# Patient Record
Sex: MALE | Race: WHITE | NOT HISPANIC OR LATINO | Employment: OTHER | URBAN - METROPOLITAN AREA
[De-identification: names, ages, dates, MRNs, and addresses within clinical notes are randomized per-mention and may not be internally consistent; named-entity substitution may affect disease eponyms.]

---

## 2017-01-11 DIAGNOSIS — I48.0 PAROXYSMAL ATRIAL FIBRILLATION (HCC): ICD-10-CM

## 2017-01-11 RX ORDER — WARFARIN SODIUM 5 MG/1
TABLET ORAL
Qty: 60 TABLET | Refills: 1 | Status: SHIPPED | OUTPATIENT
Start: 2017-01-11 | End: 2017-03-11 | Stop reason: SDUPTHER

## 2017-02-16 ENCOUNTER — TELEPHONE (OUTPATIENT)
Dept: CARDIOLOGY | Facility: CLINIC | Age: 62
End: 2017-02-16

## 2017-02-24 ENCOUNTER — ANTICOAGULATION VISIT (OUTPATIENT)
Dept: CARDIOLOGY | Facility: CLINIC | Age: 62
End: 2017-02-24

## 2017-02-24 DIAGNOSIS — I35.9 AORTIC VALVE DISORDERS: ICD-10-CM

## 2017-02-24 LAB — INR PPP: 2.9

## 2017-03-11 DIAGNOSIS — I48.0 PAROXYSMAL ATRIAL FIBRILLATION (HCC): ICD-10-CM

## 2017-03-13 RX ORDER — WARFARIN SODIUM 5 MG/1
TABLET ORAL
Qty: 60 TABLET | Refills: 0 | Status: SHIPPED | OUTPATIENT
Start: 2017-03-13 | End: 2017-04-13 | Stop reason: SDUPTHER

## 2017-04-06 ENCOUNTER — TELEPHONE (OUTPATIENT)
Dept: CARDIOLOGY | Facility: CLINIC | Age: 62
End: 2017-04-06

## 2017-04-13 ENCOUNTER — ANTICOAGULATION VISIT (OUTPATIENT)
Dept: CARDIOLOGY | Facility: CLINIC | Age: 62
End: 2017-04-13

## 2017-04-13 DIAGNOSIS — I48.0 PAROXYSMAL ATRIAL FIBRILLATION (HCC): ICD-10-CM

## 2017-04-13 DIAGNOSIS — I35.9 AORTIC VALVE DISORDERS: ICD-10-CM

## 2017-04-13 LAB — INR PPP: 2.5

## 2017-04-13 RX ORDER — WARFARIN SODIUM 5 MG/1
TABLET ORAL
Qty: 60 TABLET | Refills: 0 | Status: SHIPPED | OUTPATIENT
Start: 2017-04-13 | End: 2017-05-14 | Stop reason: SDUPTHER

## 2017-05-14 DIAGNOSIS — I48.0 PAROXYSMAL ATRIAL FIBRILLATION (HCC): ICD-10-CM

## 2017-05-15 RX ORDER — WARFARIN SODIUM 5 MG/1
TABLET ORAL
Qty: 60 TABLET | Refills: 0 | Status: SHIPPED | OUTPATIENT
Start: 2017-05-15 | End: 2017-06-12 | Stop reason: SDUPTHER

## 2017-06-12 DIAGNOSIS — I48.0 PAROXYSMAL ATRIAL FIBRILLATION (HCC): ICD-10-CM

## 2017-06-12 RX ORDER — WARFARIN SODIUM 5 MG/1
TABLET ORAL
Qty: 15 TABLET | Refills: 0 | Status: SHIPPED | OUTPATIENT
Start: 2017-06-12 | End: 2017-06-20 | Stop reason: SDUPTHER

## 2017-06-17 DIAGNOSIS — I48.0 PAROXYSMAL ATRIAL FIBRILLATION (HCC): ICD-10-CM

## 2017-06-19 RX ORDER — WARFARIN SODIUM 5 MG/1
TABLET ORAL
Qty: 15 TABLET | Refills: 0 | OUTPATIENT
Start: 2017-06-19

## 2017-06-20 ENCOUNTER — ANTICOAGULATION VISIT (OUTPATIENT)
Dept: CARDIOLOGY | Facility: CLINIC | Age: 62
End: 2017-06-20

## 2017-06-20 DIAGNOSIS — I48.0 PAROXYSMAL ATRIAL FIBRILLATION (HCC): ICD-10-CM

## 2017-06-20 DIAGNOSIS — I35.9 AORTIC VALVE DISORDERS: ICD-10-CM

## 2017-06-20 LAB — INR PPP: 2.5

## 2017-06-20 RX ORDER — WARFARIN SODIUM 5 MG/1
TABLET ORAL
Qty: 60 TABLET | Refills: 1 | Status: SHIPPED | OUTPATIENT
Start: 2017-06-20 | End: 2017-10-26 | Stop reason: SDUPTHER

## 2017-06-20 RX ORDER — WARFARIN SODIUM 5 MG/1
TABLET ORAL
Qty: 60 TABLET | Refills: 1 | Status: SHIPPED | OUTPATIENT
Start: 2017-06-20 | End: 2017-06-20 | Stop reason: SDUPTHER

## 2017-07-06 ENCOUNTER — OFFICE VISIT (OUTPATIENT)
Dept: CARDIOLOGY | Facility: CLINIC | Age: 62
End: 2017-07-06

## 2017-07-06 VITALS
HEART RATE: 75 BPM | WEIGHT: 315 LBS | HEIGHT: 75 IN | DIASTOLIC BLOOD PRESSURE: 63 MMHG | BODY MASS INDEX: 39.17 KG/M2 | SYSTOLIC BLOOD PRESSURE: 124 MMHG

## 2017-07-06 DIAGNOSIS — I35.0 AORTIC VALVE STENOSIS, UNSPECIFIED ETIOLOGY: Primary | ICD-10-CM

## 2017-07-06 DIAGNOSIS — I10 ESSENTIAL HYPERTENSION: ICD-10-CM

## 2017-07-06 PROCEDURE — 99213 OFFICE O/P EST LOW 20 MIN: CPT | Performed by: INTERNAL MEDICINE

## 2017-07-06 RX ORDER — GLIPIZIDE 5 MG/1
5 TABLET ORAL
COMMUNITY

## 2017-07-06 RX ORDER — PRAVASTATIN SODIUM 40 MG
40 TABLET ORAL DAILY
COMMUNITY
End: 2019-06-24

## 2017-07-06 NOTE — PROGRESS NOTES
OFFICE FOLLOW UP     Date of Encounter:2017     Name: Cecilio Farias  : 1955  Address: [address] [place], [place] [address]  Phone: [phone]    PCP: America Everett, DO  [address] [address]    Cecilio Farias is a 61 y.o. male.    Chief Complaint: Follow up of Aortic stenosis(AVR), HTN, Dyslipidemia    Problem List:   1. Severe valvular aortic stenosis:  a. Acute systolic CHF, winter 2009.   b. LVEF 20% to 25%.   c. Normal coronary arteries.   d. 25 mm. 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  i. Paroxysmal atrial fibrillation, resolved.   e. MUGA EF = 64%, 2010.  f. EF >70%, mechanical aortic valve with normal function (16).  2. Hypertension.   3. Dyslipidemia.   4. Diabetes mellitus, type 2.   5. Morbid obesity.   6. Colon cancer with operation/chemotherapy/radiation.   7. Status post operations, remote.     Allergies:  Allergies   Allergen Reactions   • Sulfa Antibiotics        Current Medications:    Current Outpatient Prescriptions:   •  Amlodipine 10 mg po daily.  •  aspirin EC 81 mg po daily.    •  carvedilol 25 MG by mouth 2 times a day.  •  Gabapentin 100 MG capsule 2 times a day.   •  lisinopril 20 MG by mouth twice daily.   •  Metformin 500 mg by mouth 2 times a day with meals.  •  warfarin 5 MG tablet, Take 2 pills every evening or as directed.    History of Present Illness:   The patient returns for follow-up today.  He is active and asymptomatic.  He has specifically had no symptoms of chest pain or heart failure and no palpitation suggesting recurrent paroxysmal atrial fibrillation.         The following portions of the patient's history were reviewed and updated as appropriate: allergies, current medications and problem list.    HPI: Pertinent positives as listed in the HPI.  All other systems reviewed and negative.    Objective:    Vitals:    17 1150 17 1151   BP: 125/65 124/63   BP Location: Left arm Left arm  "  Patient Position: Sitting Standing   Pulse: 75 75   Weight: (!) 320 lb (145 kg)    Height: 75\" (190.5 cm)        Physical Exam:  GENERAL: Alert, cooperative, in no acute distress.   HEENT: Fundoscopic deferred, otherwise unremarkable.  NECK: No Jugular venous distention, adenopathy, or thyromegaly noted.   HEART: Regular rhythm, normal rate, NORMAL mechanical valve sounds with 1/6 systolic flow murmur. There are no diastolic murmurs.  LUNGS: Clear to auscultation bilaterally. No wheezing, rales or rhonchi.  NEUROLOGIC: No focal abnormalities involving strength or sensation are noted.   EXTREMITIES: No clubbing, cyanosis, or edema noted.     Diagnostic Data:  N/A    Procedures      Assessment and Plan: The patient and they'll symptomatic with a normally functioning mechanical aortic valve and LV function which is now normal following correction of his severe valvular aortic stenosis and atrial fibrillation.  I talked to him about the continued importance of lifelong warfarin treatment and about the need for SBE prophylaxis when necessary recommendations of the American [employer] Association.  He does have an AHA card in his wallet at the current time and understands this.  I would like for him to continue follow-up with America Everett D.O. and to see me back in one year, sooner as needed.  I have, again, urged him to follow up with a weight loss program.      Scribed for Kurtis Smith MD by Linda North RN. 7/6/2017  11:55 AM    I, Kurtis Smith MD, [facility], [facility], personally performed the services described in this documentation as scribed by the above named individual in my presence, and it is both accurate and complete. At 12:00 PM on 07/06/2017    EMR Dragon/Transcription Disclaimer:  Much of this encounter note is an electronic transcription/translation of spoken language to printed text.  The electronic translation of spoken language may permit erroneous, or at times, nonsensical words or phrases to be " inadvertently transcribed.  Although I have reviewed the note for such errors, some may still exist.

## 2017-08-02 ENCOUNTER — ANTICOAGULATION VISIT (OUTPATIENT)
Dept: CARDIOLOGY | Facility: CLINIC | Age: 62
End: 2017-08-02

## 2017-08-02 DIAGNOSIS — I35.9 AORTIC VALVE DISORDERS: ICD-10-CM

## 2017-08-02 LAB — INR PPP: 2.2

## 2017-10-25 DIAGNOSIS — I48.0 PAROXYSMAL ATRIAL FIBRILLATION (HCC): ICD-10-CM

## 2017-10-25 RX ORDER — WARFARIN SODIUM 5 MG/1
TABLET ORAL
Qty: 60 TABLET | Refills: 0 | OUTPATIENT
Start: 2017-10-25

## 2017-10-25 NOTE — TELEPHONE ENCOUNTER
Joanie, please call Mr. Farias -- he has not had an INR since 8/2 -- may call in a short supply, but he otherwise needs labwork before we will authorize more refills.

## 2017-10-26 ENCOUNTER — ANTICOAGULATION VISIT (OUTPATIENT)
Dept: PHARMACY | Facility: HOSPITAL | Age: 62
End: 2017-10-26

## 2017-10-26 DIAGNOSIS — Z95.2 H/O MECHANICAL AORTIC VALVE REPLACEMENT: Primary | ICD-10-CM

## 2017-10-26 DIAGNOSIS — I35.9 AORTIC VALVE DISORDER: ICD-10-CM

## 2017-10-26 LAB — INR PPP: 2.9

## 2017-10-26 RX ORDER — WARFARIN SODIUM 5 MG/1
TABLET ORAL
Qty: 60 TABLET | Refills: 1 | OUTPATIENT
Start: 2017-10-26 | End: 2017-12-29 | Stop reason: SDUPTHER

## 2017-10-26 NOTE — PROGRESS NOTES
Anticoagulation Clinic - Remote Progress Note  REMOTE LAB    Indication: mechanical AVR  Referring Provider: Luis  Initial Warfarin Start Date:   Goal INR: 2.5-3.5  Current Drug Interactions:   CHADS-VASc:   Bleed Risk: [HASBLED; HIGH/MODERATE/LOW + REASON; H/O BLEED]  Other: [PREVIOUS ANTICOAGULATION, ETC]    Diet: [GLV INTAKE]  Alcohol:   Tobacco:   OTC Pain Medication:    INR History:  Date 8/2 10/26          Total Weekly Dose 62.5mg           INR 2.2 2.9          Notes              Phone Interview:  Tablet Strength: pt has 5mg tabs  Current Maintenance Dose: 7.5 mg on Sun, Wed, Fri; 10 mg all other days        Patient Contact Info:  Lab Contact Info: [facility] - [phone]    Plan:  1. INR is therapeutic today. Instructed pt to continue maintenance dose of warfarin   2. Repeat INR in   3. Pt requests / declines warfarin refills.  4. Verbal information provided over the phone to [pt / caregiver]. [Pt / caregiver] RBV dosing instructions, expresses understanding, and has no further questions at this time.

## 2017-10-26 NOTE — PROGRESS NOTES
Anticoagulation Clinic - Remote Progress Note  REMOTE LAB     Indication: mechanical AVR  Referring Provider: Luis  Initial Warfarin Start Date: 2009  Goal INR: 2.5-3.5  Current Drug Interactions: aspirin     Diet: green beans occasionally  Alcohol: none  Tobacco: none  OTC Pain Medication:     INR History:  Date 8/2 10/26                 Total Weekly Dose 62.5mg 62.5 mg                 INR 2.2 2.9                 Notes                        Phone Interview:  Tablet Strength: pt has 5mg tabs  Current Maintenance Dose: 7.5 mg on Sun, Wed, Fri; 10 mg all other days  Patient Findings      Negatives Signs/symptoms of thrombosis, Signs/symptoms of bleeding, Laboratory test error suspected, Change in health, Change in alcohol use, Change in activity, Upcoming invasive procedure, Emergency department visit, Upcoming dental procedure, Missed doses, Extra doses, Change in medications, Change in diet/appetite, Hospital admission, Bruising, Other complaints     Patient Contact Info: [phone] (Home), [phone] (Mobile)  Lab Contact Info: [facility] - [phone]     Welcomed Cecilio Farias to [facility]. I introduced myself and discussed that we will assist with his warfarin monitoring and work with his cardiologist or other physicians to provide patient care. Encouraged patient to call the clinic with requests for warfarin refills, changes in medications / diet, change in health, or upcoming procedures / surgeries. At this time, Cecilio Farias did not have further questions or concerns.     Plan:  1. INR is therapeutic today. Instructed pt to continue maintenance dose of warfarin, 10mg daily except 7.5mg SunWedFri.   2. Repeat INR in 4 weeks.  3. Pt requests warfarin refills.  4. Verbal information provided over the phone to Mr. Farias. He RBV dosing instructions, expresses understanding, and has no further questions at this time.    Ann Cowden Mayer, PharmD  10/26/2017  4:45 PM

## 2017-12-21 ENCOUNTER — ANTICOAGULATION VISIT (OUTPATIENT)
Dept: PHARMACY | Facility: HOSPITAL | Age: 62
End: 2017-12-21

## 2017-12-21 ENCOUNTER — TELEPHONE (OUTPATIENT)
Dept: PHARMACY | Facility: HOSPITAL | Age: 62
End: 2017-12-21

## 2017-12-21 DIAGNOSIS — Z95.2 H/O MITRAL VALVE REPLACEMENT WITH MECHANICAL VALVE: Primary | ICD-10-CM

## 2017-12-21 DIAGNOSIS — I35.9 AORTIC VALVE DISORDER: ICD-10-CM

## 2017-12-21 LAB — INR PPP: 3.2

## 2017-12-21 NOTE — PROGRESS NOTES
Anticoagulation Clinic - Remote Progress Note  REMOTE LAB     Indication: mechanical AVR  Referring Provider: Luis  Initial Warfarin Start Date: 2009  Goal INR: 2.5-3.5  Current Drug Interactions: aspirin     Diet: green beans occasionally  Alcohol: none  Tobacco: none  OTC Pain Medication:     INR History:  Date 8/2 10/26  12/21               Total Weekly Dose 62.5mg 62.5 mg  62.5mg               INR 2.2 2.9  3.2               Notes                        Phone Interview:  Tablet Strength: pt has 5mg tabs  Current Maintenance Dose: 7.5 mg on Sun, Wed, Fri; 10 mg all other days  Patient Findings      Negatives Signs/symptoms of thrombosis, Signs/symptoms of bleeding, Laboratory test error suspected, Change in health, Change in alcohol use, Change in activity, Upcoming invasive procedure, Emergency department visit, Upcoming dental procedure, Missed doses, Extra doses, Change in medications, Change in diet/appetite, Hospital admission, Bruising, Other complaints   Patient Contact Info: [phone] (Home), [phone] (Mobile)  Lab Contact Info: [facility] - [phone]     Plan:  1. INR is therapeutic today at 3.2. Instructed pt to continue maintenance dose of warfarin, 10mg daily except 7.5mg SunWedFri.   2. Repeat INR in 4 weeks.  3. Pt declines warfarin refills  4. Verbal information provided over the phone to Mr. Farias. He RBV dosing instructions, expresses understanding, and has no further questions at this time.    Viviane Staples   12/21/2017  3:48 PM

## 2017-12-29 DIAGNOSIS — I48.0 PAROXYSMAL ATRIAL FIBRILLATION (HCC): ICD-10-CM

## 2017-12-29 RX ORDER — WARFARIN SODIUM 5 MG/1
TABLET ORAL
Qty: 60 TABLET | Refills: 3 | Status: SHIPPED | OUTPATIENT
Start: 2017-12-29 | End: 2018-07-11 | Stop reason: SDUPTHER

## 2017-12-29 NOTE — PROGRESS NOTES
I, Madie Lino [facility], have reviewed the note in full and agree with the assessment and plan.  12/29/17  8:27 AM

## 2018-01-19 ENCOUNTER — ANTICOAGULATION VISIT (OUTPATIENT)
Dept: PHARMACY | Facility: HOSPITAL | Age: 63
End: 2018-01-19

## 2018-01-19 DIAGNOSIS — I35.9 AORTIC VALVE DISORDER: ICD-10-CM

## 2018-01-19 LAB — INR PPP: 3

## 2018-01-19 NOTE — PROGRESS NOTES
Anticoagulation Clinic - Remote Progress Note  REMOTE LAB     Indication: mechanical AVR  Referring Provider: Luis  Initial Warfarin Start Date: 2009  Goal INR: 2.5-3.5  Current Drug Interactions: aspirin     Diet: green beans occasionally  Alcohol: none  Tobacco: none  OTC Pain Medication:     INR History:  Date 8/2 10/26  12/21  1/19/18             Total Weekly Dose 62.5mg 62.5mg  62.5mg               INR 2.2 2.9  3.2  3.0             Notes                        Phone Interview:  Tablet Strength: pt has 5mg tabs  Current Maintenance Dose: 10mg daily except 7.5mg SunWedFri    Patient Findings      Negatives Signs/symptoms of thrombosis, Signs/symptoms of bleeding, Laboratory test error suspected, Change in health, Change in alcohol use, Change in activity, Upcoming invasive procedure, Emergency department visit, Upcoming dental procedure, Missed doses, Extra doses, Change in medications, Change in diet/appetite, Hospital admission, Bruising, Other complaints     Patient Contact Info: [phone] (Home), [phone] (Mobile)  Lab Contact Info: [facility] - [phone]     Plan:  1. INR is therapeutic today at 3.0. Instructed pt to continue maintenance dose of warfarin 10mg daily except 7.5mg SunWedFri.   2. Repeat INR in 4 weeks.  3. Pt declines warfarin refills  4. Verbal information provided over the phone to Mr. Farias. He RBV dosing instructions, expresses understanding, and has no further questions at this time.    Kurtis Hackett CPhT  1/19/2018  3:33 PM    I, Kurtis Mortensen [facility], have reviewed the note in full and agree with the assessment and plan.  01/19/18  4:19 PM

## 2018-02-20 ENCOUNTER — ANTICOAGULATION VISIT (OUTPATIENT)
Dept: PHARMACY | Facility: HOSPITAL | Age: 63
End: 2018-02-20

## 2018-02-20 DIAGNOSIS — I35.9 AORTIC VALVE DISORDER: ICD-10-CM

## 2018-02-20 LAB — INR PPP: 2.9

## 2018-02-20 NOTE — PROGRESS NOTES
Anticoagulation Clinic - Remote Progress Note  REMOTE LAB     Indication: mechanical AVR  Referring Provider: Luis  Initial Warfarin Start Date: 2009  Goal INR: 2.5-3.5  Current Drug Interactions: aspirin     Diet: green beans occasionally  Alcohol: none  Tobacco: none  OTC Pain Medication:     INR History:  Date 8/2 10/26  12/21  1/19/18  2/20           Total Weekly Dose 62.5mg 62.5mg  62.5mg  62.5mg  62.5mg           INR 2.2 2.9  3.2  3.0  2.9           Notes                        Phone Interview:  Tablet Strength: pt has 5mg tabs  Current Maintenance Dose: 10mg daily except 7.5mg SunWedFri  Patient Contact Info: [phone] (Home), [phone] (Mobile)  Lab Contact Info: [facility] - [phone]     Patient Findings      Negatives Signs/symptoms of thrombosis, Signs/symptoms of bleeding, Laboratory test error suspected, Change in health, Change in alcohol use, Change in activity, Upcoming invasive procedure, Emergency department visit, Upcoming dental procedure, Missed doses, Extra doses, Change in medications, Change in diet/appetite, Hospital admission, Bruising, Other complaints     Comments Patient reports that he may have his remaining teeth pulled and getting dentures over the next few months. Patient stated that he would keep us updated.     Plan:  1. INR is therapeutic today at 2.9. Instructed pt to continue maintenance dose of warfarin 10mg daily except 7.5mg SunWedFri.   2. Repeat INR in 4 weeks. (3/20)  3. Patient declines the need for warfarin refills at this time.   4. Verbal information provided over the phone to Mr. Farias. He RBV dosing instructions, expresses understanding, and has no further questions at this time.    Viviane Staples  2/20/2018  1:51 PM    Kurtis SALMON [facility], have reviewed the note in full and agree with the assessment and plan.  02/20/18  2:10 PM

## 2018-03-27 LAB — INR PPP: 3.4

## 2018-03-28 ENCOUNTER — ANTICOAGULATION VISIT (OUTPATIENT)
Dept: PHARMACY | Facility: HOSPITAL | Age: 63
End: 2018-03-28

## 2018-03-28 DIAGNOSIS — I35.9 AORTIC VALVE DISORDER: ICD-10-CM

## 2018-03-28 NOTE — PROGRESS NOTES
Anticoagulation Clinic - Remote Progress Note  REMOTE LAB     Indication: mechanical AVR  Referring Provider: Luis  Initial Warfarin Start Date: 2009  Goal INR: 2.5-3.5  Current Drug Interactions: aspirin     Diet: green beans occasionally  Alcohol: none  Tobacco: none  OTC Pain Medication:     INR History:  Date 8/2 10/26  12/21  1/19/18  2/20  3/27         Total Weekly Dose 62.5mg 62.5mg  62.5mg  62.5mg  62.5mg  62.5mg         INR 2.2 2.9  3.2  3.0  2.9  3.4         Notes                        Phone Interview:  Tablet Strength: pt has 5mg tabs  Current Maintenance Dose: 10mg daily except 7.5mg SunWedFri  Patient Contact Info: [phone] (Home), [phone] (Mobile)  Lab Contact Info: [facility] [phone]     Patient Findings:   Negatives Signs/symptoms of thrombosis, Signs/symptoms of bleeding, Laboratory test error suspected, Change in health, Change in alcohol use, Change in activity, Upcoming invasive procedure, Emergency department visit, Upcoming dental procedure, Missed doses, Extra doses, Change in medications, Change in diet/appetite, Hospital admission, Bruising, Other complaints     Plan:  1. INR was therapeutic on 3/27 at 3.4, so instructed pt to continue warfarin 10mg daily except 7.5mg SunWedFri.   2. Repeat INR in 4 weeks. (4/24)  3. Patient declines the need for warfarin refills at this time.   4. Verbal information provided over the phone to Mr. Farias. He RBV dosing instructions, expresses understanding, and has no further questions at this time.    Viviane Staples CPhT  3/28/2018  9:36 AM    Madie SALMON [facility], have reviewed the note in full and agree with the assessment and plan.  03/28/18  9:46 AM

## 2018-04-30 ENCOUNTER — ANTICOAGULATION VISIT (OUTPATIENT)
Dept: PHARMACY | Facility: HOSPITAL | Age: 63
End: 2018-04-30

## 2018-04-30 DIAGNOSIS — I35.9 AORTIC VALVE DISORDER: ICD-10-CM

## 2018-04-30 LAB — INR PPP: 3.7

## 2018-04-30 NOTE — PROGRESS NOTES
Anticoagulation Clinic - Remote Progress Note  REMOTE LAB     Indication: mechanical AVR  Referring Provider: Luis  Initial Warfarin Start Date: 2009  Goal INR: 2.5-3.5  Current Drug Interactions: aspirin     Diet: green beans occasionally  Alcohol: none  Tobacco: none  OTC Pain Medication:     INR History:  Date 8/2 10/26  12/21  1/19/18  2/20  3/27  4/27       Total Weekly Dose 62.5mg 62.5mg  62.5mg  62.5mg  62.5mg  62.5mg  62.5mg       INR 2.2 2.9  3.2  3.0  2.9  3.4  3.7       Notes              sick          Phone Interview:  Tablet Strength: pt has 5mg tabs  Current Maintenance Dose: 10mg daily except 7.5mg SunWedFri  Patient Contact Info: [phone] (Home), [phone] (Mobile)  Lab Contact Info: [facility] [phone]     Patient Findings   Positives Change in health, Change in diet/appetite   Negatives Signs/symptoms of thrombosis, Signs/symptoms of bleeding, Laboratory test error suspected, Change in alcohol use, Change in activity, Upcoming invasive procedure, Emergency department visit, Upcoming dental procedure, Missed doses, Extra doses, Change in medications, Hospital admission, Bruising, Other complaints   Comments Mr. Farias reports that he was sick (n/v/d) x 3-4 days on 4/23- 4/26. He reports he didn't eat anything.     Plan:  1. INR was slighly SUPRAtherapeutic on 4/27 at 3.7 -likely due to illness that week. Given INR was drawn on Friday and patient's appetite has since increased and he has received two lower doses in typical regimen, instructed pt to eat 1 cup of green beans tonight and continue warfarin 10mg daily except 7.5mg SunWedFri.   2. Repeat INR in 2 weeks to ensure back WNL.  3. Patient declines the need for warfarin refills at this time.   4. Verbal information provided over the phone to Mr. Farias. He RBV dosing instructions, expresses understanding, and has no further questions at this time.    Olga Friend, PharmD  4/30/2018  6:14 PM

## 2018-05-15 ENCOUNTER — TELEPHONE (OUTPATIENT)
Dept: PHARMACY | Facility: HOSPITAL | Age: 63
End: 2018-05-15

## 2018-05-16 LAB — INR PPP: 2.9

## 2018-05-17 ENCOUNTER — ANTICOAGULATION VISIT (OUTPATIENT)
Dept: PHARMACY | Facility: HOSPITAL | Age: 63
End: 2018-05-17

## 2018-05-17 DIAGNOSIS — I35.9 AORTIC VALVE DISORDER: ICD-10-CM

## 2018-05-17 NOTE — PROGRESS NOTES
Anticoagulation Clinic - Remote Progress Note  REMOTE LAB     Indication: mechanical AVR  Referring Provider: Luis  Initial Warfarin Start Date: 2009  Goal INR: 2.5-3.5  Current Drug Interactions: aspirin     Diet: green beans occasionally  Alcohol: none  Tobacco: none  OTC Pain Medication:     INR History:  Date 8/2 10/26  12/21  1/19/18  2/20  3/27  4/27  5/16     Total Weekly Dose 62.5mg 62.5mg  62.5mg  62.5mg  62.5mg  62.5mg  62.5mg  62.5mg     INR 2.2 2.9  3.2  3.0  2.9  3.4  3.7  2.9     Notes              sick          Phone Interview:  Tablet Strength: pt has 5mg tabs  Current Maintenance Dose: 10mg daily except 7.5mg SunWedFri  Patient Contact Info: [phone] (Home), [phone] (Mobile)  Lab Contact Info: [facility] [phone]     Patient Findings:   Negatives:   Signs/symptoms of thrombosis, Signs/symptoms of bleeding, Laboratory test error suspected, Change in health, Change in alcohol use, Change in activity, Upcoming invasive procedure, Emergency department visit, Upcoming dental procedure, Missed doses, Extra doses, Change in medications, Change in diet/appetite, Hospital admission, Bruising, Other complaints   Comments:   Patient was ill at last encounter but states that he is feeling much better now.     Plan:  1. INR was back WNL on 5/16 at 2.9 after a hearty serving of GLV a few weeks ago and return to health. Instructed patient to continue warfarin 10mg daily except 7.5mg SunWedFri.   2. Repeat INR in 3 weeks. (6/6)  3. Patient declines the need for warfarin refills at this time.   4. Verbal information provided over the phone to Mr. Farias. He RBV dosing instructions, expresses understanding, and has no further questions at this time.    Viviane Staples, Pharmacy Technician  5/17/2018  8:17 AM    I, Olga Friend, PharmD, have reviewed the note in full and agree with the assessment and plan.  05/17/18  12:28 PM

## 2018-06-11 LAB — INR PPP: 2.6

## 2018-06-12 ENCOUNTER — ANTICOAGULATION VISIT (OUTPATIENT)
Dept: PHARMACY | Facility: HOSPITAL | Age: 63
End: 2018-06-12

## 2018-06-12 DIAGNOSIS — I35.9 AORTIC VALVE DISORDER: ICD-10-CM

## 2018-06-12 NOTE — PROGRESS NOTES
Anticoagulation Clinic - Remote Progress Note  REMOTE LAB     Indication: mechanical AVR  Referring Provider: Luis  Initial Warfarin Start Date: 2009  Goal INR: 2.5-3.5  Current Drug Interactions: aspirin     Diet: green beans occasionally  Alcohol: none  Tobacco: none  OTC Pain Medication:     INR History:  Date 8/2 10/26  12/21  1/19/18  2/20  3/27  4/27  5/16  6/11      Total Weekly Dose 62.5mg 62.5mg  62.5mg  62.5mg  62.5mg  62.5mg  62.5mg  62.5mg  62.5mg      INR 2.2 2.9  3.2  3.0  2.9  3.4  3.7  2.9  2.6      Notes              sick             Phone Interview:  Tablet Strength: 5mg tabs  Current Maintenance Dose: 10mg daily except 7.5mg SunWedFri  Patient Contact Info: [phone] (Home), [phone] (Mobile)  Lab Contact Info: [facility] [phone]    Patient Findings   Negatives:   Signs/symptoms of thrombosis, Signs/symptoms of bleeding, Laboratory test error suspected, Change in health, Change in alcohol use, Change in activity, Upcoming invasive procedure, Emergency department visit, Upcoming dental procedure, Missed doses, Extra doses, Change in medications, Change in diet/appetite, Hospital admission, Bruising, Other complaints     Plan:  1. INR was therapeutic on 6/11 at 2.6. Instructed patient to continue warfarin 10mg daily except 7.5mg SunWedFri.   2. Repeat INR in 4 weeks, 7/9  3. Verbal information provided over the phone to Mr. Farisa. He RBV dosing instructions, expresses understanding, and has no further questions at this time.  4. Patient declines the need for warfarin refills at this time.     Kurtis Hackett, Ozzie  6/14/2018  10:37 AM     I, Olga Friend, PharmD, have reviewed the note in full and agree with the assessment and plan.  06/15/18  4:53 PM

## 2018-07-11 DIAGNOSIS — I48.0 PAROXYSMAL ATRIAL FIBRILLATION (HCC): ICD-10-CM

## 2018-07-11 RX ORDER — WARFARIN SODIUM 5 MG/1
TABLET ORAL
Qty: 60 TABLET | Refills: 2 | OUTPATIENT
Start: 2018-07-11

## 2018-07-11 RX ORDER — WARFARIN SODIUM 5 MG/1
TABLET ORAL
Qty: 60 TABLET | Refills: 3 | OUTPATIENT
Start: 2018-07-11 | End: 2018-11-10 | Stop reason: SDUPTHER

## 2018-07-16 ENCOUNTER — ANTICOAGULATION VISIT (OUTPATIENT)
Dept: PHARMACY | Facility: HOSPITAL | Age: 63
End: 2018-07-16

## 2018-07-16 DIAGNOSIS — I35.9 AORTIC VALVE DISORDER: ICD-10-CM

## 2018-07-16 DIAGNOSIS — I48.0 PAROXYSMAL ATRIAL FIBRILLATION (HCC): ICD-10-CM

## 2018-07-16 LAB — INR PPP: 2.9

## 2018-07-16 RX ORDER — WARFARIN SODIUM 5 MG/1
TABLET ORAL
Qty: 60 TABLET | Refills: 2 | OUTPATIENT
Start: 2018-07-16

## 2018-07-16 RX ORDER — GABAPENTIN 800 MG/1
800 TABLET ORAL 3 TIMES DAILY
COMMUNITY
Start: 2019-03-03

## 2018-07-16 NOTE — PROGRESS NOTES
Anticoagulation Clinic - Remote Progress Note  REMOTE LAB     Indication: mechanical AVR  Referring Provider: Luis  Initial Warfarin Start Date: 2009  Goal INR: 2.5-3.5  Current Drug Interactions: aspirin     Diet: green beans occasionally  Alcohol: none  Tobacco: none  OTC Pain Medication:     INR History:  Date 8/2 10/26  12/21  1/19/18  2/20  3/27  4/27  5/16  6/11 7/16     Total Weekly Dose 62.5mg 62.5mg  62.5mg  62.5mg  62.5mg  62.5mg  62.5mg  62.5mg  62.5mg 62.5mg     INR 2.2 2.9  3.2  3.0  2.9  3.4  3.7  2.9  2.6 2.9     Notes              sick             Phone Interview:  Tablet Strength: 5mg tabs  Current Maintenance Dose: 10mg daily except 7.5mg SunWedFri  Patient Contact Info: [phone] (Home), [phone] (Mobile)  Lab Contact Info: [facility] [phone]    Patient Findings:  Negatives:   Signs/symptoms of thrombosis, Signs/symptoms of bleeding, Laboratory test error suspected, Change in health, Change in alcohol use, Change in activity, Upcoming invasive procedure, Emergency department visit, Upcoming dental procedure, Missed doses, Extra doses, Change in medications, Change in diet/appetite, Hospital admission, Bruising, Other complaints   Comments:   Spoke with patient who denies any changes since last encounter     Plan:  1. INR is therapeutic today at 2.9. Instructed patient to continue warfarin 10mg daily except 7.5mg SunWedFri.   2. Repeat INR in 4 weeks, 8/13.  3. Verbal information provided over the phone to Mr. Farias. He RBV dosing instructions, expresses understanding, and has no further questions at this time.  4. Patient declines the need for warfarin refills at this time.     Maritza Rapp CPhT  7/16/2018  3:19 PM     I, Magaly Oneil [facility], have reviewed the note in full and agree with the assessment and plan.  07/17/18  9:09 AM

## 2018-07-17 ENCOUNTER — OFFICE VISIT (OUTPATIENT)
Dept: CARDIOLOGY | Facility: CLINIC | Age: 63
End: 2018-07-17

## 2018-07-17 VITALS
DIASTOLIC BLOOD PRESSURE: 71 MMHG | SYSTOLIC BLOOD PRESSURE: 137 MMHG | HEART RATE: 70 BPM | HEIGHT: 75 IN | BODY MASS INDEX: 39.17 KG/M2 | WEIGHT: 315 LBS

## 2018-07-17 DIAGNOSIS — I10 ESSENTIAL HYPERTENSION: ICD-10-CM

## 2018-07-17 DIAGNOSIS — E78.5 DYSLIPIDEMIA: ICD-10-CM

## 2018-07-17 DIAGNOSIS — I35.9 AORTIC VALVE DISORDER: Primary | ICD-10-CM

## 2018-07-17 PROCEDURE — 99213 OFFICE O/P EST LOW 20 MIN: CPT | Performed by: INTERNAL MEDICINE

## 2018-07-17 NOTE — PROGRESS NOTES
OFFICE FOLLOW UP     Date of Encounter:2018     Name: Cecilio Farias  : 1955  Address: [address] [place], [place] [address]  Phone:     PCP: America Everett, DO  [address] [address]    Cecilio Farias is a 62 y.o. male.      Chief Complaint: Follow up of AS, HTN, HLD    Problem List:   1. Severe valvular aortic stenosis:  a. Acute systolic CHF, winter 2009.   b. LVEF 20% to 25%.   c. Normal coronary arteries.   d. 25 mm. 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  i. Paroxysmal atrial fibrillation, resolved.   e. MUGA EF = 64%, 2010.  f. EF >70%, mechanical aortic valve with normal function (16).  2. Hypertension.   3. Dyslipidemia.   4. Diabetes mellitus, type 2.   5. Morbid obesity.   6. Colon cancer with operation/chemotherapy/radiation.   7. Status post operations, remote.    Allergies   Allergen Reactions   • Sulfa Antibiotics        Current Medications:    Current Outpatient Prescriptions:   •  amLODIPine 10 mg by mouth daily.  •  aspirin 81 MG EC by mouth daily.  •  carvedilol 25 mg by mouth 2 (two) times a day with meals.  •  gabapentin 600 mg by mouth 3 (Three) Times a Day.  •  glipiZIDE 5 mg by mouth 2 (Two) Times a Day Before Meals  •  lisinopril 20 mg by mouth 2 (two) times a day  •  metFORMIN 500 mg by mouth 2 (two) times a day with meals.  •  pravastatin 80 mg by mouth Daily.  •  warfarin (COUMADIN) 5 MG tablet, TAKE 1.5-2 TABLETS BY MOUTH ONCE DAILY AS DIRECTED BY ANTICOAGULATION PHARMACIST.    History of Present Illness: Mr. Farias returns for follow-up today.  He is active and asymptomatic.  He has not had bleeding issues or issues with Coumadin regulation.  His last INR was 2.9 per patient.      He has not had angina or symptoms of congestive heart failure.  Home blood pressure readings have largely demonstrated systolic pressures of less than 1:30.         The following portions of the patient's history were reviewed and updated as  "appropriate: allergies, current medications and problem list.    ROS: Pertinent positives as listed in the HPI.  All other systems reviewed and negative.    Objective:    Vitals:    07/17/18 1149 07/17/18 1150   BP: 134/70 137/71   BP Location: Left arm Left arm   Patient Position: Standing Sitting   Pulse: 77 70   Weight: (!) 150 kg (331 lb) (!) 150 kg (331 lb)   Height: 190.5 cm (75\") 190.5 cm (75\")       Physical Exam:  GENERAL: Alert, cooperative, in no acute distress. Obese.  HEENT: Normocephalic, no adenopathy, no jugular venous distention  HEART: No discrete PMI is noted. Regular rhythm, normal rate. There are normal mechanical valve sounds with a faint basilar systolic murmur.   LUNGS: Clear to auscultation bilaterally. No wheezing, rales or ronchi.  ABDOMEN: Soft, bowel sounds present, non-tender   NEUROLOGIC: No focal abnormalities involving strength or sensation are noted.   EXTREMITIES: No clubbing, cyanosis, or edema noted. Peripheral pulses are present.    Diagnostic Data:    Lab Results   Component Value Date    INR 2.90 07/16/2018    INR 2.60 06/11/2018    INR 2.90 05/16/2018     Procedures      Assessment and Plan:   1.  AVR: He has had his St. Kevin valve for almost a decade and no complications ensued.  He understands the need for continued follow-up, Coumadin regulation, and the need for when necessary SBE prophylaxis as recommended by our credentialing agencies.  2.  HTN: He is essentially at target.  Again, he needs to lose weight.  3.  HLP and T2DM: He did not bring a lipid panel with him today but tells me that one is planned.  He is well tells me that his diabetes has been \"slightly\" out of bounds.  Again, medical management and weight control her very important.    The patient is strongly urged to bring labs from outside facilities to me at in all of his visits.    I will see Cecilio Farias back in one year or sooner on an as needed basis.    I, Kurtis Smith MD, personally performed the " services described as documented by the above named individual. I have made any necessary edits and it is both accurate and complete 7/17/2018  12:39 PM        Scribed for Kurtis Smith MD by Linda North RN. 07/17/2018 11:45 AM.        EMR Dragon/Transcription Disclaimer:  Much of this encounter note is an electronic transcription/translation of spoken language to printed text.  The electronic translation of spoken language may permit erroneous, or at times, nonsensical words or phrases to be inadvertently transcribed.  Although I have reviewed the note for such errors, some may still exist.

## 2018-09-18 ENCOUNTER — TELEPHONE (OUTPATIENT)
Dept: PHARMACY | Facility: HOSPITAL | Age: 63
End: 2018-09-18

## 2018-09-20 ENCOUNTER — ANTICOAGULATION VISIT (OUTPATIENT)
Dept: PHARMACY | Facility: HOSPITAL | Age: 63
End: 2018-09-20

## 2018-09-20 DIAGNOSIS — I35.9 AORTIC VALVE DISORDER: ICD-10-CM

## 2018-09-20 LAB — INR PPP: 2.9

## 2018-09-20 NOTE — PROGRESS NOTES
Anticoagulation Clinic - Remote Progress Note  REMOTE LAB     Indication: mechanical AVR  Referring Provider: Luis  Initial Warfarin Start Date: 2009  Goal INR: 2.5-3.5  Current Drug Interactions: aspirin     Diet: green beans occasionally  Alcohol: none  Tobacco: none  OTC Pain Medication:     INR History:  Date 8/2 10/26 12/21 1/19/18 2/20 3/27 4/27 5/16 6/11 7/16 9/20    Total Weekly Dose 62.5mg 62.5mg 62.5mg 62.5mg 62.5mg 62.5mg 62.5mg 62.5mg 62.5mg 62.5mg 62.5mg    INR 2.2 2.9 3.2 3.0 2.9 3.4 3.7 2.9 2.6 2.9 2.9    Notes              sick             Phone Interview:  Tablet Strength: 5mg tabs  Current Maintenance Dose: 10mg daily except 7.5mg SunWedFri  Patient Contact Info: [phone] (Home), [phone] (Mobile)  Lab Contact Info: [facility] [phone]    Patient Findings   Negatives:   Signs/symptoms of thrombosis, Signs/symptoms of bleeding, Laboratory test error suspected, Change in health, Change in alcohol use, Change in activity, Upcoming invasive procedure, Emergency department visit, Upcoming dental procedure, Missed doses, Extra doses, Change in medications, Change in diet/appetite, Hospital admission, Bruising, Other complaints     Plan:  1. INR is therapeutic today at 2.9. Instructed patient to continue warfarin 10mg daily except 7.5mg SunWedFri.   2. Repeat INR in 4 weeks, 10/18.  3. Verbal information provided over the phone. Cecilio Farias RBV dosing instructions, expresses understanding by teach back, and has no further questions at this time.  4. Patient declines the need for warfarin refills at this time.     Kurtis Hackett, Ozzie  9/20/2018  4:04 PM     IOlga, PharmD, have reviewed the note in full and agree with the assessment and plan.  09/21/18  9:33 AM

## 2018-10-31 ENCOUNTER — TELEPHONE (OUTPATIENT)
Dept: PHARMACY | Facility: HOSPITAL | Age: 63
End: 2018-10-31

## 2018-11-02 ENCOUNTER — ANTICOAGULATION VISIT (OUTPATIENT)
Dept: PHARMACY | Facility: HOSPITAL | Age: 63
End: 2018-11-02

## 2018-11-02 DIAGNOSIS — I35.9 AORTIC VALVE DISORDER: ICD-10-CM

## 2018-11-02 LAB — INR PPP: 3.2

## 2018-11-02 NOTE — PROGRESS NOTES
Anticoagulation Clinic - Remote Progress Note  REMOTE LAB     Indication: mechanical AVR  Referring Provider: Luis  Initial Warfarin Start Date: 2009  Goal INR: 2.5-3.5  Current Drug Interactions: aspirin     Diet: green beans occasionally  Alcohol: none  Tobacco: none  OTC Pain Medication:     INR History:  Date 8/2 10/26 12/21 1/19/18 2/20 3/27 4/27 5/16 6/11 7/16 9/20 11/2   Total Weekly Dose 62.5mg 62.5mg 62.5mg 62.5mg 62.5mg 62.5mg 62.5mg 62.5mg 62.5mg 62.5mg 62.5mg 62.5mg   INR 2.2 2.9 3.2 3.0 2.9 3.4 3.7 2.9 2.6 2.9 2.9 3.2   Notes              sick             Date               Total Weekly Dose               INR               Notes                 Phone Interview:  Tablet Strength: 5mg tabs  Patient Contact Info: [phone] (Home), [phone] (Mobile)  Lab Contact Info: [facility] [phone]    Patient Findings   Negatives:   Signs/symptoms of thrombosis, Signs/symptoms of bleeding, Laboratory test error suspected, Change in health, Change in alcohol use, Change in activity, Upcoming invasive procedure, Emergency department visit, Upcoming dental procedure, Missed doses, Extra doses, Change in medications, Change in diet/appetite, Hospital admission, Bruising, Other complaints     Plan:  1. INR is therapeutic today at 3.2. Instructed patient to continue warfarin 10mg daily except 7.5mg SunWedFri.   2. Repeat INR in 4 weeks, 11/30.  3. Verbal information provided over the phone. Cecilio Farias RBV dosing instructions, expresses understanding by teach back, and has no further questions at this time.  4. Patient declines the need for warfarin refills at this time.     Kurtis Hackett, Ozzie  11/2/2018  4:25 PM     Olga SALMON, PharmD, have reviewed the note in full and agree with the assessment and plan.  11/02/18  4:39 PM

## 2018-11-10 DIAGNOSIS — I48.0 PAROXYSMAL ATRIAL FIBRILLATION (HCC): ICD-10-CM

## 2018-11-12 RX ORDER — WARFARIN SODIUM 5 MG/1
TABLET ORAL
Qty: 60 TABLET | Refills: 2 | Status: SHIPPED | OUTPATIENT
Start: 2018-11-12 | End: 2019-01-08 | Stop reason: SDUPTHER

## 2018-12-18 ENCOUNTER — TELEPHONE (OUTPATIENT)
Dept: PHARMACY | Facility: HOSPITAL | Age: 63
End: 2018-12-18

## 2018-12-26 ENCOUNTER — ANTICOAGULATION VISIT (OUTPATIENT)
Dept: PHARMACY | Facility: HOSPITAL | Age: 63
End: 2018-12-26

## 2018-12-26 DIAGNOSIS — I35.9 AORTIC VALVE DISORDER: ICD-10-CM

## 2018-12-26 LAB — INR PPP: 3.4

## 2018-12-26 NOTE — PROGRESS NOTES
Anticoagulation Clinic - Remote Progress Note  REMOTE LAB     Indication: mechanical AVR  Referring Provider: Luis  Initial Warfarin Start Date: 2009  Goal INR: 2.5-3.5  Current Drug Interactions: aspirin     Diet: green beans occasionally  Alcohol: none  Tobacco: none  OTC Pain Medication:     INR History:  Date 8/2 10/26 12/21 1/19/18 2/20 3/27 4/27 5/16 6/11 7/16 9/20 11/2   Total Weekly Dose 62.5mg 62.5mg 62.5mg 62.5mg 62.5mg 62.5mg 62.5mg 62.5mg 62.5mg 62.5mg 62.5mg 62.5mg   INR 2.2 2.9 3.2 3.0 2.9 3.4 3.7 2.9 2.6 2.9 2.9 3.2   Notes              sick             Date 12/21              Total Weekly Dose 62.5mg              INR 3.4              Notes rec'vd 12/26                Phone Interview:  Tablet Strength: 5mg tabs  Patient Contact Info: [phone] (Home), [phone] (Mobile)  Lab Contact Info: [facility] [phone]      Plan:  1. INR was therapeutic on 12/21 at 3.4 (received 12/26). Left voice message instructing patient to continue warfarin 10mg daily except 7.5mg SunWedFri.   2. Repeat INR in 4 weeks, 1/18.  3. Cecilio Farias understands the importance of calling the [facility] if he notices any s/sx of bleeding, stroke, or abnormal bruising, if any changes are made to his medications or medication doses (Rx, OTC, herbal), or if any upcoming procedures are scheduled. Cecilio MARLA Farias will likewise let us know if any other changes, questions, or concerns arise regarding anticoagulation therapy. Cecilio Farias has the [facility]'s contact information.     Maritza Rapp CPhT  12/26/2018  2:53 PM     IMagaly, [facility], have reviewed the note in full and agree with the assessment and plan.  12/26/18  4:30 PM

## 2019-01-07 DIAGNOSIS — I48.0 PAROXYSMAL ATRIAL FIBRILLATION (HCC): ICD-10-CM

## 2019-01-07 RX ORDER — WARFARIN SODIUM 5 MG/1
TABLET ORAL
Qty: 60 TABLET | Refills: 1 | OUTPATIENT
Start: 2019-01-07

## 2019-01-08 RX ORDER — WARFARIN SODIUM 5 MG/1
TABLET ORAL
Qty: 60 TABLET | Refills: 2 | Status: SHIPPED | OUTPATIENT
Start: 2019-01-08 | End: 2019-05-14 | Stop reason: SDUPTHER

## 2019-01-31 ENCOUNTER — TELEPHONE (OUTPATIENT)
Dept: PHARMACY | Facility: HOSPITAL | Age: 64
End: 2019-01-31

## 2019-01-31 DIAGNOSIS — Z95.2 S/P AVR (AORTIC VALVE REPLACEMENT): Primary | ICD-10-CM

## 2019-02-01 ENCOUNTER — ANTICOAGULATION VISIT (OUTPATIENT)
Dept: PHARMACY | Facility: HOSPITAL | Age: 64
End: 2019-02-01

## 2019-02-01 DIAGNOSIS — I35.9 AORTIC VALVE DISORDER: ICD-10-CM

## 2019-02-01 LAB — INR PPP: 3.34

## 2019-02-01 NOTE — PROGRESS NOTES
Anticoagulation Clinic - Remote Progress Note  REMOTE LAB     Indication: mechanical AVR  Referring Provider: Luis  Initial Warfarin Start Date: 2009  Goal INR: 2.5-3.5  Current Drug Interactions: aspirin     Diet: green beans occasionally  Alcohol: none  Tobacco: none  OTC Pain Medication:     INR History:  Date 8/2 10/26 12/21 1/19/18 2/20 3/27 4/27 5/16 6/11 7/16 9/20 11/2   Total Weekly Dose 62.5mg 62.5mg 62.5mg 62.5mg 62.5mg 62.5mg 62.5mg 62.5mg 62.5mg 62.5mg 62.5mg 62.5mg   INR 2.2 2.9 3.2 3.0 2.9 3.4 3.7 2.9 2.6 2.9 2.9 3.2   Notes              sick             Date 12/21 2/1             Total Weekly Dose 62.5mg 62.5 mg             INR 3.4 3.34             Notes rec'vd 12/26                Phone Interview:  Tablet Strength: 5mg tabs  Patient Contact Info: [phone] (Home), [phone] (Mobile)  Lab Contact Info: [facility] [phone]    Patient Findings:  Negatives:  Signs/symptoms of thrombosis, Signs/symptoms of bleeding, Laboratory test error suspected, Change in health, Change in alcohol use, Change in activity, Upcoming invasive procedure, Emergency department visit, Upcoming dental procedure, Missed doses, Extra doses, Change in medications, Change in diet/appetite, Hospital admission, Bruising, Other complaints   Comments:   denies any change in diet but plans to resume eating a serving of green beans once weekly. No missed warfarin doses, no change in medications, and no signs of bleeding or abnormal bruising since our last encounter.     Plan:  1. INR is therapeutic again today, so instructed Mr. Farias to continue warfarin 10mg daily except 7.5mg SunWedFri.  2. Repeat INR in 4 weeks.  3. Cecilio Farias understands the importance of calling the [facility] if he notices any s/sx of bleeding, stroke, or abnormal bruising, if any changes are made to his medications or medication doses (Rx, OTC, herbal), or if any upcoming procedures are scheduled. Cecilio GUTIÉRREZ  Dinora will likewise let us know if any other changes, questions, or concerns arise regarding anticoagulation therapy. Cecilio Farias has the [facility]'s contact information.     Madie Lino, PharmD  2/1/2019  1:39 PM

## 2019-03-12 ENCOUNTER — ANTICOAGULATION VISIT (OUTPATIENT)
Dept: PHARMACY | Facility: HOSPITAL | Age: 64
End: 2019-03-12

## 2019-03-12 DIAGNOSIS — I35.9 AORTIC VALVE DISORDER: ICD-10-CM

## 2019-03-12 NOTE — PROGRESS NOTES
Anticoagulation Clinic - Remote Progress Note  REMOTE LAB     Indication: mechanical AVR  Referring Provider: Luis  Initial Warfarin Start Date: 2009  Goal INR: 2.5-3.5  Current Drug Interactions: aspirin     Diet: green beans occasionally  Alcohol: none  Tobacco: none  OTC Pain Medication:     INR History:  Date 8/2 10/26 12/21 1/19/18 2/20 3/27 4/27 5/16 6/11 7/16 9/20 11/2   Total Weekly Dose 62.5mg 62.5mg 62.5mg 62.5mg 62.5mg 62.5mg 62.5mg 62.5mg 62.5mg 62.5mg 62.5mg 62.5mg   INR 2.2 2.9 3.2 3.0 2.9 3.4 3.7 2.9 2.6 2.9 2.9 3.2   Notes              sick             Date 12/21 2/1 3/11            Total Weekly Dose 62.5mg 62.5 mg 62.5mg            INR 3.4 3.34 2.8            Notes rec'vd 12/26                Phone Interview:  Tablet Strength: 5mg tabs  Patient Contact Info: [phone] (Home), [phone] (Mobile)  Lab Contact Info: [facility] [phone]    [facility] requesting call back 3/12 9:10    Plan:  1. INR was therapeutic again yesterday, so instructed Mr. Farias to continue warfarin 10mg daily except 7.5mg SunWedFri.  2. Repeat INR in 4 weeks.  3.      Maritza Rapp CPhT  3/12/2019  9:06 AM    Yes

## 2019-03-13 LAB — INR PPP: 2.8

## 2019-03-13 NOTE — PROGRESS NOTES
Anticoagulation Clinic - Remote Progress Note  REMOTE LAB     Indication: mechanical AVR  Referring Provider: Luis  Initial Warfarin Start Date: 2009  Goal INR: 2.5-3.5  Current Drug Interactions: aspirin     Diet: green beans occasionally  Alcohol: none  Tobacco: none  OTC Pain Medication:     INR History:  Date 8/2 10/26 12/21 1/19/18 2/20 3/27 4/27 5/16 6/11 7/16 9/20 11/2   Total Weekly Dose 62.5mg 62.5mg 62.5mg 62.5mg 62.5mg 62.5mg 62.5mg 62.5mg 62.5mg 62.5mg 62.5mg 62.5mg   INR 2.2 2.9 3.2 3.0 2.9 3.4 3.7 2.9 2.6 2.9 2.9 3.2   Notes              sick             Date 12/21 2/1/19 3/11            Total Weekly Dose 62.5mg 62.5mg 62.5mg            INR 3.4 3.34 2.8            Notes rec'vd 12/26                Phone Interview:  Tablet Strength: 5mg tabs  Patient Contact Info: [phone] (Home), [phone] (Mobile)  Lab Contact Info: [facility] [phone]      Patient Findings   Positives:  Change in medications   Negatives:  Signs/symptoms of thrombosis, Signs/symptoms of bleeding, Laboratory test error suspected, Change in health, Change in alcohol use, Change in activity, Upcoming invasive procedure, Emergency department visit, Upcoming dental procedure, Missed doses, Extra doses, Change in diet/appetite, Hospital admission, Bruising, Other complaints   Comments:  Patient reports his gabapentin 600mg TID was increased to 800mg TID about ~10 days ago      Plan:  1. INR is therapeutic today at 2.8. Instructed Mr. Farias to continue warfarin 10mg oral daily except 7.5mg SunWedFri.  2. Repeat INR in 4 weeks on 4/8/2019.  3. Verbal information provided over the phone. Cecilio GUTIÉRREZ Dinora RBV dosing instructions, expresses understanding by teach back, and has no further questions at this time.  4. Patient declines the need for warfarin refills at this time  5. Med list reviewed and updated -- Luly Hackett CPhT  3/13/2019  9:17 AM    I, Arabella Hebert, PharmD, have reviewed the  note in full and agree with the assessment and plan.  03/13/19  10:15 AM

## 2019-04-18 ENCOUNTER — TELEPHONE (OUTPATIENT)
Dept: PHARMACY | Facility: HOSPITAL | Age: 64
End: 2019-04-18

## 2019-04-23 NOTE — TELEPHONE ENCOUNTER
Mr. Farias called back and assures that he will have his INR checked tomorrow at [facility].

## 2019-04-24 ENCOUNTER — ANTICOAGULATION VISIT (OUTPATIENT)
Dept: PHARMACY | Facility: HOSPITAL | Age: 64
End: 2019-04-24

## 2019-04-24 DIAGNOSIS — I35.9 AORTIC VALVE DISORDER: ICD-10-CM

## 2019-04-24 LAB — INR PPP: 3

## 2019-04-24 NOTE — PROGRESS NOTES
Anticoagulation Clinic - Remote Progress Note  REMOTE LAB     Indication: mechanical AVR  Referring Provider: Luis  Initial Warfarin Start Date: 2009  Goal INR: 2.5-3.5  Current Drug Interactions: aspirin     Diet: green beans occasionally  Alcohol: none  Tobacco: none  OTC Pain Medication:     INR History:  Date 8/2 10/26 12/21 1/19/18 2/20 3/27 4/27 5/16 6/11 7/16 9/20 11/2   Total Weekly Dose 62.5mg 62.5mg 62.5mg 62.5mg 62.5mg 62.5mg 62.5mg 62.5mg 62.5mg 62.5mg 62.5mg 62.5mg   INR 2.2 2.9 3.2 3.0 2.9 3.4 3.7 2.9 2.6 2.9 2.9 3.2   Notes              sick             Date 12/21 2/1/19 3/11 4/24           Total Weekly Dose 62.5mg 62.5mg 62.5mg            INR 3.4 3.34 2.8 3.0           Notes rec'vd 12/26                Phone Interview:  Tablet Strength: 5mg tabs  Patient Contact Info: [phone] (Home), [phone] (Mobile)  Lab Contact Info: [facility] [phone]    [facility] 4/24@529    Plan:  1. INR is therapeutic today. Instructed Mr. Farias to continue warfarin 10mg daily except 7.5mg SunWedFri.  2. Repeat INR in 4 weeks  3. Verbal information provided over the phone. Cecilio Farias RBV dosing instructions, expresses understanding by teach back, and has no further questions at this time.  4. Patient declines the need for warfarin refills at this time    Kurtis Hackett CPhT  4/24/2019  12:21 PM

## 2019-04-26 NOTE — PROGRESS NOTES
Anticoagulation Clinic - Remote Progress Note  REMOTE LAB     Indication: mechanical AVR  Referring Provider: Luis  Initial Warfarin Start Date: 2009  Goal INR: 2.5-3.5  Current Drug Interactions: aspirin     Diet: green beans occasionally  Alcohol: none  Tobacco: none  OTC Pain Medication:     INR History:  Date 8/2 10/26 12/21 1/19/18 2/20 3/27 4/27 5/16 6/11 7/16 9/20 11/2   Total Weekly Dose 62.5mg 62.5mg 62.5mg 62.5mg 62.5mg 62.5mg 62.5mg 62.5mg 62.5mg 62.5mg 62.5mg 62.5mg   INR 2.2 2.9 3.2 3.0 2.9 3.4 3.7 2.9 2.6 2.9 2.9 3.2   Notes              sick                Date 12/21 2/1/19 3/11 4/24                   Total Weekly Dose 62.5mg 62.5mg 62.5mg  62.5  mg                   INR 3.4 3.34 2.8 3.0                   Notes rec'vd 12/26                            Phone Interview:  Tablet Strength: 5mg tabs  Patient Contact Info: [phone] (Home), [phone] (Mobile)  Lab Contact Info: [facility] [phone]     Patient Findings:  Negatives:  Signs/symptoms of thrombosis, Signs/symptoms of bleeding, Laboratory test error suspected, Change in health, Change in alcohol use, Change in activity, Upcoming invasive procedure, Emergency department visit, Upcoming dental procedure, Missed doses, Extra doses, Change in medications, Change in diet/appetite, Hospital admission, Bruising, Other complaints     Plan:  1. INR is therapeutic today. Instructed Mr. Farias to continue warfarin 10mg daily except 7.5mg SunWedFri.  2. Repeat INR in 4 weeks  3. Verbal information provided over the phone. Cecilio Farias RBV dosing instructions, expresses understanding by teach back, and has no further questions at this time.  4. Patient declines the need for warfarin refills at this time     Maritza Rapp, Ozzie  4/26/2019  2:18 PM    I, Kurtis Mortensen [facility], have reviewed the note in full and agree with the assessment and plan.  04/26/19  2:48 PM

## 2019-05-14 DIAGNOSIS — I48.0 PAROXYSMAL ATRIAL FIBRILLATION (HCC): ICD-10-CM

## 2019-05-14 RX ORDER — WARFARIN SODIUM 5 MG/1
TABLET ORAL
Qty: 60 TABLET | Refills: 6 | Status: ON HOLD | OUTPATIENT
Start: 2019-05-14 | End: 2019-06-11

## 2019-05-21 LAB — INR PPP: 3.3

## 2019-05-22 LAB — INR PPP: 2.8

## 2019-05-23 ENCOUNTER — TELEPHONE (OUTPATIENT)
Dept: PHARMACY | Facility: HOSPITAL | Age: 64
End: 2019-05-23

## 2019-05-23 LAB — INR PPP: 3.3

## 2019-05-24 ENCOUNTER — ANTICOAGULATION VISIT (OUTPATIENT)
Dept: PHARMACY | Facility: HOSPITAL | Age: 64
End: 2019-05-24

## 2019-05-24 DIAGNOSIS — I35.9 AORTIC VALVE DISORDER: ICD-10-CM

## 2019-05-24 NOTE — PROGRESS NOTES
Anticoagulation Clinic - Remote Progress Note  REMOTE LAB     Indication: mechanical AVR  Referring Provider: Luis  Initial Warfarin Start Date: 2009  Goal INR: 2.5-3.5  Current Drug Interactions: aspirin     Diet: green beans occasionally  Alcohol: none  Tobacco: none  OTC Pain Medication:     INR History:  Date 8/2 10/26 12/21 1/19/18 2/20 3/27 4/27 5/16 6/11 7/16 9/20 11/2   Total Weekly Dose 62.5mg 62.5mg 62.5mg 62.5mg 62.5mg 62.5mg 62.5mg 62.5mg 62.5mg 62.5mg 62.5mg 62.5mg   INR 2.2 2.9 3.2 3.0 2.9 3.4 3.7 2.9 2.6 2.9 2.9 3.2   Notes              sick                Date 12/21 2/1/19 3/11 4/24 5/23        Total Weekly Dose 62.5mg 62.5mg 62.5mg 62.5 mg 62.5 mg        INR 3.4 3.34 2.8 3.0 3.3        Notes rec'vd 12/26       self report; recent hosp; Augmentin           Phone Interview:  Tablet Strength: 5mg tabs  Patient Contact Info: [phone] (Home), [phone] (Mobile)  Lab Contact Info: [facility] [phone]     Patient Findings:  Negatives:  Signs/symptoms of thrombosis, Signs/symptoms of bleeding, Laboratory test error suspected, Change in health, Change in alcohol use, Change in activity, Upcoming invasive procedure, Emergency department visit, Upcoming dental procedure, Missed doses, Extra doses, Change in medications, Change in diet/appetite, Hospital admission, Bruising, Other complaints   Comments:  Mr. Farias called to inform us that he was admitted to [facility] in [place] on Tuesday for an infection and was discharged today. He reports no changes from previous encounter except that he was discharged on Augmentin twice daily for 7 days. I called there to get records faxed over but they are not ready yet as he was just discharged about 2 hours ago (current time 1537) and would probably not be done until Monday.      Plan:  1. INR was therapeutic yesterday. Instructed Mr. Farias to continue warfarin 10mg daily except 7.5mg SunWedFri.  2. Repeat INR in 1 week on 5/31 to  "ensure WNL.  3. Verbal information provided over the phone. Cecilio Farias RBV dosing instructions, expresses understanding by teach back, and has no further questions at this time.    Anup Tello, Pharmacy Intern  05/24/2019  3:41 PM    Records requested from [facility].   I, Madie Lino, [facility], have reviewed the note in full and agree with the assessment and plan.  05/28/19  8:27 AM     Records received -- Mr. Farias was admitted with a fever, toe ulceration, and possible sepsis; however, blood cultures were negative. He received IV vanc and Zosyn while admitted and was discharged home on Augmentin. \"possible viral syndrome as cause for fever.\"    Ann Cowden Mayer, PharmD  5/28/2019  12:24 PM  "

## 2019-06-03 ENCOUNTER — TELEPHONE (OUTPATIENT)
Dept: PHARMACY | Facility: HOSPITAL | Age: 64
End: 2019-06-03

## 2019-06-09 PROCEDURE — 99285 EMERGENCY DEPT VISIT HI MDM: CPT

## 2019-06-10 ENCOUNTER — APPOINTMENT (OUTPATIENT)
Dept: GENERAL RADIOLOGY | Facility: HOSPITAL | Age: 64
End: 2019-06-10

## 2019-06-10 ENCOUNTER — APPOINTMENT (OUTPATIENT)
Dept: CT IMAGING | Facility: HOSPITAL | Age: 64
End: 2019-06-10

## 2019-06-10 ENCOUNTER — APPOINTMENT (OUTPATIENT)
Dept: MRI IMAGING | Facility: HOSPITAL | Age: 64
End: 2019-06-10

## 2019-06-10 ENCOUNTER — HOSPITAL ENCOUNTER (INPATIENT)
Facility: HOSPITAL | Age: 64
LOS: 3 days | Discharge: HOME | End: 2019-06-13
Attending: EMERGENCY MEDICINE | Admitting: INTERNAL MEDICINE

## 2019-06-10 DIAGNOSIS — A41.9 SEPSIS, DUE TO UNSPECIFIED ORGANISM: Primary | ICD-10-CM

## 2019-06-10 DIAGNOSIS — L03.115 CELLULITIS OF FOOT, RIGHT: ICD-10-CM

## 2019-06-10 PROBLEM — I50.9 CHF (CONGESTIVE HEART FAILURE) (HCC): Status: ACTIVE | Noted: 2019-06-10

## 2019-06-10 PROBLEM — R79.1 SUPRATHERAPEUTIC INR: Status: ACTIVE | Noted: 2019-06-10

## 2019-06-10 PROBLEM — N17.9 AKI (ACUTE KIDNEY INJURY) (HCC): Status: ACTIVE | Noted: 2019-06-10

## 2019-06-10 PROBLEM — Z95.2 S/P AVR: Status: ACTIVE | Noted: 2019-06-10

## 2019-06-10 LAB
ALBUMIN SERPL-MCNC: 3.5 G/DL (ref 3.5–5.2)
ALBUMIN/GLOB SERPL: 0.9 G/DL
ALP SERPL-CCNC: 68 U/L (ref 39–117)
ALT SERPL W P-5'-P-CCNC: 15 U/L (ref 1–41)
ANION GAP SERPL CALCULATED.3IONS-SCNC: 13 MMOL/L
ANION GAP SERPL CALCULATED.3IONS-SCNC: 13 MMOL/L
AST SERPL-CCNC: 22 U/L (ref 1–40)
BACTERIA BLD CULT: NORMAL
BACTERIA UR QL AUTO: ABNORMAL /HPF
BASOPHILS # BLD AUTO: 0.07 10*3/MM3 (ref 0–0.2)
BASOPHILS # BLD AUTO: 0.12 10*3/MM3 (ref 0–0.2)
BASOPHILS NFR BLD AUTO: 0.4 % (ref 0–1.5)
BASOPHILS NFR BLD AUTO: 0.7 % (ref 0–1.5)
BILIRUB SERPL-MCNC: 0.6 MG/DL (ref 0.2–1.2)
BILIRUB UR QL STRIP: NEGATIVE
BUN BLD-MCNC: 24 MG/DL (ref 8–23)
BUN BLD-MCNC: 24 MG/DL (ref 8–23)
BUN/CREAT SERPL: 18.2 (ref 7–25)
BUN/CREAT SERPL: 18.3 (ref 7–25)
CALCIUM SPEC-SCNC: 8.1 MG/DL (ref 8.6–10.5)
CALCIUM SPEC-SCNC: 8.8 MG/DL (ref 8.6–10.5)
CHLORIDE SERPL-SCNC: 104 MMOL/L (ref 98–107)
CHLORIDE SERPL-SCNC: 104 MMOL/L (ref 98–107)
CLARITY UR: CLEAR
CO2 SERPL-SCNC: 21 MMOL/L (ref 22–29)
CO2 SERPL-SCNC: 23 MMOL/L (ref 22–29)
COLOR UR: YELLOW
CREAT BLD-MCNC: 1.31 MG/DL (ref 0.76–1.27)
CREAT BLD-MCNC: 1.32 MG/DL (ref 0.76–1.27)
CRP SERPL-MCNC: 1.93 MG/DL (ref 0–0.5)
D-LACTATE SERPL-SCNC: 2 MMOL/L (ref 0.5–2)
DEPRECATED RDW RBC AUTO: 47.9 FL (ref 37–54)
DEPRECATED RDW RBC AUTO: 49.3 FL (ref 37–54)
EOSINOPHIL # BLD AUTO: 0.07 10*3/MM3 (ref 0–0.4)
EOSINOPHIL # BLD AUTO: 0.15 10*3/MM3 (ref 0–0.4)
EOSINOPHIL NFR BLD AUTO: 0.4 % (ref 0.3–6.2)
EOSINOPHIL NFR BLD AUTO: 0.9 % (ref 0.3–6.2)
ERYTHROCYTE [DISTWIDTH] IN BLOOD BY AUTOMATED COUNT: 14.7 % (ref 12.3–15.4)
ERYTHROCYTE [DISTWIDTH] IN BLOOD BY AUTOMATED COUNT: 14.8 % (ref 12.3–15.4)
ERYTHROCYTE [SEDIMENTATION RATE] IN BLOOD: 89 MM/HR (ref 0–20)
FLUAV AG NPH QL: NEGATIVE
FLUBV AG NPH QL IA: NEGATIVE
GFR SERPL CREATININE-BSD FRML MDRD: 55 ML/MIN/1.73
GFR SERPL CREATININE-BSD FRML MDRD: 55 ML/MIN/1.73
GLOBULIN UR ELPH-MCNC: 4.1 GM/DL
GLUCOSE BLD-MCNC: 249 MG/DL (ref 65–99)
GLUCOSE BLD-MCNC: 284 MG/DL (ref 65–99)
GLUCOSE BLDC GLUCOMTR-MCNC: 231 MG/DL (ref 70–130)
GLUCOSE BLDC GLUCOMTR-MCNC: 263 MG/DL (ref 70–130)
GLUCOSE BLDC GLUCOMTR-MCNC: 281 MG/DL (ref 70–130)
GLUCOSE BLDC GLUCOMTR-MCNC: 302 MG/DL (ref 70–130)
GLUCOSE UR STRIP-MCNC: ABNORMAL MG/DL
HBA1C MFR BLD: 10.2 % (ref 4.8–5.6)
HCT VFR BLD AUTO: 34.3 % (ref 37.5–51)
HCT VFR BLD AUTO: 35.9 % (ref 37.5–51)
HGB BLD-MCNC: 10.2 G/DL (ref 13–17.7)
HGB BLD-MCNC: 11.2 G/DL (ref 13–17.7)
HGB UR QL STRIP.AUTO: ABNORMAL
HYALINE CASTS UR QL AUTO: ABNORMAL /LPF
IMM GRANULOCYTES # BLD AUTO: 0.08 10*3/MM3 (ref 0–0.05)
IMM GRANULOCYTES # BLD AUTO: 0.25 10*3/MM3 (ref 0–0.05)
IMM GRANULOCYTES NFR BLD AUTO: 0.5 % (ref 0–0.5)
IMM GRANULOCYTES NFR BLD AUTO: 1.3 % (ref 0–0.5)
INR PPP: 5.26 (ref 0.85–1.16)
INR PPP: 5.83 (ref 0.85–1.16)
KETONES UR QL STRIP: NEGATIVE
LEUKOCYTE ESTERASE UR QL STRIP.AUTO: ABNORMAL
LYMPHOCYTES # BLD AUTO: 0.67 10*3/MM3 (ref 0.7–3.1)
LYMPHOCYTES # BLD AUTO: 0.77 10*3/MM3 (ref 0.7–3.1)
LYMPHOCYTES NFR BLD AUTO: 3.5 % (ref 19.6–45.3)
LYMPHOCYTES NFR BLD AUTO: 4.7 % (ref 19.6–45.3)
MCH RBC QN AUTO: 27.1 PG (ref 26.6–33)
MCH RBC QN AUTO: 27.9 PG (ref 26.6–33)
MCHC RBC AUTO-ENTMCNC: 29.7 G/DL (ref 31.5–35.7)
MCHC RBC AUTO-ENTMCNC: 31.2 G/DL (ref 31.5–35.7)
MCV RBC AUTO: 89.3 FL (ref 79–97)
MCV RBC AUTO: 91.2 FL (ref 79–97)
MONOCYTES # BLD AUTO: 0.87 10*3/MM3 (ref 0.1–0.9)
MONOCYTES # BLD AUTO: 1.09 10*3/MM3 (ref 0.1–0.9)
MONOCYTES NFR BLD AUTO: 5.3 % (ref 5–12)
MONOCYTES NFR BLD AUTO: 5.6 % (ref 5–12)
NEUTROPHILS # BLD AUTO: 14.56 10*3/MM3 (ref 1.7–7)
NEUTROPHILS # BLD AUTO: 17.15 10*3/MM3 (ref 1.7–7)
NEUTROPHILS NFR BLD AUTO: 87.9 % (ref 42.7–76)
NEUTROPHILS NFR BLD AUTO: 88.8 % (ref 42.7–76)
NITRITE UR QL STRIP: NEGATIVE
NRBC BLD AUTO-RTO: 0 /100 WBC (ref 0–0.2)
NRBC BLD AUTO-RTO: 0 /100 WBC (ref 0–0.2)
PH UR STRIP.AUTO: 5.5 [PH] (ref 5–8)
PLATELET # BLD AUTO: 263 10*3/MM3 (ref 140–450)
PLATELET # BLD AUTO: 297 10*3/MM3 (ref 140–450)
PMV BLD AUTO: 9.6 FL (ref 6–12)
PMV BLD AUTO: 9.9 FL (ref 6–12)
POTASSIUM BLD-SCNC: 4.4 MMOL/L (ref 3.5–5.2)
POTASSIUM BLD-SCNC: 4.8 MMOL/L (ref 3.5–5.2)
PROCALCITONIN SERPL-MCNC: 0.07 NG/ML (ref 0.1–0.25)
PROT SERPL-MCNC: 7.6 G/DL (ref 6–8.5)
PROT UR QL STRIP: NEGATIVE
PROTHROMBIN TIME: 46.5 SECONDS (ref 11.2–14.3)
PROTHROMBIN TIME: 50.4 SECONDS (ref 11.2–14.3)
RBC # BLD AUTO: 3.76 10*6/MM3 (ref 4.14–5.8)
RBC # BLD AUTO: 4.02 10*6/MM3 (ref 4.14–5.8)
RBC # UR: ABNORMAL /HPF
REF LAB TEST METHOD: ABNORMAL
SODIUM BLD-SCNC: 138 MMOL/L (ref 136–145)
SODIUM BLD-SCNC: 140 MMOL/L (ref 136–145)
SP GR UR STRIP: 1.01 (ref 1–1.03)
SQUAMOUS #/AREA URNS HPF: ABNORMAL /HPF
TROPONIN T SERPL-MCNC: 0.02 NG/ML (ref 0–0.03)
UROBILINOGEN UR QL STRIP: ABNORMAL
WBC NRBC COR # BLD: 16.55 10*3/MM3 (ref 3.4–10.8)
WBC NRBC COR # BLD: 19.3 10*3/MM3 (ref 3.4–10.8)
WBC UR QL AUTO: ABNORMAL /HPF

## 2019-06-10 PROCEDURE — 85610 PROTHROMBIN TIME: CPT | Performed by: EMERGENCY MEDICINE

## 2019-06-10 PROCEDURE — 84484 ASSAY OF TROPONIN QUANT: CPT | Performed by: EMERGENCY MEDICINE

## 2019-06-10 PROCEDURE — 85610 PROTHROMBIN TIME: CPT | Performed by: NURSE PRACTITIONER

## 2019-06-10 PROCEDURE — 63710000001 INSULIN LISPRO (HUMAN) PER 5 UNITS: Performed by: NURSE PRACTITIONER

## 2019-06-10 PROCEDURE — 81001 URINALYSIS AUTO W/SCOPE: CPT | Performed by: EMERGENCY MEDICINE

## 2019-06-10 PROCEDURE — 85652 RBC SED RATE AUTOMATED: CPT | Performed by: INTERNAL MEDICINE

## 2019-06-10 PROCEDURE — 74176 CT ABD & PELVIS W/O CONTRAST: CPT

## 2019-06-10 PROCEDURE — 84145 PROCALCITONIN (PCT): CPT | Performed by: EMERGENCY MEDICINE

## 2019-06-10 PROCEDURE — 70200 X-RAY EXAM OF EYE SOCKETS: CPT

## 2019-06-10 PROCEDURE — 83036 HEMOGLOBIN GLYCOSYLATED A1C: CPT | Performed by: NURSE PRACTITIONER

## 2019-06-10 PROCEDURE — 80053 COMPREHEN METABOLIC PANEL: CPT | Performed by: EMERGENCY MEDICINE

## 2019-06-10 PROCEDURE — 63710000001 INSULIN DETEMIR PER 5 UNITS: Performed by: HOSPITALIST

## 2019-06-10 PROCEDURE — 87150 DNA/RNA AMPLIFIED PROBE: CPT | Performed by: EMERGENCY MEDICINE

## 2019-06-10 PROCEDURE — 86140 C-REACTIVE PROTEIN: CPT | Performed by: INTERNAL MEDICINE

## 2019-06-10 PROCEDURE — 25010000002 VANCOMYCIN 10 G RECONSTITUTED SOLUTION

## 2019-06-10 PROCEDURE — 87040 BLOOD CULTURE FOR BACTERIA: CPT | Performed by: EMERGENCY MEDICINE

## 2019-06-10 PROCEDURE — 87077 CULTURE AEROBIC IDENTIFY: CPT | Performed by: EMERGENCY MEDICINE

## 2019-06-10 PROCEDURE — 87804 INFLUENZA ASSAY W/OPTIC: CPT | Performed by: EMERGENCY MEDICINE

## 2019-06-10 PROCEDURE — 25010000002 ONDANSETRON PER 1 MG: Performed by: EMERGENCY MEDICINE

## 2019-06-10 PROCEDURE — 87186 SC STD MICRODIL/AGAR DIL: CPT | Performed by: EMERGENCY MEDICINE

## 2019-06-10 PROCEDURE — 73718 MRI LOWER EXTREMITY W/O DYE: CPT

## 2019-06-10 PROCEDURE — 25010000002 CEFTRIAXONE PER 250 MG: Performed by: NURSE PRACTITIONER

## 2019-06-10 PROCEDURE — 83605 ASSAY OF LACTIC ACID: CPT | Performed by: EMERGENCY MEDICINE

## 2019-06-10 PROCEDURE — 93005 ELECTROCARDIOGRAM TRACING: CPT | Performed by: EMERGENCY MEDICINE

## 2019-06-10 PROCEDURE — 99223 1ST HOSP IP/OBS HIGH 75: CPT | Performed by: INTERNAL MEDICINE

## 2019-06-10 PROCEDURE — 25010000002 VANCOMYCIN 10 G RECONSTITUTED SOLUTION: Performed by: EMERGENCY MEDICINE

## 2019-06-10 PROCEDURE — 85025 COMPLETE CBC W/AUTO DIFF WBC: CPT | Performed by: EMERGENCY MEDICINE

## 2019-06-10 PROCEDURE — 82962 GLUCOSE BLOOD TEST: CPT

## 2019-06-10 PROCEDURE — 85025 COMPLETE CBC W/AUTO DIFF WBC: CPT | Performed by: NURSE PRACTITIONER

## 2019-06-10 PROCEDURE — 71045 X-RAY EXAM CHEST 1 VIEW: CPT

## 2019-06-10 RX ORDER — NICOTINE POLACRILEX 4 MG
15 LOZENGE BUCCAL
Status: DISCONTINUED | OUTPATIENT
Start: 2019-06-10 | End: 2019-06-13 | Stop reason: HOSPADM

## 2019-06-10 RX ORDER — SODIUM CHLORIDE 0.9 % (FLUSH) 0.9 %
3 SYRINGE (ML) INJECTION EVERY 12 HOURS SCHEDULED
Status: DISCONTINUED | OUTPATIENT
Start: 2019-06-10 | End: 2019-06-13 | Stop reason: HOSPADM

## 2019-06-10 RX ORDER — ONDANSETRON 4 MG/1
4 TABLET, FILM COATED ORAL EVERY 6 HOURS PRN
Status: DISCONTINUED | OUTPATIENT
Start: 2019-06-10 | End: 2019-06-13 | Stop reason: HOSPADM

## 2019-06-10 RX ORDER — ACETAMINOPHEN 500 MG
1000 TABLET ORAL ONCE
Status: COMPLETED | OUTPATIENT
Start: 2019-06-10 | End: 2019-06-10

## 2019-06-10 RX ORDER — ATORVASTATIN CALCIUM 10 MG/1
10 TABLET, FILM COATED ORAL DAILY
Status: DISCONTINUED | OUTPATIENT
Start: 2019-06-10 | End: 2019-06-13 | Stop reason: HOSPADM

## 2019-06-10 RX ORDER — SODIUM CHLORIDE 0.9 % (FLUSH) 0.9 %
3-10 SYRINGE (ML) INJECTION AS NEEDED
Status: DISCONTINUED | OUTPATIENT
Start: 2019-06-10 | End: 2019-06-13 | Stop reason: HOSPADM

## 2019-06-10 RX ORDER — ONDANSETRON 2 MG/ML
4 INJECTION INTRAMUSCULAR; INTRAVENOUS EVERY 6 HOURS PRN
Status: DISCONTINUED | OUTPATIENT
Start: 2019-06-10 | End: 2019-06-13 | Stop reason: HOSPADM

## 2019-06-10 RX ORDER — SODIUM CHLORIDE 0.9 % (FLUSH) 0.9 %
10 SYRINGE (ML) INJECTION AS NEEDED
Status: DISCONTINUED | OUTPATIENT
Start: 2019-06-10 | End: 2019-06-13 | Stop reason: HOSPADM

## 2019-06-10 RX ORDER — DEXTROSE MONOHYDRATE 25 G/50ML
25 INJECTION, SOLUTION INTRAVENOUS
Status: DISCONTINUED | OUTPATIENT
Start: 2019-06-10 | End: 2019-06-13 | Stop reason: HOSPADM

## 2019-06-10 RX ORDER — GABAPENTIN 400 MG/1
800 CAPSULE ORAL EVERY 8 HOURS SCHEDULED
Status: DISCONTINUED | OUTPATIENT
Start: 2019-06-10 | End: 2019-06-13 | Stop reason: HOSPADM

## 2019-06-10 RX ORDER — ASPIRIN 81 MG/1
81 TABLET ORAL DAILY
Status: DISCONTINUED | OUTPATIENT
Start: 2019-06-10 | End: 2019-06-13 | Stop reason: HOSPADM

## 2019-06-10 RX ORDER — ONDANSETRON 2 MG/ML
4 INJECTION INTRAMUSCULAR; INTRAVENOUS ONCE
Status: COMPLETED | OUTPATIENT
Start: 2019-06-10 | End: 2019-06-10

## 2019-06-10 RX ORDER — CARVEDILOL 12.5 MG/1
25 TABLET ORAL 2 TIMES DAILY WITH MEALS
Status: DISCONTINUED | OUTPATIENT
Start: 2019-06-10 | End: 2019-06-13 | Stop reason: HOSPADM

## 2019-06-10 RX ORDER — AMLODIPINE BESYLATE 5 MG/1
10 TABLET ORAL DAILY
Status: DISCONTINUED | OUTPATIENT
Start: 2019-06-10 | End: 2019-06-13 | Stop reason: HOSPADM

## 2019-06-10 RX ADMIN — CEFTRIAXONE 2 G: 2 INJECTION, POWDER, FOR SOLUTION INTRAMUSCULAR; INTRAVENOUS at 11:09

## 2019-06-10 RX ADMIN — ONDANSETRON 4 MG: 2 INJECTION INTRAMUSCULAR; INTRAVENOUS at 01:16

## 2019-06-10 RX ADMIN — CARVEDILOL 25 MG: 12.5 TABLET, FILM COATED ORAL at 17:01

## 2019-06-10 RX ADMIN — INSULIN LISPRO 4 UNITS: 100 INJECTION, SOLUTION INTRAVENOUS; SUBCUTANEOUS at 17:02

## 2019-06-10 RX ADMIN — ACETAMINOPHEN 1000 MG: 500 TABLET, FILM COATED ORAL at 01:02

## 2019-06-10 RX ADMIN — AMLODIPINE BESYLATE 10 MG: 5 TABLET ORAL at 08:38

## 2019-06-10 RX ADMIN — VANCOMYCIN HYDROCHLORIDE 3000 MG: 10 INJECTION, POWDER, LYOPHILIZED, FOR SOLUTION INTRAVENOUS at 03:16

## 2019-06-10 RX ADMIN — GABAPENTIN 800 MG: 400 CAPSULE ORAL at 05:34

## 2019-06-10 RX ADMIN — INSULIN LISPRO 5 UNITS: 100 INJECTION, SOLUTION INTRAVENOUS; SUBCUTANEOUS at 12:14

## 2019-06-10 RX ADMIN — GABAPENTIN 800 MG: 400 CAPSULE ORAL at 13:11

## 2019-06-10 RX ADMIN — SODIUM CHLORIDE 2 G: 9 INJECTION, SOLUTION INTRAVENOUS at 09:45

## 2019-06-10 RX ADMIN — INSULIN LISPRO 3 UNITS: 100 INJECTION, SOLUTION INTRAVENOUS; SUBCUTANEOUS at 08:38

## 2019-06-10 RX ADMIN — INSULIN DETEMIR 4 UNITS: 100 INJECTION, SOLUTION SUBCUTANEOUS at 20:33

## 2019-06-10 RX ADMIN — INSULIN LISPRO 4 UNITS: 100 INJECTION, SOLUTION INTRAVENOUS; SUBCUTANEOUS at 20:33

## 2019-06-10 RX ADMIN — ASPIRIN 81 MG: 81 TABLET, COATED ORAL at 08:38

## 2019-06-10 RX ADMIN — CARVEDILOL 25 MG: 12.5 TABLET, FILM COATED ORAL at 08:38

## 2019-06-10 RX ADMIN — SODIUM CHLORIDE 1000 ML: 9 INJECTION, SOLUTION INTRAVENOUS at 01:02

## 2019-06-10 RX ADMIN — VANCOMYCIN HYDROCHLORIDE 1500 MG: 10 INJECTION, POWDER, LYOPHILIZED, FOR SOLUTION INTRAVENOUS at 17:01

## 2019-06-10 RX ADMIN — ATORVASTATIN CALCIUM 10 MG: 10 TABLET, FILM COATED ORAL at 08:38

## 2019-06-10 RX ADMIN — GABAPENTIN 800 MG: 400 CAPSULE ORAL at 20:33

## 2019-06-10 RX ADMIN — SODIUM CHLORIDE 2 G: 9 INJECTION, SOLUTION INTRAVENOUS at 02:44

## 2019-06-10 RX ADMIN — SODIUM CHLORIDE, PRESERVATIVE FREE 3 ML: 5 INJECTION INTRAVENOUS at 08:39

## 2019-06-10 NOTE — PLAN OF CARE
Problem: Patient Care Overview  Goal: Plan of Care Review  Outcome: Ongoing (interventions implemented as appropriate)   06/10/19 1005   Coping/Psychosocial   Plan of Care Reviewed With patient   Plan of Care Review   Progress no change   OTHER   Outcome Summary Patient presents with a posterior right great toe neuropathic ulceration. Wound measures 1.5x2.5x0.1cm, with a dry, scabbed, stable wound bed. ID following at this time time. Patient has a good palpable pedal pulse. Patient is diabetic with A1C of 10.20%. Cleaned with Povidone-iodine and palace foam dressing. Area to be cleaned and dressing change daily. See orders for care needs. Thanks

## 2019-06-10 NOTE — PROGRESS NOTES
"Pharmacy Consult  -  Warfarin  Cecilio Farias is a  63 y.o. male   Height - 190.5 cm (75\")  Weight - (!) 145 kg (320 lb)    Consulting Provider PHAN Bueno (hospitalist service)  Indication - mechanical AVR  Goal INR - 2.5-3.5  Home Regimen:   - Warfarin 10 mg Mondays, Tuesdays, Thursdays, and Saturdays   - Warfarin 7.5 mg Sundays, Wednesdays, and Fridays  Bridge Therapy: No     Drug-Drug Interactions with current regimen:   Aspirin - increased bleed risk    Warfarin Dosing During Admission:  Date             INR             Dose                Education Provided:    Discharge Follow up:  Following Provider -  Follow up time range or appointment -    Labs:  Results from last 7 days   Lab Units 06/10/19  0103   INR  5.26*   HEMOGLOBIN g/dL 11.2*   HEMATOCRIT % 35.9*   PLATELETS 10*3/mm3 297     Results from last 7 days   Lab Units 06/10/19  0103   SODIUM mmol/L 140   POTASSIUM mmol/L 4.8   CHLORIDE mmol/L 104   CO2 mmol/L 23.0   BUN mg/dL 24*   CREATININE mg/dL 1.31*   CALCIUM mg/dL 8.8   BILIRUBIN mg/dL 0.6   ALK PHOS U/L 68   ALT (SGPT) U/L 15   AST (SGOT) U/L 22   GLUCOSE mg/dL 284*     Current dietary intake:   Diet Order   Procedures   • Diet Regular; Cardiac, Consistent Carbohydrate     Assessment/Plan:   1. Hold warfarin for now due to supratherapeutic INR.  2. Daily PT-INR labs have been ordered.  3. Pharmacy will adjust Mr. Farias's warfarin dose as needed based on daily INR result.    Thank you  Dell Casiano, PharmD, BCPS  6/10/2019  3:33 AM  "

## 2019-06-10 NOTE — CONSULTS
1515 Attempted to see for diabetes education, Mr. Farias is currently in MRI.  Wife at bedside states she helps Mr. Farias with finger sticks and glucose management.  We reviewed his current A1c and the possibility of insulin at discharge.  She states insurance wouldn't pay for it in the past.  We discussed [employer] insulin briefly. Provided Ms. Farias with a donated Contour Next One meter and demonstrated use.  She was able to teach back through role play.  We will return tomorrow for insulin teaching with Mr. Farias and complete any other diabetes education needs.  Thanks for the consult.

## 2019-06-10 NOTE — PLAN OF CARE
Problem: Patient Care Overview  Goal: Plan of Care Review  Outcome: Ongoing (interventions implemented as appropriate)   06/10/19 0433   Coping/Psychosocial   Plan of Care Reviewed With patient   Plan of Care Review   Progress no change   OTHER   Outcome Summary patient arrived to floor from ED. febrile, tylenol given in ED, temp trending down. no complaints of pain. alert and oriented. resting comfortably. will continue to monitor.        Problem: Fall Risk (Adult)  Goal: Identify Related Risk Factors and Signs and Symptoms  Outcome: Outcome(s) achieved Date Met: 06/10/19   06/10/19 0433   Fall Risk (Adult)   Related Risk Factors (Fall Risk) gait/mobility problems;neuro disease/injury;sensory deficits   Signs and Symptoms (Fall Risk) presence of risk factors     Goal: Absence of Fall  Outcome: Ongoing (interventions implemented as appropriate)   06/10/19 0433   Fall Risk (Adult)   Absence of Fall making progress toward outcome       Problem: Pain, Chronic (Adult)  Goal: Identify Related Risk Factors and Signs and Symptoms  Outcome: Outcome(s) achieved Date Met: 06/10/19   06/10/19 0433   Pain, Chronic (Adult)   Related Risk Factors (Chronic Pain) nerve injury;prolonged healing   Signs and Symptoms (Chronic Pain) verbalization of pain/discomfort for a prolonged time period     Goal: Acceptable Pain/Comfort Level and Functional Ability  Outcome: Ongoing (interventions implemented as appropriate)   06/10/19 0433   Pain, Chronic (Adult)   Acceptable Pain/Comfort Level and Functional Ability making progress toward outcome       Problem: Skin Injury Risk (Adult)  Goal: Identify Related Risk Factors and Signs and Symptoms  Outcome: Outcome(s) achieved Date Met: 06/10/19   06/10/19 0433   Skin Injury Risk (Adult)   Related Risk Factors (Skin Injury Risk) body weight extremes;edema;infection;mobility impaired;tissue perfusion altered     Goal: Skin Health and Integrity  Outcome: Ongoing (interventions implemented as  appropriate)   06/10/19 0433   Skin Injury Risk (Adult)   Skin Health and Integrity making progress toward outcome

## 2019-06-10 NOTE — ED PROVIDER NOTES
"Subjective   Mr. Cecilio Farias is a 63 y.o. male who presents to the ED with c/o fever. He began experiencing symptoms of fever, chills, nausea, and vomiting \"several weeks ago\". He was seen at [facility] in [place], [place] 2 weeks ago and admitted at that time for sepsis. During the admission, he received IV antibiotics and had several scans including gallbladder ultrasound and echocardiogram. He was concerned about care related to his heart, so he was discharged. He was prescribed Amoxicillin but developed rash and stopped taking it. He discussed his concerns with Dr. Smith, cardiology, however, his symptoms had improved and he already had an appointment scheduled and he was instructed to wait for the appointment. Today, after Confucian[employer] around 1300, his symptoms of fever, chills, nausea, and vomiting returned and this prompted him to present to the ED. He denies chest pain or abdominal pain. His wife feels his symptoms have been progressively worsening over the past 3 days. This is reportedly a recurrent set of symptoms. He has a history of partial colectomy for colon cancer, CABG, valve replacement for aortic aneurysm, and bilateral foot surgery due to his diabetes. He is on Coumadin. There are no other acute symptoms at this time.        History provided by:  Patient and relative  Fever   Temp source:  Unable to specify  Severity:  Moderate  Onset quality:  Gradual  Duration:  3 weeks  Timing:  Constant  Progression:  Waxing and waning  Chronicity:  Recurrent  Relieved by: Antibiotics.  Worsened by:  Nothing  Associated symptoms: chills, nausea, rhinorrhea and vomiting    Associated symptoms: no chest pain, no congestion, no cough, no dysuria and no sore throat    Risk factors: recent sickness        Review of Systems   Constitutional: Positive for chills and fever.   HENT: Positive for rhinorrhea. Negative for congestion and sore throat.    Respiratory: Negative for cough and shortness of breath.    Cardiovascular: " Negative for chest pain.   Gastrointestinal: Positive for nausea and vomiting. Negative for abdominal pain.   Genitourinary: Negative for dysuria.   All other systems reviewed and are negative.      Past Medical History:   Diagnosis Date   • Cancer (CMS/HCC)     colon cancer with operation/ chemotherapy/radiation    • CHF (congestive heart failure) (CMS/HCC) 2009    Acute Systolic    • Diabetes mellitus (CMS/HCC)        Allergies   Allergen Reactions   • Sulfa Antibiotics    • Amoxicillin Rash       Past Surgical History:   Procedure Laterality Date   • AORTIC VALVE REPAIR/REPLACEMENT  2009    25 mm. [place] AVR   • ASCENDING AORTIC ANEURYSM REPAIR  2009   • COLON SURGERY      Colon cancer with operation       Family History   Problem Relation Age of Onset   • Heart disease Mother    • Hyperlipidemia Mother    • Diabetes Mother    • Leukemia Father    • Cancer Sister    • Kidney failure Brother    • Hepatitis Brother    • Cancer Sister    • Cancer Brother    • Heart disease Brother    • Heart attack Brother        Social History     Socioeconomic History   • Marital status:      Spouse name: Not on file   • Number of children: Not on file   • Years of education: Not on file   • Highest education level: Not on file   Tobacco Use   • Smoking status: Never Smoker   • Smokeless tobacco: Never Used   Substance and Sexual Activity   • Alcohol use: No   • Drug use: No   • Sexual activity: Defer         Objective   Physical Exam   Constitutional: He is oriented to person, place, and time. He appears well-developed and well-nourished. No distress.   Warm to touch. Appears mildly somnolent.   HENT:   Head: Normocephalic and atraumatic.   Nose: Nose normal.   Mouth/Throat: Uvula is midline. No posterior oropharyngeal erythema.   Eyes: Conjunctivae are normal. No scleral icterus.   Neck: Normal range of motion. Neck supple.   Cardiovascular: Regular rhythm and normal heart sounds.   No murmur heard.  Mildly tachycardic.    Pulmonary/Chest: Effort normal and breath sounds normal. No respiratory distress.   Abdominal: Soft. There is no tenderness. A hernia is present.   Below the umbilicus there is a left abdominal hernia which is not tender.   Musculoskeletal: Normal range of motion. He exhibits edema. He exhibits no tenderness.   +3 edema to the mid-shin bilaterally. Equal calf sizes. There is a healing wound to the base of the right great toe. No tenderness reported but he has decreased sensation due to diabetes. There is redness extending from the right second toe to the right dorsal foot.   Neurological: He is alert and oriented to person, place, and time.   Mentation intact.   Skin: Skin is warm and dry. There is erythema.   Psychiatric: He has a normal mood and affect. His behavior is normal.   Nursing note and vitals reviewed.      Procedures         ED Course  ED Course as of Ben 10 0226   Mon Ben 10, 2019   0203 Hematocrit: (!) 35.9 [JW]      ED Course User Index  [JW] Clemente Sullivan     Recent Results (from the past 24 hour(s))   Urinalysis With Culture If Indicated - Urine, Clean Catch    Collection Time: 06/10/19 12:25 AM   Result Value Ref Range    Color, UA Yellow Yellow, Straw    Appearance, UA Clear Clear    pH, UA 5.5 5.0 - 8.0    Specific Gravity, UA 1.014 1.001 - 1.030    Glucose,  mg/dL (2+) (A) Negative    Ketones, UA Negative Negative    Bilirubin, UA Negative Negative    Blood, UA Moderate (2+) (A) Negative    Protein, UA Negative Negative    Leuk Esterase, UA Trace (A) Negative    Nitrite, UA Negative Negative    Urobilinogen, UA 0.2 E.U./dL 0.2 - 1.0 E.U./dL   Urinalysis, Microscopic Only - Urine, Clean Catch    Collection Time: 06/10/19 12:25 AM   Result Value Ref Range    RBC, UA 7-12 (A) None Seen, 0-2 /HPF    WBC, UA 3-5 (A) None Seen, 0-2 /HPF    Bacteria, UA None Seen None Seen, Trace /HPF    Squamous Epithelial Cells, UA 0-2 None Seen, 0-2 /HPF    Hyaline Casts, UA None Seen 0 - 6 /LPF     Methodology Automated Microscopy    Comprehensive Metabolic Panel    Collection Time: 06/10/19  1:03 AM   Result Value Ref Range    Glucose 284 (H) 65 - 99 mg/dL    BUN 24 (H) 8 - 23 mg/dL    Creatinine 1.31 (H) 0.76 - 1.27 mg/dL    Sodium 140 136 - 145 mmol/L    Potassium 4.8 3.5 - 5.2 mmol/L    Chloride 104 98 - 107 mmol/L    CO2 23.0 22.0 - 29.0 mmol/L    Calcium 8.8 8.6 - 10.5 mg/dL    Total Protein 7.6 6.0 - 8.5 g/dL    Albumin 3.50 3.50 - 5.20 g/dL    ALT (SGPT) 15 1 - 41 U/L    AST (SGOT) 22 1 - 40 U/L    Alkaline Phosphatase 68 39 - 117 U/L    Total Bilirubin 0.6 0.2 - 1.2 mg/dL    eGFR Non African Amer 55 (L) >60 mL/min/1.73    Globulin 4.1 gm/dL    A/G Ratio 0.9 g/dL    BUN/Creatinine Ratio 18.3 7.0 - 25.0    Anion Gap 13.0 mmol/L   Procalcitonin    Collection Time: 06/10/19  1:03 AM   Result Value Ref Range    Procalcitonin 0.07 (L) 0.10 - 0.25 ng/mL   Lactic Acid, Plasma    Collection Time: 06/10/19  1:03 AM   Result Value Ref Range    Lactate 2.0 0.5 - 2.0 mmol/L   Troponin    Collection Time: 06/10/19  1:03 AM   Result Value Ref Range    Troponin T 0.021 0.000 - 0.030 ng/mL   Protime-INR    Collection Time: 06/10/19  1:03 AM   Result Value Ref Range    Protime 46.5 (H) 11.2 - 14.3 Seconds    INR 5.26 (C) 0.85 - 1.16   CBC Auto Differential    Collection Time: 06/10/19  1:03 AM   Result Value Ref Range    WBC 16.55 (H) 3.40 - 10.80 10*3/mm3    RBC 4.02 (L) 4.14 - 5.80 10*6/mm3    Hemoglobin 11.2 (L) 13.0 - 17.7 g/dL    Hematocrit 35.9 (L) 37.5 - 51.0 %    MCV 89.3 79.0 - 97.0 fL    MCH 27.9 26.6 - 33.0 pg    MCHC 31.2 (L) 31.5 - 35.7 g/dL    RDW 14.7 12.3 - 15.4 %    RDW-SD 47.9 37.0 - 54.0 fl    MPV 9.6 6.0 - 12.0 fL    Platelets 297 140 - 450 10*3/mm3    Neutrophil % 87.9 (H) 42.7 - 76.0 %    Lymphocyte % 4.7 (L) 19.6 - 45.3 %    Monocyte % 5.3 5.0 - 12.0 %    Eosinophil % 0.9 0.3 - 6.2 %    Basophil % 0.7 0.0 - 1.5 %    Immature Grans % 0.5 0.0 - 0.5 %    Neutrophils, Absolute 14.56 (H) 1.70 - 7.00  "10*3/mm3    Lymphocytes, Absolute 0.77 0.70 - 3.10 10*3/mm3    Monocytes, Absolute 0.87 0.10 - 0.90 10*3/mm3    Eosinophils, Absolute 0.15 0.00 - 0.40 10*3/mm3    Basophils, Absolute 0.12 0.00 - 0.20 10*3/mm3    Immature Grans, Absolute 0.08 (H) 0.00 - 0.05 10*3/mm3    nRBC 0.0 0.0 - 0.2 /100 WBC   Influenza Antigen, Rapid - Swab, Nasopharynx    Collection Time: 06/10/19  1:39 AM   Result Value Ref Range    Influenza A Ag, EIA Negative Negative    Influenza B Ag, EIA Negative Negative     Note: In addition to lab results from this visit, the labs listed above may include labs taken at another facility or during a different encounter within the last 24 hours. Please correlate lab times with ED admission and discharge times for further clarification of the services performed during this visit.    XR Chest 1 View   Final Result      1.  No acute abnormality.      Signer Name: Sony Lawson MD    Signed: 6/10/2019 2:13 AM    Workstation Name: "[email or web address]"          CT Abdomen Pelvis Without Contrast   Final Result   1.  No acute intra-abdominal abnormality. Anastomotic sites within the right and left colon appear widely patent.   2.  Multiple bowel containing ventral abdominal hernias. There is no evidence of obstruction of the small bowel loops or colonic loops associated with these findings.   3.  Atherosclerosis.      Signer Name: Sony Lawson MD    Signed: 6/10/2019 12:46 AM    Workstation Name: [id number]            Vitals:    06/09/19 2358 06/10/19 0045 06/10/19 0130 06/10/19 0200   BP: 145/67 145/74 156/76 152/76   Pulse: 102  103 102   Resp: 20      Temp: (!) 103 °F (39.4 °C)      SpO2: 94%  90% 91%   Weight: (!) 145 kg (320 lb)      Height: 190.5 cm (75\")        Medications   sodium chloride 0.9 % flush 10 mL (not administered)   aztreonam (AZACTAM) 2 g in sodium chloride 0.9 % 100 mL IVPB-MBP (not administered)   vancomycin 3000 mg/500 mL 0.9% NS IVPB ([facility]) (not administered)   sodium chloride 0.9 % " bolus 1,000 mL (0 mL Intravenous Stopped 6/10/19 0224)   acetaminophen (TYLENOL) tablet 1,000 mg (1,000 mg Oral Given 6/10/19 0102)   ondansetron (ZOFRAN) injection 4 mg (4 mg Intravenous Given 6/10/19 0116)     ECG/EMG Results (last 24 hours)     Procedure Component Value Units Date/Time    ECG 12 Lead [[id number]] Collected:  06/10/19 0025     Updated:  06/10/19 0027        ECG 12 Lead                             MDM  Number of Diagnoses or Management Options  Cellulitis of foot, right: new and requires workup  Sepsis, due to unspecified organism (CMS/HCC): new and requires workup  Diagnosis management comments: Patient presents to meet sepsis criteria.  He is tachycardic, fever of 103, and significant leukocytosis.    The patient does have some redness over his right proximal foot that could represent cellulitis, he also has a healing wound to the sole of the right great toe.  There is no purulent drainage.  No significant redness to the right great toe itself.    I am unsure if the foot is the source of infection, but imaging has not previously been performed.    Blood cultures have been drawn, antibiotics in the form of aztreonam and vancomycin initiated.    I discussed the patient with the hospitalist, Dr. Cornejo, who will admit.       Amount and/or Complexity of Data Reviewed  Clinical lab tests: ordered and reviewed  Tests in the radiology section of CPT®: ordered and reviewed  Obtain history from someone other than the patient: yes  Review and summarize past medical records: yes  Discuss the patient with other providers: yes  Independent visualization of images, tracings, or specimens: yes    Patient Progress  Patient progress: stable      Final diagnoses:   Sepsis, due to unspecified organism (CMS/HCC)   Cellulitis of foot, right       Documentation assistance provided by sharad Sullivan.  Information recorded by the sharad was done at my direction and has been verified and validated by me.      Clemente Sullivan  06/10/19 0200       Clemente Sullivan  06/10/19 0215       French Han MD  06/10/19 0851

## 2019-06-10 NOTE — CONSULTS
INFECTIOUS DISEASE CONSULT/INITIAL HOSPITAL VISIT    Cecilio Farias  1955  [id number]    Date of Consult: 6/10/2019    Admission Date: 6/10/2019      Requesting Provider: No ref. provider found  Evaluating Physician: Oscar Kaur MD    Reason for Consultation: sepsis    History of present illness:    Patient is a 63 y.o. male, with DM, VHD s/p AVR, CHF, seen today for sepsis.   He developed fever, chills, nausea, vomiting about 3 weeks ago,and was hospitalized at [facility] for 4  Days.  He was on IV antibiotics, and during the hospitalization had a GB ultrasound and Echocardiogram reportedly normal. He symptomatically improved and was discharged on Amoxicillin.  He developed a diffuse rash after 2 days and discontinued the Amoxicillin.  He had been feeling well until yesterday, when he began chilling, and experiencing nausea and vomiting.  He presented to the ED here where his temperature was 103 degrees.  Admitting labs with a leukocytosis of 16, 000, Scr 1.31, elevated inflammatory markers normal PCT and lactate.  An abdominal CT without acute abnormality, CXR clear.   He has  An ulcer on his right hallux that has been there for over a year, which began as a callous and his  Podiatrist has been debriding it.     Past Medical History:   Diagnosis Date   • Cancer (CMS/HCC)     colon cancer with operation/ chemotherapy/radiation    • CHF (congestive heart failure) (CMS/HCC) 2009    Acute Systolic    • Chronic anticoagulation     coumadin   • Diabetes mellitus (CMS/HCC)    • H/O aortic valve replacement    • HLD (hyperlipidemia)    • Hypertension        Past Surgical History:   Procedure Laterality Date   • AORTIC VALVE REPAIR/REPLACEMENT  2009    25 mm. [place] AVR   • ASCENDING AORTIC ANEURYSM REPAIR  2009   • BACK SURGERY     • COLON SURGERY      Colon cancer with operation   • FOOT SURGERY      bilateral 2/2 wound and trauma        Family History   Problem Relation Age of Onset   • Heart disease  Mother    • Hyperlipidemia Mother    • Diabetes Mother    • Leukemia Father    • Cancer Sister    • Kidney failure Brother    • Hepatitis Brother    • Cancer Sister    • Cancer Brother    • Heart disease Brother    • Heart attack Brother        Social History     Socioeconomic History   • Marital status:      Spouse name: Not on file   • Number of children: Not on file   • Years of education: Not on file   • Highest education level: Not on file   Tobacco Use   • Smoking status: Never Smoker   • Smokeless tobacco: Never Used   Substance and Sexual Activity   • Alcohol use: No   • Drug use: No   • Sexual activity: Defer       Allergies   Allergen Reactions   • Sulfa Antibiotics    • Amoxicillin Rash         Medication:    Current Facility-Administered Medications:   •  [START ON 6/12/2019] ! Vancomycin trough 6/12 at 0530.  Please hold vancomycin dose 6/12 at 0600 until pharmacy can evaluate level, , Does not apply, Once, Dell Casiano, [facility]  •  amLODIPine (NORVASC) tablet 10 mg, 10 mg, Oral, Daily, Humera Deutsch, APRN, 10 mg at 06/10/19 0838  •  aspirin EC tablet 81 mg, 81 mg, Oral, Daily, Humera Deutsch W, APRN, 81 mg at 06/10/19 0838  •  atorvastatin (LIPITOR) tablet 10 mg, 10 mg, Oral, Daily, Humera Deutsch, APRN, 10 mg at 06/10/19 0838  •  carvedilol (COREG) tablet 25 mg, 25 mg, Oral, BID With Meals, Humera Deutsch, APRN, 25 mg at 06/10/19 1701  •  cefTRIAXone (ROCEPHIN) 2 g/100 mL 0.9% NS VTB (NICK), 2 g, Intravenous, Q24H, Marita Garcia APRN, Stopped at 06/10/19 1148  •  dextrose (D50W) 25 g/ 50mL Intravenous Solution 25 g, 25 g, Intravenous, Q15 Min PRN, Humera Deutsch, APRN  •  dextrose (GLUTOSE) oral gel 15 g, 15 g, Oral, Q15 Min PRN, Humera Deutsch, APRN  •  gabapentin (NEURONTIN) capsule 800 mg, 800 mg, Oral, Q8H, Humera Deutsch, APRN, 800 mg at 06/10/19 1311  •  glucagon (human recombinant) (GLUCAGEN DIAGNOSTIC) injection 1 mg, 1 mg, Subcutaneous,  PRN, Humera Deutsch APRN  •  insulin detemir (LEVEMIR) injection 4 Units, 4 Units, Subcutaneous, Nightly, Antelmo Nix MD  •  insulin lispro (humaLOG) injection 0-7 Units, 0-7 Units, Subcutaneous, 4x Daily With Meals & Nightly, Humera Deutsch APRN, 4 Units at 06/10/19 1702  •  ondansetron (ZOFRAN) tablet 4 mg, 4 mg, Oral, Q6H PRN **OR** ondansetron (ZOFRAN) injection 4 mg, 4 mg, Intravenous, Q6H PRN, Humera Deutsch APRN  •  Pharmacy to dose vancomycin, , Does not apply, Continuous PRN, Humera Deutsch APRN  •  Pharmacy to dose warfarin, , Does not apply, Continuous PRN, Humera Deutsch APRN  •  [COMPLETED] Insert peripheral IV, , , Once **AND** sodium chloride 0.9 % flush 10 mL, 10 mL, Intravenous, PRN, French Han MD  •  sodium chloride 0.9 % flush 3 mL, 3 mL, Intravenous, Q12H, Humera Deutsch APRN, 3 mL at 06/10/19 0839  •  sodium chloride 0.9 % flush 3-10 mL, 3-10 mL, Intravenous, PRN, Humera Deutsch APRN  •  vancomycin 1500 mg/500 mL 0.9% NS IVPB ([facility]), 11 mg/kg, Intravenous, Q12H, Dell Casiano RPH, 1,500 mg at 06/10/19 1701  •  warfarin (COUMADIN) (dosing per levels), , Does not apply, Daily, Dell Casiano RPH    Antibiotics:  Anti-Infectives (From admission, onward)    Ordered     Dose/Rate Route Frequency Start Stop    06/10/19 0246  vancomycin 1500 mg/500 mL 0.9% NS IVPB ([facility])     Ordering Provider:  Dell Casiano RPH    11 mg/kg × 145 kg  over 90 Minutes Intravenous Every 12 Hours 06/10/19 1800 06/17/19 0559    06/10/19 0956  cefTRIAXone (ROCEPHIN) 2 g/100 mL 0.9% NS VTB (NICK)     Ordering Provider:  Marita Garcia APRN    2 g  over 30 Minutes Intravenous Every 24 Hours 06/10/19 1200 06/17/19 1159    06/10/19 0237  Pharmacy to dose vancomycin     Ordering Provider:  Humera Deutsch APRN     Does not apply Continuous PRN 06/10/19 0237 06/17/19 0236    06/10/19 0216  aztreonam (AZACTAM) 2 g in sodium chloride 0.9 %  100 mL IVPB-MBP     Ordering Provider:  French Han MD    2 g  200 mL/hr over 30 Minutes Intravenous Once 06/10/19 0218 06/10/19 0316    06/10/19 0216  vancomycin 3000 mg/500 mL 0.9% NS IVPB ([facility])     Ordering Provider:  French Han MD    20 mg/kg × 145 kg Intravenous Once 06/10/19 0218 06/10/19 0316            Review of Systems:  Constitutional-- + Fever, chills or sweats.  Appetite good, and no malaise. No fatigue.  HEENT-- No new vision, hearing or throat complaints.  No epistaxis or oral sores.  Denies odynophagia or dysphagia. No headache, photophobia or neck stiffness.  CV-- No chest pain, palpitation or syncope  Resp-- No SOB/cough/Hemoptysis  GI- + nausea, vomiting,  No  diarrhea.  No hematochezia, melena, or hematemesis. Denies jaundice or chronic liver disease.  -- No dysuria, hematuria, or flank pain.  Denies hesitancy, urgency or flank pain.  Lymph- no swollen lymph nodes in neck/axilla or groin.   Heme- No active bruising or bleeding; no Hx of DVT or PE.  MS-- no swelling or pain in the bones or joints of arms/legs.  No new back pain.  Neuro-- No acute focal weakness or numbness in the arms or legs.  No seizures.  Skin--No rashes       Physical Exam:   Vital Signs  Temp (24hrs), Av.6 °F (38.7 °C), Min:100 °F (37.8 °C), Max:103 °F (39.4 °C)    Temp  Min: 100 °F (37.8 °C)  Max: 103 °F (39.4 °C)  BP  Min: 107/52  Max: 156/76  Pulse  Min: 87  Max: 103  Resp  Min: 18  Max: 20  SpO2  Min: 90 %  Max: 94 %    GENERAL: Awake and alert, in no acute distress.   HEENT: Normocephalic, atraumatic.  PERRL. EOMI. No conjunctival injection. No icterus. Oropharynx clear without evidence of thrush or exudate. No evidence of peridontal disease.    NECK: Supple without nuchal rigidity. No mass.  LYMPH: No cervical, axillary or inguinal lymphadenopathy.  HEART: RRR; No murmur, rubs, gallops. + prosthetic click   LUNGS: Clear to auscultation bilaterally without wheezing, rales, rhonchi. Normal  respiratory effort. Nonlabored. No dullness.  ABDOMEN: Soft, nontender, nondistended. Positive bowel sounds. No rebound or guarding. NO mass or HSM.  EXT:  No cyanosis, clubbing or edema. Venous stasis discoloration noted  + Peripheral pulse   : Without Adam catheter.  MSK: FROM without joint effusions noted arms/legs.    SKIN: Warm and dry.    NEURO: Oriented to PPT. No focal deficits on motor/sensory exam at arms/legs.  PSYCHIATRIC: Normal insight and judgement. Cooperative with PE  Right hallux with ulceration plantar surface, calloused no drainage, with faint erythema dorsum of foot   Laboratory Data    Results from last 7 days   Lab Units 06/10/19  0415 06/10/19  0103   WBC 10*3/mm3 19.30* 16.55*   HEMOGLOBIN g/dL 10.2* 11.2*   HEMATOCRIT % 34.3* 35.9*   PLATELETS 10*3/mm3 263 297     Results from last 7 days   Lab Units 06/10/19  0415   SODIUM mmol/L 138   POTASSIUM mmol/L 4.4   CHLORIDE mmol/L 104   CO2 mmol/L 21.0*   BUN mg/dL 24*   CREATININE mg/dL 1.32*   GLUCOSE mg/dL 249*   CALCIUM mg/dL 8.1*     Results from last 7 days   Lab Units 06/10/19  0103   ALK PHOS U/L 68   BILIRUBIN mg/dL 0.6   ALT (SGPT) U/L 15   AST (SGOT) U/L 22     Results from last 7 days   Lab Units 06/10/19  0103   SED RATE mm/hr 89*     Results from last 7 days   Lab Units 06/10/19  0415   CRP mg/dL 1.93*     Results from last 7 days   Lab Units 06/10/19  0103   LACTATE mmol/L 2.0             Estimated Creatinine Clearance: 89.5 mL/min (A) (by C-G formula based on SCr of 1.32 mg/dL (H)).      Microbiology:         Influenza  Negative  6/10: BC pending       [facility] :  5/21:  BC no growth                   Radiology:  Imaging Results (last 72 hours)     Procedure Component Value Units Date/Time    XR Chest 1 View [[id number]] Collected:  06/10/19 0213     Updated:  06/10/19 0215    Narrative:       CR Chest 1 Vw    SIGNS AND SYMPTOMS:  fever 103 fever for a couple days with fatigue and vomiting.  Hx of CABG 10 years ago, HTN,  diabetic     COMPARISONS:  CT angiogram of the chest 12/27/2009, chest radiograph 12/27/2009    FINDINGS:    A portable AP view of the chest was obtained  Semiupright.    The lungs are clear. There is marked cardiomegaly even accounting for AP technique. Aortic valve has been replaced utilizing a mechanical prosthetic. There are postsurgical changes of median sternotomy. No pneumothorax or pleural effusion is identified.  The bones are normal for age.      Impression:         1.  No acute abnormality.    Signer Name: Sony Lawson MD   Signed: 6/10/2019 2:13 AM   Workstation Name: [id number]       CT Abdomen Pelvis Without Contrast [[id number]] Collected:  06/10/19 0046     Updated:  06/10/19 0049    Narrative:       CT Abdomen Pelvis WO 6/10/2019 1:34 AM    SIGNS AND SYMPTOMS:  Fever, n/v starting today, no abd pain; Hx colon ca w/ colon resection; Recent hospital admission for similar symptoms    COMPARISON:  None available    TECHNIQUE:  Axial imaging of the abdomen and pelvis was performed without the administration of intravenous contrast. No enteric contrast was administered. Multiplanar reformation was obtained. Radiation dose reduction techniques included automated exposure control  or exposure modulation based on body size. Radiation audit for number of CT and nuclear cardiology exams performed in the last year: 0.    FINDINGS:  Lower chest: There are postsurgical changes of median sternotomy and coronary artery bypass. Coronary atherosclerosis is present.    Hepatobiliary: The liver is normal in size. There are gallstones seen within the gallbladder.There is no CT evidence of acute cholecystitis.     Spleen, pancreas, adrenal glands: No abnormalities.    Kidneys, ureters, bladder: No abnormalities.    Bowel: There is an anastomotic site seen within left colon in the left lower quadrant (series 4#31 for example). This appears widely patent. A second anastomotic site is seen within the right colon  (series 2#42 for example). This anastomotic site also  appears widely patent. The small bowel and stomach are otherwise unremarkable.    Peritoneal cavity / Subperitoneal space: No abnormalities.    Lymphovascular: Atherosclerotic vascular calcifications are present within the descending aorta and iliac arteries. No retroperitoneal adenopathy.    Abdominal wall: There are bowel containing ventral abdominal hernias (series 5#25 for example). These contain loops of both small bowel and colon. There is no evidence of obstruction of the loops involved within the ventral abdominal hernias.    Musculoskeletal: Degenerative disc disease lumbar spine. No destructive bony lesion.    Prostate, seminal vesicles: There is calcification of the vas deferens, nonspecific but can be seen in the setting of diabetes mellitus. Prostate normal in size. No abnormality of the seminal vesicles.      Impression:       1.  No acute intra-abdominal abnormality. Anastomotic sites within the right and left colon appear widely patent.  2.  Multiple bowel containing ventral abdominal hernias. There is no evidence of obstruction of the small bowel loops or colonic loops associated with these findings.  3.  Atherosclerosis.    Signer Name: Sony Lawson MD   Signed: 6/10/2019 12:46 AM   Workstation Name: [id number]               Impression:   1.  Sepsis- with leukocytosis, fever, acute kidney injury and a potential focus of infection.  The source of his sepsis is not entirely clear.  We will need to evaluate for prosthetic valve infection.  He does have some cellulitis around his right hallux ulcer and has some mild early erythema over the dorsum of his foot.  I will plan to switch his antibiotic therapy to intravenous vancomycin/ceftriaxone pending culture data.  2.  Right hallux ulcer/ cellulitis- will need to evaluate for osteomyelitis  3.  VHD, s/p AVR   4.  Diabetes Mellitus, type 2  5.  Peripheral neuropathy  6.  Acute kidney  injury/ATN  7.  History of colon cancer/resection  8.  Penicillin allergy-he had a recent extensive rash secondary to amoxicillin.    PLAN/RECOMMENDATIONS:   Thank you for asking us to see Cecilio MARLA Farias, I recommend the followin. Consider MRI of right hallux   2. Blood cultures, pending   3. Intravenous Vancomycin   4. Discontinue Intravenous Aztreonam  5. Obtain cultures, echo,, records from [facility] [address]  6. Ceftriaxone 2 g IV daily    Dr. Kaur has obtained the history, performed the physical exam and formulated the above treatment plan.        Oscar Kaur MD  6/10/2019  5:07 PM

## 2019-06-10 NOTE — PLAN OF CARE
Problem: Patient Care Overview  Goal: Plan of Care Review  Outcome: Ongoing (interventions implemented as appropriate)   06/10/19 1508   Coping/Psychosocial   Plan of Care Reviewed With patient   Plan of Care Review   Progress no change   OTHER   Outcome Summary Patient has had a low grade fever today. Antibiotics changed. Awaiting MRI of right lower extremity. No pain/nausea/vomiting. VSS. Wife at bedside. Will continue to monitor.       Problem: Fall Risk (Adult)  Goal: Absence of Fall  Outcome: Ongoing (interventions implemented as appropriate)   06/10/19 1508   Fall Risk (Adult)   Absence of Fall making progress toward outcome       Problem: Pain, Chronic (Adult)  Goal: Acceptable Pain/Comfort Level and Functional Ability  Outcome: Ongoing (interventions implemented as appropriate)   06/10/19 1508   Pain, Chronic (Adult)   Acceptable Pain/Comfort Level and Functional Ability making progress toward outcome       Problem: Skin Injury Risk (Adult)  Goal: Skin Health and Integrity  Outcome: Ongoing (interventions implemented as appropriate)   06/10/19 1508   Skin Injury Risk (Adult)   Skin Health and Integrity making progress toward outcome

## 2019-06-10 NOTE — PROGRESS NOTES
Seen and examined.  H and P reviewed by my partner, Dr. Cornejo.    This is a complex case as the patient has been hospitalized twice in several weeks with Sepsis as primary diagnosis and symptoms concerning for occult bacteremia.    This is a concern with a prosthetic heart valve.    He has a debrided ulcer on the plantar surface of his right great toe.  He reports diabetic neuropathy.  He reports treatment by a podiatrist.  He has been having fevers and chills.  It is very important in this case to rule out Osteomyelitis in an uncontrolled diabetic with fevers and chills and the inability to feel pain in his feet.

## 2019-06-10 NOTE — PROGRESS NOTES
Discharge Planning Assessment  [facility]     Patient Name: Cecilio Farias  MRN: [id number]  Today's Date: 6/10/2019    Admit Date: 6/10/2019    Discharge Needs Assessment     Row Name 06/10/19 0948       Living Environment    Lives With  spouse;child(marysol), adult    Current Living Arrangements  home/apartment/condo    Living Arrangement Comments  Has steps. Family can assist as needed after DC.       Discharge Needs Assessment    Equipment Currently Used at Home  none    Equipment Needed After Discharge  none    Discharge Coordination/Progress  No HH involved. No outpt services currently.        Discharge Plan     Row Name 06/10/19 0949       Plan    Plan  IDP    Patient/Family in Agreement with Plan  yes    Plan Comments  I spoke with the pt. He is unsure of any DC needs at this time. I will follow.    Row Name 06/10/19 0832       Plan    Final Discharge Disposition Code  01 - home or self-care        Destination      No service coordination in this encounter.      Durable Medical Equipment      No service coordination in this encounter.      Dialysis/Infusion      No service coordination in this encounter.      Home Medical Care      No service coordination in this encounter.      Therapy      No service coordination in this encounter.      Community Resources      No service coordination in this encounter.        Expected Discharge Date and Time     Expected Discharge Date Expected Discharge Time    Jun 12, 2019         Demographic Summary    No documentation.       Functional Status     Row Name 06/10/19 0947       Functional Status    Usual Activity Tolerance  good       Functional Status, IADL    Medications  independent    Meal Preparation  independent    Housekeeping  independent    Laundry  independent    Shopping  independent        Psychosocial    No documentation.       Abuse/Neglect    No documentation.       Legal    No documentation.       Substance Abuse    No documentation.       Patient Forms     No documentation.           Marita Zarco RN

## 2019-06-10 NOTE — PROGRESS NOTES
"Pharmacokinetic Consult - Vancomycin Dosing  Cecilio Farias is a 63 y.o. male who has been consulted for vancomycin dosing for sepsis and SSTI  (goal trough 15-20 mcg/mL).  Ht - 190.5 cm (75\")  Wt - (!) 145 kg (320 lb)    Current Antimicrobial Therapy  Aztreonam 2 gm IV q8h - day 1 (started 6/10)  Vancomycin 1500 mg IV q12h - day 1 (started 6/10)    Allergies  Sulfa antibiotics and Amoxicillin    Microbiology and Radiology  Microbiology Results (last 10 days)       Procedure Component Value - Date/Time    Influenza Antigen, Rapid - Swab, Nasopharynx [[id number]]  (Normal) Collected:  06/10/19 0139    Lab Status:  Final result Specimen:  Swab from Nasopharynx Updated:  06/10/19 0202     Influenza A Ag, EIA Negative     Influenza B Ag, EIA Negative          Relevant clinical data and objective history reviewed:  Results from last 7 days   Lab Units 06/10/19  0103   BUN mg/dL 24*   CREATININE mg/dL 1.31*    Estimated Creatinine Clearance: 90.1 mL/min (A) (by C-G formula based on SCr of 1.31 mg/dL (H)).   Intake & Output (last 3 days)         06/07 0701 - 06/08 0700 06/08 0701 - 06/09 0700 06/09 0701 - 06/10 0700    IV Piggyback   1000    Total Intake(mL/kg)   1000 (6.9)    Net   +1000                 Asessment/Plan  1. Will give vancomycin 3000 mg IV once                                                followed by      Vancomycin 1500 mg IV q 12 hours    2. Vancomycin trough is scheduled 6/12 at 0530 prior to the 5th dose    Dell Casiano, PharmD, BCPS  6/10/2019  2:39 AM  "

## 2019-06-10 NOTE — H&P
"    [facility]  HISTORY AND PHYSICAL    Patient Name: Cecilio Farias  : 1955  MRN: [id number]  Primary Care Physician: America Everett DO  Date of admission: 6/10/2019      Subjective   Subjective     Chief Complaint:  Fever     HPI:  Cecilio Farias is a 63 y.o. male with a history of AVR on chronic coumadin, CHF, hx of colon cancer s/p resection, HTN, HLD and T2DM who presented to the ED w/ c/o fever. Pt reports that he developed a fever  afternoon. Soon after fever onset patient developed nausea and vomiting. His fever persisted throughout the afternoon prompting his ED visit.   Pt w/ recent admission to [facility] 2 weeks ago for sepsis of unknown etiology. Pt reports that during his admission he was treated w/ IV antibiotics and diagnostic tests including ECHO and gallbladder ultrasound. Pt discharged on Amoxicillin but developed a rash and stopped taking it. Pt reports that prior to his admission to [facility] he had similar symptoms (fever, nausea) that prompted his ED visit.   Pt denies chest pain, dyspnea, cough, abdominal pain, diarrhea, melena, dysuria, edema, syncope, confusion. He does have a wound on his right great toe that was \"shaved\" during his recent admission. Pt reports that wound appears much better than it was several weeks ago. The wound started as a callus that \"busted\" about 3 months ago.   Pt evaluated in the ED then admitted to the hospital medicine service for further evaluation.       Review of Systems   Constitutional: Positive for chills, fatigue and fever.   HENT: Negative.    Eyes: Negative.    Respiratory: Negative.    Cardiovascular: Negative.    Gastrointestinal: Positive for nausea and vomiting. Negative for abdominal distention, abdominal pain, anal bleeding, blood in stool, constipation, diarrhea and rectal pain.   Endocrine: Negative.    Genitourinary: Negative.    Musculoskeletal: Negative.    Skin: Negative.  "   Allergic/Immunologic: Negative.    Neurological: Negative.    Hematological: Negative.    Psychiatric/Behavioral: Negative.    All other systems reviewed and are negative.       Otherwise complete ROS reviewed and is negative except as mentioned in the HPI.    Personal History     Past Medical History:   Diagnosis Date   • Cancer (CMS/HCC)     colon cancer with operation/ chemotherapy/radiation    • CHF (congestive heart failure) (CMS/HCC) 2009    Acute Systolic    • Chronic anticoagulation     coumadin   • Diabetes mellitus (CMS/HCC)    • H/O aortic valve replacement    • HLD (hyperlipidemia)    • Hypertension        Past Surgical History:   Procedure Laterality Date   • AORTIC VALVE REPAIR/REPLACEMENT  2009    25 mm. [place] AVR   • ASCENDING AORTIC ANEURYSM REPAIR  2009   • BACK SURGERY     • COLON SURGERY      Colon cancer with operation   • FOOT SURGERY      bilateral 2/2 wound and trauma        Family History: family history includes Cancer in his brother, sister, and sister; Diabetes in his mother; Heart attack in his brother; Heart disease in his brother and mother; Hepatitis in his brother; Hyperlipidemia in his mother; Kidney failure in his brother; Leukemia in his father. Otherwise pertinent FHx was reviewed and unremarkable.     Social History:  reports that he has never smoked. He has never used smokeless tobacco. He reports that he does not drink alcohol or use drugs.  Social History     Social History Narrative   • Not on file       Medications:    Available home medication information reviewed.    (Not in a hospital admission)    Allergies   Allergen Reactions   • Sulfa Antibiotics    • Amoxicillin Rash       Objective   Objective     Vital Signs:   Temp:  [103 °F (39.4 °C)] 103 °F (39.4 °C)  Heart Rate:  [102-103] 102  Resp:  [20] 20  BP: (145-156)/(67-76) 152/76        Physical Exam   Constitutional: He is oriented to person, place, and time. He appears well-developed and well-nourished. No  distress.   HENT:   Head: Normocephalic and atraumatic.   Eyes: EOM are normal. Pupils are equal, round, and reactive to light.   Neck: Normal range of motion. Neck supple.   Cardiovascular: Regular rhythm and intact distal pulses. Exam reveals no gallop and no friction rub.   No murmur heard.  Mechanical valve; click on exam. Tachycardia- sinus rhythm. +2 pitting edema BLE.    Pulmonary/Chest: Effort normal and breath sounds normal. No stridor. No respiratory distress. He has no wheezes. He has no rales. He exhibits no tenderness.   Abdominal: Soft. Bowel sounds are normal. He exhibits no distension. There is no tenderness. There is no guarding.   Musculoskeletal: Normal range of motion. He exhibits no tenderness or deformity.   Neurological: He is alert and oriented to person, place, and time. No cranial nerve deficit.   Skin: Skin is warm and dry. Capillary refill takes 2 to 3 seconds. No rash noted. He is not diaphoretic. No pallor.   Right foot/lower extremity w/ warmth on palpation. Mild erythema of entire foot/ankle noted. Plantar surface of the great toe w/ diabetic ulceration @ base- no drainage noted w/ surrounding erythema.    Psychiatric: He has a normal mood and affect. His behavior is normal. Judgment and thought content normal.   Nursing note and vitals reviewed.       Results Reviewed:  I have personally reviewed current lab, radiology, and data and agree.    Results from last 7 days   Lab Units 06/10/19  0103   WBC 10*3/mm3 16.55*   HEMOGLOBIN g/dL 11.2*   HEMATOCRIT % 35.9*   PLATELETS 10*3/mm3 297   INR  5.26*     Results from last 7 days   Lab Units 06/10/19  0103   SODIUM mmol/L 140   POTASSIUM mmol/L 4.8   CHLORIDE mmol/L 104   CO2 mmol/L 23.0   BUN mg/dL 24*   CREATININE mg/dL 1.31*   GLUCOSE mg/dL 284*   CALCIUM mg/dL 8.8   ALT (SGPT) U/L 15   AST (SGOT) U/L 22   TROPONIN T ng/mL 0.021   LACTATE mmol/L 2.0   PROCALCITONIN ng/mL 0.07*     Estimated Creatinine Clearance: 90.1 mL/min (A) (by C-G  formula based on SCr of 1.31 mg/dL (H)).  Brief Urine Lab Results  (Last result in the past 365 days)      Color   Clarity   Blood   Leuk Est   Nitrite   Protein   CREAT   Urine HCG        06/10/19 0025 Yellow Clear Moderate (2+) Trace Negative Negative             Imaging Results (last 24 hours)     Procedure Component Value Units Date/Time    XR Chest 1 View [[id number]] Collected:  06/10/19 0213     Updated:  06/10/19 0215    Narrative:       CR Chest 1 Vw    SIGNS AND SYMPTOMS:  fever 103 fever for a couple days with fatigue and vomiting.  Hx of CABG 10 years ago, HTN, diabetic     COMPARISONS:  CT angiogram of the chest 12/27/2009, chest radiograph 12/27/2009    FINDINGS:    A portable AP view of the chest was obtained  Semiupright.    The lungs are clear. There is marked cardiomegaly even accounting for AP technique. Aortic valve has been replaced utilizing a mechanical prosthetic. There are postsurgical changes of median sternotomy. No pneumothorax or pleural effusion is identified.  The bones are normal for age.      Impression:         1.  No acute abnormality.    Signer Name: Sony Lawson MD   Signed: 6/10/2019 2:13 AM   Workstation Name: [id number]       CT Abdomen Pelvis Without Contrast [[id number]] Collected:  06/10/19 0046     Updated:  06/10/19 0049    Narrative:       CT Abdomen Pelvis WO 6/10/2019 1:34 AM    SIGNS AND SYMPTOMS:  Fever, n/v starting today, no abd pain; Hx colon ca w/ colon resection; Recent hospital admission for similar symptoms    COMPARISON:  None available    TECHNIQUE:  Axial imaging of the abdomen and pelvis was performed without the administration of intravenous contrast. No enteric contrast was administered. Multiplanar reformation was obtained. Radiation dose reduction techniques included automated exposure control  or exposure modulation based on body size. Radiation audit for number of CT and nuclear cardiology exams performed in the last year: 0.    FINDINGS:  Lower  chest: There are postsurgical changes of median sternotomy and coronary artery bypass. Coronary atherosclerosis is present.    Hepatobiliary: The liver is normal in size. There are gallstones seen within the gallbladder.There is no CT evidence of acute cholecystitis.     Spleen, pancreas, adrenal glands: No abnormalities.    Kidneys, ureters, bladder: No abnormalities.    Bowel: There is an anastomotic site seen within left colon in the left lower quadrant (series 4#31 for example). This appears widely patent. A second anastomotic site is seen within the right colon (series 2#42 for example). This anastomotic site also  appears widely patent. The small bowel and stomach are otherwise unremarkable.    Peritoneal cavity / Subperitoneal space: No abnormalities.    Lymphovascular: Atherosclerotic vascular calcifications are present within the descending aorta and iliac arteries. No retroperitoneal adenopathy.    Abdominal wall: There are bowel containing ventral abdominal hernias (series 5#25 for example). These contain loops of both small bowel and colon. There is no evidence of obstruction of the loops involved within the ventral abdominal hernias.    Musculoskeletal: Degenerative disc disease lumbar spine. No destructive bony lesion.    Prostate, seminal vesicles: There is calcification of the vas deferens, nonspecific but can be seen in the setting of diabetes mellitus. Prostate normal in size. No abnormality of the seminal vesicles.      Impression:       1.  No acute intra-abdominal abnormality. Anastomotic sites within the right and left colon appear widely patent.  2.  Multiple bowel containing ventral abdominal hernias. There is no evidence of obstruction of the small bowel loops or colonic loops associated with these findings.  3.  Atherosclerosis.    Signer Name: Sony Lawson MD   Signed: 6/10/2019 12:46 AM   Workstation Name: [id number]           Results for orders placed during the hospital encounter  of 07/05/16   Adult transthoracic echo complete    Narrative · All left ventricular wall segments contract normally.  · Left ventricular diastolic dysfunction (grade I a) consistent with   impaired relaxation.  · Left ventricular function is hyperdynamic (EF > 70).  · A prosthetic aortic valve (unknown type) is present and functioning   normally.  · Mitral valve thickening and annular calcification is seen.          Assessment/Plan   Assessment / Plan     Active Hospital Problems    Diagnosis POA   • **Sepsis (CMS/[facility]) [A41.9] Yes   • HUMA (acute kidney injury) (CMS/[facility]) [N17.9] Yes   • Supratherapeutic INR [R79.1] Yes   • CHF (congestive heart failure) (CMS/[facility]) [I50.9] Yes   • S/P AVR [Z95.2] Not Applicable   • Aortic valve disorders [I35.9] [I35.9] Yes   • Dyslipidemia [E78.5] Yes     He follows up with Dr. Everett     • Hypertension [I10] Yes     7/2/15: currently controlled at home in the 130s/80s     • Type 2 diabetes mellitus (CMS/[facility]) [E11.9] Yes       Assessment & Plan    **Sepsis  -recent admission to [facility] 2 weeks ago for sepsis of unknown etiology   -meets sepsis criteria based on tachycardia, fever, leukocytosis, likely skin source   -likely 2/2 right foot cellulitis   -CXR and CT abd/pel obtained/reviewed  -blood cx pending  -s/p 1 liter NS given in ED  -started on Vanc and Azactam in ED; continue pending cultures  -WOC consulted for wound on foot  -ID consulted to see this am   -monitor labs    **Acute kidney injury  -baseline unclear, no previous CR on record  -pt denies known CKD  -UA obtained  -urine studies pending   -hold nephrotoxic meds for now  -monitor w/ labs     **Supratherapeutic INR   -chronic coumadin therapy 2/2 mechanical aortic valve  -no recent change in coumadin dose  -have asked pharmacy to dose coumadin w/ daily INRs ordered     **T2DM  -hem a1c w/ am labs  -hold routine metformin for now  -FSBG q ac/hs w/ LDSS coverage    **HTN  -stable  -hold routine lisinopril 2/2  HUMA  -continue routine coreg and norvasc    **CHF  -most recent ECHO on record from 2016; EF >70 w/ Grade I a diastolic dysfunction   -monitor for s/s decompensation   -routine ASA and BB continued     **HLD  -continue routine statin dose      DVT prophylaxis:  Coumadin     CODE STATUS:    Code Status and Medical Interventions:   Ordered at: 06/10/19 0248     Level Of Support Discussed With:    Patient     Code Status:    CPR     Medical Interventions (Level of Support Prior to Arrest):    Full       Admission Status:  I believe this patient meets INPATIENT status due to the need for care which can only be reasonably provided in an hospital setting ,includes iv fluids, iv antibiotics  In such, I feel patient’s risk for adverse outcomes and need for care warrant INPATIENT evaluation and predict the patient’s care encounter to likely last beyond 2 midnights.      Electronically signed by PHAN Rosado, 06/10/19, 2:32 AM.    Brief Attending Admission Attestation          Vitals:    06/10/19 0200   BP: 152/76   Pulse: 102   Resp:  20   Temp:  103   SpO2: 91% on room air       EXAM :  RS- CTA-BL, ,  No wheezing , no crackles, good effort.  CVS- s1s2 regular, no murmur, click heard in aortic area..  ABD- soft, non tender, distended, no organomegaly.  EXT-chronic appearing nonpitting edema, with discoloration, right great toe have open wound on the plantar surface, nontender, does have erythema and warmth as compared to the left.  NEURO- AAO-3, no focal defecit.      Old records reviewed and summarized in PM hx.    HPI:  Fever-for past 24 hours, denies any complaint of cough, dysuria, abdominal pain-nausea and vomiting along with the fever, no complaint of headache, sore throat, diarrhea.  Patient was discharged on amoxicillin-could not take it secondary to generalized rash.  Patient had fever on May 23-admitted to [facility]-started o vancomycin \Zosyn-had extensive work-up-CT abdomen/ultrasound  gallbladder-negative, chest x-ray negative, 2D echo negative,-at that point questionable source was right foot cellulitis-had a callus removed underneath right great toe.    In ER patient got Tylenol thousand milligrams, normal saline 1 L.  Vancomycin/aztreonam    PM HX :  Hypertension-Norvasc 10 mg, Coreg 25/12, lisinopril 20/12  CAD/nonobstructive-aspirin 81 mg  DM2-metformin, glipizide-blood glucose of 284-poor control, last hemoglobin A1c of 8.7.  Hyperlipidemia-Pravachol 40 mg  Mechanical aortic valve since 2009-Coumadin-INR of 5.26  Morbidly obese  Neuropathy-Neurontin 600/8    LABS:  UA-glucose 500, ketones negative, blood moderate, nitrite negative, leukocyte trace, RBC 7-12, WBC 3-5  CT abdomen-no acute intra-abdominal abnormality ventral hernia-with multiple bowel loops.Gall stones but no acute cholecystitiys.  Chest x-ray-no acute abnormality.  EKG-sinus rhythm at 102 bpm, first-degree AV block, QTC of 448.  Echo-diastolic dysfunction, ejection fraction 70%, prosthetic aortic valve  Troponin-0.021  Glucose 284  BUN/creatinine 24/1.3, bicarb of 23  LFTs within normal limit, lactic acid 2.0, procalcitonin 0.07.  WBC of 16, hemoglobin of 11, neutrophils of 87, platelet 297.  Influenza-negative    A/P:  Fever/leukocytosis- likely source being right foot cellulitis-ESR/CRP added, if elevated-may need radiological study to rule out osteomyelitis-continue vancomycin/aztreonam, ID consult.  DM2- fingerstick coverage, hemoglobin A1c.  HUMA-mild hydration.  Supratherapeutic INR-hold Coumadin tonight recheck INR in the a.m.    See above for further detailed assessment and plan developed with APC which I have reviewed and/or edited.     I have discussed with findings, diagnosis and plans with the patient and family.     Bhavna Cornejo MD 06/10/19 2:29 AM

## 2019-06-11 LAB
ANION GAP SERPL CALCULATED.3IONS-SCNC: 10 MMOL/L
BUN BLD-MCNC: 25 MG/DL (ref 8–23)
BUN/CREAT SERPL: 20.5 (ref 7–25)
CALCIUM SPEC-SCNC: 7.9 MG/DL (ref 8.6–10.5)
CHLORIDE SERPL-SCNC: 108 MMOL/L (ref 98–107)
CO2 SERPL-SCNC: 23 MMOL/L (ref 22–29)
CREAT BLD-MCNC: 1.22 MG/DL (ref 0.76–1.27)
DEPRECATED RDW RBC AUTO: 51.9 FL (ref 37–54)
ERYTHROCYTE [DISTWIDTH] IN BLOOD BY AUTOMATED COUNT: 15.1 % (ref 12.3–15.4)
GFR SERPL CREATININE-BSD FRML MDRD: 60 ML/MIN/1.73
GLUCOSE BLD-MCNC: 218 MG/DL (ref 65–99)
GLUCOSE BLDC GLUCOMTR-MCNC: 135 MG/DL (ref 70–130)
GLUCOSE BLDC GLUCOMTR-MCNC: 213 MG/DL (ref 70–130)
GLUCOSE BLDC GLUCOMTR-MCNC: 235 MG/DL (ref 70–130)
GLUCOSE BLDC GLUCOMTR-MCNC: 253 MG/DL (ref 70–130)
HCT VFR BLD AUTO: 36.7 % (ref 37.5–51)
HGB BLD-MCNC: 10.9 G/DL (ref 13–17.7)
INR PPP: 4.73 (ref 0.85–1.16)
MCH RBC QN AUTO: 27.5 PG (ref 26.6–33)
MCHC RBC AUTO-ENTMCNC: 29.7 G/DL (ref 31.5–35.7)
MCV RBC AUTO: 92.7 FL (ref 79–97)
PLATELET # BLD AUTO: 251 10*3/MM3 (ref 140–450)
PMV BLD AUTO: 9.6 FL (ref 6–12)
POTASSIUM BLD-SCNC: 4.4 MMOL/L (ref 3.5–5.2)
PROTHROMBIN TIME: 42.8 SECONDS (ref 11.2–14.3)
RBC # BLD AUTO: 3.96 10*6/MM3 (ref 4.14–5.8)
SODIUM BLD-SCNC: 141 MMOL/L (ref 136–145)
WBC NRBC COR # BLD: 11.55 10*3/MM3 (ref 3.4–10.8)

## 2019-06-11 PROCEDURE — 85027 COMPLETE CBC AUTOMATED: CPT

## 2019-06-11 PROCEDURE — 80048 BASIC METABOLIC PNL TOTAL CA: CPT

## 2019-06-11 PROCEDURE — 99233 SBSQ HOSP IP/OBS HIGH 50: CPT | Performed by: HOSPITALIST

## 2019-06-11 PROCEDURE — 63710000001 INSULIN LISPRO (HUMAN) PER 5 UNITS: Performed by: NURSE PRACTITIONER

## 2019-06-11 PROCEDURE — 25010000002 ONDANSETRON PER 1 MG: Performed by: NURSE PRACTITIONER

## 2019-06-11 PROCEDURE — 82962 GLUCOSE BLOOD TEST: CPT

## 2019-06-11 PROCEDURE — 63710000001 INSULIN DETEMIR PER 5 UNITS: Performed by: HOSPITALIST

## 2019-06-11 PROCEDURE — 25010000002 CEFEPIME 2 G/NS 100 ML SOLUTION: Performed by: INTERNAL MEDICINE

## 2019-06-11 PROCEDURE — 63710000001 INSULIN LISPRO (HUMAN) PER 5 UNITS: Performed by: HOSPITALIST

## 2019-06-11 PROCEDURE — 85610 PROTHROMBIN TIME: CPT | Performed by: NURSE PRACTITIONER

## 2019-06-11 PROCEDURE — 25010000002 VANCOMYCIN 10 G RECONSTITUTED SOLUTION

## 2019-06-11 PROCEDURE — G0108 DIAB MANAGE TRN  PER INDIV: HCPCS | Performed by: REGISTERED NURSE

## 2019-06-11 PROCEDURE — 25010000002 CEFTRIAXONE PER 250 MG: Performed by: NURSE PRACTITIONER

## 2019-06-11 RX ORDER — WARFARIN SODIUM 7.5 MG/1
7.5 TABLET ORAL SEE ADMIN INSTRUCTIONS
COMMUNITY
End: 2019-06-24

## 2019-06-11 RX ORDER — WARFARIN SODIUM 10 MG/1
10 TABLET ORAL SEE ADMIN INSTRUCTIONS
COMMUNITY
End: 2019-06-24

## 2019-06-11 RX ADMIN — ONDANSETRON 4 MG: 2 INJECTION INTRAMUSCULAR; INTRAVENOUS at 07:27

## 2019-06-11 RX ADMIN — AMLODIPINE BESYLATE 10 MG: 5 TABLET ORAL at 08:03

## 2019-06-11 RX ADMIN — CEFTRIAXONE 2 G: 2 INJECTION, POWDER, FOR SOLUTION INTRAMUSCULAR; INTRAVENOUS at 11:29

## 2019-06-11 RX ADMIN — INSULIN DETEMIR 4 UNITS: 100 INJECTION, SOLUTION SUBCUTANEOUS at 20:53

## 2019-06-11 RX ADMIN — SODIUM CHLORIDE, PRESERVATIVE FREE 3 ML: 5 INJECTION INTRAVENOUS at 20:53

## 2019-06-11 RX ADMIN — GABAPENTIN 800 MG: 400 CAPSULE ORAL at 21:02

## 2019-06-11 RX ADMIN — CARVEDILOL 25 MG: 12.5 TABLET, FILM COATED ORAL at 17:18

## 2019-06-11 RX ADMIN — INSULIN LISPRO 3 UNITS: 100 INJECTION, SOLUTION INTRAVENOUS; SUBCUTANEOUS at 12:31

## 2019-06-11 RX ADMIN — ATORVASTATIN CALCIUM 10 MG: 10 TABLET, FILM COATED ORAL at 08:02

## 2019-06-11 RX ADMIN — INSULIN LISPRO 3 UNITS: 100 INJECTION, SOLUTION INTRAVENOUS; SUBCUTANEOUS at 20:54

## 2019-06-11 RX ADMIN — INSULIN LISPRO 3 UNITS: 100 INJECTION, SOLUTION INTRAVENOUS; SUBCUTANEOUS at 17:18

## 2019-06-11 RX ADMIN — SODIUM CHLORIDE, PRESERVATIVE FREE 3 ML: 5 INJECTION INTRAVENOUS at 08:03

## 2019-06-11 RX ADMIN — ASPIRIN 81 MG: 81 TABLET, COATED ORAL at 08:03

## 2019-06-11 RX ADMIN — CEFEPIME 2 G: 2 INJECTION, POWDER, FOR SOLUTION INTRAVENOUS at 22:27

## 2019-06-11 RX ADMIN — VANCOMYCIN HYDROCHLORIDE 1500 MG: 10 INJECTION, POWDER, LYOPHILIZED, FOR SOLUTION INTRAVENOUS at 05:15

## 2019-06-11 RX ADMIN — GABAPENTIN 800 MG: 400 CAPSULE ORAL at 14:37

## 2019-06-11 RX ADMIN — CARVEDILOL 25 MG: 12.5 TABLET, FILM COATED ORAL at 08:02

## 2019-06-11 RX ADMIN — INSULIN LISPRO 4 UNITS: 100 INJECTION, SOLUTION INTRAVENOUS; SUBCUTANEOUS at 17:18

## 2019-06-11 RX ADMIN — GABAPENTIN 800 MG: 400 CAPSULE ORAL at 05:15

## 2019-06-11 NOTE — PROGRESS NOTES
INFECTIOUS DISEASE PROGRESS NOTE    Cecilio Farias  1955  [id number]    Date of Consult: 6/11/2019        Requesting Provider: No ref. provider found  Evaluating Physician: Oscar Kaur MD    Reason for Consultation: sepsis    History of present illness:  6/10/19:   Patient is a 63 y.o. male, with DM, VHD s/p AVR, CHF, seen today for sepsis.   He developed fever, chills, nausea, vomiting about 3 weeks ago,and was hospitalized at [facility] for 4  Days.  He was on IV antibiotics, and during the hospitalization had a GB ultrasound and Echocardiogram reportedly normal. He symptomatically improved and was discharged on Amoxicillin.  He developed a diffuse rash after 2 days and discontinued the Amoxicillin.  He had been feeling well until yesterday, when he began chilling, and experiencing nausea and vomiting.  He presented to the ED here where his temperature was 103 degrees.  Admitting labs with a leukocytosis of 16, 000, Scr 1.31, elevated inflammatory markers normal PCT and lactate.  An abdominal CT without acute abnormality, CXR clear.   He has  An ulcer on his right hallux that has been there for over a year, which began as a callous and his  Podiatrist has been debriding it.   6/11/19: Now with positive Gram negative bacteremia. Fever is better. He complains of nausea this am.  MRI hasn't been read yet this am.  No cough, dysuria, abdominal pain.      Past Medical History:   Diagnosis Date   • Cancer (CMS/HCC)     colon cancer with operation/ chemotherapy/radiation    • CHF (congestive heart failure) (CMS/HCC) 2009    Acute Systolic    • Chronic anticoagulation     coumadin   • Diabetes mellitus (CMS/HCC)    • H/O aortic valve replacement    • HLD (hyperlipidemia)    • Hypertension        Past Surgical History:   Procedure Laterality Date   • AORTIC VALVE REPAIR/REPLACEMENT  2009    25 mm. [place] AVR   • ASCENDING AORTIC ANEURYSM REPAIR  2009   • BACK SURGERY     • COLON SURGERY      Colon cancer  with operation   • FOOT SURGERY      bilateral 2/2 wound and trauma        Family History   Problem Relation Age of Onset   • Heart disease Mother    • Hyperlipidemia Mother    • Diabetes Mother    • Leukemia Father    • Cancer Sister    • Kidney failure Brother    • Hepatitis Brother    • Cancer Sister    • Cancer Brother    • Heart disease Brother    • Heart attack Brother        Social History     Socioeconomic History   • Marital status:      Spouse name: Not on file   • Number of children: Not on file   • Years of education: Not on file   • Highest education level: Not on file   Tobacco Use   • Smoking status: Never Smoker   • Smokeless tobacco: Never Used   Substance and Sexual Activity   • Alcohol use: No   • Drug use: No   • Sexual activity: Defer       Allergies   Allergen Reactions   • Sulfa Antibiotics    • Amoxicillin Rash     Has tolerated ceftriaxone         Medication:    Current Facility-Administered Medications:   •  amLODIPine (NORVASC) tablet 10 mg, 10 mg, Oral, Daily, Humera Deutsch, APRN, 10 mg at 06/11/19 0803  •  aspirin EC tablet 81 mg, 81 mg, Oral, Daily, Humera Deutsch, APRN, 81 mg at 06/11/19 0803  •  atorvastatin (LIPITOR) tablet 10 mg, 10 mg, Oral, Daily, Humera Deutsch, APRN, 10 mg at 06/11/19 0802  •  carvedilol (COREG) tablet 25 mg, 25 mg, Oral, BID With Meals, Humera Deutsch, APRN, 25 mg at 06/11/19 1718  •  cefepime (MAXIPIME) 2 g/100 mL 0.9% NS (mbp), 2 g, Intravenous, Once, Oscar Kaur MD  •  [START ON 6/12/2019] cefepime (MAXIPIME) 2 g/100 mL 0.9% NS (mbp), 2 g, Intravenous, Q8H, Oscar Kaur MD  •  dextrose (D50W) 25 g/ 50mL Intravenous Solution 25 g, 25 g, Intravenous, Q15 Min PRN, Humera Deutsch, APRN  •  dextrose (GLUTOSE) oral gel 15 g, 15 g, Oral, Q15 Min PRN, Humera Deutsch W, APRN  •  gabapentin (NEURONTIN) capsule 800 mg, 800 mg, Oral, Q8H, Humera Deutsch, APRN, 800 mg at 06/11/19 1437  •  glucagon (human  recombinant) (GLUCAGEN DIAGNOSTIC) injection 1 mg, 1 mg, Subcutaneous, PRN, Viraj Deutscha W, APRN  •  insulin detemir (LEVEMIR) injection 4 Units, 4 Units, Subcutaneous, Nightly, Antelmo Nix MD, 4 Units at 06/10/19 2033  •  insulin lispro (humaLOG) injection 0-7 Units, 0-7 Units, Subcutaneous, 4x Daily With Meals & Nightly, Humera Deutsch APRN, 4 Units at 06/11/19 1718  •  insulin lispro (humaLOG) injection 3 Units, 3 Units, Subcutaneous, TID With Meals, Antelmo Nix MD, 3 Units at 06/11/19 1718  •  ondansetron (ZOFRAN) tablet 4 mg, 4 mg, Oral, Q6H PRN **OR** ondansetron (ZOFRAN) injection 4 mg, 4 mg, Intravenous, Q6H PRN, Humera Deutsch, APRN, 4 mg at 06/11/19 0727  •  Pharmacy to dose vancomycin, , Does not apply, Continuous PRN, [place] Humera W, APRN  •  Pharmacy to dose warfarin, , Does not apply, Continuous PRN, [place] Humera W, APRN  •  [COMPLETED] Insert peripheral IV, , , Once **AND** sodium chloride 0.9 % flush 10 mL, 10 mL, Intravenous, PRN, French Han MD  •  sodium chloride 0.9 % flush 3 mL, 3 mL, Intravenous, Q12H, Viraj Deutscha W, APRN, 3 mL at 06/11/19 0803  •  sodium chloride 0.9 % flush 3-10 mL, 3-10 mL, Intravenous, PRN, [place] Humera W, APRN  •  warfarin (COUMADIN) (dosing per levels), , Does not apply, Daily, Dell Casiano, [facility], Stopped at 06/11/19 1720    Antibiotics:  Anti-Infectives (From admission, onward)    Ordered     Dose/Rate Route Frequency Start Stop    06/11/19 1925  cefepime (MAXIPIME) 2 g/100 mL 0.9% NS (mbp)     Ordering Provider:  Oscar Kaur MD    2 g  over 4 Hours Intravenous Every 8 Hours 06/12/19 0400 07/12/19 0359    06/11/19 1925  cefepime (MAXIPIME) 2 g/100 mL 0.9% NS (mbp)     Ordering Provider:  Oscar Kaur MD    2 g  200 mL/hr over 30 Minutes Intravenous Once 06/11/19 2015      06/10/19 0237  Pharmacy to dose vancomycin     Ordering Provider:  Humera Deutsch, APRN     Does not apply  Continuous PRN 06/10/19 0237 19 0236    06/10/19 0216  aztreonam (AZACTAM) 2 g in sodium chloride 0.9 % 100 mL IVPB-MBP     Ordering Provider:  French Han MD    2 g  200 mL/hr over 30 Minutes Intravenous Once 06/10/19 0218 06/10/19 0316    06/10/19 0216  vancomycin 3000 mg/500 mL 0.9% NS IVPB ([facility])     Ordering Provider:  French Han MD    20 mg/kg × 145 kg Intravenous Once 06/10/19 0218 06/10/19 0316              Physical Exam:   Vital Signs  Temp (24hrs), Av.8 °F (36.6 °C), Min:97.8 °F (36.6 °C), Max:97.8 °F (36.6 °C)    Temp  Min: 97.8 °F (36.6 °C)  Max: 97.8 °F (36.6 °C)  BP  Min: 122/62  Max: 122/62  Pulse  Min: 79  Max: 79  Resp  Min: 18  Max: 18  No Data Recorded    GENERAL: Awake and alert, in no acute distress.   HEENT: Normocephalic, atraumatic.  PERRL. EOMI. No conjunctival injection. No icterus. Oropharynx clear without evidence of thrush or exudate. No evidence of peridontal disease.    NECK: Supple without nuchal rigidity. No mass.  LYMPH: No cervical, axillary or inguinal lymphadenopathy.  HEART: RRR; No murmur, rubs, gallops. + prosthetic click   LUNGS: Clear to auscultation bilaterally without wheezing, rales, rhonchi. Normal respiratory effort. Nonlabored. No dullness.  ABDOMEN: Soft, nontender, nondistended. Positive bowel sounds. No rebound or guarding. NO mass or HSM.  EXT:  No cyanosis, clubbing or edema. Venous stasis discoloration noted  + Peripheral pulse   : Without Adam catheter.  MSK: FROM without joint effusions noted arms/legs.    SKIN: Warm and dry.    NEURO: Oriented to PPT. No focal deficits on motor/sensory exam at arms/legs.  PSYCHIATRIC: Normal insight and judgement. Cooperative with PE  Right hallux with ulceration plantar surface, calloused no drainage, with faint erythema dorsum of foot   Laboratory Data    Results from last 7 days   Lab Units 19  0857 06/10/19  0415 06/10/19  0103   WBC 10*3/mm3 11.55* 19.30* 16.55*   HEMOGLOBIN g/dL  10.9* 10.2* 11.2*   HEMATOCRIT % 36.7* 34.3* 35.9*   PLATELETS 10*3/mm3 251 263 297     Results from last 7 days   Lab Units 06/11/19  0857   SODIUM mmol/L 141   POTASSIUM mmol/L 4.4   CHLORIDE mmol/L 108*   CO2 mmol/L 23.0   BUN mg/dL 25*   CREATININE mg/dL 1.22   GLUCOSE mg/dL 218*   CALCIUM mg/dL 7.9*     Results from last 7 days   Lab Units 06/10/19  0103   ALK PHOS U/L 68   BILIRUBIN mg/dL 0.6   ALT (SGPT) U/L 15   AST (SGOT) U/L 22     Results from last 7 days   Lab Units 06/10/19  0103   SED RATE mm/hr 89*     Results from last 7 days   Lab Units 06/10/19  0415   CRP mg/dL 1.93*     Results from last 7 days   Lab Units 06/10/19  0103   LACTATE mmol/L 2.0             Estimated Creatinine Clearance: 96.8 mL/min (by C-G formula based on SCr of 1.22 mg/dL).      Microbiology:         Influenza  Negative  6/10: BC  (2/2) ABNORMAL  Gram negative bacilli, no organism by PCR       [facility] :  5/21:  BC no growth                   Radiology:  Imaging Results (last 72 hours)     Procedure Component Value Units Date/Time    XR Chest 1 View [[id number]] Collected:  06/10/19 0213     Updated:  06/10/19 0215    Narrative:       CR Chest 1 Vw    SIGNS AND SYMPTOMS:  fever 103 fever for a couple days with fatigue and vomiting.  Hx of CABG 10 years ago, HTN, diabetic     COMPARISONS:  CT angiogram of the chest 12/27/2009, chest radiograph 12/27/2009    FINDINGS:    A portable AP view of the chest was obtained  Semiupright.    The lungs are clear. There is marked cardiomegaly even accounting for AP technique. Aortic valve has been replaced utilizing a mechanical prosthetic. There are postsurgical changes of median sternotomy. No pneumothorax or pleural effusion is identified.  The bones are normal for age.      Impression:         1.  No acute abnormality.    Signer Name: Sony Lawson MD   Signed: 6/10/2019 2:13 AM   Workstation Name: [id number]       CT Abdomen Pelvis Without Contrast [[id number]] Collected:  06/10/19  0046     Updated:  06/10/19 0049    Narrative:       CT Abdomen Pelvis WO 6/10/2019 1:34 AM    SIGNS AND SYMPTOMS:  Fever, n/v starting today, no abd pain; Hx colon ca w/ colon resection; Recent hospital admission for similar symptoms    COMPARISON:  None available    TECHNIQUE:  Axial imaging of the abdomen and pelvis was performed without the administration of intravenous contrast. No enteric contrast was administered. Multiplanar reformation was obtained. Radiation dose reduction techniques included automated exposure control  or exposure modulation based on body size. Radiation audit for number of CT and nuclear cardiology exams performed in the last year: 0.    FINDINGS:  Lower chest: There are postsurgical changes of median sternotomy and coronary artery bypass. Coronary atherosclerosis is present.    Hepatobiliary: The liver is normal in size. There are gallstones seen within the gallbladder.There is no CT evidence of acute cholecystitis.     Spleen, pancreas, adrenal glands: No abnormalities.    Kidneys, ureters, bladder: No abnormalities.    Bowel: There is an anastomotic site seen within left colon in the left lower quadrant (series 4#31 for example). This appears widely patent. A second anastomotic site is seen within the right colon (series 2#42 for example). This anastomotic site also  appears widely patent. The small bowel and stomach are otherwise unremarkable.    Peritoneal cavity / Subperitoneal space: No abnormalities.    Lymphovascular: Atherosclerotic vascular calcifications are present within the descending aorta and iliac arteries. No retroperitoneal adenopathy.    Abdominal wall: There are bowel containing ventral abdominal hernias (series 5#25 for example). These contain loops of both small bowel and colon. There is no evidence of obstruction of the loops involved within the ventral abdominal hernias.    Musculoskeletal: Degenerative disc disease lumbar spine. No destructive bony  lesion.    Prostate, seminal vesicles: There is calcification of the vas deferens, nonspecific but can be seen in the setting of diabetes mellitus. Prostate normal in size. No abnormality of the seminal vesicles.      Impression:       1.  No acute intra-abdominal abnormality. Anastomotic sites within the right and left colon appear widely patent.  2.  Multiple bowel containing ventral abdominal hernias. There is no evidence of obstruction of the small bowel loops or colonic loops associated with these findings.  3.  Atherosclerosis.    Signer Name: Sony Lawson MD   Signed: 6/10/2019 12:46 AM   Workstation Name: [id number]           5/23:  Echo:  EF ~55-60% normal diastolic function./   Moderate mitral annular calcifications, thickening and calicification of the mitral valve  Leaflet . No evidence of vegetation.     Impression:   1.  Gram-negative bacteremia- with an organism that was not identified by [employer] PCR.  Pending further data, I will plan to switch him to intravenous cefepime and discontinue the vancomycin and ceftriaxone.  The source of his gram-negative bacteremia is not clear.  He does have some mild inflammation of the right hallux but this does not appear to be severe enough to cause bacteremia.  He did not have evidence of pneumonia or urinary tract infection.  I am concerned about the possibility of prosthetic valve endocarditis.  We will need to proceed with a ESTHER.  There was no evidence of an intra-abdominal source by CT scan.  Since he has cholelithiasis, he would be a potential risk for a common bile duct stone although his biliary enzymes are not elevated to suggest cholangitis.  2.  Sepsis- with leukocytosis, fever, acute kidney injury and a potential focus of infection.  The source of his sepsis is not entirely clear.  We will need to evaluate for prosthetic valve infection.  He does have some cellulitis around his right hallux ulcer and has some mild early erythema over the dorsum  of his foot.   3.  Right hallux ulcer/ cellulitis-with no evidence of osteomyelitis by MRI  4.  VHD, s/p AVR   5.  Peripheral neuropathy  6.  Acute kidney injury/ATN  7.  History of colon cancer/resection  8.  Penicillin allergy-he had a recent extensive rash secondary to amoxicillin.  9.  Gram negative bacteremia   10.  Cholithiasis by CT at [facility]  11.  Type 2 diabetes mellitus    PLAN/RECOMMENDATIONS:   1.  Discontinue vancomycin  2.  Discontinue ceftriaxone  3. ESTHER   4.  Repeat blood culture x1    Dr. Kaur has obtained the history, performed the physical exam and formulated the above treatment plan.     I discussed his very complex situation at length with him and his son this evening.  I spent over 40 minutes on his care today.       Oscar Kaur MD  6/11/2019  7:27 PM

## 2019-06-11 NOTE — CONSULTS
"Mr Farias was seen this afternoon for continued Diabetes Education. Permission was given by patient for my visit. He shares with me that he has been living with DM for the past 20 years. He shares that he felt he managed \"very well\" for years. He has been on insulin in the past using both the syringe and vial and insulin pen. He feels confident in using these skills should the need arise again for insulin therapy. He shared that he went to his pharmacy to  his insulin and was told it would be 1100.00 dollars. He shared that he was told insurance would not pay for insulin. He has Medicare A and B , but does not think he has D. He is further investigating his drug coverage for future use. At that point, he walked away from his insulin and quit using it. He began a strict low carb diet regime and would add activity to his daily routine. He shared that his glucose numbers greatly improved and had an A1C of 6.1% as self reported by patient. He still maintains a lower carb approach to nutrition, but has seen his glucose gradually increased with a current A1C of 10.2%. He shares that it has never been this elevated in the past. Discussed insulin therapy and cost is a significant barrier. A [facility] handout was given to Mr Farias that displays insulins available and their cost. Discussed that his provider would determine the dose required, should he use any of the relion products. Discussed 70/30 therapy and the importance of adequate nutrition with three meals daily to avoid hypoglycemia episodes. He has great knowledge of carb counting and problem solving, but was unaware of more affordable insulins. Encouraged him to talk with his provider to determine the best plan for improved glucose management. He denies any further questions at this time and voices his satisfaction with our visit. Thank you for this opportunity.   "

## 2019-06-11 NOTE — PROGRESS NOTES
"Pharmacy Consult  -  Warfarin  Cecilio Farias is a  63 y.o. male   Height - 190.5 cm (75\")  Weight - (!) 146 kg (322 lb 8 oz)    Consulting Provider PHAN Bueno (hospitalist service)  Indication - mechanical AVR  Goal INR - 2.5-3.5  Home Regimen:   - Warfarin 10mg daily, except 7.5 mg MWSat (per patient)   - Warfarin 10mg daily, except 7.5mg WFSun (per anticoag note)  Bridge Therapy: No     Drug-Drug Interactions with current regimen:   Aspirin - increased bleed risk    Warfarin Dosing During Admission:  Date  6/10 6/11          INR  5.83 4.73          Dose  HOLD HOLD             Education Provided:  Patient is on warfarin prior to admission.  Education provided 6/10 verbally and in writing.  Discussed effects of warfarin, importance of checking INR, drug-drug and drug-food interactions, and signs/symptoms of bleeding and clotting.  Patient verbalized understanding through teach back.  All pertinent questions were answered.      Discharge Follow up:  Following Provider - [facility] (Dr. Smith)  Follow up time range or appointment - 2-3 days after discharge    Labs:  Results from last 7 days   Lab Units 06/11/19  0308 06/10/19  0415 06/10/19  0103   INR  4.73* 5.83* 5.26*   HEMOGLOBIN g/dL  --  10.2* 11.2*   HEMATOCRIT %  --  34.3* 35.9*   PLATELETS 10*3/mm3  --  263 297     Results from last 7 days   Lab Units 06/10/19  0415 06/10/19  0103   SODIUM mmol/L 138 140   POTASSIUM mmol/L 4.4 4.8   CHLORIDE mmol/L 104 104   CO2 mmol/L 21.0* 23.0   BUN mg/dL 24* 24*   CREATININE mg/dL 1.32* 1.31*   CALCIUM mg/dL 8.1* 8.8   BILIRUBIN mg/dL  --  0.6   ALK PHOS U/L  --  68   ALT (SGPT) U/L  --  15   AST (SGOT) U/L  --  22   GLUCOSE mg/dL 249* 284*     Current dietary intake:   Diet Order   Procedures   • Diet Regular; Cardiac, Consistent Carbohydrate   25% dinner 6/10    Assessment/Plan:   1. Continue to hold warfarin for now due to supratherapeutic INR. INR declining rapidly.  2. Daily PT-INR labs " have been ordered.  3. Pharmacy will adjust Mr. Farias's warfarin dose as needed based on daily INR result.    Thank you for this consult,  Linette De Santiago, PharmD  06/11/19  8:59 AM

## 2019-06-11 NOTE — PROGRESS NOTES
[facility] Medicine Services  PROGRESS NOTE    Patient Name: Cecilio Farias  : 1955  MRN: [id number]    Date of Admission: 6/10/2019  Length of Stay: 1  Primary Care Physician: America Everett DO    Subjective   Subjective     CC:  Fevers / Chills    HPI:  Starting to defervesce on IV abx.  Temp of 103 on admission.  Possible gram neg bacteremia.      Review of Systems    Gen- No fevers, chills  CV- No chest pain, palpitations  Resp- No cough, dyspnea  GI- No N/V/D, abd pain    Otherwise ROS is negative except as mentioned in the HPI.    Objective   Objective     Vital Signs:   Temp:  [97.8 °F (36.6 °C)-99.2 °F (37.3 °C)] 97.8 °F (36.6 °C)  Heart Rate:  [79-83] 79  Resp:  [18] 18  BP: (109-122)/(54-62) 122/62     Physical Exam:    Constitutional: No acute distress, awake, alert  HENT: NCAT, mucous membranes moist  Respiratory: Clear to auscultation bilaterally, respiratory effort normal   Cardiovascular: RRR, + murmur/click  Gastrointestinal: Positive bowel sounds, soft, nontender, nondistended  Musculoskeletal: Right Hallux Plantar toe ulceration (eschar)  Psychiatric: Appropriate affect, cooperative  Neurologic: Oriented x 3, strength symmetric in all extremities, Cranial Nerves grossly intact to confrontation, speech clear  Skin: No rashes    Results Reviewed:  I have personally reviewed current lab, radiology, and data and agree.    Results from last 7 days   Lab Units 19  0857 19  0308 06/10/19  0415 06/10/19  0103   WBC 10*3/mm3 11.55*  --  19.30* 16.55*   HEMOGLOBIN g/dL 10.9*  --  10.2* 11.2*   HEMATOCRIT % 36.7*  --  34.3* 35.9*   PLATELETS 10*3/mm3 251  --  263 297   INR   --  4.73* 5.83* 5.26*   PROCALCITONIN ng/mL  --   --   --  0.07*     Results from last 7 days   Lab Units 19  0857 06/10/19  0415 06/10/19  0103   SODIUM mmol/L 141 138 140   POTASSIUM mmol/L 4.4 4.4 4.8   CHLORIDE mmol/L 108* 104 104   CO2 mmol/L 23.0 21.0* 23.0   BUN mg/dL 25*  24* 24*   CREATININE mg/dL 1.22 1.32* 1.31*   GLUCOSE mg/dL 218* 249* 284*   CALCIUM mg/dL 7.9* 8.1* 8.8   ALT (SGPT) U/L  --   --  15   AST (SGOT) U/L  --   --  22   TROPONIN T ng/mL  --   --  0.021     Estimated Creatinine Clearance: 96.8 mL/min (by C-G formula based on SCr of 1.22 mg/dL).    Microbiology Results Abnormal     Procedure Component Value - Date/Time    Blood Culture - Blood, Blood, Venous Line [[id number]]  (Abnormal) Collected:  06/10/19 0103    Lab Status:  Preliminary result Specimen:  Blood, Venous Line Updated:  06/11/19 1023     Blood Culture Gram Negative Bacilli     Isolated from Aerobic Bottle     Gram Stain Aerobic Bottle Gram negative bacilli    Blood Culture - Blood, Blood, Venous Line [[id number]]  (Abnormal) Collected:  06/10/19 0103    Lab Status:  Preliminary result Specimen:  Blood, Venous Line Updated:  06/11/19 1021     Blood Culture Gram Negative Bacilli     Isolated from Aerobic Bottle     Gram Stain Aerobic Bottle Gram negative bacilli    Blood Culture ID, PCR - Blood, Blood, Venous Line [[id number]]  (Normal) Collected:  06/10/19 0103    Lab Status:  Final result Specimen:  Blood, Venous Line Updated:  06/10/19 1852     BCID, PCR No organism detected by BCID PCR.    Influenza Antigen, Rapid - Swab, Nasopharynx [[id number]]  (Normal) Collected:  06/10/19 0139    Lab Status:  Final result Specimen:  Swab from Nasopharynx Updated:  06/10/19 0202     Influenza A Ag, EIA Negative     Influenza B Ag, EIA Negative          Imaging Results (last 24 hours)     Procedure Component Value Units Date/Time    MRI Foot Right Without Contrast [[id number]] Collected:  06/11/19 0750     Updated:  06/11/19 1213    Narrative:       EXAMINATION: MRI FOOT RIGHT WO CONTRAST-     INDICATION: Rule out osteomyelitis/right big toe plantar surface;  A41.9-Sepsis, unspecified organism; L03.115-Cellulitis of right lower  limb.      TECHNIQUE: Multiplanar MRI of the foot without intravenous contrast.      COMPARISON: None.     FINDINGS: Patient chart demonstrates evaluation or possibly  osteomyelitis and history of diabetes. Patient states he had right foot  callus on great toe that was debrided as needed by podiatrist and now  looks like an ulcer with surrounding cellulitis is present.     ON TODAY'S EXAMINATION: Increased T2 signal along the plantar surface  proximal and distal phalanx great toe lateral margins with mild soft  tissue irregularity, however, no evidence for abscess or focal fluid  collection and no distinct cortical irregularity to suggest cortical  disruption of osteomyelitis. Osseous structures otherwise unremarkable  with expected degenerative changes in the distal interphalangeal joints  and hindfoot/midfoot junction. Pes planus deformity along with  degenerative changes however grossly unremarkable appearance of the  flexor and extensor tendons noted.       Impression:       Soft tissue irregularity and minimal soft tissue edema and  inflammation surrounding the distal great toe lateral margin without  evidence for cortical irregularity to suggest osteomyelitis and no  evidence for abscess formation. Soft tissue irregularity corresponds to  clinical history of ulceration.      D:  06/11/2019  E:  06/11/2019             Results for orders placed during the hospital encounter of 07/05/16   Adult transthoracic echo complete    Narrative · All left ventricular wall segments contract normally.  · Left ventricular diastolic dysfunction (grade I a) consistent with   impaired relaxation.  · Left ventricular function is hyperdynamic (EF > 70).  · A prosthetic aortic valve (unknown type) is present and functioning   normally.  · Mitral valve thickening and annular calcification is seen.          I have reviewed the medications:  Scheduled Meds:  [START ON 6/12/2019] Pharmacy Consult  Does not apply Once   amLODIPine 10 mg Oral Daily   aspirin 81 mg Oral Daily   atorvastatin 10 mg Oral Daily   carvedilol  25 mg Oral BID With Meals   ceftriaxone 2 g Intravenous Q24H   gabapentin 800 mg Oral Q8H   insulin detemir 4 Units Subcutaneous Nightly   insulin lispro 0-7 Units Subcutaneous 4x Daily With Meals & Nightly   insulin lispro 3 Units Subcutaneous TID With Meals   sodium chloride 3 mL Intravenous Q12H   vancomycin 11 mg/kg Intravenous Q12H   warfarin (COUMADIN) (dosing per levels)  Does not apply Daily     Continuous Infusions:  Pharmacy to dose vancomycin    Pharmacy to dose warfarin      PRN Meds:.dextrose  •  dextrose  •  glucagon (human recombinant)  •  ondansetron **OR** ondansetron  •  Pharmacy to dose vancomycin  •  Pharmacy to dose warfarin  •  [COMPLETED] Insert peripheral IV **AND** sodium chloride  •  sodium chloride      Assessment/Plan   Assessment / Plan     Active Hospital Problems    Diagnosis POA   • **Sepsis (CMS/[facility]) [A41.9] Yes   • HUMA (acute kidney injury) (CMS/[facility]) [N17.9] Yes   • Supratherapeutic INR [R79.1] Yes   • CHF (congestive heart failure) (CMS/[facility]) [I50.9] Yes   • S/P AVR [Z95.2] Not Applicable   • Aortic valve disorders [I35.9] [I35.9] Yes   • Dyslipidemia [E78.5] Yes     He follows up with Dr. Everett     • Hypertension [I10] Yes     7/2/15: currently controlled at home in the 130s/80s     • Type 2 diabetes mellitus (CMS/[facility]) [E11.9] Yes       Brief Hospital Course to date:  Cecilio Farias is a 63 y.o. male admitted for Sepsis twice recently.    Febrile Illness / SIRS  - no clear source of infection  - gram negative organism in 2 blood cultures  - check echo  - starting to defervesce on abx  Mechanical AVR  - at risk for infection  - on warfarin  Uncontrolled DM  - hemoglobin A1C >10  - may need new Insulin   - diabetes educator  Morbid Obesity  - needs outpatient sleep study  Right Toe Plantar Ulceration  - MRI pending but looks promising  Chronic Kidney Disease  - unclear baseline    SIRS with no source  Bacteremia likely  Echo pending    High Complexity Case    DVT Prophylaxis:   anticoagulated    Disposition: I expect the patient to be discharged TBD    CODE STATUS:   Code Status and Medical Interventions:   Ordered at: 06/10/19 0248     Level Of Support Discussed With:    Patient     Code Status:    CPR     Medical Interventions (Level of Support Prior to Arrest):    Full         Electronically signed by Antelmo Nix MD, 06/11/19, 4:56 PM.

## 2019-06-11 NOTE — PLAN OF CARE
Problem: Patient Care Overview  Goal: Plan of Care Review  Outcome: Ongoing (interventions implemented as appropriate)   06/11/19 1608   Coping/Psychosocial   Plan of Care Reviewed With patient;son   Plan of Care Review   Progress improving   OTHER   Outcome Summary MRI shows no evidence of cellulitis, WBC 11.5, coumadin held today due to elevated INR at 4.73, VSS will continue to monitor.       Problem: Fall Risk (Adult)  Goal: Absence of Fall  Outcome: Ongoing (interventions implemented as appropriate)   06/11/19 1608   Fall Risk (Adult)   Absence of Fall making progress toward outcome       Problem: Pain, Chronic (Adult)  Goal: Acceptable Pain/Comfort Level and Functional Ability  Outcome: Ongoing (interventions implemented as appropriate)   06/11/19 1608   Pain, Chronic (Adult)   Acceptable Pain/Comfort Level and Functional Ability making progress toward outcome       Problem: Skin Injury Risk (Adult)  Goal: Skin Health and Integrity  Outcome: Ongoing (interventions implemented as appropriate)   06/11/19 1608   Skin Injury Risk (Adult)   Skin Health and Integrity making progress toward outcome

## 2019-06-11 NOTE — PLAN OF CARE
Problem: Fall Risk (Adult)  Goal: Absence of Fall  Outcome: Ongoing (interventions implemented as appropriate)   06/11/19 0419   Fall Risk (Adult)   Absence of Fall making progress toward outcome       Problem: Pain, Chronic (Adult)  Goal: Acceptable Pain/Comfort Level and Functional Ability  Outcome: Ongoing (interventions implemented as appropriate)   06/11/19 0419   Pain, Chronic (Adult)   Acceptable Pain/Comfort Level and Functional Ability making progress toward outcome       Problem: Skin Injury Risk (Adult)  Goal: Skin Health and Integrity  Outcome: Ongoing (interventions implemented as appropriate)   06/11/19 0419   Skin Injury Risk (Adult)   Skin Health and Integrity making progress toward outcome

## 2019-06-12 ENCOUNTER — APPOINTMENT (OUTPATIENT)
Dept: CARDIOLOGY | Facility: HOSPITAL | Age: 64
End: 2019-06-12

## 2019-06-12 LAB
ANION GAP SERPL CALCULATED.3IONS-SCNC: 9 MMOL/L
BACTERIA SPEC AEROBE CULT: ABNORMAL
BACTERIA SPEC AEROBE CULT: ABNORMAL
BH CV ECHO MEAS - AO MAX PG: 22 MMHG
BH CV ECHO MEAS - AO MEAN PG: 10.5 MMHG
BH CV ECHO MEAS - AO ROOT DIAM: 4 CM
BH CV ECHO MEAS - AO V2 MAX: 231 CM/SEC
BH CV ECHO MEAS - AO V2 MEAN: 150.5 CM/SEC
BH CV ECHO MEAS - AO V2 VTI: 50.9 CM
BH CV ECHO MEAS - BSA(HAYCOCK): 2.8 M^2
BH CV ECHO MEAS - BSA: 2.7 M^2
BH CV ECHO MEAS - BZI_BMI: 40.2 KILOGRAMS/M^2
BH CV ECHO MEAS - BZI_METRIC_HEIGHT: 190 CM
BH CV ECHO MEAS - BZI_METRIC_WEIGHT: 145.2 KG
BH CV VAS BP LEFT ARM: NORMAL MMHG
BUN BLD-MCNC: 27 MG/DL (ref 8–23)
BUN/CREAT SERPL: 25 (ref 7–25)
CALCIUM SPEC-SCNC: 8.2 MG/DL (ref 8.6–10.5)
CHLORIDE SERPL-SCNC: 105 MMOL/L (ref 98–107)
CO2 SERPL-SCNC: 26 MMOL/L (ref 22–29)
CREAT BLD-MCNC: 1.08 MG/DL (ref 0.76–1.27)
GFR SERPL CREATININE-BSD FRML MDRD: 69 ML/MIN/1.73
GLUCOSE BLD-MCNC: 187 MG/DL (ref 65–99)
GLUCOSE BLDC GLUCOMTR-MCNC: 150 MG/DL (ref 70–130)
GLUCOSE BLDC GLUCOMTR-MCNC: 168 MG/DL (ref 70–130)
GLUCOSE BLDC GLUCOMTR-MCNC: 179 MG/DL (ref 70–130)
GLUCOSE BLDC GLUCOMTR-MCNC: 190 MG/DL (ref 70–130)
GRAM STN SPEC: ABNORMAL
GRAM STN SPEC: ABNORMAL
INR PPP: 3.07 (ref 0.85–1.16)
ISOLATED FROM: ABNORMAL
ISOLATED FROM: ABNORMAL
POTASSIUM BLD-SCNC: 4.3 MMOL/L (ref 3.5–5.2)
PROTHROMBIN TIME: 30.5 SECONDS (ref 11.2–14.3)
SODIUM BLD-SCNC: 140 MMOL/L (ref 136–145)

## 2019-06-12 PROCEDURE — 63710000001 INSULIN LISPRO (HUMAN) PER 5 UNITS: Performed by: HOSPITALIST

## 2019-06-12 PROCEDURE — 25010000002 MIDAZOLAM PER 1 MG: Performed by: INTERNAL MEDICINE

## 2019-06-12 PROCEDURE — 87040 BLOOD CULTURE FOR BACTERIA: CPT | Performed by: INTERNAL MEDICINE

## 2019-06-12 PROCEDURE — 93325 DOPPLER ECHO COLOR FLOW MAPG: CPT | Performed by: INTERNAL MEDICINE

## 2019-06-12 PROCEDURE — 25010000002 ONDANSETRON PER 1 MG: Performed by: NURSE PRACTITIONER

## 2019-06-12 PROCEDURE — 93320 DOPPLER ECHO COMPLETE: CPT | Performed by: INTERNAL MEDICINE

## 2019-06-12 PROCEDURE — 99233 SBSQ HOSP IP/OBS HIGH 50: CPT | Performed by: INTERNAL MEDICINE

## 2019-06-12 PROCEDURE — 93325 DOPPLER ECHO COLOR FLOW MAPG: CPT

## 2019-06-12 PROCEDURE — 80048 BASIC METABOLIC PNL TOTAL CA: CPT

## 2019-06-12 PROCEDURE — 82962 GLUCOSE BLOOD TEST: CPT

## 2019-06-12 PROCEDURE — 93320 DOPPLER ECHO COMPLETE: CPT

## 2019-06-12 PROCEDURE — 93312 ECHO TRANSESOPHAGEAL: CPT

## 2019-06-12 PROCEDURE — 93312 ECHO TRANSESOPHAGEAL: CPT | Performed by: INTERNAL MEDICINE

## 2019-06-12 PROCEDURE — 63710000001 INSULIN LISPRO (HUMAN) PER 5 UNITS: Performed by: NURSE PRACTITIONER

## 2019-06-12 PROCEDURE — 63710000001 INSULIN DETEMIR PER 5 UNITS: Performed by: HOSPITALIST

## 2019-06-12 PROCEDURE — 25010000002 MEROPENEM: Performed by: INTERNAL MEDICINE

## 2019-06-12 PROCEDURE — 85610 PROTHROMBIN TIME: CPT | Performed by: NURSE PRACTITIONER

## 2019-06-12 PROCEDURE — 25010000002 CEFEPIME 2 G/NS 100 ML SOLUTION: Performed by: INTERNAL MEDICINE

## 2019-06-12 RX ORDER — MIDAZOLAM HYDROCHLORIDE 1 MG/ML
INJECTION INTRAMUSCULAR; INTRAVENOUS
Status: COMPLETED | OUTPATIENT
Start: 2019-06-12 | End: 2019-06-12

## 2019-06-12 RX ORDER — WARFARIN SODIUM 5 MG/1
10 TABLET ORAL
Status: DISCONTINUED | OUTPATIENT
Start: 2019-06-12 | End: 2019-06-13 | Stop reason: HOSPADM

## 2019-06-12 RX ADMIN — MEROPENEM 1 G: 1 INJECTION, POWDER, FOR SOLUTION INTRAVENOUS at 11:36

## 2019-06-12 RX ADMIN — MIDAZOLAM HYDROCHLORIDE 1 MG: 1 INJECTION, SOLUTION INTRAMUSCULAR; INTRAVENOUS at 13:31

## 2019-06-12 RX ADMIN — GABAPENTIN 800 MG: 400 CAPSULE ORAL at 15:34

## 2019-06-12 RX ADMIN — SODIUM CHLORIDE, PRESERVATIVE FREE 3 ML: 5 INJECTION INTRAVENOUS at 22:30

## 2019-06-12 RX ADMIN — WARFARIN SODIUM 10 MG: 5 TABLET ORAL at 16:53

## 2019-06-12 RX ADMIN — INSULIN LISPRO 2 UNITS: 100 INJECTION, SOLUTION INTRAVENOUS; SUBCUTANEOUS at 21:59

## 2019-06-12 RX ADMIN — METHOHEXITAL SODIUM 30 MG: 500 INJECTION, POWDER, LYOPHILIZED, FOR SOLUTION INTRAMUSCULAR; INTRAVENOUS; RECTAL at 13:19

## 2019-06-12 RX ADMIN — INSULIN LISPRO 2 UNITS: 100 INJECTION, SOLUTION INTRAVENOUS; SUBCUTANEOUS at 11:41

## 2019-06-12 RX ADMIN — ASPIRIN 81 MG: 81 TABLET, COATED ORAL at 08:17

## 2019-06-12 RX ADMIN — CARVEDILOL 25 MG: 12.5 TABLET, FILM COATED ORAL at 16:54

## 2019-06-12 RX ADMIN — INSULIN LISPRO 3 UNITS: 100 INJECTION, SOLUTION INTRAVENOUS; SUBCUTANEOUS at 16:55

## 2019-06-12 RX ADMIN — INSULIN LISPRO 3 UNITS: 100 INJECTION, SOLUTION INTRAVENOUS; SUBCUTANEOUS at 11:41

## 2019-06-12 RX ADMIN — INSULIN DETEMIR 4 UNITS: 100 INJECTION, SOLUTION SUBCUTANEOUS at 22:00

## 2019-06-12 RX ADMIN — MIDAZOLAM HYDROCHLORIDE 1 MG: 1 INJECTION, SOLUTION INTRAMUSCULAR; INTRAVENOUS at 13:19

## 2019-06-12 RX ADMIN — ATORVASTATIN CALCIUM 10 MG: 10 TABLET, FILM COATED ORAL at 08:17

## 2019-06-12 RX ADMIN — CARVEDILOL 25 MG: 12.5 TABLET, FILM COATED ORAL at 08:17

## 2019-06-12 RX ADMIN — GABAPENTIN 800 MG: 400 CAPSULE ORAL at 05:55

## 2019-06-12 RX ADMIN — AMLODIPINE BESYLATE 10 MG: 5 TABLET ORAL at 08:16

## 2019-06-12 RX ADMIN — GABAPENTIN 800 MG: 400 CAPSULE ORAL at 21:59

## 2019-06-12 RX ADMIN — MEROPENEM 1 G: 1 INJECTION, POWDER, FOR SOLUTION INTRAVENOUS at 16:53

## 2019-06-12 RX ADMIN — METHOHEXITAL SODIUM 10 MG: 500 INJECTION, POWDER, LYOPHILIZED, FOR SOLUTION INTRAMUSCULAR; INTRAVENOUS; RECTAL at 13:31

## 2019-06-12 RX ADMIN — INSULIN LISPRO 3 UNITS: 100 INJECTION, SOLUTION INTRAVENOUS; SUBCUTANEOUS at 08:16

## 2019-06-12 RX ADMIN — CEFEPIME 2 G: 2 INJECTION, POWDER, FOR SOLUTION INTRAVENOUS at 05:54

## 2019-06-12 RX ADMIN — ONDANSETRON 4 MG: 2 INJECTION INTRAMUSCULAR; INTRAVENOUS at 08:16

## 2019-06-12 NOTE — PLAN OF CARE
Problem: Patient Care Overview  Goal: Plan of Care Review   06/12/19 0457   OTHER   Outcome Summary no c/o pain. appears to be resting comfortably between care.        Problem: Fall Risk (Adult)  Goal: Absence of Fall  Outcome: Ongoing (interventions implemented as appropriate)   06/12/19 0457   Fall Risk (Adult)   Absence of Fall making progress toward outcome       Problem: Skin Injury Risk (Adult)  Goal: Skin Health and Integrity  Outcome: Ongoing (interventions implemented as appropriate)   06/12/19 0457   Skin Injury Risk (Adult)   Skin Health and Integrity making progress toward outcome

## 2019-06-12 NOTE — PROGRESS NOTES
INFECTIOUS DISEASE PROGRESS NOTE    Cecilio Farias  1955  [id number]    Date of Consult: 6/12/2019        Requesting Provider: No ref. provider found  Evaluating Physician: Oscar Kaur MD    Reason for Consultation: sepsis    History of present illness:  6/10/19:   Patient is a 63 y.o. male, with DM, VHD s/p AVR, CHF, seen today for sepsis.   He developed fever, chills, nausea, vomiting about 3 weeks ago,and was hospitalized at [facility] for 4  Days.  He was on IV antibiotics, and during the hospitalization had a GB ultrasound and Echocardiogram reportedly normal. He symptomatically improved and was discharged on Amoxicillin.  He developed a diffuse rash after 2 days and discontinued the Amoxicillin.  He had been feeling well until yesterday, when he began chilling, and experiencing nausea and vomiting.  He presented to the ED here where his temperature was 103 degrees.  Admitting labs with a leukocytosis of 16, 000, Scr 1.31, elevated inflammatory markers normal PCT and lactate.  An abdominal CT without acute abnormality, CXR clear.   He has  An ulcer on his right hallux that has been there for over a year, which began as a callous and his  Podiatrist has been debriding it.   6/11/19: Now with positive Gram negative bacteremia. Fever is better. He complains of nausea this am.  MRI hasn't been read yet this am.  No cough, dysuria, abdominal pain.  6/12/19:      Past Medical History:   Diagnosis Date   • Cancer (CMS/HCC)     colon cancer with operation/ chemotherapy/radiation    • CHF (congestive heart failure) (CMS/HCC) 2009    Acute Systolic    • Chronic anticoagulation     coumadin   • Diabetes mellitus (CMS/HCC)    • H/O aortic valve replacement    • HLD (hyperlipidemia)    • Hypertension        Past Surgical History:   Procedure Laterality Date   • AORTIC VALVE REPAIR/REPLACEMENT  2009    25 mm. [place] AVR   • ASCENDING AORTIC ANEURYSM REPAIR  2009   • BACK SURGERY     • COLON SURGERY      Colon  cancer with operation   • FOOT SURGERY      bilateral 2/2 wound and trauma        Family History   Problem Relation Age of Onset   • Heart disease Mother    • Hyperlipidemia Mother    • Diabetes Mother    • Leukemia Father    • Cancer Sister    • Kidney failure Brother    • Hepatitis Brother    • Cancer Sister    • Cancer Brother    • Heart disease Brother    • Heart attack Brother        Social History     Socioeconomic History   • Marital status:      Spouse name: Not on file   • Number of children: Not on file   • Years of education: Not on file   • Highest education level: Not on file   Tobacco Use   • Smoking status: Never Smoker   • Smokeless tobacco: Never Used   Substance and Sexual Activity   • Alcohol use: No   • Drug use: No   • Sexual activity: Defer       Allergies   Allergen Reactions   • Sulfa Antibiotics    • Amoxicillin Rash     Has tolerated ceftriaxone         Medication:    Current Facility-Administered Medications:   •  amLODIPine (NORVASC) tablet 10 mg, 10 mg, Oral, Daily, Viraj Deutscha W, APRN, 10 mg at 06/12/19 0816  •  aspirin EC tablet 81 mg, 81 mg, Oral, Daily, Viraj Deutscha W, APRN, 81 mg at 06/12/19 0817  •  atorvastatin (LIPITOR) tablet 10 mg, 10 mg, Oral, Daily, Viraj Deutscha W, APRN, 10 mg at 06/12/19 0817  •  carvedilol (COREG) tablet 25 mg, 25 mg, Oral, BID With Meals, Humera Deutsch, APRN, 25 mg at 06/12/19 1654  •  dextrose (D50W) 25 g/ 50mL Intravenous Solution 25 g, 25 g, Intravenous, Q15 Min PRN, Humera Deutsch W, APRN  •  dextrose (GLUTOSE) oral gel 15 g, 15 g, Oral, Q15 Min PRN, Viraj Deutscha W, APRN  •  gabapentin (NEURONTIN) capsule 800 mg, 800 mg, Oral, Q8H, [place] Humera W, APRN, 800 mg at 06/12/19 1534  •  glucagon (human recombinant) (GLUCAGEN DIAGNOSTIC) injection 1 mg, 1 mg, Subcutaneous, PRN, [place] Humera W, APRN  •  insulin detemir (LEVEMIR) injection 4 Units, 4 Units, Subcutaneous, Nightly, Antelmo Nix MD, 4 Units  at 06/11/19 2053  •  insulin lispro (humaLOG) injection 0-7 Units, 0-7 Units, Subcutaneous, 4x Daily With Meals & Nightly, Humera Deutsch APRN, 2 Units at 06/12/19 1141  •  insulin lispro (humaLOG) injection 3 Units, 3 Units, Subcutaneous, TID With Meals, Antelmo Nix MD, 3 Units at 06/12/19 1655  •  meropenem (MERREM) 1 g/100 mL 0.9% NS VTB (mbp), 1 g, Intravenous, Q8H, Oscar Kaur MD, 1 g at 06/12/19 1653  •  ondansetron (ZOFRAN) tablet 4 mg, 4 mg, Oral, Q6H PRN **OR** ondansetron (ZOFRAN) injection 4 mg, 4 mg, Intravenous, Q6H PRN, Humera Deutsch, APRN, 4 mg at 06/12/19 0816  •  Pharmacy to dose warfarin, , Does not apply, Continuous PRN, Humera Deutsch, APRN  •  [COMPLETED] Insert peripheral IV, , , Once **AND** sodium chloride 0.9 % flush 10 mL, 10 mL, Intravenous, PRN, French Han MD  •  sodium chloride 0.9 % flush 3 mL, 3 mL, Intravenous, Q12H, Humera Deutsch, APRN, 3 mL at 06/11/19 2053  •  sodium chloride 0.9 % flush 3-10 mL, 3-10 mL, Intravenous, PRN, Humera Deutsch, APRN  •  warfarin (COUMADIN) tablet 10 mg, 10 mg, Oral, Daily, Parish Perry G, RPH, 10 mg at 06/12/19 1653    Antibiotics:  Anti-Infectives (From admission, onward)    Ordered     Dose/Rate Route Frequency Start Stop    06/12/19 0940  meropenem (MERREM) 1 g/100 mL 0.9% NS VTB (mbp)     Ordering Provider:  Oscar Kaur MD    1 g  over 3 Hours Intravenous Every 8 Hours 06/12/19 1600 06/19/19 1559    06/12/19 0940  meropenem (MERREM) 1 g/100 mL 0.9% NS VTB (mbp)     Ordering Provider:  Oscar Kaur MD    1 g  over 30 Minutes Intravenous Once 06/12/19 1030 06/12/19 1206    06/11/19 1925  cefepime (MAXIPIME) 2 g/100 mL 0.9% NS (mbp)     Ordering Provider:  Oscar Kaur MD    2 g  200 mL/hr over 30 Minutes Intravenous Once 06/11/19 2015 06/11/19 2257    06/10/19 0216  aztreonam (AZACTAM) 2 g in sodium chloride 0.9 % 100 mL IVPB-MBP     Ordering Provider:  French Han,  MD    2 g  200 mL/hr over 30 Minutes Intravenous Once 06/10/19 0218 06/10/19 0316    06/10/19 0216  vancomycin 3000 mg/500 mL 0.9% NS IVPB ([facility])     Ordering Provider:  French Han MD    20 mg/kg × 145 kg Intravenous Once 06/10/19 0218 06/10/19 0316              Physical Exam:   Vital Signs  Temp (24hrs), Av.9 °F (36.6 °C), Min:97.8 °F (36.6 °C), Max:98.1 °F (36.7 °C)    Temp  Min: 97.8 °F (36.6 °C)  Max: 98.1 °F (36.7 °C)  BP  Min: 91/62  Max: 133/68  Pulse  Min: 72  Max: 79  Resp  Min: 16  Max: 18  SpO2  Min: 92 %  Max: 98 %    GENERAL: Awake and alert, in no acute distress.   HEENT: Normocephalic, atraumatic.  PERRL. EOMI. No conjunctival injection. No icterus. Oropharynx clear without evidence of thrush or exudate. No evidence of peridontal disease.    NECK: Supple without nuchal rigidity. No mass.  LYMPH: No cervical, axillary or inguinal lymphadenopathy.  HEART: RRR; No murmur, rubs, gallops. + prosthetic click   LUNGS: Clear to auscultation bilaterally without wheezing, rales, rhonchi. Normal respiratory effort. Nonlabored. No dullness.  ABDOMEN: Soft, nontender, nondistended. Positive bowel sounds. No rebound or guarding. NO mass or HSM.  EXT:  No cyanosis, clubbing or edema. Venous stasis discoloration noted  + Peripheral pulse   : Without Adam catheter.  MSK: FROM without joint effusions noted arms/legs.    SKIN: Warm and dry.    NEURO: Oriented to PPT. No focal deficits on motor/sensory exam at arms/legs.  PSYCHIATRIC: Normal insight and judgement. Cooperative with PE  Right hallux with ulceration plantar surface, calloused no drainage, with faint erythema dorsum of foot   Laboratory Data    Results from last 7 days   Lab Units 19  0857 06/10/19  0415 06/10/19  0103   WBC 10*3/mm3 11.55* 19.30* 16.55*   HEMOGLOBIN g/dL 10.9* 10.2* 11.2*   HEMATOCRIT % 36.7* 34.3* 35.9*   PLATELETS 10*3/mm3 251 263 297     Results from last 7 days   Lab Units 19  0319   SODIUM mmol/L 140    POTASSIUM mmol/L 4.3   CHLORIDE mmol/L 105   CO2 mmol/L 26.0   BUN mg/dL 27*   CREATININE mg/dL 1.08   GLUCOSE mg/dL 187*   CALCIUM mg/dL 8.2*     Results from last 7 days   Lab Units 06/10/19  0103   ALK PHOS U/L 68   BILIRUBIN mg/dL 0.6   ALT (SGPT) U/L 15   AST (SGOT) U/L 22     Results from last 7 days   Lab Units 06/10/19  0103   SED RATE mm/hr 89*     Results from last 7 days   Lab Units 06/10/19  0415   CRP mg/dL 1.93*     Results from last 7 days   Lab Units 06/10/19  0103   LACTATE mmol/L 2.0             Estimated Creatinine Clearance: 109.3 mL/min (by C-G formula based on SCr of 1.08 mg/dL).      Microbiology:         Influenza  Negative  6/10: BC  (2/2) ABNORMAL  Gram negative bacilli, no organism by PCR -- Acinetobacter baumannii   sensitive to Merrem, Tetracycline    [facility] :  5/21:  BC no growth                   Radiology:  Imaging Results (last 72 hours)     Procedure Component Value Units Date/Time    XR Chest 1 View [[id number]] Collected:  06/10/19 0213     Updated:  06/10/19 0215    Narrative:       CR Chest 1 Vw    SIGNS AND SYMPTOMS:  fever 103 fever for a couple days with fatigue and vomiting.  Hx of CABG 10 years ago, HTN, diabetic     COMPARISONS:  CT angiogram of the chest 12/27/2009, chest radiograph 12/27/2009    FINDINGS:    A portable AP view of the chest was obtained  Semiupright.    The lungs are clear. There is marked cardiomegaly even accounting for AP technique. Aortic valve has been replaced utilizing a mechanical prosthetic. There are postsurgical changes of median sternotomy. No pneumothorax or pleural effusion is identified.  The bones are normal for age.      Impression:         1.  No acute abnormality.    Signer Name: Sony Lawson MD   Signed: 6/10/2019 2:13 AM   Workstation Name: [id number]       CT Abdomen Pelvis Without Contrast [[id number]] Collected:  06/10/19 0046     Updated:  06/10/19 0049    Narrative:       CT Abdomen Pelvis WO 6/10/2019 1:34 AM    SIGNS  AND SYMPTOMS:  Fever, n/v starting today, no abd pain; Hx colon ca w/ colon resection; Recent hospital admission for similar symptoms    COMPARISON:  None available    TECHNIQUE:  Axial imaging of the abdomen and pelvis was performed without the administration of intravenous contrast. No enteric contrast was administered. Multiplanar reformation was obtained. Radiation dose reduction techniques included automated exposure control  or exposure modulation based on body size. Radiation audit for number of CT and nuclear cardiology exams performed in the last year: 0.    FINDINGS:  Lower chest: There are postsurgical changes of median sternotomy and coronary artery bypass. Coronary atherosclerosis is present.    Hepatobiliary: The liver is normal in size. There are gallstones seen within the gallbladder.There is no CT evidence of acute cholecystitis.     Spleen, pancreas, adrenal glands: No abnormalities.    Kidneys, ureters, bladder: No abnormalities.    Bowel: There is an anastomotic site seen within left colon in the left lower quadrant (series 4#31 for example). This appears widely patent. A second anastomotic site is seen within the right colon (series 2#42 for example). This anastomotic site also  appears widely patent. The small bowel and stomach are otherwise unremarkable.    Peritoneal cavity / Subperitoneal space: No abnormalities.    Lymphovascular: Atherosclerotic vascular calcifications are present within the descending aorta and iliac arteries. No retroperitoneal adenopathy.    Abdominal wall: There are bowel containing ventral abdominal hernias (series 5#25 for example). These contain loops of both small bowel and colon. There is no evidence of obstruction of the loops involved within the ventral abdominal hernias.    Musculoskeletal: Degenerative disc disease lumbar spine. No destructive bony lesion.    Prostate, seminal vesicles: There is calcification of the vas deferens, nonspecific but can be seen in  the setting of diabetes mellitus. Prostate normal in size. No abnormality of the seminal vesicles.      Impression:       1.  No acute intra-abdominal abnormality. Anastomotic sites within the right and left colon appear widely patent.  2.  Multiple bowel containing ventral abdominal hernias. There is no evidence of obstruction of the small bowel loops or colonic loops associated with these findings.  3.  Atherosclerosis.    Signer Name: Sony Lawson MD   Signed: 6/10/2019 12:46 AM   Workstation Name: [email or web address]           5/23:  Echo:  EF ~55-60% normal diastolic function./   Moderate mitral annular calcifications, thickening and calicification of the mitral valve  Leaflet . No evidence of vegetation.   6/11:  MRI    Impression:     Soft tissue irregularity and minimal soft tissue edema and  inflammation surrounding the distal great toe lateral margin without  evidence for cortical irregularity to suggest osteomyelitis and no  evidence for abscess formation. Soft tissue irregularity corresponds to  clinical history of ulceration.           Impression:   1.  Acinetobacter  bacteremia- he had 2+ blood cultures for Acinetobacter.  This organism is sensitive to penicillins but he is allergic to penicillins.  I have switched him from cefepime to Merrem.  His ESTHER does not show a vegetation but this does not exclude prosthetic valve endocarditis.  He does have gallbladder disease with cholelithiasis and I will plan to obtain an MRCP to evaluate for a common bile duct stone as a potential source.  He will likely require 6 weeks of intravenous antibiotic therapy followed by chronic oral minocycline suppression unless we can find in a removable focus of infection.  2.  Sepsis-improved  3.  Right hallux callus/ cellulitis-with no evidence of osteomyelitis by MRI  4.  Valvular heart disease s/p AVR   5.  Peripheral neuropathy  6.  Acute kidney injury/ATN  7.  History of colon cancer/resection  8.  Penicillin allergy-he  had a recent extensive rash secondary to amoxicillin.  9.  Gram negative bacteremia   10.  Cholithiasis by CT at [facility]  11.  Type 2 diabetes mellitus    PLAN/RECOMMENDATIONS:   1.  MRCP  2.  Repeat blood culture x1  3.  Discontinue cefepime  4.  Intravenous Merrem  5.  PICC line placement      Dr. Kaur has obtained the history, performed the physical exam and formulated the above treatment plan.     I discussed his complex situation with infectious disease clinical pharmacy in detail today.  I discussed his complex situation with his wife and the patient himself at length today.  I spent over 45 minutes on his complex situation today.       Oscar Kaur MD  6/12/2019  6:29 PM

## 2019-06-12 NOTE — PROGRESS NOTES
"Pharmacy Consult  -  Warfarin  Cecilio Farias is a  63 y.o. male   Height - 190.5 cm (75\")  Weight - (!) 146 kg (322 lb 8 oz)    Consulting Provider PHAN Bueno (hospitalist service)  Indication - mechanical AVR  Goal INR - 2.5-3.5  Home Regimen:   - Warfarin 10mg daily, except 7.5 mg MWSat (per patient)   - Warfarin 10mg daily, except 7.5mg WFSun (per anticoag note)  Bridge Therapy: No     Drug-Drug Interactions with current regimen:   Aspirin - increased bleed risk    Warfarin Dosing During Admission:  Date  6/10 6/11 6/12         INR  5.83 4.73 3.07         Dose  HOLD HOLD (10mg)            Education Provided:  Patient is on warfarin prior to admission.  Education provided 6/10 verbally and in writing.  Discussed effects of warfarin, importance of checking INR, drug-drug and drug-food interactions, and signs/symptoms of bleeding and clotting.  Patient verbalized understanding through teach back.  All pertinent questions were answered.      Discharge Follow up:  Following Provider - [facility] (Dr. Smith)  Follow up time range or appointment - 2-3 days after discharge    Labs:  Results from last 7 days   Lab Units 06/11/19  0308 06/10/19  0415 06/10/19  0103   INR  4.73* 5.83* 5.26*   HEMOGLOBIN g/dL  --  10.2* 11.2*   HEMATOCRIT %  --  34.3* 35.9*   PLATELETS 10*3/mm3  --  263 297     Results from last 7 days   Lab Units 06/10/19  0415 06/10/19  0103   SODIUM mmol/L 138 140   POTASSIUM mmol/L 4.4 4.8   CHLORIDE mmol/L 104 104   CO2 mmol/L 21.0* 23.0   BUN mg/dL 24* 24*   CREATININE mg/dL 1.32* 1.31*   CALCIUM mg/dL 8.1* 8.8   BILIRUBIN mg/dL  --  0.6   ALK PHOS U/L  --  68   ALT (SGPT) U/L  --  15   AST (SGOT) U/L  --  22   GLUCOSE mg/dL 249* 284*     Current dietary intake:   Diet Order   Procedures   • Diet Regular; Cardiac, Consistent Carbohydrate   25% dinner 6/10    Assessment/Plan:   1. Patient's INR is therapeutic at 3.07 today. Represents a significant decrease in INR when " compared to yesterday. Warfarin being dosed for mechanical AVR with INR goal 2.5-3.5.   2. Will give home dose of warfarin 10 mg today. Will likely need an adjustment of home dose given SUPRAtherapeutic INR. However, expect INR to be further decreased tomorrow after two held doses.   3. Daily PT/INR ordered.  4. Monitor signs/symptoms of bleeding, dietary intake, and drug-drug interactions. Make dose adjustments as necessary.  5. Pharmacy will continue to follow.    Thanks,  Parish Perry, PharmD  Pharmacy Resident  6/12/2019  7:10 AM

## 2019-06-12 NOTE — PROGRESS NOTES
[facility] Medicine Services  PROGRESS NOTE    Patient Name: Cecilio Farias  : 1955  MRN: [id number]    Date of Admission: 6/10/2019  Length of Stay: 2  Primary Care Physician: America Everett DO    Subjective   Subjective     CC:  Fevers / Chills    HPI:  Doing well this am, denies any issues overnight.      Review of Systems    Gen- No fevers, chills  CV- No chest pain, palpitations  Resp- No cough, dyspnea  GI- No N/V/D, abd pain    Otherwise ROS is negative except as mentioned in the HPI.    Objective   Objective     Vital Signs:   Temp:  [97.9 °F (36.6 °C)-98.1 °F (36.7 °C)] 97.9 °F (36.6 °C)  Heart Rate:  [72-79] 72  Resp:  [16-18] 18  BP: ()/(55-68) 133/68     Physical Exam:    Constitutional: No acute distress, awake, alert  HENT: NCAT, mucous membranes moist  Respiratory: Clear to auscultation bilaterally, respiratory effort normal   Cardiovascular: RRR, + murmur/click  Gastrointestinal: Positive bowel sounds, soft, nontender, nondistended  Musculoskeletal: Right Hallux Plantar toe ulceration (eschar)  Psychiatric: Appropriate affect, cooperative  Neurologic: Oriented x 3, strength symmetric in all extremities, Cranial Nerves grossly intact to confrontation, speech clear  Skin: No rashes    Results Reviewed:  I have personally reviewed current lab, radiology, and data and agree.    Results from last 7 days   Lab Units 19  0857 19  0308 06/10/19  0415 06/10/19  0103   WBC 10*3/mm3  --  11.55*  --  19.30* 16.55*   HEMOGLOBIN g/dL  --  10.9*  --  10.2* 11.2*   HEMATOCRIT %  --  36.7*  --  34.3* 35.9*   PLATELETS 10*3/mm3  --  251  --  263 297   INR  3.07*  --  4.73* 5.83* 5.26*   PROCALCITONIN ng/mL  --   --   --   --  0.07*     Results from last 7 days   Lab Units 19  0857 06/10/19  0415 06/10/19  0103   SODIUM mmol/L 140 141 138 140   POTASSIUM mmol/L 4.3 4.4 4.4 4.8   CHLORIDE mmol/L 105 108* 104 104   CO2 mmol/L  26.0 23.0 21.0* 23.0   BUN mg/dL 27* 25* 24* 24*   CREATININE mg/dL 1.08 1.22 1.32* 1.31*   GLUCOSE mg/dL 187* 218* 249* 284*   CALCIUM mg/dL 8.2* 7.9* 8.1* 8.8   ALT (SGPT) U/L  --   --   --  15   AST (SGOT) U/L  --   --   --  22   TROPONIN T ng/mL  --   --   --  0.021     Estimated Creatinine Clearance: 109.3 mL/min (by C-G formula based on SCr of 1.08 mg/dL).    Microbiology Results Abnormal     Procedure Component Value - Date/Time    Blood Culture - Blood, Blood, Venous Line [[id number]]  (Abnormal)  (Susceptibility) Collected:  06/10/19 0103    Lab Status:  Edited Result - FINAL Specimen:  Blood, Venous Line Updated:  06/12/19 0919     Blood Culture Acinetobacter baumannii     Isolated from Aerobic Bottle     Gram Stain Aerobic Bottle Gram negative bacilli    Susceptibility      Acinetobacter baumannii     NADEEM     Ampicillin + Sulbactam Susceptible     Cefepime Intermediate     Ceftazidime Intermediate     Gentamicin Susceptible     Meropenem Susceptible (C) [1]      Piperacillin + Tazobactam Susceptible     Tetracycline Susceptible            [1]   Appended report. These results have been appended to a previously final verified report.                 Blood Culture - Blood, Blood, Venous Line [[id number]]  (Abnormal) Collected:  06/10/19 0103    Lab Status:  Final result Specimen:  Blood, Venous Line Updated:  06/12/19 0737     Blood Culture Acinetobacter baumannii     Comment: Refer to [id number] collected 6/10/2019 @ 01:03 for MICs.        Isolated from Aerobic Bottle     Gram Stain Aerobic Bottle Gram negative bacilli    Blood Culture ID, PCR - Blood, Blood, Venous Line [[id number]]  (Normal) Collected:  06/10/19 0103    Lab Status:  Final result Specimen:  Blood, Venous Line Updated:  06/10/19 1852     BCID, PCR No organism detected by BCID PCR.    Influenza Antigen, Rapid - Swab, Nasopharynx [[id number]]  (Normal) Collected:  06/10/19 0139    Lab Status:  Final result Specimen:  Swab from Nasopharynx Updated:   06/10/19 0202     Influenza A Ag, EIA Negative     Influenza B Ag, EIA Negative          Imaging Results (last 24 hours)     Procedure Component Value Units Date/Time    MRI Foot Right Without Contrast [[id number]] Collected:  06/11/19 0750     Updated:  06/11/19 1852    Narrative:       EXAMINATION: MRI FOOT RIGHT WO CONTRAST-     INDICATION: Rule out osteomyelitis/right big toe plantar surface;  A41.9-Sepsis, unspecified organism; L03.115-Cellulitis of right lower  limb.      TECHNIQUE: Multiplanar MRI of the foot without intravenous contrast.     COMPARISON: None.     FINDINGS: Patient chart demonstrates evaluation or possibly  osteomyelitis and history of diabetes. Patient states he had right foot  callus on great toe that was debrided as needed by podiatrist and now  looks like an ulcer with surrounding cellulitis is present.     ON TODAY'S EXAMINATION: Increased T2 signal along the plantar surface  proximal and distal phalanx great toe lateral margins with mild soft  tissue irregularity, however, no evidence for abscess or focal fluid  collection and no distinct cortical irregularity to suggest cortical  disruption of osteomyelitis. Osseous structures otherwise unremarkable  with expected degenerative changes in the distal interphalangeal joints  and hindfoot/midfoot junction. Pes planus deformity along with  degenerative changes however grossly unremarkable appearance of the  flexor and extensor tendons noted.       Impression:       Soft tissue irregularity and minimal soft tissue edema and  inflammation surrounding the distal great toe lateral margin without  evidence for cortical irregularity to suggest osteomyelitis and no  evidence for abscess formation. Soft tissue irregularity corresponds to  clinical history of ulceration.      D:  06/11/2019  E:  06/11/2019     This report was finalized on 6/11/2019 6:49 PM by Dr. Kaerem Amin.             Results for orders placed during the hospital encounter of  07/05/16   Adult transthoracic echo complete    Narrative · All left ventricular wall segments contract normally.  · Left ventricular diastolic dysfunction (grade I a) consistent with   impaired relaxation.  · Left ventricular function is hyperdynamic (EF > 70).  · A prosthetic aortic valve (unknown type) is present and functioning   normally.  · Mitral valve thickening and annular calcification is seen.          I have reviewed the medications:  Scheduled Meds:    amLODIPine 10 mg Oral Daily   aspirin 81 mg Oral Daily   atorvastatin 10 mg Oral Daily   carvedilol 25 mg Oral BID With Meals   gabapentin 800 mg Oral Q8H   insulin detemir 4 Units Subcutaneous Nightly   insulin lispro 0-7 Units Subcutaneous 4x Daily With Meals & Nightly   insulin lispro 3 Units Subcutaneous TID With Meals   meropenem 1 g Intravenous Q8H   sodium chloride 3 mL Intravenous Q12H   warfarin 10 mg Oral Daily     Continuous Infusions:    Pharmacy to dose warfarin      PRN Meds:.dextrose  •  dextrose  •  glucagon (human recombinant)  •  ondansetron **OR** ondansetron  •  Pharmacy to dose warfarin  •  [COMPLETED] Insert peripheral IV **AND** sodium chloride  •  sodium chloride      Assessment/Plan   Assessment / Plan     Active Hospital Problems    Diagnosis POA   • **Sepsis (CMS/[facility]) [A41.9] Yes   • HUMA (acute kidney injury) (CMS/[facility]) [N17.9] Yes   • Supratherapeutic INR [R79.1] Yes   • CHF (congestive heart failure) (CMS/[facility]) [I50.9] Yes   • S/P AVR [Z95.2] Not Applicable   • Aortic valve disorders [I35.9] [I35.9] Yes   • Dyslipidemia [E78.5] Yes     He follows up with Dr. Everett     • Hypertension [I10] Yes     7/2/15: currently controlled at home in the 130s/80s     • Type 2 diabetes mellitus (CMS/[facility]) [E11.9] Yes       Brief Hospital Course to date:  Cecilio Farias is a 63 y.o. male admitted for Sepsis due to Acinetobacter Bacteremia.     Acinetobacter Bacteremia  - no clear source of infection  - in 2 blood cultures  --ESTHER today  --repeat  blood cultures PENDING  --ID following, continue Merrem     Mechanical AVR  - at risk for infection  - on warfarin    Uncontrolled DM  - hemoglobin A1C >10  - will need insulin upon d/c  - diabetes educator    Morbid Obesity  --complicates all aspects of care    Right Toe Plantar Ulceration  - MRI shows no osteomyelitis nor any abscess    Chronic Kidney Disease  - unclear baseline        High Complexity Case    DVT Prophylaxis:  anticoagulated    Disposition: I expect the patient to be discharged TBD    CODE STATUS:   Code Status and Medical Interventions:   Ordered at: 06/10/19 0248     Level Of Support Discussed With:    Patient     Code Status:    CPR     Medical Interventions (Level of Support Prior to Arrest):    Full         Electronically signed by Annalisa Duarte MD, 06/12/19, 12:44 PM.

## 2019-06-12 NOTE — PROGRESS NOTES
Continued Stay Note  [facility]     Patient Name: Cecilio Farias  MRN: [id number]  Today's Date: 6/12/2019    Admit Date: 6/10/2019    Discharge Plan     Row Name 06/12/19 1043       Plan    Plan  Home at DC    Patient/Family in Agreement with Plan  yes    Plan Comments  I spoke with the pt and spouse. No new DC needs. They were asking about home infusion and outpt options for AB at DC. They are aware [facility] will let us know the plan once it is known. I will follow.        Discharge Codes    No documentation.       Expected Discharge Date and Time     Expected Discharge Date Expected Discharge Time    Jun 12, 2019             Marita Zarco RN

## 2019-06-12 NOTE — PLAN OF CARE
Problem: Patient Care Overview  Goal: Plan of Care Review  Outcome: Ongoing (interventions implemented as appropriate)   06/12/19 0263   Coping/Psychosocial   Plan of Care Reviewed With patient   Plan of Care Review   Progress improving   OTHER   Outcome Summary VSS. No c/o pain or discomfort. Pt completed ESTHER this afternoon. Up in the chair all afternoon. Will continue to monitor.

## 2019-06-13 ENCOUNTER — APPOINTMENT (OUTPATIENT)
Dept: MRI IMAGING | Facility: HOSPITAL | Age: 64
End: 2019-06-13

## 2019-06-13 VITALS
OXYGEN SATURATION: 95 % | BODY MASS INDEX: 39.17 KG/M2 | SYSTOLIC BLOOD PRESSURE: 130 MMHG | HEART RATE: 85 BPM | TEMPERATURE: 97.9 F | WEIGHT: 315 LBS | RESPIRATION RATE: 18 BRPM | HEIGHT: 75 IN | DIASTOLIC BLOOD PRESSURE: 67 MMHG

## 2019-06-13 PROBLEM — N17.9 AKI (ACUTE KIDNEY INJURY): Status: RESOLVED | Noted: 2019-06-10 | Resolved: 2019-06-13

## 2019-06-13 PROBLEM — A41.59: Status: ACTIVE | Noted: 2019-06-13

## 2019-06-13 PROBLEM — A41.9 SEPSIS: Status: RESOLVED | Noted: 2019-06-10 | Resolved: 2019-06-13

## 2019-06-13 PROBLEM — R79.1 SUPRATHERAPEUTIC INR: Status: RESOLVED | Noted: 2019-06-10 | Resolved: 2019-06-13

## 2019-06-13 LAB
ANION GAP SERPL CALCULATED.3IONS-SCNC: 11 MMOL/L
BUN BLD-MCNC: 20 MG/DL (ref 8–23)
BUN/CREAT SERPL: 20.4 (ref 7–25)
CALCIUM SPEC-SCNC: 8.9 MG/DL (ref 8.6–10.5)
CHLORIDE SERPL-SCNC: 106 MMOL/L (ref 98–107)
CO2 SERPL-SCNC: 26 MMOL/L (ref 22–29)
CREAT BLD-MCNC: 0.98 MG/DL (ref 0.76–1.27)
DEPRECATED RDW RBC AUTO: 49.5 FL (ref 37–54)
ERYTHROCYTE [DISTWIDTH] IN BLOOD BY AUTOMATED COUNT: 14.7 % (ref 12.3–15.4)
GFR SERPL CREATININE-BSD FRML MDRD: 78 ML/MIN/1.73
GLUCOSE BLD-MCNC: 185 MG/DL (ref 65–99)
GLUCOSE BLDC GLUCOMTR-MCNC: 166 MG/DL (ref 70–130)
GLUCOSE BLDC GLUCOMTR-MCNC: 229 MG/DL (ref 70–130)
HCT VFR BLD AUTO: 38 % (ref 37.5–51)
HGB BLD-MCNC: 11.4 G/DL (ref 13–17.7)
INR PPP: 2.14 (ref 0.85–1.16)
MCH RBC QN AUTO: 27.3 PG (ref 26.6–33)
MCHC RBC AUTO-ENTMCNC: 30 G/DL (ref 31.5–35.7)
MCV RBC AUTO: 91.1 FL (ref 79–97)
PLATELET # BLD AUTO: 293 10*3/MM3 (ref 140–450)
PMV BLD AUTO: 9.8 FL (ref 6–12)
POTASSIUM BLD-SCNC: 4.2 MMOL/L (ref 3.5–5.2)
PROTHROMBIN TIME: 23 SECONDS (ref 11.2–14.3)
RBC # BLD AUTO: 4.17 10*6/MM3 (ref 4.14–5.8)
SODIUM BLD-SCNC: 143 MMOL/L (ref 136–145)
WBC NRBC COR # BLD: 7.47 10*3/MM3 (ref 3.4–10.8)

## 2019-06-13 PROCEDURE — 63710000001 INSULIN LISPRO (HUMAN) PER 5 UNITS: Performed by: HOSPITALIST

## 2019-06-13 PROCEDURE — 63710000001 INSULIN LISPRO (HUMAN) PER 5 UNITS: Performed by: NURSE PRACTITIONER

## 2019-06-13 PROCEDURE — 99239 HOSP IP/OBS DSCHRG MGMT >30: CPT | Performed by: INTERNAL MEDICINE

## 2019-06-13 PROCEDURE — 85027 COMPLETE CBC AUTOMATED: CPT | Performed by: INTERNAL MEDICINE

## 2019-06-13 PROCEDURE — 82962 GLUCOSE BLOOD TEST: CPT

## 2019-06-13 PROCEDURE — 74181 MRI ABDOMEN W/O CONTRAST: CPT

## 2019-06-13 PROCEDURE — 02HV33Z INSERTION OF INFUSION DEVICE INTO SUPERIOR VENA CAVA, PERCUTANEOUS APPROACH: ICD-10-PCS | Performed by: INTERNAL MEDICINE

## 2019-06-13 PROCEDURE — 85610 PROTHROMBIN TIME: CPT | Performed by: NURSE PRACTITIONER

## 2019-06-13 PROCEDURE — C1751 CATH, INF, PER/CENT/MIDLINE: HCPCS

## 2019-06-13 PROCEDURE — 80048 BASIC METABOLIC PNL TOTAL CA: CPT | Performed by: INTERNAL MEDICINE

## 2019-06-13 PROCEDURE — 25010000002 MEROPENEM: Performed by: INTERNAL MEDICINE

## 2019-06-13 PROCEDURE — C1894 INTRO/SHEATH, NON-LASER: HCPCS

## 2019-06-13 RX ORDER — SODIUM CHLORIDE 0.9 % (FLUSH) 0.9 %
10 SYRINGE (ML) INJECTION EVERY 12 HOURS SCHEDULED
Status: DISCONTINUED | OUTPATIENT
Start: 2019-06-13 | End: 2019-06-13 | Stop reason: HOSPADM

## 2019-06-13 RX ORDER — SODIUM CHLORIDE 0.9 % (FLUSH) 0.9 %
10 SYRINGE (ML) INJECTION AS NEEDED
Status: DISCONTINUED | OUTPATIENT
Start: 2019-06-13 | End: 2019-06-13 | Stop reason: HOSPADM

## 2019-06-13 RX ORDER — SODIUM CHLORIDE 0.9 % (FLUSH) 0.9 %
20 SYRINGE (ML) INJECTION AS NEEDED
Status: DISCONTINUED | OUTPATIENT
Start: 2019-06-13 | End: 2019-06-13 | Stop reason: HOSPADM

## 2019-06-13 RX ADMIN — CARVEDILOL 25 MG: 12.5 TABLET, FILM COATED ORAL at 08:50

## 2019-06-13 RX ADMIN — INSULIN LISPRO 3 UNITS: 100 INJECTION, SOLUTION INTRAVENOUS; SUBCUTANEOUS at 12:06

## 2019-06-13 RX ADMIN — ATORVASTATIN CALCIUM 10 MG: 10 TABLET, FILM COATED ORAL at 08:50

## 2019-06-13 RX ADMIN — INSULIN LISPRO 2 UNITS: 100 INJECTION, SOLUTION INTRAVENOUS; SUBCUTANEOUS at 08:51

## 2019-06-13 RX ADMIN — MEROPENEM 1 G: 1 INJECTION, POWDER, FOR SOLUTION INTRAVENOUS at 00:14

## 2019-06-13 RX ADMIN — GABAPENTIN 800 MG: 400 CAPSULE ORAL at 06:26

## 2019-06-13 RX ADMIN — INSULIN LISPRO 3 UNITS: 100 INJECTION, SOLUTION INTRAVENOUS; SUBCUTANEOUS at 08:50

## 2019-06-13 RX ADMIN — AMLODIPINE BESYLATE 10 MG: 5 TABLET ORAL at 08:50

## 2019-06-13 RX ADMIN — ASPIRIN 81 MG: 81 TABLET, COATED ORAL at 08:50

## 2019-06-13 RX ADMIN — MEROPENEM 1 G: 1 INJECTION, POWDER, FOR SOLUTION INTRAVENOUS at 08:51

## 2019-06-13 NOTE — PROGRESS NOTES
Case Management Discharge Note    Final Note: The pt is aware and agreeable to his weekly co pay for IV AB.    Destination      No service has been selected for the patient.      Durable Medical Equipment      No service has been selected for the patient.      Dialysis/Infusion - Selection Complete      Service Provider Request Status Selected Services Address Phone Number Fax Number    [facility] Selected Infusion and IV Therapy [address], [place] [id number] [phone]      Home Medical Care      No service has been selected for the patient.      Therapy      No service has been selected for the patient.      Community Resources      No service has been selected for the patient.             Final Discharge Disposition Code: 01 - home or self-care

## 2019-06-13 NOTE — PROGRESS NOTES
"Pharmacy Consult  -  Warfarin  Cecilio Farias is a  63 y.o. male   Height - 190.5 cm (75\")  Weight - (!) 145 kg (320 lb 6.4 oz)    Consulting Provider PHAN Bueno (hospitalist service)  Indication - mechanical AVR  Goal INR - 2.5-3.5  Home Regimen:   - Warfarin 10mg daily, except 7.5 mg MWSat (per patient)   - Warfarin 10mg daily, except 7.5mg WFSun (per anticoag note)  Bridge Therapy: No     Drug-Drug Interactions with current regimen:   Aspirin - increased bleed risk    Warfarin Dosing During Admission:  Date  6/10 6/11 6/12 6/13        INR  5.83 4.73 3.07 2.14        Dose  HOLD HOLD 10mg (10mg)           Education Provided:  Patient is on warfarin prior to admission.  Education provided 6/10 verbally and in writing.  Discussed effects of warfarin, importance of checking INR, drug-drug and drug-food interactions, and signs/symptoms of bleeding and clotting.  Patient verbalized understanding through teach back.  All pertinent questions were answered.      Discharge Follow up:  Following Provider - [facility] (Dr. Smith)  Follow up time range or appointment - 2-3 days after discharge    Labs:  Results from last 7 days   Lab Units 06/13/19  0332 06/12/19  0319 06/11/19  0857 06/11/19  0308 06/10/19  0415 06/10/19  0103   INR  2.14* 3.07*  --  4.73* 5.83* 5.26*   HEMOGLOBIN g/dL 11.4*  --  10.9*  --  10.2* 11.2*   HEMATOCRIT % 38.0  --  36.7*  --  34.3* 35.9*   PLATELETS 10*3/mm3 293  --  251  --  263 297     Results from last 7 days   Lab Units 06/13/19  0332 06/12/19  0319 06/11/19  0857  06/10/19  0103   SODIUM mmol/L 143 140 141   < > 140   POTASSIUM mmol/L 4.2 4.3 4.4   < > 4.8   CHLORIDE mmol/L 106 105 108*   < > 104   CO2 mmol/L 26.0 26.0 23.0   < > 23.0   BUN mg/dL 20 27* 25*   < > 24*   CREATININE mg/dL 0.98 1.08 1.22   < > 1.31*   CALCIUM mg/dL 8.9 8.2* 7.9*   < > 8.8   BILIRUBIN mg/dL  --   --   --   --  0.6   ALK PHOS U/L  --   --   --   --  68   ALT (SGPT) U/L  --   --   --   " --  15   AST (SGOT) U/L  --   --   --   --  22   GLUCOSE mg/dL 185* 187* 218*   < > 284*    < > = values in this interval not displayed.     Current dietary intake:   Diet Order   Procedures   • Diet Regular; Cardiac, Consistent Carbohydrate   100% dinner 6/11    Assessment/Plan:   1. Patient's INR is SUBtherapeutic at 3.07 today. Represents a decrease in INR when compared to yesterday. Warfarin being dosed for mechanical AVR with INR goal 2.5-3.5.   2. Will give home dose of warfarin 10 mg today. Will likely need an adjustment of home dose given SUPRAtherapeutic INR on admission. However, given current SUBtherapeutic INR after HELD doses x 2 will continue 10mg at this time.  3. Daily PT/INR ordered.  4. Monitor signs/symptoms of bleeding, dietary intake, and drug-drug interactions. Make dose adjustments as necessary.  5. Pharmacy will continue to follow.    Thanks,  Parish Perry, PharmD  Pharmacy Resident  6/13/2019  7:49 AM

## 2019-06-13 NOTE — DISCHARGE SUMMARY
[facility] Medicine Services  DISCHARGE SUMMARY    Patient Name: Cecilio Farias  : 1955  MRN: [id number]    Date of Admission: 6/10/2019  Date of Discharge:  2019  Primary Care Physician: America Everett DO    Consults     Date and Time Order Name Status Description    6/10/2019 0332 Inpatient Infectious Diseases Consult Completed           Hospital Course     Presenting Problem:   Sepsis, due to unspecified organism (CMS/[facility]) [A41.9]    Active Hospital Problems    Diagnosis  POA   • Sepsis due to Acinetobacter species (CMS/[facility]) [A41.59]  Unknown     Priority: High   • CHF (congestive heart failure) (CMS/[facility]) [I50.9]  Yes     Priority: Medium   • S/P AVR [Z95.2]  Not Applicable     Priority: Medium   • Aortic valve disorders [I35.9] [I35.9]  Yes     Priority: Low   • Dyslipidemia [E78.5]  Yes     Priority: Low   • Hypertension [I10]  Yes     Priority: Low   • Type 2 diabetes mellitus (CMS/[facility]) [E11.9]  Yes     Priority: Low      Resolved Hospital Problems    Diagnosis Date Resolved POA   • **Sepsis (CMS/[facility]) [A41.9] 2019 Yes     Priority: High   • HUMA (acute kidney injury) (CMS/[facility]) [N17.9] 2019 Yes     Priority: Medium   • Supratherapeutic INR [R79.1] 2019 Yes     Priority: Low          Hospital Course:  Cecilio Farias is a 63 y.o. male with PMH of mechanical AVR, T2DM, CKD presents with sepsis due to Acinetobacter Bacteremia. Pt was admitted to our service and ID was consulted.  His blood cultures eventually revealed Acinetobacter.  A ESTHER was performed and did not show any evidence of valvular vegetation, however, could not definitively rule out a vegetation. MRCP was performed due to concern for possible biliary etiology of gram negative bacteremia- this was also negative for source.  He had some mild erythema and pain of his right great toe, so an MRI was done to r/o osteomyelitis of this as source of infection- MRI was also negative.  Repeat blood  cultures are currently PENDING.  At this point, he will need to be treated for at least 6 weeks with IV Meropenem and then continue with suppressive Minocycline indefinitely as endocarditis could not be ruled out by a negative ESTHER.  He will get a PICC line today and his family will manage his IV Meropenem at home. He will follow up with Dr. Kaur in clinic on 6/20/19.      Pt has a mechanical aortic valve as noted above and will need to be followed closely in terms of Warfarin/INR levels by his PCP.    His HbA1C was 10.1% consistent with very poor control at home, I have prescribed him low dose 70/30 insulin upon discharge as he has been well controlled here on low doses of basal/bolus with minimal need for SSI.  He will also resume his oral hypoglycemics upon discharge. He has been seen by the Diabetes Educator during this admission.    He is doing well and will be discharged home this afternoon.       Discharge Follow Up Recommendations for labs/diagnostics:   with PCP in one week, with Dr. Oscar Kaur in one week on June 20th, 2019.   Day of Discharge     HPI:   Doing well today, pt denies any complaints. Wife at bedside this am.     Review of Systems  Gen- No fevers, chills  CV- No chest pain, palpitations  Resp- No cough, dyspnea  GI- No N/V/D, abd pain  Otherwise ROS is negative except as mentioned in the HPI.    Vital Signs:   Temp:  [97.8 °F (36.6 °C)-97.9 °F (36.6 °C)] 97.9 °F (36.6 °C)  Heart Rate:  [72-85] 85  Resp:  [18] 18  BP: (112-133)/(63-74) 130/67     Physical Exam:  Constitutional: No acute distress, awake, alert  HENT: NCAT, mucous membranes moist  Respiratory: Clear to auscultation bilaterally, respiratory effort normal   Cardiovascular: RRR, + murmur/click  Gastrointestinal: Positive bowel sounds, soft, nontender, nondistended  Musculoskeletal: Right Hallux Plantar toe ulceration (eschar)  Psychiatric: Appropriate affect, cooperative  Neurologic: Oriented x 3, strength symmetric in all  extremities, Cranial Nerves grossly intact to confrontation, speech clear  Skin: No rashes       Pertinent  and/or Most Recent Results     Results from last 7 days   Lab Units 06/13/19  0332 06/12/19 0319 06/11/19 0857 06/10/19  0415 06/10/19  0103   WBC 10*3/mm3 7.47  --  11.55* 19.30* 16.55*   HEMOGLOBIN g/dL 11.4*  --  10.9* 10.2* 11.2*   HEMATOCRIT % 38.0  --  36.7* 34.3* 35.9*   PLATELETS 10*3/mm3 293  --  251 263 297   SODIUM mmol/L 143 140 141 138 140   POTASSIUM mmol/L 4.2 4.3 4.4 4.4 4.8   CHLORIDE mmol/L 106 105 108* 104 104   CO2 mmol/L 26.0 26.0 23.0 21.0* 23.0   BUN mg/dL 20 27* 25* 24* 24*   CREATININE mg/dL 0.98 1.08 1.22 1.32* 1.31*   GLUCOSE mg/dL 185* 187* 218* 249* 284*   CALCIUM mg/dL 8.9 8.2* 7.9* 8.1* 8.8     Results from last 7 days   Lab Units 06/13/19  0332 06/12/19  0319 06/11/19  0308 06/10/19  0415 06/10/19  0103   BILIRUBIN mg/dL  --   --   --   --  0.6   ALK PHOS U/L  --   --   --   --  68   ALT (SGPT) U/L  --   --   --   --  15   AST (SGOT) U/L  --   --   --   --  22   PROTIME Seconds 23.0* 30.5* 42.8* 50.4* 46.5*   INR  2.14* 3.07* 4.73* 5.83* 5.26*           Invalid input(s): TG, LDLCALC, LDLREALC  Results from last 7 days   Lab Units 06/10/19  0415 06/10/19  0103   HEMOGLOBIN A1C % 10.20*  --    TROPONIN T ng/mL  --  0.021   PROCALCITONIN ng/mL  --  0.07*   LACTATE mmol/L  --  2.0       Brief Urine Lab Results  (Last result in the past 365 days)      Color   Clarity   Blood   Leuk Est   Nitrite   Protein   CREAT   Urine HCG        06/10/19 0025 Yellow Clear Moderate (2+) Trace Negative Negative               Microbiology Results Abnormal     Procedure Component Value - Date/Time    Blood Culture - Blood, Blood, Venous Line [[id number]]  (Abnormal)  (Susceptibility) Collected:  06/10/19 0103    Lab Status:  Edited Result - FINAL Specimen:  Blood, Venous Line Updated:  06/12/19 0919     Blood Culture Acinetobacter baumannii     Isolated from Aerobic Bottle     Gram Stain Aerobic  Bottle Gram negative bacilli    Susceptibility      Acinetobacter baumannii     NADEEM     Ampicillin + Sulbactam Susceptible     Cefepime Intermediate     Ceftazidime Intermediate     Gentamicin Susceptible     Meropenem Susceptible (C) [1]      Piperacillin + Tazobactam Susceptible     Tetracycline Susceptible            [1]   Appended report. These results have been appended to a previously final verified report.                 Blood Culture - Blood, Blood, Venous Line [[id number]]  (Abnormal) Collected:  06/10/19 0103    Lab Status:  Final result Specimen:  Blood, Venous Line Updated:  06/12/19 0737     Blood Culture Acinetobacter baumannii     Comment: Refer to [id number] collected 6/10/2019 @ 01:03 for MICs.        Isolated from Aerobic Bottle     Gram Stain Aerobic Bottle Gram negative bacilli    Blood Culture ID, PCR - Blood, Blood, Venous Line [[id number]]  (Normal) Collected:  06/10/19 0103    Lab Status:  Final result Specimen:  Blood, Venous Line Updated:  06/10/19 1852     BCID, PCR No organism detected by BCID PCR.    Influenza Antigen, Rapid - Swab, Nasopharynx [[id number]]  (Normal) Collected:  06/10/19 0139    Lab Status:  Final result Specimen:  Swab from Nasopharynx Updated:  06/10/19 0202     Influenza A Ag, EIA Negative     Influenza B Ag, EIA Negative          Imaging Results (all)     Procedure Component Value Units Date/Time    MRI abdomen wo contrast mrcp [[id number]] Collected:  06/13/19 0738     Updated:  06/13/19 0847    Narrative:       EXAMINATION: MRI OF THE ABDOMEN WITH MRCP-     HISTORY: Cholelithiasis, bacteremia, evaluate for common duct stone.     TECHNICAL: Exam consists of axial and coronal T2 HASTE, coronal T2 true  FISP, axial T1 in and out of phase images, and T2 thick slab and 3-D  gradient echo images performed with particular attention to the  pancreatic and biliary ductal systems.     COMPARISON: 06/10/2019 abdomen and pelvis CT scan.     FINDINGS: Previous exam report noted  cholelithiasis.     Images today are somewhat degraded by motion artifact, and wraparound  artifact as a result of patient's size. The included lung bases appear  grossly clear. Note is again made of relative enlargement of the left  liver lobe compared to the right. No discrete liver lesions are  identified. Spleen is not enlarged. No significant abnormalities are  seen of the pancreas and adrenal glands, or kidneys. No adenopathy or  ascites is appreciated. Bowel loops are normal in caliber. Note is made  of multiple gallstones. No inflammation is seen around the gallbladder.     MRCP images show normal-appearing common duct, to approximately 4 mm in  diameter. No filling defect is identified. Pancreatic duct is seen in a  limited fashion but is clearly not enlarged.       Impression:       1. Cholelithiasis.  2. Relatively enlarged left liver lobe compared to right. No liver  lesions.  3. Common duct diameter well within normal limits, at 4 mm. No visible  common duct stone.     D:  06/13/2019  E:  06/13/2019       MRI Foot Right Without Contrast [[id number]] Collected:  06/11/19 0750     Updated:  06/11/19 1852    Narrative:       EXAMINATION: MRI FOOT RIGHT WO CONTRAST-     INDICATION: Rule out osteomyelitis/right big toe plantar surface;  A41.9-Sepsis, unspecified organism; L03.115-Cellulitis of right lower  limb.      TECHNIQUE: Multiplanar MRI of the foot without intravenous contrast.     COMPARISON: None.     FINDINGS: Patient chart demonstrates evaluation or possibly  osteomyelitis and history of diabetes. Patient states he had right foot  callus on great toe that was debrided as needed by podiatrist and now  looks like an ulcer with surrounding cellulitis is present.     ON TODAY'S EXAMINATION: Increased T2 signal along the plantar surface  proximal and distal phalanx great toe lateral margins with mild soft  tissue irregularity, however, no evidence for abscess or focal fluid  collection and no distinct  cortical irregularity to suggest cortical  disruption of osteomyelitis. Osseous structures otherwise unremarkable  with expected degenerative changes in the distal interphalangeal joints  and hindfoot/midfoot junction. Pes planus deformity along with  degenerative changes however grossly unremarkable appearance of the  flexor and extensor tendons noted.       Impression:       Soft tissue irregularity and minimal soft tissue edema and  inflammation surrounding the distal great toe lateral margin without  evidence for cortical irregularity to suggest osteomyelitis and no  evidence for abscess formation. Soft tissue irregularity corresponds to  clinical history of ulceration.      D:  06/11/2019  E:  06/11/2019     This report was finalized on 6/11/2019 6:49 PM by Dr. Kareem Amin.       XR Orbits 4+ View [[id number]] Collected:  06/10/19 1524     Updated:  06/10/19 1628    Narrative:       EXAMINATION: XR ORBITS 4+ VW- 06/10/2019      INDICATION: A41.9-Sepsis, unspecified organism; L03.115-Cellulitis of  right lower limb      COMPARISON: NONE     FINDINGS: 4 views of the orbits reveal no radiopaque foreign body  identified overlying either orbit. The visualized paranasal sinuses are  grossly clear. No significant nasal septal deviation. Visualized  paranasal sinuses are clear. Orbits are intact.           Impression:       No radiopaque abnormality identified overlying either orbit.     D:  06/10/2019  E:  06/10/2019     This report was finalized on 6/10/2019 4:25 PM by Dr. Deborah Macias MD.       XR Chest 1 View [[id number]] Collected:  06/10/19 0213     Updated:  06/10/19 0215    Narrative:       CR Chest 1 Vw    SIGNS AND SYMPTOMS:  fever 103 fever for a couple days with fatigue and vomiting.  Hx of CABG 10 years ago, HTN, diabetic     COMPARISONS:  CT angiogram of the chest 12/27/2009, chest radiograph 12/27/2009    FINDINGS:    A portable AP view of the chest was obtained  Semiupright.    The lungs are  clear. There is marked cardiomegaly even accounting for AP technique. Aortic valve has been replaced utilizing a mechanical prosthetic. There are postsurgical changes of median sternotomy. No pneumothorax or pleural effusion is identified.  The bones are normal for age.      Impression:         1.  No acute abnormality.    Signer Name: Sony Lawson MD   Signed: 6/10/2019 2:13 AM   Workstation Name: [id number]       CT Abdomen Pelvis Without Contrast [[id number]] Collected:  06/10/19 0046     Updated:  06/10/19 0049    Narrative:       CT Abdomen Pelvis WO 6/10/2019 1:34 AM    SIGNS AND SYMPTOMS:  Fever, n/v starting today, no abd pain; Hx colon ca w/ colon resection; Recent hospital admission for similar symptoms    COMPARISON:  None available    TECHNIQUE:  Axial imaging of the abdomen and pelvis was performed without the administration of intravenous contrast. No enteric contrast was administered. Multiplanar reformation was obtained. Radiation dose reduction techniques included automated exposure control  or exposure modulation based on body size. Radiation audit for number of CT and nuclear cardiology exams performed in the last year: 0.    FINDINGS:  Lower chest: There are postsurgical changes of median sternotomy and coronary artery bypass. Coronary atherosclerosis is present.    Hepatobiliary: The liver is normal in size. There are gallstones seen within the gallbladder.There is no CT evidence of acute cholecystitis.     Spleen, pancreas, adrenal glands: No abnormalities.    Kidneys, ureters, bladder: No abnormalities.    Bowel: There is an anastomotic site seen within left colon in the left lower quadrant (series 4#31 for example). This appears widely patent. A second anastomotic site is seen within the right colon (series 2#42 for example). This anastomotic site also  appears widely patent. The small bowel and stomach are otherwise unremarkable.    Peritoneal cavity / Subperitoneal space: No  abnormalities.    Lymphovascular: Atherosclerotic vascular calcifications are present within the descending aorta and iliac arteries. No retroperitoneal adenopathy.    Abdominal wall: There are bowel containing ventral abdominal hernias (series 5#25 for example). These contain loops of both small bowel and colon. There is no evidence of obstruction of the loops involved within the ventral abdominal hernias.    Musculoskeletal: Degenerative disc disease lumbar spine. No destructive bony lesion.    Prostate, seminal vesicles: There is calcification of the vas deferens, nonspecific but can be seen in the setting of diabetes mellitus. Prostate normal in size. No abnormality of the seminal vesicles.      Impression:       1.  No acute intra-abdominal abnormality. Anastomotic sites within the right and left colon appear widely patent.  2.  Multiple bowel containing ventral abdominal hernias. There is no evidence of obstruction of the small bowel loops or colonic loops associated with these findings.  3.  Atherosclerosis.    Signer Name: Sony Lawson MD   Signed: 6/10/2019 12:46 AM   Workstation Name: [email or web address]                       Results for orders placed during the hospital encounter of 06/10/19   Adult Transesophageal Echo (ESTHER) W/ Cont if Necessary Per Protocol    Narrative · Left ventricular systolic function is normal. Estimated EF appears to be   in the range of 56 - 60%  · Left ventricular wall thickness is consistent with mild-to-moderate   concentric hypertrophy.  · There is a bileaflet tilting disc mechanical valve present. The aortic   valve peak and mean gradients are within defined limits  · Mild dilation of the aortic root is present, measuring 4 cm  · There is no evidence of endocarditis. No valvular vegetations seen.           Order Current Status    Blood Culture - Blood, Arm, Left In process    Blood Culture - Blood, Hand, Left In process        Discharge Details        Discharge Medications       New Medications      Instructions Start Date   insulin NPH-insulin regular (70-30) 100 UNIT/ML injection  Commonly known as:  NOVOLIN 70/30   5 Units, Subcutaneous, 2 Times Daily With Meals      meropenem  Commonly known as:  MERREM   1 g, Intravenous, Every 8 Hours         Continue These Medications      Instructions Start Date   amLODIPine 10 MG tablet  Commonly known as:  NORVASC   10 mg, Oral, Daily      aspirin 81 MG EC tablet   81 mg, Oral, Daily      carvedilol 25 MG tablet  Commonly known as:  COREG   25 mg, Oral, 2 Times Daily With Meals      gabapentin 800 MG tablet  Commonly known as:  NEURONTIN   800 mg, Oral, 3 Times Daily      glipiZIDE 5 MG tablet  Commonly known as:  GLUCOTROL   5 mg, Oral, 2 Times Daily Before Meals      lisinopril 20 MG tablet  Commonly known as:  PRINIVIL,ZESTRIL   20 mg, Oral, 2 Times Daily      metFORMIN 500 MG tablet  Commonly known as:  GLUCOPHAGE   500 mg, Oral, 2 Times Daily With Meals      pravastatin 40 MG tablet  Commonly known as:  PRAVACHOL   40 mg, Oral, Daily      warfarin 10 MG tablet  Commonly known as:  COUMADIN   10 mg, Oral, See Admin Instructions, 4 days a week on Sun, Tues, Thurs, Fri       warfarin 7.5 MG tablet  Commonly known as:  COUMADIN   7.5 mg, Oral, See Admin Instructions, 3 days a week on Mon , Wed, Sat              Allergies   Allergen Reactions   • Sulfa Antibiotics    • Amoxicillin Rash     Has tolerated ceftriaxone         Discharge Disposition:  Home or Self Care    Discharge Diet:  Diet Order   Procedures   • Diet Regular; Cardiac, Consistent Carbohydrate         Discharge Activity:         CODE STATUS:    Code Status and Medical Interventions:   Ordered at: 06/10/19 0248     Level Of Support Discussed With:    Patient     Code Status:    CPR     Medical Interventions (Level of Support Prior to Arrest):    Full         Future Appointments   Date Time Provider Department Center   7/23/2019 11:45 AM Kurtis Smith MD [facility] None        Additional Instructions for the Follow-ups that You Need to Schedule     Discharge Follow-up with PCP   As directed       Currently Documented PCP:    America Everett DO    PCP Phone Number:    [phone]     Follow Up Details:  in one week with PCP         Discharge Follow-up with Specified Provider: Dr. Oscar Kaur; 1 Week   As directed      To:  Dr. Oscar Kaur    Follow Up:  1 Week               Time Spent on Discharge: 35 minutes    Electronically signed by Annalisa Duarte MD, 06/13/19, 10:27 AM.

## 2019-06-13 NOTE — PROGRESS NOTES
Case Management Discharge Note    Final Note: [facility] home infusion will deliver the IV AB to the pts room today and teach the pt and spouse how to infuse. The pt will have his picc line changed at [facility] outpt oncology on 6-20-19 at 1500. I spoke with Yumiko in outpt infusion and faxed her info to [phone]. Home infusion will arrange delivery of weekly IV AB with the pt when he comes to [place]. I spoke with Yesica in the [facility] office and she will arrange the blood work and future picc line changes in outpt oncolgy on the f/u days with Dr Kaur. The pt and spouse are in agreement with the plan and verbalize understanding. No other DC needs.    Destination      No service has been selected for the patient.      Durable Medical Equipment      No service has been selected for the patient.      Dialysis/Infusion - Selection Complete      Service Provider Request Status Selected Services Address Phone Number Fax Number    [facility] Selected Infusion and IV Therapy [address], [facility] [address] [phone]      Home Medical Care      No service has been selected for the patient.      Therapy      No service has been selected for the patient.      Community Resources      No service has been selected for the patient.             Final Discharge Disposition Code: 01 - home or self-care

## 2019-06-13 NOTE — DISCHARGE PLACEMENT REQUEST
Cecilio Farias (63 y.o. Male)   To Outpt oncology [facility]  From [id number]    [place] [address] [facility] [address]  Phone:  [phone]  Fax:          Patient:     Cecilio Farias MRN:  [id number]   [address]  [facility] [address] :  1955  SSN:    Phone: [phone] Sex:  M      INSURANCE PAYOR PLAN GROUP # SUBSCRIBER ID   Primary:    MEDICARE [id number]   [id number]   Admitting Diagnosis: Sepsis, due to unspecified organism (CMS/[facility]) [A41.9]  Order Date:  2019         Case Management  Consult       (Order ID: [id number])     Diagnosis:         Priority:  Routine Expected Date:   Expiration Date:        Interval:   Count:    Comments: OUTPATIENT IV ANTIBIOTICS:  1.  Merrem 1g q 8 hours- [facility] to provide  2.  Weekly PICC line dressing change at outpatient oncology  3.  Follow up with Dr. Kaur  at 2:15p  appt mad  4. Stat CBC CMP ESR CRP at time of appt  Reason for Consult? OUTPATIENT IV ANTIBIOTICS     Specimen Type:   Specimen Source:   Specimen Taken Date:   Specimen Taken Time:                   Authorizing Provider:Marita Garcia APRN  Authorizing Provider's NPI: [id number]  Order Entered By: Marita Garcia APRN 2019  8:29 AM     Electronically signed by: Marita Garcia APRN 2019  8:29 AM            Date of Birth Social Security Number Address Home Phone MRN    1955  [id number]  [facility] [address] [phone] [id number]    Sabianism Marital Status          None        Admission Date Admission Type Admitting Provider Attending Provider Department, Room/Bed    6/10/19 Emergency Annalisa Duarte MD Howard, Gabriela Kirk, MD [address], [address]    Discharge Date Discharge Disposition Discharge Destination         Home or Self Care              Attending Provider:  Annalisa Duarte MD    Allergies:  Sulfa Antibiotics, Amoxicillin     "Isolation:  None   Infection:  None   Code Status:  CPR    Ht:  190.5 cm (75\")   Wt:  145 kg (320 lb 6.4 oz)    Admission Cmt:  None   Principal Problem:  Sepsis (CMS/[facility]) [A41.9]                 Active Insurance as of 6/10/2019     Primary Coverage     Payor Plan Insurance Group Employer/Plan Group    MEDICARE MEDICARE A & B      Payor Plan Address Payor Plan Phone Number Payor Plan Fax Number Effective Dates    [address] [phone]  7/1/2011 - None Entered    [place] [address]       Subscriber Name Subscriber Birth Date Member ID       TAMAR FARIAS 1955 [id number]                 Emergency Contacts      (Rel.) Home Phone Work Phone Mobile Phone    Lilibeth Farias (Spouse) [phone] -- [phone]            Insurance Information                MEDICARE/MEDICARE A & B Phone: [phone]    Subscriber: Tamar Farias Subscriber#: [id number]    Group#:  Precert#:           "

## 2019-06-13 NOTE — DISCHARGE INSTR - OTHER ORDERS
[facility] home infusion will arrange delivery of antibiotics needed after this week with you when you come to [place].    Dr Kaur's office will arrange picc line care after this next office visit with the infusion center.

## 2019-06-13 NOTE — PLAN OF CARE
Problem: Patient Care Overview  Goal: Plan of Care Review   06/13/19 0436   OTHER   Outcome Summary no c/o pain. Abd MRI completed. Appears to be resting well between care.        Problem: Fall Risk (Adult)  Goal: Absence of Fall   06/13/19 0436   Fall Risk (Adult)   Absence of Fall making progress toward outcome       Problem: Skin Injury Risk (Adult)  Goal: Skin Health and Integrity   06/13/19 0436   Skin Injury Risk (Adult)   Skin Health and Integrity making progress toward outcome

## 2019-06-14 ENCOUNTER — READMISSION MANAGEMENT (OUTPATIENT)
Dept: CALL CENTER | Facility: HOSPITAL | Age: 64
End: 2019-06-14

## 2019-06-14 NOTE — OUTREACH NOTE
Prep Survey      Responses   Facility patient discharged from?  [facility]   Is patient eligible?  Yes   Discharge diagnosis  Sepsis   Does the patient have one of the following disease processes/diagnoses(primary or secondary)?  Sepsis   Does the patient have Home health ordered?  No   What is the Home health agency?   Pt shown how to do home infusion, PICC line changes will be done [facility] output oncology   Is there a DME ordered?  Yes   What DME was ordered?  Infusion supplies   Comments regarding appointments  See AVS   Prep survey completed?  Yes          Penny Kerr RN

## 2019-06-17 ENCOUNTER — READMISSION MANAGEMENT (OUTPATIENT)
Dept: CALL CENTER | Facility: HOSPITAL | Age: 64
End: 2019-06-17

## 2019-06-17 LAB
BACTERIA SPEC AEROBE CULT: NORMAL
BACTERIA SPEC AEROBE CULT: NORMAL

## 2019-06-17 NOTE — OUTREACH NOTE
Sepsis Week 1 Survey      Responses   Facility patient discharged from?  [facility]   Does the patient have one of the following disease processes/diagnoses(primary or secondary)?  Sepsis   Is there a successful TCM telephone encounter documented?  No   Week 1 attempt successful?  No   Unsuccessful attempts  Attempt 1          Arturo Roldan RN

## 2019-06-19 ENCOUNTER — TELEPHONE (OUTPATIENT)
Dept: PHARMACY | Facility: HOSPITAL | Age: 64
End: 2019-06-19

## 2019-06-19 ENCOUNTER — READMISSION MANAGEMENT (OUTPATIENT)
Dept: CALL CENTER | Facility: HOSPITAL | Age: 64
End: 2019-06-19

## 2019-06-19 NOTE — OUTREACH NOTE
Sepsis Week 1 Survey      Responses   Facility patient discharged from?  [facility]   Does the patient have one of the following disease processes/diagnoses(primary or secondary)?  Sepsis   Is there a successful TCM telephone encounter documented?  No   Week 1 attempt successful?  Yes   Call start time  1454   Call end time  1505   Discharge diagnosis  Sepsis   Is patient permission given to speak with other caregiver?  No   Meds reviewed with patient/caregiver?  Yes   Is the patient having any side effects they believe may be caused by any medication additions or changes?  No   Does the patient have all medications related to this admission filled (includes all antibiotics, inhalers, nebulizers,steroids,etc.)  Yes   Is the patient taking all medications as directed (includes completed medication regime)?  No   What is preventing the patient from taking all medications as directed?  Medication regime [Patient did not understand that he was to resume his oral diabetic meds at discharge. ]   Nursing Interventions  Nurse provided patient education   Does the patient have a primary care provider?   Yes   Comments regarding PCP   America Everett DO, 6/20/19 1045am   Does the patient have an appointment with their PCP within 7 days of discharge?  Yes   Has the patient kept scheduled appointments due by today?  Yes   Comments  Follow up with Oscar Kaur MD, 6/20 at 2:15 appt   Has home health visited the patient within 72 hours of discharge?  N/A   What DME was ordered?  Infusion supplies   Has all DME been delivered?  Yes   Psychosocial issues?  No   Comments  Patient denies any difficulty with home IV antibiotic infusion. Receiving PICC line care at ID office.    Did the patient receive a copy of their discharge instructions?  Yes   Nursing interventions  Reviewed instructions with patient   What is the patient's perception of their health status since discharge?  Improving   Nursing interventions  Nurse  provided patient education   Is the patient/caregiver able to teach back Sepsis?  S - Shivering,fever or very cold, E - Extreme pain or generalized discomfort (worst ever,especially abdomen), P - Pale or discolored skin, S - Sleepy, difficult to arouse,confused, I -   I feel like I might die-a feeling of hopelessness, S - Short of breath   Nursing interventions  Nurse provided reassurance to patient, Nurse provided patient education   Is patient/caregiver able to teach back steps to recovery at home?  Set small, achievable goals for return to baseline health, Rest and regain strength, Eat a balanced diet   Is the patient/caregiver able to teach back signs and symptoms of worsening condition:  Fever, Hyperthermia, Rapid heart rate (>90), Shortness of breath/rapid respiratory rate, Altered mental status(confusion/coma), Edema, High blood glucose without diabetes   Is the patient/caregiver able to teach back the hierarchy of who to call/visit for symptoms/problems? PCP, Specialist, Home health nurse, Urgent Care, ED, 911  Yes   Week 1 call completed?  Yes          Kierra Arauz RN

## 2019-06-19 NOTE — TELEPHONE ENCOUNTER
Spoke with patient this morning. Patient would prefer we at [facility] clinic continue to manage his warfarin. He does not believe Dr Everett's office has ever or has the ability to manage him warfarin.    He will be coming to [facility] tomorrow, 6/20, for several appointments, but also will be having labs drawn at 3rd floor draw station at [address]. Will request standing order for PT/INR be put in EPIC. Patient does not believe he would be able to come into clinic due to aforementioned appts, but understands we will call and speak with him about results once they are available

## 2019-06-20 ENCOUNTER — LAB (OUTPATIENT)
Dept: LAB | Facility: HOSPITAL | Age: 64
End: 2019-06-20

## 2019-06-20 ENCOUNTER — TRANSCRIBE ORDERS (OUTPATIENT)
Dept: LAB | Facility: HOSPITAL | Age: 64
End: 2019-06-20

## 2019-06-20 ENCOUNTER — HOSPITAL ENCOUNTER (OUTPATIENT)
Dept: ONCOLOGY | Facility: HOSPITAL | Age: 64
Setting detail: INFUSION SERIES
Discharge: HOME | End: 2019-06-20

## 2019-06-20 VITALS
DIASTOLIC BLOOD PRESSURE: 68 MMHG | RESPIRATION RATE: 20 BRPM | WEIGHT: 315 LBS | HEART RATE: 86 BPM | HEIGHT: 75 IN | SYSTOLIC BLOOD PRESSURE: 148 MMHG | TEMPERATURE: 97 F | BODY MASS INDEX: 39.17 KG/M2

## 2019-06-20 DIAGNOSIS — A41.59: ICD-10-CM

## 2019-06-20 DIAGNOSIS — L97.518 NON-PRESSURE CHRONIC ULCER OF OTHER PART OF RIGHT FOOT WITH OTHER SPECIFIED SEVERITY (HCC): Primary | ICD-10-CM

## 2019-06-20 DIAGNOSIS — L97.518 NON-PRESSURE CHRONIC ULCER OF OTHER PART OF RIGHT FOOT WITH OTHER SPECIFIED SEVERITY (HCC): ICD-10-CM

## 2019-06-20 LAB
ALBUMIN SERPL-MCNC: 3.2 G/DL (ref 3.5–5.2)
ALBUMIN/GLOB SERPL: 0.7 G/DL
ALP SERPL-CCNC: 75 U/L (ref 39–117)
ALT SERPL W P-5'-P-CCNC: 17 U/L (ref 1–41)
ANION GAP SERPL CALCULATED.3IONS-SCNC: 9 MMOL/L
AST SERPL-CCNC: 26 U/L (ref 1–40)
BASOPHILS # BLD AUTO: 0.06 10*3/MM3 (ref 0–0.2)
BASOPHILS NFR BLD AUTO: 0.7 % (ref 0–1.5)
BILIRUB SERPL-MCNC: 0.5 MG/DL (ref 0.2–1.2)
BUN BLD-MCNC: 19 MG/DL (ref 8–23)
BUN/CREAT SERPL: 24.1 (ref 7–25)
CALCIUM SPEC-SCNC: 8.7 MG/DL (ref 8.6–10.5)
CHLORIDE SERPL-SCNC: 108 MMOL/L (ref 98–107)
CO2 SERPL-SCNC: 24 MMOL/L (ref 22–29)
CREAT BLD-MCNC: 0.79 MG/DL (ref 0.76–1.27)
CRP SERPL-MCNC: 1.41 MG/DL (ref 0–0.5)
DEPRECATED RDW RBC AUTO: 48.2 FL (ref 37–54)
EOSINOPHIL # BLD AUTO: 0.52 10*3/MM3 (ref 0–0.4)
EOSINOPHIL NFR BLD AUTO: 6.1 % (ref 0.3–6.2)
ERYTHROCYTE [DISTWIDTH] IN BLOOD BY AUTOMATED COUNT: 14.6 % (ref 12.3–15.4)
ERYTHROCYTE [SEDIMENTATION RATE] IN BLOOD: 61 MM/HR (ref 0–20)
GFR SERPL CREATININE-BSD FRML MDRD: 99 ML/MIN/1.73
GLOBULIN UR ELPH-MCNC: 4.7 GM/DL
GLUCOSE BLD-MCNC: 154 MG/DL (ref 65–99)
HCT VFR BLD AUTO: 39.2 % (ref 37.5–51)
HGB BLD-MCNC: 12 G/DL (ref 13–17.7)
IMM GRANULOCYTES # BLD AUTO: 0.05 10*3/MM3 (ref 0–0.05)
IMM GRANULOCYTES NFR BLD AUTO: 0.6 % (ref 0–0.5)
LYMPHOCYTES # BLD AUTO: 1.47 10*3/MM3 (ref 0.7–3.1)
LYMPHOCYTES NFR BLD AUTO: 17.3 % (ref 19.6–45.3)
MCH RBC QN AUTO: 27.7 PG (ref 26.6–33)
MCHC RBC AUTO-ENTMCNC: 30.6 G/DL (ref 31.5–35.7)
MCV RBC AUTO: 90.5 FL (ref 79–97)
MONOCYTES # BLD AUTO: 0.67 10*3/MM3 (ref 0.1–0.9)
MONOCYTES NFR BLD AUTO: 7.9 % (ref 5–12)
NEUTROPHILS # BLD AUTO: 5.72 10*3/MM3 (ref 1.7–7)
NEUTROPHILS NFR BLD AUTO: 67.4 % (ref 42.7–76)
NRBC BLD AUTO-RTO: 0 /100 WBC (ref 0–0.2)
PLATELET # BLD AUTO: 254 10*3/MM3 (ref 140–450)
PMV BLD AUTO: 10.2 FL (ref 6–12)
POTASSIUM BLD-SCNC: 4.8 MMOL/L (ref 3.5–5.2)
PROT SERPL-MCNC: 7.9 G/DL (ref 6–8.5)
RBC # BLD AUTO: 4.33 10*6/MM3 (ref 4.14–5.8)
SODIUM BLD-SCNC: 141 MMOL/L (ref 136–145)
WBC NRBC COR # BLD: 8.49 10*3/MM3 (ref 3.4–10.8)

## 2019-06-20 PROCEDURE — 85652 RBC SED RATE AUTOMATED: CPT

## 2019-06-20 PROCEDURE — 85025 COMPLETE CBC W/AUTO DIFF WBC: CPT

## 2019-06-20 PROCEDURE — G0463 HOSPITAL OUTPT CLINIC VISIT: HCPCS

## 2019-06-20 PROCEDURE — 80053 COMPREHEN METABOLIC PANEL: CPT | Performed by: INTERNAL MEDICINE

## 2019-06-20 PROCEDURE — 36415 COLL VENOUS BLD VENIPUNCTURE: CPT | Performed by: INTERNAL MEDICINE

## 2019-06-20 PROCEDURE — 86140 C-REACTIVE PROTEIN: CPT | Performed by: INTERNAL MEDICINE

## 2019-06-21 ENCOUNTER — ANTICOAGULATION VISIT (OUTPATIENT)
Dept: PHARMACY | Facility: HOSPITAL | Age: 64
End: 2019-06-21

## 2019-06-21 DIAGNOSIS — I35.9 AORTIC VALVE DISORDER: ICD-10-CM

## 2019-06-21 NOTE — PROGRESS NOTES
"Anticoagulation Clinic - Remote Progress Note  REMOTE LAB     Indication: mechanical AVR  Referring Provider: Luis  Initial Warfarin Start Date: 2009  Goal INR: 2.5-3.5  Current Drug Interactions: aspirin     Diet: green beans occasionally  Alcohol: none  Tobacco: none  OTC Pain Medication:     INR History:  Date 8/2 10/26 12/21 1/19/18 2/20 3/27 4/27 5/16 6/11 7/16 9/20 11/2   Total Weekly Dose 62.5mg 62.5mg 62.5mg 62.5mg 62.5mg 62.5mg 62.5mg 62.5mg 62.5mg 62.5mg 62.5mg 62.5mg   INR 2.2 2.9 3.2 3.0 2.9 3.4 3.7 2.9 2.6 2.9 2.9 3.2   Notes              sick                Date 12/21 2/1/19 3/11 4/24 5/23 6/24       Total Weekly Dose 62.5mg 62.5mg 62.5mg 62.5 mg 62.5 mg 62.5 mg       INR 3.4 3.34 2.8 3.0 3.3 3.6       Notes rec'vd 12/26       self report; recent hosp; Augmentin Merrem post-disch for sepsis          Phone Interview:  Tablet Strength: 5mg tabs  Patient Contact Info: [phone] (Home), [phone] (Mobile)  Lab Contact Info: [facility] [phone]     Patient Findings:  Positives:  Change in medications, Hospital admission   Negatives:  Signs/symptoms of thrombosis, Signs/symptoms of bleeding, Laboratory test error suspected, Change in health, Change in alcohol use, Change in activity, Upcoming invasive procedure, Emergency department visit, Upcoming dental procedure, Missed doses, Extra doses, Change in diet/appetite, Bruising, Other complaints   Comments:  Mr. Farias continues on IV meropenem TID via PICC due to recent inpt admission for sepsis 2/2 Acinteobacter bacteremia. Per Discharge Summary, \"He will need to be treated for at least 6 weeks with IV Meropenem and then continue with suppressive Minocycline indefinitely as endocarditis could not be ruled out by a negative ESTHER.\"     Lab Results   Component Value Date    WBC 8.49 06/20/2019    HGB 12.0 (L) 06/20/2019    HCT 39.2 06/20/2019    MCV 90.5 06/20/2019     06/20/2019     Plan:  1. INR is supratherapeutic with ongoing " abx, so instructed Mr. Farias to take warfarin 7.5mg daily this week.  2. Repeat INR with dressing change on Thursday. Separate order entered.  3. Verbal information provided over the phone. Cecilio Farias RBV dosing instructions, expresses understanding by teach back, and has no further questions at this time.    Ann Cowden Mayer, PharmD  6/24/2019  4:53 PM

## 2019-06-24 LAB — INR PPP: 3.6

## 2019-06-24 RX ORDER — WARFARIN SODIUM 5 MG/1
TABLET ORAL
COMMUNITY
Start: 2019-06-12 | End: 2019-12-15 | Stop reason: SDUPTHER

## 2019-06-24 RX ORDER — EPINEPHRINE 0.3 MG/.3ML
INJECTION SUBCUTANEOUS AS NEEDED
Status: ON HOLD | COMMUNITY
Start: 2019-06-13 | End: 2021-01-17

## 2019-06-24 RX ORDER — PRAVASTATIN SODIUM 80 MG/1
80 TABLET ORAL NIGHTLY
COMMUNITY
Start: 2019-04-08 | End: 2021-08-04 | Stop reason: ALTCHOICE

## 2019-06-27 ENCOUNTER — TRANSCRIBE ORDERS (OUTPATIENT)
Dept: LAB | Facility: HOSPITAL | Age: 64
End: 2019-06-27

## 2019-06-27 ENCOUNTER — LAB (OUTPATIENT)
Dept: LAB | Facility: HOSPITAL | Age: 64
End: 2019-06-27

## 2019-06-27 ENCOUNTER — HOSPITAL ENCOUNTER (OUTPATIENT)
Dept: ONCOLOGY | Facility: HOSPITAL | Age: 64
Setting detail: INFUSION SERIES
Discharge: HOME | End: 2019-06-27

## 2019-06-27 ENCOUNTER — READMISSION MANAGEMENT (OUTPATIENT)
Dept: CALL CENTER | Facility: HOSPITAL | Age: 64
End: 2019-06-27

## 2019-06-27 VITALS
SYSTOLIC BLOOD PRESSURE: 123 MMHG | TEMPERATURE: 97.1 F | HEIGHT: 75 IN | BODY MASS INDEX: 39.17 KG/M2 | HEART RATE: 86 BPM | DIASTOLIC BLOOD PRESSURE: 60 MMHG | WEIGHT: 315 LBS | RESPIRATION RATE: 18 BRPM

## 2019-06-27 DIAGNOSIS — L03.031 ABSCESS OF FIFTH TOENAIL OF RIGHT FOOT: ICD-10-CM

## 2019-06-27 DIAGNOSIS — L97.518 NON-PRESSURE CHRONIC ULCER OF OTHER PART OF RIGHT FOOT WITH OTHER SPECIFIED SEVERITY (HCC): ICD-10-CM

## 2019-06-27 DIAGNOSIS — L27.0 DERMATITIS MEDICAMENTOSA DUE TO INGESTED DRUGS: ICD-10-CM

## 2019-06-27 DIAGNOSIS — A28.0 HEMORRHAGIC SEPTICEMIA DUE TO PASTEURELLA MULTOCIDA (HCC): ICD-10-CM

## 2019-06-27 DIAGNOSIS — E11.69 DIABETIC FOOT ULCER WITH OSTEOMYELITIS (HCC): ICD-10-CM

## 2019-06-27 DIAGNOSIS — A41.59: Primary | ICD-10-CM

## 2019-06-27 DIAGNOSIS — L97.509 DIABETIC FOOT ULCER WITH OSTEOMYELITIS (HCC): ICD-10-CM

## 2019-06-27 DIAGNOSIS — E11.621 DIABETIC FOOT ULCER WITH OSTEOMYELITIS (HCC): ICD-10-CM

## 2019-06-27 DIAGNOSIS — A41.59 HEMORRHAGIC SEPTICEMIA DUE TO PASTEURELLA MULTOCIDA (HCC): ICD-10-CM

## 2019-06-27 DIAGNOSIS — M86.9 DIABETIC FOOT ULCER WITH OSTEOMYELITIS (HCC): ICD-10-CM

## 2019-06-27 DIAGNOSIS — L27.0 DERMATITIS MEDICAMENTOSA DUE TO INGESTED DRUGS: Primary | ICD-10-CM

## 2019-06-27 LAB
ALBUMIN SERPL-MCNC: 3.4 G/DL (ref 3.5–5.2)
ALBUMIN/GLOB SERPL: 0.9 G/DL
ALP SERPL-CCNC: 72 U/L (ref 39–117)
ALT SERPL W P-5'-P-CCNC: 28 U/L (ref 1–41)
ANION GAP SERPL CALCULATED.3IONS-SCNC: 8 MMOL/L (ref 5–15)
AST SERPL-CCNC: 33 U/L (ref 1–40)
BASOPHILS # BLD AUTO: 0.05 10*3/MM3 (ref 0–0.2)
BASOPHILS NFR BLD AUTO: 0.9 % (ref 0–1.5)
BILIRUB SERPL-MCNC: 0.4 MG/DL (ref 0.2–1.2)
BUN BLD-MCNC: 17 MG/DL (ref 8–23)
BUN/CREAT SERPL: 21 (ref 7–25)
CALCIUM SPEC-SCNC: 8.6 MG/DL (ref 8.6–10.5)
CHLORIDE SERPL-SCNC: 103 MMOL/L (ref 98–107)
CO2 SERPL-SCNC: 28 MMOL/L (ref 22–29)
CREAT BLD-MCNC: 0.81 MG/DL (ref 0.76–1.27)
CRP SERPL-MCNC: 0.49 MG/DL (ref 0–0.5)
DEPRECATED RDW RBC AUTO: 47.8 FL (ref 37–54)
EOSINOPHIL # BLD AUTO: 0.36 10*3/MM3 (ref 0–0.4)
EOSINOPHIL NFR BLD AUTO: 6.2 % (ref 0.3–6.2)
ERYTHROCYTE [DISTWIDTH] IN BLOOD BY AUTOMATED COUNT: 14.3 % (ref 12.3–15.4)
ERYTHROCYTE [SEDIMENTATION RATE] IN BLOOD: 54 MM/HR (ref 0–20)
GFR SERPL CREATININE-BSD FRML MDRD: 97 ML/MIN/1.73
GLOBULIN UR ELPH-MCNC: 4 GM/DL
GLUCOSE BLD-MCNC: 258 MG/DL (ref 65–99)
HCT VFR BLD AUTO: 38.4 % (ref 37.5–51)
HGB BLD-MCNC: 11.7 G/DL (ref 13–17.7)
IMM GRANULOCYTES # BLD AUTO: 0.02 10*3/MM3 (ref 0–0.05)
IMM GRANULOCYTES NFR BLD AUTO: 0.3 % (ref 0–0.5)
LYMPHOCYTES # BLD AUTO: 1.45 10*3/MM3 (ref 0.7–3.1)
LYMPHOCYTES NFR BLD AUTO: 25.1 % (ref 19.6–45.3)
MCH RBC QN AUTO: 27.6 PG (ref 26.6–33)
MCHC RBC AUTO-ENTMCNC: 30.5 G/DL (ref 31.5–35.7)
MCV RBC AUTO: 90.6 FL (ref 79–97)
MONOCYTES # BLD AUTO: 0.59 10*3/MM3 (ref 0.1–0.9)
MONOCYTES NFR BLD AUTO: 10.2 % (ref 5–12)
NEUTROPHILS # BLD AUTO: 3.3 10*3/MM3 (ref 1.7–7)
NEUTROPHILS NFR BLD AUTO: 57.3 % (ref 42.7–76)
NRBC BLD AUTO-RTO: 0 /100 WBC (ref 0–0.2)
PLATELET # BLD AUTO: 225 10*3/MM3 (ref 140–450)
PMV BLD AUTO: 10.3 FL (ref 6–12)
POTASSIUM BLD-SCNC: 4.5 MMOL/L (ref 3.5–5.2)
PROT SERPL-MCNC: 7.4 G/DL (ref 6–8.5)
RBC # BLD AUTO: 4.24 10*6/MM3 (ref 4.14–5.8)
SODIUM BLD-SCNC: 139 MMOL/L (ref 136–145)
WBC NRBC COR # BLD: 5.77 10*3/MM3 (ref 3.4–10.8)

## 2019-06-27 PROCEDURE — G0463 HOSPITAL OUTPT CLINIC VISIT: HCPCS

## 2019-06-27 PROCEDURE — 96523 IRRIG DRUG DELIVERY DEVICE: CPT

## 2019-06-27 PROCEDURE — 80053 COMPREHEN METABOLIC PANEL: CPT

## 2019-06-27 PROCEDURE — 86140 C-REACTIVE PROTEIN: CPT

## 2019-06-27 PROCEDURE — 36415 COLL VENOUS BLD VENIPUNCTURE: CPT

## 2019-06-27 PROCEDURE — 85025 COMPLETE CBC W/AUTO DIFF WBC: CPT

## 2019-06-27 PROCEDURE — 85652 RBC SED RATE AUTOMATED: CPT

## 2019-06-27 RX ORDER — SODIUM CHLORIDE 0.9 % (FLUSH) 0.9 %
10 SYRINGE (ML) INJECTION AS NEEDED
Status: CANCELLED | OUTPATIENT
Start: 2019-06-27

## 2019-06-27 RX ORDER — SODIUM CHLORIDE 0.9 % (FLUSH) 0.9 %
10 SYRINGE (ML) INJECTION AS NEEDED
Status: DISCONTINUED | OUTPATIENT
Start: 2019-06-27 | End: 2019-06-28 | Stop reason: HOSPADM

## 2019-06-27 RX ADMIN — SODIUM CHLORIDE, PRESERVATIVE FREE 10 ML: 5 INJECTION INTRAVENOUS at 14:29

## 2019-06-27 NOTE — OUTREACH NOTE
Sepsis Week 2 Survey      Responses   Facility patient discharged from?  [facility]   Does the patient have one of the following disease processes/diagnoses(primary or secondary)?  Sepsis   Week 2 attempt successful?  Yes   Call start time  1131   Rescheduled  Rescheduled-pt requested [Patient states on way to dr guzmán at time of call. ]   Call end time  1132   Discharge diagnosis  Sepsis          Kierra Arauz RN

## 2019-06-28 ENCOUNTER — READMISSION MANAGEMENT (OUTPATIENT)
Dept: CALL CENTER | Facility: HOSPITAL | Age: 64
End: 2019-06-28

## 2019-06-28 NOTE — OUTREACH NOTE
Sepsis Week 2 Survey      Responses   Facility patient discharged from?  [facility]   Does the patient have one of the following disease processes/diagnoses(primary or secondary)?  Sepsis   Week 2 attempt successful?  Yes   Call start time  1448   Call end time  1456   Discharge diagnosis  Sepsis   Is patient permission given to speak with other caregiver?  No   Meds reviewed with patient/caregiver?  Yes   Is the patient having any side effects they believe may be caused by any medication additions or changes?  No   Does the patient have all medications related to this admission filled (includes all antibiotics, inhalers, nebulizers,steroids,etc.)  Yes   Is the patient taking all medications as directed (includes completed medication regime)?  Yes   Does the patient have a primary care provider?   Yes   Comments regarding PCP   America Everett DO. Patient has seen since discharge.    Does the patient have an appointment with their PCP within 7 days of discharge?  Yes   Has the patient kept scheduled appointments due by today?  Yes   Has home health visited the patient within 72 hours of discharge?  N/A   Psychosocial issues?  No   Comments  Patient denies any difficulty with home IV antibiotic infusion. Receiving PICC line care at ID office.    What is the patient's perception of their health status since discharge?  Improving   Nursing interventions  Nurse provided patient education   Is the patient/caregiver able to teach back Sepsis?  S - Shivering,fever or very cold, E - Extreme pain or generalized discomfort (worst ever,especially abdomen), P - Pale or discolored skin, S - Sleepy, difficult to arouse,confused, I -   I feel like I might die-a feeling of hopelessness, S - Short of breath   Nursing interventions  Nurse provided reassurance to patient, Nurse provided patient education   Is patient/caregiver able to teach back steps to recovery at home?  Set small, achievable goals for return to baseline health,  Rest and regain strength, Eat a balanced diet   Is the patient/caregiver able to teach back signs and symptoms of worsening condition:  Fever, Hyperthermia, Rapid heart rate (>90), Shortness of breath/rapid respiratory rate, Altered mental status(confusion/coma), Edema, High blood glucose without diabetes   Is the patient/caregiver able to teach back the hierarchy of who to call/visit for symptoms/problems? PCP, Specialist, Home health nurse, Urgent Care, ED, 911  Yes   Week 2 call completed?  Yes          Kierra Arauz RN

## 2019-07-02 ENCOUNTER — TELEPHONE (OUTPATIENT)
Dept: PHARMACY | Facility: HOSPITAL | Age: 64
End: 2019-07-02

## 2019-07-02 DIAGNOSIS — Z95.2 S/P AVR: Primary | ICD-10-CM

## 2019-07-03 ENCOUNTER — TRANSCRIBE ORDERS (OUTPATIENT)
Dept: LAB | Facility: HOSPITAL | Age: 64
End: 2019-07-03

## 2019-07-03 ENCOUNTER — LAB (OUTPATIENT)
Dept: LAB | Facility: HOSPITAL | Age: 64
End: 2019-07-03
Attending: INTERNAL MEDICINE

## 2019-07-03 ENCOUNTER — HOSPITAL ENCOUNTER (OUTPATIENT)
Dept: ONCOLOGY | Facility: HOSPITAL | Age: 64
Setting detail: INFUSION SERIES
Discharge: HOME | End: 2019-07-03

## 2019-07-03 VITALS
HEART RATE: 87 BPM | BODY MASS INDEX: 39.17 KG/M2 | RESPIRATION RATE: 18 BRPM | HEIGHT: 75 IN | TEMPERATURE: 97.5 F | WEIGHT: 315 LBS | DIASTOLIC BLOOD PRESSURE: 58 MMHG | SYSTOLIC BLOOD PRESSURE: 116 MMHG

## 2019-07-03 DIAGNOSIS — M86.9 DIABETIC FOOT ULCER WITH OSTEOMYELITIS (HCC): ICD-10-CM

## 2019-07-03 DIAGNOSIS — N20.0 URIC ACID NEPHROLITHIASIS: ICD-10-CM

## 2019-07-03 DIAGNOSIS — E11.621 DIABETIC FOOT ULCER WITH OSTEOMYELITIS (HCC): ICD-10-CM

## 2019-07-03 DIAGNOSIS — E11.69 DIABETIC FOOT ULCER WITH OSTEOMYELITIS (HCC): ICD-10-CM

## 2019-07-03 DIAGNOSIS — T36.0X5A PENICILLIN CLASS DRUG CAUSING ADVERSE EFFECTS IN THERAPEUTIC USE, INITIAL ENCOUNTER: Primary | ICD-10-CM

## 2019-07-03 DIAGNOSIS — Z88.0 PERSONAL HISTORY OF ALLERGY TO PENICILLIN: ICD-10-CM

## 2019-07-03 DIAGNOSIS — A41.59: ICD-10-CM

## 2019-07-03 DIAGNOSIS — T36.0X5A PENICILLIN CLASS DRUG CAUSING ADVERSE EFFECTS IN THERAPEUTIC USE, INITIAL ENCOUNTER: ICD-10-CM

## 2019-07-03 DIAGNOSIS — L03.031 ABSCESS OF FIFTH TOENAIL OF RIGHT FOOT: ICD-10-CM

## 2019-07-03 DIAGNOSIS — L97.509 DIABETIC FOOT ULCER WITH OSTEOMYELITIS (HCC): ICD-10-CM

## 2019-07-03 LAB
ALBUMIN SERPL-MCNC: 3.5 G/DL (ref 3.5–5.2)
ALBUMIN/GLOB SERPL: 0.9 G/DL
ALP SERPL-CCNC: 66 U/L (ref 39–117)
ALT SERPL W P-5'-P-CCNC: 20 U/L (ref 1–41)
ANION GAP SERPL CALCULATED.3IONS-SCNC: 9 MMOL/L (ref 5–15)
AST SERPL-CCNC: 25 U/L (ref 1–40)
BASOPHILS # BLD AUTO: 0.06 10*3/MM3 (ref 0–0.2)
BASOPHILS NFR BLD AUTO: 1 % (ref 0–1.5)
BILIRUB SERPL-MCNC: 0.6 MG/DL (ref 0.2–1.2)
BUN BLD-MCNC: 23 MG/DL (ref 8–23)
BUN/CREAT SERPL: 30.7 (ref 7–25)
CALCIUM SPEC-SCNC: 8.8 MG/DL (ref 8.6–10.5)
CHLORIDE SERPL-SCNC: 103 MMOL/L (ref 98–107)
CO2 SERPL-SCNC: 27 MMOL/L (ref 22–29)
CREAT BLD-MCNC: 0.75 MG/DL (ref 0.76–1.27)
CRP SERPL-MCNC: 0.55 MG/DL (ref 0–0.5)
DEPRECATED RDW RBC AUTO: 47.9 FL (ref 37–54)
EOSINOPHIL # BLD AUTO: 0.49 10*3/MM3 (ref 0–0.4)
EOSINOPHIL NFR BLD AUTO: 8.3 % (ref 0.3–6.2)
ERYTHROCYTE [DISTWIDTH] IN BLOOD BY AUTOMATED COUNT: 14.6 % (ref 12.3–15.4)
ERYTHROCYTE [SEDIMENTATION RATE] IN BLOOD: 45 MM/HR (ref 0–20)
GFR SERPL CREATININE-BSD FRML MDRD: 106 ML/MIN/1.73
GLOBULIN UR ELPH-MCNC: 3.8 GM/DL
GLUCOSE BLD-MCNC: 225 MG/DL (ref 65–99)
HCT VFR BLD AUTO: 38.2 % (ref 37.5–51)
HGB BLD-MCNC: 11.8 G/DL (ref 13–17.7)
IMM GRANULOCYTES # BLD AUTO: 0.04 10*3/MM3 (ref 0–0.05)
IMM GRANULOCYTES NFR BLD AUTO: 0.7 % (ref 0–0.5)
LYMPHOCYTES # BLD AUTO: 1.32 10*3/MM3 (ref 0.7–3.1)
LYMPHOCYTES NFR BLD AUTO: 22.4 % (ref 19.6–45.3)
MCH RBC QN AUTO: 27.9 PG (ref 26.6–33)
MCHC RBC AUTO-ENTMCNC: 30.9 G/DL (ref 31.5–35.7)
MCV RBC AUTO: 90.3 FL (ref 79–97)
MONOCYTES # BLD AUTO: 0.63 10*3/MM3 (ref 0.1–0.9)
MONOCYTES NFR BLD AUTO: 10.7 % (ref 5–12)
NEUTROPHILS # BLD AUTO: 3.34 10*3/MM3 (ref 1.7–7)
NEUTROPHILS NFR BLD AUTO: 56.9 % (ref 42.7–76)
NRBC BLD AUTO-RTO: 0 /100 WBC (ref 0–0.2)
PLATELET # BLD AUTO: 208 10*3/MM3 (ref 140–450)
PMV BLD AUTO: 10.3 FL (ref 6–12)
POTASSIUM BLD-SCNC: 4.8 MMOL/L (ref 3.5–5.2)
PROT SERPL-MCNC: 7.3 G/DL (ref 6–8.5)
RBC # BLD AUTO: 4.23 10*6/MM3 (ref 4.14–5.8)
SODIUM BLD-SCNC: 139 MMOL/L (ref 136–145)
WBC NRBC COR # BLD: 5.88 10*3/MM3 (ref 3.4–10.8)

## 2019-07-03 PROCEDURE — 85025 COMPLETE CBC W/AUTO DIFF WBC: CPT

## 2019-07-03 PROCEDURE — 36415 COLL VENOUS BLD VENIPUNCTURE: CPT

## 2019-07-03 PROCEDURE — 80053 COMPREHEN METABOLIC PANEL: CPT

## 2019-07-03 PROCEDURE — G0463 HOSPITAL OUTPT CLINIC VISIT: HCPCS

## 2019-07-03 PROCEDURE — 85652 RBC SED RATE AUTOMATED: CPT

## 2019-07-03 PROCEDURE — 86140 C-REACTIVE PROTEIN: CPT

## 2019-07-09 NOTE — TELEPHONE ENCOUNTER
Spoke with patient re:overdue PT/INR lab draw. Patient would like to have labs drawn here at [facility] at 3rd floor draw station while he has other labs done on Thurs, 7/11.    Requesting standing order be put in EPIC for patient to have INR drawn

## 2019-07-11 ENCOUNTER — TRANSCRIBE ORDERS (OUTPATIENT)
Dept: LAB | Facility: HOSPITAL | Age: 64
End: 2019-07-11

## 2019-07-11 ENCOUNTER — ANTICOAGULATION VISIT (OUTPATIENT)
Dept: PHARMACY | Facility: HOSPITAL | Age: 64
End: 2019-07-11

## 2019-07-11 ENCOUNTER — LAB (OUTPATIENT)
Dept: LAB | Facility: HOSPITAL | Age: 64
End: 2019-07-11

## 2019-07-11 ENCOUNTER — HOSPITAL ENCOUNTER (OUTPATIENT)
Dept: ONCOLOGY | Facility: HOSPITAL | Age: 64
Setting detail: INFUSION SERIES
Discharge: HOME | End: 2019-07-11

## 2019-07-11 VITALS
HEART RATE: 85 BPM | WEIGHT: 315 LBS | SYSTOLIC BLOOD PRESSURE: 102 MMHG | BODY MASS INDEX: 39.17 KG/M2 | TEMPERATURE: 97.3 F | DIASTOLIC BLOOD PRESSURE: 60 MMHG | RESPIRATION RATE: 18 BRPM | HEIGHT: 75 IN

## 2019-07-11 DIAGNOSIS — T36.0X5A PENICILLIN CLASS DRUG CAUSING ADVERSE EFFECTS IN THERAPEUTIC USE, INITIAL ENCOUNTER: Primary | ICD-10-CM

## 2019-07-11 DIAGNOSIS — L27.0 DERMATITIS MEDICAMENTOSA DUE TO INGESTED DRUGS: ICD-10-CM

## 2019-07-11 DIAGNOSIS — A41.59: ICD-10-CM

## 2019-07-11 DIAGNOSIS — L03.031 ABSCESS OF FIFTH TOENAIL OF RIGHT FOOT: ICD-10-CM

## 2019-07-11 DIAGNOSIS — Z88.0 PERSONAL HISTORY OF ALLERGY TO PENICILLIN: ICD-10-CM

## 2019-07-11 DIAGNOSIS — I35.9 AORTIC VALVE DISORDER: ICD-10-CM

## 2019-07-11 DIAGNOSIS — Z95.2 S/P AVR: ICD-10-CM

## 2019-07-11 DIAGNOSIS — T36.0X5A PENICILLIN CLASS DRUG CAUSING ADVERSE EFFECTS IN THERAPEUTIC USE, INITIAL ENCOUNTER: ICD-10-CM

## 2019-07-11 LAB
ALBUMIN SERPL-MCNC: 3.5 G/DL (ref 3.5–5.2)
ALBUMIN/GLOB SERPL: 0.9 G/DL
ALP SERPL-CCNC: 70 U/L (ref 39–117)
ALT SERPL W P-5'-P-CCNC: 26 U/L (ref 1–41)
ANION GAP SERPL CALCULATED.3IONS-SCNC: 11 MMOL/L (ref 5–15)
AST SERPL-CCNC: 38 U/L (ref 1–40)
BASOPHILS # BLD AUTO: 0.07 10*3/MM3 (ref 0–0.2)
BASOPHILS NFR BLD AUTO: 1.2 % (ref 0–1.5)
BILIRUB SERPL-MCNC: 0.5 MG/DL (ref 0.2–1.2)
BUN BLD-MCNC: 23 MG/DL (ref 8–23)
BUN/CREAT SERPL: 28 (ref 7–25)
CALCIUM SPEC-SCNC: 9.3 MG/DL (ref 8.6–10.5)
CHLORIDE SERPL-SCNC: 101 MMOL/L (ref 98–107)
CO2 SERPL-SCNC: 26 MMOL/L (ref 22–29)
CREAT BLD-MCNC: 0.82 MG/DL (ref 0.76–1.27)
CRP SERPL-MCNC: 0.56 MG/DL (ref 0–0.5)
DEPRECATED RDW RBC AUTO: 47.8 FL (ref 37–54)
EOSINOPHIL # BLD AUTO: 0.73 10*3/MM3 (ref 0–0.4)
EOSINOPHIL NFR BLD AUTO: 12.2 % (ref 0.3–6.2)
ERYTHROCYTE [DISTWIDTH] IN BLOOD BY AUTOMATED COUNT: 14.6 % (ref 12.3–15.4)
ERYTHROCYTE [SEDIMENTATION RATE] IN BLOOD: 36 MM/HR (ref 0–20)
GFR SERPL CREATININE-BSD FRML MDRD: 95 ML/MIN/1.73
GLOBULIN UR ELPH-MCNC: 4 GM/DL
GLUCOSE BLD-MCNC: 268 MG/DL (ref 65–99)
HCT VFR BLD AUTO: 38.5 % (ref 37.5–51)
HGB BLD-MCNC: 11.8 G/DL (ref 13–17.7)
IMM GRANULOCYTES # BLD AUTO: 0.02 10*3/MM3 (ref 0–0.05)
IMM GRANULOCYTES NFR BLD AUTO: 0.3 % (ref 0–0.5)
INR PPP: 2.52 (ref 0.85–1.16)
LYMPHOCYTES # BLD AUTO: 1.23 10*3/MM3 (ref 0.7–3.1)
LYMPHOCYTES NFR BLD AUTO: 20.5 % (ref 19.6–45.3)
MCH RBC QN AUTO: 27.7 PG (ref 26.6–33)
MCHC RBC AUTO-ENTMCNC: 30.6 G/DL (ref 31.5–35.7)
MCV RBC AUTO: 90.4 FL (ref 79–97)
MONOCYTES # BLD AUTO: 0.58 10*3/MM3 (ref 0.1–0.9)
MONOCYTES NFR BLD AUTO: 9.7 % (ref 5–12)
NEUTROPHILS # BLD AUTO: 3.36 10*3/MM3 (ref 1.7–7)
NEUTROPHILS NFR BLD AUTO: 56.1 % (ref 42.7–76)
NRBC BLD AUTO-RTO: 0 /100 WBC (ref 0–0.2)
PLATELET # BLD AUTO: 187 10*3/MM3 (ref 140–450)
PMV BLD AUTO: 10.4 FL (ref 6–12)
POTASSIUM BLD-SCNC: 4.8 MMOL/L (ref 3.5–5.2)
PROT SERPL-MCNC: 7.5 G/DL (ref 6–8.5)
PROTHROMBIN TIME: 26.1 SECONDS (ref 11.2–14.3)
RBC # BLD AUTO: 4.26 10*6/MM3 (ref 4.14–5.8)
SODIUM BLD-SCNC: 138 MMOL/L (ref 136–145)
WBC NRBC COR # BLD: 5.99 10*3/MM3 (ref 3.4–10.8)

## 2019-07-11 PROCEDURE — 36415 COLL VENOUS BLD VENIPUNCTURE: CPT

## 2019-07-11 PROCEDURE — 86140 C-REACTIVE PROTEIN: CPT

## 2019-07-11 PROCEDURE — 85652 RBC SED RATE AUTOMATED: CPT

## 2019-07-11 PROCEDURE — 85610 PROTHROMBIN TIME: CPT

## 2019-07-11 PROCEDURE — G0463 HOSPITAL OUTPT CLINIC VISIT: HCPCS

## 2019-07-11 PROCEDURE — 85025 COMPLETE CBC W/AUTO DIFF WBC: CPT

## 2019-07-11 PROCEDURE — 80053 COMPREHEN METABOLIC PANEL: CPT

## 2019-07-11 NOTE — PROGRESS NOTES
"Anticoagulation Clinic - Remote Progress Note  REMOTE LAB     Indication: mechanical AVR  Referring Provider: Luis  Initial Warfarin Start Date: 2009  Goal INR: 2.5-3.5  Current Drug Interactions: aspirin     Diet: green beans occasionally  Alcohol: none  Tobacco: none  OTC Pain Medication:     INR History:  Date 8/2 10/26 12/21 1/19/18 2/20 3/27 4/27 5/16 6/11 7/16 9/20 11/2   Total Weekly Dose 62.5mg 62.5mg 62.5mg 62.5mg 62.5mg 62.5mg 62.5mg 62.5mg 62.5mg 62.5mg 62.5mg 62.5mg   INR 2.2 2.9 3.2 3.0 2.9 3.4 3.7 2.9 2.6 2.9 2.9 3.2   Notes              sick                Date 12/21 2/1/19 3/11 4/24 5/23 6/24 7/11      Total Weekly Dose 62.5mg 62.5mg 62.5mg 62.5 mg 62.5 mg 62.5 mg 52.5 mg      INR 3.4 3.34 2.8 3.0 3.3 3.6 2.52      Notes rec'vd 12/26       self report; recent hosp; Augmentin Merrem post-disch for sepsis Merrem         Phone Interview:  Tablet Strength: 5mg tabs  Patient Contact Info: [phone] (Home), [phone] (Mobile)  Lab Contact Info: [facility] [phone]     Patient Findings:   Negatives:  Signs/symptoms of thrombosis, Signs/symptoms of bleeding, Laboratory test error suspected, Change in health, Change in alcohol use, Change in activity, Upcoming invasive procedure, Emergency department visit, Upcoming dental procedure, Missed doses, Extra doses, Change in medications, Change in diet/appetite, Hospital admission, Bruising, Other complaints   Comments:  Mr. Farias continues on IV meropenem for about two more weeks. Per his recent Discharge Summary, \"He will need to be treated for at least 6 weeks with IV Meropenem and then continue with suppressive Minocycline indefinitely as endocarditis could not be ruled out by a negative ESTHER.\"      Plan:  1. INR is therapeutic today at the [facility]. Instructed Mr. Farias to take 10mg tonight and otherwise continue warfarin 7.5mg daily.  2. Repeat INR with dressing change next Thursday. Separate order entered.  3. Verbal information provided " over the phone. Cecilio Farias RBV dosing instructions, expresses understanding by teach back, and has no further questions at this time.    Ann Cowden Mayer, PharmD  7/11/2019  1:16 PM

## 2019-07-17 ENCOUNTER — APPOINTMENT (OUTPATIENT)
Dept: ONCOLOGY | Facility: HOSPITAL | Age: 64
End: 2019-07-17

## 2019-07-18 ENCOUNTER — HOSPITAL ENCOUNTER (OUTPATIENT)
Dept: ONCOLOGY | Facility: HOSPITAL | Age: 64
Setting detail: INFUSION SERIES
Discharge: HOME | End: 2019-07-18

## 2019-07-18 ENCOUNTER — ANTICOAGULATION VISIT (OUTPATIENT)
Dept: PHARMACY | Facility: HOSPITAL | Age: 64
End: 2019-07-18

## 2019-07-18 ENCOUNTER — TRANSCRIBE ORDERS (OUTPATIENT)
Dept: LAB | Facility: HOSPITAL | Age: 64
End: 2019-07-18

## 2019-07-18 ENCOUNTER — LAB (OUTPATIENT)
Dept: LAB | Facility: HOSPITAL | Age: 64
End: 2019-07-18

## 2019-07-18 DIAGNOSIS — E11.621 DIABETIC FOOT ULCER WITH OSTEOMYELITIS (HCC): ICD-10-CM

## 2019-07-18 DIAGNOSIS — E11.69 DIABETIC FOOT ULCER WITH OSTEOMYELITIS (HCC): ICD-10-CM

## 2019-07-18 DIAGNOSIS — L97.509 DIABETIC FOOT ULCER WITH OSTEOMYELITIS (HCC): ICD-10-CM

## 2019-07-18 DIAGNOSIS — I35.9 AORTIC VALVE DISORDER: ICD-10-CM

## 2019-07-18 DIAGNOSIS — L03.031 ABSCESS OF FIFTH TOENAIL OF RIGHT FOOT: ICD-10-CM

## 2019-07-18 DIAGNOSIS — L27.0 DERMATITIS MEDICAMENTOSA DUE TO INGESTED DRUGS: Primary | ICD-10-CM

## 2019-07-18 DIAGNOSIS — A28.0 HEMORRHAGIC SEPTICEMIA DUE TO PASTEURELLA MULTOCIDA (HCC): ICD-10-CM

## 2019-07-18 DIAGNOSIS — M86.9 DIABETIC FOOT ULCER WITH OSTEOMYELITIS (HCC): ICD-10-CM

## 2019-07-18 DIAGNOSIS — A41.59 HEMORRHAGIC SEPTICEMIA DUE TO PASTEURELLA MULTOCIDA (HCC): ICD-10-CM

## 2019-07-18 DIAGNOSIS — L27.0 DERMATITIS MEDICAMENTOSA DUE TO INGESTED DRUGS: ICD-10-CM

## 2019-07-18 LAB
ALBUMIN SERPL-MCNC: 3.5 G/DL (ref 3.5–5.2)
ALBUMIN/GLOB SERPL: 0.9 G/DL
ALP SERPL-CCNC: 69 U/L (ref 39–117)
ALT SERPL W P-5'-P-CCNC: 28 U/L (ref 1–41)
ANION GAP SERPL CALCULATED.3IONS-SCNC: 9 MMOL/L (ref 5–15)
AST SERPL-CCNC: 36 U/L (ref 1–40)
BILIRUB SERPL-MCNC: 0.6 MG/DL (ref 0.2–1.2)
BUN BLD-MCNC: 21 MG/DL (ref 8–23)
BUN/CREAT SERPL: 27.6 (ref 7–25)
CALCIUM SPEC-SCNC: 9.1 MG/DL (ref 8.6–10.5)
CHLORIDE SERPL-SCNC: 103 MMOL/L (ref 98–107)
CO2 SERPL-SCNC: 26 MMOL/L (ref 22–29)
CREAT BLD-MCNC: 0.76 MG/DL (ref 0.76–1.27)
CRP SERPL-MCNC: 0.79 MG/DL (ref 0–0.5)
DEPRECATED RDW RBC AUTO: 48.2 FL (ref 37–54)
ERYTHROCYTE [DISTWIDTH] IN BLOOD BY AUTOMATED COUNT: 14.5 % (ref 12.3–15.4)
ERYTHROCYTE [SEDIMENTATION RATE] IN BLOOD: 43 MM/HR (ref 0–20)
GFR SERPL CREATININE-BSD FRML MDRD: 104 ML/MIN/1.73
GLOBULIN UR ELPH-MCNC: 4 GM/DL
GLUCOSE BLD-MCNC: 230 MG/DL (ref 65–99)
HCT VFR BLD AUTO: 38.1 % (ref 37.5–51)
HGB BLD-MCNC: 11.7 G/DL (ref 13–17.7)
INR PPP: 2.27 (ref 0.85–1.16)
MCH RBC QN AUTO: 27.9 PG (ref 26.6–33)
MCHC RBC AUTO-ENTMCNC: 30.7 G/DL (ref 31.5–35.7)
MCV RBC AUTO: 90.9 FL (ref 79–97)
PLATELET # BLD AUTO: 202 10*3/MM3 (ref 140–450)
PMV BLD AUTO: 10.7 FL (ref 6–12)
POTASSIUM BLD-SCNC: 4.9 MMOL/L (ref 3.5–5.2)
PROT SERPL-MCNC: 7.5 G/DL (ref 6–8.5)
PROTHROMBIN TIME: 24.1 SECONDS (ref 11.2–14.3)
RBC # BLD AUTO: 4.19 10*6/MM3 (ref 4.14–5.8)
SODIUM BLD-SCNC: 138 MMOL/L (ref 136–145)
WBC NRBC COR # BLD: 6.17 10*3/MM3 (ref 3.4–10.8)

## 2019-07-18 PROCEDURE — 85027 COMPLETE CBC AUTOMATED: CPT

## 2019-07-18 PROCEDURE — 80053 COMPREHEN METABOLIC PANEL: CPT

## 2019-07-18 PROCEDURE — 86140 C-REACTIVE PROTEIN: CPT

## 2019-07-18 PROCEDURE — 36415 COLL VENOUS BLD VENIPUNCTURE: CPT

## 2019-07-18 PROCEDURE — G0463 HOSPITAL OUTPT CLINIC VISIT: HCPCS

## 2019-07-18 PROCEDURE — 85610 PROTHROMBIN TIME: CPT

## 2019-07-18 PROCEDURE — 85652 RBC SED RATE AUTOMATED: CPT

## 2019-07-18 NOTE — PROGRESS NOTES
Anticoagulation Clinic - Remote Progress Note  REMOTE LAB     Indication: mechanical AVR  Referring Provider: Luis  Initial Warfarin Start Date: 2009  Goal INR: 2.5-3.5  Current Drug Interactions: aspirin     Diet: green beans occasionally  Alcohol: none  Tobacco: none  OTC Pain Medication:     INR History:  Date 8/2 10/26 12/21 1/19/18 2/20 3/27 4/27 5/16 6/11 7/16 9/20 11/2   Total Weekly Dose 62.5mg 62.5mg 62.5mg 62.5mg 62.5mg 62.5mg 62.5mg 62.5mg 62.5mg 62.5mg 62.5mg 62.5mg   INR 2.2 2.9 3.2 3.0 2.9 3.4 3.7 2.9 2.6 2.9 2.9 3.2   Notes              sick                Date 12/21 2/1/19 3/11 4/24 5/23 6/24 7/11 7/18     Total Weekly Dose 62.5mg 62.5mg 62.5mg 62.5mg 62.5mg 62.5mg 52.5mg 55mg 57.5mg    INR 3.4 3.34 2.8 3.0 3.3 3.6 2.52 2.27     Notes rec'vd 12/26       self report; recent hosp; Augmentin Merrem post-disch for sepsis Merrem Merrem        Phone Interview:  Tablet Strength: 5mg tabs  Patient Contact Info: [phone] (Home), [phone] (Mobile)  Lab Contact Info: [facility] [phone]    Patient Findings   Negatives:  Signs/symptoms of thrombosis, Signs/symptoms of bleeding, Laboratory test error suspected, Change in health, Change in alcohol use, Change in activity, Upcoming invasive procedure, Emergency department visit, Upcoming dental procedure, Missed doses, Extra doses, Change in medications, Change in diet/appetite, Hospital admission, Bruising, Other complaints   Comments:  Patient reports his last Merrem infusion should be on 7/25. He will then be on suppressive Minocycline indefinitely. Otherwise denies all findings.     Plan:  1. INR is SUBtherapeutic today at 2.27. After consulting with Arabella Hebert, PharmD, instructed Mr. Farias to take warfarin 7.5mg daily except for 10mg on ThursSun (57.5mg/week, 4.6% increase)  2. Repeat INR on Tues, 7/23. Will request patient come into clinic before appt w/ Dr Smith at [facility]. OOP cost is hard to calculate but may be about $21.  3.  "Verbal information provided over the phone. Cecilio Farias RBV dosing instructions, expresses understanding by teach back, and has no further questions at this time.    Kurtis Hackett, Ozzie  7/18/2019  2:29 PM    Of note: the combination of minocycline-warfarin may result in an increased risk of bleeding. Micromedex suggests that \"the prothrombin time ratio or international normalized ratio (INR) should be closely monitored with the addition and withdrawal of treatment with minocycline, and should be reassessed periodically during concurrent therapy. Adjustments of the warfarin dose may be necessary in order to maintain the desired level of anticoagulation.\"  Please also note Mr. Farias's warfarin dosing history -- pre-hospitalization, he was very stable on a 62.5mg weekly dosing regimen.   IMadie, [facility], have reviewed the note in full and agree with the assessment and plan.  07/19/19  10:35 AM  "

## 2019-07-23 ENCOUNTER — OFFICE VISIT (OUTPATIENT)
Dept: CARDIOLOGY | Facility: CLINIC | Age: 64
End: 2019-07-23

## 2019-07-23 ENCOUNTER — ANTICOAGULATION VISIT (OUTPATIENT)
Dept: PHARMACY | Facility: HOSPITAL | Age: 64
End: 2019-07-23

## 2019-07-23 VITALS
HEIGHT: 75 IN | HEART RATE: 79 BPM | SYSTOLIC BLOOD PRESSURE: 120 MMHG | DIASTOLIC BLOOD PRESSURE: 64 MMHG | BODY MASS INDEX: 39.17 KG/M2 | WEIGHT: 315 LBS

## 2019-07-23 DIAGNOSIS — I35.9 AORTIC VALVE DISORDER: ICD-10-CM

## 2019-07-23 DIAGNOSIS — Z95.2 S/P AVR: ICD-10-CM

## 2019-07-23 DIAGNOSIS — E78.5 DYSLIPIDEMIA: ICD-10-CM

## 2019-07-23 DIAGNOSIS — I10 ESSENTIAL HYPERTENSION: Primary | ICD-10-CM

## 2019-07-23 LAB
INR PPP: 2.9 (ref 0.91–1.09)
PROTHROMBIN TIME: 35 SECONDS (ref 10–13.8)

## 2019-07-23 PROCEDURE — 99213 OFFICE O/P EST LOW 20 MIN: CPT | Performed by: INTERNAL MEDICINE

## 2019-07-23 PROCEDURE — G0463 HOSPITAL OUTPT CLINIC VISIT: HCPCS

## 2019-07-23 PROCEDURE — 85610 PROTHROMBIN TIME: CPT

## 2019-07-23 PROCEDURE — 36416 COLLJ CAPILLARY BLOOD SPEC: CPT

## 2019-07-23 NOTE — PROGRESS NOTES
OFFICE FOLLOW UP     Date of Encounter:2019     Name: Cecilio Farias  : 1955  Address: [address] [place], [place] [id number]    PCP: America Everett, DO  [address] [address]    Cecilio Farias is a 63 y.o. male.      Chief Complaint: Follow up of VHD, HTN, HLD    Problem List:   1. Severe valvular aortic stenosis:  a. Acute systolic CHF, winter 2009.   b. LVEF 20% to 25%.   c. Normal coronary arteries.   d. 25 mm. 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  i. Paroxysmal atrial fibrillation, resolved.   e. MUGA EF = 64%, 2010.  f. EF >70%, mechanical aortic valve with normal function (16).  g. ESTHER, 2019 - negative for vegetation or endocarditis, EF 56-60%  2. Hypertension.   3. Dyslipidemia.   4. Diabetes mellitus, type 2.   5. Morbid obesity.   6. Colon cancer with operation/chemotherapy/radiation.   7. Status post operations, remote.  8. Acinetobacter sepsis, 2019  a. IV antibiotics followed by ID.  b. Chronic suppressive Rx planned by ID    Allergies:  Allergies   Allergen Reactions   • Sulfa Antibiotics    • Amoxicillin Rash     Has tolerated ceftriaxone       Current Medications:  •  AMLODIPINE BESYLATE PO, Take 20 mg by mouth Daily  •  aspirin 81 MG EC tablet, Take 81 mg by mouth daily  •  carvedilol (COREG) 25 MG tablet, Take 25 mg by mouth 2 (two) times a day with meals.  •  EPINEPHrine (EPIPEN) 0.3 MG/0.3ML solution auto-injector injection, As Needed.  •  gabapentin (NEURONTIN) 800 MG tablet, Take 800 mg by mouth 3 (Three) Times a Day.  •  glipiZIDE (GLUCOTROL) 5 MG tablet, Take 5 mg by mouth 2 (Two) Times a Day Before Meals   •  lisinopril (PRINIVIL,ZESTRIL) 20 MG tablet, Take 20 mg by mouth 2 (two) times a day.  •  meropenem (MERREM) 1 g/100 mL 0.9% NS VTB (NICK), Infuse 1 g into a venous catheter Every 8 (Eight) Hours.  •  metFORMIN (GLUCOPHAGE) 1000 MG tablet, Take 1,000 mg by mouth Daily With Breakfast.:   •  pravastatin  "(PRAVACHOL) 80 MG tablet, Take 80 mg by mouth Every Night.  •  warfarin (COUMADIN) 5 MG tablet, 1.5 to 2 tablets by mouth daily, as directed by the anticoagulation clinic  •  insulin NPH-insulin regular (NOVOLIN 70/30) (70-30) 100 UNIT/ML injection, Inject 5 Units under the skin into the appropriate area as directed 2 (Two) Times a Day With Meals    History of Present Illness: The patient returns for elective follow-up this afternoon.  In early June, he was admitted to [facility] with Acinetobacter sepsis.  Treatment was initiated and continues with a PICC line in place.  A definite source was not identified.  He was felt to have no clear evidence of endocarditis with an evaluation that included a transesophageal echocardiogram.  It is the patient's understanding that he will soon transition to oral antibiotics \"for life\".  Diabetes was not in good control when he presented to the hospital with sepsis and he is following up with his primary care physician in that regard.  He has had no angina or symptoms of congestive heart failure.  He has had no apparent cardioembolic events.               The following portions of the patient's history were reviewed and updated as appropriate: allergies, current medications and problem list.    ROS: Pertinent positives as listed in the HPI.  All other systems reviewed and negative.    Objective:  GENERAL: Alert, cooperative, in no acute distress.   HEENT: Normocephalic, no adenopathy, no jugular venous distention  HEART: No discrete PMI is noted. Regular rhythm, normal rate, and no murmurs, gallops, or rubs. Normal PV sounds.  LUNGS: Clear to auscultation bilaterally. No wheezing, rales or ronchi.  ABDOMEN: Soft, bowel sounds present, non-tender: OBESE as previously noted.  NEUROLOGIC: No focal abnormalities involving strength or sensation are noted.   EXTREMITIES: No clubbing, cyanosis, or edema noted. PICC in right arm.    Vitals:    07/23/19 1140 07/23/19 1141   BP: " "133/71 120/64   BP Location: Left arm Left arm   Patient Position: Sitting Standing   Pulse: 74 79   Weight: (!) 147 kg (325 lb)    Height: 190.5 cm (75\")        Physical Exam: See \"Objective\" above.      Diagnostic Data:    Lab Results   Component Value Date    INR 2.9 (H) 07/23/2019    INR 2.27 (H) 07/18/2019    INR 2.52 (H) 07/11/2019     Lab Results   Component Value Date    HGBA1C 10.20 (H) 06/10/2019     Lab Results   Component Value Date    GLUCOSE 230 (H) 07/18/2019    BUN 21 07/18/2019    CREATININE 0.76 07/18/2019    EGFRIFNONA 104 07/18/2019    BCR 27.6 (H) 07/18/2019    K 4.9 07/18/2019    CO2 26.0 07/18/2019    CALCIUM 9.1 07/18/2019    ALBUMIN 3.50 07/18/2019    AST 36 07/18/2019    ALT 28 07/18/2019         Procedures      Assessment and Plan:   1.  VHD: NYHA class I with normal LV function following [place] mechanical valve placement.  I discussed the extreme importance of life long anticoagulation with warfarin.  I also reinterated recommendations for SBE prophylaxis on an as-needed basis per recommendations of the American Heart Association.  He understands all of this.  2.  HTN: He is at target today.  Changes will not be made.  He does need to lose weight in reference to both diabetes and hypertension and we discussed this for some time.  3.  HLD: He has a target LDL should be less than 100 and this can be followed as an outpatient.  I will not make changes in his statin at the current time.  4.  Recent sepsis: As noted under #1.  No change.     .[email or web address]    I will see Cecilio Farias back in one year or sooner on an as needed basis.              EMR Dragon/Transcription Disclaimer:  Much of this encounter note is an electronic transcription/translation of spoken language to printed text.  The electronic translation of spoken language may permit erroneous, or at times, nonsensical words or phrases to be inadvertently transcribed.  Although I have reviewed the note for such errors, some may still " exist.

## 2019-07-23 NOTE — PROGRESS NOTES
"Anticoagulation Clinic - Remote Progress Note  REMOTE LAB     Indication: mechanical AVR  Referring Provider: Luis  Initial Warfarin Start Date: 2009  Goal INR: 2.5-3.5  Current Drug Interactions: aspirin     Diet: green beans occasionally  Alcohol: none  Tobacco: none  OTC Pain Medication:     INR History:  Date 8/2 10/26 12/21 1/19/18 2/20 3/27 4/27 5/16 6/11 7/16 9/20 11/2   Total Weekly Dose 62.5mg 62.5mg 62.5mg 62.5mg 62.5mg 62.5mg 62.5mg 62.5mg 62.5mg 62.5mg 62.5mg 62.5mg   INR 2.2 2.9 3.2 3.0 2.9 3.4 3.7 2.9 2.6 2.9 2.9 3.2   Notes              sick                Date 12/21 2/1/19 3/11 4/24 5/23 6/24 7/11 7/18 7/22    Total Weekly Dose 62.5mg 62.5mg 62.5mg 62.5mg 62.5mg 62.5mg 52.5mg 55mg 57.5mg 55mg   INR 3.4 3.34 2.8 3.0 3.3 3.6 2.52 2.27 2.9    Notes rec'vd 12/26       self report; recent hosp; Augmentin Merrem post-disch for sepsis Merrem Merrem 1 boost, Merrem       Phone Interview:  Tablet Strength: 5mg tabs  Patient Contact Info: [phone] (Home), [phone] (Mobile)  Lab Contact Info: [facility] [phone]  Verbal release 7/23/19 may speak with Delaney (wife), Puneet or Ty (sons)- ok to [facility]    Patient Findings   Positives:  Change in medications   Negatives:  Signs/symptoms of thrombosis, Signs/symptoms of bleeding, Laboratory test error suspected, Change in health, Change in alcohol use, Change in activity, Upcoming invasive procedure, Emergency department visit, Upcoming dental procedure, Missed doses, Extra doses, Change in diet/appetite, Hospital admission, Bruising, Other complaints   Comments:  Taking PICC line out Thursday at last Merrem infusion.     From previous note: Patient reports his last Merrem infusion should be on 7/25. He will then be on suppressive Minocycline indefinitely. Of note, the combination of minocycline-warfarin may result in an increased risk of bleeding. Micromedex suggests that \"the prothrombin time ratio or international normalized ratio " "(INR) should be closely monitored with the addition and withdrawal of treatment with minocycline, and should be reassessed periodically during concurrent therapy. Adjustments of the warfarin dose may be necessary in order to maintain the desired level of anticoagulation.\"   Please also note Mr. Farias's warfarin dosing history -- pre-hospitalization, he was very stable on a 62.5mg weekly dosing regimen.      Plan:  1. INR is therapeutic today at 2.9, however with pending induction of minocycline and trend up, instructed Mr. Farias to resume previous maintenance dose of warfarin 7.5mg daily except for 10mg on Thurs (55mg/week)  2. Repeat INR Monday to monitor minocycline interaction  3. Verbal information provided over the phone. Cecilio Farias RBV dosing instructions, expresses understanding by teach back, and has no further questions at this time.    Adore Masters, PharmD.  07/23/19   11:19 AM      "

## 2019-07-25 ENCOUNTER — TRANSCRIBE ORDERS (OUTPATIENT)
Dept: LAB | Facility: HOSPITAL | Age: 64
End: 2019-07-25

## 2019-07-25 ENCOUNTER — LAB (OUTPATIENT)
Dept: LAB | Facility: HOSPITAL | Age: 64
End: 2019-07-25

## 2019-07-25 ENCOUNTER — APPOINTMENT (OUTPATIENT)
Dept: ONCOLOGY | Facility: HOSPITAL | Age: 64
End: 2019-07-25

## 2019-07-25 DIAGNOSIS — A28.0 HEMORRHAGIC SEPTICEMIA DUE TO PASTEURELLA MULTOCIDA (HCC): ICD-10-CM

## 2019-07-25 DIAGNOSIS — L97.509 DIABETIC FOOT ULCER WITH OSTEOMYELITIS (HCC): ICD-10-CM

## 2019-07-25 DIAGNOSIS — E11.69 DIABETIC FOOT ULCER WITH OSTEOMYELITIS (HCC): ICD-10-CM

## 2019-07-25 DIAGNOSIS — L27.0 DERMATITIS MEDICAMENTOSA DUE TO INGESTED DRUGS: ICD-10-CM

## 2019-07-25 DIAGNOSIS — A41.59 HEMORRHAGIC SEPTICEMIA DUE TO PASTEURELLA MULTOCIDA (HCC): ICD-10-CM

## 2019-07-25 DIAGNOSIS — L03.031 ABSCESS OF FIFTH TOENAIL OF RIGHT FOOT: ICD-10-CM

## 2019-07-25 DIAGNOSIS — M86.9 DIABETIC FOOT ULCER WITH OSTEOMYELITIS (HCC): ICD-10-CM

## 2019-07-25 DIAGNOSIS — L27.0 DERMATITIS MEDICAMENTOSA DUE TO INGESTED DRUGS: Primary | ICD-10-CM

## 2019-07-25 DIAGNOSIS — E11.621 DIABETIC FOOT ULCER WITH OSTEOMYELITIS (HCC): ICD-10-CM

## 2019-07-25 LAB
ALBUMIN SERPL-MCNC: 3.4 G/DL (ref 3.5–5.2)
ALBUMIN/GLOB SERPL: 0.8 G/DL
ALP SERPL-CCNC: 74 U/L (ref 39–117)
ALT SERPL W P-5'-P-CCNC: 12 U/L (ref 1–41)
ANION GAP SERPL CALCULATED.3IONS-SCNC: 10 MMOL/L (ref 5–15)
AST SERPL-CCNC: 35 U/L (ref 1–40)
BASOPHILS # BLD AUTO: 0.05 10*3/MM3 (ref 0–0.2)
BASOPHILS NFR BLD AUTO: 0.8 % (ref 0–1.5)
BILIRUB SERPL-MCNC: 0.4 MG/DL (ref 0.2–1.2)
BUN BLD-MCNC: 24 MG/DL (ref 8–23)
BUN/CREAT SERPL: 33.3 (ref 7–25)
CALCIUM SPEC-SCNC: 9 MG/DL (ref 8.6–10.5)
CHLORIDE SERPL-SCNC: 99 MMOL/L (ref 98–107)
CO2 SERPL-SCNC: 25 MMOL/L (ref 22–29)
CREAT BLD-MCNC: 0.72 MG/DL (ref 0.76–1.27)
CRP SERPL-MCNC: 1.08 MG/DL (ref 0–0.5)
DEPRECATED RDW RBC AUTO: 47.7 FL (ref 37–54)
EOSINOPHIL # BLD AUTO: 0.53 10*3/MM3 (ref 0–0.4)
EOSINOPHIL NFR BLD AUTO: 9 % (ref 0.3–6.2)
ERYTHROCYTE [DISTWIDTH] IN BLOOD BY AUTOMATED COUNT: 14.3 % (ref 12.3–15.4)
ERYTHROCYTE [SEDIMENTATION RATE] IN BLOOD: 33 MM/HR (ref 0–20)
GFR SERPL CREATININE-BSD FRML MDRD: 111 ML/MIN/1.73
GLOBULIN UR ELPH-MCNC: 4.1 GM/DL
GLUCOSE BLD-MCNC: 257 MG/DL (ref 65–99)
HCT VFR BLD AUTO: 39.2 % (ref 37.5–51)
HGB BLD-MCNC: 12.4 G/DL (ref 13–17.7)
IMM GRANULOCYTES # BLD AUTO: 0.04 10*3/MM3 (ref 0–0.05)
IMM GRANULOCYTES NFR BLD AUTO: 0.7 % (ref 0–0.5)
LYMPHOCYTES # BLD AUTO: 1.33 10*3/MM3 (ref 0.7–3.1)
LYMPHOCYTES NFR BLD AUTO: 22.5 % (ref 19.6–45.3)
MCH RBC QN AUTO: 29 PG (ref 26.6–33)
MCHC RBC AUTO-ENTMCNC: 31.6 G/DL (ref 31.5–35.7)
MCV RBC AUTO: 91.6 FL (ref 79–97)
MONOCYTES # BLD AUTO: 0.73 10*3/MM3 (ref 0.1–0.9)
MONOCYTES NFR BLD AUTO: 12.3 % (ref 5–12)
NEUTROPHILS # BLD AUTO: 3.24 10*3/MM3 (ref 1.7–7)
NEUTROPHILS NFR BLD AUTO: 54.7 % (ref 42.7–76)
NRBC BLD AUTO-RTO: 0 /100 WBC (ref 0–0.2)
PLATELET # BLD AUTO: 212 10*3/MM3 (ref 140–450)
PMV BLD AUTO: 10.9 FL (ref 6–12)
POTASSIUM BLD-SCNC: 5.1 MMOL/L (ref 3.5–5.2)
PROT SERPL-MCNC: 7.5 G/DL (ref 6–8.5)
RBC # BLD AUTO: 4.28 10*6/MM3 (ref 4.14–5.8)
SODIUM BLD-SCNC: 134 MMOL/L (ref 136–145)
WBC NRBC COR # BLD: 5.92 10*3/MM3 (ref 3.4–10.8)

## 2019-07-25 PROCEDURE — 85025 COMPLETE CBC W/AUTO DIFF WBC: CPT

## 2019-07-25 PROCEDURE — 36415 COLL VENOUS BLD VENIPUNCTURE: CPT

## 2019-07-25 PROCEDURE — 85652 RBC SED RATE AUTOMATED: CPT

## 2019-07-25 PROCEDURE — 86140 C-REACTIVE PROTEIN: CPT

## 2019-07-25 PROCEDURE — 80053 COMPREHEN METABOLIC PANEL: CPT

## 2019-08-08 ENCOUNTER — TRANSCRIBE ORDERS (OUTPATIENT)
Dept: LAB | Facility: HOSPITAL | Age: 64
End: 2019-08-08

## 2019-08-08 ENCOUNTER — LAB (OUTPATIENT)
Dept: LAB | Facility: HOSPITAL | Age: 64
End: 2019-08-08

## 2019-08-08 DIAGNOSIS — A28.0 HEMORRHAGIC SEPTICEMIA DUE TO PASTEURELLA MULTOCIDA (HCC): ICD-10-CM

## 2019-08-08 DIAGNOSIS — L27.0 DERMATITIS MEDICAMENTOSA DUE TO INGESTED DRUGS: ICD-10-CM

## 2019-08-08 DIAGNOSIS — A41.59 HEMORRHAGIC SEPTICEMIA DUE TO PASTEURELLA MULTOCIDA (HCC): ICD-10-CM

## 2019-08-08 DIAGNOSIS — T36.0X5A PENICILLIN CLASS DRUG CAUSING ADVERSE EFFECTS IN THERAPEUTIC USE, INITIAL ENCOUNTER: Primary | ICD-10-CM

## 2019-08-08 DIAGNOSIS — T36.0X5A PENICILLIN CLASS DRUG CAUSING ADVERSE EFFECTS IN THERAPEUTIC USE, INITIAL ENCOUNTER: ICD-10-CM

## 2019-08-08 LAB
ALBUMIN SERPL-MCNC: 3.4 G/DL (ref 3.5–5.2)
ALBUMIN/GLOB SERPL: 0.9 G/DL
ALP SERPL-CCNC: 74 U/L (ref 39–117)
ALT SERPL W P-5'-P-CCNC: 20 U/L (ref 1–41)
ANION GAP SERPL CALCULATED.3IONS-SCNC: 8 MMOL/L (ref 5–15)
AST SERPL-CCNC: 27 U/L (ref 1–40)
BASOPHILS # BLD AUTO: 0.07 10*3/MM3 (ref 0–0.2)
BASOPHILS NFR BLD AUTO: 1.3 % (ref 0–1.5)
BILIRUB SERPL-MCNC: 0.6 MG/DL (ref 0.2–1.2)
BUN BLD-MCNC: 21 MG/DL (ref 8–23)
BUN/CREAT SERPL: 24.7 (ref 7–25)
CALCIUM SPEC-SCNC: 8.9 MG/DL (ref 8.6–10.5)
CHLORIDE SERPL-SCNC: 105 MMOL/L (ref 98–107)
CO2 SERPL-SCNC: 25 MMOL/L (ref 22–29)
CREAT BLD-MCNC: 0.85 MG/DL (ref 0.76–1.27)
CRP SERPL-MCNC: 0.82 MG/DL (ref 0–0.5)
DEPRECATED RDW RBC AUTO: 46 FL (ref 37–54)
EOSINOPHIL # BLD AUTO: 0.41 10*3/MM3 (ref 0–0.4)
EOSINOPHIL NFR BLD AUTO: 7.7 % (ref 0.3–6.2)
ERYTHROCYTE [DISTWIDTH] IN BLOOD BY AUTOMATED COUNT: 14.1 % (ref 12.3–15.4)
ERYTHROCYTE [SEDIMENTATION RATE] IN BLOOD: 35 MM/HR (ref 0–20)
GFR SERPL CREATININE-BSD FRML MDRD: 91 ML/MIN/1.73
GLOBULIN UR ELPH-MCNC: 4 GM/DL
GLUCOSE BLD-MCNC: 213 MG/DL (ref 65–99)
HCT VFR BLD AUTO: 39.1 % (ref 37.5–51)
HGB BLD-MCNC: 12.2 G/DL (ref 13–17.7)
IMM GRANULOCYTES # BLD AUTO: 0.02 10*3/MM3 (ref 0–0.05)
IMM GRANULOCYTES NFR BLD AUTO: 0.4 % (ref 0–0.5)
LYMPHOCYTES # BLD AUTO: 1.21 10*3/MM3 (ref 0.7–3.1)
LYMPHOCYTES NFR BLD AUTO: 22.7 % (ref 19.6–45.3)
MCH RBC QN AUTO: 27.8 PG (ref 26.6–33)
MCHC RBC AUTO-ENTMCNC: 31.2 G/DL (ref 31.5–35.7)
MCV RBC AUTO: 89.1 FL (ref 79–97)
MONOCYTES # BLD AUTO: 0.63 10*3/MM3 (ref 0.1–0.9)
MONOCYTES NFR BLD AUTO: 11.8 % (ref 5–12)
NEUTROPHILS # BLD AUTO: 2.99 10*3/MM3 (ref 1.7–7)
NEUTROPHILS NFR BLD AUTO: 56.1 % (ref 42.7–76)
NRBC BLD AUTO-RTO: 0 /100 WBC (ref 0–0.2)
PLATELET # BLD AUTO: 198 10*3/MM3 (ref 140–450)
PMV BLD AUTO: 10.4 FL (ref 6–12)
POTASSIUM BLD-SCNC: 4.8 MMOL/L (ref 3.5–5.2)
PROT SERPL-MCNC: 7.4 G/DL (ref 6–8.5)
RBC # BLD AUTO: 4.39 10*6/MM3 (ref 4.14–5.8)
SODIUM BLD-SCNC: 138 MMOL/L (ref 136–145)
WBC NRBC COR # BLD: 5.33 10*3/MM3 (ref 3.4–10.8)

## 2019-08-08 PROCEDURE — 86140 C-REACTIVE PROTEIN: CPT

## 2019-08-08 PROCEDURE — 85025 COMPLETE CBC W/AUTO DIFF WBC: CPT

## 2019-08-08 PROCEDURE — 80053 COMPREHEN METABOLIC PANEL: CPT

## 2019-08-08 PROCEDURE — 85652 RBC SED RATE AUTOMATED: CPT

## 2019-08-08 PROCEDURE — 36415 COLL VENOUS BLD VENIPUNCTURE: CPT

## 2019-08-13 ENCOUNTER — TELEPHONE (OUTPATIENT)
Dept: PHARMACY | Facility: HOSPITAL | Age: 64
End: 2019-08-13

## 2019-08-15 ENCOUNTER — ANTICOAGULATION VISIT (OUTPATIENT)
Dept: PHARMACY | Facility: HOSPITAL | Age: 64
End: 2019-08-15

## 2019-08-15 DIAGNOSIS — I35.9 AORTIC VALVE DISORDER: ICD-10-CM

## 2019-08-15 LAB — INR PPP: 2.9

## 2019-08-15 NOTE — PROGRESS NOTES
Anticoagulation Clinic - Remote Progress Note  REMOTE LAB     Indication: mechanical AVR  Referring Provider: Luis  Initial Warfarin Start Date: 2009  Goal INR: 2.5-3.5  Current Drug Interactions: aspirin     Diet: green beans occasionally  Alcohol: none  Tobacco: none  OTC Pain Medication:     INR History:  Date 8/2 10/26 12/21 1/19/18 2/20 3/27 4/27 5/16 6/11 7/16 9/20 11/2   Total Weekly Dose 62.5mg 62.5mg 62.5mg 62.5mg 62.5mg 62.5mg 62.5mg 62.5mg 62.5mg 62.5mg 62.5mg 62.5mg   INR 2.2 2.9 3.2 3.0 2.9 3.4 3.7 2.9 2.6 2.9 2.9 3.2   Notes              sick                Date 12/21 2/1/19 3/11 4/24 5/23 6/24 7/11 7/18 7/22 8/15   Total Weekly Dose 62.5mg 62.5mg 62.5mg 62.5mg 62.5mg 62.5mg 52.5mg 55mg 57.5mg 57.5mg   INR 3.4 3.34 2.8 3.0 3.3 3.6 2.52 2.27 2.9 2.9   Notes rec'vd 12/26       self report; recent hosp; Augmentin Merrem post-disch for sepsis Merrem Merrem 1 boost, Merrem      **will take minocycline 100mg BID indefinitely**     Phone Interview:  Tablet Strength: 5mg tabs  Patient Contact Info: [phone] (Home); [phone] (Mobile)  Lab Contact Info: [facility] [phone]  Verbal release 7/23/19 may speak with Delaney (wife), Puneet or Ty (sons)- ok to [facility]    Patient Findings   Negatives:  Signs/symptoms of thrombosis, Signs/symptoms of bleeding, Laboratory test error suspected, Change in health, Change in alcohol use, Change in activity, Upcoming invasive procedure, Emergency department visit, Upcoming dental procedure, Missed doses, Extra doses, Change in medications, Change in diet/appetite, Hospital admission, Bruising, Other complaints   Comments:  Patient reports he has been taking 10mg on SunThurs (vs tracker which had 10mg only on Thurs) since our last encounter. Will have him continue to take 57.5mg/week. Patient reports he continues to take minocycline and will so indefinitely. Feels he has been very consistent with GLV intake and denies all other findings.       Plan:  1. INR was therapeutic on 8/15, again at 2.9. After multiple attempts, was able to speak to patient on 8/20. Instructed Mr. Farias to continue warfarin 7.5mg oral daily except for 10mg on SunThurs (57.5mg/week)  2. Repeat INR in 4 weeks, 9/12  3. Verbal information provided over the phone. Cecilio Farias RBV dosing instructions, expresses understanding by teach back, and has no further questions at this time.    Kurtis Hackett, Ozzie  8/15/2019  2:19 PM     I, Arabella Hebert, PharmD, have reviewed the note in full and agree with the assessment and plan.   He was scheduled to recheck his INR on 7/29 due to addition of minocycline.    08/20/19  1:58 PM

## 2019-08-15 NOTE — TELEPHONE ENCOUNTER
GURINDER re:overdue PT/IN lab draw    Patient returned phone call to report he had INR drawn today at [facility]. Will call and speak with patient once results are received

## 2019-08-20 RX ORDER — AMLODIPINE BESYLATE 10 MG/1
1 TABLET ORAL EVERY MORNING
COMMUNITY
Start: 2019-08-03

## 2019-08-20 RX ORDER — MINOCYCLINE HYDROCHLORIDE 100 MG/1
1 CAPSULE ORAL 2 TIMES DAILY
COMMUNITY
Start: 2019-07-25

## 2019-09-03 ENCOUNTER — TELEPHONE (OUTPATIENT)
Dept: CARDIOLOGY | Facility: CLINIC | Age: 64
End: 2019-09-03

## 2019-09-03 NOTE — TELEPHONE ENCOUNTER
Spoke with Kiersten with Dr. Ackerman @ [facility]. Mr. Farias needs one tooth extracted. They are asking for clarification of ABX use and warfarin. Per [facility] would recommend SBE prophylaxis per AHA guidelines noting allergy to PCN. We would NOT discontinue or hold warfarin but will try to keep at lower end of goal INR. LAst INR 8/15/19 = 2.9. Would recommend INR day or two prior to planned extraction. Will notify INR clinic.     Phone - [phone]

## 2019-09-06 NOTE — TELEPHONE ENCOUNTER
Mr. Farias reports that he is undergoing tooth extraction on Monday at 1300 or 1400. He will plan to go in the AM, and will plan to call the clinic when he leaves so we can look for his results and contact DMD.     He will take 7.5mg Sunday night instead of 10mg and take 10mg on Monday- switch days to avoid higher dose day prior to extraction.

## 2019-09-09 ENCOUNTER — ANTICOAGULATION VISIT (OUTPATIENT)
Dept: PHARMACY | Facility: HOSPITAL | Age: 64
End: 2019-09-09

## 2019-09-09 DIAGNOSIS — I35.9 AORTIC VALVE DISORDER: ICD-10-CM

## 2019-09-09 LAB — INR PPP: 2.5

## 2019-09-09 NOTE — PROGRESS NOTES
Anticoagulation Clinic - Remote Progress Note  REMOTE LAB     Indication: mechanical AVR  Referring Provider: Luis  Initial Warfarin Start Date: 2009  Goal INR: 2.5-3.5  Current Drug Interactions: aspirin     Diet: green beans occasionally  Alcohol: none  Tobacco: none  OTC Pain Medication:     INR History:  Date 8/2 10/26 12/21 1/19/18 2/20 3/27 4/27 5/16 6/11 7/16 9/20 11/2   Total Weekly Dose 62.5mg 62.5mg 62.5mg 62.5mg 62.5mg 62.5mg 62.5mg 62.5mg 62.5mg 62.5mg 62.5mg 62.5mg   INR 2.2 2.9 3.2 3.0 2.9 3.4 3.7 2.9 2.6 2.9 2.9 3.2   Notes              sick                Date 12/21 2/1/19 3/11 4/24 5/23 6/24 7/11 7/18 7/22 8/15   Total Weekly Dose 62.5mg 62.5mg 62.5mg 62.5mg 62.5mg 62.5mg 52.5mg 55mg 57.5mg 57.5mg   INR 3.4 3.34 2.8 3.0 3.3 3.6 2.52 2.27 2.9 2.9   Notes rec'vd 12/26       self report; recent hosp; Augmentin Merrem post-disch for sepsis Merrem Merrem 1 boost, Merrem      Date 9/9       Total Weekly Dose 57.5mg       INR 2.5       Notes          **will take minocycline 100mg BID indefinitely**     Phone Interview:  Tablet Strength: 5mg tabs  Patient Contact Info: [phone] (Home); [phone] (Mobile)  Lab Contact Info: [facility] [phone]  Verbal release 7/23/19 may speak with Delaney (wife), Puneet or Ty (sons)- ok to [facility]    Patient Findings:  Positives:  Upcoming dental procedure   Negatives:  Signs/symptoms of thrombosis, Signs/symptoms of bleeding, Laboratory test error suspected, Change in health, Change in alcohol use, Change in activity, Upcoming invasive procedure, Emergency department visit, Missed doses, Extra doses, Change in medications, Change in diet/appetite, Hospital admission, Bruising, Other complaints   Comments:  Mr. Farias's dental procedure has been pushed back to Thursday,  9/12. Patient did not call us back to inform us of this information and did not take 10mg last night as instructed in voice message left yesterday. He denies any other changes and  acknowledges to call the clinic with procedure changes in future as this may require dose adjustments.     Plan:  1. INR is therapeutic again today. Patient did not receive voice message left on 9/9. His dental procedure has been rescheduled but he did not call the clinic. At this time, instructed Mr. Farias to take warfarin 7.5mg daily except 10mg on TuesFriSun until recheck.  2. Repeat INR on Monday.  3. Verbal information provided over the phone. Cecilio Farias RBV dosing instructions, expresses understanding by teach back, and has no further questions at this time.    Maritza Rapp CPhT  9/9/2019  1:26 PM     IAdore, [facility], have reviewed the note in full and agree with the assessment and plan.  09/10/19  11:19 AM

## 2019-09-09 NOTE — TELEPHONE ENCOUNTER
Spoke with representative at [facility] to let them know patient INR today is 2.5 (lowest end of goal range). Will call patient to instruct him on warfarin dosing for the rest of the week.

## 2019-09-25 ENCOUNTER — ANTICOAGULATION VISIT (OUTPATIENT)
Dept: PHARMACY | Facility: HOSPITAL | Age: 64
End: 2019-09-25

## 2019-09-25 DIAGNOSIS — I35.9 AORTIC VALVE DISORDER: ICD-10-CM

## 2019-09-25 LAB — INR PPP: 2.6

## 2019-09-25 NOTE — PROGRESS NOTES
Anticoagulation Clinic - Remote Progress Note  REMOTE LAB     Indication: mechanical AVR  Referring Provider: Luis  Initial Warfarin Start Date: 2009  Goal INR: 2.5-3.5  Current Drug Interactions: aspirin     Diet: green beans occasionally 9/25/19  Alcohol: none  Tobacco: none  OTC Pain Medication:     INR History:  Date 8/2 10/26 12/21 1/19/18 2/20 3/27 4/27 5/16 6/11 7/16 9/20 11/2   Total Weekly Dose 62.5mg 62.5mg 62.5mg 62.5mg 62.5mg 62.5mg 62.5mg 62.5mg 62.5mg 62.5mg 62.5mg 62.5mg   INR 2.2 2.9 3.2 3.0 2.9 3.4 3.7 2.9 2.6 2.9 2.9 3.2   Notes              sick                Date 12/21 2/1/19 3/11 4/24 5/23 6/24 7/11 7/18 7/22 8/15   Total Weekly Dose 62.5mg 62.5mg 62.5mg 62.5mg 62.5mg 62.5mg 52.5mg 55mg 57.5mg 57.5mg   INR 3.4 3.34 2.8 3.0 3.3 3.6 2.52 2.27 2.9 2.9   Notes rec'vd 12/26       self report; recent hosp; Augmentin Merrem post-disch for sepsis Merrem Merrem 1 boost, Merrem      Date 9/9 9/25      Total Weekly Dose 57.5mg 57.5mg      INR 2.5 2.6      Notes        **will take minocycline 100mg BID indefinitely**     Phone Interview:  Tablet Strength: 5mg tabs  Patient Contact Info: [phone] (Home); [phone] (Mobile)  Lab Contact Info: [facility] [phone]  Verbal release 7/23/19 may speak with Delaney (wife), Puneet or Ty (sons)- ok to [facility]    Patient Findings:  Negatives:  Signs/symptoms of thrombosis, Signs/symptoms of bleeding, Laboratory test error suspected, Change in health, Change in alcohol use, Change in activity, Upcoming invasive procedure, Emergency department visit, Upcoming dental procedure, Missed doses, Extra doses, Change in medications, Change in diet/appetite, Hospital admission, Bruising, Other complaints   Comments:  Patient had dental extraction as planned. States it went well and he had only minor bleeding for about 24 hours. He was prescribed Ibuprofen for pain but only took a couple doses and then stopped. He otherwise denies any changes or  signs/symptoms of bruising or bleeding.     Plan:  1. INR is therapeutic again today. Instructed Mr. Farias to take warfarin 7.5mg daily except 10mg on SunThurs until recheck.  2. Repeat INR in 4 weeks.  3. Verbal information provided over the phone. Cecilio Farias RBV dosing instructions, expresses understanding by teach back, and has no further questions at this time.    Maritza Rapp, [facility]  9/25/2019  2:47 PM     Please re-emphasize importance of taking APAP over IBU while on anticoagulation therapy. Liver function tests are WNL  ALT (SGPT)  1 - 41 U/L 29    AST (SGOT)  1 - 40 U/L 40     I, Olga Friend, PharmD, have reviewed the note in full and agree with the assessment and plan.  09/26/19  11:39 AM

## 2019-09-26 ENCOUNTER — LAB (OUTPATIENT)
Dept: LAB | Facility: HOSPITAL | Age: 64
End: 2019-09-26

## 2019-09-26 ENCOUNTER — TRANSCRIBE ORDERS (OUTPATIENT)
Dept: LAB | Facility: HOSPITAL | Age: 64
End: 2019-09-26

## 2019-09-26 DIAGNOSIS — L03.031 ABSCESS OF FIFTH TOENAIL OF RIGHT FOOT: ICD-10-CM

## 2019-09-26 DIAGNOSIS — E11.69 DIABETIC FOOT ULCER WITH OSTEOMYELITIS (HCC): Primary | ICD-10-CM

## 2019-09-26 DIAGNOSIS — A28.0 HEMORRHAGIC SEPTICEMIA DUE TO PASTEURELLA MULTOCIDA (HCC): ICD-10-CM

## 2019-09-26 DIAGNOSIS — L97.509 DIABETIC FOOT ULCER WITH OSTEOMYELITIS (HCC): ICD-10-CM

## 2019-09-26 DIAGNOSIS — E11.621 DIABETIC FOOT ULCER WITH OSTEOMYELITIS (HCC): ICD-10-CM

## 2019-09-26 DIAGNOSIS — A41.59 HEMORRHAGIC SEPTICEMIA DUE TO PASTEURELLA MULTOCIDA (HCC): ICD-10-CM

## 2019-09-26 DIAGNOSIS — M86.9 DIABETIC FOOT ULCER WITH OSTEOMYELITIS (HCC): Primary | ICD-10-CM

## 2019-09-26 DIAGNOSIS — E11.621 DIABETIC FOOT ULCER WITH OSTEOMYELITIS (HCC): Primary | ICD-10-CM

## 2019-09-26 DIAGNOSIS — M86.9 DIABETIC FOOT ULCER WITH OSTEOMYELITIS (HCC): ICD-10-CM

## 2019-09-26 DIAGNOSIS — L97.509 DIABETIC FOOT ULCER WITH OSTEOMYELITIS (HCC): Primary | ICD-10-CM

## 2019-09-26 DIAGNOSIS — E11.69 DIABETIC FOOT ULCER WITH OSTEOMYELITIS (HCC): ICD-10-CM

## 2019-09-26 LAB
ALBUMIN SERPL-MCNC: 3.7 G/DL (ref 3.5–5.2)
ALBUMIN/GLOB SERPL: 1.1 G/DL
ALP SERPL-CCNC: 82 U/L (ref 39–117)
ALT SERPL W P-5'-P-CCNC: 29 U/L (ref 1–41)
ANION GAP SERPL CALCULATED.3IONS-SCNC: 12 MMOL/L (ref 5–15)
AST SERPL-CCNC: 40 U/L (ref 1–40)
BASOPHILS # BLD AUTO: 0.06 10*3/MM3 (ref 0–0.2)
BASOPHILS NFR BLD AUTO: 1.2 % (ref 0–1.5)
BILIRUB SERPL-MCNC: 0.5 MG/DL (ref 0.2–1.2)
BUN BLD-MCNC: 17 MG/DL (ref 8–23)
BUN/CREAT SERPL: 24.6 (ref 7–25)
CALCIUM SPEC-SCNC: 8.7 MG/DL (ref 8.6–10.5)
CHLORIDE SERPL-SCNC: 103 MMOL/L (ref 98–107)
CO2 SERPL-SCNC: 24 MMOL/L (ref 22–29)
CREAT BLD-MCNC: 0.69 MG/DL (ref 0.76–1.27)
CRP SERPL-MCNC: 0.64 MG/DL (ref 0–0.5)
DEPRECATED RDW RBC AUTO: 48.5 FL (ref 37–54)
EOSINOPHIL # BLD AUTO: 0.3 10*3/MM3 (ref 0–0.4)
EOSINOPHIL NFR BLD AUTO: 6 % (ref 0.3–6.2)
ERYTHROCYTE [DISTWIDTH] IN BLOOD BY AUTOMATED COUNT: 14.5 % (ref 12.3–15.4)
ERYTHROCYTE [SEDIMENTATION RATE] IN BLOOD: 26 MM/HR (ref 0–20)
GFR SERPL CREATININE-BSD FRML MDRD: 116 ML/MIN/1.73
GLOBULIN UR ELPH-MCNC: 3.4 GM/DL
GLUCOSE BLD-MCNC: 279 MG/DL (ref 65–99)
HCT VFR BLD AUTO: 40.2 % (ref 37.5–51)
HGB BLD-MCNC: 12.3 G/DL (ref 13–17.7)
IMM GRANULOCYTES # BLD AUTO: 0.02 10*3/MM3 (ref 0–0.05)
IMM GRANULOCYTES NFR BLD AUTO: 0.4 % (ref 0–0.5)
LYMPHOCYTES # BLD AUTO: 1.11 10*3/MM3 (ref 0.7–3.1)
LYMPHOCYTES NFR BLD AUTO: 22.3 % (ref 19.6–45.3)
MCH RBC QN AUTO: 28.1 PG (ref 26.6–33)
MCHC RBC AUTO-ENTMCNC: 30.6 G/DL (ref 31.5–35.7)
MCV RBC AUTO: 91.8 FL (ref 79–97)
MONOCYTES # BLD AUTO: 0.6 10*3/MM3 (ref 0.1–0.9)
MONOCYTES NFR BLD AUTO: 12.1 % (ref 5–12)
NEUTROPHILS # BLD AUTO: 2.88 10*3/MM3 (ref 1.7–7)
NEUTROPHILS NFR BLD AUTO: 58 % (ref 42.7–76)
NRBC BLD AUTO-RTO: 0 /100 WBC (ref 0–0.2)
PLATELET # BLD AUTO: 182 10*3/MM3 (ref 140–450)
PMV BLD AUTO: 10.6 FL (ref 6–12)
POTASSIUM BLD-SCNC: 4.4 MMOL/L (ref 3.5–5.2)
PROT SERPL-MCNC: 7.1 G/DL (ref 6–8.5)
RBC # BLD AUTO: 4.38 10*6/MM3 (ref 4.14–5.8)
SODIUM BLD-SCNC: 139 MMOL/L (ref 136–145)
WBC NRBC COR # BLD: 4.97 10*3/MM3 (ref 3.4–10.8)

## 2019-09-26 PROCEDURE — 80053 COMPREHEN METABOLIC PANEL: CPT

## 2019-09-26 PROCEDURE — 36415 COLL VENOUS BLD VENIPUNCTURE: CPT

## 2019-09-26 PROCEDURE — 85025 COMPLETE CBC W/AUTO DIFF WBC: CPT

## 2019-09-26 PROCEDURE — 86140 C-REACTIVE PROTEIN: CPT

## 2019-09-26 PROCEDURE — 85652 RBC SED RATE AUTOMATED: CPT

## 2019-11-05 ENCOUNTER — TELEPHONE (OUTPATIENT)
Dept: PHARMACY | Facility: HOSPITAL | Age: 64
End: 2019-11-05

## 2019-11-08 ENCOUNTER — ANTICOAGULATION VISIT (OUTPATIENT)
Dept: PHARMACY | Facility: HOSPITAL | Age: 64
End: 2019-11-08

## 2019-11-08 DIAGNOSIS — I35.9 AORTIC VALVE DISORDER: ICD-10-CM

## 2019-11-08 LAB — INR PPP: 2.4

## 2019-11-08 NOTE — PROGRESS NOTES
Anticoagulation Clinic - Remote Progress Note  REMOTE LAB     Indication: mechanical AVR  Referring Provider: Luis  Initial Warfarin Start Date: 2009  Goal INR: 2.5-3.5  Current Drug Interactions: aspirin     Diet: green beans occasionally  (~2x/week)   Alcohol: none  Tobacco: none  OTC Pain Medication:     INR History:  Date 8/2 10/26 12/21 1/19/18 2/20 3/27 4/27 5/16 6/11 7/16 9/20 11/2   Total Weekly Dose 62.5mg 62.5mg 62.5mg 62.5mg 62.5mg 62.5mg 62.5mg 62.5mg 62.5mg 62.5mg 62.5mg 62.5mg   INR 2.2 2.9 3.2 3.0 2.9 3.4 3.7 2.9 2.6 2.9 2.9 3.2   Notes              sick                Date 12/21 2/1/19 3/11 4/24 5/23 6/24 7/11 7/18 7/22 8/15   Total Weekly Dose 62.5mg 62.5mg 62.5mg 62.5mg 62.5mg 62.5mg 52.5mg 55mg 57.5mg 57.5mg   INR 3.4 3.34 2.8 3.0 3.3 3.6 2.52 2.27 2.9 2.9   Notes rec'vd 12/26       self report; recent hosp; Augmentin Merrem post-disch for sepsis Merrem Merrem 1 boost, Merrem      Date 9/9 9/25 11/8     Total Weekly Dose 57.5mg 57.5mg 57.5mg     INR 2.5 2.6 2.4     Notes        **will take minocycline 100mg BID indefinitely**     Phone Interview:  Tablet Strength: 5mg tabs  Patient Contact Info: [phone] (Home); [phone] (Mobile)  Lab Contact Info: [facility] [phone]  Verbal release 7/23/19 may speak with Delaney (wife), Puneet or Ty (sons)- ok to [facility]    Patient Findings   Negatives:  Signs/symptoms of thrombosis, Signs/symptoms of bleeding, Laboratory test error suspected, Change in health, Change in alcohol use, Change in activity, Upcoming invasive procedure, Emergency department visit, Upcoming dental procedure, Missed doses, Extra doses, Change in medications, Change in diet/appetite, Hospital admission, Bruising, Other complaints     Plan:  1. INR was therapeutic yesterday at 2.4. Instructed Mr. Farias to take warfarin 7.5mg daily except 10mg on SunThurs until recheck.  2. Repeat INR in 4 weeks, 12/5  3. Verbal information provided over the phone. Cecilio GUTIÉRREZ  Plank RBV dosing instructions, expresses understanding by teach back, and has no further questions at this time.      Kurtis Hackett, Ozzie  11/8/2019  11:05 AM    I, Gregory Parks [facility], have reviewed the note in full and agree with the assessment and plan.  11/08/19  2:38 PM

## 2019-12-16 RX ORDER — WARFARIN SODIUM 5 MG/1
TABLET ORAL
Qty: 60 TABLET | Refills: 1 | Status: SHIPPED | OUTPATIENT
Start: 2019-12-16 | End: 2020-02-10

## 2019-12-19 ENCOUNTER — TELEPHONE (OUTPATIENT)
Dept: PHARMACY | Facility: HOSPITAL | Age: 64
End: 2019-12-19

## 2019-12-30 ENCOUNTER — ANTICOAGULATION VISIT (OUTPATIENT)
Dept: PHARMACY | Facility: HOSPITAL | Age: 64
End: 2019-12-30

## 2019-12-30 DIAGNOSIS — I35.9 AORTIC VALVE DISORDER: ICD-10-CM

## 2019-12-30 LAB — INR PPP: 2.4

## 2019-12-30 NOTE — PROGRESS NOTES
Anticoagulation Clinic - Remote Progress Note  REMOTE LAB     Indication: mechanical AVR  Referring Provider: Luis  Initial Warfarin Start Date: 2009  Goal INR: 2.5-3.5  Current Drug Interactions: aspirin     Diet: green beans occasionally  (~2x/week)   Alcohol: none  Tobacco: none  OTC Pain Medication:     INR History:  Date 8/2 10/26 12/21 1/19/18 2/20 3/27 4/27 5/16 6/11 7/16 9/20 11/2   Total Weekly Dose 62.5mg 62.5mg 62.5mg 62.5mg 62.5mg 62.5mg 62.5mg 62.5mg 62.5mg 62.5mg 62.5mg 62.5mg   INR 2.2 2.9 3.2 3.0 2.9 3.4 3.7 2.9 2.6 2.9 2.9 3.2   Notes              sick                Date 12/21 2/1/19 3/11 4/24 5/23 6/24 7/11 7/18 7/22 8/15   Total Weekly Dose 62.5mg 62.5mg 62.5mg 62.5mg 62.5mg 62.5mg 52.5mg 55mg 57.5mg 57.5mg   INR 3.4 3.34 2.8 3.0 3.3 3.6 2.52 2.27 2.9 2.9   Notes rec'vd 12/26       self report; recent hosp; Augmentin Merrem post-disch for sepsis Merrem Merrem 1 boost, Merrem      Date 9/9 9/25 11/8 12/30    Total Weekly Dose 57.5mg 57.5mg 57.5mg 57.5mg    INR 2.5 2.6 2.4 2.4    Notes    incr GLV 1x boost   **will take minocycline 100mg BID indefinitely**     Phone Interview:  Tablet Strength: 5mg tabs  Patient Contact Info: [phone] (Home); [phone] (Mobile)  Lab Contact Info: [facility] [phone]  Verbal release 7/23/19 may speak with Delaney (wife), Puneet or Ty (sons)- ok to [facility]      Patient Findings     Positives:  Missed doses, Change in diet/appetite   Negatives:  Signs/symptoms of thrombosis, Signs/symptoms of bleeding, Laboratory test error suspected, Change in health, Change in alcohol use, Change in activity, Upcoming invasive procedure, Emergency department visit, Upcoming dental procedure, Extra doses, Change in medications, Hospital admission, Bruising, Other complaints   Comments:  Patient reports he missed a 7.5mg dose of warfarin maybe 2-3x weeks ago, does not remember the exact day he missed.     He does feel like the past few weeks he has eaten more  servings of green beans then he usually does. Feels he's been eating 3-4x servings a week when he usually eats only 2-3. Does not feel this will continue going forward and is agreeable to resume his usual GLV intake.     He is leaving for a 10 day trip on Thurs of next week, he is agreeable to have labs drawn prior to leaving     Plan:  1. INR is slightly SUBtherapeutic today at 2.4. Instructed Mr. Farias to BOOST tonight's dose to 10mg (4.4% increase) and then continue warfarin 7.5mg daily except 10mg on SunThurs until recheck.  2. Repeat INR in ~1.5 weeks, 1/8/20, prior to leaving on trip.  3. Verbal information provided over the phone. Cecilio Farias RBV dosing instructions, expresses understanding by teach back, and has no further questions at this time.    Kurtis Hackett CPhT  12/30/2019  3:24 PM      IBela [facility], have reviewed the note in full and agree with the assessment and plan.  12/30/19  3:44 PM

## 2020-01-20 ENCOUNTER — TELEPHONE (OUTPATIENT)
Dept: PHARMACY | Facility: HOSPITAL | Age: 65
End: 2020-01-20

## 2020-01-23 ENCOUNTER — LAB (OUTPATIENT)
Dept: LAB | Facility: HOSPITAL | Age: 65
End: 2020-01-23

## 2020-01-23 ENCOUNTER — TRANSCRIBE ORDERS (OUTPATIENT)
Dept: LAB | Facility: HOSPITAL | Age: 65
End: 2020-01-23

## 2020-01-23 DIAGNOSIS — T36.0X5A PENICILLIN CLASS DRUG CAUSING ADVERSE EFFECTS IN THERAPEUTIC USE, INITIAL ENCOUNTER: ICD-10-CM

## 2020-01-23 DIAGNOSIS — Z88.0 PERSONAL HISTORY OF ALLERGY TO PENICILLIN: ICD-10-CM

## 2020-01-23 DIAGNOSIS — L27.0 DERMATITIS MEDICAMENTOSA DUE TO INGESTED DRUGS: ICD-10-CM

## 2020-01-23 DIAGNOSIS — T36.0X5A PENICILLIN CLASS DRUG CAUSING ADVERSE EFFECTS IN THERAPEUTIC USE, INITIAL ENCOUNTER: Primary | ICD-10-CM

## 2020-01-23 LAB
ALBUMIN SERPL-MCNC: 3.8 G/DL (ref 3.5–5.2)
ALBUMIN/GLOB SERPL: 1.1 G/DL
ALP SERPL-CCNC: 82 U/L (ref 39–117)
ALT SERPL W P-5'-P-CCNC: 33 U/L (ref 1–41)
ANION GAP SERPL CALCULATED.3IONS-SCNC: 10 MMOL/L (ref 5–15)
AST SERPL-CCNC: 45 U/L (ref 1–40)
BASOPHILS # BLD AUTO: 0.05 10*3/MM3 (ref 0–0.2)
BASOPHILS NFR BLD AUTO: 1.1 % (ref 0–1.5)
BILIRUB SERPL-MCNC: 0.5 MG/DL (ref 0.2–1.2)
BUN BLD-MCNC: 20 MG/DL (ref 8–23)
BUN/CREAT SERPL: 25.6 (ref 7–25)
CALCIUM SPEC-SCNC: 8.8 MG/DL (ref 8.6–10.5)
CHLORIDE SERPL-SCNC: 106 MMOL/L (ref 98–107)
CO2 SERPL-SCNC: 23 MMOL/L (ref 22–29)
CREAT BLD-MCNC: 0.78 MG/DL (ref 0.76–1.27)
CRP SERPL-MCNC: 0.52 MG/DL (ref 0–0.5)
DEPRECATED RDW RBC AUTO: 51.7 FL (ref 37–54)
EOSINOPHIL # BLD AUTO: 0.26 10*3/MM3 (ref 0–0.4)
EOSINOPHIL NFR BLD AUTO: 5.9 % (ref 0.3–6.2)
ERYTHROCYTE [DISTWIDTH] IN BLOOD BY AUTOMATED COUNT: 15.2 % (ref 12.3–15.4)
ERYTHROCYTE [SEDIMENTATION RATE] IN BLOOD: 31 MM/HR (ref 0–20)
GFR SERPL CREATININE-BSD FRML MDRD: 101 ML/MIN/1.73
GLOBULIN UR ELPH-MCNC: 3.4 GM/DL
GLUCOSE BLD-MCNC: 206 MG/DL (ref 65–99)
HCT VFR BLD AUTO: 41 % (ref 37.5–51)
HGB BLD-MCNC: 12.7 G/DL (ref 13–17.7)
IMM GRANULOCYTES # BLD AUTO: 0.01 10*3/MM3 (ref 0–0.05)
IMM GRANULOCYTES NFR BLD AUTO: 0.2 % (ref 0–0.5)
LYMPHOCYTES # BLD AUTO: 1.09 10*3/MM3 (ref 0.7–3.1)
LYMPHOCYTES NFR BLD AUTO: 24.7 % (ref 19.6–45.3)
MCH RBC QN AUTO: 28.5 PG (ref 26.6–33)
MCHC RBC AUTO-ENTMCNC: 31 G/DL (ref 31.5–35.7)
MCV RBC AUTO: 91.9 FL (ref 79–97)
MONOCYTES # BLD AUTO: 0.47 10*3/MM3 (ref 0.1–0.9)
MONOCYTES NFR BLD AUTO: 10.7 % (ref 5–12)
NEUTROPHILS # BLD AUTO: 2.53 10*3/MM3 (ref 1.7–7)
NEUTROPHILS NFR BLD AUTO: 57.4 % (ref 42.7–76)
NRBC BLD AUTO-RTO: 0 /100 WBC (ref 0–0.2)
PLATELET # BLD AUTO: 148 10*3/MM3 (ref 140–450)
PMV BLD AUTO: 10.9 FL (ref 6–12)
POTASSIUM BLD-SCNC: 4.8 MMOL/L (ref 3.5–5.2)
PROT SERPL-MCNC: 7.2 G/DL (ref 6–8.5)
RBC # BLD AUTO: 4.46 10*6/MM3 (ref 4.14–5.8)
SODIUM BLD-SCNC: 139 MMOL/L (ref 136–145)
WBC NRBC COR # BLD: 4.41 10*3/MM3 (ref 3.4–10.8)

## 2020-01-23 PROCEDURE — 86140 C-REACTIVE PROTEIN: CPT

## 2020-01-23 PROCEDURE — 80053 COMPREHEN METABOLIC PANEL: CPT

## 2020-01-23 PROCEDURE — 85025 COMPLETE CBC W/AUTO DIFF WBC: CPT

## 2020-01-23 PROCEDURE — 85652 RBC SED RATE AUTOMATED: CPT

## 2020-01-23 PROCEDURE — 36415 COLL VENOUS BLD VENIPUNCTURE: CPT

## 2020-01-24 ENCOUNTER — ANTICOAGULATION VISIT (OUTPATIENT)
Dept: PHARMACY | Facility: HOSPITAL | Age: 65
End: 2020-01-24

## 2020-01-24 DIAGNOSIS — I35.9 AORTIC VALVE DISORDER: ICD-10-CM

## 2020-01-24 LAB — INR PPP: 3.7

## 2020-01-24 NOTE — PROGRESS NOTES
Anticoagulation Clinic - Remote Progress Note  REMOTE LAB     Indication: mechanical AVR  Referring Provider: Luis  Initial Warfarin Start Date: 2009  Goal INR: 2.5-3.5  Current Drug Interactions: aspirin     Diet: green beans occasionally  (~2x/week)   Alcohol: none  Tobacco: none  OTC Pain Medication:     INR History:  Date 8/2 10/26 12/21 1/19/18 2/20 3/27 4/27 5/16 6/11 7/16 9/20 11/2   Total Weekly Dose 62.5mg 62.5mg 62.5mg 62.5mg 62.5mg 62.5mg 62.5mg 62.5mg 62.5mg 62.5mg 62.5mg 62.5mg   INR 2.2 2.9 3.2 3.0 2.9 3.4 3.7 2.9 2.6 2.9 2.9 3.2   Notes              sick                Date 12/21 2/1/19 3/11 4/24 5/23 6/24 7/11 7/18 7/22 8/15   Total Weekly Dose 62.5mg 62.5mg 62.5mg 62.5mg 62.5mg 62.5mg 52.5mg 55mg 57.5mg 57.5mg   INR 3.4 3.34 2.8 3.0 3.3 3.6 2.52 2.27 2.9 2.9   Notes rec'vd 12/26       self report; recent hosp; Augmentin Merrem post-disch for sepsis Merrem Merrem 1 boost, Merrem      Date 9/9 9/25 11/8 12/30 1/24   Total Weekly Dose 57.5mg 57.5mg 57.5mg 57.5mg 57.5   INR 2.5 2.6 2.4 2.4 3.7   Notes    incr GLV    **will take minocycline 100mg BID indefinitely**     Phone Interview:  Tablet Strength: 5mg tabs  Patient Contact Info: [phone] (Home); [phone] (Mobile)  Lab Contact Info: [facility] [phone]  Verbal release 7/23/19 may speak with Delaney (wife), Puneet or Ty (sons)- ok to [facility]      Patient Findings     Negatives:  Signs/symptoms of thrombosis, Signs/symptoms of bleeding, Laboratory test error suspected, Change in health, Change in alcohol use, Change in activity, Upcoming invasive procedure, Emergency department visit, Upcoming dental procedure, Missed doses, Extra doses, Change in medications, Change in diet/appetite, Hospital admission, Bruising, Other complaints   Comments:  Mr. Farias states he is taking warfarin as directed. He does note that he has not had as many GLV lately. Aside from this he denies any other changes.       Plan:  1. INR is slightly  supratherapeutic today at 3.7. Instructed Mr. Farias  continue warfarin 7.5mg daily except 10mg on SunThurs until recheck. Mr. Farias will attempt to return to his normal GLV intake (2x week).  2. Repeat INR in 4 weeks.  3. Verbal information provided over the phone. Cecilio Farias RBV dosing instructions, expresses understanding by teach back, and has no further questions at this time.

## 2020-02-10 RX ORDER — WARFARIN SODIUM 5 MG/1
TABLET ORAL
Qty: 60 TABLET | Refills: 2 | Status: SHIPPED | OUTPATIENT
Start: 2020-02-10 | End: 2020-05-08

## 2020-02-26 ENCOUNTER — TELEPHONE (OUTPATIENT)
Dept: PHARMACY | Facility: HOSPITAL | Age: 65
End: 2020-02-26

## 2020-03-10 ENCOUNTER — ANTICOAGULATION VISIT (OUTPATIENT)
Dept: PHARMACY | Facility: HOSPITAL | Age: 65
End: 2020-03-10

## 2020-03-10 DIAGNOSIS — Z95.2 S/P AVR (AORTIC VALVE REPLACEMENT): Primary | ICD-10-CM

## 2020-03-10 DIAGNOSIS — I35.9 AORTIC VALVE DISORDER: ICD-10-CM

## 2020-03-10 DIAGNOSIS — Z95.2 S/P AVR: Primary | ICD-10-CM

## 2020-03-11 ENCOUNTER — TELEPHONE (OUTPATIENT)
Dept: PHARMACY | Facility: HOSPITAL | Age: 65
End: 2020-03-11

## 2020-04-07 NOTE — TELEPHONE ENCOUNTER
Contacted Mr. Farias re. Overdue PT/ INR.  He stated that he was aware that he needed to have it checked.    He has not had his INR checked since 1/24.  Discussed the risks of out-of-range INR.  He verbalized understanding.    He stated that he would go to the lab tomorrow.  Encouraged him to have checked.    Arabella Hebert, SandhyaD

## 2020-04-13 ENCOUNTER — ANTICOAGULATION VISIT (OUTPATIENT)
Dept: PHARMACY | Facility: HOSPITAL | Age: 65
End: 2020-04-13

## 2020-04-13 DIAGNOSIS — I35.9 AORTIC VALVE DISORDER: ICD-10-CM

## 2020-04-13 LAB — INR PPP: 2.2

## 2020-04-13 NOTE — PROGRESS NOTES
Anticoagulation Clinic - Remote Progress Note  REMOTE LAB     Indication: mechanical AVR  Referring Provider: Luis  Initial Warfarin Start Date: 2009  Goal INR: 2.5-3.5  Current Drug Interactions: aspirin     Diet: green beans occasionally  (~1x/week) 4/13/2020  Alcohol: none  Tobacco: none  OTC Pain Medication:     INR History:  Date 8/2 10/26 12/21 1/19/18 2/20 3/27 4/27 5/16 6/11 7/16 9/20 11/2   Total Weekly Dose 62.5mg 62.5mg 62.5mg 62.5mg 62.5mg 62.5mg 62.5mg 62.5mg 62.5mg 62.5mg 62.5mg 62.5mg   INR 2.2 2.9 3.2 3.0 2.9 3.4 3.7 2.9 2.6 2.9 2.9 3.2   Notes              sick                Date 12/21 2/1/19 3/11 4/24 5/23 6/24 7/11 7/18 7/22 8/15   Total Weekly Dose 62.5mg 62.5mg 62.5mg 62.5mg 62.5mg 62.5mg 52.5mg 55mg 57.5mg 57.5mg   INR 3.4 3.34 2.8 3.0 3.3 3.6 2.52 2.27 2.9 2.9   Notes rec'vd 12/26       self report; recent hosp; Augmentin Merrem post-disch for sepsis Merrem Merrem 1 boost, Merrem      Date 9/9 9/25 11/8 12/30 1/24 4/13     Total Weekly Dose 57.5mg 57.5mg 57.5mg 57.5mg 57.5mg 57.5mg     INR 2.5 2.6 2.4 2.4 3.7 2.2     Notes    incr GLV       **will take minocycline 100mg BID indefinitely**     Phone Interview:  Tablet Strength: 5mg tabs  Patient Contact Info: [phone] (Home); [phone] (Mobile)  Lab Contact Info: [facility] [phone]  Verbal release 7/23/19 may speak with Delaney (wife), Puneet or Ty (sons)- ok to [facility]    Patient Findings   Negatives:  Signs/symptoms of thrombosis, Signs/symptoms of bleeding, Laboratory test error suspected, Change in health, Change in alcohol use, Change in activity, Upcoming invasive procedure, Emergency department visit, Upcoming dental procedure, Missed doses, Extra doses, Change in medications, Change in diet/appetite, Hospital admission, Bruising, Other complaints   Comments:  Mr. Farias states he is taking warfarin as directed. He reports that he has not been eating his GLV as he used. He denies meal replacement drinks, liver,  or grapefruit. All other medications are consistent.      Plan:  1. INR is slightly SUBtherapeutic today. Instructed Mr. Farias to BOOST his dose to 12.5mg tonight and then continue warfarin 7.5mg daily except 10mg on SunThurs until recheck.  2. Repeat INR in 2 weeks and pt verbalized understanding of need for compliance.   3. Verbal information provided over the phone. Cecilio Farias RBV dosing instructions, expresses understanding by teach back, and has no further questions at this time.    Olga Friend, PharmD  04/13/2020  12:27 PM

## 2020-05-08 RX ORDER — WARFARIN SODIUM 5 MG/1
TABLET ORAL
Qty: 60 TABLET | Refills: 0 | Status: SHIPPED | OUTPATIENT
Start: 2020-05-08 | End: 2020-06-04

## 2020-05-19 ENCOUNTER — TELEPHONE (OUTPATIENT)
Dept: PHARMACY | Facility: HOSPITAL | Age: 65
End: 2020-05-19

## 2020-06-02 ENCOUNTER — ANTICOAGULATION VISIT (OUTPATIENT)
Dept: PHARMACY | Facility: HOSPITAL | Age: 65
End: 2020-06-02

## 2020-06-02 DIAGNOSIS — I35.9 AORTIC VALVE DISORDER: ICD-10-CM

## 2020-06-02 LAB — INR PPP: 2.9

## 2020-06-02 NOTE — PROGRESS NOTES
Anticoagulation Clinic - Remote Progress Note  REMOTE LAB     Indication: mechanical AVR  Referring Provider: Luis  Initial Warfarin Start Date: 2009  Goal INR: 2.5-3.5  Current Drug Interactions: aspirin     Diet: green beans occasionally  (~1x/week) 6/2/20  Alcohol: none  Tobacco: none  OTC Pain Medication:     INR History:  Date 8/2 10/26 12/21 1/19/18 2/20 3/27 4/27 5/16 6/11 7/16 9/20 11/2   Total Weekly Dose 62.5mg 62.5mg 62.5mg 62.5mg 62.5mg 62.5mg 62.5mg 62.5mg 62.5mg 62.5mg 62.5mg 62.5mg   INR 2.2 2.9 3.2 3.0 2.9 3.4 3.7 2.9 2.6 2.9 2.9 3.2   Notes              sick                Date 12/21 2/1/19 3/11 4/24 5/23 6/24 7/11 7/18 7/22 8/15   Total Weekly Dose 62.5mg 62.5mg 62.5mg 62.5mg 62.5mg 62.5mg 52.5mg 55mg 57.5mg 57.5mg   INR 3.4 3.34 2.8 3.0 3.3 3.6 2.52 2.27 2.9 2.9   Notes rec'vd 12/26       self report; recent hosp; Augmentin Merrem post-disch for sepsis Merrem Merrem 1 boost, Merrem      Date 9/9 9/25 11/8 12/30 1/24 4/13 6/2         Total Weekly Dose 57.5mg 57.5mg 57.5mg 57.5mg 57.5mg 57.5mg 57.5mg         INR 2.5 2.6 2.4 2.4 3.7 2.2 2.9         Notes    incr GLV   Nerve control 911         **will take minocycline 100mg BID indefinitely**     Phone Interview:  Tablet Strength: 5mg tabs  Patient Contact Info: [phone] (Home); [phone] (Mobile)  Lab Contact Info: [facility] [phone]  Verbal release 7/23/19 may speak with Delaney (wife), Puneet or Ty (sons)- ok to [facility]    Patient Findings:  Positives:  Change in medications, Bruising   Negatives:  Signs/symptoms of thrombosis, Signs/symptoms of bleeding, Laboratory test error suspected, Change in health, Change in alcohol use, Change in activity, Upcoming invasive procedure, Emergency department visit, Upcoming dental procedure, Missed doses, Extra doses, Change in diet/appetite, Hospital admission, Other complaints   Comments:  Mr. Farias verified dosing correctly despite being months overdue for repeat INR. He is taking  Nerve Control 911 for neuropathy pain. He has around 1-2 weeks left and doesn't think he will take it any further once this bottle is gone. It doesn't seem to be helping him. He reports bruising at his insulin shot sites but nothing concerning.      Plan:  1. INR is therapeutic today at 2.9. Mr. Farias has been non compliant with requested repeat INR testing dates. His last INR check was 4/10/20.  Instructed Mr. Farias to continue warfarin 7.5 mg oral daily except 10 mg on SunThurs  2. Repeat INR in 4 weeks on 6/30.  3. Verbal information provided over the phone. Cecilio Farias RBV dosing instructions, expresses understanding by teach back, and has no further questions at this time.    Maritza Rapp, Ozzie  06/02/2020  3:25 PM    IArabella, PharmD, have reviewed the note in full and agree with the assessment and plan.  06/02/20  15:45

## 2020-06-04 RX ORDER — WARFARIN SODIUM 5 MG/1
TABLET ORAL
Qty: 60 TABLET | Refills: 3 | Status: SHIPPED | OUTPATIENT
Start: 2020-06-04 | End: 2020-10-07

## 2020-07-06 ENCOUNTER — TELEPHONE (OUTPATIENT)
Dept: PHARMACY | Facility: HOSPITAL | Age: 65
End: 2020-07-06

## 2020-07-06 NOTE — TELEPHONE ENCOUNTER
Patient returned phone call re: overdue PT/INR. He is agreeable to have INR drawn ASAP. He reports it will tomorrow at the latest when he has it drawn.

## 2020-07-09 ENCOUNTER — ANTICOAGULATION VISIT (OUTPATIENT)
Dept: PHARMACY | Facility: HOSPITAL | Age: 65
End: 2020-07-09

## 2020-07-09 DIAGNOSIS — I35.9 AORTIC VALVE DISORDER: ICD-10-CM

## 2020-07-09 LAB — INR PPP: 2.3

## 2020-07-09 NOTE — PROGRESS NOTES
Anticoagulation Clinic - Remote Progress Note  REMOTE LAB     Indication: mechanical AVR  Referring Provider: TEODORA Smith (appt 7/28/20)  Initial Warfarin Start Date: 2009  Goal INR: 2.5-3.5  Current Drug Interactions: aspirin     Diet: green beans occasionally  (~1x/week) 6/2/20  Alcohol: none  Tobacco: none  OTC Pain Medication:     INR History:  Date 8/2 10/26 12/21 1/19/18 2/20 3/27 4/27 5/16 6/11 7/16 9/20 11/2   Total Weekly Dose 62.5mg 62.5mg 62.5mg 62.5mg 62.5mg 62.5mg 62.5mg 62.5mg 62.5mg 62.5mg 62.5mg 62.5mg   INR 2.2 2.9 3.2 3.0 2.9 3.4 3.7 2.9 2.6 2.9 2.9 3.2   Notes              sick                Date 12/21 2/1/19 3/11 4/24 5/23 6/24 7/11 7/18 7/22 8/15   Total Weekly Dose 62.5mg 62.5mg 62.5mg 62.5mg 62.5mg 62.5mg 52.5mg 55mg 57.5mg 57.5mg   INR 3.4 3.34 2.8 3.0 3.3 3.6 2.52 2.27 2.9 2.9   Notes rec'vd 12/26       self report; recent hosp; Augmentin Merrem post-disch for sepsis Merrem Merrem 1 boost, Merrem      Date 9/9 9/25 11/8 12/30 1/24 4/13 6/2 7/8        Total Weekly Dose 57.5mg 57.5mg 57.5mg 57.5mg 57.5mg 57.5mg 57.5mg  57.5 mg        INR 2.5 2.6 2.4 2.4 3.7 2.2 2.9  2.3        Notes    incr GLV   Nerve control 911 rec'd 7/9  Extra GLV        **will take minocycline 100mg BID indefinitely**     Phone Interview:  Tablet Strength: 5mg tabs  Patient Contact Info: [phone] (Home); [phone] (Mobile)  Lab Contact Info: [facility] [phone]  Verbal release 7/23/19 may speak with Delaney (wife), Puneet or Ty (sons)- ok to LV    Patient Findings   Positives:  Change in diet/appetite   Negatives:  Signs/symptoms of thrombosis, Signs/symptoms of bleeding, Laboratory test error suspected, Change in health, Change in alcohol use, Change in activity, Upcoming invasive procedure, Emergency department visit, Upcoming dental procedure, Missed doses, Extra doses, Change in medications, Hospital admission, Bruising, Other complaints   Comments:  He had been eating quite a few green  beans but hasn't had many over the past week.  He plans to resume his usual intake.   He will come to clinic prior to appt with Dr. Smith on 7/28.   Otherwise, above findings negative.     Plan:  1. INR is sub therapeutic today at 2.3. Mr. Farias has been non compliant with requested repeat INR testing dates. Instructed Mr. Farias to boost warfarin 10 mg tomorrow; otherwise, continue warfarin 7.5 mg oral daily except 10 mg on SunThurs  2. Repeat INR on 7/28 in clinic prior to appointment with Dr. Smith.  3. Verbal information provided over the phone. Cecilio Farias RBV dosing instructions, expresses understanding by teach back, and has no further questions at this time.      Arabella Hebert, PharmD  07/09/20  13:55

## 2020-07-13 ENCOUNTER — ANTICOAGULATION VISIT (OUTPATIENT)
Dept: PHARMACY | Facility: HOSPITAL | Age: 65
End: 2020-07-13

## 2020-07-13 DIAGNOSIS — I35.9 AORTIC VALVE DISORDER: ICD-10-CM

## 2020-07-21 ENCOUNTER — LAB (OUTPATIENT)
Dept: LAB | Facility: HOSPITAL | Age: 65
End: 2020-07-21

## 2020-07-21 ENCOUNTER — TRANSCRIBE ORDERS (OUTPATIENT)
Dept: LAB | Facility: HOSPITAL | Age: 65
End: 2020-07-21

## 2020-07-21 DIAGNOSIS — L27.0 DERMATITIS MEDICAMENTOSA DUE TO INGESTED DRUGS: ICD-10-CM

## 2020-07-21 DIAGNOSIS — Z88.0 PERSONAL HISTORY OF ALLERGY TO PENICILLIN: ICD-10-CM

## 2020-07-21 DIAGNOSIS — T36.0X5S: Primary | ICD-10-CM

## 2020-07-21 DIAGNOSIS — Z88.0 PERSONAL HISTORY OF ALLERGY TO PENICILLIN: Primary | ICD-10-CM

## 2020-07-21 LAB
ALBUMIN SERPL-MCNC: 3.7 G/DL (ref 3.5–5.2)
ALBUMIN/GLOB SERPL: 1.1 G/DL
ALP SERPL-CCNC: 75 U/L (ref 39–117)
ALT SERPL W P-5'-P-CCNC: 25 U/L (ref 1–41)
ANION GAP SERPL CALCULATED.3IONS-SCNC: 8 MMOL/L (ref 5–15)
AST SERPL-CCNC: 39 U/L (ref 1–40)
BASOPHILS # BLD AUTO: 0.06 10*3/MM3 (ref 0–0.2)
BASOPHILS NFR BLD AUTO: 1.1 % (ref 0–1.5)
BILIRUB SERPL-MCNC: 0.6 MG/DL (ref 0–1.2)
BUN SERPL-MCNC: 12 MG/DL (ref 8–23)
BUN/CREAT SERPL: 18.2 (ref 7–25)
CALCIUM SPEC-SCNC: 9.1 MG/DL (ref 8.6–10.5)
CHLORIDE SERPL-SCNC: 105 MMOL/L (ref 98–107)
CO2 SERPL-SCNC: 27 MMOL/L (ref 22–29)
CREAT SERPL-MCNC: 0.66 MG/DL (ref 0.76–1.27)
CRP SERPL-MCNC: 0.34 MG/DL (ref 0–0.5)
DEPRECATED RDW RBC AUTO: 49.3 FL (ref 37–54)
EOSINOPHIL # BLD AUTO: 0.33 10*3/MM3 (ref 0–0.4)
EOSINOPHIL NFR BLD AUTO: 5.9 % (ref 0.3–6.2)
ERYTHROCYTE [DISTWIDTH] IN BLOOD BY AUTOMATED COUNT: 14.5 % (ref 12.3–15.4)
ERYTHROCYTE [SEDIMENTATION RATE] IN BLOOD: 36 MM/HR (ref 0–20)
GFR SERPL CREATININE-BSD FRML MDRD: 122 ML/MIN/1.73
GLOBULIN UR ELPH-MCNC: 3.5 GM/DL
GLUCOSE SERPL-MCNC: 169 MG/DL (ref 65–99)
HCT VFR BLD AUTO: 41.8 % (ref 37.5–51)
HGB BLD-MCNC: 13.2 G/DL (ref 13–17.7)
IMM GRANULOCYTES # BLD AUTO: 0.02 10*3/MM3 (ref 0–0.05)
IMM GRANULOCYTES NFR BLD AUTO: 0.4 % (ref 0–0.5)
LYMPHOCYTES # BLD AUTO: 1.27 10*3/MM3 (ref 0.7–3.1)
LYMPHOCYTES NFR BLD AUTO: 22.9 % (ref 19.6–45.3)
MCH RBC QN AUTO: 29.2 PG (ref 26.6–33)
MCHC RBC AUTO-ENTMCNC: 31.6 G/DL (ref 31.5–35.7)
MCV RBC AUTO: 92.5 FL (ref 79–97)
MONOCYTES # BLD AUTO: 0.59 10*3/MM3 (ref 0.1–0.9)
MONOCYTES NFR BLD AUTO: 10.6 % (ref 5–12)
NEUTROPHILS NFR BLD AUTO: 3.28 10*3/MM3 (ref 1.7–7)
NEUTROPHILS NFR BLD AUTO: 59.1 % (ref 42.7–76)
NRBC BLD AUTO-RTO: 0 /100 WBC (ref 0–0.2)
PLATELET # BLD AUTO: 173 10*3/MM3 (ref 140–450)
PMV BLD AUTO: 10.4 FL (ref 6–12)
POTASSIUM SERPL-SCNC: 4.7 MMOL/L (ref 3.5–5.2)
PROT SERPL-MCNC: 7.2 G/DL (ref 6–8.5)
RBC # BLD AUTO: 4.52 10*6/MM3 (ref 4.14–5.8)
SODIUM SERPL-SCNC: 140 MMOL/L (ref 136–145)
WBC # BLD AUTO: 5.55 10*3/MM3 (ref 3.4–10.8)

## 2020-07-21 PROCEDURE — 85025 COMPLETE CBC W/AUTO DIFF WBC: CPT

## 2020-07-21 PROCEDURE — 36415 COLL VENOUS BLD VENIPUNCTURE: CPT

## 2020-07-21 PROCEDURE — 86140 C-REACTIVE PROTEIN: CPT

## 2020-07-21 PROCEDURE — 80053 COMPREHEN METABOLIC PANEL: CPT

## 2020-07-21 PROCEDURE — 85652 RBC SED RATE AUTOMATED: CPT

## 2020-07-28 ENCOUNTER — TELEPHONE (OUTPATIENT)
Dept: PHARMACY | Facility: HOSPITAL | Age: 65
End: 2020-07-28

## 2020-07-28 NOTE — TELEPHONE ENCOUNTER
LVM re: missed appt    Of note, patient cancelled appt with cardiology today due to being ill. Requested he recheck INR or call clinic back with questions.

## 2020-08-13 ENCOUNTER — OFFICE VISIT (OUTPATIENT)
Dept: CARDIOLOGY | Facility: CLINIC | Age: 65
End: 2020-08-13

## 2020-08-13 VITALS
WEIGHT: 315 LBS | DIASTOLIC BLOOD PRESSURE: 80 MMHG | HEIGHT: 75 IN | SYSTOLIC BLOOD PRESSURE: 156 MMHG | HEART RATE: 90 BPM | BODY MASS INDEX: 39.17 KG/M2

## 2020-08-13 DIAGNOSIS — E78.5 DYSLIPIDEMIA: ICD-10-CM

## 2020-08-13 DIAGNOSIS — I10 ESSENTIAL HYPERTENSION: ICD-10-CM

## 2020-08-13 DIAGNOSIS — I35.9 AORTIC VALVE DISORDER: Primary | ICD-10-CM

## 2020-08-13 DIAGNOSIS — Z95.2 S/P AVR: ICD-10-CM

## 2020-08-13 PROCEDURE — 99213 OFFICE O/P EST LOW 20 MIN: CPT | Performed by: INTERNAL MEDICINE

## 2020-08-13 NOTE — PROGRESS NOTES
OFFICE FOLLOW UP     Date of Encounter:2020     Name: Cecilio Farias  : 1955  Address: [address] [place], [place] [address]    PCP: America Everett, DO  [address] [id number]    Cecilio Farias is a 64 y.o. male.    Chief Complaint: Follow up of f/u VHD, HTN, HLD    Problem List:   1. Severe valvular aortic stenosis:  a. Acute systolic CHF, winter 2009.   b. LVEF 20% to 25%.   c. Normal coronary arteries.   d. 25 mm. 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  i. Paroxysmal atrial fibrillation, resolved.   e. MUGA EF = 64%, 2010.  f. EF >70%, mechanical aortic valve with normal function (16).  g. ESTHER, 2019 - negative for vegetation or endocarditis, EF 56-60%  2. Hypertension.   3. Dyslipidemia.   4. Diabetes mellitus, type 2.   5. Morbid obesity.   6. Colon cancer with operation/chemotherapy/radiation.   7. Status post operations, remote.  8. Acinetobacter sepsis, 2019  a. IV antibiotics followed by ID.  b. Chronic suppressive Rx planned by ID    Allergies:  Allergies   Allergen Reactions   • Sulfa Antibiotics    • Amoxicillin Rash     Has tolerated ceftriaxone     Current Medications:  •  amLODIPine (NORVASC) 10 MG tablet, Take 1 tablet by mouth Every Morning.  •  aspirin 81 MG EC tablet, Take 81 mg by mouth daily  •  carvedilol (COREG) 25 MG tablet, Take 25 mg by mouth 2 (two) times a day with meals.  •  EPINEPHrine (EPIPEN) 0.3 MG/0.3ML solution auto-injector injection, As Needed  •  gabapentin (NEURONTIN) 800 MG tablet, Take 800 mg by mouth 3 (Three) Times a Day  •  glipiZIDE (GLUCOTROL) 5 MG tablet, Take 5 mg by mouth 2 (Two) Times a Day Before Meals.  •  insulin NPH-insulin regular (NOVOLIN 70/30) (70-30) 100 UNIT/ML injection, Inject 5 Units under the skin into the appropriate area as directed 2 (Two) Times a Day With Meals. (Patient taking differently: Inject 15 Units under the skin into the appropriate area as directed 2 (Two) Times a  "Day With Meals.)  •  lisinopril (PRINIVIL,ZESTRIL) 20 MG tablet, Take 20 mg by mouth 2 (two) times a day  •  metFORMIN (GLUCOPHAGE) 500 MG tablet, Take 2 tablets by mouth Every Evening  •  minocycline (MINOCIN,DYNACIN) 100 MG capsule, Take 1 capsule by mouth 2 (Two) Times a Day  •  pravastatin (PRAVACHOL) 80 MG tablet, Take 80 mg by mouth Every Night.  •  warfarin (COUMADIN) 5 MG tablet, TAKE ONE AND ONE-HALF TO TWO TABLETS BY MOUTH AS DIRECTED BY CLINIC. DUE FOR REPEAT INR    History of Present Illness:           Mr. Sims returns today for yearly follow up. He denies any cardiac complaints since his last visit, specifically no chest pain, dyspnea or heart failure symptoms. He remains on suppressive therapy with Minocycline and notes a 3-4 month history of LE discoloration. He denies pain or significant edema, and reports he had a negative LE duplex in [place] recently. He reports his BP usually is 120-130s mmHg. He is still followed by ID, and is aware of SBE prophylaxis.     The following portions of the patient's history were reviewed and updated as appropriate: allergies, current medications and problem list.    ROS: Pertinent positives as listed in the HPI.  All other systems reviewed and negative.    Objective:  Vitals:    08/13/20 1502 08/13/20 1503   BP: 165/80 156/80   BP Location: Right arm Right arm   Patient Position: Sitting Standing   Pulse: 86 90   Weight: (!) 150 kg (330 lb)    Height: 190.5 cm (75\")      Physical Exam:  GENERAL: Obese, cooperative, in no acute distress.   HEENT: Normocephalic, no adenopathy, no jugular venous distention  HEART: No discrete PMI is noted. Regular rhythm, normal rate, mechanical valve clicks, and no murmurs, gallops, or rubs.   LUNGS: Clear to auscultation bilaterally. No wheezing, rales or ronchi.  ABDOMEN: Soft, bowel sounds present, non-tender   NEUROLOGIC: No focal abnormalities involving strength or sensation are noted.   EXTREMITIES: No clubbing, cyanosis, or " edema noted. LE discoloration/hyperpigementation with purple-black appearance of bilateral LE to knees, sparing soles of feet     Diagnostic Data:    Lab Results   Component Value Date    INR 2.30 07/08/2020    INR 2.90 06/02/2020    INR 2.20 04/10/2020     Procedures    Assessment and Plan:     1. VHD, s/p mechanical aortic valve: stable and asymptomatic without dyspnea or heart failure. His INR is followed by the anticoagulation clinic. He is reminded of SBE ppx, and is still followed by ID for suppressive therapy. Suspect LE discoloration is related to Minocycline induced hyperpigmentation.   2. HTN: elevated today, however historically controlled at home. We will not make any changes today and have asked him to continue to follow home BPs.  3. HLD: Continue Pravastatin, followed by PCP.  4. Follow up in 1 year or sooner as needed.     Scribed for Kurtis Smith MD by Olga Chau PA-C. 8/13/2020  15:41

## 2020-08-18 NOTE — TELEPHONE ENCOUNTER
LVM re:overdue PT/INR lab draw    Patient will be due for 2nd letter on 8/24 if INR is not received

## 2020-08-26 NOTE — TELEPHONE ENCOUNTER
LVM re:overdue PT/INR lab draw    Will send 2x copies of 2nd letter. 1x via [employer] regular mail and 1x via [employer] certified mail (tracking #: [id number])

## 2020-09-02 ENCOUNTER — ANTICOAGULATION VISIT (OUTPATIENT)
Dept: PHARMACY | Facility: HOSPITAL | Age: 65
End: 2020-09-02

## 2020-09-02 DIAGNOSIS — I35.9 AORTIC VALVE DISORDER: ICD-10-CM

## 2020-09-02 LAB — INR PPP: 2.2

## 2020-09-02 NOTE — TELEPHONE ENCOUNTER
Spoke with patient re:overdue PT/INR lab draw    Patient reports he had INR drawn at [facility] on Mon, 8/31. Will call for results

## 2020-09-02 NOTE — PROGRESS NOTES
Anticoagulation Clinic - Remote Progress Note  REMOTE LAB     Indication: mechanical AVR  Referring Provider: TEODORA Smith (last seen: 8/13/20  next appt: 8/26/21)  Initial Warfarin Start Date: 2009  Goal INR: 2.5-3.5  Current Drug Interactions: aspirin     Diet: green beans occasionally  (~1x/week) 6/2/20  Alcohol: none  Tobacco: none  OTC Pain Medication:     INR History:  Date 8/2 10/26 12/21 1/19/18 2/20 3/27 4/27 5/16 6/11 7/16 9/20 11/2   Total Weekly Dose 62.5mg 62.5mg 62.5mg 62.5mg 62.5mg 62.5mg 62.5mg 62.5mg 62.5mg 62.5mg 62.5mg 62.5mg   INR 2.2 2.9 3.2 3.0 2.9 3.4 3.7 2.9 2.6 2.9 2.9 3.2   Notes             sick                Date 12/21 2/1/19 3/11 4/24 5/23 6/24 7/11 7/18 7/22 8/15 9/9 9/25   Total Weekly Dose 62.5mg 62.5mg 62.5mg 62.5mg 62.5mg 62.5mg 52.5mg 55mg 57.5mg 57.5mg 57.5mg 57.5mg   INR 3.4 3.34 2.8 3.0 3.3 3.6 2.52 2.27 2.9 2.9 2.5 2.6   Notes recv'd 12/26       self report; recent hosp; Augmentin Merrem post-disch for sepsis merrem merrem 1x incr dose, merrem        Date 11/8 12/30 1/24/20 4/13 6/2 7/8 8/31        Total Weekly Dose 57.5mg 57.5mg 57.5mg 57.5mg 57.5mg 57.5mg 57.5mg 60mg       INR 2.4 2.4 3.7 2.2 2.9 2.3 2.2        Notes  incr GLV   nerve control 911 supplement recv'd 7/9  incr GLV recv'd 9/2; incr GLV        **will take minocycline 100mg BID indefinitely**     Phone Interview:  Tablet Strength: 5mg tabs  Patient Contact Info: [phone] (Home); [phone] (Mobile)  Lab Contact Info: [facility] [phone]  Verbal release 7/23/19 may speak with Delaney (wife), Puneet or Ty (sons)- ok to LVM    Patient Findings   Positives:  Change in diet/appetite, Other complaints   Negatives:  Signs/symptoms of thrombosis, Signs/symptoms of bleeding, Laboratory test error suspected, Change in health, Change in alcohol use, Change in activity, Upcoming invasive procedure, Emergency department visit, Upcoming dental procedure, Missed doses, Extra doses, Change in medications,  Hospital admission, Bruising   Comments:  Patient needed to be called multiple times and sent 2 letters. He does report that he had INR drawn on 8/31. Per staff at [facility], they are short-staffed and patient's INR was due to be faxed today. Did request update of auto-fax number in patient's account, should hopefully help with receiving results as soon as they become available.     Patient reports he has been eating more green beans and broccoli recently. Have discussed the need to stay consistent with GLV intake. Patient is agreeable to try to stay consistent week to week. Otherwise denies findings.     Plan:  1. INR was SUBtherapeutic on 8/31 at 2.2, results received 9/2. Patient was again non-compliant with requested follow up.  Discussed with Adore Masters, PharmD.    Instructed Mr. Farias to increase regimen to warfarin 7.5mg oral daily except 10mg on MonWedFri until recheck (60 mg/week, 4.3% increase)  2. Repeat INR in 2 weeks, 9/14. He is agreeable to call clinic if he has not been contacted by the following morning.  3. Verbal information provided over the phone. Cecilio Farias RBV dosing instructions, expresses understanding by teach back, and has no further questions at this time.    Kurtis Hackett CPhT  9/2/2020  10:24    I, Arabella Hebert, PharmD, have reviewed the note in full and agree with the assessment and plan.  09/02/20  11:40

## 2020-10-02 ENCOUNTER — TELEPHONE (OUTPATIENT)
Dept: PHARMACY | Facility: HOSPITAL | Age: 65
End: 2020-10-02

## 2020-10-07 NOTE — TELEPHONE ENCOUNTER
Overdue for INR, followed by our ACP. Last seen 8/13/2020    Lab Results   Component Value Date    INR 2.20 08/31/2020    INR 2.30 07/08/2020    INR 2.90 06/02/2020

## 2020-10-08 RX ORDER — WARFARIN SODIUM 5 MG/1
TABLET ORAL
Qty: 60 TABLET | Refills: 0 | Status: SHIPPED | OUTPATIENT
Start: 2020-10-08 | End: 2020-11-03

## 2020-10-14 ENCOUNTER — ANTICOAGULATION VISIT (OUTPATIENT)
Dept: PHARMACY | Facility: HOSPITAL | Age: 65
End: 2020-10-14

## 2020-10-14 DIAGNOSIS — I35.9 AORTIC VALVE DISORDER: ICD-10-CM

## 2020-10-14 LAB — INR PPP: 2.8

## 2020-10-14 NOTE — PROGRESS NOTES
Anticoagulation Clinic - Remote Progress Note  REMOTE LAB     Indication: mechanical AVR  Referring Provider: TEODORA Smith (last seen: 8/13/20  next appt: 8/26/21)  Initial Warfarin Start Date: 2009  Goal INR: 2.5-3.5  Current Drug Interactions: aspirin     Diet: green beans occasionally  (~1x/week) 10/15/20  Alcohol: none  Tobacco: none  OTC Pain Medication:     INR History:  Date 8/2 10/26 12/21 1/19/18 2/20 3/27 4/27 5/16 6/11 7/16 9/20 11/2   Total Weekly Dose 62.5mg 62.5mg 62.5mg 62.5mg 62.5mg 62.5mg 62.5mg 62.5mg 62.5mg 62.5mg 62.5mg 62.5mg   INR 2.2 2.9 3.2 3.0 2.9 3.4 3.7 2.9 2.6 2.9 2.9 3.2   Notes             sick                Date 12/21 2/1/19 3/11 4/24 5/23 6/24 7/11 7/18 7/22 8/15 9/9 9/25   Total Weekly Dose 62.5mg 62.5mg 62.5mg 62.5mg 62.5mg 62.5mg 52.5mg 55mg 57.5mg 57.5mg 57.5mg 57.5mg   INR 3.4 3.34 2.8 3.0 3.3 3.6 2.52 2.27 2.9 2.9 2.5 2.6   Notes recv'd 12/26       self report; recent hosp; Augmentin Merrem post-disch for sepsis merrem merrem 1x incr dose, merrem        Date 11/8 12/30 1/24/20 4/13 6/2 7/8 8/31 10/13       Total Weekly Dose 57.5mg 57.5mg 57.5mg 57.5mg 57.5mg 57.5mg 57.5mg 60 mg       INR 2.4 2.4 3.7 2.2 2.9 2.3 2.2 2.8       Notes  incr GLV   nerve control 911 supplement recv'd 7/9  incr GLV recv'd 9/2; incr GLV        **will take minocycline 100mg BID indefinitely**     Phone Interview:  Tablet Strength: 5mg tabs  Patient Contact Info: [phone] (Home); [phone] (Mobile)  Lab Contact Info: [facility] [phone]  Verbal release 7/23/19 may speak with Delaney (wife), Puneet or Ty (sons)- ok to [facility]    Patient Findings:  Positives:  Upcoming dental procedure   Negatives:  Signs/symptoms of thrombosis, Signs/symptoms of bleeding, Laboratory test error suspected, Change in health, Change in alcohol use, Change in activity, Upcoming invasive procedure, Emergency department visit, Missed doses, Extra doses, Change in medications, Change in diet/appetite,  Hospital admission, Bruising, Other complaints   Comments:  Mr. Farias has not scheduled yet but plans on calling his DMD to make an appointment for consultation/multiple extractions. Advised Mr. Farias the importance of calling the clinic with further information once this is scheduled as they may require a warfarin hold. He verbalized understanding.     Plan:  1. INR was back [facility] on 10/13 but unable to reach patient until 10/15. Patient was again non-compliant with requested follow up.  Instructed Mr. Farias to continue recently increased maintenance dose of warfarin 7.5 mg daily except 10 mg on MonWedFri until recheck. (60 mg/week)  2. Repeat INR in three weeks from last date.  3. Verbal information provided over the phone. Cecilio Farias RBV dosing instructions, expresses understanding by teach back, and has no further questions at this time.    Maritza Rapp, Ozzie  10/15/2020  11:36 EDT    I, Olga Friend, PharmD, have reviewed the note in full and agree with the assessment and plan.  10/16/20  11:19 EDT

## 2020-11-03 RX ORDER — WARFARIN SODIUM 5 MG/1
TABLET ORAL
Qty: 60 TABLET | Refills: 1 | Status: SHIPPED | OUTPATIENT
Start: 2020-11-03 | End: 2021-01-11

## 2020-11-16 ENCOUNTER — TELEPHONE (OUTPATIENT)
Dept: PHARMACY | Facility: HOSPITAL | Age: 65
End: 2020-11-16

## 2020-12-01 ENCOUNTER — ANTICOAGULATION VISIT (OUTPATIENT)
Dept: PHARMACY | Facility: HOSPITAL | Age: 65
End: 2020-12-01

## 2020-12-01 DIAGNOSIS — I35.9 AORTIC VALVE DISORDER: ICD-10-CM

## 2020-12-01 LAB — INR PPP: 3.5

## 2021-01-07 ENCOUNTER — TELEPHONE (OUTPATIENT)
Dept: PHARMACY | Facility: HOSPITAL | Age: 66
End: 2021-01-07

## 2021-01-11 RX ORDER — WARFARIN SODIUM 5 MG/1
TABLET ORAL
Qty: 60 TABLET | Refills: 0 | Status: SHIPPED | OUTPATIENT
Start: 2021-01-11 | End: 2021-02-08

## 2021-01-11 NOTE — TELEPHONE ENCOUNTER
Lab Results   Component Value Date    INR 3.50 12/01/2020    INR 2.80 10/13/2020    INR 2.20 08/31/2020      Overdue for INR  Next appt with [facility] 8/2021

## 2021-01-14 ENCOUNTER — ANTICOAGULATION VISIT (OUTPATIENT)
Dept: PHARMACY | Facility: HOSPITAL | Age: 66
End: 2021-01-14

## 2021-01-14 DIAGNOSIS — I35.9 AORTIC VALVE DISORDER: ICD-10-CM

## 2021-01-14 LAB — INR PPP: 3.5

## 2021-01-14 NOTE — PROGRESS NOTES
Anticoagulation Clinic - Remote Progress Note  REMOTE LAB     Indication: mechanical AVR  Referring Provider: TEODORA Smith (last seen: 8/13/20  next appt: 8/26/21)  Initial Warfarin Start Date: 2009  Goal INR: 2.5-3.5  Current Drug Interactions: aspirin     Diet: green beans occasionally  (~1x/week) 1/14/21  Alcohol: none  Tobacco: none  OTC Pain Medication:     INR History:  Date 8/2 10/26 12/21 1/19/18 2/20 3/27 4/27 5/16 6/11 7/16 9/20 11/2   Total Weekly Dose 62.5mg 62.5mg 62.5mg 62.5mg 62.5mg 62.5mg 62.5mg 62.5mg 62.5mg 62.5mg 62.5mg 62.5mg   INR 2.2 2.9 3.2 3.0 2.9 3.4 3.7 2.9 2.6 2.9 2.9 3.2   Notes             sick                Date 12/21 2/1/19 3/11 4/24 5/23 6/24 7/11 7/18 7/22 8/15 9/9 9/25   Total Weekly Dose 62.5mg 62.5mg 62.5mg 62.5mg 62.5mg 62.5mg 52.5mg 55mg 57.5mg 57.5mg 57.5mg 57.5mg   INR 3.4 3.34 2.8 3.0 3.3 3.6 2.52 2.27 2.9 2.9 2.5 2.6   Notes recv'd 12/26       self report; recent hosp; Augmentin Merrem post-disch for sepsis merrem merrem 1x incr dose, merrem        Date 11/8 12/30 1/24/20 4/13 6/2 7/8 8/31 10/13 12/1 1/14/21     Total Weekly Dose 57.5mg 57.5mg 57.5mg 57.5mg 57.5mg 57.5mg 57.5mg 60 mg 60 mg 60 mg     INR 2.4 2.4 3.7 2.2 2.9 2.3 2.2 2.8 3.5 3.5     Notes  incr GLV   nerve control 911 supplement recv'd 7/9  incr GLV recv'd 9/2; incr GLV        **will take minocycline 100mg BID indefinitely**     Phone Interview:  Tablet Strength: 5mg tabs  Patient Contact Info: [phone] (Home); [phone] (Mobile)  Lab Contact Info: [facility] [phone]  Verbal release 7/23/19 may speak with Delaney (wife), Puneet or Ty (sons)- ok to LVM    Patient Findings:  Negatives:  Signs/symptoms of thrombosis, Signs/symptoms of bleeding, Laboratory test error suspected, Change in health, Change in alcohol use, Change in activity, Upcoming invasive procedure, Emergency department visit, Upcoming dental procedure, Missed doses, Extra doses, Change in medications, Change in  diet/appetite, Hospital admission, Bruising, Other complaints     Plan:  1. INR is therapeutic today at [facility]. Patient was non-compliant again with requested follow up. Instructed Mr. Farias to continue maintenance dose of warfarin 7.5 mg daily except 10 mg on MonWedFri until recheck. (60 mg/week).   2. Repeat INR in 4 weeks.  3. Verbal information provided over the phone. Cecilio Farias RBV dosing instructions, expresses understanding by teach back, and has no further questions at this time.    Maritza Rapp, Ozzie  1/14/2021  11:17 EST    VIKY, Olga Friend, PharmD, have reviewed the note in full and agree with the assessment and plan.  01/14/21  13:41 EST

## 2021-01-15 ENCOUNTER — HOSPITAL ENCOUNTER (INPATIENT)
Facility: HOSPITAL | Age: 66
LOS: 6 days | Discharge: HOME | End: 2021-01-21
Attending: EMERGENCY MEDICINE | Admitting: INTERNAL MEDICINE

## 2021-01-15 ENCOUNTER — APPOINTMENT (OUTPATIENT)
Dept: GENERAL RADIOLOGY | Facility: HOSPITAL | Age: 66
End: 2021-01-15

## 2021-01-15 ENCOUNTER — TELEPHONE (OUTPATIENT)
Dept: CARDIOLOGY | Facility: CLINIC | Age: 66
End: 2021-01-15

## 2021-01-15 DIAGNOSIS — I50.9 ACUTE ON CHRONIC CONGESTIVE HEART FAILURE, UNSPECIFIED HEART FAILURE TYPE (HCC): Primary | ICD-10-CM

## 2021-01-15 DIAGNOSIS — Z95.2 HISTORY OF MECHANICAL AORTIC VALVE REPLACEMENT: ICD-10-CM

## 2021-01-15 DIAGNOSIS — R60.0 BILATERAL LOWER EXTREMITY EDEMA: ICD-10-CM

## 2021-01-15 DIAGNOSIS — I50.9 ACUTE CONGESTIVE HEART FAILURE, UNSPECIFIED HEART FAILURE TYPE (HCC): ICD-10-CM

## 2021-01-15 DIAGNOSIS — I48.91 ATRIAL FIBRILLATION, NEW ONSET (HCC): ICD-10-CM

## 2021-01-15 DIAGNOSIS — J96.01 ACUTE RESPIRATORY FAILURE WITH HYPOXIA (HCC): ICD-10-CM

## 2021-01-15 DIAGNOSIS — I27.21 MODERATE PULMONARY ARTERIAL SYSTOLIC HYPERTENSION (HCC): ICD-10-CM

## 2021-01-15 DIAGNOSIS — R94.39 ABNORMAL NUCLEAR STRESS TEST: ICD-10-CM

## 2021-01-15 DIAGNOSIS — R06.02 SHORTNESS OF BREATH: ICD-10-CM

## 2021-01-15 PROBLEM — Z86.19: Status: ACTIVE | Noted: 2021-01-15

## 2021-01-15 PROBLEM — Z79.01 CHRONIC ANTICOAGULATION: Status: ACTIVE | Noted: 2021-01-15

## 2021-01-15 PROBLEM — D64.9 ANEMIA: Status: ACTIVE | Noted: 2021-01-15

## 2021-01-15 PROBLEM — L81.9 DISCOLORATION OF SKIN OF LOWER LEG: Status: ACTIVE | Noted: 2021-01-15

## 2021-01-15 LAB
ALBUMIN SERPL-MCNC: 3.7 G/DL (ref 3.5–5.2)
ALBUMIN/GLOB SERPL: 0.9 G/DL
ALP SERPL-CCNC: 80 U/L (ref 39–117)
ALT SERPL W P-5'-P-CCNC: 23 U/L (ref 1–41)
ANION GAP SERPL CALCULATED.3IONS-SCNC: 7 MMOL/L (ref 5–15)
ARTERIAL PATENCY WRIST A: ABNORMAL
AST SERPL-CCNC: 40 U/L (ref 1–40)
ATMOSPHERIC PRESS: ABNORMAL MM[HG]
BASE EXCESS BLDA CALC-SCNC: 0 MMOL/L (ref 0–2)
BASOPHILS # BLD AUTO: 0.07 10*3/MM3 (ref 0–0.2)
BASOPHILS NFR BLD AUTO: 0.9 % (ref 0–1.5)
BDY SITE: ABNORMAL
BILIRUB SERPL-MCNC: 0.8 MG/DL (ref 0–1.2)
BODY TEMPERATURE: 37 C
BUN SERPL-MCNC: 21 MG/DL (ref 8–23)
BUN/CREAT SERPL: 28.4 (ref 7–25)
CALCIUM SPEC-SCNC: 8.8 MG/DL (ref 8.6–10.5)
CHLORIDE SERPL-SCNC: 106 MMOL/L (ref 98–107)
CO2 BLDA-SCNC: 26.8 MMOL/L (ref 22–33)
CO2 SERPL-SCNC: 24 MMOL/L (ref 22–29)
COHGB MFR BLD: 1.7 % (ref 0–2)
CREAT SERPL-MCNC: 0.74 MG/DL (ref 0.76–1.27)
D DIMER PPP FEU-MCNC: 0.41 MCGFEU/ML (ref 0–0.56)
D-LACTATE SERPL-SCNC: 1 MMOL/L (ref 0.5–2)
DEPRECATED RDW RBC AUTO: 49.7 FL (ref 37–54)
EOSINOPHIL # BLD AUTO: 0.51 10*3/MM3 (ref 0–0.4)
EOSINOPHIL NFR BLD AUTO: 6.4 % (ref 0.3–6.2)
ERYTHROCYTE [DISTWIDTH] IN BLOOD BY AUTOMATED COUNT: 15.1 % (ref 12.3–15.4)
FLUAV RNA RESP QL NAA+PROBE: NOT DETECTED
FLUBV RNA RESP QL NAA+PROBE: NOT DETECTED
GFR SERPL CREATININE-BSD FRML MDRD: 106 ML/MIN/1.73
GLOBULIN UR ELPH-MCNC: 3.9 GM/DL
GLUCOSE BLDC GLUCOMTR-MCNC: 146 MG/DL (ref 70–130)
GLUCOSE SERPL-MCNC: 162 MG/DL (ref 65–99)
HCO3 BLDA-SCNC: 25.5 MMOL/L (ref 20–26)
HCT VFR BLD AUTO: 41.3 % (ref 37.5–51)
HCT VFR BLD CALC: 37.1 %
HGB BLD-MCNC: 12.1 G/DL (ref 13–17.7)
HGB BLDA-MCNC: 12.1 G/DL (ref 13.5–17.5)
HOLD SPECIMEN: NORMAL
HOLD SPECIMEN: NORMAL
IMM GRANULOCYTES # BLD AUTO: 0.03 10*3/MM3 (ref 0–0.05)
IMM GRANULOCYTES NFR BLD AUTO: 0.4 % (ref 0–0.5)
INHALED O2 CONCENTRATION: 28 %
INR PPP: 3.81 (ref 0.85–1.16)
INR PPP: 3.88 (ref 0.85–1.16)
LYMPHOCYTES # BLD AUTO: 1.08 10*3/MM3 (ref 0.7–3.1)
LYMPHOCYTES NFR BLD AUTO: 13.6 % (ref 19.6–45.3)
MAGNESIUM SERPL-MCNC: 2 MG/DL (ref 1.6–2.4)
MCH RBC QN AUTO: 26.5 PG (ref 26.6–33)
MCHC RBC AUTO-ENTMCNC: 29.3 G/DL (ref 31.5–35.7)
MCV RBC AUTO: 90.6 FL (ref 79–97)
METHGB BLD QL: 0.5 % (ref 0–1.5)
MODALITY: ABNORMAL
MONOCYTES # BLD AUTO: 0.71 10*3/MM3 (ref 0.1–0.9)
MONOCYTES NFR BLD AUTO: 8.9 % (ref 5–12)
NEUTROPHILS NFR BLD AUTO: 5.55 10*3/MM3 (ref 1.7–7)
NEUTROPHILS NFR BLD AUTO: 69.8 % (ref 42.7–76)
NOTE: ABNORMAL
NRBC BLD AUTO-RTO: 0 /100 WBC (ref 0–0.2)
NT-PROBNP SERPL-MCNC: 532.3 PG/ML (ref 0–900)
OXYHGB MFR BLDV: 91.8 % (ref 94–99)
PCO2 BLDA: 43.9 MM HG (ref 35–45)
PCO2 TEMP ADJ BLD: 43.9 MM HG (ref 35–48)
PH BLDA: 7.37 PH UNITS (ref 7.35–7.45)
PH, TEMP CORRECTED: 7.37 PH UNITS
PLATELET # BLD AUTO: 201 10*3/MM3 (ref 140–450)
PMV BLD AUTO: 10.3 FL (ref 6–12)
PO2 BLDA: 70.1 MM HG (ref 83–108)
PO2 TEMP ADJ BLD: 70.1 MM HG (ref 83–108)
POTASSIUM SERPL-SCNC: 4.7 MMOL/L (ref 3.5–5.2)
PROT SERPL-MCNC: 7.6 G/DL (ref 6–8.5)
PROTHROMBIN TIME: 37.3 SECONDS (ref 11.5–14)
PROTHROMBIN TIME: 37.8 SECONDS (ref 11.5–14)
QT INTERVAL: 462 MS
QTC INTERVAL: 495 MS
RBC # BLD AUTO: 4.56 10*6/MM3 (ref 4.14–5.8)
SARS-COV-2 RNA RESP QL NAA+PROBE: NOT DETECTED
SODIUM SERPL-SCNC: 137 MMOL/L (ref 136–145)
TROPONIN T SERPL-MCNC: 0.01 NG/ML (ref 0–0.03)
TROPONIN T SERPL-MCNC: 0.02 NG/ML (ref 0–0.03)
TROPONIN T SERPL-MCNC: 0.02 NG/ML (ref 0–0.03)
TSH SERPL DL<=0.05 MIU/L-ACNC: 2.76 UIU/ML (ref 0.27–4.2)
VENTILATOR MODE: ABNORMAL
WBC # BLD AUTO: 7.95 10*3/MM3 (ref 3.4–10.8)
WHOLE BLOOD HOLD SPECIMEN: NORMAL
WHOLE BLOOD HOLD SPECIMEN: NORMAL

## 2021-01-15 PROCEDURE — 80053 COMPREHEN METABOLIC PANEL: CPT | Performed by: EMERGENCY MEDICINE

## 2021-01-15 PROCEDURE — 99284 EMERGENCY DEPT VISIT MOD MDM: CPT

## 2021-01-15 PROCEDURE — 85610 PROTHROMBIN TIME: CPT | Performed by: PHYSICIAN ASSISTANT

## 2021-01-15 PROCEDURE — 84484 ASSAY OF TROPONIN QUANT: CPT | Performed by: EMERGENCY MEDICINE

## 2021-01-15 PROCEDURE — 84484 ASSAY OF TROPONIN QUANT: CPT | Performed by: PHYSICIAN ASSISTANT

## 2021-01-15 PROCEDURE — 82375 ASSAY CARBOXYHB QUANT: CPT

## 2021-01-15 PROCEDURE — 85025 COMPLETE CBC W/AUTO DIFF WBC: CPT | Performed by: EMERGENCY MEDICINE

## 2021-01-15 PROCEDURE — 93005 ELECTROCARDIOGRAM TRACING: CPT | Performed by: EMERGENCY MEDICINE

## 2021-01-15 PROCEDURE — 71045 X-RAY EXAM CHEST 1 VIEW: CPT

## 2021-01-15 PROCEDURE — 85379 FIBRIN DEGRADATION QUANT: CPT | Performed by: PHYSICIAN ASSISTANT

## 2021-01-15 PROCEDURE — 99223 1ST HOSP IP/OBS HIGH 75: CPT | Performed by: INTERNAL MEDICINE

## 2021-01-15 PROCEDURE — 83605 ASSAY OF LACTIC ACID: CPT

## 2021-01-15 PROCEDURE — 83880 ASSAY OF NATRIURETIC PEPTIDE: CPT | Performed by: EMERGENCY MEDICINE

## 2021-01-15 PROCEDURE — 36600 WITHDRAWAL OF ARTERIAL BLOOD: CPT

## 2021-01-15 PROCEDURE — 87636 SARSCOV2 & INF A&B AMP PRB: CPT | Performed by: PHYSICIAN ASSISTANT

## 2021-01-15 PROCEDURE — 83050 HGB METHEMOGLOBIN QUAN: CPT

## 2021-01-15 PROCEDURE — 63710000001 INSULIN ISOPHANE & REGULAR PER 5 UNITS: Performed by: INTERNAL MEDICINE

## 2021-01-15 PROCEDURE — 82962 GLUCOSE BLOOD TEST: CPT

## 2021-01-15 PROCEDURE — 82805 BLOOD GASES W/O2 SATURATION: CPT

## 2021-01-15 PROCEDURE — 83735 ASSAY OF MAGNESIUM: CPT | Performed by: PHYSICIAN ASSISTANT

## 2021-01-15 PROCEDURE — 25010000002 FUROSEMIDE PER 20 MG: Performed by: PHYSICIAN ASSISTANT

## 2021-01-15 PROCEDURE — 84443 ASSAY THYROID STIM HORMONE: CPT | Performed by: PHYSICIAN ASSISTANT

## 2021-01-15 RX ORDER — MAGNESIUM SULFATE HEPTAHYDRATE 40 MG/ML
4 INJECTION, SOLUTION INTRAVENOUS AS NEEDED
Status: DISCONTINUED | OUTPATIENT
Start: 2021-01-15 | End: 2021-01-16

## 2021-01-15 RX ORDER — ACETAMINOPHEN 650 MG/1
650 SUPPOSITORY RECTAL EVERY 4 HOURS PRN
Status: DISCONTINUED | OUTPATIENT
Start: 2021-01-15 | End: 2021-01-21 | Stop reason: HOSPADM

## 2021-01-15 RX ORDER — DEXTROSE MONOHYDRATE 25 G/50ML
25 INJECTION, SOLUTION INTRAVENOUS
Status: DISCONTINUED | OUTPATIENT
Start: 2021-01-15 | End: 2021-01-21 | Stop reason: HOSPADM

## 2021-01-15 RX ORDER — MAGNESIUM SULFATE 1 G/100ML
1 INJECTION INTRAVENOUS AS NEEDED
Status: DISCONTINUED | OUTPATIENT
Start: 2021-01-15 | End: 2021-01-16

## 2021-01-15 RX ORDER — ASPIRIN 81 MG/1
81 TABLET ORAL DAILY
Status: DISCONTINUED | OUTPATIENT
Start: 2021-01-16 | End: 2021-01-21 | Stop reason: HOSPADM

## 2021-01-15 RX ORDER — LISINOPRIL 20 MG/1
20 TABLET ORAL EVERY 12 HOURS SCHEDULED
Status: DISCONTINUED | OUTPATIENT
Start: 2021-01-15 | End: 2021-01-21 | Stop reason: HOSPADM

## 2021-01-15 RX ORDER — SODIUM CHLORIDE 0.9 % (FLUSH) 0.9 %
10 SYRINGE (ML) INJECTION AS NEEDED
Status: DISCONTINUED | OUTPATIENT
Start: 2021-01-15 | End: 2021-01-21 | Stop reason: HOSPADM

## 2021-01-15 RX ORDER — MINOCYCLINE HYDROCHLORIDE 50 MG/1
100 CAPSULE ORAL 2 TIMES DAILY
Status: DISCONTINUED | OUTPATIENT
Start: 2021-01-15 | End: 2021-01-21 | Stop reason: HOSPADM

## 2021-01-15 RX ORDER — NICOTINE POLACRILEX 4 MG
15 LOZENGE BUCCAL
Status: DISCONTINUED | OUTPATIENT
Start: 2021-01-15 | End: 2021-01-21 | Stop reason: HOSPADM

## 2021-01-15 RX ORDER — AMLODIPINE BESYLATE 10 MG/1
10 TABLET ORAL EVERY MORNING
Status: DISCONTINUED | OUTPATIENT
Start: 2021-01-16 | End: 2021-01-21 | Stop reason: HOSPADM

## 2021-01-15 RX ORDER — CHOLECALCIFEROL (VITAMIN D3) 125 MCG
5 CAPSULE ORAL NIGHTLY PRN
Status: DISCONTINUED | OUTPATIENT
Start: 2021-01-15 | End: 2021-01-19

## 2021-01-15 RX ORDER — PRAVASTATIN SODIUM 40 MG
80 TABLET ORAL NIGHTLY
Status: DISCONTINUED | OUTPATIENT
Start: 2021-01-15 | End: 2021-01-21 | Stop reason: HOSPADM

## 2021-01-15 RX ORDER — ACETAMINOPHEN 160 MG/5ML
650 SOLUTION ORAL EVERY 4 HOURS PRN
Status: DISCONTINUED | OUTPATIENT
Start: 2021-01-15 | End: 2021-01-21 | Stop reason: HOSPADM

## 2021-01-15 RX ORDER — FUROSEMIDE 10 MG/ML
20 INJECTION INTRAMUSCULAR; INTRAVENOUS ONCE
Status: COMPLETED | OUTPATIENT
Start: 2021-01-16 | End: 2021-01-15

## 2021-01-15 RX ORDER — FUROSEMIDE 10 MG/ML
40 INJECTION INTRAMUSCULAR; INTRAVENOUS ONCE
Status: COMPLETED | OUTPATIENT
Start: 2021-01-15 | End: 2021-01-15

## 2021-01-15 RX ORDER — SODIUM CHLORIDE 0.9 % (FLUSH) 0.9 %
10 SYRINGE (ML) INJECTION EVERY 12 HOURS SCHEDULED
Status: DISCONTINUED | OUTPATIENT
Start: 2021-01-15 | End: 2021-01-21 | Stop reason: HOSPADM

## 2021-01-15 RX ORDER — GABAPENTIN 400 MG/1
800 CAPSULE ORAL EVERY 8 HOURS SCHEDULED
Status: DISCONTINUED | OUTPATIENT
Start: 2021-01-15 | End: 2021-01-21 | Stop reason: HOSPADM

## 2021-01-15 RX ORDER — WARFARIN SODIUM 7.5 MG/1
7.5 TABLET ORAL
Status: DISCONTINUED | OUTPATIENT
Start: 2021-01-16 | End: 2021-01-16

## 2021-01-15 RX ORDER — CARVEDILOL 12.5 MG/1
25 TABLET ORAL 2 TIMES DAILY WITH MEALS
Status: DISCONTINUED | OUTPATIENT
Start: 2021-01-15 | End: 2021-01-21 | Stop reason: HOSPADM

## 2021-01-15 RX ORDER — MAGNESIUM SULFATE HEPTAHYDRATE 40 MG/ML
2 INJECTION, SOLUTION INTRAVENOUS AS NEEDED
Status: DISCONTINUED | OUTPATIENT
Start: 2021-01-15 | End: 2021-01-16

## 2021-01-15 RX ORDER — ACETAMINOPHEN 325 MG/1
650 TABLET ORAL EVERY 4 HOURS PRN
Status: DISCONTINUED | OUTPATIENT
Start: 2021-01-15 | End: 2021-01-21 | Stop reason: HOSPADM

## 2021-01-15 RX ADMIN — LISINOPRIL 20 MG: 20 TABLET ORAL at 23:07

## 2021-01-15 RX ADMIN — PRAVASTATIN SODIUM 80 MG: 40 TABLET ORAL at 23:07

## 2021-01-15 RX ADMIN — MINOCYCLINE HYDROCHLORIDE 100 MG: 50 CAPSULE ORAL at 23:04

## 2021-01-15 RX ADMIN — INSULIN HUMAN 5 UNITS: 100 INJECTION, SUSPENSION SUBCUTANEOUS at 23:05

## 2021-01-15 RX ADMIN — SODIUM CHLORIDE, PRESERVATIVE FREE 10 ML: 5 INJECTION INTRAVENOUS at 23:07

## 2021-01-15 RX ADMIN — FUROSEMIDE 20 MG: 10 INJECTION, SOLUTION INTRAMUSCULAR; INTRAVENOUS at 23:55

## 2021-01-15 RX ADMIN — GABAPENTIN 800 MG: 400 CAPSULE ORAL at 23:07

## 2021-01-15 RX ADMIN — FUROSEMIDE 40 MG: 10 INJECTION, SOLUTION INTRAVENOUS at 19:21

## 2021-01-15 RX ADMIN — CARVEDILOL 25 MG: 12.5 TABLET, FILM COATED ORAL at 23:07

## 2021-01-15 NOTE — TELEPHONE ENCOUNTER
Pt called to report he is coming to ER at [facility] this evening. He has been SOB with minimal exertion and has LE edema. He denies fever and known exposure to COVID.  Last visit with JAQUELINE, 8/2020  Last Echo was ESTHER 6/2019 showed normal LV function and normal functioning mechanical AV.  Lab Results   Component Value Date    INR 3.50 01/14/2021    INR 3.50 12/01/2020    INR 2.80 10/13/2020

## 2021-01-15 NOTE — ED PROVIDER NOTES
Subjective   Patient is a 65-year-old male presents emergency room today with complaints of increased shortness of breath and bilateral lower extremity swelling.  Patient shares that his symptoms have been worsening over the last 2 to 3 weeks.  He shares that the swelling in his legs goes down when he lays down but that recently when he lays down he cannot breathe.  Patient has history of an artificial heart valve and is anticoagulated on warfarin.  He reports also that his symptoms are worse with exertion and alleviated with rest.  He states he can barely complete a sentence without feeling short of breath.  Patient is followed by Dr. Smith cardiology.  He denies any additional symptoms on interview and exam.      Shortness of Breath  Severity:  Moderate  Onset quality:  Gradual  Duration:  2 weeks  Timing:  Constant  Progression:  Worsening  Chronicity:  Recurrent  Context: activity    Relieved by:  Sitting up and position changes  Worsened by:  Exertion and movement (Lying down)  Ineffective treatments:  None tried  Associated symptoms: no cough and no fever    Risk factors: hx of cancer        Review of Systems   Constitutional: Negative for chills, fatigue and fever.   HENT: Negative.    Respiratory: Positive for shortness of breath. Negative for cough and chest tightness.    Cardiovascular: Negative.    Gastrointestinal: Negative.    Genitourinary: Negative.    All other systems reviewed and are negative.      Past Medical History:   Diagnosis Date   • Cancer (CMS/HCC)     colon cancer with operation/ chemotherapy/radiation    • CHF (congestive heart failure) (CMS/HCC) 2009    Acute Systolic    • Chronic anticoagulation     coumadin   • Diabetes mellitus (CMS/HCC)    • H/O aortic valve replacement    • HLD (hyperlipidemia)    • Hypertension        Allergies   Allergen Reactions   • Sulfa Antibiotics    • Amoxicillin Rash     Has tolerated ceftriaxone       Past Surgical History:   Procedure Laterality Date   •  AORTIC VALVE REPAIR/REPLACEMENT  2009    25 mm. [place] AVR   • ASCENDING AORTIC ANEURYSM REPAIR  2009   • BACK SURGERY     • COLON SURGERY      Colon cancer with operation   • FOOT SURGERY      bilateral 2/2 wound and trauma        Family History   Problem Relation Age of Onset   • Heart disease Mother    • Hyperlipidemia Mother    • Diabetes Mother    • Leukemia Father    • Cancer Sister    • Kidney failure Brother    • Hepatitis Brother    • Cancer Sister    • Cancer Brother    • Heart disease Brother    • Heart attack Brother        Social History     Socioeconomic History   • Marital status:      Spouse name: Not on file   • Number of children: Not on file   • Years of education: Not on file   • Highest education level: Not on file   Tobacco Use   • Smoking status: Never Smoker   • Smokeless tobacco: Never Used   Substance and Sexual Activity   • Alcohol use: No   • Drug use: No   • Sexual activity: Defer           Objective   Physical Exam  Vitals signs and nursing note reviewed.   Constitutional:       General: He is not in acute distress.     Appearance: Normal appearance. He is well-developed. He is obese. He is not ill-appearing or toxic-appearing.   HENT:      Head: Normocephalic and atraumatic.      Mouth/Throat:      Mouth: Mucous membranes are moist.   Eyes:      General: No scleral icterus.     Extraocular Movements: Extraocular movements intact.      Conjunctiva/sclera: Conjunctivae normal.   Neck:      Musculoskeletal: Normal range of motion and neck supple.   Cardiovascular:      Rate and Rhythm: Normal rate and regular rhythm.   Pulmonary:      Effort: Pulmonary effort is normal. Tachypnea present. No respiratory distress.      Breath sounds: Normal breath sounds.   Chest:      Chest wall: No tenderness.   Abdominal:      General: There is no distension.      Palpations: Abdomen is soft.      Tenderness: There is no abdominal tenderness.   Musculoskeletal: Normal range of motion.          General: No deformity.      Right lower leg: Edema present.      Left lower leg: Edema present.   Skin:     General: Skin is warm and dry.      Comments: Hyperpigmentation of bilateral lower extremities secondary to long-term minocycline therapy   Neurological:      General: No focal deficit present.      Mental Status: He is alert.   Psychiatric:         Mood and Affect: Mood normal.         Behavior: Behavior normal.         Thought Content: Thought content normal.         Judgment: Judgment normal.         Procedures           ED Course  ED Course as of Dane 15 2050   Fri Dane 15, 2021   1849 Notified by nursing staff that patient oxygen sats dropped from 93% resting at bedside to 83% when ambulating to doorway of ER exam room.    [JG]   2047 Patient presents to ED for evaluation of worsening shortness of breath and bilateral lower extremity edema over the last 2 to 3 weeks.  Patient with history of CHF and increased pulmonary vascular congestion on imaging of chest.  No additional acute abnormalities on labs.  With ambulation in ED patient dropped from 93% on room air to 83%.  Patient normally does not require oxygen.  Patient also found to be in new onset atrial fibrillation.  Patient already anticoagulated with warfarin for his mechanical valve.  Patient received initial dose of diuretic while in the emergency department.  Hospitalist consulted for admission and was agreeable to accept patient.  Patient agreeable to plan of care and hospital admission.  At time of admission disposition, patient resting comfortably, no acute distress, vital signs stable with oxygen saturation of 95%.    [JG]      ED Course User Index  [JG] Juanito Meeks PA      Recent Results (from the past 24 hour(s))   ECG 12 Lead    Collection Time: 01/15/21  5:38 PM   Result Value Ref Range    QT Interval 462 ms    QTC Interval 495 ms   Comprehensive Metabolic Panel    Collection Time: 01/15/21  5:46 PM    Specimen: Blood   Result Value Ref  Range    Glucose 162 (H) 65 - 99 mg/dL    BUN 21 8 - 23 mg/dL    Creatinine 0.74 (L) 0.76 - 1.27 mg/dL    Sodium 137 136 - 145 mmol/L    Potassium 4.7 3.5 - 5.2 mmol/L    Chloride 106 98 - 107 mmol/L    CO2 24.0 22.0 - 29.0 mmol/L    Calcium 8.8 8.6 - 10.5 mg/dL    Total Protein 7.6 6.0 - 8.5 g/dL    Albumin 3.70 3.50 - 5.20 g/dL    ALT (SGPT) 23 1 - 41 U/L    AST (SGOT) 40 1 - 40 U/L    Alkaline Phosphatase 80 39 - 117 U/L    Total Bilirubin 0.8 0.0 - 1.2 mg/dL    eGFR Non African Amer 106 >60 mL/min/1.73    Globulin 3.9 gm/dL    A/G Ratio 0.9 g/dL    BUN/Creatinine Ratio 28.4 (H) 7.0 - 25.0    Anion Gap 7.0 5.0 - 15.0 mmol/L   BNP    Collection Time: 01/15/21  5:46 PM    Specimen: Blood   Result Value Ref Range    proBNP 532.3 0.0 - 900.0 pg/mL   Troponin    Collection Time: 01/15/21  5:46 PM    Specimen: Blood   Result Value Ref Range    Troponin T 0.014 0.000 - 0.030 ng/mL   Light Blue Top    Collection Time: 01/15/21  5:46 PM   Result Value Ref Range    Extra Tube hold for add-on    Green Top (Gel)    Collection Time: 01/15/21  5:46 PM   Result Value Ref Range    Extra Tube Hold for add-ons.    Gold Top - SST    Collection Time: 01/15/21  5:46 PM   Result Value Ref Range    Extra Tube Hold for add-ons.    Protime-INR    Collection Time: 01/15/21  5:46 PM    Specimen: Blood   Result Value Ref Range    Protime 37.8 (H) 11.5 - 14.0 Seconds    INR 3.88 (H) 0.85 - 1.16   D-dimer, Quantitative    Collection Time: 01/15/21  5:46 PM    Specimen: Blood   Result Value Ref Range    D-Dimer, Quantitative 0.41 0.00 - 0.56 MCGFEU/mL   TSH    Collection Time: 01/15/21  5:46 PM    Specimen: Blood   Result Value Ref Range    TSH 2.760 0.270 - 4.200 uIU/mL   Magnesium    Collection Time: 01/15/21  5:46 PM    Specimen: Blood   Result Value Ref Range    Magnesium 2.0 1.6 - 2.4 mg/dL   Lavender Top    Collection Time: 01/15/21  5:48 PM   Result Value Ref Range    Extra Tube hold for add-on    CBC Auto Differential    Collection  Time: 01/15/21  5:48 PM    Specimen: Blood   Result Value Ref Range    WBC 7.95 3.40 - 10.80 10*3/mm3    RBC 4.56 4.14 - 5.80 10*6/mm3    Hemoglobin 12.1 (L) 13.0 - 17.7 g/dL    Hematocrit 41.3 37.5 - 51.0 %    MCV 90.6 79.0 - 97.0 fL    MCH 26.5 (L) 26.6 - 33.0 pg    MCHC 29.3 (L) 31.5 - 35.7 g/dL    RDW 15.1 12.3 - 15.4 %    RDW-SD 49.7 37.0 - 54.0 fl    MPV 10.3 6.0 - 12.0 fL    Platelets 201 140 - 450 10*3/mm3    Neutrophil % 69.8 42.7 - 76.0 %    Lymphocyte % 13.6 (L) 19.6 - 45.3 %    Monocyte % 8.9 5.0 - 12.0 %    Eosinophil % 6.4 (H) 0.3 - 6.2 %    Basophil % 0.9 0.0 - 1.5 %    Immature Grans % 0.4 0.0 - 0.5 %    Neutrophils, Absolute 5.55 1.70 - 7.00 10*3/mm3    Lymphocytes, Absolute 1.08 0.70 - 3.10 10*3/mm3    Monocytes, Absolute 0.71 0.10 - 0.90 10*3/mm3    Eosinophils, Absolute 0.51 (H) 0.00 - 0.40 10*3/mm3    Basophils, Absolute 0.07 0.00 - 0.20 10*3/mm3    Immature Grans, Absolute 0.03 0.00 - 0.05 10*3/mm3    nRBC 0.0 0.0 - 0.2 /100 WBC   Lactic Acid, Plasma    Collection Time: 01/15/21  5:48 PM    Specimen: Blood   Result Value Ref Range    Lactate 1.0 0.5 - 2.0 mmol/L   COVID-19 and FLU A/B PCR - Swab, Nasopharynx    Collection Time: 01/15/21  7:21 PM    Specimen: Nasopharynx; Swab   Result Value Ref Range    COVID19 Not Detected Not Detected - Ref. Range    Influenza A PCR Not Detected Not Detected    Influenza B PCR Not Detected Not Detected   Blood Gas, Arterial With Co-Ox    Collection Time: 01/15/21  8:36 PM    Specimen: Arterial Blood   Result Value Ref Range    Site Right Radial     Rodo's Test N/A     pH, Arterial 7.373 7.350 - 7.450 pH units    pCO2, Arterial 43.9 35.0 - 45.0 mm Hg    pO2, Arterial 70.1 (L) 83.0 - 108.0 mm Hg    HCO3, Arterial 25.5 20.0 - 26.0 mmol/L    Base Excess, Arterial 0.0 0.0 - 2.0 mmol/L    Hemoglobin, Blood Gas 12.1 (L) 13.5 - 17.5 g/dL    Hematocrit, Blood Gas 37.1 %    Oxyhemoglobin 91.8 (L) 94 - 99 %    Methemoglobin 0.50 0.00 - 1.50 %    Carboxyhemoglobin 1.7 0  - 2 %    CO2 Content 26.8 22 - 33 mmol/L    Temperature 37.0 C    Barometric Pressure for Blood Gas      Modality Nasal Cannula     FIO2 28 %    Ventilator Mode       Note      pH, Temp Corrected 7.373 pH Units    pCO2, Temperature Corrected 43.9 35 - 48 mm Hg    pO2, Temperature Corrected 70.1 (L) 83 - 108 mm Hg     Note: In addition to lab results from this visit, the labs listed above may include labs taken at another facility or during a different encounter within the last 24 hours. Please correlate lab times with ED admission and discharge times for further clarification of the services performed during this visit.    XR Chest 1 View   Preliminary Result   Heart is enlarged with prominence of the pulmonary   vascularity bilaterally. No evidence of acute parenchymal disease.                Vitals:    01/15/21 1830 01/15/21 1835 01/15/21 1845 01/15/21 1900   BP: 159/77   165/79   BP Location:       Patient Position:       Pulse:  67 78 70   Resp:       Temp:       SpO2: 92% 91% (!) 88% 95%   Weight:       Height:         Medications   sodium chloride 0.9 % flush 10 mL (has no administration in time range)   furosemide (LASIX) injection 40 mg (40 mg Intravenous Given 1/15/21 1921)     ECG/EMG Results (last 24 hours)     Procedure Component Value Units Date/Time    ECG 12 Lead [[id number]] Collected: 01/15/21 1738     Updated: 01/15/21 1738        ECG 12 Lead   Final Result   Test Reason : SOA Protocol   Blood Pressure : **/** mmHG   Vent. Rate : 069 BPM     Atrial Rate : 072 BPM      P-R Int : 000 ms          QRS Dur : 098 ms       QT Int : 462 ms       P-R-T Axes : 000 064 081 degrees      QTc Int : 495 ms      Atrial fibrillation   Prolonged QT   Abnormal ECG   When compared with ECG of 10-EVENS-2019 00:25,   Atrial fibrillation has replaced Sinus rhythm   Confirmed by NORMAN CHAPA MD ([id number]) on 1/15/2021 7:52:01 PM      Referred By:  JADA CAMPOS           Confirmed By:NORMAN CHAPA MD                                              MDM  Number of Diagnoses or Management Options  Acute on chronic congestive heart failure, unspecified heart failure type (CMS/HCC): new and requires workup  Acute respiratory failure with hypoxia (CMS/HCC): new and requires workup  Atrial fibrillation, new onset (CMS/HCC): new and requires workup  Bilateral lower extremity edema: established and worsening  Shortness of breath: new and requires workup     Amount and/or Complexity of Data Reviewed  Clinical lab tests: reviewed  Tests in the radiology section of CPT®: reviewed  Tests in the medicine section of CPT®: reviewed  Decide to obtain previous medical records or to obtain history from someone other than the patient: yes    Risk of Complications, Morbidity, and/or Mortality  Presenting problems: high  Diagnostic procedures: moderate  Management options: moderate    Patient Progress  Patient progress: stable      Final diagnoses:   Acute on chronic congestive heart failure, unspecified heart failure type (CMS/HCC)   Acute respiratory failure with hypoxia (CMS/HCC)   Shortness of breath   Bilateral lower extremity edema   Atrial fibrillation, new onset (CMS/HCC)            Juanito Meeks PA  01/15/21 2051

## 2021-01-16 ENCOUNTER — APPOINTMENT (OUTPATIENT)
Dept: CT IMAGING | Facility: HOSPITAL | Age: 66
End: 2021-01-16

## 2021-01-16 ENCOUNTER — APPOINTMENT (OUTPATIENT)
Dept: CARDIOLOGY | Facility: HOSPITAL | Age: 66
End: 2021-01-16

## 2021-01-16 PROBLEM — I50.9 CHF (CONGESTIVE HEART FAILURE): Status: RESOLVED | Noted: 2019-06-10 | Resolved: 2021-01-16

## 2021-01-16 PROBLEM — I50.31 ACUTE DIASTOLIC CONGESTIVE HEART FAILURE (HCC): Status: ACTIVE | Noted: 2021-01-15

## 2021-01-16 PROBLEM — I48.0 PAROXYSMAL ATRIAL FIBRILLATION (HCC): Status: ACTIVE | Noted: 2021-01-15

## 2021-01-16 LAB
ANION GAP SERPL CALCULATED.3IONS-SCNC: 7 MMOL/L (ref 5–15)
ASCENDING AORTA: 3.7 CM
BH CV ECHO MEAS - AO MAX PG (FULL): 25.1 MMHG
BH CV ECHO MEAS - AO MAX PG: 30.8 MMHG
BH CV ECHO MEAS - AO MEAN PG (FULL): 12.8 MMHG
BH CV ECHO MEAS - AO MEAN PG: 16.4 MMHG
BH CV ECHO MEAS - AO ROOT AREA (BSA CORRECTED): 1.4
BH CV ECHO MEAS - AO ROOT AREA: 11.4 CM^2
BH CV ECHO MEAS - AO ROOT DIAM: 3.9 CM
BH CV ECHO MEAS - AO V2 MAX: 277.5 CM/SEC
BH CV ECHO MEAS - AO V2 MEAN: 187.5 CM/SEC
BH CV ECHO MEAS - AO V2 VTI: 57.4 CM
BH CV ECHO MEAS - ASC AORTA: 3.7 CM
BH CV ECHO MEAS - AVA(I,A): 1.6 CM^2
BH CV ECHO MEAS - AVA(I,D): 1.6 CM^2
BH CV ECHO MEAS - AVA(V,A): 1.2 CM^2
BH CV ECHO MEAS - AVA(V,D): 1.2 CM^2
BH CV ECHO MEAS - BSA(HAYCOCK): 2.9 M^2
BH CV ECHO MEAS - BSA: 2.7 M^2
BH CV ECHO MEAS - BZI_BMI: 42.5 KILOGRAMS/M^2
BH CV ECHO MEAS - BZI_METRIC_HEIGHT: 190.5 CM
BH CV ECHO MEAS - BZI_METRIC_WEIGHT: 154.2 KG
BH CV ECHO MEAS - EDV(CUBED): 114.4 ML
BH CV ECHO MEAS - EDV(MOD-SP2): 234 ML
BH CV ECHO MEAS - EDV(MOD-SP4): 237 ML
BH CV ECHO MEAS - EDV(TEICH): 110.4 ML
BH CV ECHO MEAS - EF(CUBED): 83.4 %
BH CV ECHO MEAS - EF(MOD-BP): 53 %
BH CV ECHO MEAS - EF(MOD-SP2): 50 %
BH CV ECHO MEAS - EF(MOD-SP4): 56.1 %
BH CV ECHO MEAS - EF(TEICH): 76.2 %
BH CV ECHO MEAS - ESV(CUBED): 19 ML
BH CV ECHO MEAS - ESV(MOD-SP2): 117 ML
BH CV ECHO MEAS - ESV(MOD-SP4): 104 ML
BH CV ECHO MEAS - ESV(TEICH): 26.3 ML
BH CV ECHO MEAS - FS: 45 %
BH CV ECHO MEAS - IVS/LVPW: 1.1
BH CV ECHO MEAS - IVSD: 1.3 CM
BH CV ECHO MEAS - LA DIMENSION: 4.7 CM
BH CV ECHO MEAS - LA/AO: 1.2
BH CV ECHO MEAS - LAD MAJOR: 7.5 CM
BH CV ECHO MEAS - LAT PEAK E' VEL: 6.3 CM/SEC
BH CV ECHO MEAS - LATERAL E/E' RATIO: 27.4
BH CV ECHO MEAS - LV DIASTOLIC VOL/BSA (35-75): 86.2 ML/M^2
BH CV ECHO MEAS - LV IVRT: 0.1 SEC
BH CV ECHO MEAS - LV MASS(C)D: 242.8 GRAMS
BH CV ECHO MEAS - LV MASS(C)DI: 88.3 GRAMS/M^2
BH CV ECHO MEAS - LV MAX PG: 5.7 MMHG
BH CV ECHO MEAS - LV MEAN PG: 3.6 MMHG
BH CV ECHO MEAS - LV SYSTOLIC VOL/BSA (12-30): 37.8 ML/M^2
BH CV ECHO MEAS - LV V1 MAX: 119 CM/SEC
BH CV ECHO MEAS - LV V1 MEAN: 88.2 CM/SEC
BH CV ECHO MEAS - LV V1 VTI: 32 CM
BH CV ECHO MEAS - LVIDD: 4.9 CM
BH CV ECHO MEAS - LVIDS: 2.7 CM
BH CV ECHO MEAS - LVLD AP2: 10.1 CM
BH CV ECHO MEAS - LVLD AP4: 10.3 CM
BH CV ECHO MEAS - LVLS AP2: 8.8 CM
BH CV ECHO MEAS - LVLS AP4: 9.1 CM
BH CV ECHO MEAS - LVOT AREA (M): 2.8 CM^2
BH CV ECHO MEAS - LVOT AREA: 2.8 CM^2
BH CV ECHO MEAS - LVOT DIAM: 1.9 CM
BH CV ECHO MEAS - LVPWD: 1.2 CM
BH CV ECHO MEAS - MED PEAK E' VEL: 6.6 CM/SEC
BH CV ECHO MEAS - MEDIAL E/E' RATIO: 26
BH CV ECHO MEAS - MV A MAX VEL: 29.6 CM/SEC
BH CV ECHO MEAS - MV DEC SLOPE: 891.9 CM/SEC^2
BH CV ECHO MEAS - MV DEC TIME: 0.18 SEC
BH CV ECHO MEAS - MV E MAX VEL: 174.9 CM/SEC
BH CV ECHO MEAS - MV E/A: 5.9
BH CV ECHO MEAS - MV MAX PG: 19.3 MMHG
BH CV ECHO MEAS - MV MEAN PG: 4.5 MMHG
BH CV ECHO MEAS - MV P1/2T MAX VEL: 229.2 CM/SEC
BH CV ECHO MEAS - MV P1/2T: 75.3 MSEC
BH CV ECHO MEAS - MV V2 MAX: 219.6 CM/SEC
BH CV ECHO MEAS - MV V2 MEAN: 83.1 CM/SEC
BH CV ECHO MEAS - MV V2 VTI: 48.3 CM
BH CV ECHO MEAS - MVA P1/2T LCG: 0.96 CM^2
BH CV ECHO MEAS - MVA(P1/2T): 2.9 CM^2
BH CV ECHO MEAS - MVA(VTI): 1.9 CM^2
BH CV ECHO MEAS - PA ACC SLOPE: 856.5 CM/SEC^2
BH CV ECHO MEAS - PA ACC TIME: 0.12 SEC
BH CV ECHO MEAS - PA MAX PG: 6.2 MMHG
BH CV ECHO MEAS - PA PR(ACCEL): 22.8 MMHG
BH CV ECHO MEAS - PA V2 MAX: 124.6 CM/SEC
BH CV ECHO MEAS - RAP SYSTOLE: 3 MMHG
BH CV ECHO MEAS - RVSP: 56 MMHG
BH CV ECHO MEAS - SI(AO): 238.7 ML/M^2
BH CV ECHO MEAS - SI(CUBED): 34.7 ML/M^2
BH CV ECHO MEAS - SI(LVOT): 32.9 ML/M^2
BH CV ECHO MEAS - SI(MOD-SP2): 42.6 ML/M^2
BH CV ECHO MEAS - SI(MOD-SP4): 48.4 ML/M^2
BH CV ECHO MEAS - SI(TEICH): 30.6 ML/M^2
BH CV ECHO MEAS - SV(AO): 656.3 ML
BH CV ECHO MEAS - SV(CUBED): 95.4 ML
BH CV ECHO MEAS - SV(LVOT): 90.6 ML
BH CV ECHO MEAS - SV(MOD-SP2): 117 ML
BH CV ECHO MEAS - SV(MOD-SP4): 133 ML
BH CV ECHO MEAS - SV(TEICH): 84.1 ML
BH CV ECHO MEAS - TAPSE (>1.6): 2 CM
BH CV ECHO MEAS - TR MAX PG: 53 MMHG
BH CV ECHO MEAS - TR MAX VEL: 364 CM/SEC
BH CV ECHO MEASUREMENTS AVERAGE E/E' RATIO: 27.12
BH CV VAS BP LEFT ARM: NORMAL MMHG
BH CV XLRA - RV BASE: 4.3 CM
BH CV XLRA - RV LENGTH: 7.3 CM
BH CV XLRA - RV MID: 3.7 CM
BH CV XLRA - TDI S': 14.7 CM/SEC
BUN SERPL-MCNC: 21 MG/DL (ref 8–23)
BUN/CREAT SERPL: 24.4 (ref 7–25)
CALCIUM SPEC-SCNC: 8.4 MG/DL (ref 8.6–10.5)
CHLORIDE SERPL-SCNC: 105 MMOL/L (ref 98–107)
CO2 SERPL-SCNC: 25 MMOL/L (ref 22–29)
CREAT SERPL-MCNC: 0.86 MG/DL (ref 0.76–1.27)
DEPRECATED RDW RBC AUTO: 50.5 FL (ref 37–54)
ERYTHROCYTE [DISTWIDTH] IN BLOOD BY AUTOMATED COUNT: 15.2 % (ref 12.3–15.4)
GFR SERPL CREATININE-BSD FRML MDRD: 90 ML/MIN/1.73
GLUCOSE BLDC GLUCOMTR-MCNC: 205 MG/DL (ref 70–130)
GLUCOSE BLDC GLUCOMTR-MCNC: 223 MG/DL (ref 70–130)
GLUCOSE BLDC GLUCOMTR-MCNC: 233 MG/DL (ref 70–130)
GLUCOSE BLDC GLUCOMTR-MCNC: 243 MG/DL (ref 70–130)
GLUCOSE SERPL-MCNC: 159 MG/DL (ref 65–99)
HBA1C MFR BLD: 7.1 % (ref 4.8–5.6)
HCT VFR BLD AUTO: 38.2 % (ref 37.5–51)
HGB BLD-MCNC: 11.2 G/DL (ref 13–17.7)
INR PPP: 3.63 (ref 0.85–1.16)
IVRT: 95 MSEC
LEFT ATRIUM VOLUME INDEX: 46.9 ML/M^2
LEFT ATRIUM VOLUME: 129 ML
MAGNESIUM SERPL-MCNC: 1.8 MG/DL (ref 1.6–2.4)
MCH RBC QN AUTO: 26.9 PG (ref 26.6–33)
MCHC RBC AUTO-ENTMCNC: 29.3 G/DL (ref 31.5–35.7)
MCV RBC AUTO: 91.8 FL (ref 79–97)
PLATELET # BLD AUTO: 198 10*3/MM3 (ref 140–450)
PMV BLD AUTO: 10.2 FL (ref 6–12)
POTASSIUM SERPL-SCNC: 4.3 MMOL/L (ref 3.5–5.2)
PROTHROMBIN TIME: 35.9 SECONDS (ref 11.5–14)
RBC # BLD AUTO: 4.16 10*6/MM3 (ref 4.14–5.8)
SODIUM SERPL-SCNC: 137 MMOL/L (ref 136–145)
TSH SERPL DL<=0.05 MIU/L-ACNC: 2.34 UIU/ML (ref 0.27–4.2)
WBC # BLD AUTO: 6.39 10*3/MM3 (ref 3.4–10.8)

## 2021-01-16 PROCEDURE — 83036 HEMOGLOBIN GLYCOSYLATED A1C: CPT | Performed by: PHYSICIAN ASSISTANT

## 2021-01-16 PROCEDURE — 99232 SBSQ HOSP IP/OBS MODERATE 35: CPT | Performed by: INTERNAL MEDICINE

## 2021-01-16 PROCEDURE — 93005 ELECTROCARDIOGRAM TRACING: CPT | Performed by: PHYSICIAN ASSISTANT

## 2021-01-16 PROCEDURE — 93010 ELECTROCARDIOGRAM REPORT: CPT | Performed by: INTERNAL MEDICINE

## 2021-01-16 PROCEDURE — 99222 1ST HOSP IP/OBS MODERATE 55: CPT | Performed by: INTERNAL MEDICINE

## 2021-01-16 PROCEDURE — 0 IOPAMIDOL PER 1 ML: Performed by: INTERNAL MEDICINE

## 2021-01-16 PROCEDURE — 85610 PROTHROMBIN TIME: CPT | Performed by: PHYSICIAN ASSISTANT

## 2021-01-16 PROCEDURE — 82962 GLUCOSE BLOOD TEST: CPT

## 2021-01-16 PROCEDURE — 93306 TTE W/DOPPLER COMPLETE: CPT

## 2021-01-16 PROCEDURE — 25010000002 MAGNESIUM SULFATE 2 GM/50ML SOLUTION: Performed by: INTERNAL MEDICINE

## 2021-01-16 PROCEDURE — 85027 COMPLETE CBC AUTOMATED: CPT | Performed by: PHYSICIAN ASSISTANT

## 2021-01-16 PROCEDURE — 93306 TTE W/DOPPLER COMPLETE: CPT | Performed by: INTERNAL MEDICINE

## 2021-01-16 PROCEDURE — 83735 ASSAY OF MAGNESIUM: CPT | Performed by: INTERNAL MEDICINE

## 2021-01-16 PROCEDURE — 63710000001 INSULIN LISPRO (HUMAN) PER 5 UNITS: Performed by: INTERNAL MEDICINE

## 2021-01-16 PROCEDURE — 25010000002 FUROSEMIDE PER 20 MG: Performed by: INTERNAL MEDICINE

## 2021-01-16 PROCEDURE — 71275 CT ANGIOGRAPHY CHEST: CPT

## 2021-01-16 PROCEDURE — 63710000001 INSULIN DETEMIR PER 5 UNITS: Performed by: INTERNAL MEDICINE

## 2021-01-16 PROCEDURE — 80048 BASIC METABOLIC PNL TOTAL CA: CPT | Performed by: PHYSICIAN ASSISTANT

## 2021-01-16 PROCEDURE — 84443 ASSAY THYROID STIM HORMONE: CPT | Performed by: PHYSICIAN ASSISTANT

## 2021-01-16 RX ORDER — WARFARIN SODIUM 5 MG/1
5 TABLET ORAL
Status: DISCONTINUED | OUTPATIENT
Start: 2021-01-16 | End: 2021-01-17

## 2021-01-16 RX ORDER — FUROSEMIDE 10 MG/ML
40 INJECTION INTRAMUSCULAR; INTRAVENOUS EVERY 12 HOURS SCHEDULED
Status: DISCONTINUED | OUTPATIENT
Start: 2021-01-16 | End: 2021-01-19

## 2021-01-16 RX ORDER — MAGNESIUM SULFATE HEPTAHYDRATE 40 MG/ML
2 INJECTION, SOLUTION INTRAVENOUS AS NEEDED
Status: DISCONTINUED | OUTPATIENT
Start: 2021-01-16 | End: 2021-01-21 | Stop reason: HOSPADM

## 2021-01-16 RX ORDER — MAGNESIUM SULFATE HEPTAHYDRATE 40 MG/ML
4 INJECTION, SOLUTION INTRAVENOUS AS NEEDED
Status: DISCONTINUED | OUTPATIENT
Start: 2021-01-16 | End: 2021-01-21 | Stop reason: HOSPADM

## 2021-01-16 RX ADMIN — INSULIN LISPRO 3 UNITS: 100 INJECTION, SOLUTION INTRAVENOUS; SUBCUTANEOUS at 09:52

## 2021-01-16 RX ADMIN — PRAVASTATIN SODIUM 80 MG: 40 TABLET ORAL at 21:37

## 2021-01-16 RX ADMIN — INSULIN LISPRO 3 UNITS: 100 INJECTION, SOLUTION INTRAVENOUS; SUBCUTANEOUS at 13:15

## 2021-01-16 RX ADMIN — GABAPENTIN 800 MG: 400 CAPSULE ORAL at 21:38

## 2021-01-16 RX ADMIN — GABAPENTIN 800 MG: 400 CAPSULE ORAL at 14:16

## 2021-01-16 RX ADMIN — LISINOPRIL 20 MG: 20 TABLET ORAL at 21:37

## 2021-01-16 RX ADMIN — GABAPENTIN 800 MG: 400 CAPSULE ORAL at 06:38

## 2021-01-16 RX ADMIN — SODIUM CHLORIDE, PRESERVATIVE FREE 10 ML: 5 INJECTION INTRAVENOUS at 21:38

## 2021-01-16 RX ADMIN — MINOCYCLINE HYDROCHLORIDE 100 MG: 50 CAPSULE ORAL at 21:37

## 2021-01-16 RX ADMIN — INSULIN LISPRO 3 UNITS: 100 INJECTION, SOLUTION INTRAVENOUS; SUBCUTANEOUS at 18:54

## 2021-01-16 RX ADMIN — CARVEDILOL 25 MG: 12.5 TABLET, FILM COATED ORAL at 18:54

## 2021-01-16 RX ADMIN — WARFARIN SODIUM 5 MG: 5 TABLET ORAL at 18:54

## 2021-01-16 RX ADMIN — IOPAMIDOL 75 ML: 755 INJECTION, SOLUTION INTRAVENOUS at 16:06

## 2021-01-16 RX ADMIN — FUROSEMIDE 40 MG: 10 INJECTION INTRAMUSCULAR; INTRAVENOUS at 21:38

## 2021-01-16 RX ADMIN — INSULIN LISPRO 4 UNITS: 100 INJECTION, SOLUTION INTRAVENOUS; SUBCUTANEOUS at 18:55

## 2021-01-16 RX ADMIN — MAGNESIUM SULFATE HEPTAHYDRATE 2 G: 2 INJECTION, SOLUTION INTRAVENOUS at 09:52

## 2021-01-16 RX ADMIN — ASPIRIN 81 MG: 81 TABLET, FILM COATED ORAL at 09:53

## 2021-01-16 RX ADMIN — SODIUM CHLORIDE, PRESERVATIVE FREE 10 ML: 5 INJECTION INTRAVENOUS at 09:52

## 2021-01-16 RX ADMIN — INSULIN DETEMIR 8 UNITS: 100 INJECTION, SOLUTION SUBCUTANEOUS at 21:37

## 2021-01-16 RX ADMIN — FUROSEMIDE 40 MG: 10 INJECTION INTRAMUSCULAR; INTRAVENOUS at 09:53

## 2021-01-16 RX ADMIN — LISINOPRIL 20 MG: 20 TABLET ORAL at 09:53

## 2021-01-16 RX ADMIN — MINOCYCLINE HYDROCHLORIDE 100 MG: 50 CAPSULE ORAL at 09:53

## 2021-01-16 RX ADMIN — AMLODIPINE BESYLATE 10 MG: 10 TABLET ORAL at 09:53

## 2021-01-16 NOTE — PROGRESS NOTES
[facility] Medicine Services  PROGRESS NOTE    Patient Name: Cecilio Farias  : 1955  MRN: [id number]    Date of Admission: 1/15/2021  Primary Care Physician: America Everett DO    Subjective   Subjective     CC:  f/u dyspnea    HPI:  Feels a little better since admission.  Dieruesing well.  No CP, less dyspnea    ROS:  Gen- No fevers, chills  CV- No chest pain, palpitations  Resp- No cough, + dyspnea  GI- No N/V/D, abd pain    Objective   Objective     Vital Signs:   Temp:  [97.3 °F (36.3 °C)-98.6 °F (37 °C)] 98 °F (36.7 °C)  Heart Rate:  [45-78] 60  Resp:  [18] 18  BP: (128-193)/(57-96) 148/70        Physical Exam:  Constitutional: No acute distress, awake, alert, sitting up in bed  HENT: NCAT, mucous membranes moist  Respiratory: diminished but generally clear, respiratory effort normal   Cardiovascular: RRR, no murmurs, rubs, or gallops  Gastrointestinal: Positive bowel sounds, soft, nontender, nondistended  Musculoskeletal: trace bilateral ankle edema, chronic discoloration  Psychiatric: Appropriate affect, cooperative  Neurologic: Oriented x 3, no focal deficits  Skin: No rashes      Results Reviewed:  Results from last 7 days   Lab Units 21  0820 01/15/21  2250 01/15/21  1748 01/15/21  1746   WBC 10*3/mm3 6.39  --  7.95  --    HEMOGLOBIN g/dL 11.2*  --  12.1*  --    HEMATOCRIT % 38.2  --  41.3  --    PLATELETS 10*3/mm3 198  --  201  --    INR  3.63* 3.81*  --  3.88*     Results from last 7 days   Lab Units 21  0820 01/15/21  2222 01/15/21  2041 01/15/21  174   SODIUM mmol/L 137  --   --  137   POTASSIUM mmol/L 4.3  --   --  4.7   CHLORIDE mmol/L 105  --   --  106   CO2 mmol/L 25.0  --   --  24.0   BUN mg/dL 21  --   --  21   CREATININE mg/dL 0.86  --   --  0.74*   GLUCOSE mg/dL 159*  --   --  162*   CALCIUM mg/dL 8.4*  --   --  8.8   ALT (SGPT) U/L  --   --   --  23   AST (SGOT) U/L  --   --   --  40   TROPONIN T ng/mL  --  0.018 0.021 0.014   PROBNP pg/mL   --   --   --  532.3     Estimated Creatinine Clearance: 137.5 mL/min (by C-G formula based on SCr of 0.86 mg/dL).    Microbiology Results Abnormal     Procedure Component Value - Date/Time    COVID PRE-OP / PRE-PROCEDURE SCREENING ORDER (NO ISOLATION) - Swab, Nasopharynx [[id number]]  (Normal) Collected: 01/15/21 1921    Lab Status: Final result Specimen: Swab from Nasopharynx Updated: 01/15/21 2015    Narrative:      The following orders were created for panel order COVID PRE-OP / PRE-PROCEDURE SCREENING ORDER (NO ISOLATION) - Swab, Nasopharynx.  Procedure                               Abnormality         Status                     ---------                               -----------         ------                     COVID-19 and FLU A/B PCR...[[id number]]  Normal              Final result                 Please view results for these tests on the individual orders.    COVID-19 and FLU A/B PCR - Swab, Nasopharynx [[id number]]  (Normal) Collected: 01/15/21 1921    Lab Status: Final result Specimen: Swab from Nasopharynx Updated: 01/15/21 2015     COVID19 Not Detected     Influenza A PCR Not Detected     Influenza B PCR Not Detected    Narrative:      Fact sheet for providers: https://www.fda.gov/media/158444/download    Fact sheet for patients: https://www.fda.gov/media/678635/download    Test performed by PCR.          Imaging Results (Last 24 Hours)     Procedure Component Value Units Date/Time    XR Chest 1 View [[id number]] Collected: 01/15/21 1900     Updated: 01/16/21 1359    Narrative:         EXAMINATION: XR CHEST, SINGLE VIEW - 01/15/2021     INDICATION: Shortness of air.     COMPARISON: 06/10/2019     FINDINGS: Portable chest reveals patient is status post median  sternotomy. The heart is enlarged. Prominence of the pulmonary  vascularity. No focal parenchymal opacification present. No pleural  effusion or pneumothorax.          Impression:      Heart is enlarged with prominence of the pulmonary  vascularity  bilaterally. No evidence of acute parenchymal disease.     DICTATED:   01/15/2021  EDITED/ls :   01/15/2021     This report was finalized on 1/16/2021 1:56 PM by Dr. Deborah Macias MD.             Results for orders placed during the hospital encounter of 01/15/21   Transthoracic Echo Complete With Contrast if Necessary Per Protocol    Narrative · The left atrium is dilated.  · There is mild concentric left ventricular hypertrophy.  · Global and segmental LV systolic function is normal. The calculated left   ventricular ejection fraction is 53%.  · There is a mechanical aortic valve in the aortic position. Function   appears to be normal.  · There is mitral annular calcification. Mild mitral regurgitation is   seen. There appears to be some degree of chordal thickening with a 4.5   mmHg mean diastolic gradient across the mitral valve apparatus.  · Mild to moderate tricuspid regurgitation is noted. The estimated RV   systolic pressure is elevated at 56 mmHg.  · No other important findings are noted on the study.          I have reviewed the medications:  Scheduled Meds:amLODIPine, 10 mg, Oral, QAM  aspirin, 81 mg, Oral, Daily  carvedilol, 25 mg, Oral, BID With Meals  furosemide, 40 mg, Intravenous, Q12H  gabapentin, 800 mg, Oral, Q8H  insulin detemir, 8 Units, Subcutaneous, Nightly  insulin lispro, 0-9 Units, Subcutaneous, TID AC  insulin lispro, 3 Units, Subcutaneous, TID With Meals  lisinopril, 20 mg, Oral, Q12H  minocycline, 100 mg, Oral, BID  pharmacy consult - MTM, , Does not apply, BID  pravastatin, 80 mg, Oral, Nightly  sodium chloride, 10 mL, Intravenous, Q12H  warfarin, 5 mg, Oral, Daily      Continuous Infusions:Pharmacy to dose warfarin,       PRN Meds:.•  acetaminophen **OR** acetaminophen **OR** acetaminophen  •  dextrose  •  dextrose  •  glucagon (human recombinant)  •  magnesium sulfate **OR** magnesium sulfate **OR** magnesium sulfate  •  melatonin  •  Pharmacy to dose warfarin  •  sodium  chloride  •  sodium chloride    Assessment/Plan   Assessment & Plan     Active Hospital Problems    Diagnosis  POA   • **Acute diastolic congestive heart failure (CMS/HCC) [I50.31]  Yes     Priority: Medium   • Chronic anticoagulation [Z79.01]  Not Applicable   • Acute respiratory failure with hypoxia (CMS/HCC) [J96.01]  Yes   • Anemia [D64.9]  Yes   • History Acinetobacter infection [Z86.19]  Yes   • Paroxysmal atrial fibrillation (CMS/HCC) [I48.0]  Yes   • Discoloration of skin of bilateral legs likely due to minocycline use [L81.9]  Yes   • History of mechanical aortic valve replacement [Z95.2]  Not Applicable   • Essential hypertension [I10]  Yes   • Dyslipidemia [E78.5]  Yes   • Type 2 diabetes mellitus (CMS/HCC) [E11.9]  Yes      Resolved Hospital Problems    Diagnosis Date Resolved POA   • CHF (congestive heart failure) (CMS/HCC) [I50.9] 01/16/2021 Yes        Brief Hospital Course to date:  Cecilio Farias is a 65 y.o. male with a history of mechanical aortic valve on Coumadin, hypertension, A. fib, type 2 diabetes who presents with progressive dyspnea found to have some hypoxia.    Acute Respiratory Failure with Hypoxia   Acute diastolic CHF  - clinically improved after diuresis, down 2.3L so far  - echo reviewed, some diastolic dysfunction  - reviewed cardiology note  - getting CTA chest to eval possible widened mediastinum on CXR    Atrial Fibrillation:  - currently stable and rate controlled  - [id number] = 4. Already anticoagulated on coumadin     Hx of mechanical AVR:  - on coumadin. supratherapeutic inr at 3.8  - continue to dose anticoagulation per pharmacy     History of Acinetobacter bacteremia:  - on chronic minocycline.  Dr. Kaur  - legs discolored likely due to minocycline     DM2:  - last improved to 7.1%  - will increase basal slightly and add some prandial for better control while in the hospital     Hypertension:  - controlled and stable. Continue norvasc and coreg     7. HLD:  - continue  statin     8. Anemia:  - mild, stable, chronic    DVT Prophylaxis:  coumadin      Disposition: I expect the patient to be discharged home ~2 days.    CODE STATUS:   Code Status and Medical Interventions:   Ordered at: 01/15/21 1944     Level Of Support Discussed With:    Patient     Code Status:    CPR     Medical Interventions (Level of Support Prior to Arrest):    Full       Rocky Khalil MD  01/16/21

## 2021-01-16 NOTE — H&P
[facility] Medicine Services  HISTORY AND PHYSICAL    Patient Name: Cecilio Farias  : 1955  MRN: [id number]  Primary Care Physician: America Everett DO  Date of admission: 1/15/2021    Subjective   Subjective     Chief Complaint:  SOB    HPI:  Cecilio Farias is a 65 y.o. male with a past medical history significant for hypertension, HLD, chronic CHF, s/p AVR chronically anticoagulated on coumadin, PAF, DM2, anemia, and acinetobacter bacteremia on suppressive minocycline. He presents today with complaints of progressively worsening SOB going on for the past 2 weeks. Dyspnea worse with exertion and lying flat. There is associated lower extremity edema bilaterally, palpitations, and generalized fatigue. Patient was concerned that he was unable to walk a few steps without significant dyspnea and called his cardiologist, Dr. Smith. He was subsequently directed to ED for further evaluation and treatment. Currently there are no complaints of fever, cough, congestion, or chest pain. No syncope. No N/v/D. No known exposure to COVID-19. He is compliant with anticoagulation. Will admit to inpatient.      Current COVID Risks are:  [] Fever []  Cough [x] Shortness of breath [] Fatigue [] Change in taste or smell    [] Exposure to COVID positive patient  [] High risk facility   []  NONE    Review of Systems   Constitutional: Positive for fatigue. Negative for chills and fever.   HENT: Negative for congestion and trouble swallowing.    Eyes: Negative for photophobia and visual disturbance.   Respiratory: Positive for shortness of breath. Negative for cough.    Cardiovascular: Positive for leg swelling. Negative for chest pain.   Gastrointestinal: Negative for abdominal pain, diarrhea, nausea and vomiting.   Endocrine: Negative for cold intolerance and heat intolerance.   Genitourinary: Negative for dysuria and flank pain.   Musculoskeletal: Negative for back pain and gait problem.   Skin:  Positive for color change. Negative for pallor and rash.   Allergic/Immunologic: Positive for immunocompromised state.   Neurological: Negative for dizziness, weakness and headaches.   Hematological: Negative for adenopathy.   Psychiatric/Behavioral: Negative for agitation and confusion.        All other systems reviewed and are negative.     Personal History     Past Medical History:   Diagnosis Date   • Cancer (CMS/HCC)     colon cancer with operation/ chemotherapy/radiation    • CHF (congestive heart failure) (CMS/HCC) 2009    Acute Systolic    • Chronic anticoagulation     coumadin   • Diabetes mellitus (CMS/HCC)    • H/O aortic valve replacement    • HLD (hyperlipidemia)    • Hypertension        Past Surgical History:   Procedure Laterality Date   • AORTIC VALVE REPAIR/REPLACEMENT  2009    25 mm. [place] AVR   • ASCENDING AORTIC ANEURYSM REPAIR  2009   • BACK SURGERY     • COLON SURGERY      Colon cancer with operation   • FOOT SURGERY      bilateral 2/2 wound and trauma        Family History: family history includes Cancer in his brother, sister, and sister; Diabetes in his mother; Heart attack in his brother; Heart disease in his brother and mother; Hepatitis in his brother; Hyperlipidemia in his mother; Kidney failure in his brother; Leukemia in his father. Otherwise pertinent FHx was reviewed and unremarkable.     Social History:  reports that he has never smoked. He has never used smokeless tobacco. He reports that he does not drink alcohol or use drugs.  Social History     Social History Narrative   • Not on file       Medications:  EPINEPHrine, amLODIPine, aspirin, carvedilol, gabapentin, glipizide, insulin NPH-insulin regular, lisinopril, metFORMIN, minocycline, pravastatin, and warfarin    Allergies   Allergen Reactions   • Sulfa Antibiotics    • Amoxicillin Rash     Has tolerated ceftriaxone       Objective   Objective     Vital Signs:   Temp:  [97.3 °F (36.3 °C)-98.6 °F (37 °C)] 98.5 °F (36.9  °C)  Heart Rate:  [63-78] 70  Resp:  [18] 18  BP: (144-193)/(57-96) 149/57  Flow (L/min):  [3] 3    Physical Exam   Constitutional: Awake, alert. morbidly obese  Eyes: PERRLA, sclerae anicteric, no conjunctival injection  HENT: NCAT, mucous membranes moist  Neck: Supple, no thyromegaly, no lymphadenopathy, trachea midline  Respiratory: Clear to auscultation bilaterally, nonlabored respirations   Cardiovascular: RRR, no murmurs, rubs, or gallops, palpable pedal pulses bilaterally  Audible click from mechanical valve  Gastrointestinal: Positive bowel sounds, soft, nontender, nondistended  Musculoskeletal: no clubbing or cyanosis to extremities  Pitting BLE edema, significant color change from chronic minocycline  Psychiatric: Appropriate affect, cooperative  Neurologic: Oriented x 3, strength symmetric in all extremities, Cranial Nerves grossly intact to confrontation, speech clear  Skin: No rashes      Results Reviewed:  I have personally reviewed most recent indicated data and agree with findings including:  [x]  Laboratory  [x]  Radiology  [x]  EKG/Telemetry  [x]  Pathology  [x]  Cardiac/Vascular Studies  [x]  Old records  []  Other:  Most pertinent findings include: /85. Hypoxic to 88%. Afebrile. Troponin negative. . Glucose 162. INR 3.88. hemoglobin 12.1. chemistry and hematology otherwise favorable. CXR demonstrates pulmonary edema. EKG with rate controlled atrial fibrillation and prolonged QTC.       LAB RESULTS:      Lab 01/15/21  1748 01/15/21  1746   WBC 7.95  --    HEMOGLOBIN 12.1*  --    HEMATOCRIT 41.3  --    PLATELETS 201  --    NEUTROS ABS 5.55  --    IMMATURE GRANS (ABS) 0.03  --    LYMPHS ABS 1.08  --    MONOS ABS 0.71  --    EOS ABS 0.51*  --    MCV 90.6  --    LACTATE 1.0  --    PROTIME  --  37.8*   D DIMER QUANT  --  0.41         Lab 01/15/21  1746   SODIUM 137   POTASSIUM 4.7   CHLORIDE 106   CO2 24.0   ANION GAP 7.0   BUN 21   CREATININE 0.74*   GLUCOSE 162*   CALCIUM 8.8   MAGNESIUM  2.0   TSH 2.760         Lab 01/15/21  1746   TOTAL PROTEIN 7.6   ALBUMIN 3.70   GLOBULIN 3.9   ALT (SGPT) 23   AST (SGOT) 40   BILIRUBIN 0.8   ALK PHOS 80         Lab 01/15/21  2041 01/15/21  1746 01/14/21   PROBNP  --  532.3  --    TROPONIN T 0.021 0.014  --    PROTIME  --  37.8*  --    INR  --  3.88* 3.50                 Lab 01/15/21  2036   PH, ARTERIAL 7.373   PCO2, ARTERIAL 43.9   PO2 ART 70.1*   FIO2 28   HCO3 ART 25.5   BASE EXCESS ART 0.0   CARBOXYHEMOGLOBIN 1.7     Brief Urine Lab Results     None        Microbiology Results (last 10 days)     Procedure Component Value - Date/Time    COVID PRE-OP / PRE-PROCEDURE SCREENING ORDER (NO ISOLATION) - Swab, Nasopharynx [[id number]]  (Normal) Collected: 01/15/21 1921    Lab Status: Final result Specimen: Swab from Nasopharynx Updated: 01/15/21 2015    Narrative:      The following orders were created for panel order COVID PRE-OP / PRE-PROCEDURE SCREENING ORDER (NO ISOLATION) - Swab, Nasopharynx.  Procedure                               Abnormality         Status                     ---------                               -----------         ------                     COVID-19 and FLU A/B PCR...[[id number]]  Normal              Final result                 Please view results for these tests on the individual orders.    COVID-19 and FLU A/B PCR - Swab, Nasopharynx [[id number]]  (Normal) Collected: 01/15/21 1921    Lab Status: Final result Specimen: Swab from Nasopharynx Updated: 01/15/21 2015     COVID19 Not Detected     Influenza A PCR Not Detected     Influenza B PCR Not Detected    Narrative:      Fact sheet for providers: https://www.fda.gov/media/222160/download    Fact sheet for patients: https://www.fda.gov/media/512391/download    Test performed by PCR.          Xr Chest 1 View    Result Date: 1/15/2021   EXAMINATION: XR CHEST, SINGLE VIEW - 01/15/2021  INDICATION: Shortness of air.  COMPARISON: 06/10/2019  FINDINGS: Portable chest reveals patient is status  post median sternotomy. The heart is enlarged. Prominence of the pulmonary vascularity. No focal parenchymal opacification present. No pleural effusion or pneumothorax.        Impression: Heart is enlarged with prominence of the pulmonary vascularity bilaterally. No evidence of acute parenchymal disease.  DICTATED:   01/15/2021 EDITED/ls :   01/15/2021         Results for orders placed during the hospital encounter of 06/10/19   Adult Transesophageal Echo (ESTHER) W/ Cont if Necessary Per Protocol    Narrative · Left ventricular systolic function is normal. Estimated EF appears to be   in the range of 56 - 60%  · Left ventricular wall thickness is consistent with mild-to-moderate   concentric hypertrophy.  · There is a bileaflet tilting disc mechanical valve present. The aortic   valve peak and mean gradients are within defined limits  · Mild dilation of the aortic root is present, measuring 4 cm  · There is no evidence of endocarditis. No valvular vegetations seen.          Assessment/Plan   Assessment & Plan       Acute CHF    Acute respiratory failure with hypoxia (CMS/HCC)    Hx of mechanical AoVR (on chronic coumadin)    Atrial fibrillation (CMS/HCC)    Hypertension    Dyslipidemia    Type 2 diabetes mellitus (CMS/HCC)    CHF (congestive heart failure) (CMS/HCC)    Chronic anticoagulation    Anemia    History Acinetobacter infection    Discoloration of skin of bilateral legs likely due to minocycline use      Acute Respiratory Failure with Hypoxia (due to acute chf):  . Not chronically diuresed at home  - administered 40 mg lasix in ED, repeating 20mg iv x 1 @ ~ midnight as well. Further diuretics tomorrow depending on renal function & cards recs  - troponin, BNP favorable. Continue to trend cardia enzymes  - check TSH   - last echocardiogram June 2019 demonstrated EF of 56 to 60%. No vegetation on mechanical valve or evidence of endocarditis. Obtain echocardiogram; cards consult  - daily weights  - strict I&O  -  1500ml fluid restriction, cardiac diet  - am labs    Atrial Fibrillation:  - currently stable and rate controlled  - history of resolved PAF in 2016  - [id number] = 4. Already anticoagulated on coumadin  - check magnesium  -echo, on b-blocker, cards to see    Hx of mechanical AVR:  - on coumadin. supratherapeutic inr at 3.8  - continue to dose anticoagulation per pharmacy    History of Acinetobacter bacteremia:  - source yet to be determined. Could not rule out endocarditis from negative ESTHER.   - on chronic minocycline. Continue  - followed per ID, Dr. Kaur  -legs discolored likely due to minocycline    DM2:  - last a1c 10.2 on 6/2019. recheck  - insulin dependent. Received 5 units 70/30 night of admission, will change to levemir 6 units nightly w/ sliding scale, titrate prn  - hold oral hypoglycemics  - add SSI with scheduled accu checks    Hypertension:  - controlled and stable. Continue norvasc and coreg    7. HLD:  - continue statin    8. Anemia:  - stable. At baseline. Monitor.      DVT prophylaxis:  coumadin      CODE STATUS:  Full code  Code Status and Medical Interventions:   Ordered at: 01/15/21 1944     Level Of Support Discussed With:    Patient     Code Status:    CPR     Medical Interventions (Level of Support Prior to Arrest):    Full         This note has been completed as part of a split-shared workflow.     Electronically signed by Sada Armendariz PA-C, 01/15/21, 7:50 PM EST.      Attending   Admission Attestation       I have seen and examined the patient, performing an independent face-to-face diagnostic evaluation with plan of care reviewed and developed with the advanced practice clinician (APC).      Brief Summary Statement:   Cecilio Farias is a 65 y.o. male w/ hx obesity, mechanical aortic valve replacement (on coumadin), previous afib (apparently had resolved per previous cardiology note), dm2, previous acinetobacter bacteremia on chronic suppression w/ minocycline (per Dr. Moore  Kaur) w/ associated leg skin discoloration. Patient presents w/ 2 weeks of dyspnea worse w/ exertion, orthopnea, LE edema. No fever, no chills, no taste or smell changes. Patient apparently called cardiologist who referred patient to ED. In ED patient hypoxic. In afib but rate controlled. Troponin negative. cxr w/ pulmonary vascular congestion. Given lasix 40mg iv in ed and admitted to hospitalist service.    Remainder of detailed HPI is as noted by APC and has been reviewed and/or edited by me for completeness.    Attending Physical Exam:  Constitutional:Alert, oriented x 3, nontoxic appearing, slightly dyspneic w/ long sentences but no overt distress  Psych:Normal/appropriate affect  HEENT:Ncat, oroph clear  Neck: neck supple, full range of motion  Neuro: Face symmetric, speech clear, equal , moves all extremities  Cardiac: irr irr, regular rate; BLE 2+ edema  Resp: Ctab, normal effort  GI: abd soft, nontender  Skin: BLE dark skin discoloration  Musculoskeletal/extremities: no cyanosis extremities; no significant ankle edema; 2+ BLE dp pulses          Brief Assessment/Plan :  See detailed assessment and plan developed with APC which I have reviewed and/or edited for completeness.        Admission Status: I believe that this patient meets inpatient status due to need for iv diuresis, supplemental oxygen requirement w/ expected stay > 2 midnights      Roque Montilla MD  01/15/21

## 2021-01-16 NOTE — PROGRESS NOTES
"Pharmacy Consult  -  Warfarin  Cecilio Farias is a  65 y.o. male   Height - 190 cm (74.8\")  Weight - (!) 154 kg (340 lb)    Consulting Service - hospitalist  Referring Provider: TEODORA Smith (last seen: 8/13/20  next appt: 8/26/21)  Indication - mechanical AVR  Goal INR - 2.5-3.5  Home Regimen:   - warfarin 10 mg on Mon, Wed, Fri   - warfarin 7.5 mg on Sun, Tues, Thurs, Sat  Bridge Therapy: No     Drug-Drug Interactions with current regimen:   Aspirin: increased bleeding risk   Minocycline: Increased bleeding risk     Warfarin Dosing During Admission:  Date  1/15 1/16          INR  3.88 3.63          Dose  Hold (5 mg)             Education Provided:    Discharge Follow up:  Following Provider -  Follow up time range or appointment -    Labs:  Results from last 7 days   Lab Units 01/16/21  0820 01/15/21  2250 01/15/21  1748 01/15/21  1746 01/14/21   INR  3.63* 3.81*  --  3.88* 3.50   HEMOGLOBIN g/dL 11.2*  --  12.1*  --   --    HEMATOCRIT % 38.2  --  41.3  --   --    PLATELETS 10*3/mm3 198  --  201  --   --      Results from last 7 days   Lab Units 01/16/21  0820 01/15/21  1746   SODIUM mmol/L 137 137   POTASSIUM mmol/L 4.3 4.7   CHLORIDE mmol/L 105 106   CO2 mmol/L 25.0 24.0   BUN mg/dL 21 21   CREATININE mg/dL 0.86 0.74*   CALCIUM mg/dL 8.4* 8.8   BILIRUBIN mg/dL  --  0.8   ALK PHOS U/L  --  80   ALT (SGPT) U/L  --  23   AST (SGOT) U/L  --  40   GLUCOSE mg/dL 159* 162*     Current dietary intake: 100 % of meals on 1/16.   Diet Order   Procedures    Diet Regular; Cardiac, Consistent Carbohydrate       Assessment/Plan:   Today's INR is supra therapeutic 3.63 from 3.81 on 1/15.   Patients warfarin dose is 5 mg today.   Obtain PT/INR daily.  Monitor for signs and symptoms of bleeding, food intake, and drug - drug interactions. Make dose adjustments as neccessary   Pharmacy will follow.     Thank you,   Paul Mustafa     "

## 2021-01-16 NOTE — PROGRESS NOTES
"Pharmacy Consult  -  Warfarin  Cecilio Farias is a  65 y.o. male   Height - 190.5 cm (75\")  Weight - (!) 154 kg (340 lb)    Consulting Service - hospitalist  Referring Provider: TEODORA Smith (last seen: 8/13/20  next appt: 8/26/21)  Indication - mechanical AVR  Goal INR - 2.5-3.5  Home Regimen:   - warfarin 10 mg on Mon, Wed, Fri   - warfarin 7.5 mg on Sun, Tues, Thurs, Sat  Bridge Therapy: No     Drug-Drug Interactions with current regimen:   aspirin    Warfarin Dosing During Admission:  Date  1/15           INR  3.88           Dose  Hold              Education Provided:    Discharge Follow up:  Following Provider -  Follow up time range or appointment -    Labs:  Results from last 7 days   Lab Units 01/15/21  1748 01/15/21  1746 01/14/21   INR   --  3.88* 3.50   HEMOGLOBIN g/dL 12.1*  --   --    HEMATOCRIT % 41.3  --   --    PLATELETS 10*3/mm3 201  --   --      Results from last 7 days   Lab Units 01/15/21  1746   SODIUM mmol/L 137   POTASSIUM mmol/L 4.7   CHLORIDE mmol/L 106   CO2 mmol/L 24.0   BUN mg/dL 21   CREATININE mg/dL 0.74*   CALCIUM mg/dL 8.8   BILIRUBIN mg/dL 0.8   ALK PHOS U/L 80   ALT (SGPT) U/L 23   AST (SGOT) U/L 40   GLUCOSE mg/dL 162*     Current dietary intake:   Diet Order   Procedures    Diet Regular; Cardiac, Consistent Carbohydrate       Assessment/Plan:   1. Hold warfarin tonight due to supratherapeutic INR.  2. Daily PT-INR labs have been ordered.  3. Pharmacy will adjust Mr. Farias's warfarin dose as needed based on daily INR result.    Thank you  Dell Casiano, PharmD, BCPS  1/15/2021  22:30 EST   "

## 2021-01-16 NOTE — CONSULTS
[facility] at [facility]  CARDIOLOGY CONSULTATION NOTE    Cecilio Farias  : 1955  MRN:[id number]  Home Phone:[phone]    Date of Admission:1/15/2021  Date of Consultation: 21    PCP: America Everett DO    IDENTIFICATION: A 65 y.o. male resident of [place], [place]     Chief Complaint   Patient presents with   • Shortness of Breath     PROBLEM LIST:   1. Severe valvular aortic stenosis:  a. Acute systolic CHF, winter 2009.   b. LVEF 20% to 25%.   c. Normal coronary arteries.   d. 25 mm. 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  i. Paroxysmal atrial fibrillation, resolved.   e. MUGA EF = 64%, 2010.  f. EF >70%, mechanical aortic valve with normal function (16).  g. ESTHER, 2019 - negative for vegetation or endocarditis, EF 56-60%  2. [facility] admission for LE edema, shortness of breath, and new Afib 2021   3. Hypertension.   4. Dyslipidemia.   5. Diabetes mellitus, type 2.   6. Morbid obesity.   7. Colon cancer with operation/chemotherapy/radiation.   8. Status post operations, remote.  9. Acinetobacter sepsis, 2019  a. IV antibiotics followed by ID.  b. Chronic suppressive Rx planned by ID       ALLERGIES:   Allergies   Allergen Reactions   • Sulfa Antibiotics    • Amoxicillin Rash     Has tolerated ceftriaxone       HOME MEDICINES:   Prior to Admission Medications     Prescriptions Last Dose Informant Patient Reported? Taking?    amLODIPine (NORVASC) 10 MG tablet 1/15/2021  Yes Yes    Take 1 tablet by mouth Every Morning.    aspirin 81 MG EC tablet 1/15/2021  Yes Yes    Take 81 mg by mouth daily.    carvedilol (COREG) 25 MG tablet 1/15/2021  Yes Yes    Take 25 mg by mouth 2 (two) times a day with meals.    gabapentin (NEURONTIN) 800 MG tablet 1/15/2021 Self Yes Yes    Take 800 mg by mouth 3 (Three) Times a Day.    glipiZIDE (GLUCOTROL) 5 MG tablet 1/15/2021  Yes Yes    Take 5 mg by mouth 2 (Two) Times a Day Before Meals.    insulin  NPH-insulin regular (NOVOLIN 70/30) (70-30) 100 UNIT/ML injection 1/15/2021  No Yes    Inject 5 Units under the skin into the appropriate area as directed 2 (Two) Times a Day With Meals.    Patient taking differently:  Inject 15 Units under the skin into the appropriate area as directed 2 (Two) Times a Day With Meals.    lisinopril (PRINIVIL,ZESTRIL) 20 MG tablet 1/15/2021  Yes Yes    Take 20 mg by mouth 2 (two) times a day.    metFORMIN (GLUCOPHAGE) 500 MG tablet 1/15/2021  Yes Yes    Take 2 tablets by mouth Every Evening.    minocycline (MINOCIN,DYNACIN) 100 MG capsule 1/15/2021  Yes Yes    Take 1 capsule by mouth 2 (Two) Times a Day.    pravastatin (PRAVACHOL) 80 MG tablet 1/14/2021  Yes Yes    Take 80 mg by mouth Every Night.    warfarin (COUMADIN) 5 MG tablet 1/14/2021  No Yes    TAKE 1 AND 1/2 TO TWO TABLETS BY MOUTH AS DIRECTED BY CLINIC.    EPINEPHrine (EPIPEN) 0.3 MG/0.3ML solution auto-injector injection Patient Taking Differently  Yes No    As Needed.          HPI: Mr. Farias is a 64 y/o WM with VHD, s/p remote mechanical aortic valve, hypertension, hyperlipidemia and diabetes who presents to the hospital with a 2 week history of worsening exertional dyspnea, LE edema, and orthopnea. He denies any symptoms to suggest angina, no syncope or pre-syncope. He was found to be in rate controlled Afib at time of admission, with proBNP of 532, and CXR with pulmonary congestion. He was treated with IV lasix and has diuresed well with improvement in symptoms. He denies any known Covid exposure or symptoms. He reports LE edema is improved today, however not to baseline. He is on chronic Minocycline which has caused skin discoloration in lower legs.       ROS: All systems have been reviewed and are negative with the exception of those mentioned in the HPI and problem list above.    Surgical History:   Past Surgical History:   Procedure Laterality Date   • AORTIC VALVE REPAIR/REPLACEMENT  2009    25 mm. St. Kevin AVR   •  "ASCENDING AORTIC ANEURYSM REPAIR  2009   • BACK SURGERY     • COLON SURGERY      Colon cancer with operation   • FOOT SURGERY      bilateral 2/2 wound and trauma      Social History:   Social History     Socioeconomic History   • Marital status:    Tobacco Use   • Smoking status: Never Smoker   • Smokeless tobacco: Never Used   Substance and Sexual Activity   • Alcohol use: No   • Drug use: No   • Sexual activity: Defer     Family History:   Family History   Problem Relation Age of Onset   • Heart disease Mother    • Hyperlipidemia Mother    • Diabetes Mother    • Leukemia Father    • Cancer Sister    • Kidney failure Brother    • Hepatitis Brother    • Cancer Sister    • Cancer Brother    • Heart disease Brother    • Heart attack Brother        Objective     /68 (BP Location: Right arm, Patient Position: Lying)   Pulse 59   Temp 98.1 °F (36.7 °C) (Oral)   Resp 18   Ht 190.5 cm (75\")   Wt (!) 154 kg (340 lb)   SpO2 95%   BMI 42.50 kg/m²     Intake/Output Summary (Last 24 hours) at 1/16/2021 0957  Last data filed at 1/16/2021 0341  Gross per 24 hour   Intake 525 ml   Output 1050 ml   Net -525 ml       PHYSICAL EXAM:  CONSTITUTIONAL: Well nourished, cooperative, in no acute distress  HEENT: Normocephalic, atraumatic, PERRLA, no JVD  CARDIOVASCULAR:  Regular rhythm and normal rate, mechanical valve clicks, no murmur, gallop, rub.  RESPIRATORY:  Normal respiratory effort, no wheezing, rales or rhonchi. On 3L NC  GI: Soft, nontender, normal bowel sounds  MUSCULOSKELETAL: No gross deformities, 2+ LE edema; skin discoloration attributed to Minocycline use   SKIN: Warm, dry. No bleeding, bruising or rash; see above   NEUROLOGICAL: No focal deficits  PSYCHIATRIC: Normal mood and affect. Behavior is normal     Labs/Diagnostic Data  Results from last 7 days   Lab Units 01/16/21  0820 01/15/21  1746   SODIUM mmol/L 137 137   POTASSIUM mmol/L 4.3 4.7   CHLORIDE mmol/L 105 106   CO2 mmol/L 25.0 24.0   BUN " "mg/dL 21 21   CREATININE mg/dL 0.86 0.74*   GLUCOSE mg/dL 159* 162*   CALCIUM mg/dL 8.4* 8.8     Results from last 7 days   Lab Units 01/15/21  2222 01/15/21  2041 01/15/21  1746   TROPONIN T ng/mL 0.018 0.021 0.014     Results from last 7 days   Lab Units 01/16/21  0820 01/15/21  1748   WBC 10*3/mm3 6.39 7.95   HEMOGLOBIN g/dL 11.2* 12.1*   HEMATOCRIT % 38.2 41.3   PLATELETS 10*3/mm3 198 201     Results from last 7 days   Lab Units 01/16/21  0820   MAGNESIUM mg/dL 1.8         Results from last 7 days   Lab Units 01/16/21  0820   TSH uIU/mL 2.340         Results from last 7 days   Lab Units 01/15/21  1746   PROBNP pg/mL 532.3     Results from last 7 days   Lab Units 01/16/21  0820 01/15/21  2250 01/15/21  1746   PROTIME Seconds 35.9* 37.3* 37.8*   INR  3.63* 3.81* 3.88*     I personally reviewed the patient's EKG/Telemetry data    Radiology Data:   CXR 1/15/21:  IMPRESSION:  Heart is enlarged with prominence of the pulmonary  vascularity bilaterally. No evidence of acute parenchymal disease.    Current Medications:    amLODIPine, 10 mg, Oral, QAM  aspirin, 81 mg, Oral, Daily  carvedilol, 25 mg, Oral, BID With Meals  furosemide, 40 mg, Intravenous, Q12H  gabapentin, 800 mg, Oral, Q8H  insulin detemir, 6 Units, Subcutaneous, Nightly  insulin lispro, 0-7 Units, Subcutaneous, 4x Daily With Meals & Nightly  lisinopril, 20 mg, Oral, Q12H  minocycline, 100 mg, Oral, BID  pravastatin, 80 mg, Oral, Nightly  sodium chloride, 10 mL, Intravenous, Q12H  warfarin, 7.5 mg, Oral, Daily      Pharmacy to dose warfarin,         Assessment and Plan:     1. Acute \"CHF\"  - symptoms of progressive exertional dyspnea, orthopnea and LE edema x2 weeks  - proBNP within normal limits, however CXR shows enlarged mediastinum with pulmonary congestion, no significantly changed from 2019  - echo demonstrates some LVH with NORMAL EF and normal PV function. There is sl.diastolic gradient across the MV apparatus AND a PA systolic pressure of 56 " mmHg.  - clinically improved with IV lasix; will continue overnight but NEEDS evaluation of mediastinum (not new) and PA-HTN (JI a real possibility).     2. AS, s/p mechanical AVR  - echo pending  - continue Warfarin for anticoagulation    3. Afib  - rate is controlled, and he is chronically anticoagulated with Warfarin    4. Morbid obesity/possible JI  - patient denies prior history of JI, however would benefit from sleep study         Thank you for allowing me to participate in the care of Cecilio Farias. Feel free to contact me directly with any further questions or concerns.      Scribed for Kurtis Smith MD by Olga Chau PA-C. 1/16/2021  12:13 EST

## 2021-01-16 NOTE — PLAN OF CARE
Goal Outcome Evaluation:  Plan of Care Reviewed With: patient  Progress: improving  Outcome Summary: A & O x 4, Afib/Zay, 3 NC, no c/o pain, Mag replaced with redraw scheduled, TTE done today/EF 53%, [place] 19, Falls score 22/bed alarm active, will continue to monitor

## 2021-01-16 NOTE — NURSING NOTE
Spoke with Bela, pharmacist. Told okay to give Warfarin 5mg this evening even though INR is outside of range (currently 3.63; range is 2.5-3.5). She stated it was held last night and if we hold again, it would drop outside of therapeutic range. ELIJAH Epps

## 2021-01-17 PROBLEM — R91.1 LUNG NODULE: Status: ACTIVE | Noted: 2021-01-17

## 2021-01-17 LAB
ANION GAP SERPL CALCULATED.3IONS-SCNC: 7 MMOL/L (ref 5–15)
BUN SERPL-MCNC: 21 MG/DL (ref 8–23)
BUN/CREAT SERPL: 27.6 (ref 7–25)
CALCIUM SPEC-SCNC: 8.6 MG/DL (ref 8.6–10.5)
CHLORIDE SERPL-SCNC: 103 MMOL/L (ref 98–107)
CO2 SERPL-SCNC: 28 MMOL/L (ref 22–29)
CREAT SERPL-MCNC: 0.76 MG/DL (ref 0.76–1.27)
GFR SERPL CREATININE-BSD FRML MDRD: 103 ML/MIN/1.73
GLUCOSE BLDC GLUCOMTR-MCNC: 151 MG/DL (ref 70–130)
GLUCOSE BLDC GLUCOMTR-MCNC: 217 MG/DL (ref 70–130)
GLUCOSE BLDC GLUCOMTR-MCNC: 221 MG/DL (ref 70–130)
GLUCOSE BLDC GLUCOMTR-MCNC: 223 MG/DL (ref 70–130)
GLUCOSE SERPL-MCNC: 150 MG/DL (ref 65–99)
INR PPP: 2.66 (ref 0.85–1.16)
MAGNESIUM SERPL-MCNC: 1.9 MG/DL (ref 1.6–2.4)
NT-PROBNP SERPL-MCNC: 303.4 PG/ML (ref 0–900)
POTASSIUM SERPL-SCNC: 4.4 MMOL/L (ref 3.5–5.2)
PROTHROMBIN TIME: 28.1 SECONDS (ref 11.5–14)
QT INTERVAL: 498 MS
QTC INTERVAL: 476 MS
SODIUM SERPL-SCNC: 138 MMOL/L (ref 136–145)
TROPONIN T SERPL-MCNC: 0.03 NG/ML (ref 0–0.03)

## 2021-01-17 PROCEDURE — 85610 PROTHROMBIN TIME: CPT | Performed by: PHYSICIAN ASSISTANT

## 2021-01-17 PROCEDURE — 63710000001 INSULIN DETEMIR PER 5 UNITS: Performed by: INTERNAL MEDICINE

## 2021-01-17 PROCEDURE — 80048 BASIC METABOLIC PNL TOTAL CA: CPT | Performed by: INTERNAL MEDICINE

## 2021-01-17 PROCEDURE — 25010000003 MAGNESIUM SULFATE 4 GM/100ML SOLUTION: Performed by: INTERNAL MEDICINE

## 2021-01-17 PROCEDURE — 99232 SBSQ HOSP IP/OBS MODERATE 35: CPT | Performed by: INTERNAL MEDICINE

## 2021-01-17 PROCEDURE — 82962 GLUCOSE BLOOD TEST: CPT

## 2021-01-17 PROCEDURE — 63710000001 INSULIN LISPRO (HUMAN) PER 5 UNITS: Performed by: INTERNAL MEDICINE

## 2021-01-17 PROCEDURE — 84484 ASSAY OF TROPONIN QUANT: CPT | Performed by: INTERNAL MEDICINE

## 2021-01-17 PROCEDURE — 83735 ASSAY OF MAGNESIUM: CPT | Performed by: INTERNAL MEDICINE

## 2021-01-17 PROCEDURE — 93010 ELECTROCARDIOGRAM REPORT: CPT | Performed by: INTERNAL MEDICINE

## 2021-01-17 PROCEDURE — 83880 ASSAY OF NATRIURETIC PEPTIDE: CPT | Performed by: INTERNAL MEDICINE

## 2021-01-17 PROCEDURE — 99232 SBSQ HOSP IP/OBS MODERATE 35: CPT | Performed by: PHYSICIAN ASSISTANT

## 2021-01-17 PROCEDURE — 93005 ELECTROCARDIOGRAM TRACING: CPT | Performed by: INTERNAL MEDICINE

## 2021-01-17 PROCEDURE — 25010000002 FUROSEMIDE PER 20 MG: Performed by: INTERNAL MEDICINE

## 2021-01-17 RX ADMIN — GABAPENTIN 800 MG: 400 CAPSULE ORAL at 21:47

## 2021-01-17 RX ADMIN — CARVEDILOL 25 MG: 12.5 TABLET, FILM COATED ORAL at 17:35

## 2021-01-17 RX ADMIN — SODIUM CHLORIDE, PRESERVATIVE FREE 10 ML: 5 INJECTION INTRAVENOUS at 08:04

## 2021-01-17 RX ADMIN — LISINOPRIL 20 MG: 20 TABLET ORAL at 21:48

## 2021-01-17 RX ADMIN — INSULIN LISPRO 3 UNITS: 100 INJECTION, SOLUTION INTRAVENOUS; SUBCUTANEOUS at 08:50

## 2021-01-17 RX ADMIN — INSULIN LISPRO 4 UNITS: 100 INJECTION, SOLUTION INTRAVENOUS; SUBCUTANEOUS at 12:34

## 2021-01-17 RX ADMIN — INSULIN DETEMIR 8 UNITS: 100 INJECTION, SOLUTION SUBCUTANEOUS at 21:56

## 2021-01-17 RX ADMIN — ASPIRIN 81 MG: 81 TABLET, FILM COATED ORAL at 08:04

## 2021-01-17 RX ADMIN — SODIUM CHLORIDE, PRESERVATIVE FREE 10 ML: 5 INJECTION INTRAVENOUS at 21:49

## 2021-01-17 RX ADMIN — FUROSEMIDE 40 MG: 10 INJECTION INTRAMUSCULAR; INTRAVENOUS at 21:53

## 2021-01-17 RX ADMIN — WARFARIN SODIUM 9 MG: 5 TABLET ORAL at 17:35

## 2021-01-17 RX ADMIN — FUROSEMIDE 40 MG: 10 INJECTION INTRAMUSCULAR; INTRAVENOUS at 08:04

## 2021-01-17 RX ADMIN — GABAPENTIN 800 MG: 400 CAPSULE ORAL at 14:10

## 2021-01-17 RX ADMIN — MINOCYCLINE HYDROCHLORIDE 100 MG: 50 CAPSULE ORAL at 08:04

## 2021-01-17 RX ADMIN — MAGNESIUM SULFATE HEPTAHYDRATE 4 G: 40 INJECTION, SOLUTION INTRAVENOUS at 08:50

## 2021-01-17 RX ADMIN — INSULIN LISPRO 4 UNITS: 100 INJECTION, SOLUTION INTRAVENOUS; SUBCUTANEOUS at 17:36

## 2021-01-17 RX ADMIN — MINOCYCLINE HYDROCHLORIDE 100 MG: 50 CAPSULE ORAL at 21:47

## 2021-01-17 RX ADMIN — LISINOPRIL 20 MG: 20 TABLET ORAL at 08:04

## 2021-01-17 RX ADMIN — AMLODIPINE BESYLATE 10 MG: 10 TABLET ORAL at 08:04

## 2021-01-17 RX ADMIN — INSULIN LISPRO 2 UNITS: 100 INJECTION, SOLUTION INTRAVENOUS; SUBCUTANEOUS at 08:50

## 2021-01-17 RX ADMIN — PRAVASTATIN SODIUM 80 MG: 40 TABLET ORAL at 21:47

## 2021-01-17 RX ADMIN — INSULIN LISPRO 3 UNITS: 100 INJECTION, SOLUTION INTRAVENOUS; SUBCUTANEOUS at 17:36

## 2021-01-17 RX ADMIN — CARVEDILOL 25 MG: 12.5 TABLET, FILM COATED ORAL at 08:04

## 2021-01-17 RX ADMIN — INSULIN LISPRO 3 UNITS: 100 INJECTION, SOLUTION INTRAVENOUS; SUBCUTANEOUS at 12:34

## 2021-01-17 RX ADMIN — GABAPENTIN 800 MG: 400 CAPSULE ORAL at 06:30

## 2021-01-17 NOTE — PLAN OF CARE
Goal Outcome Evaluation:  Plan of Care Reviewed With: patient  Progress: improving  Outcome Summary: A & O x 4, AFib/Zay at times, reduced to 2L NC, no c/o pain, Mag replaced with recheck scheduled in the AM, to be NPO after midnight for stress test tomorrow, plan is to d/c home in 1-2 days, Thomas 19, Falls score 19/bed alarm active, will continue to monitor

## 2021-01-17 NOTE — PROGRESS NOTES
"  [facility] at [facility]  PROGRESS NOTE    Date of Admission: 1/15/2021  Date of Service: 01/17/21    Primary Care Physician: America Everett DO    Chief Complaint: f/u CHF  Problem List:   1. Severe valvular aortic stenosis:  a. Acute systolic CHF, winter 2009.   b. LVEF 20% to 25%.   c. Normal coronary arteries.   d. 25 mm. St. Kevin AVR and repair of ascending aneurysm, March 2009.  i. Paroxysmal atrial fibrillation, resolved.   e. MUGA EF = 64%, September 2010.  f. EF >70%, mechanical aortic valve with normal function (7/5/16).  g. ESTHER, 6/12/2019 - negative for vegetation or endocarditis, EF 56-60%  2. [facility] admission for LE edema, shortness of breath, and new Afib January 2021   a. Echo 1/16/21: EF 53%, mechanical aortic valve with normal function, MAC with mild MR, RVSP 56mmHg   3. Hypertension.   4. Dyslipidemia.   5. Diabetes mellitus, type 2.   6. Morbid obesity.   7. Colon cancer with operation/chemotherapy/radiation.   8. Status post operations, remote.  9. Acinetobacter sepsis, 6/2019  a. IV antibiotics followed by ID.  b. Chronic suppressive Rx planned by ID       Subjective      Patient feels improved in terms of dyspnea and LE edema.       Objective   Vitals: /73   Pulse 57   Temp 98 °F (36.7 °C) (Oral)   Resp 18   Ht 190 cm (74.8\")   Wt (!) 146 kg (322 lb 12.8 oz)   SpO2 98%   BMI 40.56 kg/m²     Physical Exam:  GENERAL: Alert, cooperative, in no acute distress.   HEENT: Normocephalic, no jugular venous distention  HEART: Regular rhythm, normal rate, mechanical valve clicks, no murmur, gallops, or rubs.   LUNGS: Diminished bilaterally. No wheezing, rales or rhonchi.  ABDOMEN: Soft, bowel sounds present, non-tender   NEUROLOGIC: No focal abnormalities involving strength or sensation are noted.   EXTREMITIES: No clubbing, cyanosis, 1+ edema noted.     Results:  Results from last 7 days   Lab Units 01/16/21  0820 01/15/21  1748   WBC 10*3/mm3 6.39 7.95 "   HEMOGLOBIN g/dL 11.2* 12.1*   HEMATOCRIT % 38.2 41.3   PLATELETS 10*3/mm3 198 201     Results from last 7 days   Lab Units 01/17/21  0740 01/16/21  0820 01/15/21  1746   SODIUM mmol/L 138 137 137   POTASSIUM mmol/L 4.4 4.3 4.7   CHLORIDE mmol/L 103 105 106   CO2 mmol/L 28.0 25.0 24.0   BUN mg/dL 21 21 21   CREATININE mg/dL 0.76 0.86 0.74*   GLUCOSE mg/dL 150* 159* 162*      Lab Results   Component Value Date    AST 40 01/15/2021    ALT 23 01/15/2021     Results from last 7 days   Lab Units 01/16/21  0820   HEMOGLOBIN A1C % 7.10*         Results from last 7 days   Lab Units 01/16/21  0820   TSH uIU/mL 2.340         Results from last 7 days   Lab Units 01/17/21  0740 01/16/21  0820 01/15/21  2250   PROTIME Seconds 28.1* 35.9* 37.3*   INR  2.66* 3.63* 3.81*     Results from last 7 days   Lab Units 01/17/21  0740 01/15/21  2222 01/15/21  2041   TROPONIN T ng/mL 0.028 0.018 0.021     Results from last 7 days   Lab Units 01/17/21  0740   PROBNP pg/mL 303.4       Intake/Output Summary (Last 24 hours) at 1/17/2021 1116  Last data filed at 1/17/2021 1100  Gross per 24 hour   Intake 1150 ml   Output 6475 ml   Net -5325 ml     I personally reviewed the patient's EKG/Telemetry data    Radiology Data:   Echo 1/16/21:  Interpretation Summary    · The left atrium is dilated.  · There is mild concentric left ventricular hypertrophy.  · Global and segmental LV systolic function is normal. The calculated left ventricular ejection fraction is 53%.  · There is a mechanical aortic valve in the aortic position. Function appears to be normal.  · There is mitral annular calcification. Mild mitral regurgitation is seen. There appears to be some degree of chordal thickening with a 4.5 mmHg mean diastolic gradient across the mitral valve apparatus.  · Mild to moderate tricuspid regurgitation is noted. The estimated RV systolic pressure is elevated at 56 mmHg.  · No other important findings are noted on the study.     CTA Chest  "1/16/21:  IMPRESSION:  Atherosclerotic disease seen within the thoracic aorta.  Largest AP dimension of the a sending thoracic aorta is 3.1 cm. There is  extensive coronary artery calcification identified. Small right pleural  effusion and tiny left pleural effusion with atelectatic changes seen at  the lung bases. No superimposed infiltrate. There is a small nodular  density possibly at the right lung base in which 3-6 month follow-up is  recommended. Stones filling a contracted gallbladder.      Current Medications:  amLODIPine, 10 mg, Oral, QAM  aspirin, 81 mg, Oral, Daily  carvedilol, 25 mg, Oral, BID With Meals  furosemide, 40 mg, Intravenous, Q12H  gabapentin, 800 mg, Oral, Q8H  insulin detemir, 8 Units, Subcutaneous, Nightly  insulin lispro, 0-9 Units, Subcutaneous, TID AC  insulin lispro, 3 Units, Subcutaneous, TID With Meals  lisinopril, 20 mg, Oral, Q12H  minocycline, 100 mg, Oral, BID  pharmacy consult - [facility], , Does not apply, BID  pravastatin, 80 mg, Oral, Nightly  sodium chloride, 10 mL, Intravenous, Q12H  warfarin, 5 mg, Oral, Daily      Pharmacy to dose warfarin,         Assessment and Plan:      1. Acute \"CHF\"  - symptoms of progressive exertional dyspnea, orthopnea and LE edema x2 weeks  - proBNP within normal limits, however CXR shows enlarged mediastinum with pulmonary congestion, no significantly changed from 2019  - echo demonstrates some LVH with NORMAL EF.There is sl.diastolic gradient across the MV apparatus AND a PA systolic pressure of 56 mmHg.  - CTA results noted above with no mediastinal mass, 3.1 cm ascending aorta  - extensive coronary calcifications noted- will obtain stress PET tomorrow to rule out ischemia      2. AS, s/p mechanical AVR  - echo as above with normal valve function   - continue Warfarin for anticoagulation     3. Afib  - rate is controlled, and he is chronically anticoagulated with Warfarin     4. Morbid obesity/possible JI  - patient denies prior history of JI, " however would benefit from sleep study     Electronically signed by Olga Chau PA-C, 01/17/21, 12:02 PM EST.

## 2021-01-17 NOTE — PROGRESS NOTES
[facility] Medicine Services  PROGRESS NOTE    Patient Name: Cecilio Farias  : 1955  MRN: [id number]    Date of Admission: 1/15/2021  Primary Care Physician: America Everett DO    Subjective   Subjective     CC:  f/u dyspnea    HPI:  Wife at bedside.  Says he is continues to feel improved and breathing is close to baseline.  Has diuresed about 6 L since admission.  Lower extremity edema is improved. denies any chest pain.    ROS:  Gen- No fevers, chills  CV- No chest pain, palpitations  Resp- No cough, + dyspnea  GI- No N/V/D, abd pain    Objective   Objective     Vital Signs:   Temp:  [98 °F (36.7 °C)-99.7 °F (37.6 °C)] 98.3 °F (36.8 °C)  Heart Rate:  [56-82] 64  Resp:  [16-19] 18  BP: ()/(54-79) 125/71        Physical Exam:  Constitutional: No acute distress, awake, alert, sitting up on side of bed  HENT: NCAT, mucous membranes moist  Respiratory: diminished but generally clear, respiratory effort normal on 2 L  Cardiovascular: RRR, no murmurs, rubs, or gallops  Gastrointestinal: Positive bowel sounds, soft, nontender, nondistended  Musculoskeletal: 1+ bilateral ankle edema, chronic discoloration  Psychiatric: Appropriate affect, cooperative  Neurologic: Oriented x 3, no focal deficits  Skin: No rashes      Results Reviewed:  Results from last 7 days   Lab Units 21  0740 21  0820 01/15/21  2250 01/15/21  1748   WBC 10*3/mm3  --  6.39  --  7.95   HEMOGLOBIN g/dL  --  11.2*  --  12.1*   HEMATOCRIT %  --  38.2  --  41.3   PLATELETS 10*3/mm3  --  198  --  201   INR  2.66* 3.63* 3.81*  --      Results from last 7 days   Lab Units 21  0740 21  0820 01/15/21  2222 01/15/21  2041 01/15/21  1746   SODIUM mmol/L 138 137  --   --  137   POTASSIUM mmol/L 4.4 4.3  --   --  4.7   CHLORIDE mmol/L 103 105  --   --  106   CO2 mmol/L 28.0 25.0  --   --  24.0   BUN mg/dL 21 21  --   --  21   CREATININE mg/dL 0.76 0.86  --   --  0.74*   GLUCOSE mg/dL 150* 159*   --   --  162*   CALCIUM mg/dL 8.6 8.4*  --   --  8.8   ALT (SGPT) U/L  --   --   --   --  23   AST (SGOT) U/L  --   --   --   --  40   TROPONIN T ng/mL 0.028  --  0.018 0.021 0.014   PROBNP pg/mL 303.4  --   --   --  532.3     Estimated Creatinine Clearance: 151.4 mL/min (by C-G formula based on SCr of 0.76 mg/dL).    Microbiology Results Abnormal     Procedure Component Value - Date/Time    COVID PRE-OP / PRE-PROCEDURE SCREENING ORDER (NO ISOLATION) - Swab, Nasopharynx [[id number]]  (Normal) Collected: 01/15/21 1921    Lab Status: Final result Specimen: Swab from Nasopharynx Updated: 01/15/21 2015    Narrative:      The following orders were created for panel order COVID PRE-OP / PRE-PROCEDURE SCREENING ORDER (NO ISOLATION) - Swab, Nasopharynx.  Procedure                               Abnormality         Status                     ---------                               -----------         ------                     COVID-19 and FLU A/B PCR...[[id number]]  Normal              Final result                 Please view results for these tests on the individual orders.    COVID-19 and FLU A/B PCR - Swab, Nasopharynx [[id number]]  (Normal) Collected: 01/15/21 1921    Lab Status: Final result Specimen: Swab from Nasopharynx Updated: 01/15/21 2015     COVID19 Not Detected     Influenza A PCR Not Detected     Influenza B PCR Not Detected    Narrative:      Fact sheet for providers: https://www.fda.gov/media/225892/download    Fact sheet for patients: https://www.fda.gov/media/332806/download    Test performed by PCR.          Imaging Results (Last 24 Hours)     Procedure Component Value Units Date/Time    CT Angiogram Chest With & Without Contrast [[id number]] Collected: 01/17/21 0824     Updated: 01/17/21 1132    Narrative:      EXAMINATION: CT ANGIOGRAM CHEST WWO CONTRAST  - 01/16/2021     INDICATION: I50.9-Heart failure, unspecified; J96.01-Acute respiratory  failure with hypoxia; R06.02-Shortness of breath; R60.0-Localized  edema;  I48.91-Unspecified atrial fibrillation. Shortness of breath,     TECHNIQUE: Multiple axial CT images obtained of the chest with and  without the administration of intravenous contrast. 3D reformatted  images were submitted to further facilitate diagnostic accuracy and  treatment planning.     The radiation dose reduction device was turned on for each scan per the  ALARA (As Low as Reasonably Achievable) protocol.     COMPARISON: None.     FINDINGS: Thyroid is homogeneous. There is no mediastinal mass. There is  been repair of the ascending thoracic aorta. The largest AP dimension of  the a sending thoracic aorta is 3.1 cm. The cardiac chambers are within  normal limits. Extensive coronary artery calcification identified. There  is no mediastinal mass. Small lymph nodes seen throughout the hilar  regions bilaterally. No bulky adenopathy. There is a tiny left pleural  effusion and small right pleural effusion. The lung parenchyma reveals  some mild atelectatic changes seen within the lung fields bilaterally.  There are some atelectatic changes identified within the lower lung  fields bilaterally. A nodular density within the right lung base cannot  be completely excluded measuring approximately 1.5 cm. 3-6 month  follow-up is recommended for stability. Findings may be related to the  small right pleural effusion. There are stones filling the contracted  gallbladder. Remainder of the upper abdomen is unremarkable.  Degenerative changes seen within the spine.       Impression:      Atherosclerotic disease seen within the thoracic aorta.  Largest AP dimension of the ascending thoracic aorta is 3.1 cm. There is  extensive coronary artery calcification identified. Small right pleural  effusion and tiny left pleural effusion with atelectatic changes seen at  the lung bases. No superimposed infiltrate. There is a small nodular  density possibly at the right lung base and 3-6 month follow-up is  recommended. Stones  are filling a contracted gallbladder.     DICTATED:   01/17/2021  EDITED/ls :   01/17/2021       XR Chest 1 View [[id number]] Collected: 01/15/21 1900     Updated: 01/16/21 1359    Narrative:         EXAMINATION: XR CHEST, SINGLE VIEW - 01/15/2021     INDICATION: Shortness of air.     COMPARISON: 06/10/2019     FINDINGS: Portable chest reveals patient is status post median  sternotomy. The heart is enlarged. Prominence of the pulmonary  vascularity. No focal parenchymal opacification present. No pleural  effusion or pneumothorax.          Impression:      Heart is enlarged with prominence of the pulmonary  vascularity bilaterally. No evidence of acute parenchymal disease.     DICTATED:   01/15/2021  EDITED/ls :   01/15/2021     This report was finalized on 1/16/2021 1:56 PM by Dr. Deborah Macias MD.             Results for orders placed during the hospital encounter of 01/15/21   Transthoracic Echo Complete With Contrast if Necessary Per Protocol    Narrative · The left atrium is dilated.  · There is mild concentric left ventricular hypertrophy.  · Global and segmental LV systolic function is normal. The calculated left   ventricular ejection fraction is 53%.  · There is a mechanical aortic valve in the aortic position. Function   appears to be normal.  · There is mitral annular calcification. Mild mitral regurgitation is   seen. There appears to be some degree of chordal thickening with a 4.5   mmHg mean diastolic gradient across the mitral valve apparatus.  · Mild to moderate tricuspid regurgitation is noted. The estimated RV   systolic pressure is elevated at 56 mmHg.  · No other important findings are noted on the study.          I have reviewed the medications:  Scheduled Meds:amLODIPine, 10 mg, Oral, QAM  aspirin, 81 mg, Oral, Daily  carvedilol, 25 mg, Oral, BID With Meals  furosemide, 40 mg, Intravenous, Q12H  gabapentin, 800 mg, Oral, Q8H  insulin detemir, 8 Units, Subcutaneous, Nightly  insulin lispro,  0-9 Units, Subcutaneous, TID AC  insulin lispro, 3 Units, Subcutaneous, TID With Meals  lisinopril, 20 mg, Oral, Q12H  minocycline, 100 mg, Oral, BID  pharmacy consult - MTM, , Does not apply, BID  pravastatin, 80 mg, Oral, Nightly  sodium chloride, 10 mL, Intravenous, Q12H  warfarin, 9 mg, Oral, Daily      Continuous Infusions:Pharmacy to dose warfarin,       PRN Meds:.•  acetaminophen **OR** acetaminophen **OR** acetaminophen  •  dextrose  •  dextrose  •  glucagon (human recombinant)  •  magnesium sulfate **OR** magnesium sulfate **OR** magnesium sulfate  •  melatonin  •  Pharmacy to dose warfarin  •  sodium chloride  •  sodium chloride    Assessment/Plan   Assessment & Plan     Active Hospital Problems    Diagnosis  POA   • **Acute diastolic congestive heart failure (CMS/HCC) [I50.31]  Yes     Priority: Medium   • Lung nodule [R91.1]  Yes   • Chronic anticoagulation [Z79.01]  Not Applicable   • Acute respiratory failure with hypoxia (CMS/HCC) [J96.01]  Yes   • Anemia [D64.9]  Yes   • History Acinetobacter infection [Z86.19]  Yes   • Paroxysmal atrial fibrillation (CMS/HCC) [I48.0]  Yes   • Discoloration of skin of bilateral legs likely due to minocycline use [L81.9]  Yes   • History of mechanical aortic valve replacement [Z95.2]  Not Applicable   • Essential hypertension [I10]  Yes   • Dyslipidemia [E78.5]  Yes   • Type 2 diabetes mellitus (CMS/HCC) [E11.9]  Yes      Resolved Hospital Problems    Diagnosis Date Resolved POA   • CHF (congestive heart failure) (CMS/HCC) [I50.9] 01/16/2021 Yes        Brief Hospital Course to date:  Cecilio Farias is a 65 y.o. male with a history of mechanical aortic valve on Coumadin, hypertension, A. fib, type 2 diabetes who presents with progressive dyspnea found to have some hypoxia.    Acute Respiratory Failure with Hypoxia   Acute diastolic CHF  - clinically improved after diuresis, down 6L so far.  Continue IV Lasix at this time.  - echo reviewed, some diastolic  dysfunction  -Personally reviewed chest CT which shows some small effusions   -CT showed significant coronary calcifications, cardiology plans stress test tomorrow    Atrial Fibrillation:  - currently stable and rate controlled  - [id number] = 4. Already anticoagulated on coumadin     Hx of mechanical AVR:  - on coumadin. supratherapeutic inr at 3.8  - continue to dose anticoagulation per pharmacy     History of Acinetobacter bacteremia:  - on chronic minocycline.  Dr. Kaur  - legs discolored likely due to minocycline     DM2:  - last improved to 7.1%  - better control, continue current insulin regimen     Hypertension:  - controlled and stable. Continue norvasc and coreg     7. HLD:  - continue statin     8. Anemia:  - mild, stable, chronic    DVT Prophylaxis:  coumadin      Disposition: I expect the patient to be discharged home ~1-2 days when ok with cardiology    CODE STATUS:   Code Status and Medical Interventions:   Ordered at: 01/15/21 1944     Level Of Support Discussed With:    Patient     Code Status:    CPR     Medical Interventions (Level of Support Prior to Arrest):    Full       Rocky Khalil MD  01/17/21

## 2021-01-17 NOTE — PROGRESS NOTES
"Pharmacy Consult  -  Warfarin  Cecilio Farias is a  65 y.o. male   Height - 190 cm (74.8\")  Weight - (!) 146 kg (322 lb 12.8 oz)    Consulting Service - hospitalist  Referring Provider: TEODORA Smith (last seen: 8/13/20  next appt: 8/26/21)  Indication - mechanical AVR  Goal INR - 2.5-3.5  Home Regimen:   - warfarin 10 mg on Mon, Wed, Fri   - warfarin 7.5 mg on Sun, Tues, Thurs, Sat  Bridge Therapy: No     Drug-Drug Interactions with current regimen:   Aspirin: increased bleeding risk   Minocycline: Increased bleeding risk     Warfarin Dosing During Admission:  Date  1/15 1/16 1/17         INR  3.88 3.63 2.66         Dose  Hold 5 mg (9 mg)            Education Provided: Warfarin education provided on 1/17 verbally and in writing.  Discussed effects of warfarin, importance of checking INR, drug-drug and drug-food interactions, and signs/symptoms of bleeding and clotting.  Patient verbalized understanding through teach back.  All pertinent questions were answered.      Discharge Follow up:  Following Provider -  [facility]  Follow up time range or appointment - 2-3 days post discharge    Labs:  Results from last 7 days   Lab Units 01/17/21  0740 01/16/21  0820 01/15/21  2250 01/15/21  1748 01/15/21  1746 01/14/21   INR  2.66* 3.63* 3.81*  --  3.88* 3.50   HEMOGLOBIN g/dL  --  11.2*  --  12.1*  --   --    HEMATOCRIT %  --  38.2  --  41.3  --   --    PLATELETS 10*3/mm3  --  198  --  201  --   --      Results from last 7 days   Lab Units 01/17/21  0740 01/16/21  0820 01/15/21  1746   SODIUM mmol/L 138 137 137   POTASSIUM mmol/L 4.4 4.3 4.7   CHLORIDE mmol/L 103 105 106   CO2 mmol/L 28.0 25.0 24.0   BUN mg/dL 21 21 21   CREATININE mg/dL 0.76 0.86 0.74*   CALCIUM mg/dL 8.6 8.4* 8.8   BILIRUBIN mg/dL  --   --  0.8   ALK PHOS U/L  --   --  80   ALT (SGPT) U/L  --   --  23   AST (SGOT) U/L  --   --  40   GLUCOSE mg/dL 150* 159* 162*     Current dietary intake: 75 - 100 % of meals on 1/16.   Diet Order "   Procedures    Diet Regular; Cardiac, Consistent Carbohydrate    NPO Diet       Assessment/Plan:   Today's INR is therapeutic at 2.66 from 3.63 on 1/16.    Due to large drop in INR, patients warfarin dose is 9 mg today.   Obtain PT/INR daily.  Monitor for signs and symptoms of bleeding, food intake, and drug - drug interactions. Make dose adjustments as neccessary   Pharmacy will follow.     Thank you,   Paul Mustafa, Pharm D  Pharmacy Resident   1/17/2021  14:44 EST

## 2021-01-17 NOTE — PLAN OF CARE
Goal Outcome Evaluation:  Plan of Care Reviewed With: patient  Progress: improving  Outcome Summary: Pt VSS, NSR/ SB w/ frequent PACs, 3LNC. A+Ox4, denies pain/ discomfort. IV lasix given as ordered. Pt slept well overnight, no new complaints at this time.

## 2021-01-18 ENCOUNTER — APPOINTMENT (OUTPATIENT)
Dept: CARDIOLOGY | Facility: HOSPITAL | Age: 66
End: 2021-01-18

## 2021-01-18 ENCOUNTER — DOCUMENTATION (OUTPATIENT)
Dept: CARDIOLOGY | Facility: CLINIC | Age: 66
End: 2021-01-18

## 2021-01-18 LAB
ANION GAP SERPL CALCULATED.3IONS-SCNC: 8 MMOL/L (ref 5–15)
BUN SERPL-MCNC: 25 MG/DL (ref 8–23)
BUN/CREAT SERPL: 28.1 (ref 7–25)
CALCIUM SPEC-SCNC: 8.3 MG/DL (ref 8.6–10.5)
CHLORIDE SERPL-SCNC: 99 MMOL/L (ref 98–107)
CO2 SERPL-SCNC: 29 MMOL/L (ref 22–29)
CREAT SERPL-MCNC: 0.89 MG/DL (ref 0.76–1.27)
GFR SERPL CREATININE-BSD FRML MDRD: 86 ML/MIN/1.73
GLUCOSE BLDC GLUCOMTR-MCNC: 177 MG/DL (ref 70–130)
GLUCOSE BLDC GLUCOMTR-MCNC: 213 MG/DL (ref 70–130)
GLUCOSE BLDC GLUCOMTR-MCNC: 214 MG/DL (ref 70–130)
GLUCOSE BLDC GLUCOMTR-MCNC: 218 MG/DL (ref 70–130)
GLUCOSE SERPL-MCNC: 169 MG/DL (ref 65–99)
INR PPP: 2.39 (ref 0.85–1.16)
MAGNESIUM SERPL-MCNC: 2.1 MG/DL (ref 1.6–2.4)
POTASSIUM SERPL-SCNC: 4.3 MMOL/L (ref 3.5–5.2)
PROTHROMBIN TIME: 25.7 SECONDS (ref 11.5–14)
QT INTERVAL: 462 MS
QT INTERVAL: 524 MS
QTC INTERVAL: 491 MS
QTC INTERVAL: 515 MS
SODIUM SERPL-SCNC: 136 MMOL/L (ref 136–145)

## 2021-01-18 PROCEDURE — 78492 MYOCRD IMG PET MLT RST&STRS: CPT | Performed by: INTERNAL MEDICINE

## 2021-01-18 PROCEDURE — 99232 SBSQ HOSP IP/OBS MODERATE 35: CPT | Performed by: NURSE PRACTITIONER

## 2021-01-18 PROCEDURE — 25010000002 REGADENOSON 0.4 MG/5ML SOLUTION: Performed by: PHYSICIAN ASSISTANT

## 2021-01-18 PROCEDURE — 25010000002 FUROSEMIDE PER 20 MG: Performed by: INTERNAL MEDICINE

## 2021-01-18 PROCEDURE — 63710000001 INSULIN DETEMIR PER 5 UNITS: Performed by: NURSE PRACTITIONER

## 2021-01-18 PROCEDURE — 93010 ELECTROCARDIOGRAM REPORT: CPT | Performed by: INTERNAL MEDICINE

## 2021-01-18 PROCEDURE — 82962 GLUCOSE BLOOD TEST: CPT

## 2021-01-18 PROCEDURE — 63710000001 INSULIN LISPRO (HUMAN) PER 5 UNITS: Performed by: INTERNAL MEDICINE

## 2021-01-18 PROCEDURE — 80048 BASIC METABOLIC PNL TOTAL CA: CPT | Performed by: INTERNAL MEDICINE

## 2021-01-18 PROCEDURE — 93017 CV STRESS TEST TRACING ONLY: CPT

## 2021-01-18 PROCEDURE — 93018 CV STRESS TEST I&R ONLY: CPT | Performed by: INTERNAL MEDICINE

## 2021-01-18 PROCEDURE — 99232 SBSQ HOSP IP/OBS MODERATE 35: CPT | Performed by: INTERNAL MEDICINE

## 2021-01-18 PROCEDURE — 83735 ASSAY OF MAGNESIUM: CPT | Performed by: INTERNAL MEDICINE

## 2021-01-18 PROCEDURE — 78492 MYOCRD IMG PET MLT RST&STRS: CPT

## 2021-01-18 PROCEDURE — 85610 PROTHROMBIN TIME: CPT | Performed by: PHYSICIAN ASSISTANT

## 2021-01-18 PROCEDURE — 93005 ELECTROCARDIOGRAM TRACING: CPT | Performed by: INTERNAL MEDICINE

## 2021-01-18 PROCEDURE — 0 RUBIDIUM CHLORIDE: Performed by: PHYSICIAN ASSISTANT

## 2021-01-18 PROCEDURE — A9555 RB82 RUBIDIUM: HCPCS | Performed by: PHYSICIAN ASSISTANT

## 2021-01-18 RX ORDER — CAFFEINE CITRATE 20 MG/ML
60 SOLUTION INTRAVENOUS ONCE
Status: COMPLETED | OUTPATIENT
Start: 2021-01-18 | End: 2021-01-18

## 2021-01-18 RX ORDER — WARFARIN SODIUM 5 MG/1
10 TABLET ORAL
Status: DISCONTINUED | OUTPATIENT
Start: 2021-01-18 | End: 2021-01-19

## 2021-01-18 RX ADMIN — INSULIN LISPRO 4 UNITS: 100 INJECTION, SOLUTION INTRAVENOUS; SUBCUTANEOUS at 17:18

## 2021-01-18 RX ADMIN — GABAPENTIN 800 MG: 400 CAPSULE ORAL at 12:47

## 2021-01-18 RX ADMIN — INSULIN LISPRO 3 UNITS: 100 INJECTION, SOLUTION INTRAVENOUS; SUBCUTANEOUS at 17:19

## 2021-01-18 RX ADMIN — CAFFEINE CITRATE 60 MG: 20 INJECTION, SOLUTION INTRAVENOUS at 10:19

## 2021-01-18 RX ADMIN — WARFARIN SODIUM 10 MG: 5 TABLET ORAL at 17:18

## 2021-01-18 RX ADMIN — GABAPENTIN 800 MG: 400 CAPSULE ORAL at 22:20

## 2021-01-18 RX ADMIN — SODIUM CHLORIDE, PRESERVATIVE FREE 10 ML: 5 INJECTION INTRAVENOUS at 08:13

## 2021-01-18 RX ADMIN — SODIUM CHLORIDE, PRESERVATIVE FREE 10 ML: 5 INJECTION INTRAVENOUS at 22:18

## 2021-01-18 RX ADMIN — INSULIN DETEMIR 10 UNITS: 100 INJECTION, SOLUTION SUBCUTANEOUS at 22:21

## 2021-01-18 RX ADMIN — INSULIN LISPRO 2 UNITS: 100 INJECTION, SOLUTION INTRAVENOUS; SUBCUTANEOUS at 12:48

## 2021-01-18 RX ADMIN — RUBIDIUM CHLORIDE RB-82 1 DOSE: 150 INJECTION, SOLUTION INTRAVENOUS at 10:04

## 2021-01-18 RX ADMIN — MINOCYCLINE HYDROCHLORIDE 100 MG: 50 CAPSULE ORAL at 22:21

## 2021-01-18 RX ADMIN — LISINOPRIL 20 MG: 20 TABLET ORAL at 22:21

## 2021-01-18 RX ADMIN — FUROSEMIDE 40 MG: 10 INJECTION INTRAMUSCULAR; INTRAVENOUS at 08:13

## 2021-01-18 RX ADMIN — MINOCYCLINE HYDROCHLORIDE 100 MG: 50 CAPSULE ORAL at 08:13

## 2021-01-18 RX ADMIN — INSULIN LISPRO 3 UNITS: 100 INJECTION, SOLUTION INTRAVENOUS; SUBCUTANEOUS at 12:48

## 2021-01-18 RX ADMIN — PRAVASTATIN SODIUM 80 MG: 40 TABLET ORAL at 22:20

## 2021-01-18 RX ADMIN — AMLODIPINE BESYLATE 10 MG: 10 TABLET ORAL at 08:13

## 2021-01-18 RX ADMIN — RUBIDIUM CHLORIDE RB-82 1 DOSE: 150 INJECTION, SOLUTION INTRAVENOUS at 09:16

## 2021-01-18 RX ADMIN — CARVEDILOL 25 MG: 12.5 TABLET, FILM COATED ORAL at 12:47

## 2021-01-18 RX ADMIN — LISINOPRIL 20 MG: 20 TABLET ORAL at 08:13

## 2021-01-18 RX ADMIN — FUROSEMIDE 40 MG: 10 INJECTION INTRAMUSCULAR; INTRAVENOUS at 22:20

## 2021-01-18 RX ADMIN — GABAPENTIN 800 MG: 400 CAPSULE ORAL at 06:10

## 2021-01-18 RX ADMIN — ASPIRIN 81 MG: 81 TABLET, FILM COATED ORAL at 08:13

## 2021-01-18 RX ADMIN — REGADENOSON 0.4 MG: 0.08 INJECTION, SOLUTION INTRAVENOUS at 10:01

## 2021-01-18 NOTE — PROGRESS NOTES
"  [facility]  PROGRESS NOTE    Date of Admission: 1/15/2021  Date of Service: 01/18/21    Primary Care Physician: America Everett DO    Chief Complaint: f/u CHF, PAF  Problem List:   1. Severe valvular aortic stenosis:  a. Acute systolic CHF, winter 2009.   b. LVEF 20% to 25%.   c. Normal coronary arteries.   d. 25 mm. [place] AVR and repair of ascending aneurysm, March 2009.  i. Paroxysmal atrial fibrillation, resolved.   e. MUGA EF = 64%, September 2010.  f. EF >70%, mechanical aortic valve with normal function (7/5/16).  g. ESTHER, 6/12/2019 - negative for vegetation or endocarditis, EF 56-60%  2. [facility] admission for LE edema, shortness of breath, and new Afib January 2021   a. Echo 1/16/21: EF 53%, mechanical aortic valve with normal function, MAC with mild MR, RVSP 56mmHg   3. Paroxysmal atrial fibrillation  a. Noted on admission 01/2021, rate controlled and asymptomatic   4. Hypertension.   5. Dyslipidemia.   6. Diabetes mellitus, type 2.   7. Morbid obesity.   8. Colon cancer with operation/chemotherapy/radiation.   9. Status post operations, remote.  10. Acinetobacter sepsis, 6/2019  a. IV antibiotics followed by ID.  b. Chronic suppressive Rx planned by ID       Subjective      HPI: He is comfortable at rest and with ambulation on the floor.      Objective   Vitals: /74 (BP Location: Left arm, Patient Position: Sitting)   Pulse 67   Temp 98.5 °F (36.9 °C) (Oral)   Resp 18   Ht 190 cm (74.8\")   Wt (!) 146 kg (322 lb)   SpO2 91%   BMI 40.46 kg/m²     Physical Exam:  GENERAL: Alert, cooperative, in no acute distress.   HEENT: Normocephalic, no jugular venous distention  HEART: Regular rhythm, normal rate, and no murmurs, gallops, or rubs.   LUNGS: Clear to auscultation bilaterally. No wheezing, rales or rhonchi.  ABDOMEN: Soft, bowel sounds present, non-tender; obese (doubt fluid)   NEUROLOGIC: No focal abnormalities involving strength or sensation are " noted.   EXTREMITIES: Discoloration and edema noted.    Results:  Results from last 7 days   Lab Units 01/16/21  0820 01/15/21  1748   WBC 10*3/mm3 6.39 7.95   HEMOGLOBIN g/dL 11.2* 12.1*   HEMATOCRIT % 38.2 41.3   PLATELETS 10*3/mm3 198 201     Results from last 7 days   Lab Units 01/18/21  0533 01/17/21  0740 01/16/21  0820   SODIUM mmol/L 136 138 137   POTASSIUM mmol/L 4.3 4.4 4.3   CHLORIDE mmol/L 99 103 105   CO2 mmol/L 29.0 28.0 25.0   BUN mg/dL 25* 21 21   CREATININE mg/dL 0.89 0.76 0.86   GLUCOSE mg/dL 169* 150* 159*      Lab Results   Component Value Date    AST 40 01/15/2021    ALT 23 01/15/2021     Results from last 7 days   Lab Units 01/16/21  0820   HEMOGLOBIN A1C % 7.10*         Results from last 7 days   Lab Units 01/16/21  0820   TSH uIU/mL 2.340         Results from last 7 days   Lab Units 01/18/21  0533 01/17/21  0740 01/16/21  0820   PROTIME Seconds 25.7* 28.1* 35.9*   INR  2.39* 2.66* 3.63*     Results from last 7 days   Lab Units 01/17/21  0740 01/15/21  2222 01/15/21  2041   TROPONIN T ng/mL 0.028 0.018 0.021     Results from last 7 days   Lab Units 01/17/21  0740   PROBNP pg/mL 303.4       Intake/Output Summary (Last 24 hours) at 1/18/2021 1357  Last data filed at 1/18/2021 0342  Gross per 24 hour   Intake 306 ml   Output 1650 ml   Net -1344 ml     I personally reviewed the patient's EKG/Telemetry data    Current Medications:  amLODIPine, 10 mg, Oral, QAM  aspirin, 81 mg, Oral, Daily  carvedilol, 25 mg, Oral, BID With Meals  furosemide, 40 mg, Intravenous, Q12H  gabapentin, 800 mg, Oral, Q8H  insulin detemir, 10 Units, Subcutaneous, Nightly  insulin lispro, 0-9 Units, Subcutaneous, TID AC  insulin lispro, 3 Units, Subcutaneous, TID With Meals  lisinopril, 20 mg, Oral, Q12H  minocycline, 100 mg, Oral, BID  pharmacy consult - MTM, , Does not apply, BID  pravastatin, 80 mg, Oral, Nightly  sodium chloride, 10 mL, Intravenous, Q12H  warfarin, 10 mg, Oral, Daily          Assessment and Plan:  "    1. Acute \"CHF\"  - symptoms of progressive exertional dyspnea, orthopnea and LE edema x2 weeks  - proBNP within normal limits, however CXR shows enlarged mediastinum with pulmonary congestion, not significantly changed from 2019  - echo demonstrates some LVH with NORMAL EF.There is sl.diastolic gradient across the MV apparatus AND a PA systolic pressure of 56 mmHg.  - CTA results noted above with no mediastinal mass, 3.1 cm ascending aorta  - stress PET today is abnormal but could reflect artifact. Needs catheterization prior to discharge. Discussed and answered questions.     2. AS, s/p mechanical AVR  - echo with normal valve function   - continue Warfarin for anticoagulation     3. Afib  -  chronically anticoagulated with Warfarin  - currently in 2nd degree AVB, type I     4. Morbid obesity/possible JI  - patient denies prior history of JI, however would benefit from sleep study as OP.    5. PA hypertension: Will look at during catheterization.    Electronically signed by Olga Chau PA-C, 01/18/21, 2:01 PM EST.    "

## 2021-01-18 NOTE — PLAN OF CARE
Goal Outcome Evaluation:  Plan of Care Reviewed With: patient  Progress: improving  Outcome Summary: VSS. AOx4. No reports of pain. Stress test completed today. Remains in sinus rhythm to sinus merlyn on the monitor with first degree block and frequent PACs. Producing adequate urine output.

## 2021-01-18 NOTE — PLAN OF CARE
Problem: Adult Inpatient Plan of Care  Goal: Plan of Care Review  Outcome: Ongoing, Progressing  Flowsheets (Taken 1/18/2021 6079)  Plan of Care Reviewed With: patient  Outcome Summary: Pt A+O x 4. RHYTHM CHANGE per EKG - Sinus merlyn with first degree AV block and PACs. EKG did not upload to EMR, copy in pt's chart. No complaints per pt. NPO since midnight for stress test this AM. No further concerns at this time.

## 2021-01-18 NOTE — PROGRESS NOTES
[facility] Medicine Services  PROGRESS NOTE    Patient Name: Cecilio Farias  : 1955  MRN: [id number]    Date of Admission: 1/15/2021  Primary Care Physician: America Everett DO    Subjective   Subjective     CC:  f/u dyspnea    HPI:  Patient returned from stress test. Feels much better. Breathing at baseline wo CP, palpitations, dizziness. Moving well.  Net - 7,429 since admit, -2,904 in last 24 hr  -8lb    ROS:  Gen- No fevers, chills  CV- No chest pain, palpitations  Resp- No cough,  Dyspnea resolved  GI- No N/V/D, abd pain    Objective   Objective     Vital Signs:   Temp:  [98.1 °F (36.7 °C)-98.5 °F (36.9 °C)] 98.5 °F (36.9 °C)  Heart Rate:  [55-73] 73  Resp:  [16-18] 18  BP: (113-150)/(66-74) 123/74        Physical Exam:  Constitutional: No acute distress, awake, alert, sitting up on side of bed finishing lunch  HENT: NCAT, mucous membranes moist  Respiratory: CTAB, respiratory effort normal on 2 L  Cardiovascular: RRR, no murmurs, rubs, or gallops  Gastrointestinal: Positive bowel sounds, soft, nontender, nondistended  Musculoskeletal: No bilateral ankle edema, chronic discoloration  Psychiatric: Appropriate affect, cooperative  Neurologic: Oriented x 3, no focal deficits  Skin: No rashes      Results Reviewed:  Results from last 7 days   Lab Units 21  0521  0740 21  0820  01/15/21  1748   WBC 10*3/mm3  --   --  6.39  --  7.95   HEMOGLOBIN g/dL  --   --  11.2*  --  12.1*   HEMATOCRIT %  --   --  38.2  --  41.3   PLATELETS 10*3/mm3  --   --  198  --  201   INR  2.39* 2.66* 3.63*   < >  --     < > = values in this interval not displayed.     Results from last 7 days   Lab Units 21  0533 21  0740 21  0820 01/15/21  2222 01/15/21  2041 01/15/21  1746   SODIUM mmol/L 136 138 137  --   --  137   POTASSIUM mmol/L 4.3 4.4 4.3  --   --  4.7   CHLORIDE mmol/L 99 103 105  --   --  106   CO2 mmol/L 29.0 28.0 25.0  --   --  24.0   BUN mg/dL 25*  21 21  --   --  21   CREATININE mg/dL 0.89 0.76 0.86  --   --  0.74*   GLUCOSE mg/dL 169* 150* 159*  --   --  162*   CALCIUM mg/dL 8.3* 8.6 8.4*  --   --  8.8   ALT (SGPT) U/L  --   --   --   --   --  23   AST (SGOT) U/L  --   --   --   --   --  40   TROPONIN T ng/mL  --  0.028  --  0.018 0.021 0.014   PROBNP pg/mL  --  303.4  --   --   --  532.3     Estimated Creatinine Clearance: 129.3 mL/min (by C-G formula based on SCr of 0.89 mg/dL).    Microbiology Results Abnormal     Procedure Component Value - Date/Time    COVID PRE-OP / PRE-PROCEDURE SCREENING ORDER (NO ISOLATION) - Swab, Nasopharynx [[id number]]  (Normal) Collected: 01/15/21 1921    Lab Status: Final result Specimen: Swab from Nasopharynx Updated: 01/15/21 2015    Narrative:      The following orders were created for panel order COVID PRE-OP / PRE-PROCEDURE SCREENING ORDER (NO ISOLATION) - Swab, Nasopharynx.  Procedure                               Abnormality         Status                     ---------                               -----------         ------                     COVID-19 and FLU A/B PCR...[[id number]]  Normal              Final result                 Please view results for these tests on the individual orders.    COVID-19 and FLU A/B PCR - Swab, Nasopharynx [[id number]]  (Normal) Collected: 01/15/21 1921    Lab Status: Final result Specimen: Swab from Nasopharynx Updated: 01/15/21 2015     COVID19 Not Detected     Influenza A PCR Not Detected     Influenza B PCR Not Detected    Narrative:      Fact sheet for providers: https://www.fda.gov/media/592997/download    Fact sheet for patients: https://www.fda.gov/media/716921/download    Test performed by PCR.          Imaging Results (Last 24 Hours)     ** No results found for the last 24 hours. **          Results for orders placed during the hospital encounter of 01/15/21   Transthoracic Echo Complete With Contrast if Necessary Per Protocol    Narrative · The left atrium is dilated.  · There  is mild concentric left ventricular hypertrophy.  · Global and segmental LV systolic function is normal. The calculated left   ventricular ejection fraction is 53%.  · There is a mechanical aortic valve in the aortic position. Function   appears to be normal.  · There is mitral annular calcification. Mild mitral regurgitation is   seen. There appears to be some degree of chordal thickening with a 4.5   mmHg mean diastolic gradient across the mitral valve apparatus.  · Mild to moderate tricuspid regurgitation is noted. The estimated RV   systolic pressure is elevated at 56 mmHg.  · No other important findings are noted on the study.          I have reviewed the medications:  Scheduled Meds:amLODIPine, 10 mg, Oral, QAM  aspirin, 81 mg, Oral, Daily  carvedilol, 25 mg, Oral, BID With Meals  furosemide, 40 mg, Intravenous, Q12H  gabapentin, 800 mg, Oral, Q8H  insulin detemir, 10 Units, Subcutaneous, Nightly  insulin lispro, 0-9 Units, Subcutaneous, TID AC  insulin lispro, 3 Units, Subcutaneous, TID With Meals  lisinopril, 20 mg, Oral, Q12H  minocycline, 100 mg, Oral, BID  pharmacy consult - [place], , Does not apply, BID  pravastatin, 80 mg, Oral, Nightly  sodium chloride, 10 mL, Intravenous, Q12H  warfarin, 10 mg, Oral, Daily      Continuous Infusions:Pharmacy to dose warfarin,       PRN Meds:.•  acetaminophen **OR** acetaminophen **OR** acetaminophen  •  dextrose  •  dextrose  •  glucagon (human recombinant)  •  magnesium sulfate **OR** magnesium sulfate **OR** magnesium sulfate  •  melatonin  •  Pharmacy to dose warfarin  •  sodium chloride  •  sodium chloride    Assessment/Plan   Assessment & Plan     Active Hospital Problems    Diagnosis  POA   • **Acute diastolic congestive heart failure (CMS/HCC) [I50.31]  Yes   • Lung nodule [R91.1]  Yes   • Chronic anticoagulation [Z79.01]  Not Applicable   • Acute respiratory failure with hypoxia (CMS/HCC) [J96.01]  Yes   • Anemia [D64.9]  Yes   • History Acinetobacter infection  [Z86.19]  Yes   • Paroxysmal atrial fibrillation (CMS/HCC) [I48.0]  Yes   • Discoloration of skin of bilateral legs likely due to minocycline use [L81.9]  Yes   • History of mechanical aortic valve replacement [Z95.2]  Not Applicable   • Essential hypertension [I10]  Yes   • Dyslipidemia [E78.5]  Yes   • Type 2 diabetes mellitus (CMS/HCC) [E11.9]  Yes      Resolved Hospital Problems    Diagnosis Date Resolved POA   • CHF (congestive heart failure) (CMS/HCC) [I50.9] 01/16/2021 Yes        Brief Hospital Course to date:  Cecilio Farias is a 65 y.o. male with a history of mechanical aortic valve on Coumadin, hypertension, A. fib, type 2 diabetes who presents with progressive dyspnea found to have hypoxia.    Acute Respiratory Failure with Hypoxia   Acute diastolic CHF  - clinically improved after diuresis, down 7L.  Continue IV Lasix bid per cardiology  - echo reviewed, noted diastolic dysfunction  - CT chest with some small effusions   -CT showed significant coronary calcifications, s/p stress test today. Prelim with EF 24%. Await further recs from cardiology    Atrial Fibrillation:  - sinus/ sinus merlyn currently  - [id number] = 4. Already anticoagulated on coumadin    Right lung base nodule  -- incidental finding on CTA chest in right lung base  -- recommend 3-6 month follow up     Hx of mechanical AVR:  - on coumadin. therapeutic today at 2.39  - continue to dose anticoagulation per pharmacy     History of Acinetobacter bacteremia:  - on chronic minocycline.  Dr. Kaur  - legs discolored likely due to minocycline     DM2:  - last improved to 7.1%  - decent control will increase levemir slightly     Hypertension:  - controlled and stable. Continue norvasc and coreg     7. HLD:  - continue statin     8. Anemia:  - mild, stable, chronic    DVT Prophylaxis:  coumadin      Disposition: I expect the patient to be discharged home ~1-2 days when ok with cardiology    CODE STATUS:   Code Status and Medical Interventions:    Ordered at: 01/15/21 1944     Level Of Support Discussed With:    Patient     Code Status:    CPR     Medical Interventions (Level of Support Prior to Arrest):    Full       Evelyne Barrientos, APRN  01/18/21

## 2021-01-18 NOTE — PROGRESS NOTES
"Pharmacy Consult  -  Warfarin  Cecilio Farias is a  65 y.o. male   Height - 190 cm (74.8\")  Weight - (!) 146 kg (322 lb)    Consulting Service: Hospitalist  Indication: Mechanical AVR  Goal INR: 2.5-3.5  Home Regimen:   - Warfarin 10 mg on Mon, Wed, Fri   - Warfarin 7.5 mg on Sun, Tues, Thurs, Sat    Bridge Therapy: No     Drug-Drug Interactions with current regimen:     Aspirin - may increase risk of bleeding (home med)      Minocycline - may increase risk of bleeding (home med)     Warfarin Dosing During Admission:    Date  1/15 1/16 1/17 1/18        INR  3.88 3.63 2.66 2.39        Dose  Hold 5 mg 9 mg (10 mg)           Education Provided: Warfarin education provided on 1/17/21 verbally and in writing. Discussed effects of warfarin, importance of checking INR, drug-drug and drug-food interactions, and signs/symptoms of bleeding and clotting. Patient verbalized understanding through teach back. All pertinent questions were answered.     Discharge Follow up:     Following Provider - [facility]     Follow up time range or appointment - Recommend repeat INR 2-3 days after discharge    Labs:  Results from last 7 days   Lab Units 01/18/21 0533 01/17/21  0740 01/16/21  0820 01/15/21  2250 01/15/21  1748 01/15/21  1746 01/14/21   INR  2.39* 2.66* 3.63* 3.81*  --  3.88* 3.50   HEMOGLOBIN g/dL  --   --  11.2*  --  12.1*  --   --    HEMATOCRIT %  --   --  38.2  --  41.3  --   --    PLATELETS 10*3/mm3  --   --  198  --  201  --   --      Results from last 7 days   Lab Units 01/18/21  0533 01/17/21  0740 01/16/21  0820 01/15/21  1746   SODIUM mmol/L 136 138 137 137   POTASSIUM mmol/L 4.3 4.4 4.3 4.7   CHLORIDE mmol/L 99 103 105 106   CO2 mmol/L 29.0 28.0 25.0 24.0   BUN mg/dL 25* 21 21 21   CREATININE mg/dL 0.89 0.76 0.86 0.74*   CALCIUM mg/dL 8.3* 8.6 8.4* 8.8   BILIRUBIN mg/dL  --   --   --  0.8   ALK PHOS U/L  --   --   --  80   ALT (SGPT) U/L  --   --   --  23   AST (SGOT) U/L  --   --   --  40   GLUCOSE " mg/dL 169* 150* 159* 162*     Current dietary intake: 100% of meals documented on 1/17. NPO on 1/18 AM for procedure, but diet resumed.    Diet Order   Procedures    Diet Regular; Cardiac, Consistent Carbohydrate     Assessment/Plan:    Patient's INR is therapeutic at 2.39 today.  Will resume home dose of warfarin and give 10 mg today.  Daily PT/INR ordered.  Monitor signs/symptoms of bleeding, dietary intake, and drug-drug interactions. Make dose adjustments as necessary.  Pharmacy will continue to follow.    Rachel Mathias, PharmD, BCPS  1/18/2021  11:51 EST

## 2021-01-18 NOTE — PROGRESS NOTES
Discharge Planning Assessment  [facility]     Patient Name: Cecilio Farias  MRN: [id number]  Today's Date: 1/18/2021    Admit Date: 1/15/2021    Discharge Needs Assessment     Row Name 01/18/21 5886       Living Environment    Lives With  spouse;child(marysol), adult    Name(s) of Who Lives With Patient  Lilibeth Farias    Current Living Arrangements  home/apartment/condo    Primary Care Provided by  self    Provides Primary Care For  no one    Able to Return to Prior Arrangements  yes       Resource/Environmental Concerns    Resource/Environmental Concerns  none       Transition Planning    Patient/Family Anticipates Transition to  home    Patient/Family Anticipated Services at Transition  none    Transportation Anticipated  family or friend will provide       Discharge Needs Assessment    Readmission Within the Last 30 Days  no previous admission in last 30 days    Equipment Currently Used at Home  none    Concerns to be Addressed  discharge planning;adjustment to diagnosis/illness    Equipment Needed After Discharge  none    Current Discharge Risk  chronically ill        Discharge Plan     Row Name 01/18/21 8247       Plan    Plan  Home    Patient/Family in Agreement with Plan  yes    Plan Comments  I met with Mr. Farias at bedside who states he lives in [facility] [place] [facility] with wife and adult son.  He reports that he is independent with activities of daily living and mobility.  He drives himself.  He is being followed by cardiology.  He anticipates having no discharge needs at this time.  Case Management will follow plan of care recommendations and discuss with Mr. Farias as needed.    Final Discharge Disposition Code  01 - home or self-care        Continued Care and Services - Admitted Since 1/15/2021    Coordination has not been started for this encounter.         Demographic Summary     Row Name 01/18/21 6369       General Information    General Information Comments  I verified with Mr. Farias his PCP is America Everett MD  and his insurance carrier as Medicare with [employer] as a prescription plan.        Functional Status     Row Name 01/18/21 1356       Functional Status, IADL    Medications  independent    Meal Preparation  independent    Housekeeping  independent    Laundry  independent    Shopping  independent        Psychosocial    No documentation.       Abuse/Neglect    No documentation.       Legal    No documentation.       Substance Abuse    No documentation.       Patient Forms    No documentation.           Kathy Kuhn RN

## 2021-01-19 LAB
ANION GAP SERPL CALCULATED.3IONS-SCNC: 7 MMOL/L (ref 5–15)
BH CV STRESS BP STAGE 1: NORMAL
BH CV STRESS BP STAGE 3: NORMAL
BH CV STRESS COMMENTS STAGE 1: NORMAL
BH CV STRESS DOSE REGADENOSON STAGE 1: 0.4
BH CV STRESS DURATION MIN STAGE 1: 1
BH CV STRESS DURATION MIN STAGE 2: 1
BH CV STRESS DURATION MIN STAGE 3: 1
BH CV STRESS DURATION MIN STAGE 4: 1
BH CV STRESS DURATION SEC STAGE 1: 0
BH CV STRESS DURATION SEC STAGE 2: 0
BH CV STRESS DURATION SEC STAGE 3: 0
BH CV STRESS DURATION SEC STAGE 4: 0
BH CV STRESS HR STAGE 1: 55
BH CV STRESS HR STAGE 2: 70
BH CV STRESS HR STAGE 3: 62
BH CV STRESS HR STAGE 4: 62
BH CV STRESS O2 STAGE 1: 98
BH CV STRESS O2 STAGE 2: 100
BH CV STRESS O2 STAGE 3: 99
BH CV STRESS O2 STAGE 4: 100
BH CV STRESS PROTOCOL 1: NORMAL
BH CV STRESS RECOVERY BP: NORMAL MMHG
BH CV STRESS RECOVERY HR: 63 BPM
BH CV STRESS RECOVERY O2: 99 %
BH CV STRESS STAGE 1: 1
BH CV STRESS STAGE 2: 2
BH CV STRESS STAGE 3: 3
BH CV STRESS STAGE 4: 4
BUN SERPL-MCNC: 33 MG/DL (ref 8–23)
BUN/CREAT SERPL: 33.3 (ref 7–25)
CALCIUM SPEC-SCNC: 8.5 MG/DL (ref 8.6–10.5)
CHLORIDE SERPL-SCNC: 99 MMOL/L (ref 98–107)
CO2 SERPL-SCNC: 31 MMOL/L (ref 22–29)
CREAT SERPL-MCNC: 0.99 MG/DL (ref 0.76–1.27)
GFR SERPL CREATININE-BSD FRML MDRD: 76 ML/MIN/1.73
GLUCOSE BLDC GLUCOMTR-MCNC: 187 MG/DL (ref 70–130)
GLUCOSE BLDC GLUCOMTR-MCNC: 213 MG/DL (ref 70–130)
GLUCOSE BLDC GLUCOMTR-MCNC: 239 MG/DL (ref 70–130)
GLUCOSE BLDC GLUCOMTR-MCNC: 242 MG/DL (ref 70–130)
GLUCOSE SERPL-MCNC: 236 MG/DL (ref 65–99)
INR PPP: 2.44 (ref 0.85–1.16)
LV EF NUC BP: 24 %
MAXIMAL PREDICTED HEART RATE: 156 BPM
PERCENT MAX PREDICTED HR: 48.08 %
POTASSIUM SERPL-SCNC: 4.8 MMOL/L (ref 3.5–5.2)
PROTHROMBIN TIME: 26.2 SECONDS (ref 11.5–14)
SODIUM SERPL-SCNC: 137 MMOL/L (ref 136–145)
STRESS BASELINE BP: NORMAL MMHG
STRESS BASELINE HR: 64 BPM
STRESS O2 SAT REST: 96 %
STRESS PERCENT HR: 57 %
STRESS POST ESTIMATED WORKLOAD: 1 METS
STRESS POST EXERCISE DUR MIN: 4 MIN
STRESS POST EXERCISE DUR SEC: 0 SEC
STRESS POST O2 SAT PEAK: 100 %
STRESS POST PEAK BP: NORMAL MMHG
STRESS POST PEAK HR: 75 BPM
STRESS TARGET HR: 133 BPM

## 2021-01-19 PROCEDURE — 99232 SBSQ HOSP IP/OBS MODERATE 35: CPT | Performed by: NURSE PRACTITIONER

## 2021-01-19 PROCEDURE — 25010000002 FUROSEMIDE PER 20 MG: Performed by: INTERNAL MEDICINE

## 2021-01-19 PROCEDURE — 82962 GLUCOSE BLOOD TEST: CPT

## 2021-01-19 PROCEDURE — 99233 SBSQ HOSP IP/OBS HIGH 50: CPT | Performed by: INTERNAL MEDICINE

## 2021-01-19 PROCEDURE — 85610 PROTHROMBIN TIME: CPT | Performed by: PHYSICIAN ASSISTANT

## 2021-01-19 PROCEDURE — 63710000001 INSULIN DETEMIR PER 5 UNITS: Performed by: NURSE PRACTITIONER

## 2021-01-19 PROCEDURE — 80048 BASIC METABOLIC PNL TOTAL CA: CPT | Performed by: PHYSICIAN ASSISTANT

## 2021-01-19 PROCEDURE — 63710000001 INSULIN LISPRO (HUMAN) PER 5 UNITS: Performed by: NURSE PRACTITIONER

## 2021-01-19 PROCEDURE — 63710000001 INSULIN LISPRO (HUMAN) PER 5 UNITS: Performed by: INTERNAL MEDICINE

## 2021-01-19 RX ORDER — TRAZODONE HYDROCHLORIDE 50 MG/1
25 TABLET ORAL NIGHTLY PRN
Status: DISCONTINUED | OUTPATIENT
Start: 2021-01-19 | End: 2021-01-21 | Stop reason: HOSPADM

## 2021-01-19 RX ORDER — FUROSEMIDE 40 MG/1
40 TABLET ORAL DAILY
Status: DISCONTINUED | OUTPATIENT
Start: 2021-01-20 | End: 2021-01-21 | Stop reason: HOSPADM

## 2021-01-19 RX ORDER — WARFARIN SODIUM 7.5 MG/1
7.5 TABLET ORAL
Status: DISCONTINUED | OUTPATIENT
Start: 2021-01-19 | End: 2021-01-20

## 2021-01-19 RX ORDER — CHOLECALCIFEROL (VITAMIN D3) 125 MCG
5 CAPSULE ORAL NIGHTLY
Status: DISCONTINUED | OUTPATIENT
Start: 2021-01-19 | End: 2021-01-21 | Stop reason: HOSPADM

## 2021-01-19 RX ADMIN — Medication 5 MG: at 22:00

## 2021-01-19 RX ADMIN — FUROSEMIDE 40 MG: 10 INJECTION INTRAMUSCULAR; INTRAVENOUS at 08:54

## 2021-01-19 RX ADMIN — SODIUM CHLORIDE, PRESERVATIVE FREE 10 ML: 5 INJECTION INTRAVENOUS at 21:59

## 2021-01-19 RX ADMIN — INSULIN LISPRO 4 UNITS: 100 INJECTION, SOLUTION INTRAVENOUS; SUBCUTANEOUS at 11:54

## 2021-01-19 RX ADMIN — INSULIN LISPRO 3 UNITS: 100 INJECTION, SOLUTION INTRAVENOUS; SUBCUTANEOUS at 09:13

## 2021-01-19 RX ADMIN — SODIUM CHLORIDE, PRESERVATIVE FREE 10 ML: 5 INJECTION INTRAVENOUS at 08:55

## 2021-01-19 RX ADMIN — MINOCYCLINE HYDROCHLORIDE 100 MG: 50 CAPSULE ORAL at 08:54

## 2021-01-19 RX ADMIN — INSULIN LISPRO 7 UNITS: 100 INJECTION, SOLUTION INTRAVENOUS; SUBCUTANEOUS at 17:48

## 2021-01-19 RX ADMIN — WARFARIN SODIUM 7.5 MG: 7.5 TABLET ORAL at 17:45

## 2021-01-19 RX ADMIN — AMLODIPINE BESYLATE 10 MG: 10 TABLET ORAL at 08:54

## 2021-01-19 RX ADMIN — GABAPENTIN 800 MG: 400 CAPSULE ORAL at 22:00

## 2021-01-19 RX ADMIN — GABAPENTIN 800 MG: 400 CAPSULE ORAL at 13:28

## 2021-01-19 RX ADMIN — INSULIN LISPRO 7 UNITS: 100 INJECTION, SOLUTION INTRAVENOUS; SUBCUTANEOUS at 11:53

## 2021-01-19 RX ADMIN — LISINOPRIL 20 MG: 20 TABLET ORAL at 08:53

## 2021-01-19 RX ADMIN — CARVEDILOL 25 MG: 12.5 TABLET, FILM COATED ORAL at 08:54

## 2021-01-19 RX ADMIN — INSULIN DETEMIR 10 UNITS: 100 INJECTION, SOLUTION SUBCUTANEOUS at 22:00

## 2021-01-19 RX ADMIN — ASPIRIN 81 MG: 81 TABLET, FILM COATED ORAL at 08:53

## 2021-01-19 RX ADMIN — MINOCYCLINE HYDROCHLORIDE 100 MG: 50 CAPSULE ORAL at 22:00

## 2021-01-19 RX ADMIN — INSULIN LISPRO 2 UNITS: 100 INJECTION, SOLUTION INTRAVENOUS; SUBCUTANEOUS at 08:53

## 2021-01-19 RX ADMIN — PRAVASTATIN SODIUM 80 MG: 40 TABLET ORAL at 22:00

## 2021-01-19 RX ADMIN — INSULIN LISPRO 4 UNITS: 100 INJECTION, SOLUTION INTRAVENOUS; SUBCUTANEOUS at 17:47

## 2021-01-19 RX ADMIN — GABAPENTIN 800 MG: 400 CAPSULE ORAL at 06:21

## 2021-01-19 NOTE — PLAN OF CARE
Goal Outcome Evaluation:  Plan of Care Reviewed With: patient  Progress: no change  Outcome Summary: Patient A&O x 4, Afib on telemetry.  BP low as noted in flowsheets this afternoon and notified Evelyne CHISHOLM, will monitor BP prior to administration of BP medications this evening and hold as ordered.  Consent obtained for left heart catheterization in AM.  No new complaints or concerns voiced.

## 2021-01-19 NOTE — PROGRESS NOTES
"  [facility]  PROGRESS NOTE    Date of Admission: 1/15/2021  Date of Service: 01/19/21    Primary Care Physician: America Everett DO    Chief Complaint: f/u CHF, PAF  Problem List:   1. Severe valvular aortic stenosis:  a. Acute systolic CHF, winter 2009.   b. LVEF 20% to 25%.   c. Normal coronary arteries.   d. 25 mm. [place] AVR and repair of ascending aneurysm, March 2009.  i. Paroxysmal atrial fibrillation, resolved.   e. MUGA EF = 64%, September 2010.  f. EF >70%, mechanical aortic valve with normal function (7/5/16).  g. ESTHER, 6/12/2019 - negative for vegetation or endocarditis, EF 56-60%  2. [facility] admission for LE edema, shortness of breath, and new Afib January 2021   a. Echo 1/16/21: EF 53%, mechanical aortic valve with normal function, MAC with mild MR, RVSP 56mmHg   3. Paroxysmal atrial fibrillation  a. Noted on admission 01/2021, rate controlled and asymptomatic   4. Hypertension.   5. Dyslipidemia.   6. Diabetes mellitus, type 2.   7. Morbid obesity.   8. Colon cancer with operation/chemotherapy/radiation.   9. Status post operations, remote.  10. Acinetobacter sepsis, 6/2019  a. IV antibiotics followed by ID.  b. Chronic suppressive Rx planned by ID    Subjective      HPI:  He has been painfree and no complaints of shortness of air overnight.      Objective   Vitals: /61 (BP Location: Left arm, Patient Position: Lying)   Pulse 71   Temp 97.6 °F (36.4 °C) (Oral)   Resp 22   Ht 190 cm (74.8\")   Wt (!) 144 kg (316 lb 9.6 oz)   SpO2 98%   BMI 39.78 kg/m²     Physical Exam:  GENERAL: Alert, cooperative, in no acute distress.   HEENT: Normocephalic, no jugular venous distention  HEART: Regular rhythm, normal rate, and no murmurs, gallops, or rubs.   LUNGS: Clear to auscultation bilaterally. No wheezing, rales or rhonchi.  ABDOMEN: Soft, bowel sounds present, non-tender   NEUROLOGIC: No focal abnormalities involving strength or sensation are noted. "   EXTREMITIES: As before, discoloration and edema noted.     Results:  Results from last 7 days   Lab Units 01/16/21  0820 01/15/21  1748   WBC 10*3/mm3 6.39 7.95   HEMOGLOBIN g/dL 11.2* 12.1*   HEMATOCRIT % 38.2 41.3   PLATELETS 10*3/mm3 198 201     Results from last 7 days   Lab Units 01/18/21  0533 01/17/21  0740 01/16/21  0820   SODIUM mmol/L 136 138 137   POTASSIUM mmol/L 4.3 4.4 4.3   CHLORIDE mmol/L 99 103 105   CO2 mmol/L 29.0 28.0 25.0   BUN mg/dL 25* 21 21   CREATININE mg/dL 0.89 0.76 0.86   GLUCOSE mg/dL 169* 150* 159*      Lab Results   Component Value Date    AST 40 01/15/2021    ALT 23 01/15/2021     Results from last 7 days   Lab Units 01/16/21  0820   HEMOGLOBIN A1C % 7.10*         Results from last 7 days   Lab Units 01/16/21  0820   TSH uIU/mL 2.340         Results from last 7 days   Lab Units 01/18/21  0533 01/17/21  0740 01/16/21  0820   PROTIME Seconds 25.7* 28.1* 35.9*   INR  2.39* 2.66* 3.63*     Results from last 7 days   Lab Units 01/17/21  0740 01/15/21  2222 01/15/21  2041   TROPONIN T ng/mL 0.028 0.018 0.021     Results from last 7 days   Lab Units 01/17/21  0740   PROBNP pg/mL 303.4       Intake/Output Summary (Last 24 hours) at 1/19/2021 0858  Last data filed at 1/19/2021 0600  Gross per 24 hour   Intake 360 ml   Output 1600 ml   Net -1240 ml     I personally reviewed the patient's EKG/Telemetry data    Current Medications:  amLODIPine, 10 mg, Oral, QAM  aspirin, 81 mg, Oral, Daily  carvedilol, 25 mg, Oral, BID With Meals  furosemide, 40 mg, Intravenous, Q12H  gabapentin, 800 mg, Oral, Q8H  insulin detemir, 10 Units, Subcutaneous, Nightly  insulin lispro, 0-9 Units, Subcutaneous, TID AC  insulin lispro, 5 Units, Subcutaneous, TID With Meals  lisinopril, 20 mg, Oral, Q12H  minocycline, 100 mg, Oral, BID  pharmacy consult - MTM, , Does not apply, BID  pravastatin, 80 mg, Oral, Nightly  sodium chloride, 10 mL, Intravenous, Q12H  warfarin, 10 mg, Oral, Daily      Pharmacy to dose warfarin,  "        Assessment and Plan:   1. Acute \"CHF\"  - symptoms of progressive exertional dyspnea, orthopnea and LE edema x2 weeks  - proBNP within normal limits, however CXR shows enlarged mediastinum with pulmonary congestion, not significantly changed from 2019  - echo demonstrates some LVH with NORMAL EF.There is sl.diastolic gradient across the MV apparatus AND a PA systolic pressure of 56 mmHg.  - stress PET yesterday was abnormal but could reflect artifact. Needs catheterization prior to discharge. Discussed and answered questions.     2. AS, s/p mechanical AVR  - echo with normal valve function   - continue Warfarin for anticoagulation     3. Paroxysmal afib  -  chronically anticoagulated with Warfarin  -  currently in 2nd degree AVB, type I      4. Morbid obesity/possible JI  - patient denies prior history of JI, however would benefit from sleep study as OP.     5. PA hypertension: Will look at during catheterization.    Needs heart catheterization to assess \"heart failure\", PA hypertension, CAD as contributory to symptoms.    Electronically signed by Olga Chau PA-C, 01/19/21, 9:00 AM EST.      "

## 2021-01-19 NOTE — PROGRESS NOTES
[facility] Medicine Services  PROGRESS NOTE    Patient Name: Cecilio Farias  : 1955  MRN: [id number]    Date of Admission: 1/15/2021  Primary Care Physician: America Everett,     Subjective   Subjective     CC:  f/u dyspnea    HPI:  Patient sitting up in bed eating breakfast. Denies pain, soa/ MEJIAS, lightheadedness.  -13lb and -8.6L since admission  Urinating constantly per report    ROS:  Gen- No fevers, chills  CV- No chest pain, palpitations  Resp- No cough,  Dyspnea resolved  GI- No N/V/D, abd pain    Objective   Objective     Vital Signs:   Temp:  [97.3 °F (36.3 °C)-98.5 °F (36.9 °C)] 97.3 °F (36.3 °C)  Heart Rate:  [49-73] 71  Resp:  [18-24] 24  BP: (120-129)/(61-83) 123/83        Physical Exam:  Constitutional: No acute distress, awake, alert, sitting up on side of bed finishing breakfast  HENT: NCAT, mucous membranes moist  Respiratory: CTAB, respiratory effort normal on 2 L  Cardiovascular: RRR, no murmurs, rubs, or gallops  Gastrointestinal: Positive bowel sounds, soft, nontender, nondistended  Musculoskeletal: trace bilateral ankle edema, chronic dark/ black discoloration BLE extending up to knees  Psychiatric: Appropriate affect, cooperative  Neurologic: Oriented x 3, no focal deficits  Skin: No rashes      Results Reviewed:  Results from last 7 days   Lab Units 21  0521  0740 21  0820  01/15/21  1748   WBC 10*3/mm3  --   --  6.39  --  7.95   HEMOGLOBIN g/dL  --   --  11.2*  --  12.1*   HEMATOCRIT %  --   --  38.2  --  41.3   PLATELETS 10*3/mm3  --   --  198  --  201   INR  2.39* 2.66* 3.63*   < >  --     < > = values in this interval not displayed.     Results from last 7 days   Lab Units 21  0533 21  0740 21  0820 01/15/21  2222 01/15/21  2041 01/15/21  1746   SODIUM mmol/L 136 138 137  --   --  137   POTASSIUM mmol/L 4.3 4.4 4.3  --   --  4.7   CHLORIDE mmol/L 99 103 105  --   --  106   CO2 mmol/L 29.0 28.0 25.0  --   --   24.0   BUN mg/dL 25* 21 21  --   --  21   CREATININE mg/dL 0.89 0.76 0.86  --   --  0.74*   GLUCOSE mg/dL 169* 150* 159*  --   --  162*   CALCIUM mg/dL 8.3* 8.6 8.4*  --   --  8.8   ALT (SGPT) U/L  --   --   --   --   --  23   AST (SGOT) U/L  --   --   --   --   --  40   TROPONIN T ng/mL  --  0.028  --  0.018 0.021 0.014   PROBNP pg/mL  --  303.4  --   --   --  532.3     Estimated Creatinine Clearance: 128.1 mL/min (by C-G formula based on SCr of 0.89 mg/dL).    Microbiology Results Abnormal     Procedure Component Value - Date/Time    COVID PRE-OP / PRE-PROCEDURE SCREENING ORDER (NO ISOLATION) - Swab, Nasopharynx [[id number]]  (Normal) Collected: 01/15/21 1921    Lab Status: Final result Specimen: Swab from Nasopharynx Updated: 01/15/21 2015    Narrative:      The following orders were created for panel order COVID PRE-OP / PRE-PROCEDURE SCREENING ORDER (NO ISOLATION) - Swab, Nasopharynx.  Procedure                               Abnormality         Status                     ---------                               -----------         ------                     COVID-19 and FLU A/B PCR...[[id number]]  Normal              Final result                 Please view results for these tests on the individual orders.    COVID-19 and FLU A/B PCR - Swab, Nasopharynx [[id number]]  (Normal) Collected: 01/15/21 1921    Lab Status: Final result Specimen: Swab from Nasopharynx Updated: 01/15/21 2015     COVID19 Not Detected     Influenza A PCR Not Detected     Influenza B PCR Not Detected    Narrative:      Fact sheet for providers: https://www.fda.gov/media/874771/download    Fact sheet for patients: https://www.fda.gov/media/289527/download    Test performed by PCR.          Imaging Results (Last 24 Hours)     Procedure Component Value Units Date/Time    CT Angiogram Chest With & Without Contrast [[id number]] Collected: 01/17/21 0824     Updated: 01/18/21 1428    Narrative:      EXAMINATION: CT ANGIOGRAM CHEST WWO CONTRAST  -  01/16/2021     INDICATION: I50.9-Heart failure, unspecified; J96.01-Acute respiratory  failure with hypoxia; R06.02-Shortness of breath; R60.0-Localized edema;  I48.91-Unspecified atrial fibrillation. Shortness of breath,     TECHNIQUE: Multiple axial CT images obtained of the chest with and  without the administration of intravenous contrast. 3D reformatted  images were submitted to further facilitate diagnostic accuracy and  treatment planning.     The radiation dose reduction device was turned on for each scan per the  ALARA (As Low as Reasonably Achievable) protocol.     COMPARISON: None.     FINDINGS: Thyroid is homogeneous. There is no mediastinal mass. There is  been repair of the ascending thoracic aorta. The largest AP dimension of  the a sending thoracic aorta is 3.1 cm. The cardiac chambers are within  normal limits. Extensive coronary artery calcification identified. There  is no mediastinal mass. Small lymph nodes seen throughout the hilar  regions bilaterally. No bulky adenopathy. There is a tiny left pleural  effusion and small right pleural effusion. The lung parenchyma reveals  some mild atelectatic changes seen within the lung fields bilaterally.  There are some atelectatic changes identified within the lower lung  fields bilaterally. A nodular density within the right lung base cannot  be completely excluded measuring approximately 1.5 cm. 3-6 month  follow-up is recommended for stability. Findings may be related to the  small right pleural effusion. There are stones filling the contracted  gallbladder. Remainder of the upper abdomen is unremarkable.  Degenerative changes seen within the spine.       Impression:      Atherosclerotic disease seen within the thoracic aorta.  Largest AP dimension of the ascending thoracic aorta is 3.1 cm. There is  extensive coronary artery calcification identified. Small right pleural  effusion and tiny left pleural effusion with atelectatic changes seen at  the  lung bases. No superimposed infiltrate. There is a small nodular  density possibly at the right lung base and 3-6 month follow-up is  recommended. Stones are filling a contracted gallbladder.     DICTATED:   01/17/2021  EDITED/ls :   01/17/2021     This report was finalized on 1/18/2021 2:25 PM by Dr. Deborah Macias MD.             Results for orders placed during the hospital encounter of 01/15/21   Transthoracic Echo Complete With Contrast if Necessary Per Protocol    Narrative · The left atrium is dilated.  · There is mild concentric left ventricular hypertrophy.  · Global and segmental LV systolic function is normal. The calculated left   ventricular ejection fraction is 53%.  · There is a mechanical aortic valve in the aortic position. Function   appears to be normal.  · There is mitral annular calcification. Mild mitral regurgitation is   seen. There appears to be some degree of chordal thickening with a 4.5   mmHg mean diastolic gradient across the mitral valve apparatus.  · Mild to moderate tricuspid regurgitation is noted. The estimated RV   systolic pressure is elevated at 56 mmHg.  · No other important findings are noted on the study.          I have reviewed the medications:  Scheduled Meds:amLODIPine, 10 mg, Oral, QAM  aspirin, 81 mg, Oral, Daily  carvedilol, 25 mg, Oral, BID With Meals  furosemide, 40 mg, Intravenous, Q12H  gabapentin, 800 mg, Oral, Q8H  insulin detemir, 10 Units, Subcutaneous, Nightly  insulin lispro, 0-9 Units, Subcutaneous, TID AC  insulin lispro, 5 Units, Subcutaneous, TID With Meals  lisinopril, 20 mg, Oral, Q12H  melatonin, 5 mg, Oral, Nightly  minocycline, 100 mg, Oral, BID  pharmacy consult - MTM, , Does not apply, BID  pravastatin, 80 mg, Oral, Nightly  sodium chloride, 10 mL, Intravenous, Q12H  warfarin, 10 mg, Oral, Daily      Continuous Infusions:Pharmacy to dose warfarin,       PRN Meds:.•  acetaminophen **OR** acetaminophen **OR** acetaminophen  •  dextrose  •   dextrose  •  glucagon (human recombinant)  •  magnesium sulfate **OR** magnesium sulfate **OR** magnesium sulfate  •  Pharmacy to dose warfarin  •  sodium chloride  •  sodium chloride  •  traZODone    Assessment/Plan   Assessment & Plan     Active Hospital Problems    Diagnosis  POA   • **Acute diastolic congestive heart failure (CMS/HCC) [I50.31]  Yes   • Lung nodule [R91.1]  Yes   • Chronic anticoagulation [Z79.01]  Not Applicable   • Acute respiratory failure with hypoxia (CMS/HCC) [J96.01]  Yes   • Anemia [D64.9]  Yes   • History Acinetobacter infection [Z86.19]  Yes   • Paroxysmal atrial fibrillation (CMS/HCC) [I48.0]  Yes   • Discoloration of skin of bilateral legs likely due to minocycline use [L81.9]  Yes   • History of mechanical aortic valve replacement [Z95.2]  Not Applicable   • Essential hypertension [I10]  Yes   • Dyslipidemia [E78.5]  Yes   • Type 2 diabetes mellitus (CMS/HCC) [E11.9]  Yes      Resolved Hospital Problems    Diagnosis Date Resolved POA   • CHF (congestive heart failure) (CMS/HCC) [I50.9] 01/16/2021 Yes        Brief Hospital Course to date:  Cecilio Farias is a 65 y.o. male with a history of mechanical aortic valve on Coumadin, hypertension, A. fib, type 2 diabetes who presents with progressive dyspnea found to have hypoxia.    Acute Respiratory Failure with Hypoxia   Acute diastolic CHF  - clinically improved after diuresis, down 8.6L.  Continue IV Lasix bid per cardiology  -continue coreg/ lisinopril  - echo reviewed, noted diastolic dysfunction  - CT chest with some small effusions   -CT showed significant coronary calcifications, s/p stress test1/18. Prelim with EF 24%. Dr. Smith has reviewed and plans for [facility] 1/20. NPO after MN  -CHF navigator has evaluated and plan follow up 3-5 days after DC    Right lung base nodule  -- incidental finding on CTA chest in right lung base  -- recommend 3-6 month follow up     Atrial Fibrillation:  - sinus/ sinus merlyn currently  - [id number] = 4.  Already anticoagulated on coumadin. Pharmacy dosing    Hx of mechanical AVR:  - on coumadin. therapeutic today at 2.39  - continue to dose anticoagulation per pharmacy     History of Acinetobacter bacteremia:  - on chronic minocycline.  Dr. Kaur  - legs discolored likely due to minocycline     DM2:  - last improved to 7.1%  - decent control will increase prandial slightly today     Hypertension:  - controlled and stable. Continue norvas, lisinopril and coreg     7. HLD:  - continue statin     8. Anemia:  - mild, stable, chronic    DVT Prophylaxis:  coumadin      Disposition: I expect the patient to be discharged home pending cardiology clearance    CODE STATUS:   Code Status and Medical Interventions:   Ordered at: 01/15/21 1944     Level Of Support Discussed With:    Patient     Code Status:    CPR     Medical Interventions (Level of Support Prior to Arrest):    Full       Evelyne Barrientos, APRN  01/19/21

## 2021-01-19 NOTE — PROGRESS NOTES
"Pharmacy Consult  -  Warfarin  Cecilio Farias is a  65 y.o. male   Height - 190 cm (74.8\")  Weight - (!) 144 kg (316 lb 9.6 oz)    Consulting Service: Hospitalist  Indication: Mechanical AVR  Goal INR: 2.5-3.5  Home Regimen:   - Warfarin 10 mg on Mon, Wed, Fri   - Warfarin 7.5 mg on Sun, Tues, Thurs, Sat    Bridge Therapy: No     Drug-Drug Interactions with current regimen:     Aspirin - may increase risk of bleeding (home med)      Minocycline - may increase risk of bleeding (home med)     Warfarin Dosing During Admission:    Date  1/15 1/16 1/17 1/18 1/19       INR  3.88 3.63 2.66 2.39 2.44       Dose  Hold 5 mg 9 mg 10 mg (7.5 mg)         Education Provided: Warfarin education provided on 1/17/21 verbally and in writing. Discussed effects of warfarin, importance of checking INR, drug-drug and drug-food interactions, and signs/symptoms of bleeding and clotting. Patient verbalized understanding through teach back. All pertinent questions were answered.     Discharge Follow up:     Following Provider - [facility]     Follow up time range or appointment - Recommend repeat INR 2-3 days after discharge    Labs:  Results from last 7 days   Lab Units 01/19/21  1058 01/18/21  0533 01/17/21  0740 01/16/21  0820 01/15/21  2250 01/15/21  1748 01/15/21  1746 01/14/21   INR  2.44* 2.39* 2.66* 3.63* 3.81*  --  3.88* 3.50   HEMOGLOBIN g/dL  --   --   --  11.2*  --  12.1*  --   --    HEMATOCRIT %  --   --   --  38.2  --  41.3  --   --    PLATELETS 10*3/mm3  --   --   --  198  --  201  --   --      Results from last 7 days   Lab Units 01/19/21  1058 01/18/21  0533 01/17/21  0740  01/15/21  1746   SODIUM mmol/L 137 136 138   < > 137   POTASSIUM mmol/L 4.8 4.3 4.4   < > 4.7   CHLORIDE mmol/L 99 99 103   < > 106   CO2 mmol/L 31.0* 29.0 28.0   < > 24.0   BUN mg/dL 33* 25* 21   < > 21   CREATININE mg/dL 0.99 0.89 0.76   < > 0.74*   CALCIUM mg/dL 8.5* 8.3* 8.6   < > 8.8   BILIRUBIN mg/dL  --   --   --   --  0.8   ALK " PHOS U/L  --   --   --   --  80   ALT (SGPT) U/L  --   --   --   --  23   AST (SGOT) U/L  --   --   --   --  40   GLUCOSE mg/dL 236* 169* 150*   < > 162*    < > = values in this interval not displayed.     Current dietary intake: 100% of meals documented on 1/19.   Patient will be NPO 1/20 AM for procedure  Diet Order   Procedures    Diet Regular; Cardiac, Consistent Carbohydrate    NPO Diet     Assessment/Plan:    Patient's INR is slightly subtherapeutic at 2.44 today.  Will continue home dose of warfarin and give 7.5 mg today.  Daily PT/INR ordered.  Monitor signs/symptoms of bleeding, dietary intake, and drug-drug interactions. Make dose adjustments as necessary.  Pharmacy will continue to follow.    Mattie Smith, PharmD  Pharmacy Resident  1/19/2021  13:08 EST

## 2021-01-19 NOTE — NURSING NOTE
Cardiology Navigator Note      Patient Name:  Cecilio Farias  :  1955  DOS:  21    Active Hospital Problems    Diagnosis   • **Acute diastolic congestive heart failure (CMS/HCC)   • Lung nodule   • Chronic anticoagulation   • Acute respiratory failure with hypoxia (CMS/HCC)   • Anemia   • History Acinetobacter infection   • Paroxysmal atrial fibrillation (CMS/HCC)     · Atrial fibrillation noted following AVR/ascending aortic aneurysm repair,   · [facility] admission for CHF with atrial fibrillation, 1/15/2021  · UICQH1Dflo 3 (HTN, DM, CHF)     • Discoloration of skin of bilateral legs likely due to minocycline use   • History of mechanical aortic valve replacement     · Acute systolic CHF with aortic stenosis,   · Status post mechanical AVR (25 mm [place] tilting-disc) with repair of ascending aorta, 3/2009  · [facility] (2019): LVEF 60%.  Mild to moderate LVH.  Normal functioning mechanical aortic valve.  No vegetations.     • Essential hypertension   • Dyslipidemia   • Type 2 diabetes mellitus (CMS/HCC)       Consult has been received. Medical records have been reviewed. Hx of mechanical aortic valve replacement, systolic heart failure, hypertension, type 2 diabetes, PAF admitted with acute diastolic heart failure.   Patient is a good candidate for the [facility].  Patient to be scheduled follow up 3-5 days post discharge.    Echo Results:  · The left atrium is dilated.  · There is mild concentric left ventricular hypertrophy.  · Global and segmental LV systolic function is normal. The calculated left ventricular ejection fraction is 53%.  · There is a mechanical aortic valve in the aortic position. Function appears to be normal.  · There is mitral annular calcification. Mild mitral regurgitation is seen. There appears to be some degree of chordal thickening with a 4.5 mmHg mean diastolic gradient across the mitral valve apparatus.  · Mild to moderate tricuspid  regurgitation is noted. The estimated RV systolic pressure is elevated at 56 mmHg.  · No other important findings are noted on the study.         Heart Failure Quality Measures    ACE I, ARB, ARNI (if EF <40%)     Lisinopril     Evidence-based Beta Blocker (EF<40%)    Carvedilol    Aldosterone Antagonist (EF <40%)  None-recent EF normalized

## 2021-01-19 NOTE — PLAN OF CARE
Goal Outcome Evaluation:   Pt VSS, NSR/ SB  w/ first degree block and frequent PACs, RA. A+Ox4, denies pain/ discomfort. IV lasix given as ordered. no new complaints at this time. Pt SS, NSR/ SB  w/ first degree block and frequent PACs, RA. A+Ox4, denies pain/ discomfort. IV lasix given as ordered. no new complaints at this time.

## 2021-01-20 LAB
ANION GAP SERPL CALCULATED.3IONS-SCNC: 5 MMOL/L (ref 5–15)
ARTERIAL PATENCY WRIST A: ABNORMAL
ATMOSPHERIC PRESS: ABNORMAL MM[HG]
BASE EXCESS BLDA CALC-SCNC: 4.1 MMOL/L (ref 0–2)
BDY SITE: ABNORMAL
BODY TEMPERATURE: 37 C
BUN SERPL-MCNC: 42 MG/DL (ref 8–23)
BUN/CREAT SERPL: 40.4 (ref 7–25)
CALCIUM SPEC-SCNC: 7.9 MG/DL (ref 8.6–10.5)
CHLORIDE SERPL-SCNC: 102 MMOL/L (ref 98–107)
CO2 BLDA-SCNC: 30.8 MMOL/L (ref 22–33)
CO2 SERPL-SCNC: 30 MMOL/L (ref 22–29)
COHGB MFR BLD: 1.2 % (ref 0–2)
CREAT SERPL-MCNC: 1.04 MG/DL (ref 0.76–1.27)
EPAP: 0
GFR SERPL CREATININE-BSD FRML MDRD: 72 ML/MIN/1.73
GLUCOSE BLDC GLUCOMTR-MCNC: 145 MG/DL (ref 70–130)
GLUCOSE BLDC GLUCOMTR-MCNC: 150 MG/DL (ref 70–130)
GLUCOSE BLDC GLUCOMTR-MCNC: 176 MG/DL (ref 70–130)
GLUCOSE SERPL-MCNC: 155 MG/DL (ref 65–99)
HCO3 BLDA-SCNC: 29.4 MMOL/L (ref 20–26)
HCT VFR BLD CALC: 36.7 %
HGB BLDA-MCNC: 12 G/DL (ref 13.5–17.5)
INHALED O2 CONCENTRATION: 21 %
INR PPP: 2.7 (ref 0.85–1.16)
IPAP: 0
METHGB BLD QL: 0.7 % (ref 0–1.5)
MODALITY: ABNORMAL
NOTE: ABNORMAL
OXYHGB MFR BLDV: 92.4 % (ref 94–99)
PAW @ PEAK INSP FLOW SETTING VENT: 0 CMH2O
PCO2 BLDA: 45.9 MM HG (ref 35–45)
PCO2 TEMP ADJ BLD: 45.9 MM HG (ref 35–48)
PH BLDA: 7.42 PH UNITS (ref 7.35–7.45)
PH, TEMP CORRECTED: 7.42 PH UNITS
PO2 BLDA: 70.7 MM HG (ref 83–108)
PO2 TEMP ADJ BLD: 70.7 MM HG (ref 83–108)
POTASSIUM SERPL-SCNC: 4.7 MMOL/L (ref 3.5–5.2)
PROTHROMBIN TIME: 28.3 SECONDS (ref 11.5–14)
SODIUM SERPL-SCNC: 137 MMOL/L (ref 136–145)
TOTAL RATE: 0 BREATHS/MINUTE

## 2021-01-20 PROCEDURE — 25010000002 FENTANYL CITRATE (PF) 100 MCG/2ML SOLUTION: Performed by: INTERNAL MEDICINE

## 2021-01-20 PROCEDURE — C1751 CATH, INF, PER/CENT/MIDLINE: HCPCS | Performed by: INTERNAL MEDICINE

## 2021-01-20 PROCEDURE — 25010000002 HEPARIN (PORCINE) PER 1000 UNITS: Performed by: INTERNAL MEDICINE

## 2021-01-20 PROCEDURE — 93456 R HRT CORONARY ARTERY ANGIO: CPT | Performed by: INTERNAL MEDICINE

## 2021-01-20 PROCEDURE — B2111ZZ FLUOROSCOPY OF MULTIPLE CORONARY ARTERIES USING LOW OSMOLAR CONTRAST: ICD-10-PCS | Performed by: INTERNAL MEDICINE

## 2021-01-20 PROCEDURE — 85610 PROTHROMBIN TIME: CPT | Performed by: PHYSICIAN ASSISTANT

## 2021-01-20 PROCEDURE — C1894 INTRO/SHEATH, NON-LASER: HCPCS | Performed by: INTERNAL MEDICINE

## 2021-01-20 PROCEDURE — 82375 ASSAY CARBOXYHB QUANT: CPT

## 2021-01-20 PROCEDURE — C1760 CLOSURE DEV, VASC: HCPCS | Performed by: INTERNAL MEDICINE

## 2021-01-20 PROCEDURE — 82805 BLOOD GASES W/O2 SATURATION: CPT

## 2021-01-20 PROCEDURE — 63710000001 INSULIN DETEMIR PER 5 UNITS: Performed by: INTERNAL MEDICINE

## 2021-01-20 PROCEDURE — B2161ZZ FLUOROSCOPY OF RIGHT AND LEFT HEART USING LOW OSMOLAR CONTRAST: ICD-10-PCS | Performed by: INTERNAL MEDICINE

## 2021-01-20 PROCEDURE — 99233 SBSQ HOSP IP/OBS HIGH 50: CPT | Performed by: INTERNAL MEDICINE

## 2021-01-20 PROCEDURE — C1769 GUIDE WIRE: HCPCS | Performed by: INTERNAL MEDICINE

## 2021-01-20 PROCEDURE — 25010000002 MIDAZOLAM PER 1 MG: Performed by: INTERNAL MEDICINE

## 2021-01-20 PROCEDURE — 80048 BASIC METABOLIC PNL TOTAL CA: CPT | Performed by: INTERNAL MEDICINE

## 2021-01-20 PROCEDURE — 36600 WITHDRAWAL OF ARTERIAL BLOOD: CPT

## 2021-01-20 PROCEDURE — 82962 GLUCOSE BLOOD TEST: CPT

## 2021-01-20 PROCEDURE — 83050 HGB METHEMOGLOBIN QUAN: CPT

## 2021-01-20 PROCEDURE — 4A023N6 MEASUREMENT OF CARDIAC SAMPLING AND PRESSURE, RIGHT HEART, PERCUTANEOUS APPROACH: ICD-10-PCS | Performed by: INTERNAL MEDICINE

## 2021-01-20 PROCEDURE — 0 IOPAMIDOL PER 1 ML: Performed by: INTERNAL MEDICINE

## 2021-01-20 RX ORDER — FENTANYL CITRATE 50 UG/ML
INJECTION, SOLUTION INTRAMUSCULAR; INTRAVENOUS AS NEEDED
Status: DISCONTINUED | OUTPATIENT
Start: 2021-01-20 | End: 2021-01-20 | Stop reason: HOSPADM

## 2021-01-20 RX ORDER — LIDOCAINE HYDROCHLORIDE 10 MG/ML
INJECTION, SOLUTION EPIDURAL; INFILTRATION; INTRACAUDAL; PERINEURAL AS NEEDED
Status: DISCONTINUED | OUTPATIENT
Start: 2021-01-20 | End: 2021-01-20 | Stop reason: HOSPADM

## 2021-01-20 RX ORDER — MIDAZOLAM HYDROCHLORIDE 1 MG/ML
INJECTION INTRAMUSCULAR; INTRAVENOUS AS NEEDED
Status: DISCONTINUED | OUTPATIENT
Start: 2021-01-20 | End: 2021-01-20 | Stop reason: HOSPADM

## 2021-01-20 RX ORDER — SODIUM CHLORIDE 9 MG/ML
INJECTION, SOLUTION INTRAVENOUS CONTINUOUS PRN
Status: COMPLETED | OUTPATIENT
Start: 2021-01-20 | End: 2021-01-20

## 2021-01-20 RX ORDER — ACETAMINOPHEN 325 MG/1
650 TABLET ORAL EVERY 4 HOURS PRN
Status: DISCONTINUED | OUTPATIENT
Start: 2021-01-20 | End: 2021-01-21 | Stop reason: HOSPADM

## 2021-01-20 RX ORDER — WARFARIN SODIUM 5 MG/1
10 TABLET ORAL
Status: DISCONTINUED | OUTPATIENT
Start: 2021-01-20 | End: 2021-01-21 | Stop reason: HOSPADM

## 2021-01-20 RX ADMIN — INSULIN DETEMIR 10 UNITS: 100 INJECTION, SOLUTION SUBCUTANEOUS at 21:50

## 2021-01-20 RX ADMIN — GABAPENTIN 800 MG: 400 CAPSULE ORAL at 21:48

## 2021-01-20 RX ADMIN — GABAPENTIN 800 MG: 400 CAPSULE ORAL at 06:38

## 2021-01-20 RX ADMIN — SODIUM CHLORIDE, PRESERVATIVE FREE 10 ML: 5 INJECTION INTRAVENOUS at 21:49

## 2021-01-20 RX ADMIN — GABAPENTIN 800 MG: 400 CAPSULE ORAL at 15:20

## 2021-01-20 RX ADMIN — Medication 5 MG: at 21:49

## 2021-01-20 RX ADMIN — MINOCYCLINE HYDROCHLORIDE 100 MG: 50 CAPSULE ORAL at 21:45

## 2021-01-20 RX ADMIN — PRAVASTATIN SODIUM 80 MG: 40 TABLET ORAL at 21:49

## 2021-01-20 RX ADMIN — LISINOPRIL 20 MG: 20 TABLET ORAL at 21:45

## 2021-01-20 NOTE — PROGRESS NOTES
"Pharmacy Consult  -  Warfarin  Cecilio Farias is a  65 y.o. male   Height - 190 cm (74.8\")  Weight - (!) 144 kg (316 lb 9.6 oz)    Consulting Service: Hospitalist  Indication: Mechanical AVR  Goal INR: 2.5-3.5  Home Regimen:   - Warfarin 10 mg on Mon, Wed, Fri   - Warfarin 7.5 mg on Sun, Tues, Thurs, Sat    Bridge Therapy: No     Drug-Drug Interactions with current regimen:     Aspirin - may increase risk of bleeding (home med)      Minocycline - may increase risk of bleeding (home med)     Warfarin Dosing During Admission:    Date  1/15 1/16 1/17 1/18 1/19 1/20      INR  3.88 3.63 2.66 2.39 2.44 2.7      Dose  Hold 5 mg 9 mg 10 mg 7.5 mg (10 mg)        Education Provided: Warfarin education provided on 1/17/21 verbally and in writing. Discussed effects of warfarin, importance of checking INR, drug-drug and drug-food interactions, and signs/symptoms of bleeding and clotting. Patient verbalized understanding through teach back. All pertinent questions were answered.     Discharge Follow up:     Following Provider - [facility]     Follow up time range or appointment - Recommend repeat INR 2-3 days after discharge    Labs:  Results from last 7 days   Lab Units 01/20/21 0625 01/19/21 1058 01/18/21  0533 01/17/21  0740 01/16/21  0820 01/15/21  2250 01/15/21  1748 01/15/21  1746   INR  2.70* 2.44* 2.39* 2.66* 3.63* 3.81*  --  3.88*   HEMOGLOBIN g/dL  --   --   --   --  11.2*  --  12.1*  --    HEMATOCRIT %  --   --   --   --  38.2  --  41.3  --    PLATELETS 10*3/mm3  --   --   --   --  198  --  201  --      Results from last 7 days   Lab Units 01/20/21 0625 01/19/21 1058 01/18/21  0533  01/15/21  1746   SODIUM mmol/L 137 137 136   < > 137   POTASSIUM mmol/L 4.7 4.8 4.3   < > 4.7   CHLORIDE mmol/L 102 99 99   < > 106   CO2 mmol/L 30.0* 31.0* 29.0   < > 24.0   BUN mg/dL 42* 33* 25*   < > 21   CREATININE mg/dL 1.04 0.99 0.89   < > 0.74*   CALCIUM mg/dL 7.9* 8.5* 8.3*   < > 8.8   BILIRUBIN mg/dL  --   --   " --   --  0.8   ALK PHOS U/L  --   --   --   --  80   ALT (SGPT) U/L  --   --   --   --  23   AST (SGOT) U/L  --   --   --   --  40   GLUCOSE mg/dL 155* 236* 169*   < > 162*    < > = values in this interval not displayed.     Current dietary intake: 100% of meals documented on 1/19.   Patient NPO 1/20 AM for procedure  Diet Order   Procedures    Diet Regular; Cardiac     Assessment/Plan:    Patient's INR is therapeutic at 2.7 today.  Will continue home dose of warfarin and give 10 mg today.  Daily PT/INR ordered.  Monitor signs/symptoms of bleeding, dietary intake, and drug-drug interactions. Make dose adjustments as necessary.  Pharmacy will continue to follow.    Mattie Smith, PharmD  Pharmacy Resident  1/20/2021  13:02 EST

## 2021-01-20 NOTE — PLAN OF CARE
Goal Outcome Evaluation:  Plan of Care Reviewed With: patient  Progress: no change  Outcome Summary: Pt Second degree AV block Type one per cardiolody, appears to primary RN to be NSR/SB w/ first degree AV block and frequent PACs. RA. A+Ox4, denies pain/ discomfort. pt Hypotensive this pm, held lisinopril, BP improving this am. NPO since 0000 pending heart cath today. sleeping better tonight after starting melatonin. no new complaints at this time.

## 2021-01-20 NOTE — PROGRESS NOTES
Events noted to this point. No new symptoms today.    CATH REPORT ON CHART.    I'll ask pulmonary medicine to look at re pulmonary HTN that does NOT appear to be due to left heart failure.

## 2021-01-20 NOTE — PROGRESS NOTES
[facility] Medicine Services  PROGRESS NOTE    Patient Name: Cecilio Farias  : 1955  MRN: [id number]    Date of Admission: 1/15/2021  Primary Care Physician: America Everett DO    Subjective   Subjective     CC:  f/u dyspnea    HPI:  Patient doing well after heart cath.  Denies any shortness of breath or chest pain.      General: denies fevers or chills  CV: denies chest pain  Resp: denies shortness of breath  Abd: denies abd pain, nausea      Objective   Objective     Vital Signs:   Temp:  [97.7 °F (36.5 °C)-98.3 °F (36.8 °C)] 97.7 °F (36.5 °C)  Heart Rate:  [51-81] 73  Resp:  [16-24] 16  BP: ()/(39-72) 129/64        Physical Exam:  Constitutional: Sitting upright in bed  HENT: NCAT, mucous membranes moist  Respiratory: Clear to auscultation bilaterally, respiratory effort normal   Cardiovascular: RRR, no murmurs, rubs, or gallops  Gastrointestinal: Positive bowel sounds, soft, nontender, nondistended  Musculoskeletal: No bilateral ankle edema  Psychiatric: Appropriate affect, cooperative  Neurologic: Oriented x 3, normal gait  Skin: Left radial cath site with dressings        Results Reviewed:  Results from last 7 days   Lab Units 2120  01/15/21  1748   WBC 10*3/mm3  --   --   --   --  6.39  --  7.95   HEMOGLOBIN g/dL  --   --   --   --  11.2*  --  12.1*   HEMATOCRIT %  --   --   --   --  38.2  --  41.3   PLATELETS 10*3/mm3  --   --   --   --  198  --  201   INR  2.70* 2.44* 2.39*   < > 3.63*   < >  --     < > = values in this interval not displayed.     Results from last 7 days   Lab Units 21  0621  1058 21  0533 21  0740  01/15/21  2222 01/15/21  2041 01/15/21  1746   SODIUM mmol/L 137 137 136 138   < >  --   --  137   POTASSIUM mmol/L 4.7 4.8 4.3 4.4   < >  --   --  4.7   CHLORIDE mmol/L 102 99 99 103   < >  --   --  106   CO2 mmol/L 30.0* 31.0* 29.0 28.0   < >  --   --  24.0   BUN  mg/dL 42* 33* 25* 21   < >  --   --  21   CREATININE mg/dL 1.04 0.99 0.89 0.76   < >  --   --  0.74*   GLUCOSE mg/dL 155* 236* 169* 150*   < >  --   --  162*   CALCIUM mg/dL 7.9* 8.5* 8.3* 8.6   < >  --   --  8.8   ALT (SGPT) U/L  --   --   --   --   --   --   --  23   AST (SGOT) U/L  --   --   --   --   --   --   --  40   TROPONIN T ng/mL  --   --   --  0.028  --  0.018 0.021 0.014   PROBNP pg/mL  --   --   --  303.4  --   --   --  532.3    < > = values in this interval not displayed.     Estimated Creatinine Clearance: 109.6 mL/min (by C-G formula based on SCr of 1.04 mg/dL).    Microbiology Results Abnormal     Procedure Component Value - Date/Time    COVID PRE-OP / PRE-PROCEDURE SCREENING ORDER (NO ISOLATION) - Swab, Nasopharynx [[id number]]  (Normal) Collected: 01/15/21 1921    Lab Status: Final result Specimen: Swab from Nasopharynx Updated: 01/15/21 2015    Narrative:      The following orders were created for panel order COVID PRE-OP / PRE-PROCEDURE SCREENING ORDER (NO ISOLATION) - Swab, Nasopharynx.  Procedure                               Abnormality         Status                     ---------                               -----------         ------                     COVID-19 and FLU A/B PCR...[[id number]]  Normal              Final result                 Please view results for these tests on the individual orders.    COVID-19 and FLU A/B PCR - Swab, Nasopharynx [[id number]]  (Normal) Collected: 01/15/21 1921    Lab Status: Final result Specimen: Swab from Nasopharynx Updated: 01/15/21 2015     COVID19 Not Detected     Influenza A PCR Not Detected     Influenza B PCR Not Detected    Narrative:      Fact sheet for providers: https://www.fda.gov/media/398154/download    Fact sheet for patients: https://www.fda.gov/media/938792/download    Test performed by PCR.          Imaging Results (Last 24 Hours)     ** No results found for the last 24 hours. **          Results for orders placed during the hospital  encounter of 01/15/21   Transthoracic Echo Complete With Contrast if Necessary Per Protocol    Narrative · The left atrium is dilated.  · There is mild concentric left ventricular hypertrophy.  · Global and segmental LV systolic function is normal. The calculated left   ventricular ejection fraction is 53%.  · There is a mechanical aortic valve in the aortic position. Function   appears to be normal.  · There is mitral annular calcification. Mild mitral regurgitation is   seen. There appears to be some degree of chordal thickening with a 4.5   mmHg mean diastolic gradient across the mitral valve apparatus.  · Mild to moderate tricuspid regurgitation is noted. The estimated RV   systolic pressure is elevated at 56 mmHg.  · No other important findings are noted on the study.          I have reviewed the medications:  Scheduled Meds:amLODIPine, 10 mg, Oral, QAM  aspirin, 81 mg, Oral, Daily  carvedilol, 25 mg, Oral, BID With Meals  furosemide, 40 mg, Oral, Daily  gabapentin, 800 mg, Oral, Q8H  insulin detemir, 10 Units, Subcutaneous, Nightly  insulin lispro, 0-9 Units, Subcutaneous, TID AC  insulin lispro, 7 Units, Subcutaneous, TID With Meals  lisinopril, 20 mg, Oral, Q12H  melatonin, 5 mg, Oral, Nightly  minocycline, 100 mg, Oral, BID  pharmacy consult - MTM, , Does not apply, BID  pravastatin, 80 mg, Oral, Nightly  sodium chloride, 10 mL, Intravenous, Q12H  warfarin, 10 mg, Oral, Daily      Continuous Infusions:Pharmacy to dose warfarin,       PRN Meds:.•  acetaminophen **OR** acetaminophen **OR** acetaminophen  •  acetaminophen  •  dextrose  •  dextrose  •  glucagon (human recombinant)  •  magnesium sulfate **OR** magnesium sulfate **OR** magnesium sulfate  •  Pharmacy to dose warfarin  •  sodium chloride  •  sodium chloride  •  traZODone    Assessment/Plan   Assessment & Plan     Active Hospital Problems    Diagnosis  POA   • **Acute diastolic congestive heart failure (CMS/HCC) [I50.31]  Yes   • Lung nodule [R91.1]   Yes   • Chronic anticoagulation [Z79.01]  Not Applicable   • Acute respiratory failure with hypoxia (CMS/HCC) [J96.01]  Yes   • Anemia [D64.9]  Yes   • History Acinetobacter infection [Z86.19]  Yes   • Paroxysmal atrial fibrillation (CMS/HCC) [I48.0]  Yes   • Discoloration of skin of bilateral legs likely due to minocycline use [L81.9]  Yes   • History of mechanical aortic valve replacement [Z95.2]  Not Applicable   • Essential hypertension [I10]  Yes   • Dyslipidemia [E78.5]  Yes   • Type 2 diabetes mellitus (CMS/HCC) [E11.9]  Yes      Resolved Hospital Problems    Diagnosis Date Resolved POA   • CHF (congestive heart failure) (CMS/HCC) [I50.9] 01/16/2021 Yes        Brief Hospital Course to date:  Cecilio Farias is a 65 y.o. male with a history of mechanical aortic valve on Coumadin, hypertension, A. fib, type 2 diabetes who presents with progressive dyspnea found to have hypoxia.    Acute Respiratory Failure with Hypoxia   Acute diastolic CHF  Moderate to severe pulmonary artery hypertension  - clinically improved after diuresis, down 8.6L.  Continue p.o. Lasix per cardiology  -continue coreg/ lisinopril  - echo reviewed, noted diastolic dysfunction  - CT chest with some small effusions   - CT showed significant coronary calcifications,  -  s/p stress test 1/18 was abnormal.  -Status post left heart cath per Dr. Smith on 1/20/2021 which showed systolic function and functioning mechanical aortic valve.  There is nonobstructive coronary artery disease.  There was moderate to severe pulmonary artery hypertension due to elevated pulmonary vascular resistance.  Recommends pulmonary evaluation.  -CHF navigator has evaluated and plan follow up 3-5 days after DC  -Awaiting pulmonary consult    Right lung base nodule  -- incidental finding on CTA chest in right lung base  -- recommend 3-6 month follow up     Atrial Fibrillation:  -Continue Coreg, warfarin    Hx of mechanical AVR:  - on coumadin. therapeutic today at  2.39  - continue to dose anticoagulation per pharmacy     History of Acinetobacter bacteremia:  - on chronic minocycline.  Dr. Kaur  - legs discolored likely due to minocycline     DM2:  - last improved to 7.1%  - decent control will increase prandial slightly today     Hypertension:  - Continue norvas, lisinopril and coreg     HLD:  - continue statin     Anemia:  - mild, stable, chronic    DVT Prophylaxis:  coumadin      Disposition: I expect the patient to be discharged home pending pulmonary evaluation    CODE STATUS:   Code Status and Medical Interventions:   Ordered at: 01/20/21 0935     Level Of Support Discussed With:    Patient     Code Status:    CPR     Medical Interventions (Level of Support Prior to Arrest):    Full     This patient's problems and plans were partially entered by my partner and updated as appropriate by me 01/20/21. Today is my first day evaluating this patients active medical problems. I Personally reviewed chart and adjusted note to reflect daily changes in management/clinical condition      Prerna Bates MD  01/20/21

## 2021-01-21 ENCOUNTER — APPOINTMENT (OUTPATIENT)
Dept: PULMONOLOGY | Facility: HOSPITAL | Age: 66
End: 2021-01-21

## 2021-01-21 VITALS
SYSTOLIC BLOOD PRESSURE: 132 MMHG | TEMPERATURE: 97.7 F | WEIGHT: 312.1 LBS | HEIGHT: 75 IN | OXYGEN SATURATION: 92 % | DIASTOLIC BLOOD PRESSURE: 85 MMHG | BODY MASS INDEX: 38.81 KG/M2 | HEART RATE: 69 BPM | RESPIRATION RATE: 16 BRPM

## 2021-01-21 PROBLEM — I27.21 PULMONARY ARTERIAL HYPERTENSION (HCC): Status: ACTIVE | Noted: 2021-01-21

## 2021-01-21 PROBLEM — R06.00 DYSPNEA: Status: ACTIVE | Noted: 2021-01-21

## 2021-01-21 PROBLEM — J90 CHRONIC BILATERAL PLEURAL EFFUSIONS: Status: ACTIVE | Noted: 2021-01-21

## 2021-01-21 LAB
ANION GAP SERPL CALCULATED.3IONS-SCNC: 7 MMOL/L (ref 5–15)
BUN SERPL-MCNC: 28 MG/DL (ref 8–23)
BUN/CREAT SERPL: 34.6 (ref 7–25)
CALCIUM SPEC-SCNC: 7.9 MG/DL (ref 8.6–10.5)
CHLORIDE SERPL-SCNC: 103 MMOL/L (ref 98–107)
CO2 SERPL-SCNC: 27 MMOL/L (ref 22–29)
CREAT SERPL-MCNC: 0.81 MG/DL (ref 0.76–1.27)
GFR SERPL CREATININE-BSD FRML MDRD: 96 ML/MIN/1.73
GLUCOSE BLDC GLUCOMTR-MCNC: 213 MG/DL (ref 70–130)
GLUCOSE BLDC GLUCOMTR-MCNC: 300 MG/DL (ref 70–130)
GLUCOSE SERPL-MCNC: 163 MG/DL (ref 65–99)
INR PPP: 2.58 (ref 0.85–1.16)
POTASSIUM SERPL-SCNC: 4.4 MMOL/L (ref 3.5–5.2)
PROTHROMBIN TIME: 27.4 SECONDS (ref 11.5–14)
SODIUM SERPL-SCNC: 137 MMOL/L (ref 136–145)

## 2021-01-21 PROCEDURE — 94729 DIFFUSING CAPACITY: CPT

## 2021-01-21 PROCEDURE — 94010 BREATHING CAPACITY TEST: CPT

## 2021-01-21 PROCEDURE — 80048 BASIC METABOLIC PNL TOTAL CA: CPT | Performed by: INTERNAL MEDICINE

## 2021-01-21 PROCEDURE — 99239 HOSP IP/OBS DSCHRG MGMT >30: CPT | Performed by: HOSPITALIST

## 2021-01-21 PROCEDURE — 99223 1ST HOSP IP/OBS HIGH 75: CPT | Performed by: INTERNAL MEDICINE

## 2021-01-21 PROCEDURE — 99232 SBSQ HOSP IP/OBS MODERATE 35: CPT | Performed by: PHYSICIAN ASSISTANT

## 2021-01-21 PROCEDURE — 82962 GLUCOSE BLOOD TEST: CPT

## 2021-01-21 PROCEDURE — 63710000001 INSULIN LISPRO (HUMAN) PER 5 UNITS: Performed by: INTERNAL MEDICINE

## 2021-01-21 PROCEDURE — 94727 GAS DIL/WSHOT DETER LNG VOL: CPT | Performed by: INTERNAL MEDICINE

## 2021-01-21 PROCEDURE — 94010 BREATHING CAPACITY TEST: CPT | Performed by: INTERNAL MEDICINE

## 2021-01-21 PROCEDURE — 97161 PT EVAL LOW COMPLEX 20 MIN: CPT | Performed by: PHYSICAL THERAPIST

## 2021-01-21 PROCEDURE — 94727 GAS DIL/WSHOT DETER LNG VOL: CPT

## 2021-01-21 PROCEDURE — 85610 PROTHROMBIN TIME: CPT | Performed by: INTERNAL MEDICINE

## 2021-01-21 PROCEDURE — 94729 DIFFUSING CAPACITY: CPT | Performed by: INTERNAL MEDICINE

## 2021-01-21 RX ORDER — FUROSEMIDE 40 MG/1
40 TABLET ORAL DAILY
Qty: 30 TABLET | Refills: 5 | Status: SHIPPED | OUTPATIENT
Start: 2021-01-22 | End: 2021-01-22

## 2021-01-21 RX ORDER — POTASSIUM CHLORIDE 1500 MG/1
20 TABLET, FILM COATED, EXTENDED RELEASE ORAL DAILY
Qty: 30 TABLET | Refills: 5 | Status: SHIPPED | OUTPATIENT
Start: 2021-01-21 | End: 2021-01-22 | Stop reason: SDUPTHER

## 2021-01-21 RX ADMIN — INSULIN LISPRO 4 UNITS: 100 INJECTION, SOLUTION INTRAVENOUS; SUBCUTANEOUS at 13:11

## 2021-01-21 RX ADMIN — INSULIN LISPRO 7 UNITS: 100 INJECTION, SOLUTION INTRAVENOUS; SUBCUTANEOUS at 13:11

## 2021-01-21 RX ADMIN — MINOCYCLINE HYDROCHLORIDE 100 MG: 50 CAPSULE ORAL at 10:11

## 2021-01-21 RX ADMIN — GABAPENTIN 800 MG: 400 CAPSULE ORAL at 04:54

## 2021-01-21 RX ADMIN — CARVEDILOL 25 MG: 12.5 TABLET, FILM COATED ORAL at 09:09

## 2021-01-21 RX ADMIN — LISINOPRIL 20 MG: 20 TABLET ORAL at 09:09

## 2021-01-21 RX ADMIN — AMLODIPINE BESYLATE 10 MG: 10 TABLET ORAL at 09:09

## 2021-01-21 RX ADMIN — ASPIRIN 81 MG: 81 TABLET, FILM COATED ORAL at 09:08

## 2021-01-21 RX ADMIN — INSULIN LISPRO 7 UNITS: 100 INJECTION, SOLUTION INTRAVENOUS; SUBCUTANEOUS at 10:11

## 2021-01-21 RX ADMIN — SODIUM CHLORIDE, PRESERVATIVE FREE 10 ML: 5 INJECTION INTRAVENOUS at 09:09

## 2021-01-21 RX ADMIN — FUROSEMIDE 40 MG: 40 TABLET ORAL at 09:08

## 2021-01-21 NOTE — PROGRESS NOTES
Adult Nutrition  Assessment/PES    Patient Name:  Cecilio Farias  YOB: 1955  MRN: [id number]  Admit Date:  1/15/2021    Assessment Date:      Reason for Assessment     Row Name 01/21/21 1214          Reason for Assessment    Reason For Assessment  -- education. 30 mins     Diagnosis  -- respiratory failure with hypoxia, HF, AFib, R lung base nodule, obese. H/o DM,HTN, HLP.           Anthropometrics     Row Name 01/21/21 1221 01/21/21 0500       Anthropometrics             Admit Weight    Admit Weight  -- ht=75in, gp=190jp; BMI=39.2  --                        Problem/Interventions:  Problem 1     Row Name 01/21/21 1221          Nutrition Diagnoses Problem 1    Problem 1  Knowledge Deficit     Etiology (related to)  -- OBESE     Signs/Symptoms (evidenced by)  -- BMI=39.2                     Education/Evaluation     Row Name 01/21/21 1222          Education    Education  Provided education regarding;Education topics RD verbally encouraged pt toward wt loss, encouraged food portion control, discussed principles of basic heart healthy diet,and encouraged  physical activity as allowed by MD, and gave written materials for such.     Provided education regarding  -- Pt lives in [place], [place] and RD encouraged pt to seek outpt nutrition education/follow up to assist pt with wt loss goals long term; pt willing to consider.        Monitor/Evaluation    Monitor  Per protocol           Electronically signed by:  Clara Dyer MS,RD,LD  01/21/21 12:25 EST

## 2021-01-21 NOTE — PROGRESS NOTES
Case Management Discharge Note      Final Note: I spoke with Mr. Farias at bedside.  He is agreeable for oxygen to be arranged with [facility]. [facility]'s room.  Patients son to transport.         Selected Continued Care - Admitted Since 1/15/2021     Destination    No services have been selected for the patient.              Durable Medical Equipment Coordination complete    Service Provider Selected Services Address Phone Fax Patient Preferred    [facility]  Durable Medical Equipment [address] [facility] [address] [phone] --    [facility] [place]  Durable Medical Equipment [address] [address] [phone] --          Dialysis/Infusion    No services have been selected for the patient.              Home Medical Care    No services have been selected for the patient.              Therapy    No services have been selected for the patient.              Community Resources    No services have been selected for the patient.                       Final Discharge Disposition Code: 01 - home or self-care

## 2021-01-21 NOTE — CONSULTS
Intensivist Note                                                            CONSULTATION  1/21/2021  Hospital Day: 6  1 Day Post-Op  ICU Stays Timeline            Hospital Admission: 01/15/21 1802 - Current  No ICU stays    No ICU stays to display             Mr. Cecilio Farias, 65 y.o. male is followed for:    Acute diastolic CHF. LVEF 53% (CMS/HCC)    Essential hypertension    Moderate PAH. PA 55/20, wedge 15 mmHg.  Multifactorial (CMS/HCC)    Bilateral small pleural effusions    History of mechanical AVR for aortic stenosis 2009    Dyspnea and mild hypoxemia    PAF on chronic warfarin (CMS/HCC)    RLL lung nodule. Incidental finding and requires follow-up    Dyslipidemia    Type 2 diabetes mellitus (CMS/HCC)    Anemia    History Acinetobacter bacteremia on chronic minocycline    Discoloration of skin of bilateral legs likely due to minocycline use       SUBJECTIVE     65-year-old white male with a history of severe aortic stenosis and associated LV dysfunction requiring mechanical aortic valve replacement 2009 with recovery of LV systolic function.  Additional problems include PAF, diabetes mellitus, morbid obesity, remote Acinetobacter bacteremia on chronic minocycline, hypertension and diastolic dysfunction, and hyperlipidemia.  Patient was last seen by Dr. Smith in the office 8/13/2020 and denied any complaints specifically chest pain, dyspnea, or CHF.  Approximately 2 months PTA however he began to note dyspnea on exertion upon just walking to his garage.  This gradually progressed over time and began to be associated with increasing lower extremity edema, orthopnea, and PND.  Dyspnea worsened over the last 2 weeks PTA to the point that just getting to the bathroom became difficult.  1/15/2020 he called Dr. Smith office and was short of breath just trying to talk and was told to go to the ER where it was noted that O2 sats were 93% on room air but with ambulation dropped to 83%.  He was admitted by the  hospitalists 1/15/2020 and seen in consultation by Dr. Smith.  Has been aggressively diuresed and is in negative fluid balance of 8.8 L.  He indicates that his heart rate dropped to 38 1 evening and his Coreg dose was held but he remains on Coreg at this time.  Patient was taken for cardiac catheterization to rule out occult CAD 1/20/2021.  This revealed no significant occlusive disease, but confirmed the patient has moderate pulmonary hypertension with a PA pressure 55/20 mmHg, RV 55/12 mmHg, RA 12 mmHg, wedge pressure 15 mmHg, cardiac index 3.1.  Echocardiogram confirmed RVSP of 56 mmHg.  Because patient's LV function has normalized after his mechanical AVR in 2009, I was asked to see if there is any other potential etiology for his moderate pulmonary hypertension.  He specifically denies ever taking weight loss medications such as Fen/Phen in the past.  He is on full dose anticoagulation making occult PE and chronic thromboembolic disease unlikely, he gives a history of snoring and daytime hypersomnolence although he downplays his symptoms, his weight is stable and has not changed but he remains morbidly obese for many years.  He denies any prior history of pulmonary disease and his chest x-ray and CT scans here have just revealed some mild congestive and groundglass changes.  He denies anorexia, weight loss, chronic fevers chills sweats, chronic cough, purulent production, hemoptysis, or pleuritic pain, nausea, vomiting, diarrhea, melena, hematochezia, dysuria, hematuria, urgency, or frequency.      ROS: Per subjective, all other systems reviewed and were negative.    Past Medical History:   Diagnosis Date   • Cancer (CMS/HCC)     colon cancer with operation/ chemotherapy/radiation    • CHF (congestive heart failure) (CMS/HCC) 2009    Acute Systolic CHF prior to aortic valve replacement   • Chronic anticoagulation for prosthetic aortic valve and PAF     coumadin   • Diabetes mellitus (CMS/[facility])    • H/O aortic  valve replacement  2009   • HLD (hyperlipidemia)    • Hypertension and diastolic CHF    • History of Acinetobacter bacteremia on chronic minocycline (CMS/HCC) 6/13/2019     .   PAF on chronic warfarin    .   Morbid obesity    .   Suspected JI    Past Surgical History:   Procedure Laterality Date   • AORTIC VALVE REPAIR/REPLACEMENT  2009    25 mm. [place] AVR   • ASCENDING AORTIC ANEURYSM REPAIR  2009   • BACK SURGERY     • CARDIAC CATHETERIZATION N/A 1/20/2021    Procedure: RIGHT AND LEFT HEART CATH;  Surgeon: Kurtis Smith MD;  Location: [facility] CATH INVASIVE LOCATION;  Service: Cardiology;  Laterality: N/A;   • COLON SURGERY      Colon cancer with operation   • FOOT SURGERY      bilateral 2/2 wound and trauma          Current Outpatient Medications on File Prior to Encounter   Medication Sig Dispense Refill   • amLODIPine (NORVASC) 10 MG tablet Take 1 tablet by mouth Every Morning.     • aspirin 81 MG EC tablet Take 81 mg by mouth daily.     • carvedilol (COREG) 25 MG tablet Take 25 mg by mouth 2 (two) times a day with meals.     • gabapentin (NEURONTIN) 800 MG tablet Take 800 mg by mouth 3 (Three) Times a Day.     • glipiZIDE (GLUCOTROL) 5 MG tablet Take 5 mg by mouth 2 (Two) Times a Day Before Meals.     • insulin NPH-insulin regular (NOVOLIN 70/30) (70-30) 100 UNIT/ML injection Inject 5 Units under the skin into the appropriate area as directed 2 (Two) Times a Day With Meals. (Patient taking differently: Inject 15 Units under the skin into the appropriate area as directed 2 (Two) Times a Day With Meals.) 10 mL 0   • lisinopril (PRINIVIL,ZESTRIL) 20 MG tablet Take 20 mg by mouth 2 (two) times a day.     • metFORMIN (GLUCOPHAGE) 500 MG tablet Take 2 tablets by mouth Every Evening.     • minocycline (MINOCIN,DYNACIN) 100 MG capsule Take 1 capsule by mouth 2 (Two) Times a Day.     • pravastatin (PRAVACHOL) 80 MG tablet Take 80 mg by mouth Every Night.     • warfarin (COUMADIN) 5 MG tablet TAKE 1 AND 1/2 TO TWO  "TABLETS BY MOUTH AS DIRECTED BY CLINIC. 60 tablet 0       Allergies   Allergen Reactions   • Sulfa Antibiotics    • Amoxicillin Rash     Has tolerated ceftriaxone       Family History   Problem Relation Age of Onset   • Heart disease Mother    • Hyperlipidemia Mother    • Diabetes Mother    • Leukemia Father    • Cancer Sister    • Kidney failure Brother    • Hepatitis Brother    • Cancer Sister    • Cancer Brother    • Heart disease Brother    • Heart attack Brother        Social History     Socioeconomic History   • Marital status:      Spouse name: Not on file   • Number of children: Not on file   • Years of education: Not on file   • Highest education level: Not on file   Tobacco Use   • Smoking status: Never Smoker   • Smokeless tobacco: Never Used   Substance and Sexual Activity   • Alcohol use: No   • Drug use: No   • Sexual activity: Defer       The patient's relevant PMH, PSH, FH, and SH were reviewed and updated in Epic as appropriate. Allergies and Medications reviewed.    OBJECTIVE     /85   Pulse 96   Temp 97.7 °F (36.5 °C) (Oral)   Resp 16   Ht 190 cm (74.8\")   Wt (!) 142 kg (312 lb 1.6 oz)   SpO2 98%   BMI 39.22 kg/m²      Flow (L/min): 2    Flowsheet Rows      First Filed Value   Admission Height  190.5 cm (75\") Documented at 01/15/2021 1731   Admission Weight  (!) 150 kg (330 lb) Documented at 01/15/2021 1731        Intake & Output (last day)       01/20 0701 - 01/21 0700 01/21 0701 - 01/22 0700    P.O.      Total Intake(mL/kg)      Urine (mL/kg/hr) 450 (0.1)     Stool      Total Output 450     Net -450                 Exam:  General Exam:  Pleasant well-developed well-nourished morbidly obese white male sitting on the side of the bed in NAD  HEENT: Pupils equal and reactive. Nose and throat clear.  Neck:                          Supple, no JVD, thyromegaly, or adenopathy  Lungs: Clear anteriorly, laterally, posteriorly.  No wheezes, rales, rhonchi.  Cardiovascular: RRR without " murmurs or gallops.  Valve click present HR 78 bpm  Abdomen: Soft nontender without organomegaly or masses.   and rectal: Deferred.  Extremities: No cyanosis clubbing edema.  Apparently on admission had 2+ edema.  Does have skin discoloration of lower extremities (questionable venous stasis changes versus mono cycling)  Neurologic:                 Symmetric strength. No focal deficits.    Chest X-Ray 1/15/2020: Portable study reveals cardiomegaly with a widened mediastinum and congestive changes but no florid pulmonary edema.  Sternal wires in place    CTA of chest 1/16/2021: Lungs clear except for some mild bilateral groundglass changes and small pleural effusions (small on the right, tiny on the left).  Significant amount of mediastinal fat.  Coronary calcifications are present.    Results from last 7 days   Lab Units 01/16/21  0820 01/15/21  1748   WBC 10*3/mm3 6.39 7.95   HEMOGLOBIN g/dL 11.2* 12.1*   HEMATOCRIT % 38.2 41.3   PLATELETS 10*3/mm3 198 201     Results from last 7 days   Lab Units 01/21/21  0407 01/20/21  0625   SODIUM mmol/L 137 137   POTASSIUM mmol/L 4.4 4.7   CHLORIDE mmol/L 103 102   CO2 mmol/L 27.0 30.0*   BUN mg/dL 28* 42*   CREATININE mg/dL 0.81 1.04   GLUCOSE mg/dL 163* 155*   CALCIUM mg/dL 7.9* 7.9*     Results from last 7 days   Lab Units 01/18/21  0533 01/17/21  0740 01/16/21  0820   MAGNESIUM mg/dL 2.1 1.9 1.8     Results from last 7 days   Lab Units 01/15/21  1746   ALK PHOS U/L 80   BILIRUBIN mg/dL 0.8   ALT (SGPT) U/L 23   AST (SGOT) U/L 40       Lab Results   Component Value Date    SEDRATE 36 (H) 07/21/2020     No results found for: BNP  Lab Results   Component Value Date    TROPONINT 0.028 01/17/2021     Lab Results   Component Value Date    TSH 2.340 01/16/2021     Lab Results   Component Value Date    LACTATE 1.0 01/15/2021     No results found for: CORTISOL    Results from last 7 days   Lab Units 01/20/21  1735 01/15/21  2036   PH, ARTERIAL pH units 7.415 7.373   PCO2, ARTERIAL  mm Hg 45.9* 43.9   PO2 ART mm Hg 70.7* 70.1*   HCO3 ART mmol/L 29.4* 25.5   FIO2 % 21 28         I reviewed the patient's results, images and medication.    Assessment/Plan   ASSESSMENT        Acute diastolic CHF. LVEF 53% (CMS/HCC)    Essential hypertension    Moderate PAH. PA 55/20, wedge 15 mmHg.  Multifactorial (CMS/HCC)    Bilateral small pleural effusions    History of mechanical AVR for aortic stenosis 2009    Dyspnea and mild hypoxemia    PAF on chronic warfarin (CMS/HCC)    RLL lung nodule. Incidental finding and requires follow-up    Dyslipidemia    Type 2 diabetes mellitus (CMS/HCC)    Anemia    History Acinetobacter bacteremia on chronic minocycline    Discoloration of skin of bilateral legs likely due to minocycline use      DISCUSSION: In reviewing his chart, exam, and radiographic studies I do not think he has an unusual cause for his moderate pulmonary hypertension.  Although his LV systolic function is good I am sure he has significant diastolic dysfunction as evidenced by his wedge of 15 which was obtained 5 days after his admission and significant diuresis.  ABGs also revealed a PCO2 mildly elevated at 44 and an individual with normal lungs suggesting his morbid obesity does induce mild obesity hypoventilation syndrome.  On top of this he does have snoring and daytime hypersomnolence suggesting at least a component of obstructive sleep apnea (but he has never had a sleep study).  He is on chronic anticoagulation and I feel it is unlikely he has chronic thromboembolic disease (VQ scan is the better study for this rather than CTA but I see no reason to evaluate as my suspicion is very low).  I specifically asked if he ever taken any weight loss agents which are known to cause pulmonary hypertension and he denied ever doing so.  He also does not fit the picture of a primary pulmonary hypertension, and I see no evidence of lung disease.  With this in mind I would just focus on diuresis as per   Luis's plan, weight loss, and having a sleep study performed at home.  If the sleep study is abnormal he should be on CPAP at night.    PLAN     1.  Okay for discharge  2.  Would contact his primary physician and have him set up a sleep study in [place] (patient prefers to have it done there).  If abnormal they can set him up for CPAP and if used consistently this should help his pulmonary pressures  3.  Weight loss  4.  Diuretic therapy per cardiology  5.  Continued blood pressure control would also help with diastolic dysfunction    Plan of care and goals reviewed with multidisciplinary team at daily rounds.    I discussed the patient's findings and my recommendations with patient, nursing staff and consulting provider    Time spent: 60 minutes    Electronically signed by Dani Jaramillo MD, 01/21/21, 6:39 AM EST.   Pulmonary / Critical care medicine

## 2021-01-21 NOTE — PLAN OF CARE
Goal Outcome Evaluation:  Plan of Care Reviewed With: patient  Progress: no change       Pt resting at this time. Obtained walking oxygen sat. Pt went from 88%-91% while walking. PT is unsteady at times because of his neuropathy in his feet. Pt's groin and radial site are without issues. Pt does desat in his sleep. 2LNC have been provided and his walk sat and sleeping sat are within the charting. Continuing to monitor.

## 2021-01-21 NOTE — DISCHARGE PLACEMENT REQUEST
"Cecilio Farias (65 y.o. Male)     Date of Birth Social Security Number Address Home Phone MRN    1955  [id number]  [facility] [id number] [phone] [id number]    [employer] Marital Status          L[employer]-day        Admission Date Admission Type Admitting Provider Attending Provider Department, Room/Bed    1/15/21 Emergency Miguel Florez MD Russell, Marc P, MD [address], [address]    Discharge Date Discharge Disposition Discharge Destination                       Attending Provider: Miguel Florez MD    Allergies: Sulfa Antibiotics, Amoxicillin    Isolation: None   Infection: None   Code Status: CPR    Ht: 190 cm (74.8\")   Wt: 142 kg (312 lb 1.6 oz)    Admission Cmt: None   Principal Problem: Acute diastolic CHF. LVEF 53% (CMS/HCC) [I50.31]                 Active Insurance as of 1/15/2021     Primary Coverage     Payor Plan Insurance Group Employer/Plan Group    MEDICARE MEDICARE A & B      Payor Plan Address Payor Plan Phone Number Payor Plan Fax Number Effective Dates    [address] [phone]  2011 - None Entered    [place] [id number]       Subscriber Name Subscriber Birth Date Member ID       CECILIO FARIAS 1955 [id number]                 Emergency Contacts      (Rel.) Home Phone Work Phone Mobile Phone    Lilibeth Farias (Spouse) [phone] -- [phone]    GERSON FARIAS (Son) [phone] -- --        [address] [address]  Dept. Phone:  [phone]  Dept. Fax:  [phone] Date Ordered: 2021         Patient:  Cecilio Farias MRN:  [id number]   [address]  [facility] [address] :  1955  SSN:    Phone: [phone] Sex:  M     Weight: 142 kg (312 lb 1.6 oz)         Ht Readings from Last 1 Encounters:   21 190 cm (74.8\")         Oxygen Therapy         (Order ID: [id number])    Diagnosis:  Acute on chronic congestive heart failure, unspecified " heart failure type (CMS/HCC) (I50.9 [ICD-10-CM] 428.0 [ICD-9-CM])  Acute respiratory failure with hypoxia (CMS/HCC) (J96.01 [ICD-10-CM] 518.81 [ICD-9-CM])  Shortness of breath (R06.02 [ICD-10-CM] 786.05 [ICD-9-CM])  Atrial fibrillation, new onset (CMS/HCC) (I48.91 [ICD-10-CM] 427.31 [ICD-9-CM])  Moderate pulmonary arterial systolic hypertension (CMS/HCC) (I27.21 [ICD-10-CM] 416.8 [ICD-9-CM])   Quantity:  1     Delivery Modality: Nasal Cannula  Liters Per Minute: 2  Duration: With Exertion  Duration: During Sleep  Equipment:  Oxygen Concentrator &  &  Portable Gaseous Oxygen System & Portable Oxygen Contents Gaseous &  Conserving Regulator  Length of Need (99 Months = Lifetime): 6 Months        Authorizing Provider's Phone: [phone]   Verbal Order Mode: Verbal with readback   Authorizing Provider: Miguel Florez MD  Authorizing Provider's NPI: [id number]     Order Entered By: Daya Gallegos RN 2021 11:25 AM     Electronically signed by: Miguel Florez MD 2021 11:29 AM               History & Physical      West, Roque GUTIÉRREZ MD at 01/15/21 [address] [place] [address] Medicine Services  HISTORY AND PHYSICAL    Patient Name: Cecilio Farias  : 1955  MRN: [id number]  Primary Care Physician: America Everett DO  Date of admission: 1/15/2021    Subjective   Subjective     Chief Complaint:  SOB    HPI:  Cecilio Farias is a 65 y.o. male with a past medical history significant for hypertension, HLD, chronic CHF, s/p AVR chronically anticoagulated on coumadin, PAF, DM2, anemia, and acinetobacter bacteremia on suppressive minocycline. He presents today with complaints of progressively worsening SOB going on for the past 2 weeks. Dyspnea worse with exertion and lying flat. There is associated lower extremity edema bilaterally, palpitations, and generalized fatigue. Patient was concerned that he was unable to walk a few steps without significant dyspnea  and called his cardiologist, Dr. Smith. He was subsequently directed to ED for further evaluation and treatment. Currently there are no complaints of fever, cough, congestion, or chest pain. No syncope. No N/v/D. No known exposure to COVID-19. He is compliant with anticoagulation. Will admit to inpatient.      Current COVID Risks are:  [] Fever []  Cough [x] Shortness of breath [] Fatigue [] Change in taste or smell    [] Exposure to COVID positive patient  [] High risk facility   []  NONE    Review of Systems   Constitutional: Positive for fatigue. Negative for chills and fever.   HENT: Negative for congestion and trouble swallowing.    Eyes: Negative for photophobia and visual disturbance.   Respiratory: Positive for shortness of breath. Negative for cough.    Cardiovascular: Positive for leg swelling. Negative for chest pain.   Gastrointestinal: Negative for abdominal pain, diarrhea, nausea and vomiting.   Endocrine: Negative for cold intolerance and heat intolerance.   Genitourinary: Negative for dysuria and flank pain.   Musculoskeletal: Negative for back pain and gait problem.   Skin: Positive for color change. Negative for pallor and rash.   Allergic/Immunologic: Positive for immunocompromised state.   Neurological: Negative for dizziness, weakness and headaches.   Hematological: Negative for adenopathy.   Psychiatric/Behavioral: Negative for agitation and confusion.        All other systems reviewed and are negative.     Personal History     Past Medical History:   Diagnosis Date   • Cancer (CMS/HCC)     colon cancer with operation/ chemotherapy/radiation    • CHF (congestive heart failure) (CMS/HCC) 2009    Acute Systolic    • Chronic anticoagulation     coumadin   • Diabetes mellitus (CMS/HCC)    • H/O aortic valve replacement    • HLD (hyperlipidemia)    • Hypertension        Past Surgical History:   Procedure Laterality Date   • AORTIC VALVE REPAIR/REPLACEMENT  2009    25 mm. [place] AVR   • ASCENDING  AORTIC ANEURYSM REPAIR  2009   • BACK SURGERY     • COLON SURGERY      Colon cancer with operation   • FOOT SURGERY      bilateral 2/2 wound and trauma        Family History: family history includes Cancer in his brother, sister, and sister; Diabetes in his mother; Heart attack in his brother; Heart disease in his brother and mother; Hepatitis in his brother; Hyperlipidemia in his mother; Kidney failure in his brother; Leukemia in his father. Otherwise pertinent FHx was reviewed and unremarkable.     Social History:  reports that he has never smoked. He has never used smokeless tobacco. He reports that he does not drink alcohol or use drugs.  Social History     Social History Narrative   • Not on file       Medications:  EPINEPHrine, amLODIPine, aspirin, carvedilol, gabapentin, glipizide, insulin NPH-insulin regular, lisinopril, metFORMIN, minocycline, pravastatin, and warfarin    Allergies   Allergen Reactions   • Sulfa Antibiotics    • Amoxicillin Rash     Has tolerated ceftriaxone       Objective   Objective     Vital Signs:   Temp:  [97.3 °F (36.3 °C)-98.6 °F (37 °C)] 98.5 °F (36.9 °C)  Heart Rate:  [63-78] 70  Resp:  [18] 18  BP: (144-193)/(57-96) 149/57  Flow (L/min):  [3] 3    Physical Exam   Constitutional: Awake, alert. morbidly obese  Eyes: PERRLA, sclerae anicteric, no conjunctival injection  HENT: NCAT, mucous membranes moist  Neck: Supple, no thyromegaly, no lymphadenopathy, trachea midline  Respiratory: Clear to auscultation bilaterally, nonlabored respirations   Cardiovascular: RRR, no murmurs, rubs, or gallops, palpable pedal pulses bilaterally  Audible click from mechanical valve  Gastrointestinal: Positive bowel sounds, soft, nontender, nondistended  Musculoskeletal: no clubbing or cyanosis to extremities  Pitting BLE edema, significant color change from chronic minocycline  Psychiatric: Appropriate affect, cooperative  Neurologic: Oriented x 3, strength symmetric in all extremities, Cranial Nerves  grossly intact to confrontation, speech clear  Skin: No rashes      Results Reviewed:  I have personally reviewed most recent indicated data and agree with findings including:  [x]  Laboratory  [x]  Radiology  [x]  EKG/Telemetry  [x]  Pathology  [x]  Cardiac/Vascular Studies  [x]  Old records  []  Other:  Most pertinent findings include: /85. Hypoxic to 88%. Afebrile. Troponin negative. . Glucose 162. INR 3.88. hemoglobin 12.1. chemistry and hematology otherwise favorable. CXR demonstrates pulmonary edema. EKG with rate controlled atrial fibrillation and prolonged QTC.       LAB RESULTS:      Lab 01/15/21  1748 01/15/21  1746   WBC 7.95  --    HEMOGLOBIN 12.1*  --    HEMATOCRIT 41.3  --    PLATELETS 201  --    NEUTROS ABS 5.55  --    IMMATURE GRANS (ABS) 0.03  --    LYMPHS ABS 1.08  --    MONOS ABS 0.71  --    EOS ABS 0.51*  --    MCV 90.6  --    LACTATE 1.0  --    PROTIME  --  37.8*   D DIMER QUANT  --  0.41         Lab 01/15/21  1746   SODIUM 137   POTASSIUM 4.7   CHLORIDE 106   CO2 24.0   ANION GAP 7.0   BUN 21   CREATININE 0.74*   GLUCOSE 162*   CALCIUM 8.8   MAGNESIUM 2.0   TSH 2.760         Lab 01/15/21  1746   TOTAL PROTEIN 7.6   ALBUMIN 3.70   GLOBULIN 3.9   ALT (SGPT) 23   AST (SGOT) 40   BILIRUBIN 0.8   ALK PHOS 80         Lab 01/15/21  2041 01/15/21  1746 01/14/21   PROBNP  --  532.3  --    TROPONIN T 0.021 0.014  --    PROTIME  --  37.8*  --    INR  --  3.88* 3.50                 Lab 01/15/21  2036   PH, ARTERIAL 7.373   PCO2, ARTERIAL 43.9   PO2 ART 70.1*   FIO2 28   HCO3 ART 25.5   BASE EXCESS ART 0.0   CARBOXYHEMOGLOBIN 1.7     Brief Urine Lab Results     None        Microbiology Results (last 10 days)     Procedure Component Value - Date/Time    COVID PRE-OP / PRE-PROCEDURE SCREENING ORDER (NO ISOLATION) - Swab, Nasopharynx [[id number]]  (Normal) Collected: 01/15/21 1921    Lab Status: Final result Specimen: Swab from Nasopharynx Updated: 01/15/21 2015    Narrative:      The following  orders were created for panel order COVID PRE-OP / PRE-PROCEDURE SCREENING ORDER (NO ISOLATION) - Swab, Nasopharynx.  Procedure                               Abnormality         Status                     ---------                               -----------         ------                     COVID-19 and FLU A/B PCR...[[id number]]  Normal              Final result                 Please view results for these tests on the individual orders.    COVID-19 and FLU A/B PCR - Swab, Nasopharynx [[id number]]  (Normal) Collected: 01/15/21 1921    Lab Status: Final result Specimen: Swab from Nasopharynx Updated: 01/15/21 2015     COVID19 Not Detected     Influenza A PCR Not Detected     Influenza B PCR Not Detected    Narrative:      Fact sheet for providers: https://www.fda.gov/media/769383/download    Fact sheet for patients: https://www.fda.gov/media/549218/download    Test performed by PCR.          Xr Chest 1 View    Result Date: 1/15/2021   EXAMINATION: XR CHEST, SINGLE VIEW - 01/15/2021  INDICATION: Shortness of air.  COMPARISON: 06/10/2019  FINDINGS: Portable chest reveals patient is status post median sternotomy. The heart is enlarged. Prominence of the pulmonary vascularity. No focal parenchymal opacification present. No pleural effusion or pneumothorax.        Impression: Heart is enlarged with prominence of the pulmonary vascularity bilaterally. No evidence of acute parenchymal disease.  DICTATED:   01/15/2021 EDITED/ls :   01/15/2021         Results for orders placed during the hospital encounter of 06/10/19   Adult Transesophageal Echo (ESTHER) W/ Cont if Necessary Per Protocol    Narrative · Left ventricular systolic function is normal. Estimated EF appears to be   in the range of 56 - 60%  · Left ventricular wall thickness is consistent with mild-to-moderate   concentric hypertrophy.  · There is a bileaflet tilting disc mechanical valve present. The aortic   valve peak and mean gradients are within defined limits  ·  Mild dilation of the aortic root is present, measuring 4 cm  · There is no evidence of endocarditis. No valvular vegetations seen.          Assessment/Plan   Assessment & Plan       Acute CHF    Acute respiratory failure with hypoxia (CMS/HCC)    Hx of mechanical AoVR (on chronic coumadin)    Atrial fibrillation (CMS/HCC)    Hypertension    Dyslipidemia    Type 2 diabetes mellitus (CMS/HCC)    CHF (congestive heart failure) (CMS/[facility])    Chronic anticoagulation    Anemia    History Acinetobacter infection    Discoloration of skin of bilateral legs likely due to minocycline use      Acute Respiratory Failure with Hypoxia (due to acute chf):  . Not chronically diuresed at home  - administered 40 mg lasix in ED, repeating 20mg iv x 1 @ ~ midnight as well. Further diuretics tomorrow depending on renal function & cards recs  - troponin, BNP favorable. Continue to trend cardia enzymes  - check TSH   - last echocardiogram June 2019 demonstrated EF of 56 to 60%. No vegetation on mechanical valve or evidence of endocarditis. Obtain echocardiogram; cards consult  - daily weights  - strict I&O  - 1500ml fluid restriction, cardiac diet  - am labs    Atrial Fibrillation:  - currently stable and rate controlled  - history of resolved PAF in 2016  - [id number] = 4. Already anticoagulated on coumadin  - check magnesium  -echo, on b-blocker, cards to see    Hx of mechanical AVR:  - on coumadin. supratherapeutic inr at 3.8  - continue to dose anticoagulation per pharmacy    History of Acinetobacter bacteremia:  - source yet to be determined. Could not rule out endocarditis from negative ESTHER.   - on chronic minocycline. Continue  - followed per ID, Dr. Kaur  -legs discolored likely due to minocycline    DM2:  - last a1c 10.2 on 6/2019. recheck  - insulin dependent. Received 5 units 70/30 night of admission, will change to levemir 6 units nightly w/ sliding scale, titrate prn  - hold oral hypoglycemics  - add SSI with scheduled  accu checks    Hypertension:  - controlled and stable. Continue norvasc and coreg    7. HLD:  - continue statin    8. Anemia:  - stable. At baseline. Monitor.      DVT prophylaxis:  coumadin      CODE STATUS:  Full code  Code Status and Medical Interventions:   Ordered at: 01/15/21 1944     Level Of Support Discussed With:    Patient     Code Status:    CPR     Medical Interventions (Level of Support Prior to Arrest):    Full         This note has been completed as part of a split-shared workflow.     Electronically signed by Sada Armendariz PA-C, 01/15/21, 7:50 PM EST.      Attending   Admission Attestation       I have seen and examined the patient, performing an independent face-to-face diagnostic evaluation with plan of care reviewed and developed with the advanced practice clinician (APC).      Brief Summary Statement:   Cecilio Farias is a 65 y.o. male w/ hx obesity, mechanical aortic valve replacement (on coumadin), previous afib (apparently had resolved per previous cardiology note), dm2, previous acinetobacter bacteremia on chronic suppression w/ minocycline (per Dr. Oscar Kaur) w/ associated leg skin discoloration. Patient presents w/ 2 weeks of dyspnea worse w/ exertion, orthopnea, LE edema. No fever, no chills, no taste or smell changes. Patient apparently called cardiologist who referred patient to ED. In ED patient hypoxic. In afib but rate controlled. Troponin negative. cxr w/ pulmonary vascular congestion. Given lasix 40mg iv in ed and admitted to hospitalist service.    Remainder of detailed HPI is as noted by APC and has been reviewed and/or edited by me for completeness.    Attending Physical Exam:  Constitutional:Alert, oriented x 3, nontoxic appearing, slightly dyspneic w/ long sentences but no overt distress  Psych:Normal/appropriate affect  HEENT:Ncat, oroph clear  Neck: neck supple, full range of motion  Neuro: Face symmetric, speech clear, equal , moves all extremities  Cardiac:  irr irr, regular rate; BLE 2+ edema  Resp: Ctab, normal effort  GI: abd soft, nontender  Skin: BLE dark skin discoloration  Musculoskeletal/extremities: no cyanosis extremities; no significant ankle edema; 2+ BLE dp pulses          Brief Assessment/Plan :  See detailed assessment and plan developed with APC which I have reviewed and/or edited for completeness.        Admission Status: I believe that this patient meets inpatient status due to need for iv diuresis, supplemental oxygen requirement w/ expected stay > 2 midnights      Roque Montilla MD  01/15/21                        Electronically signed by Roque Montilla MD at 01/15/21 3151       Oxygen Therapy (last 2 days)     Date/Time   SpO2   Device (Oxygen Therapy)   Flow (L/min)   Oxygen Concentration (%)   ETCO2 (mmHg)    01/21/21 0910   --   room air   --   --   --    01/21/21 0900   92   --   --   --   --    01/21/21 0700   100   --   --   --   --    01/21/21 0600   --   nasal cannula   2   --   --    01/21/21 0400   --   nasal cannula   2   --   --    01/21/21 0200   98   nasal cannula   2   --   --    01/21/21 0100   --   nasal cannula   2   --   --    01/21/21 0017   (!) 87   room air   --   --   --    01/21/21 0016   (!) 87   room air   --   --   --    01/21/21 0014   (!) 88   room air   --   --   --    01/21/21 0013   (!) 88   room air   --   --   --    01/21/21 0012   (!) 88   room air   --   --   --    01/21/21 0000   (!) 89   nasal cannula   2   --   --    01/20/21 2300   93   --   --   --   --    01/20/21 2202   91   room air   --   --   --    01/20/21 2200   91   room air   --   --   --    01/20/21 2159   90   room air   --   --   --    01/20/21 2158   (!) 88   room air   --   --   --    01/20/21 1930   93   --   --   --   --    01/20/21 1915   92   --   --   --   --    01/20/21 1900   92   --   --   --   --    01/20/21 1845   94   --   --   --   --    01/20/21 1830 91   --   --   --   --    01/20/21 1815   95   --   --   --   --     01/20/21 1800   94   --   --   --   --    01/20/21 1745   93   --   --   --   --    01/20/21 1730   93   --   --   --   --    01/20/21 1715   93   --   --   --   --    01/20/21 1700   95   --   --   --   --    01/20/21 1645   95   --   --   --   --    01/20/21 1630   93   --   --   --   --    01/20/21 1615   94   --   --   --   --    01/20/21 1600   94   --   --   --   --    01/20/21 1545   95   --   --   --   --    01/20/21 1530   94   --   --   --   --    01/20/21 1515   95   --   --   --   --    01/20/21 1500   95   --   --   --   --    01/20/21 1415   95   --   --   --   --    01/20/21 1400   93   --   --   --   --    01/20/21 1345   94   --   --   --   --    01/20/21 1330   96   --   --   --   --    01/20/21 1315   95   --   --   --   --    01/20/21 1300   94   --   --   --   --    01/20/21 1245   94   --   --   --   --    01/20/21 1230   95   --   --   --   --    01/20/21 1215   94   --   --   --   --    01/20/21 1200   94   --   --   --   --    01/20/21 1145   93   --   --   --   --    01/20/21 1130   94   --   --   --   --    01/20/21 1115   96   --   --   --   --    01/20/21 1100   93   --   --   --   --    01/20/21 1045   94   --   --   --   --    01/20/21 1030   94   --   --   --   --    01/20/21 1015   93   --   --   --   --    01/20/21 1000   90   --   --   --   --    01/20/21 0945   92   --   --   --   --    01/20/21 0930   91   --   --   --   --    01/20/21 0915   91   room air   --   --   --    01/20/21 09:03:20   98   --   --   --   --    01/20/21 08:07:13   98   --   --   --   --    01/20/21 0736   --   room air   --   --   --    01/20/21 0600   98   room air   --   --   --    01/20/21 0400   97   room air   --   --   --    01/20/21 0314   98   --   --   --   --    01/20/21 0200   98   room air   --   --   --    01/20/21 0000   99   room air   --   --   --    01/19/21 2329   97   --   --   --   --    01/19/21 2200   98   room air   --   --   --    01/19/21 2045   --   room air   --   --   --     01/19/21 2000   95   room air   --   --   --    01/19/21 1938   93   --   --   --   --    01/19/21 1810   90   --   --   --   --    01/19/21 1600   90   --   --   --   --    01/19/21 1542   92   --   --   --   --    01/19/21 1200   94   room air   --   --   --    01/19/21 1000   98   nasal cannula   2   --   --    01/19/21 0840   --   room air   --   --   --    01/19/21 0800   99   --   --   --   --    01/19/21 0740   (!) 81   --   --   --   --    01/19/21 0600   98   room air   --   --   --    01/19/21 0303   98   --   --   --   --    01/19/21 0000   98   room air   --   --   --               Consult Notes (most recent note)      Dani Jaramillo MD at 01/21/21 0639          Intensivist Note                                                            CONSULTATION  1/21/2021  Hospital Day: 6  1 Day Post-Op  ICU Stays Timeline            Hospital Admission: 01/15/21 1802 - Current  No ICU stays    No ICU stays to display             Mr. Cecilio Farias, 65 y.o. male is followed for:    Acute diastolic CHF. LVEF 53% (CMS/HCC)    Essential hypertension    Moderate PAH. PA 55/20, wedge 15 mmHg.  Multifactorial (CMS/HCC)    Bilateral small pleural effusions    History of mechanical AVR for aortic stenosis 2009    Dyspnea and mild hypoxemia    PAF on chronic warfarin (CMS/HCC)    RLL lung nodule. Incidental finding and requires follow-up    Dyslipidemia    Type 2 diabetes mellitus (CMS/HCC)    Anemia    History Acinetobacter bacteremia on chronic minocycline    Discoloration of skin of bilateral legs likely due to minocycline use       SUBJECTIVE     65-year-old white male with a history of severe aortic stenosis and associated LV dysfunction requiring mechanical aortic valve replacement 2009 with recovery of LV systolic function.  Additional problems include PAF, diabetes mellitus, morbid obesity, remote Acinetobacter bacteremia on chronic minocycline, hypertension and diastolic dysfunction, and hyperlipidemia.  Patient  was last seen by Dr. Smith in the office 8/13/2020 and denied any complaints specifically chest pain, dyspnea, or CHF.  Approximately 2 months PTA however he began to note dyspnea on exertion upon just walking to his garage.  This gradually progressed over time and began to be associated with increasing lower extremity edema, orthopnea, and PND.  Dyspnea worsened over the last 2 weeks PTA to the point that just getting to the bathroom became difficult.  1/15/2020 he called Dr. Smith office and was short of breath just trying to talk and was told to go to the ER where it was noted that O2 sats were 93% on room air but with ambulation dropped to 83%.  He was admitted by the hospitalists 1/15/2020 and seen in consultation by Dr. Smith.  Has been aggressively diuresed and is in negative fluid balance of 8.8 L.  He indicates that his heart rate dropped to 38 1 evening and his Coreg dose was held but he remains on Coreg at this time.  Patient was taken for cardiac catheterization to rule out occult CAD 1/20/2021.  This revealed no significant occlusive disease, but confirmed the patient has moderate pulmonary hypertension with a PA pressure 55/20 mmHg, RV 55/12 mmHg, RA 12 mmHg, wedge pressure 15 mmHg, cardiac index 3.1.  Echocardiogram confirmed RVSP of 56 mmHg.  Because patient's LV function has normalized after his mechanical AVR in 2009, I was asked to see if there is any other potential etiology for his moderate pulmonary hypertension.  He specifically denies ever taking weight loss medications such as Fen/Phen in the past.  He is on full dose anticoagulation making occult PE and chronic thromboembolic disease unlikely, he gives a history of snoring and daytime hypersomnolence although he downplays his symptoms, his weight is stable and has not changed but he remains morbidly obese for many years.  He denies any prior history of pulmonary disease and his chest x-ray and CT scans here have just revealed some mild  congestive and groundglass changes.  He denies anorexia, weight loss, chronic fevers chills sweats, chronic cough, purulent production, hemoptysis, or pleuritic pain, nausea, vomiting, diarrhea, melena, hematochezia, dysuria, hematuria, urgency, or frequency.      ROS: Per subjective, all other systems reviewed and were negative.    Past Medical History:   Diagnosis Date   • Cancer (CMS/[facility])     colon cancer with operation/ chemotherapy/radiation    • CHF (congestive heart failure) (CMS/[facility]) 2009    Acute Systolic CHF prior to aortic valve replacement   • Chronic anticoagulation for prosthetic aortic valve and PAF     coumadin   • Diabetes mellitus ([facility] [facility])    • H/O aortic valve replacement  2009   • HLD (hyperlipidemia)    • Hypertension and diastolic CHF    • History of Acinetobacter bacteremia on chronic minocycline (CMS/[facility]) 6/13/2019     .   PAF on chronic warfarin    .   Morbid obesity    .   Suspected JI    Past Surgical History:   Procedure Laterality Date   • AORTIC VALVE REPAIR/REPLACEMENT  2009    25 mm. [place] AVR   • ASCENDING AORTIC ANEURYSM REPAIR  2009   • BACK SURGERY     • CARDIAC CATHETERIZATION N/A 1/20/2021    Procedure: RIGHT AND LEFT HEART CATH;  Surgeon: Kurtis Smith MD;  Location: [facility] CATH INVASIVE LOCATION;  Service: Cardiology;  Laterality: N/A;   • COLON SURGERY      Colon cancer with operation   • FOOT SURGERY      bilateral 2/2 wound and trauma          Current Outpatient Medications on File Prior to Encounter   Medication Sig Dispense Refill   • amLODIPine (NORVASC) 10 MG tablet Take 1 tablet by mouth Every Morning.     • aspirin 81 MG EC tablet Take 81 mg by mouth daily.     • carvedilol (COREG) 25 MG tablet Take 25 mg by mouth 2 (two) times a day with meals.     • gabapentin (NEURONTIN) 800 MG tablet Take 800 mg by mouth 3 (Three) Times a Day.     • glipiZIDE (GLUCOTROL) 5 MG tablet Take 5 mg by mouth 2 (Two) Times a Day Before Meals.     • insulin NPH-insulin regular  "(NOVOLIN 70/30) (70-30) 100 UNIT/ML injection Inject 5 Units under the skin into the appropriate area as directed 2 (Two) Times a Day With Meals. (Patient taking differently: Inject 15 Units under the skin into the appropriate area as directed 2 (Two) Times a Day With Meals.) 10 mL 0   • lisinopril (PRINIVIL,ZESTRIL) 20 MG tablet Take 20 mg by mouth 2 (two) times a day.     • metFORMIN (GLUCOPHAGE) 500 MG tablet Take 2 tablets by mouth Every Evening.     • minocycline (MINOCIN,DYNACIN) 100 MG capsule Take 1 capsule by mouth 2 (Two) Times a Day.     • pravastatin (PRAVACHOL) 80 MG tablet Take 80 mg by mouth Every Night.     • warfarin (COUMADIN) 5 MG tablet TAKE 1 AND 1/2 TO TWO TABLETS BY MOUTH AS DIRECTED BY CLINIC. 60 tablet 0       Allergies   Allergen Reactions   • Sulfa Antibiotics    • Amoxicillin Rash     Has tolerated ceftriaxone       Family History   Problem Relation Age of Onset   • Heart disease Mother    • Hyperlipidemia Mother    • Diabetes Mother    • Leukemia Father    • Cancer Sister    • Kidney failure Brother    • Hepatitis Brother    • Cancer Sister    • Cancer Brother    • Heart disease Brother    • Heart attack Brother        Social History     Socioeconomic History   • Marital status:      Spouse name: Not on file   • Number of children: Not on file   • Years of education: Not on file   • Highest education level: Not on file   Tobacco Use   • Smoking status: Never Smoker   • Smokeless tobacco: Never Used   Substance and Sexual Activity   • Alcohol use: No   • Drug use: No   • Sexual activity: Defer       The patient's relevant PMH, PSH, FH, and SH were reviewed and updated in Epic as appropriate. Allergies and Medications reviewed.    OBJECTIVE     /85   Pulse 96   Temp 97.7 °F (36.5 °C) (Oral)   Resp 16   Ht 190 cm (74.8\")   Wt (!) 142 kg (312 lb 1.6 oz)   SpO2 98%   BMI 39.22 kg/m²      Flow (L/min): 2    Flowsheet Rows      First Filed Value   Admission Height  190.5 cm " "(75\") Documented at 01/15/2021 1731   Admission Weight  (!) 150 kg (330 lb) Documented at 01/15/2021 1731        Intake & Output (last day)       01/20 0701 - 01/21 0700 01/21 0701 - 01/22 0700    P.O.      Total Intake(mL/kg)      Urine (mL/kg/hr) 450 (0.1)     Stool      Total Output 450     Net -450                 Exam:  General Exam:  Pleasant well-developed well-nourished morbidly obese white male sitting on the side of the bed in NAD  HEENT: Pupils equal and reactive. Nose and throat clear.  Neck:                          Supple, no JVD, thyromegaly, or adenopathy  Lungs: Clear anteriorly, laterally, posteriorly.  No wheezes, rales, rhonchi.  Cardiovascular: RRR without murmurs or gallops.  Valve click present HR 78 bpm  Abdomen: Soft nontender without organomegaly or masses.   and rectal: Deferred.  Extremities: No cyanosis clubbing edema.  Apparently on admission had 2+ edema.  Does have skin discoloration of lower extremities (questionable venous stasis changes versus mono cycling)  Neurologic:                 Symmetric strength. No focal deficits.    Chest X-Ray 1/15/2020: Portable study reveals cardiomegaly with a widened mediastinum and congestive changes but no florid pulmonary edema.  Sternal wires in place    CTA of chest 1/16/2021: Lungs clear except for some mild bilateral groundglass changes and small pleural effusions (small on the right, tiny on the left).  Significant amount of mediastinal fat.  Coronary calcifications are present.    Results from last 7 days   Lab Units 01/16/21  0820 01/15/21  1748   WBC 10*3/mm3 6.39 7.95   HEMOGLOBIN g/dL 11.2* 12.1*   HEMATOCRIT % 38.2 41.3   PLATELETS 10*3/mm3 198 201     Results from last 7 days   Lab Units 01/21/21  0407 01/20/21  0625   SODIUM mmol/L 137 137   POTASSIUM mmol/L 4.4 4.7   CHLORIDE mmol/L 103 102   CO2 mmol/L 27.0 30.0*   BUN mg/dL 28* 42*   CREATININE mg/dL 0.81 1.04   GLUCOSE mg/dL 163* 155*   CALCIUM mg/dL 7.9* 7.9*     Results from " last 7 days   Lab Units 01/18/21  0533 01/17/21  0740 01/16/21  0820   MAGNESIUM mg/dL 2.1 1.9 1.8     Results from last 7 days   Lab Units 01/15/21  1746   ALK PHOS U/L 80   BILIRUBIN mg/dL 0.8   ALT (SGPT) U/L 23   AST (SGOT) U/L 40       Lab Results   Component Value Date    SEDRATE 36 (H) 07/21/2020     No results found for: BNP  Lab Results   Component Value Date    TROPONINT 0.028 01/17/2021     Lab Results   Component Value Date    TSH 2.340 01/16/2021     Lab Results   Component Value Date    LACTATE 1.0 01/15/2021     No results found for: CORTISOL    Results from last 7 days   Lab Units 01/20/21  1735 01/15/21  2036   PH, ARTERIAL pH units 7.415 7.373   PCO2, ARTERIAL mm Hg 45.9* 43.9   PO2 ART mm Hg 70.7* 70.1*   HCO3 ART mmol/L 29.4* 25.5   FIO2 % 21 28         I reviewed the patient's results, images and medication.    Assessment/Plan   ASSESSMENT        Acute diastolic CHF. LVEF 53% (CMS/HCC)    Essential hypertension    Moderate PAH. PA 55/20, wedge 15 mmHg.  Multifactorial (CMS/HCC)    Bilateral small pleural effusions    History of mechanical AVR for aortic stenosis 2009    Dyspnea and mild hypoxemia    PAF on chronic warfarin (CMS/HCC)    RLL lung nodule. Incidental finding and requires follow-up    Dyslipidemia    Type 2 diabetes mellitus (CMS/HCC)    Anemia    History Acinetobacter bacteremia on chronic minocycline    Discoloration of skin of bilateral legs likely due to minocycline use      DISCUSSION: In reviewing his chart, exam, and radiographic studies I do not think he has an unusual cause for his moderate pulmonary hypertension.  Although his LV systolic function is good I am sure he has significant diastolic dysfunction as evidenced by his wedge of 15 which was obtained 5 days after his admission and significant diuresis.  ABGs also revealed a PCO2 mildly elevated at 44 and an individual with normal lungs suggesting his morbid obesity does induce mild obesity hypoventilation syndrome.  On  top of this he does have snoring and daytime hypersomnolence suggesting at least a component of obstructive sleep apnea (but he has never had a sleep study).  He is on chronic anticoagulation and I feel it is unlikely he has chronic thromboembolic disease (VQ scan is the better study for this rather than CTA but I see no reason to evaluate as my suspicion is very low).  I specifically asked if he ever taken any weight loss agents which are known to cause pulmonary hypertension and he denied ever doing so.  He also does not fit the picture of a primary pulmonary hypertension, and I see no evidence of lung disease.  With this in mind I would just focus on diuresis as per Dr. Smith's plan, weight loss, and having a sleep study performed at home.  If the sleep study is abnormal he should be on CPAP at night.    PLAN     1.  Okay for discharge  2.  Would contact his primary physician and have him set up a sleep study in [place] (patient prefers to have it done there).  If abnormal they can set him up for CPAP and if used consistently this should help his pulmonary pressures  3.  Weight loss  4.  Diuretic therapy per cardiology  5.  Continued blood pressure control would also help with diastolic dysfunction    Plan of care and goals reviewed with multidisciplinary team at daily rounds.    I discussed the patient's findings and my recommendations with patient, nursing staff and consulting provider    Time spent: 60 minutes    Electronically signed by Dani Jaramillo MD, 01/21/21, 6:39 AM EST.   Pulmonary / Critical care medicine       Electronically signed by Dani Jaramillo MD at 01/21/21 1041       Physical Therapy Notes (most recent note)    No notes exist for this encounter.         Occupational Therapy Notes (most recent note)    No notes exist for this encounter.            Discharge Summary      Miguel Florez MD at 01/21/21 0961              [facility] Medicine Services  DISCHARGE  SUMMARY    Patient Name: Cecilio Farias  : 1955  MRN: [id number]    Date of Admission: 1/15/2021  6:02 PM  Date of Discharge:  2021  Primary Care Physician: America Everett,     Consults     Date and Time Order Name Status Description    2021 1211 Inpatient Pulmonology Consult      1/15/2021 2223 Inpatient Cardiology Consult Completed           Hospital Course     Presenting Problem:   Acute on chronic congestive heart failure, unspecified heart failure type (CMS/HCC) [I50.9]  Acute on chronic congestive heart failure, unspecified heart failure type (CMS/HCC) [I50.9]    Active Hospital Problems    Diagnosis  POA   • **Acute diastolic CHF. LVEF 53% (CMS/HCC) [I50.31]  Yes   • Dyspnea and mild hypoxemia [R06.00]  Yes   • Moderate PAH. PA 55/20, wedge 15 mmHg.  Multifactorial (CMS/HCC) [I27.21]  Yes   • Bilateral small pleural effusions [J90]  Yes   • RLL lung nodule. Incidental finding and requires follow-up [R91.1]  Yes   • Anemia [D64.9]  Yes   • History Acinetobacter bacteremia on chronic minocycline [Z86.19]  Yes   • PAF on chronic warfarin (CMS/HCC) [I48.0]  Yes   • Discoloration of skin of bilateral legs likely due to minocycline use [L81.9]  Yes   • History of mechanical aortic valve replacement for aortic stenosis  [Z95.2]  Not Applicable   • Essential hypertension [I10]  Yes   • Dyslipidemia [E78.5]  Yes   • Type 2 diabetes mellitus (CMS/HCC) [E11.9]  Yes      Resolved Hospital Problems    Diagnosis Date Resolved POA   • CHF (congestive heart failure) (CMS/HCC) [I50.9] 2021 Yes          Hospital Course:  Cecilio Farias is a 65 y.o. male with a history of mechanical aortic valve on Coumadin, hypertension, A. fib, type 2 diabetes who presents with progressive dyspnea found to have hypoxia.     Acute Respiratory Failure with Hypoxia   Acute Diastolic CHF  Pulmonary hypertension secondary to obesity and probable sleep apnea  --He was aggressively diuresed with near resolution  of his edema. He underwent LHC/RHC and was found to have pulmonary HTN and evaluated by pulmonary. Outpatient sleep study was recommended and the patient needs to remain on diuretics. He will continue Coreg and Lisinopril.     Right lung base nodule  --This was incidentally found on CTA and needs imaging follow up in 3-6 months for surveillance.      Atrial Fibrillation:  ---He will remain on Coreg and warfarin     Hx of mechanical AVR:  --He will remain on Coumadin     History of Acinetobacter bacteremia:  --He is on chronic minocycline per Dr. Kaur.        Discharge Follow Up Recommendations for outpatient labs/diagnostics:  As written below, and needs sleep study for snoring and daytime hypersomnolence      Day of Discharge     HPI:   Up in bed. Denies dyspnea. Edema nearly completely resolved. No f/c. No n/v. Tolerating PO. No constipation or dysuria.    Review of Systems  Negative except for above.    Vital Signs:   Temp:  [97.7 °F (36.5 °C)] 97.7 °F (36.5 °C)  Heart Rate:  [] 96  Resp:  [16] 16  BP: (105-149)/(53-95) 132/85     Physical Exam:  NAD, alert and oriented x 3  OP clear, MMM  PERRL  Neck supple  No LAD  Tachy  Decreased at bases, otherwise clear  +BS, ND, NT, soft  No c/c, tr edema  No rashes  Normal affect    Pertinent  and/or Most Recent Results     LAB RESULTS:      Lab 01/21/21  0407 01/20/21  0625 01/19/21  1058 01/18/21  0533 01/17/21  0740 01/16/21  0820  01/15/21  1748 01/15/21  1746   WBC  --   --   --   --   --  6.39  --  7.95  --    HEMOGLOBIN  --   --   --   --   --  11.2*  --  12.1*  --    HEMATOCRIT  --   --   --   --   --  38.2  --  41.3  --    PLATELETS  --   --   --   --   --  198  --  201  --    NEUTROS ABS  --   --   --   --   --   --   --  5.55  --    IMMATURE GRANS (ABS)  --   --   --   --   --   --   --  0.03  --    LYMPHS ABS  --   --   --   --   --   --   --  1.08  --    MONOS ABS  --   --   --   --   --   --   --  0.71  --    EOS ABS  --   --   --   --   --   --    --  0.51*  --    MCV  --   --   --   --   --  91.8  --  90.6  --    LACTATE  --   --   --   --   --   --   --  1.0  --    PROTIME 27.4* 28.3* 26.2* 25.7* 28.1* 35.9*   < >  --  37.8*   D DIMER QUANT  --   --   --   --   --   --   --   --  0.41    < > = values in this interval not displayed.         Lab 01/21/21 0407 01/20/21 0625 01/19/21  1058 01/18/21  0533 01/17/21 0740 01/16/21  0820 01/15/21  1746   SODIUM 137 137 137 136 138 137 137   POTASSIUM 4.4 4.7 4.8 4.3 4.4 4.3 4.7   CHLORIDE 103 102 99 99 103 105 106   CO2 27.0 30.0* 31.0* 29.0 28.0 25.0 24.0   ANION GAP 7.0 5.0 7.0 8.0 7.0 7.0 7.0   BUN 28* 42* 33* 25* 21 21 21   CREATININE 0.81 1.04 0.99 0.89 0.76 0.86 0.74*   GLUCOSE 163* 155* 236* 169* 150* 159* 162*   CALCIUM 7.9* 7.9* 8.5* 8.3* 8.6 8.4* 8.8   MAGNESIUM  --   --   --  2.1 1.9 1.8 2.0   HEMOGLOBIN A1C  --   --   --   --   --  7.10*  --    TSH  --   --   --   --   --  2.340 2.760         Lab 01/15/21  1746   TOTAL PROTEIN 7.6   ALBUMIN 3.70   GLOBULIN 3.9   ALT (SGPT) 23   AST (SGOT) 40   BILIRUBIN 0.8   ALK PHOS 80         Lab 01/21/21 0407 01/20/21 0625 01/19/21  1058 01/18/21  0533 01/17/21  0740  01/15/21  2222 01/15/21  2041 01/15/21  1746   PROBNP  --   --   --   --  303.4  --   --   --  532.3   TROPONIN T  --   --   --   --  0.028  --  0.018 0.021 0.014   PROTIME 27.4* 28.3* 26.2* 25.7* 28.1*   < >  --   --  37.8*   INR 2.58* 2.70* 2.44* 2.39* 2.66*   < >  --   --  3.88*    < > = values in this interval not displayed.                 Lab 01/20/21  1735 01/15/21  2036   PH, ARTERIAL 7.415 7.373   PCO2, ARTERIAL 45.9* 43.9   PO2 ART 70.7* 70.1*   FIO2 21 28   HCO3 ART 29.4* 25.5   BASE EXCESS ART 4.1* 0.0   CARBOXYHEMOGLOBIN 1.2 1.7     Brief Urine Lab Results     None        Microbiology Results (last 10 days)     Procedure Component Value - Date/Time    COVID PRE-OP / PRE-PROCEDURE SCREENING ORDER (NO ISOLATION) - Swab, Nasopharynx [[id number]]  (Normal) Collected: 01/15/21 1921    Lab  Status: Final result Specimen: Swab from Nasopharynx Updated: 01/15/21 2015    Narrative:      The following orders were created for panel order COVID PRE-OP / PRE-PROCEDURE SCREENING ORDER (NO ISOLATION) - Swab, Nasopharynx.  Procedure                               Abnormality         Status                     ---------                               -----------         ------                     COVID-19 and FLU A/B PCR...[[id number]]  Normal              Final result                 Please view results for these tests on the individual orders.    COVID-19 and FLU A/B PCR - Swab, Nasopharynx [[id number]]  (Normal) Collected: 01/15/21 1921    Lab Status: Final result Specimen: Swab from Nasopharynx Updated: 01/15/21 2015     COVID19 Not Detected     Influenza A PCR Not Detected     Influenza B PCR Not Detected    Narrative:      Fact sheet for providers: https://www.fda.gov/media/956584/download    Fact sheet for patients: https://www.fda.gov/media/953149/download    Test performed by PCR.          Ct Angiogram Chest With & Without Contrast    Result Date: 1/18/2021  EXAMINATION: CT ANGIOGRAM CHEST WWO CONTRAST  - 01/16/2021  INDICATION: I50.9-Heart failure, unspecified; J96.01-Acute respiratory failure with hypoxia; R06.02-Shortness of breath; R60.0-Localized edema; I48.91-Unspecified atrial fibrillation. Shortness of breath,  TECHNIQUE: Multiple axial CT images obtained of the chest with and without the administration of intravenous contrast. 3D reformatted images were submitted to further facilitate diagnostic accuracy and treatment planning.  The radiation dose reduction device was turned on for each scan per the ALARA (As Low as Reasonably Achievable) protocol.  COMPARISON: None.  FINDINGS: Thyroid is homogeneous. There is no mediastinal mass. There is been repair of the ascending thoracic aorta. The largest AP dimension of the a sending thoracic aorta is 3.1 cm. The cardiac chambers are within normal limits.  Extensive coronary artery calcification identified. There is no mediastinal mass. Small lymph nodes seen throughout the hilar regions bilaterally. No bulky adenopathy. There is a tiny left pleural effusion and small right pleural effusion. The lung parenchyma reveals some mild atelectatic changes seen within the lung fields bilaterally. There are some atelectatic changes identified within the lower lung fields bilaterally. A nodular density within the right lung base cannot be completely excluded measuring approximately 1.5 cm. 3-6 month follow-up is recommended for stability. Findings may be related to the small right pleural effusion. There are stones filling the contracted gallbladder. Remainder of the upper abdomen is unremarkable. Degenerative changes seen within the spine.      Atherosclerotic disease seen within the thoracic aorta. Largest AP dimension of the ascending thoracic aorta is 3.1 cm. There is extensive coronary artery calcification identified. Small right pleural effusion and tiny left pleural effusion with atelectatic changes seen at the lung bases. No superimposed infiltrate. There is a small nodular density possibly at the right lung base and 3-6 month follow-up is recommended. Stones are filling a contracted gallbladder.  DICTATED:   01/17/2021 EDITED/ls :   01/17/2021  This report was finalized on 1/18/2021 2:25 PM by Dr. Deborah Macias MD.      Xr Chest 1 View    Result Date: 1/16/2021   EXAMINATION: XR CHEST, SINGLE VIEW - 01/15/2021  INDICATION: Shortness of air.  COMPARISON: 06/10/2019  FINDINGS: Portable chest reveals patient is status post median sternotomy. The heart is enlarged. Prominence of the pulmonary vascularity. No focal parenchymal opacification present. No pleural effusion or pneumothorax.        Heart is enlarged with prominence of the pulmonary vascularity bilaterally. No evidence of acute parenchymal disease.  DICTATED:   01/15/2021 EDITED/ls :   01/15/2021  This report  was finalized on 1/16/2021 1:56 PM by Dr. Deborah Macias MD.                  Results for orders placed during the hospital encounter of 01/15/21   Transthoracic Echo Complete With Contrast if Necessary Per Protocol    Narrative · The left atrium is dilated.  · There is mild concentric left ventricular hypertrophy.  · Global and segmental LV systolic function is normal. The calculated left   ventricular ejection fraction is 53%.  · There is a mechanical aortic valve in the aortic position. Function   appears to be normal.  · There is mitral annular calcification. Mild mitral regurgitation is   seen. There appears to be some degree of chordal thickening with a 4.5   mmHg mean diastolic gradient across the mitral valve apparatus.  · Mild to moderate tricuspid regurgitation is noted. The estimated RV   systolic pressure is elevated at 56 mmHg.  · No other important findings are noted on the study.          Plan for Follow-up of Pending Labs/Results: Reviewed    Discharge Details        Discharge Medications      New Medications      Instructions Start Date   furosemide 40 MG tablet  Commonly known as: LASIX   40 mg, Oral, Daily   Start Date: January 22, 2021     PHARMACY MEDS TO BED CONSULT   Does not apply, Daily         Changes to Medications      Instructions Start Date   insulin NPH-insulin regular (70-30) 100 UNIT/ML injection  Commonly known as: NovoLIN 70/30  What changed: how much to take   5 Units, Subcutaneous, 2 Times Daily With Meals         Continue These Medications      Instructions Start Date   amLODIPine 10 MG tablet  Commonly known as: NORVASC   1 tablet, Oral, Every Morning      aspirin 81 MG EC tablet   81 mg, Oral, Daily      carvedilol 25 MG tablet  Commonly known as: COREG   25 mg, Oral, 2 Times Daily With Meals      gabapentin 800 MG tablet  Commonly known as: NEURONTIN   800 mg, Oral, 3 Times Daily      glipizide 5 MG tablet  Commonly known as: GLUCOTROL   5 mg, Oral, 2 Times Daily Before  Meals      lisinopril 20 MG tablet  Commonly known as: PRINIVIL,ZESTRIL   20 mg, Oral, 2 Times Daily      metFORMIN 500 MG tablet  Commonly known as: GLUCOPHAGE   2 tablets, Oral, Every Evening      minocycline 100 MG capsule  Commonly known as: MINOCIN,DYNACIN   1 capsule, Oral, 2 Times Daily      pravastatin 80 MG tablet  Commonly known as: PRAVACHOL   80 mg, Oral, Nightly      warfarin 5 MG tablet  Commonly known as: COUMADIN   TAKE 1 AND 1/2 TO TWO TABLETS BY MOUTH AS DIRECTED BY CLINIC.             Allergies   Allergen Reactions   • Sulfa Antibiotics    • Amoxicillin Rash     Has tolerated ceftriaxone         Discharge Disposition:      Diet:  Hospital:  Diet Order   Procedures   • Diet Regular; Cardiac       Activity:  Activity Instructions     Activity as Tolerated            Restrictions or Other Recommendations:           CODE STATUS:    Code Status and Medical Interventions:   Ordered at: 01/20/21 0935     Level Of Support Discussed With:    Patient     Code Status:    CPR     Medical Interventions (Level of Support Prior to Arrest):    Full       Future Appointments   Date Time Provider Department Center   8/26/2021 11:00 AM Kurtis Smith MD [facility] None       Additional Instructions for the Follow-ups that You Need to Schedule     Discharge Follow-up with PCP   As directed       Currently Documented PCP:    America Everett DO    PCP Phone Number:    [phone]     Follow Up Details: 1-2 weeks, needs outpatient sleep study for snoring and daytime hypersomnolence         Discharge Follow-up with Specified Provider: STUART Smith if wants to follow up   As directed      To: STUART Smith if wants to follow up                     Miguel Florez MD  01/21/21      Time Spent on Discharge:  I spent 40 minutes on this discharge activity which included: face-to-face encounter with the patient, reviewing the data in the system, coordination of the care with the nursing staff as well as consultants,  documentation, and entering orders.            Electronically signed by Miguel Florez MD at 01/21/21 0926

## 2021-01-21 NOTE — THERAPY EVALUATION
Patient Name: Cecilio Farias  : 1955    MRN: [id number]                              Today's Date: 2021       Admit Date: 1/15/2021    Visit Dx:     ICD-10-CM ICD-9-CM   1. Acute on chronic congestive heart failure, unspecified heart failure type (CMS/Formerly [facility])  I50.9 428.0   2. Acute respiratory failure with hypoxia (CMS/Formerly [facility])  J96.01 518.81   3. Shortness of breath  R06.02 786.05   4. Bilateral lower extremity edema  R60.0 782.3   5. Atrial fibrillation, new onset (CMS/Formerly [facility])  I48.91 427.31   6. Abnormal nuclear stress test  R94.39 794.39   7. Acute congestive heart failure, unspecified heart failure type (CMS/Formerly [facility])  I50.9 428.0   8. History of mechanical aortic valve replacement  Z95.2 V43.3   9. Moderate pulmonary arterial systolic hypertension (CMS/[facility]ly [facility])  I27.21 416.8     Patient Active Problem List   Diagnosis   • Essential hypertension   • Dyslipidemia   • Morbid obesity (CMS/[facility]ly [facility])   • Type 2 diabetes mellitus (CMS/[facility])   • History of mechanical AVR for aortic stenosis    • Sepsis due to Acinetobacter species (CMS/[facility]ly [facility])   • Chronic anticoagulation   • Anemia   • Acute diastolic CHF. LVEF 53% (CMS/[facility]ly [facility])   • History Acinetobacter bacteremia on chronic minocycline   • PAF on chronic warfarin (CMS/[facility]ly [facility])   • Discoloration of skin of bilateral legs likely due to minocycline use   • RLL lung nodule. Incidental finding and requires follow-up   • Dyspnea and mild hypoxemia   • Moderate PAH. PA 55/20, wedge 15 mmHg.  Multifactorial (CMS/[facility])   • Bilateral small pleural effusions     Past Medical History:   Diagnosis Date   • Cancer (CMS/[facility])     colon cancer with operation/ chemotherapy/radiation    • CHF (congestive heart failure) (CMS/[facility]ly [facility])     Acute Systolic    • Chronic anticoagulation     coumadin   • Diabetes mellitus (CMS/[facility])    • H/O aortic valve replacement    • HLD (hyperlipidemia)    • Hypertension    • Sepsis due to Acinetobacter species (CMS/[facility]ly [facility]) 2019     Past Surgical History:   Procedure  Laterality Date   • AORTIC VALVE REPAIR/REPLACEMENT  2009    25 mm. [place] AVR   • ASCENDING AORTIC ANEURYSM REPAIR  2009   • BACK SURGERY     • CARDIAC CATHETERIZATION N/A 1/20/2021    Procedure: RIGHT AND LEFT HEART CATH;  Surgeon: Kurtis Smith MD;  Location: [facility] CATH INVASIVE LOCATION;  Service: Cardiology;  Laterality: N/A;   • COLON SURGERY      Colon cancer with operation   • FOOT SURGERY      bilateral 2/2 wound and trauma      General Information     Row Name 01/21/21 1048          Physical Therapy Time and Intention    Document Type  evaluation  -LM     Mode of Treatment  individual therapy;physical therapy  -LM     Row Name 01/21/21 1048          General Information    Patient Profile Reviewed  yes  -LM     Prior Level of Function  independent:;all household mobility;gait;ADL's  -LM     Existing Precautions/Restrictions  fall  -LM     Barriers to Rehab  none identified  -LM     Row Name 01/21/21 1048          Living Environment    Lives With  spouse;child(marysol), adult States his son is there most of the time  -LM     Row Name 01/21/21 1048          Home Main Entrance    Number of Stairs, Main Entrance  one  -LM     Row Name 01/21/21 1048          Stairs Within Home, Primary    Stairs, Within Home, Primary  Has 2 stairs from the kitchen to the living room; Also has a basement but states he does not use it  -LM     Number of Stairs, Within Home, Primary  two  -LM     Stair Railings, Within Home, Primary  railings on both sides of stairs  -LM     Row Name 01/21/21 1048          Cognition    Orientation Status (Cognition)  oriented x 4  -LM     Row Name 01/21/21 1048          Safety Issues, Functional Mobility    Impairments Affecting Function (Mobility)  balance;sensation/sensory awareness;endurance/activity tolerance  -LM       User Key  (r) = Recorded By, (t) = Taken By, (c) = Cosigned By    Initials Name Provider Type    LM Chacha Terrell PT Physical Therapist        Mobility     Row Name 01/21/21  1048          Bed Mobility    Comment (Bed Mobility)  Pt sitting EOB at initial and end of eval.  -LM     Row Name 01/21/21 1048          Sit-Stand Transfer    Sit-Stand [place] (Transfers)  standby assist  -LM     Assistive Device (Sit-Stand Transfers)  -- No AD  -LM     Row Name 01/21/21 1048          Gait/Stairs (Locomotion)    [place] Level (Gait)  standby assist  -LM     Assistive Device (Gait)  -- No AD  -LM     Distance in Feet (Gait)  400 feet  -LM     Deviations/Abnormal Patterns (Gait)  jack decreased;stride length decreased  -LM     Bilateral Gait Deviations  weight shift ability decreased  -LM     Comment (Gait/Stairs)  O2 on RA noted to be 92% post gait.  Quality of gait improved with distance.  Pt slightly unsteady throughout but reports this is baseline.  States he would do better if he had his shoes as he is unable to feel his feet and it is difficult to walk in socks.  -LM       User Key  (r) = Recorded By, (t) = Taken By, (c) = Cosigned By    Initials Name Provider Type    Chacha Juarez, PT Physical Therapist        Obj/Interventions     Row Name 01/21/21 1048          Range of Motion Comprehensive    General Range of Motion  bilateral lower extremity ROM WFL  -LM     [facility] Name 01/21/21 1048          Strength Comprehensive (MMT)    General Manual Muscle Testing (MMT) Assessment  no strength deficits identified BLEs  -LM     [facility] Name 01/21/21 1048          Balance    Balance Assessment  sitting static balance;standing static balance;standing dynamic balance  -LM     Static Sitting Balance  WFL;sitting, edge of bed  -LM     Static Standing Balance  WFL;unsupported  -LM     Dynamic Standing Balance  WFL;unsupported  -LM     Row Name 01/21/21 1048          Sensory Assessment (Somatosensory)    Bilateral LE Sensory Assessment  impaired Unable to feel light touch sensation to B feet  -LM       User Key  (r) = Recorded By, (t) = Taken By, (c) = Cosigned By    Initials Name Provider Type     LM Chacha Terrell, PT Physical Therapist        Goals/Plan     Row Name 01/21/21 1048          Bed Mobility Goal 1 (PT)    Activity/Assistive Device (Bed Mobility Goal 1, PT)  sit to supine/supine to sit  -LM     [place] Level/Cues Needed (Bed Mobility Goal 1, PT)  independent  -LM     Time Frame (Bed Mobility Goal 1, PT)  long term goal (LTG);5 days  -LM     Row Name 01/21/21 1048          Gait Training Goal 1 (PT)    Activity/Assistive Device (Gait Training Goal 1, PT)  gait (walking locomotion)  -LM     [place] Level (Gait Training Goal 1, PT)  independent  -LM     Distance (Gait Training Goal 1, PT)  400 feet  -LM     Time Frame (Gait Training Goal 1, PT)  long term goal (LTG);5 days  -LM     Row Name 01/21/21 1048          Stairs Goal 1 (PT)    Activity/Assistive Device (Stairs Goal 1, PT)  ascending stairs;descending stairs;using handrail, left;using handrail, right  -LM     [place] Level/Cues Needed (Stairs Goal 1, PT)  modified independence  -LM     Number of Stairs (Stairs Goal 1, PT)  2  -LM     Time Frame (Stairs Goal 1, PT)  long term goal (LTG);5 days  -LM       User Key  (r) = Recorded By, (t) = Taken By, (c) = Cosigned By    Initials Name Provider Type    Chacha Juarez, PT Physical Therapist        Clinical Impression     Row Name 01/21/21 1048          Pain    Additional Documentation  Pain Scale: Numbers Pre/Post-Treatment (Group)  -LM     Row Name 01/21/21 1048          Pain Scale: Numbers Pre/Post-Treatment    Pretreatment Pain Rating  0/10 - no pain  -LM     Posttreatment Pain Rating  0/10 - no pain  -LM     Row Name 01/21/21 1048          Plan of Care Review    Plan of Care Reviewed With  patient  -LM     Outcome Summary  PT evaluation completed.  Pt stood with SBA and ambulated 400 feet without AD with SBAx1.  Slight unsteadiness noted but quality of gait improved with distance.  O2 noted to be 92% post gait.  HEP issued.  Skilled PT services warranted to improve mobility and  safety.  Recommend home at d/c.  -LM     Row Name 01/21/21 1048          Therapy Assessment/Plan (PT)    Rehab Potential (PT)  good, to achieve stated therapy goals  -LM     Criteria for Skilled Interventions Met (PT)  yes;meets criteria;skilled treatment is necessary  -LM     Row Name 01/21/21 1048          Vital Signs    Pre Systolic BP Rehab  132  -LM     Pre Treatment Diastolic BP  85  -LM     Post Systolic BP Rehab  143  -LM     Post Treatment Diastolic BP  72  -LM     Pretreatment Heart Rate (beats/min)  61  -LM     Intratreatment Heart Rate (beats/min)  94  -LM     Posttreatment Heart Rate (beats/min)  68  -LM     Pre SpO2 (%)  96  -LM     O2 Delivery Pre Treatment  room air  -LM     Intra SpO2 (%)  92  -LM     O2 Delivery Intra Treatment  room air  -LM     Post SpO2 (%)  97  -LM     O2 Delivery Post Treatment  room air  -LM     Pre Patient Position  Sitting  -LM     Intra Patient Position  Sitting  -LM     Post Patient Position  Sitting  -LM     Row Name 01/21/21 1048          Positioning and Restraints    Pre-Treatment Position  in bed  -LM     Post Treatment Position  bed  -LM     In Bed  sitting EOB;call light within reach;encouraged to call for assist;notified nsg  -LM       User Key  (r) = Recorded By, (t) = Taken By, (c) = Cosigned By    Initials Name Provider Type    LM Chacha Terrell, PT Physical Therapist        Outcome Measures     Row Name 01/21/21 1048          How much help from another person do you currently need...    Turning from your back to your side while in flat bed without using bedrails?  3  -LM     Moving from lying on back to sitting on the side of a flat bed without bedrails?  3  -LM     Moving to and from a bed to a chair (including a wheelchair)?  3  -LM     Standing up from a chair using your arms (e.g., wheelchair, bedside chair)?  3  -LM     Climbing 3-5 steps with a railing?  3  -LM     To walk in hospital room?  3  -LM     AM-PAC 6 Clicks Score (PT)  18  -LM     Row Name  01/21/21 1048          Functional Assessment    Outcome Measure Options  AM-PAC 6 Clicks Basic Mobility (PT)  -       User Key  (r) = Recorded By, (t) = Taken By, (c) = Cosigned By    Initials Name Provider Type    Chacha Juarez PT Physical Therapist        Physical Therapy Education                 Title: PT OT SLP Therapies (Done)     Topic: Physical Therapy (Done)     Point: Mobility training (Done)     Learning Progress Summary           Patient Acceptance, E, VU by  at 1/21/2021 1316                   Point: Home exercise program (Done)     Learning Progress Summary           Patient Acceptance, E, VU by  at 1/21/2021 1316                   Point: Precautions (Done)     Learning Progress Summary           Patient Acceptance, E, VU by  at 1/21/2021 1316                               User Key     Initials Effective Dates Name Provider Type Discipline     07/24/19 -  Chacha Terrell PT Physical Therapist PT              PT Recommendation and Plan  Planned Therapy Interventions (PT): balance training, bed mobility training, gait training, home exercise program, motor coordination training, neuromuscular re-education, patient/family education, postural re-education, ROM (range of motion), stair training, strengthening, stretching, transfer training  Plan of Care Reviewed With: patient  Outcome Summary: PT evaluation completed.  Pt stood with SBA and ambulated 400 feet without AD with SBAx1.  Slight unsteadiness noted but quality of gait improved with distance.  O2 noted to be 92% post gait.  HEP issued.  Skilled PT services warranted to improve mobility and safety.  Recommend home at d/c.     Time Calculation:   PT Charges     Row Name 01/21/21 1048             Time Calculation    Start Time  1048  -LM      PT Received On  01/21/21  -      PT Goal Re-Cert Due Date  01/31/21  -        User Key  (r) = Recorded By, (t) = Taken By, (c) = Cosigned By    Initials Name Provider Type    Chacha Juarez PT  Physical Therapist        Therapy Charges for Today     Code Description Service Date Service Provider Modifiers Qty    [id number] HC PT EVAL LOW COMPLEXITY 4 1/21/2021 Chacha Terrell, PT GP 1          PT G-Codes  Outcome Measure Options: AM-PAC 6 Clicks Basic Mobility (PT)  AM-PAC 6 Clicks Score (PT): 18    Chacha Terrell, MATEO  1/21/2021

## 2021-01-21 NOTE — DISCHARGE SUMMARY
[facility] Medicine Services  DISCHARGE SUMMARY    Patient Name: Cecilio Farias  : 1955  MRN: [id number]    Date of Admission: 1/15/2021  6:02 PM  Date of Discharge:  2021  Primary Care Physician: America Everett DO    Consults     Date and Time Order Name Status Description    2021 1211 Inpatient Pulmonology Consult      1/15/2021 2223 Inpatient Cardiology Consult Completed           Hospital Course     Presenting Problem:   Acute on chronic congestive heart failure, unspecified heart failure type (CMS/HCC) [I50.9]  Acute on chronic congestive heart failure, unspecified heart failure type (CMS/HCC) [I50.9]    Active Hospital Problems    Diagnosis  POA   • **Acute diastolic CHF. LVEF 53% (CMS/HCC) [I50.31]  Yes   • Dyspnea and mild hypoxemia [R06.00]  Yes   • Moderate PAH. PA 55/20, wedge 15 mmHg.  Multifactorial (CMS/HCC) [I27.21]  Yes   • Bilateral small pleural effusions [J90]  Yes   • RLL lung nodule. Incidental finding and requires follow-up [R91.1]  Yes   • Anemia [D64.9]  Yes   • History Acinetobacter bacteremia on chronic minocycline [Z86.19]  Yes   • PAF on chronic warfarin (CMS/HCC) [I48.0]  Yes   • Discoloration of skin of bilateral legs likely due to minocycline use [L81.9]  Yes   • History of mechanical aortic valve replacement for aortic stenosis  [Z95.2]  Not Applicable   • Essential hypertension [I10]  Yes   • Dyslipidemia [E78.5]  Yes   • Type 2 diabetes mellitus (CMS/HCC) [E11.9]  Yes      Resolved Hospital Problems    Diagnosis Date Resolved POA   • CHF (congestive heart failure) (CMS/HCC) [I50.9] 2021 Yes          Hospital Course:  Cecilio Farias is a 65 y.o. male with a history of mechanical aortic valve on Coumadin, hypertension, A. fib, type 2 diabetes who presents with progressive dyspnea found to have hypoxia.     Acute Respiratory Failure with Hypoxia   Acute Diastolic CHF  Pulmonary hypertension secondary to obesity and probable  sleep apnea  --He was aggressively diuresed with near resolution of his edema. He underwent LHC/RHC and was found to have pulmonary HTN and evaluated by pulmonary. Outpatient sleep study was recommended and the patient needs to remain on diuretics. He will continue Coreg and Lisinopril.     Right lung base nodule  --This was incidentally found on CTA and needs imaging follow up in 3-6 months for surveillance.      Atrial Fibrillation:  ---He will remain on Coreg and warfarin     Hx of mechanical AVR:  --He will remain on Coumadin     History of Acinetobacter bacteremia:  --He is on chronic minocycline per Dr. Kaur.        Discharge Follow Up Recommendations for outpatient labs/diagnostics:  As written below, and needs sleep study for snoring and daytime hypersomnolence      Day of Discharge     HPI:   Up in bed. Denies dyspnea. Edema nearly completely resolved. No f/c. No n/v. Tolerating PO. No constipation or dysuria.    Review of Systems  Negative except for above.    Vital Signs:   Temp:  [97.7 °F (36.5 °C)] 97.7 °F (36.5 °C)  Heart Rate:  [] 96  Resp:  [16] 16  BP: (105-149)/(53-95) 132/85     Physical Exam:  NAD, alert and oriented x 3  OP clear, MMM  PERRL  Neck supple  No LAD  Tachy  Decreased at bases, otherwise clear  +BS, ND, NT, soft  No c/c, tr edema  No rashes  Normal affect    Pertinent  and/or Most Recent Results     LAB RESULTS:      Lab 01/21/21  0407 01/20/21  0625 01/19/21  1058 01/18/21  0533 01/17/21  0740 01/16/21  0820  01/15/21  1748 01/15/21  1746   WBC  --   --   --   --   --  6.39  --  7.95  --    HEMOGLOBIN  --   --   --   --   --  11.2*  --  12.1*  --    HEMATOCRIT  --   --   --   --   --  38.2  --  41.3  --    PLATELETS  --   --   --   --   --  198  --  201  --    NEUTROS ABS  --   --   --   --   --   --   --  5.55  --    IMMATURE GRANS (ABS)  --   --   --   --   --   --   --  0.03  --    LYMPHS ABS  --   --   --   --   --   --   --  1.08  --    MONOS ABS  --   --   --   --    --   --   --  0.71  --    EOS ABS  --   --   --   --   --   --   --  0.51*  --    MCV  --   --   --   --   --  91.8  --  90.6  --    LACTATE  --   --   --   --   --   --   --  1.0  --    PROTIME 27.4* 28.3* 26.2* 25.7* 28.1* 35.9*   < >  --  37.8*   D DIMER QUANT  --   --   --   --   --   --   --   --  0.41    < > = values in this interval not displayed.         Lab 01/21/21 0407 01/20/21  0625 01/19/21  1058 01/18/21  0533 01/17/21 0740 01/16/21  0820 01/15/21  1746   SODIUM 137 137 137 136 138 137 137   POTASSIUM 4.4 4.7 4.8 4.3 4.4 4.3 4.7   CHLORIDE 103 102 99 99 103 105 106   CO2 27.0 30.0* 31.0* 29.0 28.0 25.0 24.0   ANION GAP 7.0 5.0 7.0 8.0 7.0 7.0 7.0   BUN 28* 42* 33* 25* 21 21 21   CREATININE 0.81 1.04 0.99 0.89 0.76 0.86 0.74*   GLUCOSE 163* 155* 236* 169* 150* 159* 162*   CALCIUM 7.9* 7.9* 8.5* 8.3* 8.6 8.4* 8.8   MAGNESIUM  --   --   --  2.1 1.9 1.8 2.0   HEMOGLOBIN A1C  --   --   --   --   --  7.10*  --    TSH  --   --   --   --   --  2.340 2.760         Lab 01/15/21  1746   TOTAL PROTEIN 7.6   ALBUMIN 3.70   GLOBULIN 3.9   ALT (SGPT) 23   AST (SGOT) 40   BILIRUBIN 0.8   ALK PHOS 80         Lab 01/21/21  0407 01/20/21  0625 01/19/21  1058 01/18/21  0533 01/17/21  0740  01/15/21  2222 01/15/21  2041 01/15/21  1746   PROBNP  --   --   --   --  303.4  --   --   --  532.3   TROPONIN T  --   --   --   --  0.028  --  0.018 0.021 0.014   PROTIME 27.4* 28.3* 26.2* 25.7* 28.1*   < >  --   --  37.8*   INR 2.58* 2.70* 2.44* 2.39* 2.66*   < >  --   --  3.88*    < > = values in this interval not displayed.                 Lab 01/20/21  1735 01/15/21  2036   PH, ARTERIAL 7.415 7.373   PCO2, ARTERIAL 45.9* 43.9   PO2 ART 70.7* 70.1*   FIO2 21 28   HCO3 ART 29.4* 25.5   BASE EXCESS ART 4.1* 0.0   CARBOXYHEMOGLOBIN 1.2 1.7     Brief Urine Lab Results     None        Microbiology Results (last 10 days)     Procedure Component Value - Date/Time    COVID PRE-OP / PRE-PROCEDURE SCREENING ORDER (NO ISOLATION) - Swab,  Nasopharynx [[id number]]  (Normal) Collected: 01/15/21 1921    Lab Status: Final result Specimen: Swab from Nasopharynx Updated: 01/15/21 2015    Narrative:      The following orders were created for panel order COVID PRE-OP / PRE-PROCEDURE SCREENING ORDER (NO ISOLATION) - Swab, Nasopharynx.  Procedure                               Abnormality         Status                     ---------                               -----------         ------                     COVID-19 and FLU A/B PCR...[[id number]]  Normal              Final result                 Please view results for these tests on the individual orders.    COVID-19 and FLU A/B PCR - Swab, Nasopharynx [[id number]]  (Normal) Collected: 01/15/21 1921    Lab Status: Final result Specimen: Swab from Nasopharynx Updated: 01/15/21 2015     COVID19 Not Detected     Influenza A PCR Not Detected     Influenza B PCR Not Detected    Narrative:      Fact sheet for providers: https://www.fda.gov/media/592339/download    Fact sheet for patients: https://www.fda.gov/media/196582/download    Test performed by PCR.          Ct Angiogram Chest With & Without Contrast    Result Date: 1/18/2021  EXAMINATION: CT ANGIOGRAM CHEST WWO CONTRAST  - 01/16/2021  INDICATION: I50.9-Heart failure, unspecified; J96.01-Acute respiratory failure with hypoxia; R06.02-Shortness of breath; R60.0-Localized edema; I48.91-Unspecified atrial fibrillation. Shortness of breath,  TECHNIQUE: Multiple axial CT images obtained of the chest with and without the administration of intravenous contrast. 3D reformatted images were submitted to further facilitate diagnostic accuracy and treatment planning.  The radiation dose reduction device was turned on for each scan per the ALARA (As Low as Reasonably Achievable) protocol.  COMPARISON: None.  FINDINGS: Thyroid is homogeneous. There is no mediastinal mass. There is been repair of the ascending thoracic aorta. The largest AP dimension of the a sending thoracic  aorta is 3.1 cm. The cardiac chambers are within normal limits. Extensive coronary artery calcification identified. There is no mediastinal mass. Small lymph nodes seen throughout the hilar regions bilaterally. No bulky adenopathy. There is a tiny left pleural effusion and small right pleural effusion. The lung parenchyma reveals some mild atelectatic changes seen within the lung fields bilaterally. There are some atelectatic changes identified within the lower lung fields bilaterally. A nodular density within the right lung base cannot be completely excluded measuring approximately 1.5 cm. 3-6 month follow-up is recommended for stability. Findings may be related to the small right pleural effusion. There are stones filling the contracted gallbladder. Remainder of the upper abdomen is unremarkable. Degenerative changes seen within the spine.      Atherosclerotic disease seen within the thoracic aorta. Largest AP dimension of the ascending thoracic aorta is 3.1 cm. There is extensive coronary artery calcification identified. Small right pleural effusion and tiny left pleural effusion with atelectatic changes seen at the lung bases. No superimposed infiltrate. There is a small nodular density possibly at the right lung base and 3-6 month follow-up is recommended. Stones are filling a contracted gallbladder.  DICTATED:   01/17/2021 EDITED/ls :   01/17/2021  This report was finalized on 1/18/2021 2:25 PM by Dr. Deborah Macias MD.      Xr Chest 1 View    Result Date: 1/16/2021   EXAMINATION: XR CHEST, SINGLE VIEW - 01/15/2021  INDICATION: Shortness of air.  COMPARISON: 06/10/2019  FINDINGS: Portable chest reveals patient is status post median sternotomy. The heart is enlarged. Prominence of the pulmonary vascularity. No focal parenchymal opacification present. No pleural effusion or pneumothorax.        Heart is enlarged with prominence of the pulmonary vascularity bilaterally. No evidence of acute parenchymal  disease.  DICTATED:   01/15/2021 EDITED/ls :   01/15/2021  This report was finalized on 1/16/2021 1:56 PM by Dr. Deborah Macias MD.                  Results for orders placed during the hospital encounter of 01/15/21   Transthoracic Echo Complete With Contrast if Necessary Per Protocol    Narrative · The left atrium is dilated.  · There is mild concentric left ventricular hypertrophy.  · Global and segmental LV systolic function is normal. The calculated left   ventricular ejection fraction is 53%.  · There is a mechanical aortic valve in the aortic position. Function   appears to be normal.  · There is mitral annular calcification. Mild mitral regurgitation is   seen. There appears to be some degree of chordal thickening with a 4.5   mmHg mean diastolic gradient across the mitral valve apparatus.  · Mild to moderate tricuspid regurgitation is noted. The estimated RV   systolic pressure is elevated at 56 mmHg.  · No other important findings are noted on the study.          Plan for Follow-up of Pending Labs/Results: Reviewed    Discharge Details        Discharge Medications      New Medications      Instructions Start Date   furosemide 40 MG tablet  Commonly known as: LASIX   40 mg, Oral, Daily   Start Date: January 22, 2021     PHARMACY MEDS TO BED CONSULT   Does not apply, Daily         Changes to Medications      Instructions Start Date   insulin NPH-insulin regular (70-30) 100 UNIT/ML injection  Commonly known as: NovoLIN 70/30  What changed: how much to take   5 Units, Subcutaneous, 2 Times Daily With Meals         Continue These Medications      Instructions Start Date   amLODIPine 10 MG tablet  Commonly known as: NORVASC   1 tablet, Oral, Every Morning      aspirin 81 MG EC tablet   81 mg, Oral, Daily      carvedilol 25 MG tablet  Commonly known as: COREG   25 mg, Oral, 2 Times Daily With Meals      gabapentin 800 MG tablet  Commonly known as: NEURONTIN   800 mg, Oral, 3 Times Daily      glipizide 5 MG  tablet  Commonly known as: GLUCOTROL   5 mg, Oral, 2 Times Daily Before Meals      lisinopril 20 MG tablet  Commonly known as: PRINIVIL,ZESTRIL   20 mg, Oral, 2 Times Daily      metFORMIN 500 MG tablet  Commonly known as: GLUCOPHAGE   2 tablets, Oral, Every Evening      minocycline 100 MG capsule  Commonly known as: MINOCIN,DYNACIN   1 capsule, Oral, 2 Times Daily      pravastatin 80 MG tablet  Commonly known as: PRAVACHOL   80 mg, Oral, Nightly      warfarin 5 MG tablet  Commonly known as: COUMADIN   TAKE 1 AND 1/2 TO TWO TABLETS BY MOUTH AS DIRECTED BY CLINIC.             Allergies   Allergen Reactions   • Sulfa Antibiotics    • Amoxicillin Rash     Has tolerated ceftriaxone         Discharge Disposition:      Diet:  Hospital:  Diet Order   Procedures   • Diet Regular; Cardiac       Activity:  Activity Instructions     Activity as Tolerated            Restrictions or Other Recommendations:           CODE STATUS:    Code Status and Medical Interventions:   Ordered at: 01/20/21 0935     Level Of Support Discussed With:    Patient     Code Status:    CPR     Medical Interventions (Level of Support Prior to Arrest):    Full       Future Appointments   Date Time Provider Department Center   8/26/2021 11:00 AM Kurtis Smith MD [facility] None       Additional Instructions for the Follow-ups that You Need to Schedule     Discharge Follow-up with PCP   As directed       Currently Documented PCP:    America Everett DO    PCP Phone Number:    [phone]     Follow Up Details: 1-2 weeks, needs outpatient sleep study for snoring and daytime hypersomnolence         Discharge Follow-up with Specified Provider: STUART Smith if wants to follow up   As directed      To: STUART Smith if wants to follow up                     Miguel Florez MD  01/21/21      Time Spent on Discharge:  I spent 40 minutes on this discharge activity which included: face-to-face encounter with the patient, reviewing the data in the system,  coordination of the care with the nursing staff as well as consultants, documentation, and entering orders.

## 2021-01-21 NOTE — PLAN OF CARE
Goal Outcome Evaluation:  Plan of Care Reviewed With: patient  Progress: no change  Outcome Summary: PT evaluation completed.  Pt stood with SBA and ambulated 400 feet without AD with SBAx1.  Slight unsteadiness noted but quality of gait improved with distance.  O2 noted to be 92% post gait.  HEP issued.  Skilled PT services warranted to improve mobility and safety.  Recommend home at d/c.

## 2021-01-21 NOTE — PROGRESS NOTES
"Pharmacy Consult  -  Warfarin  Cecilio Farias is a  65 y.o. male   Height - 190 cm (74.8\")  Weight - (!) 142 kg (312 lb 1.6 oz)    Consulting Service: Hospitalist  Indication: Mechanical AVR  Goal INR: 2.5-3.5  Home Regimen:   - Warfarin 7.5 mg oral daily EXCEPT         - Warfarin 10 mg on Mon, Wed, Fri    Bridge Therapy: No     Drug-Drug Interactions with current regimen:      Aspirin - may increase risk of bleeding (home med)       Minocycline - may increase risk of bleeding (home med)    Melatonin, may result in an increased risk of bleeding   PRN acetaminophen and trazodone, may result in an increased risk of bleeding     Warfarin Dosing During Admission:    Date  1/15 1/16 1/17 1/18 1/19 1/20  1/21     INR  3.88 3.63 2.66 2.39 2.44 2.7  2.58     Dose  Hold 5 mg 9 mg 10 mg 7.5 mg 10 mg  7.5 mg       Education Provided: Warfarin education provided on 1/17/21 verbally and in writing. Discussed effects of warfarin, importance of checking INR, drug-drug and drug-food interactions, and signs/symptoms of bleeding and clotting. Patient verbalized understanding through teach back. All pertinent questions were answered.     Discharge Follow up:     Following Provider - [facility]     Follow up time range or appointment - Recommend repeat INR 2-3 days after discharge    Labs:  Results from last 7 days   Lab Units 01/21/21  0407 01/20/21  0625 01/19/21  1058 01/18/21  0533 01/17/21  0740 01/16/21  0820 01/15/21  2250 01/15/21  1748   INR  2.58* 2.70* 2.44* 2.39* 2.66* 3.63* 3.81*  --    HEMOGLOBIN g/dL  --   --   --   --   --  11.2*  --  12.1*   HEMATOCRIT %  --   --   --   --   --  38.2  --  41.3   PLATELETS 10*3/mm3  --   --   --   --   --  198  --  201     Results from last 7 days   Lab Units 01/21/21  0407 01/20/21  0625 01/19/21  1058  01/15/21  1746   SODIUM mmol/L 137 137 137   < > 137   POTASSIUM mmol/L 4.4 4.7 4.8   < > 4.7   CHLORIDE mmol/L 103 102 99   < > 106   CO2 mmol/L 27.0 30.0* 31.0*   < " > 24.0   BUN mg/dL 28* 42* 33*   < > 21   CREATININE mg/dL 0.81 1.04 0.99   < > 0.74*   CALCIUM mg/dL 7.9* 7.9* 8.5*   < > 8.8   BILIRUBIN mg/dL  --   --   --   --  0.8   ALK PHOS U/L  --   --   --   --  80   ALT (SGPT) U/L  --   --   --   --  23   AST (SGOT) U/L  --   --   --   --  40   GLUCOSE mg/dL 163* 155* 236*   < > 162*    < > = values in this interval not displayed.     Current dietary intake: 100% of meals documented on 1/19.   Diet Order   Procedures    Diet Regular; Cardiac     Assessment/Plan:    Patient's INR is therapeutic today at 2.58. (goal 2.5 to 3.5)  Recommend to continue home regimen:  warfarin 7.5 mg oral daily except 10 mg MonWedFri  Daily PT/INR ordered.  Pharmacy will monitor signs/symptoms of bleeding, dietary intake, and drug-drug interactions and make dose adjustments as necessary.       Arabella Hebert, PharmD  1/21/2021  13:38 EST

## 2021-01-21 NOTE — PROGRESS NOTES
"  [facility]  PROGRESS NOTE    Date of Admission: 1/15/2021  Date of Service: 01/21/21    Primary Care Physician: America Everett DO    Chief Complaint: f/u PAH with RHF, PAF, mechanical AV  Problem List:   1. Severe valvular aortic stenosis:  a. Acute systolic CHF, winter 2009.   b. LVEF 20% to 25%.   c. Normal coronary arteries.   d. 25 mm. [place] AVR and repair of ascending aneurysm, March 2009.  i. Paroxysmal atrial fibrillation, resolved.   e. MUGA EF = 64%, September 2010.  f. EF >70%, mechanical aortic valve with normal function (7/5/16).  g. ESTHER, 6/12/2019 - negative for vegetation or endocarditis, EF 56-60%  2. PA hypertension with right heart failure, new Afib January 2021   a. Echo 1/16/21: EF 53%, mechanical aortic valve with normal function, MAC with mild MR, RVSP 56mmHg   b. Right and LHC 1/20/21: mod-severe PAH due to elevated pulmonary vascular resistance; normal forward CI, nonobstructive CAD  3. Paroxysmal atrial fibrillation  a. Noted on admission 01/2021, rate controlled and asymptomatic   4. Hypertension.   5. Dyslipidemia.   6. Diabetes mellitus, type 2.   7. Right lower lobe nodule  8. Morbid obesity.   9. Colon cancer with operation/chemotherapy/radiation.   10. Status post operations, remote.  11. Acinetobacter sepsis, 6/2019  a. IV antibiotics followed by ID.  b. Chronic suppressive Rx planned by ID    Subjective      Asymptomatic and ready for discharge home. Had pulmonary function tests this AM.     Objective   Vitals: /60 (BP Location: Right arm, Patient Position: Lying)   Pulse 70   Temp 97.7 °F (36.5 °C) (Oral)   Resp 16   Ht 190 cm (74.8\")   Wt (!) 142 kg (312 lb 1.6 oz)   SpO2 98%   BMI 39.22 kg/m²     Physical Exam:  GENERAL: Alert, cooperative, in no acute distress.   HEENT: Normocephalic, no jugular venous distention  HEART: Regular rhythm, normal rate, and no murmurs, gallops, or rubs.   LUNGS: Clear to auscultation " bilaterally. No wheezing, rales or rhonchi.  ABDOMEN: Soft, bowel sounds present, non-tender   NEUROLOGIC: No focal abnormalities involving strength or sensation are noted.   EXTREMITIES: No clubbing, cyanosis, or edema noted.     Results:  Results from last 7 days   Lab Units 01/16/21  0820 01/15/21  1748   WBC 10*3/mm3 6.39 7.95   HEMOGLOBIN g/dL 11.2* 12.1*   HEMATOCRIT % 38.2 41.3   PLATELETS 10*3/mm3 198 201     Results from last 7 days   Lab Units 01/21/21  0407 01/20/21  0625 01/19/21  1058   SODIUM mmol/L 137 137 137   POTASSIUM mmol/L 4.4 4.7 4.8   CHLORIDE mmol/L 103 102 99   CO2 mmol/L 27.0 30.0* 31.0*   BUN mg/dL 28* 42* 33*   CREATININE mg/dL 0.81 1.04 0.99   GLUCOSE mg/dL 163* 155* 236*      Lab Results   Component Value Date    AST 40 01/15/2021    ALT 23 01/15/2021     Results from last 7 days   Lab Units 01/16/21  0820   HEMOGLOBIN A1C % 7.10*         Results from last 7 days   Lab Units 01/16/21  0820   TSH uIU/mL 2.340         Results from last 7 days   Lab Units 01/21/21  0407 01/20/21  0625 01/19/21  1058   PROTIME Seconds 27.4* 28.3* 26.2*   INR  2.58* 2.70* 2.44*     Results from last 7 days   Lab Units 01/17/21  0740 01/15/21  2222 01/15/21  2041   TROPONIN T ng/mL 0.028 0.018 0.021     Results from last 7 days   Lab Units 01/17/21  0740   PROBNP pg/mL 303.4       No intake or output data in the 24 hours ending 01/21/21 0903    I personally reviewed the patient's EKG/Telemetry data    Radiology Data:   Right and [facility] 1/20/21:  Final Impression:  #1: Normal left ventricular systolic function and a normally functioning mechanical aortic valve (Non-invasive).                              #2:  There is moderate-severe pulmonary artery hypertension due to elevated pulmonary vascular resistance, i.e., this is a pulmonary, not cardiac issue.  The forward cardiac index is normal.                              #3:  The patient does not have obstructive coronary artery disease.     I have asked pulmonary  associates to review CXR/CT chest as well as this data for guidance. I do not believe that JI has been excluded. He is a non-smoker.    Current Medications:  amLODIPine, 10 mg, Oral, QAM  aspirin, 81 mg, Oral, Daily  carvedilol, 25 mg, Oral, BID With Meals  furosemide, 40 mg, Oral, Daily  gabapentin, 800 mg, Oral, Q8H  insulin detemir, 10 Units, Subcutaneous, Nightly  insulin lispro, 0-9 Units, Subcutaneous, TID AC  insulin lispro, 7 Units, Subcutaneous, TID With Meals  lisinopril, 20 mg, Oral, Q12H  melatonin, 5 mg, Oral, Nightly  minocycline, 100 mg, Oral, BID  pharmacy consult - MTM, , Does not apply, BID  pravastatin, 80 mg, Oral, Nightly  sodium chloride, 10 mL, Intravenous, Q12H  warfarin, 10 mg, Oral, Daily      Pharmacy to dose warfarin,         Assessment and Plan:     1. Pulmonary artery hypertension with right heart failure   - right and C yesterday with mod-severe PAH with elevated pulmonary vascular resistance  - nonobstructive CAD  - discussed case with Dr. Jaramillo - appreciate input  - agree with discharge on lasix 40mg daily with KCl 20mEq     2. AS, s/p mechanical AVR  - echo with normal valve function   - continue Warfarin for anticoagulation     3. Paroxysmal afib  -  chronically anticoagulated with Warfarin     4. Morbid obesity/possible JI  - patient denies prior history of JI, however would benefit from sleep study as OP.  - Pulmonary is setting this up        Electronically signed by Olga Chau PA-C, 01/21/21, 9:07 AM EST.

## 2021-01-22 ENCOUNTER — TELEPHONE (OUTPATIENT)
Dept: PHARMACY | Facility: HOSPITAL | Age: 66
End: 2021-01-22

## 2021-01-22 ENCOUNTER — READMISSION MANAGEMENT (OUTPATIENT)
Dept: CALL CENTER | Facility: HOSPITAL | Age: 66
End: 2021-01-22

## 2021-01-22 RX ORDER — FUROSEMIDE 40 MG/1
40 TABLET ORAL DAILY
Qty: 30 TABLET | Refills: 5 | Status: SHIPPED | OUTPATIENT
Start: 2021-01-22 | End: 2021-04-27

## 2021-01-22 RX ORDER — POTASSIUM CHLORIDE 1500 MG/1
20 TABLET, FILM COATED, EXTENDED RELEASE ORAL DAILY
Qty: 30 TABLET | Refills: 5 | Status: SHIPPED | OUTPATIENT
Start: 2021-01-22 | End: 2021-08-12 | Stop reason: SDUPTHER

## 2021-01-22 NOTE — OUTREACH NOTE
Prep Survey      Responses   [facility] facility patient discharged from?  [place]   Is LACE score < 7 ?  No   Emergency Room discharge w/ pulse ox?  No   Eligibility  Readm Mgmt   Discharge diagnosis  Acute diastolic CHF [s/p right and left heart cath]   Does the patient have one of the following disease processes/diagnoses(primary or secondary)?  CHF   Does the patient have Home health ordered?  No   Is there a DME ordered?  Yes   What DME was ordered?  Oxygen via [employer]   Comments regarding appointments  Per AVS   Medication alerts for this patient  continue aspirin and coumadin   Prep survey completed?  Yes          Jessica Abrams RN

## 2021-01-26 ENCOUNTER — READMISSION MANAGEMENT (OUTPATIENT)
Dept: CALL CENTER | Facility: HOSPITAL | Age: 66
End: 2021-01-26

## 2021-01-26 NOTE — OUTREACH NOTE
CHF Week 1 Survey      Responses   [facility] patient discharged from?  [place]   Does the patient have one of the following disease processes/diagnoses(primary or secondary)?  CHF   CHF Week 1 attempt successful?  No   Unsuccessful attempts  Attempt 1          Rossy Fortune RN

## 2021-01-29 ENCOUNTER — READMISSION MANAGEMENT (OUTPATIENT)
Dept: CALL CENTER | Facility: HOSPITAL | Age: 66
End: 2021-01-29

## 2021-01-29 NOTE — OUTREACH NOTE
CHF Week 1 Survey      Responses   [facility] patient discharged from?  [place]   Does the patient have one of the following disease processes/diagnoses(primary or secondary)?  CHF   CHF Week 1 attempt successful?  Yes   Call start time  1606   Discharge diagnosis  Acute diastolic CHF   Meds reviewed with patient/caregiver?  Yes   Is the patient having any side effects they believe may be caused by any medication additions or changes?  No   Does the patient have all medications ordered at discharge?  Yes   Is the patient taking all medications as directed (includes completed medication regime)?  Yes   Does the patient have a primary care provider?   Yes   Does the patient have an appointment with their PCP within 7 days of discharge?  Yes   Comments regarding PCP   America Everett, DO. Patient has seen since discharge.    Has the patient kept scheduled appointments due by today?  Yes   Psychosocial issues?  No   Did the patient receive a copy of their discharge instructions?  Yes   Nursing interventions  Reviewed instructions with patient   What is the patient's perception of their health status since discharge?  Improving   Nursing interventions  Nurse provided patient education   Is the patient weighing daily?  No   Does the patient have scales?  Yes   Daily weight interventions  Education provided on importance of daily weight          Rashida Pollack RN

## 2021-01-29 NOTE — OUTREACH NOTE
CHF Week 1 Survey      Responses   [facility] patient discharged from?  [place]   Does the patient have one of the following disease processes/diagnoses(primary or secondary)?  CHF   CHF Week 1 attempt successful?  Yes   Call start time  1606   Discharge diagnosis  Acute diastolic CHF   Meds reviewed with patient/caregiver?  Yes   Is the patient having any side effects they believe may be caused by any medication additions or changes?  No   Does the patient have all medications ordered at discharge?  Yes   Is the patient taking all medications as directed (includes completed medication regime)?  Yes   Does the patient have a primary care provider?   Yes   Does the patient have an appointment with their PCP within 7 days of discharge?  Yes   Comments regarding PCP   America Everett, DO. Patient has seen since discharge.    Has the patient kept scheduled appointments due by today?  Yes   Psychosocial issues?  No   Did the patient receive a copy of their discharge instructions?  Yes   Nursing interventions  Reviewed instructions with patient   What is the patient's perception of their health status since discharge?  Improving   Nursing interventions  Nurse provided patient education   Is the patient weighing daily?  No   Does the patient have scales?  Yes   Daily weight interventions  Education provided on importance of daily weight          Rashida Pollack RN

## 2021-02-02 ENCOUNTER — TRANSCRIBE ORDERS (OUTPATIENT)
Dept: LAB | Facility: HOSPITAL | Age: 66
End: 2021-02-02

## 2021-02-02 ENCOUNTER — TELEPHONE (OUTPATIENT)
Dept: PHARMACY | Facility: HOSPITAL | Age: 66
End: 2021-02-02

## 2021-02-02 ENCOUNTER — ANTICOAGULATION VISIT (OUTPATIENT)
Dept: PHARMACY | Facility: HOSPITAL | Age: 66
End: 2021-02-02

## 2021-02-02 ENCOUNTER — LAB (OUTPATIENT)
Dept: LAB | Facility: HOSPITAL | Age: 66
End: 2021-02-02

## 2021-02-02 DIAGNOSIS — T36.0X5A PENICILLIN CLASS DRUG CAUSING ADVERSE EFFECTS IN THERAPEUTIC USE, INITIAL ENCOUNTER: Primary | ICD-10-CM

## 2021-02-02 DIAGNOSIS — Z95.2 S/P AVR (AORTIC VALVE REPLACEMENT): ICD-10-CM

## 2021-02-02 DIAGNOSIS — T36.0X5A PENICILLIN CLASS DRUG CAUSING ADVERSE EFFECTS IN THERAPEUTIC USE, INITIAL ENCOUNTER: ICD-10-CM

## 2021-02-02 LAB
ALBUMIN SERPL-MCNC: 3.6 G/DL (ref 3.5–5.2)
ALBUMIN/GLOB SERPL: 0.9 G/DL
ALP SERPL-CCNC: 78 U/L (ref 39–117)
ALT SERPL W P-5'-P-CCNC: 26 U/L (ref 1–41)
ANION GAP SERPL CALCULATED.3IONS-SCNC: 7 MMOL/L (ref 5–15)
AST SERPL-CCNC: 47 U/L (ref 1–40)
BASOPHILS # BLD AUTO: 0.08 10*3/MM3 (ref 0–0.2)
BASOPHILS NFR BLD AUTO: 1.4 % (ref 0–1.5)
BILIRUB SERPL-MCNC: 0.5 MG/DL (ref 0–1.2)
BUN SERPL-MCNC: 26 MG/DL (ref 8–23)
BUN/CREAT SERPL: 28.9 (ref 7–25)
CALCIUM SPEC-SCNC: 9.1 MG/DL (ref 8.6–10.5)
CHLORIDE SERPL-SCNC: 106 MMOL/L (ref 98–107)
CO2 SERPL-SCNC: 28 MMOL/L (ref 22–29)
CREAT SERPL-MCNC: 0.9 MG/DL (ref 0.76–1.27)
CRP SERPL-MCNC: 0.45 MG/DL (ref 0–0.5)
DEPRECATED RDW RBC AUTO: 49.9 FL (ref 37–54)
EOSINOPHIL # BLD AUTO: 0.43 10*3/MM3 (ref 0–0.4)
EOSINOPHIL NFR BLD AUTO: 7.5 % (ref 0.3–6.2)
ERYTHROCYTE [DISTWIDTH] IN BLOOD BY AUTOMATED COUNT: 15.3 % (ref 12.3–15.4)
ERYTHROCYTE [SEDIMENTATION RATE] IN BLOOD: 45 MM/HR (ref 0–20)
GFR SERPL CREATININE-BSD FRML MDRD: 85 ML/MIN/1.73
GLOBULIN UR ELPH-MCNC: 3.8 GM/DL
GLUCOSE SERPL-MCNC: 111 MG/DL (ref 65–99)
HCT VFR BLD AUTO: 42.1 % (ref 37.5–51)
HGB BLD-MCNC: 12.6 G/DL (ref 13–17.7)
IMM GRANULOCYTES # BLD AUTO: 0.02 10*3/MM3 (ref 0–0.05)
IMM GRANULOCYTES NFR BLD AUTO: 0.3 % (ref 0–0.5)
INR PPP: 3.23 (ref 0.85–1.16)
LYMPHOCYTES # BLD AUTO: 1.29 10*3/MM3 (ref 0.7–3.1)
LYMPHOCYTES NFR BLD AUTO: 22.6 % (ref 19.6–45.3)
MCH RBC QN AUTO: 26.5 PG (ref 26.6–33)
MCHC RBC AUTO-ENTMCNC: 29.9 G/DL (ref 31.5–35.7)
MCV RBC AUTO: 88.6 FL (ref 79–97)
MONOCYTES # BLD AUTO: 0.69 10*3/MM3 (ref 0.1–0.9)
MONOCYTES NFR BLD AUTO: 12.1 % (ref 5–12)
NEUTROPHILS NFR BLD AUTO: 3.21 10*3/MM3 (ref 1.7–7)
NEUTROPHILS NFR BLD AUTO: 56.1 % (ref 42.7–76)
NRBC BLD AUTO-RTO: 0 /100 WBC (ref 0–0.2)
PLATELET # BLD AUTO: 236 10*3/MM3 (ref 140–450)
PMV BLD AUTO: 10.6 FL (ref 6–12)
POTASSIUM SERPL-SCNC: 5.1 MMOL/L (ref 3.5–5.2)
PROT SERPL-MCNC: 7.4 G/DL (ref 6–8.5)
PROTHROMBIN TIME: 32.7 SECONDS (ref 11.5–14)
RBC # BLD AUTO: 4.75 10*6/MM3 (ref 4.14–5.8)
SODIUM SERPL-SCNC: 141 MMOL/L (ref 136–145)
WBC # BLD AUTO: 5.72 10*3/MM3 (ref 3.4–10.8)

## 2021-02-02 PROCEDURE — 85025 COMPLETE CBC W/AUTO DIFF WBC: CPT

## 2021-02-02 PROCEDURE — 86140 C-REACTIVE PROTEIN: CPT | Performed by: INTERNAL MEDICINE

## 2021-02-02 PROCEDURE — 85610 PROTHROMBIN TIME: CPT

## 2021-02-02 PROCEDURE — 36415 COLL VENOUS BLD VENIPUNCTURE: CPT | Performed by: INTERNAL MEDICINE

## 2021-02-02 PROCEDURE — 85652 RBC SED RATE AUTOMATED: CPT | Performed by: INTERNAL MEDICINE

## 2021-02-02 PROCEDURE — 80053 COMPREHEN METABOLIC PANEL: CPT | Performed by: INTERNAL MEDICINE

## 2021-02-02 NOTE — PROGRESS NOTES
Anticoagulation Clinic - Remote Progress Note  REMOTE LAB     Indication: mechanical AVR  Referring Provider: TEODORA Smith (last seen: 8/13/20  next appt: 8/26/21)  Initial Warfarin Start Date: 2009  Goal INR: 2.5-3.5  Current Drug Interactions: aspirin     Diet: green beans occasionally  (~1x/week) 1/14/21; salads 5x wk (2/2/21)  Alcohol: none  Tobacco: none  OTC Pain Medication:     INR History:  Date 8/2 10/26 12/21 1/19/18 2/20 3/27 4/27 5/16 6/11 7/16 9/20 11/2   Total Weekly Dose 62.5mg 62.5mg 62.5mg 62.5mg 62.5mg 62.5mg 62.5mg 62.5mg 62.5mg 62.5mg 62.5mg 62.5mg   INR 2.2 2.9 3.2 3.0 2.9 3.4 3.7 2.9 2.6 2.9 2.9 3.2   Notes             sick                Date 12/21 2/1/19 3/11 4/24 5/23 6/24 7/11 7/18 7/22 8/15 9/9 9/25   Total Weekly Dose 62.5mg 62.5mg 62.5mg 62.5mg 62.5mg 62.5mg 52.5mg 55mg 57.5mg 57.5mg 57.5mg 57.5mg   INR 3.4 3.34 2.8 3.0 3.3 3.6 2.52 2.27 2.9 2.9 2.5 2.6   Notes recv'd 12/26       self report; recent hosp; Augmentin Merrem post-disch for sepsis merrem merrem 1x incr dose, merrem        Date 11/8 12/30 1/24/20 4/13 6/2 7/8 8/31 10/13 12/1 1/14/21 2/2    Total Weekly Dose 57.5mg 57.5mg 57.5mg 57.5mg 57.5mg 57.5mg 57.5mg 60 mg 60 mg 60 mg 60mg    INR 2.4 2.4 3.7 2.2 2.9 2.3 2.2 2.8 3.5 3.5 3.23    Notes  incr GLV   nerve control 911 supplement recv'd 7/9  incr GLV recv'd 9/2; incr GLV    incr glv    **will take minocycline 100mg BID indefinitely**     Phone Interview:  Tablet Strength: 5mg tabs  Patient Contact Info: [phone] (Home); [phone] (Mobile)  Lab Contact Info: [facility] [phone]  Verbal release 7/23/19 may speak with Delaney (wife), Puneet or Ty (sons)- ok to LVM    Patient Findings    Positives:  Change in medications, Change in diet/appetite, Hospital admission   Negatives:  Signs/symptoms of thrombosis, Signs/symptoms of bleeding, Laboratory test error suspected, Change in health, Change in alcohol use, Change in activity, Upcoming invasive procedure,  Emergency department visit, Upcoming dental procedure, Missed doses, Extra doses, Bruising, Other complaints   Comments:  Patient was recently discharged from [facility] for a heart failure exacerbation. He was discharged on furosemide which has greatly improved his symptoms, per his report. His is trying to do better with his BGL avoiding junk foods and keeping carbohydrate intake low. He has started eating salads about 5 times a week. These are normal house salads with lettuce, tomato, cheese, etc.          Plan:  1. INR is therapeutic today at 3.23. Instructed Mr. Farias to continue maintenance dose of warfarin 7.5 mg daily except 10 mg on MonWedFri until recheck. (60 mg/week).   2. Repeat INR in 2 weeks.  3. Verbal information provided over the phone. Cecilio Farias RBV dosing instructions, expresses understanding by teach back, and has no further questions at this time.    Thank you,    Kayode ArthurD, SRI  2/2/2021  14:31 EST

## 2021-02-08 RX ORDER — WARFARIN SODIUM 5 MG/1
TABLET ORAL
Qty: 60 TABLET | Refills: 2 | Status: SHIPPED | OUTPATIENT
Start: 2021-02-08 | End: 2021-05-17

## 2021-02-08 NOTE — TELEPHONE ENCOUNTER
Lab Results   Component Value Date    INR 3.23 (H) 02/02/2021    INR 2.58 (H) 01/21/2021    INR 2.70 (H) 01/20/2021    Next appt 4/27/21

## 2021-02-10 ENCOUNTER — READMISSION MANAGEMENT (OUTPATIENT)
Dept: CALL CENTER | Facility: HOSPITAL | Age: 66
End: 2021-02-10

## 2021-02-10 NOTE — OUTREACH NOTE
CHF Week 2 Survey      Responses   [facility] patient discharged from?  [place]   Does the patient have one of the following disease processes/diagnoses(primary or secondary)?  CHF   Week 2 attempt successful?  No   Unsuccessful attempts  Attempt 1          Arturo Roldan RN

## 2021-02-25 ENCOUNTER — TELEPHONE (OUTPATIENT)
Dept: PHARMACY | Facility: HOSPITAL | Age: 66
End: 2021-02-25

## 2021-03-04 ENCOUNTER — ANTICOAGULATION VISIT (OUTPATIENT)
Dept: PHARMACY | Facility: HOSPITAL | Age: 66
End: 2021-03-04

## 2021-03-04 LAB — INR PPP: 2.4

## 2021-03-04 NOTE — PROGRESS NOTES
Anticoagulation Clinic - Remote Progress Note  REMOTE LAB     Indication: mechanical AVR  Referring Provider: TEODORA Smith (last seen: 8/13/20  next appt: 8/26/21)  Initial Warfarin Start Date: 2009  Goal INR: 2.5-3.5  Current Drug Interactions: aspirin     Diet: green beans occasionally  (~1x/week) 1/14/21; salads 5x wk (2/2/21)  Alcohol: none  Tobacco: none  OTC Pain Medication:     INR History:  Date 8/2 10/26 12/21 1/19/18 2/20 3/27 4/27 5/16 6/11 7/16 9/20 11/2   Total Weekly Dose 62.5mg 62.5mg 62.5mg 62.5mg 62.5mg 62.5mg 62.5mg 62.5mg 62.5mg 62.5mg 62.5mg 62.5mg   INR 2.2 2.9 3.2 3.0 2.9 3.4 3.7 2.9 2.6 2.9 2.9 3.2   Notes             sick                Date 12/21 2/1/19 3/11 4/24 5/23 6/24 7/11 7/18 7/22 8/15 9/9 9/25   Total Weekly Dose 62.5mg 62.5mg 62.5mg 62.5mg 62.5mg 62.5mg 52.5mg 55mg 57.5mg 57.5mg 57.5mg 57.5mg   INR 3.4 3.34 2.8 3.0 3.3 3.6 2.52 2.27 2.9 2.9 2.5 2.6   Notes recv'd 12/26       self report; recent hosp; Augmentin Merrem post-disch for sepsis merrem merrem 1x incr dose, merrem        Date 11/8 12/30 1/24/20 4/13 6/2 7/8 8/31 10/13 12/1 1/14/21 2/2 3/4   Total Weekly Dose 57.5mg 57.5mg 57.5mg 57.5mg 57.5mg 57.5mg 57.5mg 60 mg 60 mg 60 mg 60mg 60mg   INR 2.4 2.4 3.7 2.2 2.9 2.3 2.2 2.8 3.5 3.5 3.23 2.4   Notes  incr GLV   nerve control 911 supplement recv'd 7/9  incr GLV recv'd 9/2; incr GLV    incr glv Inc GLV   **will take minocycline 100mg BID indefinitely**     Phone Interview:  Tablet Strength: 5mg tabs  Patient Contact Info: [phone] (Home); [phone] (Mobile)  Lab Contact Info: [facility] [phone]  Verbal release 7/23/19 may speak with Delaney (wife), Puneet or Ty (sons)- ok to [facility]    Patient Findings  Negatives:  Signs/symptoms of thrombosis, Signs/symptoms of bleeding, Laboratory test error suspected, Change in health, Change in alcohol use, Change in activity, Upcoming invasive procedure, Emergency department visit, Upcoming dental procedure, Missed  doses, Extra doses, Change in medications, Change in diet/appetite, Hospital admission, Bruising, Other complaints   Comments:  Possibly had more GLV than usual, green beans and broccoli along with his normal 5x weekly salads. Plans to cut back to normal diet         Plan:  1. INR is slightly sub therapeutic today at 2.4. Instructed Mr. Farias to boost tonight's dose to 10mg then  continue maintenance dose of warfarin 7.5 mg daily except 10 mg on MonWedFri until recheck. (60 mg/week).   2. Repeat INR in 2 weeks.  3. Verbal information provided over the phone. Cecilio Farias RBV dosing instructions, expresses understanding by teach back, and has no further questions at this time.    Adore Masters, PharmD.  03/04/21   14:38 EST

## 2021-03-25 ENCOUNTER — TELEPHONE (OUTPATIENT)
Dept: PHARMACY | Facility: HOSPITAL | Age: 66
End: 2021-03-25

## 2021-03-25 NOTE — TELEPHONE ENCOUNTER
Spoke with patient re: overdue PT/INR. Patient though he was to recheck in 4 weeks (vs advised 2 weeks). He is agreeable to have INR drawn ASAP.

## 2021-03-30 ENCOUNTER — ANTICOAGULATION VISIT (OUTPATIENT)
Dept: PHARMACY | Facility: HOSPITAL | Age: 66
End: 2021-03-30

## 2021-03-30 LAB — INR PPP: 2.5

## 2021-03-30 NOTE — PROGRESS NOTES
Anticoagulation Clinic - Remote Progress Note  REMOTE LAB     Indication: mechanical AVR  Referring Provider: TEODORA Smith (last seen: 8/13/20  next appt: 8/26/21)  Initial Warfarin Start Date: 2009  Goal INR: 2.5-3.5  Current Drug Interactions: aspirin     Diet: green beans occasionally  (~1x/week) 1/14/21; salads 5x week (2/2/21)  Alcohol: none  Tobacco: none  OTC Pain Medication:     INR History:  Date 8/2 10/26 12/21 1/19/18 2/20 3/27 4/27 5/16 6/11 7/16 9/20 11/2   Total Weekly Dose 62.5mg 62.5mg 62.5mg 62.5mg 62.5mg 62.5mg 62.5mg 62.5mg 62.5mg 62.5mg 62.5mg 62.5mg   INR 2.2 2.9 3.2 3.0 2.9 3.4 3.7 2.9 2.6 2.9 2.9 3.2   Notes             sick                Date 12/21 2/1/19 3/11 4/24 5/23 6/24 7/11 7/18 7/22 8/15 9/9 9/25   Total Weekly Dose 62.5mg 62.5mg 62.5mg 62.5mg 62.5mg 62.5mg 52.5mg 55mg 57.5mg 57.5mg 57.5mg 57.5mg   INR 3.4 3.34 2.8 3.0 3.3 3.6 2.52 2.27 2.9 2.9 2.5 2.6   Notes recv'd 12/26       self report; recent hosp; Augmentin Merrem post-disch for sepsis merrem merrem 1x incr dose, merrem        Date 11/8 12/30 1/24/20 4/13 6/2 7/8 8/31 10/13 12/1 1/14/21 2/2 3/4   Total Weekly Dose 57.5mg 57.5mg 57.5mg 57.5mg 57.5mg 57.5mg 57.5mg 60mg 60mg 60mg 60mg 60mg   INR 2.4 2.4 3.7 2.2 2.9 2.3 2.2 2.8 3.5 3.5 3.23 2.4   Notes  incr GLV   nerve control 911 supplement recv'd 7/9  incr GLV recv'd 9/2; incr GLV    incr glv Inc GLV     Date 3/30              Total WeeklyDose 60mg              INR 2.5              Notes 1x incr dose                  **will take minocycline 100mg BID indefinitely**     Phone Interview:  Tablet Strength: 5mg tabs  Patient Contact Info: [phone] (Home); [phone] (Mobile)  Lab Contact Info: [facility] [phone]  Verbal release 7/23/19 may speak with Delaney (wife), Puneet or Ty (sons)- ok to [facility]    Patient Findings  Negatives:  Signs/symptoms of thrombosis, Signs/symptoms of bleeding, Laboratory test error suspected, Change in health, Change in alcohol  use, Change in activity, Upcoming invasive procedure, Emergency department visit, Upcoming dental procedure, Missed doses, Extra doses, Change in medications, Change in diet/appetite, Hospital admission, Bruising, Other complaints   Comments:  Patient denies all findings, feels he has been consistent with GLV intake.     Plan:  1. INR is therapeutic and back WNL today at 2.5, though this is LLN. Patient was again non-compliant with requested follow-up. Instructed Mr. Farias to continue maintenance dose of warfarin 7.5mg oral daily except 10mg on MonWedFri until recheck. (60mg/week).   2. Repeat INR in 2 weeks, 4/13. Patient has history of non-compliant with requested follow up.  3. Verbal information provided over the phone. Cecilio Farias RBV dosing instructions, expresses understanding by teach back, and has no further questions at this time.    Kurtis Hackett, Ozzie  3/30/2021  13:37 EDT     I, Adore Masters, PharmD, have reviewed the note in full and agree with the assessment and plan.  03/30/21  15:53 EDT

## 2021-03-30 NOTE — TELEPHONE ENCOUNTER
Spoke with patient re: overdue PT/INR. Patient reports he had labs drawn at [facility] approx 1 hour ago. He is aware we will call and speak once results are received.

## 2021-04-20 ENCOUNTER — TELEPHONE (OUTPATIENT)
Dept: PHARMACY | Facility: HOSPITAL | Age: 66
End: 2021-04-20

## 2021-04-27 ENCOUNTER — OFFICE VISIT (OUTPATIENT)
Dept: CARDIOLOGY | Facility: CLINIC | Age: 66
End: 2021-04-27

## 2021-04-27 VITALS
SYSTOLIC BLOOD PRESSURE: 119 MMHG | WEIGHT: 315 LBS | BODY MASS INDEX: 39.17 KG/M2 | HEART RATE: 91 BPM | OXYGEN SATURATION: 95 % | HEIGHT: 75 IN | DIASTOLIC BLOOD PRESSURE: 67 MMHG

## 2021-04-27 DIAGNOSIS — I50.31 ACUTE DIASTOLIC CONGESTIVE HEART FAILURE (HCC): ICD-10-CM

## 2021-04-27 DIAGNOSIS — E66.01 MORBID OBESITY (HCC): Primary | ICD-10-CM

## 2021-04-27 DIAGNOSIS — I10 ESSENTIAL HYPERTENSION: ICD-10-CM

## 2021-04-27 DIAGNOSIS — I48.0 PAROXYSMAL ATRIAL FIBRILLATION (HCC): ICD-10-CM

## 2021-04-27 PROCEDURE — 99214 OFFICE O/P EST MOD 30 MIN: CPT | Performed by: INTERNAL MEDICINE

## 2021-04-27 RX ORDER — FUROSEMIDE 20 MG/1
20 TABLET ORAL DAILY
COMMUNITY
End: 2021-08-12 | Stop reason: SDUPTHER

## 2021-04-27 NOTE — PROGRESS NOTES
OFFICE FOLLOW UP     Date of Encounter:2021     Name: Cecilio Farias  : 1955  Address: [address] [place], [place] [id number]    PCP: America Everett, DO  [address] [id number]    Cecilio Farias is a 65 y.o. male.      Chief Complaint: Follow up of VHD    Problem List:   1. Severe valvular aortic stenosis:  a. Acute systolic CHF, winter 2009.   b. LVEF 20% to 25%.   c. Normal coronary arteries.   d. 25 mm. 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  i. Paroxysmal atrial fibrillation, resolved.   e. MUGA EF = 64%, 2010.  f. EF >70%, mechanical aortic valve with normal function (16).  g. ESTHER, 2019 - negative for vegetation or endocarditis, EF 56-60%  h. Echo, 2021: mechanical AV normal function, EF 53%  2. PA hypertension with right heart failure, new Afib 2021   a. Echo 21: EF 53%, mechanical aortic valve with normal function, MAC with mild MR, RVSP 56mmHg   b. Right and LHC 21: mod-severe PAH due to elevated pulmonary vascular resistance; normal forward CI, nonobstructive CAD  c. PFTs 2021: Severe combined obstruction and restriction. Air trapping.   3. Paroxysmal atrial fibrillation  a. Noted on admission 2021, rate controlled and asymptomatic   4. Hypertension.   5. Dyslipidemia.   6. Diabetes mellitus, type 2.   7. Right lower lobe nodule  8. Morbid obesity.   9. Colon cancer with operation/chemotherapy/radiation.   10. Status post operations, remote.  11. Acinetobacter sepsis, 2019  a. IV antibiotics followed by ID.  b. Chronic suppressive Rx planned by ID    Allergies:  Allergies   Allergen Reactions   • Sulfa Antibiotics    • Amoxicillin Rash     Has tolerated ceftriaxone       Current Medications:  Current Outpatient Medications   Medication Instructions   • amLODIPine (NORVASC) 10 MG tablet 1 tablet, Oral, Every Morning   • aspirin 81 mg, Oral, Daily   • carvedilol (COREG) 25 mg, Oral, 2 Times Daily With  "Meals   • furosemide (LASIX) 20 mg, Oral, Daily   • gabapentin (NEURONTIN) 800 mg, Oral, 3 Times Daily   • glipizide (GLUCOTROL) 10 mg, Oral, 2 Times Daily Before Meals   • insulin NPH-insulin regular (NOVOLIN 70/30) (70-30) 100 UNIT/ML injection 5 Units, Subcutaneous, 2 Times Daily With Meals   • lisinopril (PRINIVIL,ZESTRIL) 20 mg, Oral, 2 Times Daily   • metFORMIN (GLUCOPHAGE) 500 MG tablet 2 tablets, Oral, Every Evening   • minocycline (MINOCIN,DYNACIN) 100 MG capsule 1 capsule, Oral, 2 Times Daily   • PHARMACY MEDS TO BED CONSULT Does not apply, Daily   • potassium chloride ER (K-TAB) 20 MEQ tablet controlled-release ER tablet 20 mEq, Oral, Daily, With Lasix   • pravastatin (PRAVACHOL) 80 mg, Oral, Nightly   • warfarin (COUMADIN) 5 MG tablet TAKE ONE AND ONE-HALF TO TWO TABLETS BY MOUTH DAILY AS DIRECTED BY CLINIC        History of Present Illness:  Cecilio Farias returns for hospital follow up today.  He \"feels great\".  He has not undergone a sleep study as ordered at the time of his recent hospital discharge.  He has had no recurrent angina or symptoms of congestive heart failure.  He has been unable to lose weight.  The results of hospital PFTs are as noted above.             The following portions of the patient's history were reviewed and updated as appropriate: allergies, current medications and problem list.    ROS: Pertinent positives as listed in the HPI.  All other systems reviewed and negative.    Objective:    Vitals:    04/27/21 1159 04/27/21 1200   BP: 124/64 119/67   BP Location: Left arm Left arm   Patient Position: Sitting Standing   Pulse: 82 91   SpO2: 95%    Weight: (!) 144 kg (318 lb)    Height: 190.5 cm (75\")      Body mass index is 39.75 kg/m².    Physical Exam:   GENERAL: Alert, cooperative, in no acute distress.   HEENT: Normocephalic, no adenopathy, no jugular venous distention  HEART: No discrete PMI is noted. Regular rhythm, normal rate, and no murmurs, gallops, or rubs.   LUNGS: " Clear to auscultation bilaterally. No wheezing, rales or ronchi.  ABDOMEN: Soft, bowel sounds present, non-tender, obese  NEUROLOGIC: No focal abnormalities involving strength or sensation are noted.   EXTREMITIES: No clubbing, cyanosis, or edema noted.        Diagnostic Data:  No new labs available to review.     Procedures    Assessment and Plan:   1.  VHD:  Normal function. Continue warfarin for St. Kevin AVR.  2.  HTN:  Well controlled, continue current medications.  3.  PAF: Continue warfarin.   4.  JI, suspected: We will call [facility] and ask them to reschedule previously requested sleep evaluation.     I will see Cecilio GUTIÉRREZ Dinora back in one year or sooner on an as needed basis.      Scribed for Kurtis Smith MD by Linda North RN. 04/27/2021 12:31 EDT.             EMR Dragon/Transcription Disclaimer:  Much of this encounter note is an electronic transcription/translation of spoken language to printed text.  The electronic translation of spoken language may permit erroneous, or at times, nonsensical words or phrases to be inadvertently transcribed.  Although I have reviewed the note for such errors, some may still exist.

## 2021-05-07 ENCOUNTER — TELEPHONE (OUTPATIENT)
Dept: CARDIOLOGY | Facility: CLINIC | Age: 66
End: 2021-05-07

## 2021-05-07 NOTE — TELEPHONE ENCOUNTER
Spoke with Arabella at [facility], pt was no-show on 2/9/21. They will call to get pt rescheduled and order is current from PCP.

## 2021-05-10 ENCOUNTER — ANTICOAGULATION VISIT (OUTPATIENT)
Dept: PHARMACY | Facility: HOSPITAL | Age: 66
End: 2021-05-10

## 2021-05-10 LAB — INR PPP: 2.8

## 2021-05-10 NOTE — PROGRESS NOTES
Anticoagulation Clinic - Remote Progress Note  REMOTE LAB     Indication: mechanical AVR  Referring Provider: TEODORA Smith (last seen: 8/13/20  next appt: 8/26/21)  Initial Warfarin Start Date: 2009  Goal INR: 2.5-3.5  Current Drug Interactions: aspirin     Diet: green beans occasionally  (~1x/week) 1/14/21; salads 5x week (2/2/21)  Alcohol: none  Tobacco: none  OTC Pain Medication:     INR History:  Date 8/2 10/26 12/21 1/19/18 2/20 3/27 4/27 5/16 6/11 7/16 9/20 11/2   Total Weekly Dose 62.5mg 62.5mg 62.5mg 62.5mg 62.5mg 62.5mg 62.5mg 62.5mg 62.5mg 62.5mg 62.5mg 62.5mg   INR 2.2 2.9 3.2 3.0 2.9 3.4 3.7 2.9 2.6 2.9 2.9 3.2   Notes             sick                Date 12/21 2/1/19 3/11 4/24 5/23 6/24 7/11 7/18 7/22 8/15 9/9 9/25   Total Weekly Dose 62.5mg 62.5mg 62.5mg 62.5mg 62.5mg 62.5mg 52.5mg 55mg 57.5mg 57.5mg 57.5mg 57.5mg   INR 3.4 3.34 2.8 3.0 3.3 3.6 2.52 2.27 2.9 2.9 2.5 2.6   Notes recv'd 12/26       self report; recent hosp; Augmentin Merrem post-disch for sepsis merrem merrem 1x incr dose, merrem        Date 11/8 12/30 1/24/20 4/13 6/2 7/8 8/31 10/13 12/1 1/14/21 2/2 3/4   Total Weekly Dose 57.5mg 57.5mg 57.5mg 57.5mg 57.5mg 57.5mg 57.5mg 60mg 60mg 60mg 60mg 60mg   INR 2.4 2.4 3.7 2.2 2.9 2.3 2.2 2.8 3.5 3.5 3.23 2.4   Notes  incr GLV   nerve control 911 supplement recv'd 7/9  incr GLV recv'd 9/2; incr GLV    incr GLV incr GLV     Date 3/30  5/10            Total WeeklyDose 60mg non- compliant 60mg            INR 2.5  2.8            Notes 1x incr dose                **will take minocycline 100mg BID indefinitely**       Phone Interview:  Tablet Strength: 5mg tabs  Patient Contact Info: [phone] (Home); [phone] (Mobile)  Lab Contact Info: [facility] [phone]  Verbal release 7/23/19 may speak with Delaney (wife), Puneet or Ty (sons)- ok to LVM    Patient Findings  Negatives:  Signs/symptoms of thrombosis, Signs/symptoms of bleeding, Laboratory test error suspected, Change in  health, Change in alcohol use, Change in activity, Upcoming invasive procedure, Emergency department visit, Upcoming dental procedure, Missed doses, Extra doses, Change in medications, Change in diet/appetite, Hospital admission, Bruising, Other complaints     Plan:  1. INR is therapeutic today at 2.8. Patient was again non-compliant with requested follow-up and needed be to called multiple times and sent 1x letter. Instructed Mr. Farias to continue maintenance dose of warfarin 7.5mg oral daily except 10mg on MonWedFri until recheck. (60mg/week).   2. Repeat INR in 4 weeks, 6/7. Patient has extensive history of non-compliance with requested follow-up.  3. Verbal information provided over the phone. Cecilio Farias RBV dosing instructions, expresses understanding by teach back, and has no further questions at this time.    Kurtis Hackett, LANI  5/10/2021  12:21 EDT     I, Mattie Smith, have reviewed the note in full and agree with the assessment and plan.  05/11/21  14:48 EDT

## 2021-05-17 RX ORDER — WARFARIN SODIUM 5 MG/1
TABLET ORAL
Qty: 60 TABLET | Refills: 1 | Status: SHIPPED | OUTPATIENT
Start: 2021-05-17 | End: 2021-07-15

## 2021-05-17 NOTE — TELEPHONE ENCOUNTER
Lab Results   Component Value Date    INR 2.80 05/10/2021    INR 2.50 03/30/2021    INR 2.40 03/04/2021    next appt 4/28/22   Graft Cartilage Fenestration Text: The cartilage was fenestrated with a 2mm punch biopsy to help facilitate graft survival and healing.

## 2021-06-17 ENCOUNTER — TELEPHONE (OUTPATIENT)
Dept: PHARMACY | Facility: HOSPITAL | Age: 66
End: 2021-06-17

## 2021-06-21 ENCOUNTER — ANTICOAGULATION VISIT (OUTPATIENT)
Dept: PHARMACY | Facility: HOSPITAL | Age: 66
End: 2021-06-21

## 2021-06-21 DIAGNOSIS — I48.0 PAROXYSMAL ATRIAL FIBRILLATION (HCC): ICD-10-CM

## 2021-06-21 DIAGNOSIS — Z95.2 HISTORY OF MECHANICAL AORTIC VALVE REPLACEMENT: Primary | ICD-10-CM

## 2021-06-21 LAB — INR PPP: 3.6

## 2021-06-21 NOTE — PROGRESS NOTES
Anticoagulation Clinic - Remote Progress Note  REMOTE LAB     Indication: mechanical AVR  Referring Provider: TEODORA Smith (last seen: 8/13/20  next appt: 8/26/21)  Initial Warfarin Start Date: 2009  Goal INR: 2.5-3.5  Current Drug Interactions: aspirin     Diet: green beans occasionally  (~1x/week) 1/14/21; salads 5x week (2/2/21)  Alcohol: none  Tobacco: none  OTC Pain Medication:     INR History:  Date 8/2 10/26 12/21 1/19/18 2/20 3/27 4/27 5/16 6/11 7/16 9/20 11/2   Total Weekly Dose 62.5mg 62.5mg 62.5mg 62.5mg 62.5mg 62.5mg 62.5mg 62.5mg 62.5mg 62.5mg 62.5mg 62.5mg   INR 2.2 2.9 3.2 3.0 2.9 3.4 3.7 2.9 2.6 2.9 2.9 3.2   Notes             sick                Date 12/21 2/1/19 3/11 4/24 5/23 6/24 7/11 7/18 7/22 8/15 9/9 9/25   Total Weekly Dose 62.5mg 62.5mg 62.5mg 62.5mg 62.5mg 62.5mg 52.5mg 55mg 57.5mg 57.5mg 57.5mg 57.5mg   INR 3.4 3.34 2.8 3.0 3.3 3.6 2.52 2.27 2.9 2.9 2.5 2.6   Notes recv'd 12/26       self report; recent hosp; Augmentin Merrem post-disch for sepsis merrem merrem 1x incr dose, merrem        Date 11/8 12/30 1/24/20 4/13 6/2 7/8 8/31 10/13 12/1 1/14/21 2/2 3/4   Total Weekly Dose 57.5mg 57.5mg 57.5mg 57.5mg 57.5mg 57.5mg 57.5mg 60mg 60mg 60mg 60mg 60mg   INR 2.4 2.4 3.7 2.2 2.9 2.3 2.2 2.8 3.5 3.5 3.23 2.4   Notes  incr GLV   nerve control 911 supplement recv'd 7/9  incr GLV recv'd 9/2; incr GLV    incr GLV incr GLV     Date 3/30  5/10 6/21           Total WeeklyDose 60mg non- compliant 60mg 60mg           INR 2.5  2.8 3.6           Notes 1x incr dose                **will take minocycline 100mg BID indefinitely**       Phone Interview:  Tablet Strength: 5mg tabs  Patient Contact Info: [phone] (Home); [phone] (Mobile)  Lab Contact Info: [facility] [phone]  Verbal release 7/23/19 may speak with Delaney (wife), Puneet or Ty (sons)- ok to LVM    Patient Findings    Positives:  Change in diet/appetite   Negatives:  Signs/symptoms of thrombosis, Signs/symptoms of  bleeding, Laboratory test error suspected, Change in health, Change in alcohol use, Change in activity, Upcoming invasive procedure, Emergency department visit, Upcoming dental procedure, Missed doses, Extra doses, Change in medications, Hospital admission, Bruising, Other complaints   Comments:  Patient did not have any greens last week and he usually will have some every week.          Plan:  1. INR is supratherapeutic today at 3.6.  Instructed Mr. Farias to continue maintenance dose of warfarin 7.5mg oral daily except 10mg on MonWedFri until recheck. (60mg/week). He will resume normal GLV intake.  2. Repeat INR in 3 weeks, 7/12. Patient has extensive history of non-compliance with requested follow-up.  3. Verbal information provided over the phone. Cecilio Farias RBV dosing instructions, expresses understanding by teach back, and has no further questions at this time.    Thank you,    Kayode ArthurD, SRI  6/21/2021  11:25 EDT

## 2021-07-15 NOTE — TELEPHONE ENCOUNTER
Lab Results   Component Value Date    INR 3.60 06/21/2021    INR 2.80 05/10/2021    INR 2.50 03/30/2021    next appt 4/2022

## 2021-07-16 RX ORDER — WARFARIN SODIUM 5 MG/1
TABLET ORAL
Qty: 60 TABLET | Refills: 3 | Status: SHIPPED | OUTPATIENT
Start: 2021-07-16 | End: 2022-03-09 | Stop reason: SDUPTHER

## 2021-07-28 ENCOUNTER — TELEPHONE (OUTPATIENT)
Dept: PHARMACY | Facility: HOSPITAL | Age: 66
End: 2021-07-28

## 2021-08-03 ENCOUNTER — TRANSCRIBE ORDERS (OUTPATIENT)
Dept: LAB | Facility: HOSPITAL | Age: 66
End: 2021-08-03

## 2021-08-03 ENCOUNTER — LAB (OUTPATIENT)
Dept: LAB | Facility: HOSPITAL | Age: 66
End: 2021-08-03

## 2021-08-03 ENCOUNTER — ANTICOAGULATION VISIT (OUTPATIENT)
Dept: PHARMACY | Facility: HOSPITAL | Age: 66
End: 2021-08-03

## 2021-08-03 DIAGNOSIS — M86.9 DIABETIC FOOT ULCER WITH OSTEOMYELITIS (HCC): ICD-10-CM

## 2021-08-03 DIAGNOSIS — L97.518 NON-PRESSURE CHRONIC ULCER OF OTHER PART OF RIGHT FOOT WITH OTHER SPECIFIED SEVERITY (HCC): ICD-10-CM

## 2021-08-03 DIAGNOSIS — E11.621 DIABETIC FOOT ULCER WITH OSTEOMYELITIS (HCC): ICD-10-CM

## 2021-08-03 DIAGNOSIS — Z95.2 HEART VALVE REPLACED BY TRANSPLANT: ICD-10-CM

## 2021-08-03 DIAGNOSIS — L03.031 ABSCESS OF FIFTH TOENAIL OF RIGHT FOOT: ICD-10-CM

## 2021-08-03 DIAGNOSIS — E11.69 DIABETIC FOOT ULCER WITH OSTEOMYELITIS (HCC): ICD-10-CM

## 2021-08-03 DIAGNOSIS — I33.0 ACUTE AND SUBACUTE BACTERIAL ENDOCARDITIS: Primary | ICD-10-CM

## 2021-08-03 DIAGNOSIS — L97.509 DIABETIC FOOT ULCER WITH OSTEOMYELITIS (HCC): ICD-10-CM

## 2021-08-03 DIAGNOSIS — I48.0 PAROXYSMAL ATRIAL FIBRILLATION (HCC): ICD-10-CM

## 2021-08-03 DIAGNOSIS — I33.0 ACUTE AND SUBACUTE BACTERIAL ENDOCARDITIS: ICD-10-CM

## 2021-08-03 DIAGNOSIS — Z95.2 HISTORY OF MECHANICAL AORTIC VALVE REPLACEMENT: ICD-10-CM

## 2021-08-03 LAB
ALBUMIN SERPL-MCNC: 3.9 G/DL (ref 3.5–5.2)
ALBUMIN/GLOB SERPL: 1.2 G/DL
ALP SERPL-CCNC: 81 U/L (ref 39–117)
ALT SERPL W P-5'-P-CCNC: 27 U/L (ref 1–41)
ANION GAP SERPL CALCULATED.3IONS-SCNC: 8 MMOL/L (ref 5–15)
AST SERPL-CCNC: 46 U/L (ref 1–40)
BILIRUB SERPL-MCNC: 0.7 MG/DL (ref 0–1.2)
BUN SERPL-MCNC: 28 MG/DL (ref 8–23)
BUN/CREAT SERPL: 27.2 (ref 7–25)
CALCIUM SPEC-SCNC: 8.8 MG/DL (ref 8.6–10.5)
CHLORIDE SERPL-SCNC: 104 MMOL/L (ref 98–107)
CO2 SERPL-SCNC: 26 MMOL/L (ref 22–29)
CREAT SERPL-MCNC: 1.03 MG/DL (ref 0.76–1.27)
CRP SERPL-MCNC: 0.76 MG/DL (ref 0–0.5)
ERYTHROCYTE [SEDIMENTATION RATE] IN BLOOD: 18 MM/HR (ref 0–20)
GFR SERPL CREATININE-BSD FRML MDRD: 73 ML/MIN/1.73
GLOBULIN UR ELPH-MCNC: 3.2 GM/DL
GLUCOSE SERPL-MCNC: 115 MG/DL (ref 65–99)
INR PPP: 3.12 (ref 0.85–1.16)
POTASSIUM SERPL-SCNC: 5.1 MMOL/L (ref 3.5–5.2)
PROT SERPL-MCNC: 7.1 G/DL (ref 6–8.5)
PROTHROMBIN TIME: 30.7 SECONDS (ref 11.4–14.4)
SODIUM SERPL-SCNC: 138 MMOL/L (ref 136–145)

## 2021-08-03 PROCEDURE — 85652 RBC SED RATE AUTOMATED: CPT | Performed by: INTERNAL MEDICINE

## 2021-08-03 PROCEDURE — 86140 C-REACTIVE PROTEIN: CPT | Performed by: INTERNAL MEDICINE

## 2021-08-03 PROCEDURE — 80053 COMPREHEN METABOLIC PANEL: CPT | Performed by: INTERNAL MEDICINE

## 2021-08-03 PROCEDURE — 36415 COLL VENOUS BLD VENIPUNCTURE: CPT | Performed by: INTERNAL MEDICINE

## 2021-08-03 PROCEDURE — 85610 PROTHROMBIN TIME: CPT

## 2021-08-03 NOTE — PROGRESS NOTES
Anticoagulation Clinic - Remote Progress Note  REMOTE LAB     Indication: mechanical AVR  Referring Provider: TEODORA Smith (last seen: 8/13/20  next appt: 8/26/21)  Initial Warfarin Start Date: 2009  Goal INR: 2.5-3.5  Current Drug Interactions: aspirin     Diet: green beans occasionally  (~1x/week) 1/14/21; salads 5x week (2/2/21)  Alcohol: none  Tobacco: none  OTC Pain Medication:     INR History:  Date 8/2 10/26 12/21 1/19/18 2/20 3/27 4/27 5/16 6/11 7/16 9/20 11/2   Total Weekly Dose 62.5mg 62.5mg 62.5mg 62.5mg 62.5mg 62.5mg 62.5mg 62.5mg 62.5mg 62.5mg 62.5mg 62.5mg   INR 2.2 2.9 3.2 3.0 2.9 3.4 3.7 2.9 2.6 2.9 2.9 3.2   Notes             sick                Date 12/21 2/1/19 3/11 4/24 5/23 6/24 7/11 7/18 7/22 8/15 9/9 9/25   Total Weekly Dose 62.5mg 62.5mg 62.5mg 62.5mg 62.5mg 62.5mg 52.5mg 55mg 57.5mg 57.5mg 57.5mg 57.5mg   INR 3.4 3.34 2.8 3.0 3.3 3.6 2.52 2.27 2.9 2.9 2.5 2.6   Notes recv'd 12/26       self report; recent hosp; Augmentin Merrem post-disch for sepsis merrem merrem 1x incr dose, merrem        Date 11/8 12/30 1/24/20 4/13 6/2 7/8 8/31 10/13 12/1 1/14/21 2/2 3/4   Total Weekly Dose 57.5mg 57.5mg 57.5mg 57.5mg 57.5mg 57.5mg 57.5mg 60mg 60mg 60mg 60mg 60mg   INR 2.4 2.4 3.7 2.2 2.9 2.3 2.2 2.8 3.5 3.5 3.23 2.4   Notes  incr GLV   nerve control 911 supplement recv'd 7/9  incr GLV recv'd 9/2; incr GLV    incr GLV incr GLV     Date 3/30  5/10 6/21 8/3          Total WeeklyDose 60mg non- compliant 60mg 60mg 60 mg          INR 2.5  2.8 3.6 3.12          Notes 1x incr dose   Less GLV             **will take minocycline 100mg BID indefinitely**       Phone Interview:  Tablet Strength: 5mg tabs  Patient Contact Info: [phone] (Home); [phone] (Mobile)  Lab Contact Info: [facility] [phone]  Verbal release 7/23/19 may speak with Delaney (wife), Puneet or Ty (sons)- ok to [facility]    Patient Findings  Positives:  Upcoming dental procedure, Change in medications, Change in  diet/appetite, Other complaints   Negatives:  Signs/symptoms of thrombosis, Signs/symptoms of bleeding, Laboratory test error suspected, Change in health, Change in alcohol use, Change in activity, Upcoming invasive procedure, Emergency department visit, Missed doses, Extra doses, Hospital admission, Bruising   Comments:  Mr. Farias was due to recheck his INR on 7/12.  Was not rechecked until today.   He has been changed from pravastatin to Atorvastatin.  Updated his med profile.   He has been trying to eat more healthy choices.     He is going to the dentist on 8/28.  He believes he will require a few teeth to be pulled soon. He will contact the clinic once procedure is scheduled.  Otherwise, above findings negative     Plan:  1. INR is therapeutic today at 3.12.  Instructed Mr. Farias to continue maintenance dose of warfarin 7.5mg oral daily except 10mg on MonWedFri until recheck. (60mg/week). He will resume normal GLV intake.  2. Repeat INR in 4 weeks, 8/31.   Patient has extensive history of non-compliance with requested follow-up.  3. Verbal information provided over the phone. Cecilio Farias RBV dosing instructions, expresses understanding by teach back, and has no further questions at this time.    Arabella Hebert, PharmD  8/3/2021  10:42 EDT     Lab Results   Component Value Date    GLUCOSE 115 (H) 08/03/2021    BUN 28 (H) 08/03/2021    CREATININE 1.03 08/03/2021    EGFRIFNONA 73 08/03/2021    BCR 27.2 (H) 08/03/2021    K 5.1 08/03/2021    CO2 26.0 08/03/2021    CALCIUM 8.8 08/03/2021    ALBUMIN 3.90 08/03/2021    AST 46 (H) 08/03/2021    ALT 27 08/03/2021

## 2021-08-04 RX ORDER — ATORVASTATIN CALCIUM 40 MG/1
40 TABLET, FILM COATED ORAL NIGHTLY
COMMUNITY

## 2021-08-12 RX ORDER — FUROSEMIDE 20 MG/1
20 TABLET ORAL DAILY
Qty: 90 TABLET | Refills: 0 | Status: SHIPPED | OUTPATIENT
Start: 2021-08-12 | End: 2021-09-20 | Stop reason: SDUPTHER

## 2021-08-12 RX ORDER — POTASSIUM CHLORIDE 1500 MG/1
20 TABLET, FILM COATED, EXTENDED RELEASE ORAL DAILY
Qty: 30 TABLET | Refills: 5 | Status: SHIPPED | OUTPATIENT
Start: 2021-08-12 | End: 2021-08-16

## 2021-08-16 RX ORDER — POTASSIUM CHLORIDE 1500 MG/1
TABLET, FILM COATED, EXTENDED RELEASE ORAL
Qty: 90 TABLET | Refills: 2 | Status: SHIPPED | OUTPATIENT
Start: 2021-08-16 | End: 2022-05-20 | Stop reason: SDUPTHER

## 2021-08-16 NOTE — TELEPHONE ENCOUNTER
Lab Results   Component Value Date    GLUCOSE 115 (H) 08/03/2021    BUN 28 (H) 08/03/2021    CREATININE 1.03 08/03/2021    EGFRIFNONA 73 08/03/2021    BCR 27.2 (H) 08/03/2021    K 5.1 08/03/2021    CO2 26.0 08/03/2021    CALCIUM 8.8 08/03/2021    ALBUMIN 3.90 08/03/2021    AST 46 (H) 08/03/2021    ALT 27 08/03/2021    Next appt 4/2022

## 2021-09-15 ENCOUNTER — TELEPHONE (OUTPATIENT)
Dept: PHARMACY | Facility: HOSPITAL | Age: 66
End: 2021-09-15

## 2021-09-15 NOTE — TELEPHONE ENCOUNTER
Patient called back and is agreeable to have INR drawn tomorrow. Of note, patient reports he will have 7 teeth pulled with oral surgeon Dr. Vlad Martinez ([phone]).     Have spoken with  [facility] and they report that Dr. Chu is not in office today, but they believe he was not aware patient was taking warfarin. They are going to speak with Dr. Chu to determine if patient should hold warfarin and for how long. They are agreeable to contact clinic when they have more information and confirm they have our contact information.

## 2021-09-20 RX ORDER — FUROSEMIDE 20 MG/1
20 TABLET ORAL DAILY
Qty: 90 TABLET | Refills: 2 | Status: SHIPPED | OUTPATIENT
Start: 2021-09-20

## 2021-09-27 ENCOUNTER — ANTICOAGULATION VISIT (OUTPATIENT)
Dept: PHARMACY | Facility: HOSPITAL | Age: 66
End: 2021-09-27

## 2021-09-27 LAB — INR PPP: 3.1

## 2021-09-27 NOTE — PROGRESS NOTES
Anticoagulation Clinic - Remote Progress Note  REMOTE LAB     Indication: mechanical AVR  Referring Provider: Olga Chau   Initial Warfarin Start Date: 2009  Goal INR: 2.5-3.5  Current Drug Interactions: aspirin     Diet: green beans occasionally  (~1x/week) 1/14/21; salads 5x week (2/2/21)  Alcohol: none  Tobacco: none  OTC Pain Medication:     INR History:  Date 8/2 10/26 12/21 1/19/18 2/20 3/27 4/27 5/16 6/11 7/16 9/20 11/2   Total Weekly Dose 62.5mg 62.5mg 62.5mg 62.5mg 62.5mg 62.5mg 62.5mg 62.5mg 62.5mg 62.5mg 62.5mg 62.5mg   INR 2.2 2.9 3.2 3.0 2.9 3.4 3.7 2.9 2.6 2.9 2.9 3.2   Notes             sick                Date 12/21 2/1/19 3/11 4/24 5/23 6/24 7/11 7/18 7/22 8/15 9/9 9/25   Total Weekly Dose 62.5mg 62.5mg 62.5mg 62.5mg 62.5mg 62.5mg 52.5mg 55mg 57.5mg 57.5mg 57.5mg 57.5mg   INR 3.4 3.34 2.8 3.0 3.3 3.6 2.52 2.27 2.9 2.9 2.5 2.6   Notes recv'd 12/26       self report; recent hosp; Augmentin Merrem post-disch for sepsis merrem merrem 1x incr dose, merrem        Date 11/8 12/30 1/24/20 4/13 6/2 7/8 8/31 10/13 12/1 1/14/21 2/2 3/4   Total Weekly Dose 57.5mg 57.5mg 57.5mg 57.5mg 57.5mg 57.5mg 57.5mg 60mg 60mg 60mg 60mg 60mg   INR 2.4 2.4 3.7 2.2 2.9 2.3 2.2 2.8 3.5 3.5 3.23 2.4   Notes  incr GLV   nerve control 911 supplement recv'd 7/9  incr GLV recv'd 9/2; incr GLV    incr GLV incr GLV     Date 3/30  5/10 6/21 8/3  9/27        Total WeeklyDose 60mg non- compliant 60mg 60mg 60mg non- compliant 60mg        INR 2.5  2.8 3.6 3.12  3.1        Notes 1x incr dose   Less GLV             **will take minocycline 100mg BID indefinitely**       Phone Interview:  Tablet Strength: 5mg tabs  Patient Contact Info: [phone] (Home); [phone] (Mobile)  Lab Contact Info: [facility] [phone]  Verbal release 7/23/19 may speak with Delaney (wife), Puneet or Ty (sons)- ok to LVM    Patient Findings  Negatives:  Signs/symptoms of thrombosis, Signs/symptoms of bleeding, Laboratory test  error suspected, Change in health, Change in alcohol use, Change in activity, Upcoming invasive procedure, Emergency department visit, Upcoming dental procedure, Missed doses, Extra doses, Change in medications, Change in diet/appetite, Hospital admission, Bruising, Other complaints     Plan:  1. INR is therapeutic today at 3.1. Patient was non-compliant with requested follow-up.  Instructed Mr. Farias to continue maintenance dose of warfarin 7.5mg oral daily except 10mg on MonWedFri until recheck. (60mg/week)  2. Repeat INR in 4 weeks, 10/25.   Patient has extensive history of non-compliance with requested follow-up.  3. Verbal information provided over the phone. Cecilio Farias RBV dosing instructions, expresses understanding by teach back, and has no further questions at this time.    Kurtis Hackett, [facility]  9/27/2021  11:33 EDT    I, Collins Gilbert, PharmD, have reviewed the note in full and agree with the assessment and plan. Non-compliance noted.  09/27/21  14:38 EDT     Approved plan for upcoming unscheduled dental extraction:  Will send the below plan to Dr Martinez's office for upcoming dental extraction (7 remaining teeth)     Cecilio Farias is a 65 y.o. male on warfarin for mechanical aortic valve and PAF, therefore bridge therapy is recommended. [144 kg / Scr 1.03 (8/2021)]     Date -5: Begin hold warfarin 5 days  Date -4: hold warfarin, lovenox PM only  Date -3: hold warfarin: lovenox q12h   Date -2: hold warfarin, lovenox q12h  Date -1: hold warfarin: lovenox AM ONLY  Date TBD: procedure- resume anticoagulation when directed by Dr Martinez  Post-op day 1: resume warfarin [2-3 boosted doses if appropriate] + lovenox q12h until INR >2    Adore Masters, SandhyaD.  10/26/21   11:44 EDT

## 2021-09-28 ENCOUNTER — TELEPHONE (OUTPATIENT)
Dept: CARDIOLOGY | Facility: CLINIC | Age: 66
End: 2021-09-28

## 2021-09-28 DIAGNOSIS — Z95.2 HISTORY OF MECHANICAL AORTIC VALVE REPLACEMENT: Primary | ICD-10-CM

## 2021-09-28 NOTE — TELEPHONE ENCOUNTER
I am normally trying to coordinate this with the anticoagulation clinic. Typically would recommend holding warfarin 5 days ahead of the procedure.    Anticipate he will need bridging Lovenox starting about 2 days prior to the procedure. If an INR could be checked on this day that would be beneficial.    Would have  him take the therapeutic Lovenox with the last dose the morning prior to the procedure.    Postprocedure have him start Lovenox when acceptable per Dr. Martinez. Typically would be started the next day. And restarting Coumadin that evening. Then would need follow-up with anticoagulation clinic to reestablish therapeutic INR

## 2021-09-28 NOTE — TELEPHONE ENCOUNTER
Mei from Dr. Vlad Martinez's office ([place]) called to request to hold warfarin for 3-5 days prior to teeth extraction (all remaining teeth - 7) under IV sedation.    Pt has mechanical aortic valve and PAF. Warfarin followed by  [facility]  Lab Results   Component Value Date    INR 3.10 09/27/2021    INR 3.12 (H) 08/03/2021    INR 3.60 06/21/2021     SBE prophylaxis - Pre-op clindamycin given to pt per Dr. Martinez.    Office phone - [phone]  Office fax - [phone]    Please advise.

## 2021-09-29 NOTE — TELEPHONE ENCOUNTER
Dr. Martinez's office aware to contact us once scheduled and briefly discussed with Mr Farias- will review further once scheduled

## 2021-09-29 NOTE — TELEPHONE ENCOUNTER
Dr. Jackson,     Will send the below plan to Dr Martinez's office for upcoming dental extraction (7 remaining teeth)    Cecilio Farias is a 65 y.o. male on warfarin for mechanical aortic valve and PAF, therefore bridge therapy is recommended. [144 kg / Scr 1.03 (8/2021)]    Date -5: Begin hold warfarin 5 days  Date -4: hold warfarin, lovenox PM only  Date -3: hold warfarin: lovenox q12h   Date -2: hold warfarin, lovenox q12h  Date -1: hold warfarin: lovenox AM ONLY  Date TBD: procedure- resume anticoagulation when directed by Dr Martinez  Post-op day 1: resume warfarin [2-3 boosted doses if appropriate] + lovenox q12h until INR >2        Thank you,    Adore Masters, PharmD    [facility] Anticoagulation Team  (t) [phone]  (f) [phone]

## 2021-11-16 ENCOUNTER — HOSPITAL ENCOUNTER (INPATIENT)
Facility: HOSPITAL | Age: 66
LOS: 2 days | Discharge: HOME | End: 2021-11-19
Attending: EMERGENCY MEDICINE | Admitting: INTERNAL MEDICINE

## 2021-11-16 DIAGNOSIS — R11.2 NON-INTRACTABLE VOMITING WITH NAUSEA, UNSPECIFIED VOMITING TYPE: ICD-10-CM

## 2021-11-16 DIAGNOSIS — Z79.01 ANTICOAGULATED ON COUMADIN: ICD-10-CM

## 2021-11-16 DIAGNOSIS — K56.609 SMALL BOWEL OBSTRUCTION (HCC): Primary | ICD-10-CM

## 2021-11-16 DIAGNOSIS — K43.6 VENTRAL HERNIA WITH OBSTRUCTION AND WITHOUT GANGRENE: ICD-10-CM

## 2021-11-16 LAB
BASOPHILS # BLD AUTO: 0.06 10*3/MM3 (ref 0–0.2)
BASOPHILS NFR BLD AUTO: 0.5 % (ref 0–1.5)
DEPRECATED RDW RBC AUTO: 49.2 FL (ref 37–54)
EOSINOPHIL # BLD AUTO: 0.16 10*3/MM3 (ref 0–0.4)
EOSINOPHIL NFR BLD AUTO: 1.3 % (ref 0.3–6.2)
ERYTHROCYTE [DISTWIDTH] IN BLOOD BY AUTOMATED COUNT: 15.8 % (ref 12.3–15.4)
HCT VFR BLD AUTO: 47.7 % (ref 37.5–51)
HGB BLD-MCNC: 15.4 G/DL (ref 13–17.7)
IMM GRANULOCYTES # BLD AUTO: 0.05 10*3/MM3 (ref 0–0.05)
IMM GRANULOCYTES NFR BLD AUTO: 0.4 % (ref 0–0.5)
LYMPHOCYTES # BLD AUTO: 1.22 10*3/MM3 (ref 0.7–3.1)
LYMPHOCYTES NFR BLD AUTO: 10.1 % (ref 19.6–45.3)
MCH RBC QN AUTO: 27.8 PG (ref 26.6–33)
MCHC RBC AUTO-ENTMCNC: 32.3 G/DL (ref 31.5–35.7)
MCV RBC AUTO: 86.3 FL (ref 79–97)
MONOCYTES # BLD AUTO: 1.2 10*3/MM3 (ref 0.1–0.9)
MONOCYTES NFR BLD AUTO: 10 % (ref 5–12)
NEUTROPHILS NFR BLD AUTO: 77.7 % (ref 42.7–76)
NEUTROPHILS NFR BLD AUTO: 9.37 10*3/MM3 (ref 1.7–7)
NRBC BLD AUTO-RTO: 0 /100 WBC (ref 0–0.2)
PLATELET # BLD AUTO: 311 10*3/MM3 (ref 140–450)
PMV BLD AUTO: 10.4 FL (ref 6–12)
RBC # BLD AUTO: 5.53 10*6/MM3 (ref 4.14–5.8)
WBC # BLD AUTO: 12.06 10*3/MM3 (ref 3.4–10.8)

## 2021-11-16 PROCEDURE — 85025 COMPLETE CBC W/AUTO DIFF WBC: CPT | Performed by: EMERGENCY MEDICINE

## 2021-11-16 PROCEDURE — 80053 COMPREHEN METABOLIC PANEL: CPT | Performed by: EMERGENCY MEDICINE

## 2021-11-16 PROCEDURE — 83605 ASSAY OF LACTIC ACID: CPT | Performed by: EMERGENCY MEDICINE

## 2021-11-16 PROCEDURE — 99284 EMERGENCY DEPT VISIT MOD MDM: CPT

## 2021-11-16 PROCEDURE — 99285 EMERGENCY DEPT VISIT HI MDM: CPT

## 2021-11-16 PROCEDURE — 82248 BILIRUBIN DIRECT: CPT | Performed by: INTERNAL MEDICINE

## 2021-11-16 PROCEDURE — 85610 PROTHROMBIN TIME: CPT | Performed by: EMERGENCY MEDICINE

## 2021-11-16 PROCEDURE — 83690 ASSAY OF LIPASE: CPT | Performed by: EMERGENCY MEDICINE

## 2021-11-16 PROCEDURE — 84145 PROCALCITONIN (PCT): CPT | Performed by: INTERNAL MEDICINE

## 2021-11-16 RX ORDER — SODIUM CHLORIDE 9 MG/ML
10 INJECTION INTRAVENOUS AS NEEDED
Status: DISCONTINUED | OUTPATIENT
Start: 2021-11-16 | End: 2021-11-19 | Stop reason: HOSPADM

## 2021-11-16 RX ADMIN — SODIUM CHLORIDE 1000 ML: 9 INJECTION, SOLUTION INTRAVENOUS at 23:40

## 2021-11-17 ENCOUNTER — APPOINTMENT (OUTPATIENT)
Dept: CT IMAGING | Facility: HOSPITAL | Age: 66
End: 2021-11-17

## 2021-11-17 ENCOUNTER — APPOINTMENT (OUTPATIENT)
Dept: GENERAL RADIOLOGY | Facility: HOSPITAL | Age: 66
End: 2021-11-17

## 2021-11-17 PROBLEM — E80.6 HYPERBILIRUBINEMIA: Status: ACTIVE | Noted: 2021-11-17

## 2021-11-17 PROBLEM — K56.609 SBO (SMALL BOWEL OBSTRUCTION): Status: ACTIVE | Noted: 2021-11-17

## 2021-11-17 PROBLEM — I50.32 DIASTOLIC CHF, CHRONIC: Status: ACTIVE | Noted: 2021-11-17

## 2021-11-17 LAB
ABO GROUP BLD: NORMAL
ALBUMIN SERPL-MCNC: 3.2 G/DL (ref 3.5–5.2)
ALBUMIN SERPL-MCNC: 4.3 G/DL (ref 3.5–5.2)
ALBUMIN/GLOB SERPL: 1.3 G/DL
ALBUMIN/GLOB SERPL: 1.3 G/DL
ALP SERPL-CCNC: 113 U/L (ref 39–117)
ALP SERPL-CCNC: 86 U/L (ref 39–117)
ALT SERPL W P-5'-P-CCNC: 21 U/L (ref 1–41)
ALT SERPL W P-5'-P-CCNC: 29 U/L (ref 1–41)
ANION GAP SERPL CALCULATED.3IONS-SCNC: 12 MMOL/L (ref 5–15)
ANION GAP SERPL CALCULATED.3IONS-SCNC: 17 MMOL/L (ref 5–15)
APTT PPP: 37.6 SECONDS (ref 22–39)
ARTERIAL PATENCY WRIST A: ABNORMAL
AST SERPL-CCNC: 31 U/L (ref 1–40)
AST SERPL-CCNC: 44 U/L (ref 1–40)
ATMOSPHERIC PRESS: ABNORMAL MM[HG]
BACTERIA UR QL AUTO: NORMAL /HPF
BASE EXCESS BLDA CALC-SCNC: -0.7 MMOL/L (ref 0–2)
BASOPHILS # BLD AUTO: 0.05 10*3/MM3 (ref 0–0.2)
BASOPHILS NFR BLD AUTO: 0.5 % (ref 0–1.5)
BDY SITE: ABNORMAL
BILIRUB CONJ SERPL-MCNC: 0.3 MG/DL (ref 0–0.3)
BILIRUB SERPL-MCNC: 1.1 MG/DL (ref 0–1.2)
BILIRUB SERPL-MCNC: 1.6 MG/DL (ref 0–1.2)
BILIRUB UR QL STRIP: NEGATIVE
BLD GP AB SCN SERPL QL: NEGATIVE
BODY TEMPERATURE: 37 C
BUN SERPL-MCNC: 22 MG/DL (ref 8–23)
BUN SERPL-MCNC: 24 MG/DL (ref 8–23)
BUN/CREAT SERPL: 13.8 (ref 7–25)
BUN/CREAT SERPL: 14.3 (ref 7–25)
CALCIUM SPEC-SCNC: 7.7 MG/DL (ref 8.6–10.5)
CALCIUM SPEC-SCNC: 9.2 MG/DL (ref 8.6–10.5)
CHLORIDE SERPL-SCNC: 102 MMOL/L (ref 98–107)
CHLORIDE SERPL-SCNC: 95 MMOL/L (ref 98–107)
CLARITY UR: CLEAR
CO2 BLDA-SCNC: 25.9 MMOL/L (ref 22–33)
CO2 SERPL-SCNC: 24 MMOL/L (ref 22–29)
CO2 SERPL-SCNC: 25 MMOL/L (ref 22–29)
COHGB MFR BLD: 1.5 % (ref 0–2)
COLOR UR: ABNORMAL
CREAT SERPL-MCNC: 1.6 MG/DL (ref 0.76–1.27)
CREAT SERPL-MCNC: 1.68 MG/DL (ref 0.76–1.27)
D-LACTATE SERPL-SCNC: 1.9 MMOL/L (ref 0.5–2)
D-LACTATE SERPL-SCNC: 3.3 MMOL/L (ref 0.5–2)
DEPRECATED RDW RBC AUTO: 51.4 FL (ref 37–54)
EOSINOPHIL # BLD AUTO: 0.08 10*3/MM3 (ref 0–0.4)
EOSINOPHIL NFR BLD AUTO: 0.7 % (ref 0.3–6.2)
EPAP: 0
ERYTHROCYTE [DISTWIDTH] IN BLOOD BY AUTOMATED COUNT: 15.9 % (ref 12.3–15.4)
GFR SERPL CREATININE-BSD FRML MDRD: 41 ML/MIN/1.73
GFR SERPL CREATININE-BSD FRML MDRD: 44 ML/MIN/1.73
GLOBULIN UR ELPH-MCNC: 2.5 GM/DL
GLOBULIN UR ELPH-MCNC: 3.4 GM/DL
GLUCOSE BLDC GLUCOMTR-MCNC: 165 MG/DL (ref 70–130)
GLUCOSE BLDC GLUCOMTR-MCNC: 186 MG/DL (ref 70–130)
GLUCOSE BLDC GLUCOMTR-MCNC: 202 MG/DL (ref 70–130)
GLUCOSE SERPL-MCNC: 200 MG/DL (ref 65–99)
GLUCOSE SERPL-MCNC: 228 MG/DL (ref 65–99)
GLUCOSE UR STRIP-MCNC: NEGATIVE MG/DL
HBA1C MFR BLD: 8.5 % (ref 4.8–5.6)
HCO3 BLDA-SCNC: 24.6 MMOL/L (ref 20–26)
HCT VFR BLD AUTO: 45.5 % (ref 37.5–51)
HCT VFR BLD CALC: 39.1 % (ref 38–51)
HGB BLD-MCNC: 14.4 G/DL (ref 13–17.7)
HGB BLDA-MCNC: 12.8 G/DL (ref 13.5–17.5)
HGB UR QL STRIP.AUTO: NEGATIVE
HOLD SPECIMEN: NORMAL
HYALINE CASTS UR QL AUTO: NORMAL /LPF
IMM GRANULOCYTES # BLD AUTO: 0.05 10*3/MM3 (ref 0–0.05)
IMM GRANULOCYTES NFR BLD AUTO: 0.5 % (ref 0–0.5)
INHALED O2 CONCENTRATION: 21 %
INR PPP: 2.02 (ref 0.85–1.16)
INR PPP: 2.68 (ref 0.85–1.16)
INR PPP: 2.79 (ref 0.85–1.16)
IPAP: 0
KETONES UR QL STRIP: ABNORMAL
LEUKOCYTE ESTERASE UR QL STRIP.AUTO: NEGATIVE
LIPASE SERPL-CCNC: 48 U/L (ref 13–60)
LYMPHOCYTES # BLD AUTO: 1.63 10*3/MM3 (ref 0.7–3.1)
LYMPHOCYTES NFR BLD AUTO: 14.9 % (ref 19.6–45.3)
MCH RBC QN AUTO: 28.2 PG (ref 26.6–33)
MCHC RBC AUTO-ENTMCNC: 31.6 G/DL (ref 31.5–35.7)
MCV RBC AUTO: 89.2 FL (ref 79–97)
METHGB BLD QL: 1.2 % (ref 0–1.5)
MODALITY: ABNORMAL
MONOCYTES # BLD AUTO: 1.09 10*3/MM3 (ref 0.1–0.9)
MONOCYTES NFR BLD AUTO: 10 % (ref 5–12)
NEUTROPHILS NFR BLD AUTO: 73.4 % (ref 42.7–76)
NEUTROPHILS NFR BLD AUTO: 8.03 10*3/MM3 (ref 1.7–7)
NITRITE UR QL STRIP: NEGATIVE
NOTE: ABNORMAL
NRBC BLD AUTO-RTO: 0 /100 WBC (ref 0–0.2)
NT-PROBNP SERPL-MCNC: 157.1 PG/ML (ref 0–900)
OXYHGB MFR BLDV: 90.8 % (ref 94–99)
PAW @ PEAK INSP FLOW SETTING VENT: 0 CMH2O
PCO2 BLDA: 42.2 MM HG (ref 35–45)
PCO2 TEMP ADJ BLD: 42.2 MM HG (ref 35–48)
PH BLDA: 7.37 PH UNITS (ref 7.35–7.45)
PH UR STRIP.AUTO: 5.5 [PH] (ref 5–8)
PH, TEMP CORRECTED: 7.37 PH UNITS
PLATELET # BLD AUTO: 266 10*3/MM3 (ref 140–450)
PMV BLD AUTO: 10.4 FL (ref 6–12)
PO2 BLDA: 71.7 MM HG (ref 83–108)
PO2 TEMP ADJ BLD: 71.7 MM HG (ref 83–108)
POTASSIUM SERPL-SCNC: 4.7 MMOL/L (ref 3.5–5.2)
POTASSIUM SERPL-SCNC: 4.9 MMOL/L (ref 3.5–5.2)
PROCALCITONIN SERPL-MCNC: 0.11 NG/ML (ref 0–0.25)
PROT SERPL-MCNC: 5.7 G/DL (ref 6–8.5)
PROT SERPL-MCNC: 7.7 G/DL (ref 6–8.5)
PROT UR QL STRIP: ABNORMAL
PROTHROMBIN TIME: 22 SECONDS (ref 11.4–14.4)
PROTHROMBIN TIME: 27.4 SECONDS (ref 11.4–14.4)
PROTHROMBIN TIME: 28.3 SECONDS (ref 11.4–14.4)
RBC # BLD AUTO: 5.1 10*6/MM3 (ref 4.14–5.8)
RBC # UR STRIP: NORMAL /HPF
REF LAB TEST METHOD: NORMAL
RH BLD: POSITIVE
SARS-COV-2 RDRP RESP QL NAA+PROBE: NORMAL
SODIUM SERPL-SCNC: 137 MMOL/L (ref 136–145)
SODIUM SERPL-SCNC: 138 MMOL/L (ref 136–145)
SP GR UR STRIP: 1.04 (ref 1–1.03)
SQUAMOUS #/AREA URNS HPF: NORMAL /HPF
T&S EXPIRATION DATE: NORMAL
TOTAL RATE: 0 BREATHS/MINUTE
UFH PPP CHRO-ACNC: 0.1 IU/ML (ref 0.3–0.7)
UFH PPP CHRO-ACNC: 0.1 IU/ML (ref 0.3–0.7)
UFH PPP CHRO-ACNC: >1.1 IU/ML (ref 0.3–0.7)
UROBILINOGEN UR QL STRIP: ABNORMAL
WBC # BLD AUTO: 10.93 10*3/MM3 (ref 3.4–10.8)
WBC # UR STRIP: NORMAL /HPF
WHOLE BLOOD HOLD SPECIMEN: NORMAL
WHOLE BLOOD HOLD SPECIMEN: NORMAL

## 2021-11-17 PROCEDURE — 85520 HEPARIN ASSAY: CPT

## 2021-11-17 PROCEDURE — 85610 PROTHROMBIN TIME: CPT | Performed by: INTERNAL MEDICINE

## 2021-11-17 PROCEDURE — 80053 COMPREHEN METABOLIC PANEL: CPT | Performed by: NURSE PRACTITIONER

## 2021-11-17 PROCEDURE — 83605 ASSAY OF LACTIC ACID: CPT | Performed by: EMERGENCY MEDICINE

## 2021-11-17 PROCEDURE — 25010000002 IOPAMIDOL 61 % SOLUTION: Performed by: EMERGENCY MEDICINE

## 2021-11-17 PROCEDURE — 99223 1ST HOSP IP/OBS HIGH 75: CPT | Performed by: INTERNAL MEDICINE

## 2021-11-17 PROCEDURE — 83036 HEMOGLOBIN GLYCOSYLATED A1C: CPT | Performed by: NURSE PRACTITIONER

## 2021-11-17 PROCEDURE — 93010 ELECTROCARDIOGRAM REPORT: CPT | Performed by: INTERNAL MEDICINE

## 2021-11-17 PROCEDURE — P9017 PLASMA 1 DONOR FRZ W/IN 8 HR: HCPCS

## 2021-11-17 PROCEDURE — 85730 THROMBOPLASTIN TIME PARTIAL: CPT | Performed by: INTERNAL MEDICINE

## 2021-11-17 PROCEDURE — 82962 GLUCOSE BLOOD TEST: CPT

## 2021-11-17 PROCEDURE — 86900 BLOOD TYPING SEROLOGIC ABO: CPT | Performed by: INTERNAL MEDICINE

## 2021-11-17 PROCEDURE — 74250 X-RAY XM SM INT 1CNTRST STD: CPT

## 2021-11-17 PROCEDURE — 86927 PLASMA FRESH FROZEN: CPT

## 2021-11-17 PROCEDURE — 87635 SARS-COV-2 COVID-19 AMP PRB: CPT | Performed by: INTERNAL MEDICINE

## 2021-11-17 PROCEDURE — 82805 BLOOD GASES W/O2 SATURATION: CPT

## 2021-11-17 PROCEDURE — 86901 BLOOD TYPING SEROLOGIC RH(D): CPT | Performed by: INTERNAL MEDICINE

## 2021-11-17 PROCEDURE — 85025 COMPLETE CBC W/AUTO DIFF WBC: CPT | Performed by: NURSE PRACTITIONER

## 2021-11-17 PROCEDURE — 74177 CT ABD & PELVIS W/CONTRAST: CPT

## 2021-11-17 PROCEDURE — 74018 RADEX ABDOMEN 1 VIEW: CPT

## 2021-11-17 PROCEDURE — 25010000002 MORPHINE PER 10 MG

## 2021-11-17 PROCEDURE — 0 DIATRIZOATE MEGLUMINE & SODIUM PER 1 ML: Performed by: INTERNAL MEDICINE

## 2021-11-17 PROCEDURE — 83050 HGB METHEMOGLOBIN QUAN: CPT

## 2021-11-17 PROCEDURE — 85520 HEPARIN ASSAY: CPT | Performed by: INTERNAL MEDICINE

## 2021-11-17 PROCEDURE — 83880 ASSAY OF NATRIURETIC PEPTIDE: CPT | Performed by: INTERNAL MEDICINE

## 2021-11-17 PROCEDURE — 82375 ASSAY CARBOXYHB QUANT: CPT

## 2021-11-17 PROCEDURE — 25010000002 HEPARIN (PORCINE) 25000-0.45 UT/250ML-% SOLUTION

## 2021-11-17 PROCEDURE — 81001 URINALYSIS AUTO W/SCOPE: CPT | Performed by: EMERGENCY MEDICINE

## 2021-11-17 PROCEDURE — 93005 ELECTROCARDIOGRAM TRACING: CPT | Performed by: NURSE PRACTITIONER

## 2021-11-17 PROCEDURE — 36600 WITHDRAWAL OF ARTERIAL BLOOD: CPT

## 2021-11-17 PROCEDURE — 25010000002 HEPARIN (PORCINE) 25000-0.45 UT/250ML-% SOLUTION: Performed by: INTERNAL MEDICINE

## 2021-11-17 PROCEDURE — 36430 TRANSFUSION BLD/BLD COMPNT: CPT

## 2021-11-17 PROCEDURE — 0 PHYTONADIONE 10 MG/ML SOLUTION 1 ML AMPULE: Performed by: INTERNAL MEDICINE

## 2021-11-17 PROCEDURE — 86850 RBC ANTIBODY SCREEN: CPT | Performed by: INTERNAL MEDICINE

## 2021-11-17 RX ORDER — FAMOTIDINE 20 MG/1
20 TABLET, FILM COATED ORAL ONCE
Status: CANCELLED | OUTPATIENT
Start: 2021-11-17 | End: 2021-11-17

## 2021-11-17 RX ORDER — ONDANSETRON 4 MG/1
4 TABLET, FILM COATED ORAL EVERY 6 HOURS PRN
Status: DISCONTINUED | OUTPATIENT
Start: 2021-11-17 | End: 2021-11-19 | Stop reason: HOSPADM

## 2021-11-17 RX ORDER — HEPARIN SODIUM 10000 [USP'U]/100ML
8 INJECTION, SOLUTION INTRAVENOUS
Status: DISPENSED | OUTPATIENT
Start: 2021-11-17 | End: 2021-11-18

## 2021-11-17 RX ORDER — SODIUM CHLORIDE 0.9 % (FLUSH) 0.9 %
10 SYRINGE (ML) INJECTION AS NEEDED
Status: CANCELLED | OUTPATIENT
Start: 2021-11-17

## 2021-11-17 RX ORDER — DEXTROSE MONOHYDRATE 25 G/50ML
25 INJECTION, SOLUTION INTRAVENOUS
Status: DISCONTINUED | OUTPATIENT
Start: 2021-11-17 | End: 2021-11-19 | Stop reason: HOSPADM

## 2021-11-17 RX ORDER — NICOTINE POLACRILEX 4 MG
15 LOZENGE BUCCAL
Status: DISCONTINUED | OUTPATIENT
Start: 2021-11-17 | End: 2021-11-19 | Stop reason: HOSPADM

## 2021-11-17 RX ORDER — MORPHINE SULFATE 4 MG/ML
INJECTION, SOLUTION INTRAMUSCULAR; INTRAVENOUS
Status: COMPLETED
Start: 2021-11-17 | End: 2021-11-17

## 2021-11-17 RX ORDER — MIDAZOLAM HYDROCHLORIDE 1 MG/ML
1 INJECTION INTRAMUSCULAR; INTRAVENOUS
Status: CANCELLED | OUTPATIENT
Start: 2021-11-17

## 2021-11-17 RX ORDER — SODIUM CHLORIDE 0.9 % (FLUSH) 0.9 %
10 SYRINGE (ML) INJECTION EVERY 12 HOURS SCHEDULED
Status: CANCELLED | OUTPATIENT
Start: 2021-11-17

## 2021-11-17 RX ORDER — FAMOTIDINE 10 MG/ML
20 INJECTION, SOLUTION INTRAVENOUS ONCE
Status: CANCELLED | OUTPATIENT
Start: 2021-11-17 | End: 2021-11-17

## 2021-11-17 RX ORDER — MINOCYCLINE HYDROCHLORIDE 50 MG/1
100 CAPSULE ORAL 2 TIMES DAILY
Status: DISCONTINUED | OUTPATIENT
Start: 2021-11-17 | End: 2021-11-19 | Stop reason: HOSPADM

## 2021-11-17 RX ORDER — SODIUM CHLORIDE, SODIUM LACTATE, POTASSIUM CHLORIDE, CALCIUM CHLORIDE 600; 310; 30; 20 MG/100ML; MG/100ML; MG/100ML; MG/100ML
9 INJECTION, SOLUTION INTRAVENOUS CONTINUOUS
Status: CANCELLED | OUTPATIENT
Start: 2021-11-17

## 2021-11-17 RX ORDER — ONDANSETRON 2 MG/ML
4 INJECTION INTRAMUSCULAR; INTRAVENOUS EVERY 6 HOURS PRN
Status: DISCONTINUED | OUTPATIENT
Start: 2021-11-17 | End: 2021-11-19 | Stop reason: HOSPADM

## 2021-11-17 RX ORDER — GABAPENTIN 400 MG/1
800 CAPSULE ORAL EVERY 12 HOURS SCHEDULED
Status: DISCONTINUED | OUTPATIENT
Start: 2021-11-17 | End: 2021-11-19 | Stop reason: HOSPADM

## 2021-11-17 RX ORDER — LIDOCAINE HYDROCHLORIDE 10 MG/ML
0.5 INJECTION, SOLUTION EPIDURAL; INFILTRATION; INTRACAUDAL; PERINEURAL ONCE AS NEEDED
Status: CANCELLED | OUTPATIENT
Start: 2021-11-17

## 2021-11-17 RX ORDER — HEPARIN SODIUM 1000 [USP'U]/ML
4000 INJECTION, SOLUTION INTRAVENOUS; SUBCUTANEOUS AS NEEDED
Status: DISCONTINUED | OUTPATIENT
Start: 2021-11-17 | End: 2021-11-17

## 2021-11-17 RX ORDER — SODIUM CHLORIDE, SODIUM LACTATE, POTASSIUM CHLORIDE, CALCIUM CHLORIDE 600; 310; 30; 20 MG/100ML; MG/100ML; MG/100ML; MG/100ML
75 INJECTION, SOLUTION INTRAVENOUS CONTINUOUS
Status: DISCONTINUED | OUTPATIENT
Start: 2021-11-17 | End: 2021-11-18

## 2021-11-17 RX ORDER — ATORVASTATIN CALCIUM 40 MG/1
40 TABLET, FILM COATED ORAL NIGHTLY
Status: DISCONTINUED | OUTPATIENT
Start: 2021-11-17 | End: 2021-11-19 | Stop reason: HOSPADM

## 2021-11-17 RX ORDER — HEPARIN SODIUM 1000 [USP'U]/ML
60 INJECTION, SOLUTION INTRAVENOUS; SUBCUTANEOUS AS NEEDED
Status: DISCONTINUED | OUTPATIENT
Start: 2021-11-17 | End: 2021-11-17

## 2021-11-17 RX ORDER — MIDAZOLAM HYDROCHLORIDE 1 MG/ML
0.5 INJECTION INTRAMUSCULAR; INTRAVENOUS
Status: CANCELLED | OUTPATIENT
Start: 2021-11-17

## 2021-11-17 RX ADMIN — SODIUM CHLORIDE 1000 ML: 9 INJECTION, SOLUTION INTRAVENOUS at 04:29

## 2021-11-17 RX ADMIN — IOPAMIDOL 100 ML: 612 INJECTION, SOLUTION INTRAVENOUS at 01:18

## 2021-11-17 RX ADMIN — MINOCYCLINE HYDROCHLORIDE 100 MG: 50 CAPSULE ORAL at 20:07

## 2021-11-17 RX ADMIN — GABAPENTIN 800 MG: 400 CAPSULE ORAL at 20:08

## 2021-11-17 RX ADMIN — SODIUM CHLORIDE, POTASSIUM CHLORIDE, SODIUM LACTATE AND CALCIUM CHLORIDE 500 ML: 600; 310; 30; 20 INJECTION, SOLUTION INTRAVENOUS at 06:32

## 2021-11-17 RX ADMIN — DIATRIZOATE MEGLUMINE AND DIATRIZOATE SODIUM 240 ML: 660; 100 LIQUID ORAL; RECTAL at 11:31

## 2021-11-17 RX ADMIN — MORPHINE SULFATE: 4 INJECTION, SOLUTION INTRAMUSCULAR; INTRAVENOUS at 01:14

## 2021-11-17 RX ADMIN — PHYTONADIONE 5 MG: 10 INJECTION, EMULSION INTRAMUSCULAR; INTRAVENOUS; SUBCUTANEOUS at 06:32

## 2021-11-17 RX ADMIN — ATORVASTATIN CALCIUM 40 MG: 40 TABLET, FILM COATED ORAL at 20:08

## 2021-11-17 RX ADMIN — SODIUM CHLORIDE, POTASSIUM CHLORIDE, SODIUM LACTATE AND CALCIUM CHLORIDE 75 ML/HR: 600; 310; 30; 20 INJECTION, SOLUTION INTRAVENOUS at 05:02

## 2021-11-17 RX ADMIN — SODIUM CHLORIDE, POTASSIUM CHLORIDE, SODIUM LACTATE AND CALCIUM CHLORIDE 1000 ML: 600; 310; 30; 20 INJECTION, SOLUTION INTRAVENOUS at 05:24

## 2021-11-17 RX ADMIN — HEPARIN SODIUM 4 UNITS/KG/HR: 10000 INJECTION, SOLUTION INTRAVENOUS at 16:13

## 2021-11-17 RX ADMIN — HEPARIN SODIUM 7 UNITS/KG/HR: 10000 INJECTION, SOLUTION INTRAVENOUS at 09:06

## 2021-11-17 NOTE — CONSULTS
General Surgery Consultation Note    Date of Service: 11/17/2021  Cecilio Farias  [id number]  1955      Referring Provider: Romero Bradshaw MD    Location of Consult: Emergency department     Reason for Consultation: Concern for obstruction related to ventral hernia       History of Present Illness:  I am seeing, Cecliio Farias, in consultation at request of Romero Bradshaw MD regarding concern for small bowel obstruction related to ventral hernia.  He is a 66-year-old gentleman with history of diabetes, hypertension, diastolic heart failure, mechanical aortic valve, and partial colectomy for colon cancer many years ago who presents to [facility] with complaints of abdominal pain.  He reports that he has had a known abdominal hernia for greater than 10 years and he has discussed this with prior physicians who have not recommended repair.  He reports that over the last several months he has had intermittent pain in his lower abdomen.  This comes and goes and only lasts for a few minutes.  Over the last 3 days or so the pain has been worse.  The pain is intermittent and last for only a few minutes.  The pain is located in his mid abdomen.  He reports that he is not having pain at this time.  He has had some small episodes of intermittent emesis over the last 3 days.  He has not had fevers or chills.  He has had a bowel movement since arrival in the ER and is also passed gas.  An NG tube was placed with less than 100 mL of output.  He denies any pain at this time.  He denies pain with palpation of his abdomen.  He reports that he is on chronic minocycline therapy for history of Acinetobacter infection.  His only prior abdominal surgery was a partial colectomy and more had greater than 10 years ago.  He takes Coumadin due to history of mechanical aortic valve and believes his last dose was on Monday.    Problems Addressed this Visit     None      Visit Diagnoses     Small bowel obstruction (HCC)    -   Primary    Ventral hernia with obstruction and without gangrene        Non-intractable vomiting with nausea, unspecified vomiting type        Anticoagulated on Coumadin          Diagnoses       Codes Comments    Small bowel obstruction (HCC)    -  Primary ICD-10-CM: K56.609  ICD-9-CM: 560.9     Ventral hernia with obstruction and without gangrene     ICD-10-CM: K43.6  ICD-9-CM: 552.20     Non-intractable vomiting with nausea, unspecified vomiting type     ICD-10-CM: R11.2  ICD-9-CM: 787.01     Anticoagulated on Coumadin     ICD-10-CM: Z79.01  ICD-9-CM: V58.61           PMHx:  Past Medical History:   Diagnosis Date   • Cancer (HCC)     colon cancer with operation/ chemotherapy/radiation    • CHF (congestive heart failure) (HCC) 2009    Acute Systolic    • Chronic anticoagulation     coumadin   • Diabetes mellitus (HCC)    • H/O aortic valve replacement    • HLD (hyperlipidemia)    • Hypertension    • Sepsis due to Acinetobacter species (HCC) 6/13/2019       Past Surgical History:  Past Surgical History:   • AORTIC VALVE REPAIR/REPLACEMENT    25 mm. [place] AVR   • ASCENDING AORTIC ANEURYSM REPAIR   • BACK SURGERY   • CARDIAC CATHETERIZATION    Procedure: RIGHT AND LEFT HEART CATH;  Surgeon: Kurtis Smith MD;  Location: [facility] CATH INVASIVE LOCATION;  Service: Cardiology;  Laterality: N/A;   • COLON SURGERY    Colon cancer with operation   • FOOT SURGERY    bilateral 2/2 wound and trauma        Allergies:  Allergies   Allergen Reactions   • Sulfa Antibiotics    • Amoxicillin Rash     Has tolerated ceftriaxone       Medications:  No current facility-administered medications on file prior to encounter.     Current Outpatient Medications on File Prior to Encounter   Medication Sig Dispense Refill   • amLODIPine (NORVASC) 10 MG tablet Take 1 tablet by mouth Every Morning.     • aspirin 81 MG EC tablet Take 81 mg by mouth daily.     • atorvastatin (LIPITOR) 40 MG tablet Take 40 mg by mouth Every Night.     •  carvedilol (COREG) 25 MG tablet Take 25 mg by mouth 2 (two) times a day with meals.     • furosemide (LASIX) 20 MG tablet Take 1 tablet by mouth Daily. 90 tablet 2   • gabapentin (NEURONTIN) 800 MG tablet Take 800 mg by mouth 3 (Three) Times a Day.     • glipizide (GLUCOTROL) 10 MG tablet Take 10 mg by mouth 2 (Two) Times a Day Before Meals.     • insulin NPH-insulin regular (NOVOLIN 70/30) (70-30) 100 UNIT/ML injection Inject 5 Units under the skin into the appropriate area as directed 2 (Two) Times a Day With Meals. (Patient taking differently: Inject 15 Units under the skin into the appropriate area as directed 2 (Two) Times a Day With Meals.) 10 mL 0   • lisinopril (PRINIVIL,ZESTRIL) 20 MG tablet Take 20 mg by mouth 2 (two) times a day.     • minocycline (MINOCIN,DYNACIN) 100 MG capsule Take 1 capsule by mouth 2 (Two) Times a Day.     • potassium chloride ER (K-TAB) 20 MEQ tablet controlled-release ER tablet TAKE ONE TABLET BY MOUTH DAILY WITH LASIX 90 tablet 2   • warfarin (COUMADIN) 5 MG tablet TAKE ONE AND ONE-HALF TO TWO TABLETS BY MOUTH DAILY AS DIRECTED BY CLINIC 60 tablet 3   • metFORMIN (GLUCOPHAGE) 500 MG tablet Take 2 tablets by mouth Every Evening.           Current Facility-Administered Medications:   •  atorvastatin (LIPITOR) tablet 40 mg, 40 mg, Oral, Nightly, Ramo, Kimmie, APRN  •  dextrose (D50W) (25 g/50 mL) IV injection 25 g, 25 g, Intravenous, Q15 Min PRN, Ramo, Kimmie, APRN  •  dextrose (GLUTOSE) oral gel 15 g, 15 g, Oral, Q15 Min PRN, Ramo, Kimmie, APRN  •  gabapentin (NEURONTIN) capsule 800 mg, 800 mg, Oral, Q12H, Ramo, Kimmie, APRN  •  glucagon (human recombinant) (GLUCAGEN DIAGNOSTIC) injection 1 mg, 1 mg, Subcutaneous, Q15 Min PRN, Ramo, Kimmie, APRN  •  heparin 86038 units/250 mL (100 units/mL) in 0.45 % NaCl infusion, 7 Units/kg/hr, Intravenous, Titrated, JoanRolando doe, DO  •  insulin regular (humuLIN R,novoLIN R) injection 0-7 Units, 0-7 Units, Subcutaneous, Q6H,  Rolando Franco, DO  •  lactated ringers infusion, 75 mL/hr, Intravenous, Continuous, Rolando Franco, DO, Last Rate: 75 mL/hr at 11/17/21 0502, 75 mL/hr at 11/17/21 0502  •  minocycline (MINOCIN,DYNACIN) capsule 100 mg, 100 mg, Oral, BID, Ramo, Kimmie, APRN  •  ondansetron (ZOFRAN) tablet 4 mg, 4 mg, Oral, Q6H PRN **OR** ondansetron (ZOFRAN) injection 4 mg, 4 mg, Intravenous, Q6H PRN, Ramo, Kimmie, APRN  •  Pharmacy to Dose Heparin, , Does not apply, Continuous PRN, Rolando Franco,   •  Sodium Chloride (PF) 0.9 % 10 mL, 10 mL, Intravenous, PRN, Paramjit Martinez MD    Current Outpatient Medications:   •  amLODIPine (NORVASC) 10 MG tablet, Take 1 tablet by mouth Every Morning., Disp: , Rfl:   •  aspirin 81 MG EC tablet, Take 81 mg by mouth daily., Disp: , Rfl:   •  atorvastatin (LIPITOR) 40 MG tablet, Take 40 mg by mouth Every Night., Disp: , Rfl:   •  carvedilol (COREG) 25 MG tablet, Take 25 mg by mouth 2 (two) times a day with meals., Disp: , Rfl:   •  furosemide (LASIX) 20 MG tablet, Take 1 tablet by mouth Daily., Disp: 90 tablet, Rfl: 2  •  gabapentin (NEURONTIN) 800 MG tablet, Take 800 mg by mouth 3 (Three) Times a Day., Disp: , Rfl:   •  glipizide (GLUCOTROL) 10 MG tablet, Take 10 mg by mouth 2 (Two) Times a Day Before Meals., Disp: , Rfl:   •  insulin NPH-insulin regular (NOVOLIN 70/30) (70-30) 100 UNIT/ML injection, Inject 5 Units under the skin into the appropriate area as directed 2 (Two) Times a Day With Meals. (Patient taking differently: Inject 15 Units under the skin into the appropriate area as directed 2 (Two) Times a Day With Meals.), Disp: 10 mL, Rfl: 0  •  lisinopril (PRINIVIL,ZESTRIL) 20 MG tablet, Take 20 mg by mouth 2 (two) times a day., Disp: , Rfl:   •  minocycline (MINOCIN,DYNACIN) 100 MG capsule, Take 1 capsule by mouth 2 (Two) Times a Day., Disp: , Rfl:   •  potassium chloride ER (K-TAB) 20 MEQ tablet controlled-release ER tablet, TAKE ONE TABLET BY MOUTH DAILY WITH LASIX, Disp:  "90 tablet, Rfl: 2  •  warfarin (COUMADIN) 5 MG tablet, TAKE ONE AND ONE-HALF TO TWO TABLETS BY MOUTH DAILY AS DIRECTED BY CLINIC, Disp: 60 tablet, Rfl: 3  •  metFORMIN (GLUCOPHAGE) 500 MG tablet, Take 2 tablets by mouth Every Evening., Disp: , Rfl:       Family History:  Family History   Problem Relation Age of Onset   • Heart disease Mother    • Hyperlipidemia Mother    • Diabetes Mother    • Leukemia Father    • Cancer Sister    • Kidney failure Brother    • Hepatitis Brother    • Cancer Sister    • Cancer Brother    • Heart disease Brother    • Heart attack Brother        Social History: [place] lives in [place]    Tobacco use: Denies     EtOH use : Denies    Illicit drug use: Denies       Review of Systems:   Constitutional: No fevers, chills or malaise   Eyes: Denies visual changes    Cardiovascular: Denies chest pain, palpitations   Pulmonary: Denies cough or shortness of breath   Abdominal/ GI: See HPI    Genitourinary: Denies dysuria or hematuria   Musculoskeletal: Denies any but chronic joint aches, pains or deformities   Psychiatric: No recent mood changes   Neurologic: No paresthesias or loss of function    /62   Pulse 76   Temp 97.6 °F (36.4 °C) (Oral)   Resp 18   Ht 190.5 cm (75\")   Wt 136 kg (300 lb)   SpO2 93%   BMI 37.50 kg/m²   Body mass index is 37.5 kg/m².    Gen: Awake, alert, resting in bed, no acute distress  Head: Normocephalic, atraumatic.   Eyes: Pupils equal, round, react to light and accommodation.   Mouth: Oral mucosa without lesions,   Neck: No masses, lymphadenopathy or carotid bruits bilaterally   CV: Rhythm and rate regular, no murmurs, rubs or gallops  Lungs: Clear to auscultation bilaterally, not labored on room air   Abdomen: Obese, soft, not tender to palpation throughout the abdomen, midline scar, palpable periumbilical bulge that extends inferiorly on the right side, not definitely reducible, no significant tenderness to palpation over the site, no skin changes on " the abdomen  Groin : No obvious hernias bilaterally   Extremities:  No cyanosis, clubbing or edema bilaterally.  Black discoloration of the bilateral lower extremities below the knee but no obvious open wounds  Lymphatics: No abnormal lymphadenopathy appreciated   Neurologic: No gross deficits         CBC  Results from last 7 days   Lab Units 11/17/21  0601   WBC 10*3/mm3 10.93*   HEMOGLOBIN g/dL 14.4   HEMATOCRIT % 45.5   PLATELETS 10*3/mm3 266       CMP  Results from last 7 days   Lab Units 11/17/21  0601   SODIUM mmol/L 138   POTASSIUM mmol/L 4.9   CHLORIDE mmol/L 102   CO2 mmol/L 24.0   BUN mg/dL 24*   CREATININE mg/dL 1.68*   CALCIUM mg/dL 7.7*   BILIRUBIN mg/dL 1.1   ALK PHOS U/L 86   ALT (SGPT) U/L 21   AST (SGOT) U/L 31   GLUCOSE mg/dL 200*       Radiology  Imaging Results (Last 72 Hours)     Procedure Component Value Units Date/Time    XR Abdomen KUB [[id number]] Collected: 11/17/21 0529     Updated: 11/17/21 0532    Narrative:      CR Abdomen 1 Vw    INDICATION:   NG tube placement.    COMPARISON:   None available    FINDINGS:  AP radiograph(s) of the abdomen. Tip of an NG tube is in the proximal gastric body. Visualized bowel gas pattern is within normal limits. There is excreted IV contrast in the kidneys from the patient's prior CT scan.      Impression:      NG tube tip in the stomach.    Signer Name: Angelo Pimentel MD   Signed: 11/17/2021 5:29 AM   Workstation Name: [id number]    [facility]    CT Abdomen Pelvis With Contrast [[id number]] Resulted: 11/17/21 0404     Updated: 11/17/21 0405              Results Review: I have personally reviewed all of the recent lab and imaging results available at this time.     Assessment:  Mr. Farias is a 66-year-old gentleman with history of diabetes, diastolic heart failure, mechanical aortic valve on Coumadin, hypertension, and history of colon cancer status post resection with concern for small bowel obstruction related to incarcerated  ventral hernia.  Vital signs are stable.  Labs demonstrate a white blood cell count of 10.9 with no left shift, albumin of 3.2, elevated creatinine of 1.6.  I have personally reviewed his CT scan of the abdomen and pelvis, this demonstrates a fairly large ventral hernia that contains portions of small bowel, there is appears to be a transition point as the small bowel enters the hernia, there is no definite pneumatosis, free fluid, or evidence of perforation.  He has a very obese abdomen with some rectus diastases as well.  He does not appear clinically obstructed as he has passed flatus and had a bowel movement this morning shortly before I evaluated him and has only had 100 cc from his NG tube which was placed.  He is high risk for hernia repair and would be at a very high risk of hernia recurrence given his abdominal obesity, chronic Acinetobacter infection, uncontrolled diabetes, history of heart disease, and urgent nature of the repair.  I am concerned that he will require operative management given his progressive symptoms over the last several months.  We will plan to obtain a small bowel follow-through today to see if his obstruction has resolved and will also work to reverse his anticoagulation.  If small bowel follow-through shows no evidence of obstruction may be able to manage this nonoperatively, however I think it is likely he will require surgery    Plan:  -Small bowel follow-through today to assess for continued obstruction  -As needed pain control and antiemetics  -Recommend reversal of his warfarin.  Discussed with Dr. Jasso who will order FFP      I discussed the patient's findings and my recommendations with the patient and/or family, as well as the primary team     Juan Lowe MD  11/17/21  08:47 EST

## 2021-11-17 NOTE — ED PROVIDER NOTES
[facility]    EMERGENCY DEPARTMENT ENCOUNTER      Pt Name: Cecilio Farias  MRN: [id number]  YOB: 1955  Date of evaluation: 11/16/2021  Provider: Paramjit Martinez MD    CHIEF COMPLAINT       Chief Complaint   Patient presents with   • Abdominal Pain         HISTORY OF PRESENT ILLNESS  (Location/Symptom, Timing/Onset, Context/Setting, Quality, Duration, Modifying Factors, Severity.)   Cecilio Farias is a 66 y.o. male who presents to the emergency department with acute onset moderate severity generalized abdominal pain has been progressively worsening over the course the past day with associated vomiting and no modifying factors, however there is no associated fever, chills, chest pain, shortness of breath, diarrhea.  Patient states he is also been constipated for the past 3 days.  He Opry previous history of resection of colon cancer at age 42 but no other prior abdominal surgeries.  He does use Coumadin due to mechanical valve replacement.      Nursing notes were reviewed.    REVIEW OF SYSTEMS    (2-9 systems for level 4, 10 or more for level 5)   ROS:  General:  No fevers, no chills, no weakness  Cardiovascular:  No chest pain, no palpitations  Respiratory:  No shortness of breath, no cough, no wheezing  Gastrointestinal: Abdominal pain, vomiting  Musculoskeletal:  No muscle pain, no joint pain  Skin:  No rash  Neurologic:  No speech problems, no headache, no extremity numbness, no extremity tingling, no extremity weakness  Psychiatric:  No anxiety  Genitourinary:  No dysuria, no hematuria    Except as noted above the remainder of the review of systems was reviewed and negative.       PAST MEDICAL HISTORY     Past Medical History:   Diagnosis Date   • Cancer (HCC)     colon cancer with operation/ chemotherapy/radiation    • CHF (congestive heart failure) (HCC) 2009    Acute Systolic    • Chronic anticoagulation     coumadin   • Diabetes mellitus (HCC)    • H/O aortic valve replacement    • HLD  (hyperlipidemia)    • Hypertension    • Sepsis due to Acinetobacter species (HCC) 6/13/2019         SURGICAL HISTORY       Past Surgical History:   Procedure Laterality Date   • AORTIC VALVE REPAIR/REPLACEMENT  2009    25 mm. [place] AVR   • ASCENDING AORTIC ANEURYSM REPAIR  2009   • BACK SURGERY     • CARDIAC CATHETERIZATION N/A 1/20/2021    Procedure: RIGHT AND LEFT HEART CATH;  Surgeon: Kurtis Smith MD;  Location: [facility] CATH INVASIVE LOC[facility];  Service: Cardiology;  Laterality: N/A;   • COLON SURGERY      Colon cancer with operation   • FOOT SURGERY      bilateral 2/2 wound and trauma          CURRENT MEDICATIONS       Current Facility-Administered Medications:   •  atorvastatin (LIPITOR) tablet 40 mg, 40 mg, Oral, Nightly, Ramo, Kimmie, APRN  •  dextrose (D50W) (25 g/50 mL) IV injection 25 g, 25 g, Intravenous, Q15 Min PRN, Ramo, Kimmie, APRN  •  dextrose (GLUTOSE) oral gel 15 g, 15 g, Oral, Q15 Min PRN, Ramo, Kimmie, APRN  •  gabapentin (NEURONTIN) capsule 800 mg, 800 mg, Oral, Q12H, Ramo, Kimmie, APRN  •  glucagon (human recombinant) (GLUCAGEN DIAGNOSTIC) injection 1 mg, 1 mg, Subcutaneous, Q15 Min PRN, Ramo, Kimmie, APRN  •  heparin 07354 units/250 mL (100 units/mL) in 0.45 % NaCl infusion, 4 Units/kg/hr, Intravenous, Titrated, Rupert Nixon, PharmD, Last Rate: 5.44 mL/hr at 11/17/21 1613, 4 Units/kg/hr at 11/17/21 1613  •  insulin regular (humuLIN R,novoLIN R) injection 0-7 Units, 0-7 Units, Subcutaneous, Q6H, Rolando Franco, DO  •  lactated ringers infusion, 75 mL/hr, Intravenous, Continuous, Rolando Franco, DO, Last Rate: 75 mL/hr at 11/17/21 1140, 75 mL/hr at 11/17/21 1140  •  minocycline (MINOCIN,DYNACIN) capsule 100 mg, 100 mg, Oral, BID, Ramo, Kimmie, APRN  •  ondansetron (ZOFRAN) tablet 4 mg, 4 mg, Oral, Q6H PRN **OR** ondansetron (ZOFRAN) injection 4 mg, 4 mg, Intravenous, Q6H PRN, Kimmie Ralph, APRN  •  Pharmacy to Dose Heparin, , Does not apply, Continuous  PRN, Rolando Franco, DO  •  Sodium Chloride (PF) 0.9 % 10 mL, 10 mL, Intravenous, PRN, Paramjit Martinez MD    ALLERGIES     Sulfa antibiotics and Amoxicillin    FAMILY HISTORY       Family History   Problem Relation Age of Onset   • Heart disease Mother    • Hyperlipidemia Mother    • Diabetes Mother    • Leukemia Father    • Cancer Sister    • Kidney failure Brother    • Hepatitis Brother    • Cancer Sister    • Cancer Brother    • Heart disease Brother    • Heart attack Brother           SOCIAL HISTORY       Social History     Socioeconomic History   • Marital status:    Tobacco Use   • Smoking status: Never Smoker   • Smokeless tobacco: Never Used   Vaping Use   • Vaping Use: Never used   Substance and Sexual Activity   • Alcohol use: No   • Drug use: No   • Sexual activity: Defer         PHYSICAL EXAM    (up to 7 for level 4, 8 or more for level 5)     Vitals:    11/17/21 1315 11/17/21 1330 11/17/21 1341 11/17/21 1426   BP: 145/65 130/80  147/81   BP Location:    Right arm   Patient Position:    Lying   Pulse:   76 84   Resp:   16 18   Temp:    98.1 °F (36.7 °C)   TempSrc:    Oral   SpO2: 93% 92% 96% 100%   Weight:       Height:           Physical Exam  General: Awake, alert, no acute distress.  HEENT: Conjunctivae normal.  Neck: Trachea midline.  Cardiac: Heart regular rate, rhythm, no murmurs, rubs, or gallops  Lungs: Lungs are clear to auscultation, there is no wheezing, rhonchi, or rales. There is no use of accessory muscles.  Chest wall: There is no tenderness to palpation over the chest wall or over ribs  Abdomen: Reducible ventral abdominal wall hernia that is not indurated and without any overlying erythema.  The abdomen is otherwise moderately distended and generally tender.  Musculoskeletal: No deformity.  Neuro: Alert and oriented x 4.  Dermatology: Skin is warm and dry  Psych: Mentation is grossly normal, cognition is grossly normal. Affect is appropriate.        DIAGNOSTIC RESULTS     EKG:  All EKGs are interpreted by the Emergency Department Physician who either signs or Co-signs this chart in the absence of a cardiologist.    NSR, no acute ST/T changes, normal intervals, no ectopy    RADIOLOGY:   Non-plain film images such as CT, Ultrasound and MRI are read by the radiologist. Plain radiographic images are visualized and preliminarily interpreted by the emergency physician with the below findings:      [x] Radiologist's Report Reviewed:  XR Abdomen KUB   Final Result   NG tube tip in the stomach.      Signer Name: Angelo Pimentel MD    Signed: 11/17/2021 5:29 AM    Workstation Name: [facility]     [facility]      CT Abdomen Pelvis With Contrast   Final Result       Complete small bowel obstruction with transition point identified in the   lower anterior abdomen at site of a ventral hernia defect as above. No   bowel ischemia or perforation.           This report was finalized on 11/17/2021 10:57 AM by Gregory Arana MD.          FL Small Bowel Follow Through Single-Contrast    (Results Pending)         ED BEDSIDE ULTRASOUND:   Performed by ED Physician - none    LABS:    I have reviewed and interpreted all of the currently available lab results from this visit (if applicable):  Results for orders placed or performed during the hospital encounter of 11/16/21   COVID-19, [facility]OTT IN-HOUSE,NASAL Swab (NO TRANSPORT MEDIA) 2 HR TAT - Swab, Nasopharynx    Specimen: Nasopharynx; Swab   Result Value Ref Range    COVID19 Presumptive Negative Presumptive Negative - Ref. Range   Protime-INR    Specimen: Blood   Result Value Ref Range    Protime 27.4 (H) 11.4 - 14.4 Seconds    INR 2.68 (H) 0.85 - 1.16   Comprehensive Metabolic Panel    Specimen: Blood   Result Value Ref Range    Glucose 228 (H) 65 - 99 mg/dL    BUN 22 8 - 23 mg/dL    Creatinine 1.60 (H) 0.76 - 1.27 mg/dL    Sodium 137 136 - 145 mmol/L    Potassium 4.7 3.5 - 5.2 mmol/L    Chloride 95 (L) 98 - 107 mmol/L    CO2 25.0 22.0 -  29.0 mmol/L    Calcium 9.2 8.6 - 10.5 mg/dL    Total Protein 7.7 6.0 - 8.5 g/dL    Albumin 4.30 3.50 - 5.20 g/dL    ALT (SGPT) 29 1 - 41 U/L    AST (SGOT) 44 (H) 1 - 40 U/L    Alkaline Phosphatase 113 39 - 117 U/L    Total Bilirubin 1.6 (H) 0.0 - 1.2 mg/dL    eGFR Non African Amer 44 (L) >60 mL/min/1.73    Globulin 3.4 gm/dL    A/G Ratio 1.3 g/dL    BUN/Creatinine Ratio 13.8 7.0 - 25.0    Anion Gap 17.0 (H) 5.0 - 15.0 mmol/L   Lipase    Specimen: Blood   Result Value Ref Range    Lipase 48 13 - 60 U/L   Lactic Acid, Plasma    Specimen: Blood   Result Value Ref Range    Lactate 3.3 (C) 0.5 - 2.0 mmol/L   CBC Auto Differential    Specimen: Blood   Result Value Ref Range    WBC 12.06 (H) 3.40 - 10.80 10*3/mm3    RBC 5.53 4.14 - 5.80 10*6/mm3    Hemoglobin 15.4 13.0 - 17.7 g/dL    Hematocrit 47.7 37.5 - 51.0 %    MCV 86.3 79.0 - 97.0 fL    MCH 27.8 26.6 - 33.0 pg    MCHC 32.3 31.5 - 35.7 g/dL    RDW 15.8 (H) 12.3 - 15.4 %    RDW-SD 49.2 37.0 - 54.0 fl    MPV 10.4 6.0 - 12.0 fL    Platelets 311 140 - 450 10*3/mm3    Neutrophil % 77.7 (H) 42.7 - 76.0 %    Lymphocyte % 10.1 (L) 19.6 - 45.3 %    Monocyte % 10.0 5.0 - 12.0 %    Eosinophil % 1.3 0.3 - 6.2 %    Basophil % 0.5 0.0 - 1.5 %    Immature Grans % 0.4 0.0 - 0.5 %    Neutrophils, Absolute 9.37 (H) 1.70 - 7.00 10*3/mm3    Lymphocytes, Absolute 1.22 0.70 - 3.10 10*3/mm3    Monocytes, Absolute 1.20 (H) 0.10 - 0.90 10*3/mm3    Eosinophils, Absolute 0.16 0.00 - 0.40 10*3/mm3    Basophils, Absolute 0.06 0.00 - 0.20 10*3/mm3    Immature Grans, Absolute 0.05 0.00 - 0.05 10*3/mm3    nRBC 0.0 0.0 - 0.2 /100 WBC   Protime-INR    Specimen: Blood   Result Value Ref Range    Protime 28.3 (H) 11.4 - 14.4 Seconds    INR 2.79 (H) 0.85 - 1.16   aPTT    Specimen: Blood   Result Value Ref Range    PTT 37.6 22.0 - 39.0 seconds   BNP    Specimen: Blood   Result Value Ref Range    proBNP 157.1 0.0 - 900.0 pg/mL   Bilirubin, Direct    Specimen: Blood   Result Value Ref Range    Bilirubin,  Direct 0.3 0.0 - 0.3 mg/dL   Procalcitonin    Specimen: Blood   Result Value Ref Range    Procalcitonin 0.11 0.00 - 0.25 ng/mL   CBC Auto Differential    Specimen: Blood   Result Value Ref Range    WBC 10.93 (H) 3.40 - 10.80 10*3/mm3    RBC 5.10 4.14 - 5.80 10*6/mm3    Hemoglobin 14.4 13.0 - 17.7 g/dL    Hematocrit 45.5 37.5 - 51.0 %    MCV 89.2 79.0 - 97.0 fL    MCH 28.2 26.6 - 33.0 pg    MCHC 31.6 31.5 - 35.7 g/dL    RDW 15.9 (H) 12.3 - 15.4 %    RDW-SD 51.4 37.0 - 54.0 fl    MPV 10.4 6.0 - 12.0 fL    Platelets 266 140 - 450 10*3/mm3    Neutrophil % 73.4 42.7 - 76.0 %    Lymphocyte % 14.9 (L) 19.6 - 45.3 %    Monocyte % 10.0 5.0 - 12.0 %    Eosinophil % 0.7 0.3 - 6.2 %    Basophil % 0.5 0.0 - 1.5 %    Immature Grans % 0.5 0.0 - 0.5 %    Neutrophils, Absolute 8.03 (H) 1.70 - 7.00 10*3/mm3    Lymphocytes, Absolute 1.63 0.70 - 3.10 10*3/mm3    Monocytes, Absolute 1.09 (H) 0.10 - 0.90 10*3/mm3    Eosinophils, Absolute 0.08 0.00 - 0.40 10*3/mm3    Basophils, Absolute 0.05 0.00 - 0.20 10*3/mm3    Immature Grans, Absolute 0.05 0.00 - 0.05 10*3/mm3    nRBC 0.0 0.0 - 0.2 /100 WBC   Comprehensive Metabolic Panel    Specimen: Blood   Result Value Ref Range    Glucose 200 (H) 65 - 99 mg/dL    BUN 24 (H) 8 - 23 mg/dL    Creatinine 1.68 (H) 0.76 - 1.27 mg/dL    Sodium 138 136 - 145 mmol/L    Potassium 4.9 3.5 - 5.2 mmol/L    Chloride 102 98 - 107 mmol/L    CO2 24.0 22.0 - 29.0 mmol/L    Calcium 7.7 (L) 8.6 - 10.5 mg/dL    Total Protein 5.7 (L) 6.0 - 8.5 g/dL    Albumin 3.20 (L) 3.50 - 5.20 g/dL    ALT (SGPT) 21 1 - 41 U/L    AST (SGOT) 31 1 - 40 U/L    Alkaline Phosphatase 86 39 - 117 U/L    Total Bilirubin 1.1 0.0 - 1.2 mg/dL    eGFR Non African Amer 41 (L) >60 mL/min/1.73    Globulin 2.5 gm/dL    A/G Ratio 1.3 g/dL    BUN/Creatinine Ratio 14.3 7.0 - 25.0    Anion Gap 12.0 5.0 - 15.0 mmol/L   Hemoglobin A1c    Specimen: Blood   Result Value Ref Range    Hemoglobin A1C 8.50 (H) 4.80 - 5.60 %   Blood Gas, Arterial With Co-Ox     Specimen: Arterial Blood   Result Value Ref Range    Site Right Radial     Rodo's Test N/A     pH, Arterial 7.374 7.350 - 7.450 pH units    pCO2, Arterial 42.2 35.0 - 45.0 mm Hg    pO2, Arterial 71.7 (L) 83.0 - 108.0 mm Hg    HCO3, Arterial 24.6 20.0 - 26.0 mmol/L    Base Excess, Arterial -0.7 (L) 0.0 - 2.0 mmol/L    Hemoglobin, Blood Gas 12.8 (L) 13.5 - 17.5 g/dL    Hematocrit, Blood Gas 39.1 38.0 - 51.0 %    Oxyhemoglobin 90.8 (L) 94 - 99 %    Methemoglobin 1.20 0.00 - 1.50 %    Carboxyhemoglobin 1.5 0 - 2 %    CO2 Content 25.9 22 - 33 mmol/L    Temperature 37.0 C    Barometric Pressure for Blood Gas      Modality Room Air     FIO2 21 %    Rate 0 Breaths/minute    PIP 0 cmH2O    IPAP 0     EPAP 0     Note      pH, Temp Corrected 7.374 pH Units    pCO2, Temperature Corrected 42.2 35 - 48 mm Hg    pO2, Temperature Corrected 71.7 (L) 83 - 108 mm Hg   Heparin Anti-Xa    Specimen: Blood   Result Value Ref Range    Heparin Anti-Xa (UFH) 0.10 (L) 0.30 - 0.70 IU/ml   Heparin Anti-Xa    Specimen: Blood   Result Value Ref Range    Heparin Anti-Xa (UFH) >1.10 (C) 0.30 - 0.70 IU/ml   Protime-INR    Specimen: Blood   Result Value Ref Range    Protime 22.0 (H) 11.4 - 14.4 Seconds    INR 2.02 (H) 0.85 - 1.16   POC Glucose Once    Specimen: Blood   Result Value Ref Range    Glucose 186 (H) 70 - 130 mg/dL   Type & Screen    Specimen: Blood   Result Value Ref Range    ABO Type A     RH type Positive     Antibody Screen Negative     T&S Expiration Date 11/20/2021 11:59:59 PM    Prepare Fresh Frozen Plasma, 2 Units   Result Value Ref Range    Product Code G9700C16     Unit Number [id number]     UNIT  ABO A     UNIT  RH POS     Dispense Status IS     Blood Expiration Date 542237017027     Blood Type Barcode 6200     Product Code T9253D64     Unit Number [id number]     UNIT  ABO A     UNIT  RH POS     Dispense Status IS     Blood Expiration Date 202111222359     Blood Type Barcode 6200    Prepare Fresh Frozen Plasma, 2 Units    Result Value Ref Range    Product Code I4691V14     Unit Number [id number]     UNIT  ABO A     UNIT  RH POS     Dispense Status XM     Blood Expiration Date 202111222359     Blood Type Barcode 6200     Product Code C6416K78     Unit Number [id number]     UNIT  ABO A     UNIT  RH POS     Dispense Status XM     Blood Expiration Date 202111222359     Blood Type Barcode 6200    Green Top (Gel)   Result Value Ref Range    Extra Tube Hold for add-ons.    Lavender Top   Result Value Ref Range    Extra Tube hold for add-on    Gold Top - SST   Result Value Ref Range    Extra Tube Hold for add-ons.    Gray Top   Result Value Ref Range    Extra Tube Hold for add-ons.    Light Blue Top   Result Value Ref Range    Extra Tube hold for add-on    Gold Top - SST   Result Value Ref Range    Extra Tube Hold for add-ons.         All other labs were within normal range or not returned as of this dictation.      EMERGENCY DEPARTMENT COURSE and DIFFERENTIAL DIAGNOSIS/MDM:   Vitals:    Vitals:    11/17/21 1315 11/17/21 1330 11/17/21 1341 11/17/21 1426   BP: 145/65 130/80  147/81   BP Location:    Right arm   Patient Position:    Lying   Pulse:   76 84   Resp:   16 18   Temp:    98.1 °F (36.7 °C)   TempSrc:    Oral   SpO2: 93% 92% 96% 100%   Weight:       Height:           ED Course as of 11/17/21 1627   Wed Nov 17, 2021   0426 Patient presents with findings concerning for acute small bowel obstruction based on CT scan.  This is secondary to an incarcerated ventral abdominal wall hernia, however there is no CT evidence of strangulation and labs demonstrate no evidence of acidemia.  There is no evidence of alternate process, perforation on CT.  He is not systemically ill or septic at this time.  I was initially able to reduce the hernia and he was feeling better, however he unfortunately had recurrence and ongoing pain and nausea and so I elected to admit him to the hospitalist service, place an NG tube, and will have him  evaluated by surgery. [NS]      ED Course User Index  [NS] Paramjit Martinez MD         MEDICATIONS ADMINISTERED IN ED:  Medications   Sodium Chloride (PF) 0.9 % 10 mL (has no administration in time range)   lactated ringers infusion (75 mL/hr Intravenous Currently Infusing 11/17/21 1140)   atorvastatin (LIPITOR) tablet 40 mg (has no administration in time range)   gabapentin (NEURONTIN) capsule 800 mg (0 mg Oral Hold 11/17/21 1124)   minocycline (MINOCIN,DYNACIN) capsule 100 mg (0 mg Oral Hold 11/17/21 1125)   ondansetron (ZOFRAN) tablet 4 mg (has no administration in time range)     Or   ondansetron (ZOFRAN) injection 4 mg (has no administration in time range)   dextrose (GLUTOSE) oral gel 15 g (has no administration in time range)   dextrose (D50W) (25 g/50 mL) IV injection 25 g (has no administration in time range)   glucagon (human recombinant) (GLUCAGEN DIAGNOSTIC) injection 1 mg (has no administration in time range)   heparin 80584 units/250 mL (100 units/mL) in 0.45 % NaCl infusion (4 Units/kg/hr × 136 kg Intravenous New Bag 11/17/21 1613)   Pharmacy to Dose Heparin (has no administration in time range)   insulin regular (humuLIN R,novoLIN R) injection 0-7 Units (0 Units Subcutaneous Not Given 11/17/21 1437)   sodium chloride 0.9 % bolus 1,000 mL (0 mL Intravenous Stopped 11/17/21 0023)   Morphine sulfate (PF) 4 MG/ML injection  - ADS Override Pull (  Given During Downtime 11/17/21 0114)   iopamidol (ISOVUE-300) 61 % injection 100 mL (100 mL Intravenous Given 11/17/21 0118)   sodium chloride 0.9 % bolus 1,000 mL (0 mL Intravenous Stopped 11/17/21 0511)   lactated ringers bolus 1,000 mL (0 mL Intravenous Stopped 11/17/21 0939)   phytonadione (AQUA-MEPHYTON, VITAMIN K) 5 mg in sodium chloride 0.9 % 50 mL IVPB (0 mg Intravenous Stopped 11/17/21 0740)   lactated ringers bolus 500 mL (0 mL Intravenous Stopped 11/17/21 0708)   diatrizoate meglumine-sodium (GASTROGRAFIN) 66-10 % oral solution 240 mL (240 mL  Nasogastric Given 11/17/21 1131)           FINAL IMPRESSION      1. Small bowel obstruction (HCC)    2. Ventral hernia with obstruction and without gangrene    3. Non-intractable vomiting with nausea, unspecified vomiting type    4. Anticoagulated on Coumadin          DISPOSITION/PLAN     ED Disposition     ED Disposition Condition Comment    Decision to Admit  Level of Care: Telemetry [5]   Diagnosis: SBO (small bowel obstruction) (HCC) [128298]   Admitting Physician: CARL URRUTIA [[id number]]   Attending Physician: CARL URRUTIA [[id number]]   Bed Request Comments: fly Martinez MD  Attending Emergency Physician               Paramjit Martinez MD  11/17/21 1665

## 2021-11-17 NOTE — H&P
[facility]  HISTORY AND PHYSICAL    Patient Name: Cecilio Farias  : 1955  MRN: [id number]  Primary Care Physician: America Everett DO  Date of admission: 2021    Subjective   Subjective     Chief Complaint:  Abdominal pain, nausea/vomiting     HPI:  Cecilio Farias is a 66 y.o. 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  Patient reports intermittent throbbing pain over the past two weeks but has significantly worsened over the past 2-3 days.  In addition he reports nausea and vomiting with inability to eat or drink in 2-3 days.  He does have a ventral hernia in which he has had for many years.  He has a history of colon cancer and is s/p colon resection (24 years ago.) He denies any cough, chest pain, shortness of air, dizziness or palpitations.  CT of abdomen and pelvis obtained tonight notes SBO  With transition point in ventral hernia.  Patient will be admitted to [facility] under the care of the Hospitalist for further evaluation and treatment.      Case was discussed with Dr. REYNA who recommends reversal of INR.  2 units of FFP as well as 5 IV of vitamin K have been ordered.  Heparin drip has been ordered for bridging purposes.      COVID Details:    Symptoms:    [x] NONE [] Fever []  Cough [] Shortness of breath [] Change in taste/smell      The patient has started, but not completed, their COVID-19 vaccination series.      Review of Systems   Constitutional: Positive for appetite change. Negative for activity change, chills, diaphoresis, fatigue, fever and unexpected weight change.   HENT: Negative.    Eyes: Negative for photophobia and visual disturbance.   Respiratory: Negative for cough  and shortness of breath.    Cardiovascular: Negative for chest pain, palpitations and leg swelling.   Gastrointestinal: Positive for abdominal pain, nausea and vomiting. Negative for abdominal distention, blood in stool, constipation and diarrhea.   Genitourinary: Negative.    Musculoskeletal: Negative.    Skin: Negative.    Neurological: Negative.    Psychiatric/Behavioral: Negative.         All other systems reviewed and are negative.     Personal History     Past Medical History:   Diagnosis Date   • Cancer (HCC)     colon cancer with operation/ chemotherapy/radiation    • CHF (congestive heart failure) (HCC) 2009    Acute Systolic    • Chronic anticoagulation     coumadin   • Diabetes mellitus (HCC)    • H/O aortic valve replacement    • HLD (hyperlipidemia)    • Hypertension    • Sepsis due to Acinetobacter species (HCC) 6/13/2019       Past Surgical History:   Procedure Laterality Date   • AORTIC VALVE REPAIR/REPLACEMENT  2009    25 mm. [place] AVR   • ASCENDING AORTIC ANEURYSM REPAIR  2009   • BACK SURGERY     • CARDIAC CATHETERIZATION N/A 1/20/2021    Procedure: RIGHT AND LEFT HEART CATH;  Surgeon: Kurtis Smith MD;  Location: [facility] CATH INVASIVE LOC[facility];  Service: Cardiology;  Laterality: N/A;   • COLON SURGERY      Colon cancer with operation   • FOOT SURGERY      bilateral 2/2 wound and trauma        Family History: family history includes Cancer in his brother, sister, and sister; Diabetes in his mother; Heart attack in his brother; Heart disease in his brother and mother; Hepatitis in his brother; Hyperlipidemia in his mother; Kidney failure in his brother; Leukemia in his father. Otherwise pertinent FHx was reviewed and unremarkable.     Social History:  reports that he has never smoked. He has never used smokeless tobacco. He reports that he does not drink alcohol and does not use drugs.  Social History     Social History Narrative   • Not on file       Medications:  amLODIPine, aspirin,  atorvastatin, carvedilol, furosemide, gabapentin, glipizide, insulin NPH-insulin regular, lisinopril, metFORMIN, minocycline, potassium chloride ER, and warfarin    Allergies   Allergen Reactions   • Sulfa Antibiotics    • Amoxicillin Rash     Has tolerated ceftriaxone       Objective   Objective     Vital Signs:   Temp:  [97.6 °F (36.4 °C)] 97.6 °F (36.4 °C)  Heart Rate:  [] 93  Resp:  [18] 18  BP: ()/() 95/65    Physical Exam  Vitals and nursing note reviewed.   Constitutional:       General: He is not in acute distress.     Appearance: Normal appearance. He is obese. He is not ill-appearing, toxic-appearing or diaphoretic.   HENT:      Head: Normocephalic and atraumatic.      Nose: Nose normal.      Mouth/Throat:      Mouth: Mucous membranes are dry.      Pharynx: Oropharynx is clear.   Eyes:      General: No scleral icterus.        Right eye: No discharge.         Left eye: No discharge.      Extraocular Movements: Extraocular movements intact.      Conjunctiva/sclera: Conjunctivae normal.      Pupils: Pupils are equal, round, and reactive to light.   Cardiovascular:      Rate and Rhythm: Tachycardia present. Rhythm irregular.      Pulses: Normal pulses.      Heart sounds: Normal heart sounds.      Comments: Bigeminal PAC's   Pulmonary:      Effort: Pulmonary effort is normal.      Breath sounds: Normal breath sounds.   Abdominal:      General: Bowel sounds are normal. There is no distension.      Palpations: Abdomen is soft. There is no mass.      Tenderness: There is abdominal tenderness. There is no right CVA tenderness, left CVA tenderness, guarding or rebound.      Hernia: A hernia is present.      Comments: Ventral hernia noted     Musculoskeletal:         General: No swelling, tenderness, deformity or signs of injury. Normal range of motion.      Cervical back: Normal range of motion and neck supple.      Right lower leg: No edema.      Left lower leg: No edema.   Skin:     General: Skin  is warm and dry.   Neurological:      General: No focal deficit present.      Mental Status: He is alert and oriented to person, place, and time. Mental status is at baseline.   Psychiatric:         Mood and Affect: Mood normal.         Behavior: Behavior normal.         Thought Content: Thought content normal.         Judgment: Judgment normal.            Results Reviewed:  I have personally reviewed most recent indicated data and agree with findings including:  [x]  Laboratory  [x]  Radiology  [x]  EKG/Telemetry  []  Pathology  []  Cardiac/Vascular Studies  []  Old records  []  Other:  Most pertinent findings include:      LAB RESULTS:      Lab 11/16/21  2253   WBC 12.06*   HEMOGLOBIN 15.4   HEMATOCRIT 47.7   PLATELETS 311   NEUTROS ABS 9.37*   IMMATURE GRANS (ABS) 0.05   LYMPHS ABS 1.22   MONOS ABS 1.20*   EOS ABS 0.16   MCV 86.3             Lab 11/16/21  2331   LIPASE 48                     Brief Urine Lab Results     None        Microbiology Results (last 10 days)     ** No results found for the last 240 hours. **          No radiology results from the last 24 hrs    Results for orders placed during the hospital encounter of 01/15/21    Transthoracic Echo Complete With Contrast if Necessary Per Protocol    Interpretation Summary  · The left atrium is dilated.  · There is mild concentric left ventricular hypertrophy.  · Global and segmental LV systolic function is normal. The calculated left ventricular ejection fraction is 53%.  · There is a mechanical aortic valve in the aortic position. Function appears to be normal.  · There is mitral annular calcification. Mild mitral regurgitation is seen. There appears to be some degree of chordal thickening with a 4.5 mmHg mean diastolic gradient across the mitral valve apparatus.  · Mild to moderate tricuspid regurgitation is noted. The estimated RV systolic pressure is elevated at 56 mmHg.  · No other important findings are noted on the study.    CT of the abdomen  pelvis shows small bowel obstruction with transition point identified at the superior aspect of ventral abdominal wall hernia with no evidence of bowel ischemia or perforation.  A few normal, decompressed small bowel loops distal of the obstruction are seen entering the inferior ventral hernia sac.        Assessment/Plan   Assessment & Plan       SBO (small bowel obstruction) (HCC)    Essential hypertension    Morbid obesity (HCC)    Type 2 diabetes mellitus (HCC)    History of mechanical AVR for aortic stenosis 2009    Chronic anticoagulation    History Acinetobacter bacteremia on chronic minocycline    PAF on chronic warfarin (CMS/HCC)    Discoloration of skin of bilateral legs likely due to minocycline use    Diastolic CHF, chronic (HCC)    66 year old male presents to the ED with worsening abdominal pain over the past 2-3 days.      1) SBO      Ventral hernia   -CT of abdomen and pelvis: CT of the abdomen pelvis shows small bowel obstruction with transition point identified at the superior aspect of ventral abdominal wall hernia with no evidence of bowel ischemia or perforation.  A few normal, decompressed small bowel loops distal of the obstruction are seen entering the inferior ventral hernia sac.  -NPO   -consult general surgery   -IVF  -NG tube to LWS  -pain control:monitor due to hypotension     2) HUMA  -creatinine 1.60  -likely due to hypovolemia   -hold nephrotoxic agents  -check urine studies   -IVF  -s/p 1L in the ED, getting second liter now. May need 3rd bolus but does have diastolic CHF    3) Aortic stenosis  -s/p mechanical aortic valve 2009  -on coumadin   -check PT/INR, patient has not been able to keep any medications down in a couple days   -pending INR may need to start heparin drip if subtherapeutic   -follows with Dr. Smith  -last echo noted above     4) Diastolic congestive heart failure  -echo 1/16/21 EF: 53%, mechanical aortic valve with normal function, MAC with mild MR, RVSP: 56 mmhg.   "  -strict I &O   -daily weight   -hold lasix for now     5) Diabetes mellitus type 2  -check hgb A1c   -start ldssi   -fingersticks q6    6) Acinetobacter sepsis 6/2019  -chronic minocycline  -follows with ID     7) Hx of colon cancer  -s/p partial resection     8) Essential hypertension   -on coreg and lisinopril: hold for now due to hypotension     9) Hyperlipidemia   -continue statin     10) chronic discoloration to LEs related to minocycline    DVT prophylaxis:  Scds    CODE STATUS:  Full code        This note has been completed as part of a split-shared workflow.     Signature: Electronically signed by PHAN Brock, 11/17/21, 4:55 AM EST.      Attending   Admission Attestation       I have seen and examined the patient, performing an independent face-to-face diagnostic evaluation with plan of care reviewed and developed with the advanced practice clinician (APC).      Brief Summary Statement:   Cecilio Farias is a 66 y.o. male with past medical history of essential hypertension, history of Acinetobacter bacteremia on chronic suppressive therapy with minocycline, type 2 diabetes, diastolic congestive heart failure, paroxysmal atrial fibrillation, history of aortic stenosis status post mechanical aortic valve repair in 2009 on chronic Coumadin therapy with target INR of 2.5-3.5, hyperlipidemia who presents to the ER with complaints of 3-day history of abdominal pain.    Patient reports over the past 2 to 3 days he has had increasing nausea and vomiting with a \"throbbing\" abdominal pain.  Seems to come and go somewhat, however is fairly intense at its peak.  He reports is worse as 9 out of 10 pain.  Currently a dull discomfort at 3 out of 10.  He has been unable to take any of his medications or eat or drink anything over the past 2 to 3 days.    Patient reports that he has a ventral hernia that has been present for several years.  Patient has history of exploratory laparotomy for which he underwent " partial colon resection 24 years ago.    Work-up in the ER tonight shows small bowel obstruction on CT with transition point at the area of ventral hernia with small bowel loops located within the hernia.  Successful reduction of the hernia was performed in the ER, however unfortunately patient had recurrence.  He continues to have persistent pain.  Reports some nausea.    Remainder of detailed HPI is as noted by APC and has been reviewed and/or edited by me for completeness.    Attending Physical Exam:  Constitutional: Awake, alert, appears ill, appears uncomfortable  Eyes: PERRLA, sclerae anicteric, no conjunctival injection  HENT: NCAT, mucous membranes moist  Neck: Supple, no thyromegaly, no lymphadenopathy, trachea midline  Respiratory: Clear to auscultation bilaterally, nonlabored respirations   Cardiovascular: tachy, regular, no murmurs, rubs, or gallops, palpable pedal pulses bilaterally  Gastrointestinal: Positive bowel sounds, soft, moderate tenderness in area of hernia without rebound or guarding, severe distension  Musculoskeletal: No bilateral ankle edema, no clubbing or cyanosis to extremities  Psychiatric: Appropriate affect, cooperative  Neurologic: Oriented x 3, strength symmetric in all extremities, Cranial Nerves grossly intact to confrontation, speech clear  Skin: chronic black/blueish discoloration to the LEs from chronic minocycline      Brief Assessment/Plan :  See detailed assessment and plan developed with APC which I have reviewed and/or edited for completeness.        Admission Status: I believe that this patient meets INPATIENT status due to SBO, chronic ac on warfarin for mech aortic valve and may need reversal for procedural intervention.  I feel patient’s risk for adverse outcomes and need for care warrant INPATIENT evaluation and I predict the patient’s care encounter to likely last beyond 2 midnights.        Rolando Franco,   11/17/21

## 2021-11-17 NOTE — PROGRESS NOTES
[facility]  ADMISSION FOLLOW-UP NOTE          Patient admitted after midnight, H&P by my partner performed earlier on today's date reviewed.  Interim findings, labs, and charting also reviewed.        The [facility] Problem List has been managed and updated to include any new diagnoses:  Active Hospital Problems    Diagnosis  POA   • **SBO (small bowel obstruction) (HCC) [K56.609]  Yes   • Diastolic CHF, chronic (HCC) [I50.32]  Yes   • Hyperbilirubinemia [E80.6]  Yes   • Chronic anticoagulation [Z79.01]  Not Applicable   • PAF on chronic warfarin (CMS/HCC) [I48.0]  Yes   • Discoloration of skin of bilateral legs likely due to minocycline use [L81.9]  Yes   • History Acinetobacter bacteremia on chronic minocycline [Z86.19]  Yes   • History of mechanical AVR for aortic stenosis 2009 [Z95.2]  Not Applicable   • Essential hypertension [I10]  Yes   • Morbid obesity (HCC) [E66.01]  Yes   • Type 2 diabetes mellitus (HCC) [E11.9]  Yes      Resolved Hospital Problems   No resolved problems to display.     66-year-old male with history of paroxysmal defibrillation, aortic stenosis with mechanical AVR, type 2 diabetes, hypertension, morbid obesity, chronic diastolic heart failure, history of Acinetobacter bacteremia on chronic minocycline.  Patient presents to the ED with 2 to 3 days of progressively worsening abdominal pain, found to have small bowel obstruction.  This morning he states that he is feeling okay, he did have a BM earlier this morning    Small bowel obstruction  -CT abdomen reveals SBO with transition point at the superior aspect of the ventral abdominal wall hernia, no evidence of bowel ischemia or perforation  -General surgery has been consulted, plan to get SBFT, may need surgical intervention for his ventral hernia  - NG tube remains in place, continue pain control, IV fluids and keep n.p.o.  -Patient mentions he did have a BM this morning    HUMA  -Baseline  Cr~1.0, currently 1.68, suspect likely prerenal  -Follow-up urine electrolytes, continue IV fluids, (cautious patient has underlying history of diastolic heart failure    Paroxysmal atrial fibrillation  History of aortic stenosis s/p mechanical AVR  -Patient on Coumadin at home, however has not taken his meds for the last few days  -INR is currently therapeutic at 2.79, discussed with [place], this needs to be reversed, he is s/p 5mg Vit K and 2 units FFP have been ordered. Heparin gtt order is also in place. Follow up repeat INR.    Otherwise continue plan of care as per admission H&P    Romero Bradshaw MD  11/17/21

## 2021-11-17 NOTE — PLAN OF CARE
Goal Outcome Evaluation:  Plan of Care Reviewed With: patient        Progress: improving  Outcome Summary: Pt VSS, 02 room air. No c/o pain. NG tube removed when patient arrived from ED. Liquid diet now. NPO after MN. Heparin to be stopped at MN for surgery. Gen Surg to perform a Ventral hernia repair with mesh tomorrow. Dr. Bradshaw notified of patient's diarrhea and wanted to test for Cdiff. Pt does not meet requirments per the Cdiff algorithm. UA sent. Heparin gtt decreased to 4 un/kg/hr. FFP infusing 1/2 units now. Pt up to [facility] independently.

## 2021-11-17 NOTE — CASE MANAGEMENT/SOCIAL WORK
Discharge Planning Assessment  [facility]     Patient Name: Cecilio Farias  MRN: [id number]  Today's Date: 11/17/2021    Admit Date: 11/16/2021     Discharge Needs Assessment     Row Name 11/17/21 0915       Living Environment    Lives With spouse; child(marysol), adult    Name(s) of Who Lives With Patient Lilibeth Farias (Spouse) [phone] (Mobile) and a college-age child on weekends    Potentially Unsafe Housing Conditions unable to assess    Primary Care Provided by self    Provides Primary Care For no one    Family Caregiver if Needed spouse    Family Caregiver Names Lilibeth Farias (Spouse) [phone] (Mobile)    Quality of Family Relationships helpful; involved; supportive    Able to Return to Prior Arrangements yes       Resource/Environmental Concerns    Transportation Concerns car, none       Discharge Needs Assessment    Readmission Within the Last 30 Days no previous admission in last 30 days    Concerns to be Addressed denies needs/concerns at this time    Anticipated Changes Related to Illness none    Equipment Needed After Discharge none    Provided Post Acute Provider List? N/A    N/A Provider List Comment denies need    Provided Post Acute Provider Quality & Resource List? N/A               Discharge Plan     Row Name 11/17/21 0983       Plan    Plan initial    Plan Comments Pt lives in [facility] with his wife. He states he is independent with ADL's, retired. He has no DME reported.  Hx includes HTN, Anemia, DM2, and CHF. Plan is home when ready. PCP is America Everett.    Final Discharge Disposition Code 30 - still a patient              Continued Care and Services - Admitted Since 11/16/2021    Coordination has not been started for this encounter.          Demographic Summary    No documentation.                Functional Status     Row Name 11/17/21 0910       Functional Status    Usual Activity Tolerance good       Functional Status, IADL    Medications independent    Meal Preparation  independent    Housekeeping independent    Laundry independent    Shopping independent       Mental Status    General Appearance WDL WDL       Mental Status Summary    Recent Changes in Mental Status/Cognitive Functioning no changes       Employment/    Employment Status retired               Psychosocial    No documentation.                Abuse/Neglect    No documentation.                Legal    No documentation.                Substance Abuse    No documentation.                Patient Forms    No documentation.                   Dayami Watkins RN

## 2021-11-17 NOTE — CONSULTS
Consult received for diabetes education. Chart reviewed. A1c was 8.5%. Prior to admission patient was taking Novolin 70/30 and Glipizide.Patient was last seen by diabetes education in 2019. Will continue to follow and attempt to see patient tomorrow for education as appropriate.

## 2021-11-18 LAB
ANION GAP SERPL CALCULATED.3IONS-SCNC: 10 MMOL/L (ref 5–15)
BASOPHILS # BLD AUTO: 0.02 10*3/MM3 (ref 0–0.2)
BASOPHILS NFR BLD AUTO: 0.4 % (ref 0–1.5)
BH BB BLOOD EXPIRATION DATE: NORMAL
BH BB BLOOD TYPE BARCODE: 6200
BH BB DISPENSE STATUS: NORMAL
BH BB PRODUCT CODE: NORMAL
BH BB UNIT NUMBER: NORMAL
BUN SERPL-MCNC: 15 MG/DL (ref 8–23)
BUN/CREAT SERPL: 18.5 (ref 7–25)
CALCIUM SPEC-SCNC: 8.5 MG/DL (ref 8.6–10.5)
CHLORIDE SERPL-SCNC: 105 MMOL/L (ref 98–107)
CO2 SERPL-SCNC: 23 MMOL/L (ref 22–29)
CREAT SERPL-MCNC: 0.81 MG/DL (ref 0.76–1.27)
DEPRECATED RDW RBC AUTO: 51.5 FL (ref 37–54)
EOSINOPHIL # BLD AUTO: 0.31 10*3/MM3 (ref 0–0.4)
EOSINOPHIL NFR BLD AUTO: 5.6 % (ref 0.3–6.2)
ERYTHROCYTE [DISTWIDTH] IN BLOOD BY AUTOMATED COUNT: 15.5 % (ref 12.3–15.4)
GFR SERPL CREATININE-BSD FRML MDRD: 96 ML/MIN/1.73
GLUCOSE BLDC GLUCOMTR-MCNC: 130 MG/DL (ref 70–130)
GLUCOSE BLDC GLUCOMTR-MCNC: 151 MG/DL (ref 70–130)
GLUCOSE BLDC GLUCOMTR-MCNC: 159 MG/DL (ref 70–130)
GLUCOSE BLDC GLUCOMTR-MCNC: 176 MG/DL (ref 70–130)
GLUCOSE SERPL-MCNC: 164 MG/DL (ref 65–99)
HCT VFR BLD AUTO: 37.4 % (ref 37.5–51)
HGB BLD-MCNC: 11.4 G/DL (ref 13–17.7)
IMM GRANULOCYTES # BLD AUTO: 0.03 10*3/MM3 (ref 0–0.05)
IMM GRANULOCYTES NFR BLD AUTO: 0.5 % (ref 0–0.5)
INR PPP: 1.31 (ref 0.85–1.16)
LYMPHOCYTES # BLD AUTO: 1.07 10*3/MM3 (ref 0.7–3.1)
LYMPHOCYTES NFR BLD AUTO: 19.5 % (ref 19.6–45.3)
MCH RBC QN AUTO: 27.7 PG (ref 26.6–33)
MCHC RBC AUTO-ENTMCNC: 30.5 G/DL (ref 31.5–35.7)
MCV RBC AUTO: 90.8 FL (ref 79–97)
MONOCYTES # BLD AUTO: 0.56 10*3/MM3 (ref 0.1–0.9)
MONOCYTES NFR BLD AUTO: 10.2 % (ref 5–12)
NEUTROPHILS NFR BLD AUTO: 3.5 10*3/MM3 (ref 1.7–7)
NEUTROPHILS NFR BLD AUTO: 63.8 % (ref 42.7–76)
NRBC BLD AUTO-RTO: 0 /100 WBC (ref 0–0.2)
PLATELET # BLD AUTO: 161 10*3/MM3 (ref 140–450)
PMV BLD AUTO: 10.3 FL (ref 6–12)
POTASSIUM SERPL-SCNC: 4.2 MMOL/L (ref 3.5–5.2)
PROTHROMBIN TIME: 15.8 SECONDS (ref 11.4–14.4)
QT INTERVAL: 436 MS
QTC INTERVAL: 499 MS
RBC # BLD AUTO: 4.12 10*6/MM3 (ref 4.14–5.8)
SODIUM SERPL-SCNC: 138 MMOL/L (ref 136–145)
UFH PPP CHRO-ACNC: 0.1 IU/ML (ref 0.3–0.7)
UFH PPP CHRO-ACNC: 0.56 IU/ML (ref 0.3–0.7)
UNIT  ABO: NORMAL
UNIT  RH: NORMAL
WBC NRBC COR # BLD: 5.49 10*3/MM3 (ref 3.4–10.8)

## 2021-11-18 PROCEDURE — 82962 GLUCOSE BLOOD TEST: CPT

## 2021-11-18 PROCEDURE — 85025 COMPLETE CBC W/AUTO DIFF WBC: CPT

## 2021-11-18 PROCEDURE — 93010 ELECTROCARDIOGRAM REPORT: CPT | Performed by: INTERNAL MEDICINE

## 2021-11-18 PROCEDURE — 63710000001 INSULIN REGULAR HUMAN PER 5 UNITS: Performed by: INTERNAL MEDICINE

## 2021-11-18 PROCEDURE — 25010000002 HEPARIN (PORCINE) 25000-0.45 UT/250ML-% SOLUTION: Performed by: SURGERY

## 2021-11-18 PROCEDURE — 93005 ELECTROCARDIOGRAM TRACING: CPT | Performed by: INTERNAL MEDICINE

## 2021-11-18 PROCEDURE — 85610 PROTHROMBIN TIME: CPT | Performed by: INTERNAL MEDICINE

## 2021-11-18 PROCEDURE — 85520 HEPARIN ASSAY: CPT | Performed by: SURGERY

## 2021-11-18 PROCEDURE — 25010000002 HEPARIN (PORCINE) 25000-0.45 UT/250ML-% SOLUTION

## 2021-11-18 PROCEDURE — 99232 SBSQ HOSP IP/OBS MODERATE 35: CPT | Performed by: INTERNAL MEDICINE

## 2021-11-18 PROCEDURE — G0108 DIAB MANAGE TRN  PER INDIV: HCPCS | Performed by: REGISTERED NURSE

## 2021-11-18 PROCEDURE — 25010000002 HEPARIN (PORCINE) PER 1000 UNITS

## 2021-11-18 PROCEDURE — 80048 BASIC METABOLIC PNL TOTAL CA: CPT | Performed by: SURGERY

## 2021-11-18 PROCEDURE — 85520 HEPARIN ASSAY: CPT

## 2021-11-18 RX ORDER — HEPARIN SODIUM 10000 [USP'U]/100ML
15 INJECTION, SOLUTION INTRAVENOUS
Status: DISCONTINUED | OUTPATIENT
Start: 2021-11-18 | End: 2021-11-19 | Stop reason: HOSPADM

## 2021-11-18 RX ORDER — WARFARIN SODIUM 7.5 MG/1
15 TABLET ORAL
Status: DISCONTINUED | OUTPATIENT
Start: 2021-11-18 | End: 2021-11-19 | Stop reason: HOSPADM

## 2021-11-18 RX ORDER — HEPARIN SODIUM 1000 [USP'U]/ML
5000 INJECTION, SOLUTION INTRAVENOUS; SUBCUTANEOUS ONCE
Status: COMPLETED | OUTPATIENT
Start: 2021-11-18 | End: 2021-11-18

## 2021-11-18 RX ADMIN — HEPARIN SODIUM 5000 UNITS: 1000 INJECTION, SOLUTION INTRAVENOUS; SUBCUTANEOUS at 12:47

## 2021-11-18 RX ADMIN — INSULIN HUMAN 2 UNITS: 100 INJECTION, SOLUTION PARENTERAL at 17:51

## 2021-11-18 RX ADMIN — GABAPENTIN 800 MG: 400 CAPSULE ORAL at 20:44

## 2021-11-18 RX ADMIN — HEPARIN SODIUM 12 UNITS/KG/HR: 10000 INJECTION, SOLUTION INTRAVENOUS at 17:57

## 2021-11-18 RX ADMIN — WARFARIN SODIUM 15 MG: 7.5 TABLET ORAL at 17:51

## 2021-11-18 RX ADMIN — GABAPENTIN 800 MG: 400 CAPSULE ORAL at 10:02

## 2021-11-18 RX ADMIN — MINOCYCLINE HYDROCHLORIDE 100 MG: 50 CAPSULE ORAL at 10:02

## 2021-11-18 RX ADMIN — ATORVASTATIN CALCIUM 40 MG: 40 TABLET, FILM COATED ORAL at 20:44

## 2021-11-18 RX ADMIN — HEPARIN SODIUM 8 UNITS/KG/HR: 10000 INJECTION, SOLUTION INTRAVENOUS at 07:33

## 2021-11-18 RX ADMIN — MINOCYCLINE HYDROCHLORIDE 100 MG: 50 CAPSULE ORAL at 20:44

## 2021-11-18 RX ADMIN — INSULIN HUMAN 2 UNITS: 100 INJECTION, SOLUTION PARENTERAL at 12:45

## 2021-11-18 NOTE — CONSULTS
Consult received for diabetes education. Chart reviewed. A1c was 8.5%. Spoke to patient and family at bedside. Patient is taking Novolin 70/30 15 units twice per day. He is taking his morning dose around 7 and not eating until 9 and his evening dose before bed (after dinner). We discussed how 70/30 works and how each insulin works with timing of his meals. Explained the insulin should be given about 30 minutes before meals. Patient plans to start to give both doses with his meals.     Patient is checking his blood sugar daily with fasting readings between 140-150's. We dicussed target BG goals per ADA. We also reviewed current A1c and goal of 7% or less to reduce complication from diabetes.Signs, symptoms and treatment of hyperglycemia and hypoglycemia were discussed. He has had hypoglycemia in the past, but not recently.    Lifestyle changes such as physical activity with MD approval and healthy eating were encouraged.  Patient is eating two meals a day. We discussed having a small snack or meal at lunch to help with blood sugar control. He stated he drinks water and diet pop. Patient was encouraged to keep record of blood glucose readings to take to follow up appointment with PCP.  OP education was also encouraged for additional education once discharged.

## 2021-11-18 NOTE — PROGRESS NOTES
[facility] Medicine Services  PROGRESS NOTE    Patient Name: Cecilio Farias  : 1955  MRN: [id number]    Date of Admission: 2021  Primary Care Physician: America Everett DO    Subjective   Subjective     CC:  SBO    HPI:  Denies abdominal pain, no nauses    ROS:  Gen- No fevers, chills  CV- No chest pain, palpitations  Resp- No cough, dyspnea  GI- No N/V/D, abd pain        Objective   Objective     Vital Signs:   Temp:  [97.1 °F (36.2 °C)-98 °F (36.7 °C)] 97.6 °F (36.4 °C)  Heart Rate:  [] 111  Resp:  [16-18] 18  BP: (102-170)/(48-95) 162/78     Physical Exam:  Constitutional - no acute distress, nontoxic, in bed  HEENT-NCAT, mucous membranes moist  CV-RRR, Ao valve click  Resp-CTAB, no wheezes, rhonchi or rales  Abd-soft, nontender, nondistended, normoactive bowel sounds, obese  Ext-No lower extremity cyanosis, clubbing or edema bilaterally  Neuro-alert and oriented, speech clear, moves all extremities   Psych-normal affect   Skin- No rash on exposed UE or LE bilaterally      Results Reviewed:  LAB RESULTS:      Lab 21  1114 21  1738 21  1351 21  0601 21  0507 21  2331 21  2253 21  2252   WBC 5.49  --   --  10.93*  --   --  12.06*  --    HEMOGLOBIN 11.4*  --   --  14.4  --   --  15.4  --    HEMATOCRIT 37.4*  --   --  45.5  --   --  47.7  --    PLATELETS 161  --   --  266  --   --  311  --    NEUTROS ABS 3.50  --   --  8.03*  --   --  9.37*  --    IMMATURE GRANS (ABS) 0.03  --   --  0.05  --   --  0.05  --    LYMPHS ABS 1.07  --   --  1.63  --   --  1.22  --    MONOS ABS 0.56  --   --  1.09*  --   --  1.20*  --    EOS ABS 0.31  --   --  0.08  --   --  0.16  --    MCV 90.8  --   --  89.2  --   --  86.3  --    PROCALCITONIN  --   --   --   --   --  0.11  --   --    LACTATE  --  1.9  --   --   --   --  3.3*  --    PROTIME 15.8*  --  22.0*  --  28.3*  --   --  27.4*   APTT  --   --   --   --  37.6  --   --   --     HEPARIN ANTI-XA 0.10* 0.10* >1.10*  --  0.10*  --   --   --          Lab 11/18/21  1114 11/17/21  0601 11/16/21  2331   SODIUM 138 138 137   POTASSIUM 4.2 4.9 4.7   CHLORIDE 105 102 95*   CO2 23.0 24.0 25.0   ANION GAP 10.0 12.0 17.0*   BUN 15 24* 22   CREATININE 0.81 1.68* 1.60*   GLUCOSE 164* 200* 228*   CALCIUM 8.5* 7.7* 9.2   HEMOGLOBIN A1C  --  8.50*  --          Lab 11/17/21  0601 11/16/21  2331   TOTAL PROTEIN 5.7* 7.7   ALBUMIN 3.20* 4.30   GLOBULIN 2.5 3.4   ALT (SGPT) 21 29   AST (SGOT) 31 44*   BILIRUBIN 1.1 1.6*   BILIRUBIN DIRECT  --  0.3   ALK PHOS 86 113   LIPASE  --  48         Lab 11/18/21  1114 11/17/21  1351 11/17/21  0507 11/16/21  2252   PROBNP  --   --  157.1  --    PROTIME 15.8* 22.0* 28.3* 27.4*   INR 1.31* 2.02* 2.79* 2.68*             Lab 11/17/21  0601   ABO TYPING A   RH TYPING Positive   ANTIBODY SCREEN Negative         Lab 11/17/21  0533   PH, ARTERIAL 7.374   PCO2, ARTERIAL 42.2   PO2 ART 71.7*   FIO2 21   HCO3 ART 24.6   BASE EXCESS ART -0.7*   CARBOXYHEMOGLOBIN 1.5     Brief Urine Lab Results  (Last result in the past 365 days)      Color   Clarity   Blood   Leuk Est   Nitrite   Protein   CREAT   Urine HCG        11/17/21 1651 Dark Yellow   Clear   Negative   Negative   Negative   30 mg/dL (1+)                 Microbiology Results Abnormal     Procedure Component Value - Date/Time    COVID PRE-OP / PRE-PROCEDURE SCREENING ORDER (NO ISOLATION) - Swab, Nasopharynx [[id number]]  (Normal) Collected: 11/17/21 0441    Lab Status: Final result Specimen: Swab from Nasopharynx Updated: 11/17/21 0527    Narrative:      The following orders were created for panel order COVID PRE-OP / PRE-PROCEDURE SCREENING ORDER (NO ISOLATION) - Swab, Nasopharynx.  Procedure                               Abnormality         Status                     ---------                               -----------         ------                     COVID-19, ABBOTT IN-HOUS...[[id number]]  Normal              Final result                  Please view results for these tests on the individual orders.    COVID-19, ABBOTT IN-HOUSE,NASAL Swab (NO TRANSPORT MEDIA) 2 HR TAT - Swab, Nasopharynx [[id number]]  (Normal) Collected: 11/17/21 0441    Lab Status: Final result Specimen: Swab from Nasopharynx Updated: 11/17/21 0527     COVID19 Presumptive Negative    Narrative:      Fact sheet for providers: https://www.fda.gov/media/145243/download     Fact sheet for patients: https://www.fda.gov/media/023662/download    Test performed by PCR.  If inconsistent with clinical signs and symptoms patient should be tested with different authorized molecular test.          CT Abdomen Pelvis With Contrast    Result Date: 11/17/2021  EXAMINATION: CT ABDOMEN PELVIS W CONTRAST-  INDICATION: abdominal pain, vomiting  TECHNIQUE: CT the abdomen and pelvis without IV contrast  COMPARISON: CT abdomen and pelvis 6/10/2019  FINDINGS:  There are multiple dilated fluid-filled loops of small bowel compatible with small bowel obstruction. A transition point is identified at the lower anterior abdomen were a loop of small bowel is seen within a small ventral hernia defect (series 2 image 72); small bowel downstream from this site appear decompressed. The colon is decompressed. There appear to be multiple small ventral abdominal wall hernia defects above and below the site; immediately inferior to the identified transition point, a few decompressed normal appearing distal small bowel loops are seen within a larger component of the ventral abdominal wall hernia sac (series 2 image 81). There is no evidence of bowel ischemia or perforation. The stomach is fluid dilated. Surgical small bowel anastomosis is seen in the right upper quadrant.  Unremarkable appearance of the liver. Numerous gallstones are seen within an otherwise unremarkable appearing gallbladder. The bile ducts are normal. Normal spleen with adjacent splenule. Unremarkable appearance of the pancreas and adrenal  glands. Mild atrophic appearance of the kidneys bilaterally. The ureters and bladder are unremarkable. Normal-appearing prostate and seminal vesicles. Mild atherosclerosis of the abdominal aorta without aneurysm. No bulky abdominopelvic adenopathy. No free intra-abdominal fluid, pneumoperitoneum, or fat stranding. No acute osseous findings.      Impression:  Complete small bowel obstruction with transition point identified in the lower anterior abdomen at site of a ventral hernia defect as above. No bowel ischemia or perforation.   This report was finalized on 11/17/2021 10:57 AM by Gregory Arana MD.      FL Small Bowel Follow Through Single-Contrast    Result Date: 11/17/2021  EXAMINATION: FL SMALL BOWEL FOLLOW THROUGH SINGLE-CONTRAST-  INDICATION: SBFT only, concern for obstruction related to ventral hernia, has had BM since CT scan, eval for continued obstruction  TECHNIQUE: 3 associated series were saved.  imaging reveals a nonobstructive bowel gas pattern. A single contrast study using water-soluble contrast was performed. Images of the small bowel were obtained at 30 minutes, and 75 minutes.  COMPARISON: NONE  FINDINGS: 30 minute film shows contrast opacification of multiple loops of mildly prominent proximal to mid small bowel. 75 minute film shows contrast opacification of multiple loops of mildly prominent proximal to mid small bowel, with a single loop of moderately dilated small bowel visible in the left midabdomen. This loop of bowel measures approximately 7 cm in diameter. There was contrast opacification of grossly normal-appearing distal small bowel, and colon at 75 minutes. Mild dilatation of the proximal to mid small bowel with grossly normal-appearing distal small bowel, and contrast seen within the colon likely represents a partial mid to distal small bowel obstruction. There was no evidence of complete small bowel obstruction. Further evaluation may be considered if clinically relevant.        Impression: Multiple loops of mildly prominent to moderately dilated proximal to mid small bowel with contrast opacification of the colon at 75 minutes likely represents a partial mid to distal small bowel obstruction. There was no evidence of complete small bowel obstruction. Further evaluation may be considered if clinically relevant.        XR Abdomen KUB    Result Date: 11/17/2021  CR Abdomen 1 Vw INDICATION: NG tube placement. COMPARISON: None available FINDINGS: AP radiograph(s) of the abdomen. Tip of an NG tube is in the proximal gastric body. Visualized bowel gas pattern is within normal limits. There is excreted IV contrast in the kidneys from the patient's prior CT scan.     Impression: NG tube tip in the stomach. Signer Name: Angelo Pimentel MD  Signed: 11/17/2021 5:29 AM  Workstation Name: [id number]  [facility]      Results for orders placed during the hospital encounter of 01/15/21    Transthoracic Echo Complete With Contrast if Necessary Per Protocol    Interpretation Summary  · The left atrium is dilated.  · There is mild concentric left ventricular hypertrophy.  · Global and segmental LV systolic function is normal. The calculated left ventricular ejection fraction is 53%.  · There is a mechanical aortic valve in the aortic position. Function appears to be normal.  · There is mitral annular calcification. Mild mitral regurgitation is seen. There appears to be some degree of chordal thickening with a 4.5 mmHg mean diastolic gradient across the mitral valve apparatus.  · Mild to moderate tricuspid regurgitation is noted. The estimated RV systolic pressure is elevated at 56 mmHg.  · No other important findings are noted on the study.      I have reviewed the medications:  Scheduled Meds:atorvastatin, 40 mg, Oral, Nightly  gabapentin, 800 mg, Oral, Q12H  insulin regular, 0-7 Units, Subcutaneous, TID AC  minocycline, 100 mg, Oral, BID  warfarin, 15 mg, Oral,  Daily      Continuous Infusions:heparin, 12 Units/kg/hr, Last Rate: 12 Units/kg/hr (11/18/21 1245)  Pharmacy to Dose Heparin,   Pharmacy to dose warfarin,       PRN Meds:.dextrose  •  dextrose  •  glucagon (human recombinant)  •  ondansetron **OR** ondansetron  •  Pharmacy to Dose Heparin  •  Pharmacy to dose warfarin  •  Sodium Chloride (PF)    Assessment/Plan   Assessment & Plan     Active Hospital Problems    Diagnosis  POA   • **SBO (small bowel obstruction) (HCC) [K56.609]  Yes   • Diastolic CHF, chronic (HCC) [I50.32]  Yes   • Hyperbilirubinemia [E80.6]  Yes   • Chronic anticoagulation [Z79.01]  Not Applicable   • PAF on chronic warfarin (CMS/HCC) [I48.0]  Yes   • Discoloration of skin of bilateral legs likely due to minocycline use [L81.9]  Yes   • History Acinetobacter bacteremia on chronic minocycline [Z86.19]  Yes   • History of mechanical AVR for aortic stenosis 2009 [Z95.2]  Not Applicable   • Essential hypertension [I10]  Yes   • Morbid obesity (HCC) [E66.01]  Yes   • Type 2 diabetes mellitus (HCC) [E11.9]  Yes      Resolved Hospital Problems   No resolved problems to display.        Brief Hospital Course to date:  66-year-old male with history of paroxysmal defibrillation, aortic stenosis with mechanical AVR, type 2 diabetes, hypertension, morbid obesity, chronic diastolic heart failure, history of Acinetobacter bacteremia on chronic minocycline.  Patient presents to the ED with 2 to 3 days of progressively worsening abdominal pain, found to have small bowel obstruction.       Small bowel obstruction  -CT abdomen revealed SBO with transition point at the superior aspect of the ventral abdominal wall hernia, no evidence of bowel ischemia or perforation  - patient with multiple bm's after SBFT  - tolerating liquid diet  - discussed with General Surgery Dr Lowe, if tolerating regular diet tomorrow, consider discharge home with followup in Surgery clinic, 1-2 weeks, to discuss elective  repair    PAF  Aortic stenosis s/p mechanical valve  - INR reversed emergently at admission in anticipation of need for surgery.  - on heparin drip, INR today 1.31  - coumadin resumed, discussed with PharmD  - consider home with lovenox bridge if acceptable from weight and renal function perspective.  Will need close followup with the coumadin clinic, however appears patient has been somewhat non-compliant with his visits.    HUMA  -Cr 1.60 at admission, suspect likely prerenal  -improved to 0.81 today     Paroxysmal atrial fibrillation  History of aortic stenosis s/p mechanical AVR    H/o Acinetobacter infection  - on chronic minocycline    DVT prophylaxis:  Medical and mechanical DVT prophylaxis orders are present.       AM-PAC 6 Clicks Score (PT): 21 (11/18/21 7340)    Disposition: I expect the patient to be discharged home in 1-2 days    CODE STATUS:   Code Status and Medical Interventions:   Ordered at: 11/17/21 9091     Level Of Support Discussed With:    Patient     Code Status (Patient has no pulse and is not breathing):    CPR (Attempt to Resuscitate)     Medical Interventions (Patient has pulse or is breathing):    Full Support       Nate Saunders MD  11/18/21

## 2021-11-18 NOTE — PROGRESS NOTES
Clinical Nutrition     Reason for Visit: Identified at risk by screening criteria, Need for education    Patient Name: Cecilio Farias  YOB: 1955  MRN: [id number]  Date of Encounter: 11/18/21 16:23 EST  Admission date: 11/16/2021    Nutrition Assessment   Admission Problem List:    SBO (small bowel obstruction) (HCC)    Essential hypertension    Morbid obesity (HCC)    Type 2 diabetes mellitus (HCC)    History of mechanical AVR for aortic stenosis 2009    Chronic anticoagulation    History Acinetobacter bacteremia on chronic minocycline    PAF on chronic warfarin (CMS/HCC)    Discoloration of skin of bilateral legs likely due to minocycline use    Diastolic CHF, chronic (HCC)    Hyperbilirubinemia    SBO -resolving    PMH:   He  has a past medical history of Cancer (HCC), CHF (congestive heart failure) (HCC) (2009), Chronic anticoagulation, Diabetes mellitus (HCC), H/O aortic valve replacement, HLD (hyperlipidemia), Hypertension, and Sepsis due to Acinetobacter species (HCC) (6/13/2019).  PSxH:  He  has a past surgical history that includes Aortic valve replacement (2009); Ascending aortic aneurysm repair (2009); Colon surgery; Back surgery; Foot surgery; and Cardiac catheterization (N/A, 1/20/2021).      Reported/Observed/Food/Nutrition Related History:     Pt resting in bed, reports he is passing a lot of gas, had bm yesterday + today, states that he tries to follow a diabetic diet, just learned today, that he should take his insulin with his meals      Anthropometrics   Height: 75in  Weight: 301lb  BMI: 37.6  BMI classification: Obese Class II: 35-39.9kg/m2     Labs reviewed     Results from last 7 days   Lab Units 11/18/21  1114 11/17/21  0601 11/17/21  0601   SODIUM mmol/L 138   < > 138   POTASSIUM mmol/L 4.2   < > 4.9   CHLORIDE mmol/L 105   < > 102   CO2 mmol/L 23.0   < > 24.0   BUN mg/dL 15   < > 24*   CREATININE mg/dL 0.81   < > 1.68*   CALCIUM mg/dL 8.5*   < > 7.7*    BILIRUBIN mg/dL  --   --  1.1   ALK PHOS U/L  --   --  86   ALT (SGPT) U/L  --   --  21   AST (SGOT) U/L  --   --  31   GLUCOSE mg/dL 164*   < > 200*    < > = values in this interval not displayed.       Results from last 7 days   Lab Units 11/18/21  1127 11/18/21  0723 11/17/21  1936 11/17/21  1723 11/17/21  0608   GLUCOSE mg/dL 176* 130 202* 165* 186*     Lab Results   Lab Value Date/Time    HGBA1C 8.50 (H) 11/17/2021 0601    HGBA1C 7.10 (H) 01/16/2021 0820       Medications reviewed   Pertinent:abx, coumadin, insulin, heparin    Intake/Ouptut 24 hrs (7:00AM - 6:59 AM)     Intake & Output (last day)       11/17 0701 11/18 0700 11/18 0701 11/19 0700    P.O. 240 525    I.V. (mL/kg) 900 (6.6)     Blood 1439     IV Piggyback 1550     Total Intake(mL/kg) 4129 (30.1) 525 (3.8)    Urine (mL/kg/hr) 100 (0)     Stool 0     Total Output 100     Net +4029 +525          Stool Unmeasured Occurrence 2 x                GI: + bm, flatus    SKIN:bruised    Current Nutrition Prescription     PO: Diet Regular; Low Fiber / Low Residue, GI Soft      Intake; 75% of 1 meal      Nutrition Diagnosis   Date: 11-18-21  Problem Food and nutrition knowledge deficit   Etiology Uncontrolled DM   Signs/Symptoms Ha1c 8.5     Nutrition Intervention   1.  Follow treatment progress, Advised available snacks, Interview for preferences, Menu provided, Menu adjusted, Education provided    Education provided on Diabetic Diet    Also discussed need for low residua/ low fiber diet at current time    Goal:   General: Nutrition support treatment  PO: Establish PO    Additional goals: Education    Monitoring/Evaluation:   Per protocol    Shana Dominguez RD  Time Spent: 45min

## 2021-11-18 NOTE — PROGRESS NOTES
HEPARIN INFUSION  Cecilio Farias is a  66 y.o. male receiving heparin infusion.             Therapy for (VTE/Cardiac):   Cardiac - mechanical valve, bridge off Coumadin  Patient Weight: 136 kg  Initial Bolus (Y/N):   N  Any Bolus (Y/N):   Y        Signs or Symptoms of Bleeding: N/A      Cardiac or Other (Not VTE)   Initial Bolus: 60 units/kg (Max 4,000 units)  Initial rate: 12 units/kg/hr (Max 1,000 units/hr)   Anti-Xa (IU/mL) Bolus Dose Stop Infusion Rate Change Repeat Anti-Xa      ?0.19 60 units/kg 0 hrs Increase rate by   4 units/kg/hr 6 hrs       0.2 - 0.29  30 units/kg 0 hrs Increase rate by 2 units/kg/hr 6 hrs    0.3 - 0.7 0 0 hrs No change 6 hrs      0.71 - 0.99 0  0 hrs Decrease rate by 2 units/kg/hr 6 hrs            ?1 0 Hold 1 hr Decrease rate by 3 units/kg/hr 6 hrs      Recommend Xa every 6 hours.       Results from last 7 days   Lab Units 11/17/21  1351 11/17/21  0601 11/17/21  0507 11/16/21  2253 11/16/21  2252   INR  2.02*  --  2.79*  --  2.68*   HEMOGLOBIN g/dL  --  14.4  --  15.4  --    HEMATOCRIT %  --  45.5  --  47.7  --    PLATELETS 10*3/mm3  --  266  --  311  --           Date   Time   Anti-Xa Current Rate (Unit/kg/hr) Bolus   (Units) Rate Change   (Unit/kg/hr) New Rate (Unit/kg/hr) Next   Anti-Xa Comments  Pump Check Daily   11/17 0507 0.1 New ---- +7 7 1200 Cuong CAMPOS - needs surgery; 2 units FFP + 5 mg Vit K for INR 2.68, heparin gtt bridge; cuong ED RN   11/17 1351 >1.1 7 -- -3 4 1800 CUONG RN   11/17 1730 0.1 4 -- +4 8 None Possible lab error with previous anti-Xa; Drip off at midnight for procedure; D/w ELIJAH Samaniego [facility]  11/17/2021  19:18 EST

## 2021-11-18 NOTE — PROGRESS NOTES
"Patient Name:  Cecilio Farias  YOB: 1955  [id number]    Surgery Progress Note    Date of visit: 11/18/2021      Subjective: No acute events overnight.  Had multiple bowel movements yesterday after the small bowel follow-through.  NG tube was removed.  He received a full liquid diet tray for dinner and was able to tolerate this without difficulty.  He reports that he has passed flatus multiple times overnight and had several loose bowel movements yesterday.  He denies any abdominal pain or discomfort this morning.  He reports to me that he had a good night.  Denies fevers or chills          Objective:     /67 (BP Location: Right arm, Patient Position: Lying)   Pulse 73   Temp 97.3 °F (36.3 °C) (Axillary)   Resp 17   Ht 190.5 cm (75\")   Wt (!) 137 kg (301 lb)   SpO2 93%   BMI 37.62 kg/m²     Intake/Output Summary (Last 24 hours) at 11/18/2021 0632  Last data filed at 11/18/2021 0548  Gross per 24 hour   Intake 4129 ml   Output 100 ml   Net 4029 ml       GEN:   Awake, alert, in no acute distress, resting comfortably in bed, chronically ill-appearing  CV:   Regular rate and rhythm  L:  Clear to auscultation bilaterally, not labored on room air   Abd:  Obese, soft, not tender to palpation throughout the abdomen including over the hernia site, there is a generalized bulge to the right of midline close to the umbilicus that extends inferiorly, this is not definitely reducible, there is no significant tenderness to palpation over the site, there are no skin changes  Ext:  No cyanosis, clubbing, or edema, discoloration of the bilateral lower extremities without obvious open wound    Recent labs that are back at this time have been reviewed.           Assessment/ Plan:    Mr. Farias is a 66-year-old gentleman with history of diabetes, diastolic heart failure, mechanical aortic valve on Coumadin, hypertension, and history of colon cancer status post resection with concern for small bowel " obstruction related to incarcerated ventral hernia    #Concern for bowel obstruction, ventral incisional hernia  -He had return of bowel function and relief of his symptoms shortly before I saw him yesterday morning.  A small bowel follow-through was completed yesterday and shows no obstruction with contrast opacification of the colon at 75 minutes.  He is not clinically obstructed at this time as he has had several loose bowel movements and tolerated diet yesterday without difficulty  -I initially had a high concern that this would require operative management, and we therefore worked to reverse his anticoagulation yesterday.  He is a high risk operative candidate for hernia repair related to his abdominal obesity and rectus diastases, diastolic heart failure, mechanical heart valve requiring chronic anticoagulation, uncontrolled diabetes with A1c of 8.5, and chronic Acinetobacter infection on antibiotics.  At this time his obstruction has resolved.  I do have some concern that he could potentially experience recurrence of his obstruction related to mechanical factors from his hernia, but I am hesitant to operate on him currently given his risk factors and no evidence of current obstruction.  -Will order regular low residue diet  -Okay to resume anticoagulation from my standpoint  -If tolerates regular diet can be discharged from my standpoint.  Can follow-up with me in the clinic in 1 to 2 weeks to discuss elective repair      Juan Lowe MD  11/18/2021  06:32 EST

## 2021-11-18 NOTE — PLAN OF CARE
Goal Outcome Evaluation:  Plan of Care Reviewed With: patient        Progress: no change  Outcome Summary: Second FFP administered this shift, without noted reaction. Bowel sound normal active. Denies pain. Several loose BM this shift. Vital stable. Patient npo after midnight. Hepain gtt increased at shift change and stopped at 0000 per surgen order. Sinus arrhythmia throughout shift. Bradycardic in the 40's occasionally.

## 2021-11-18 NOTE — PROGRESS NOTES
"Pharmacy Consult  -  Warfarin    Cecilio Farias is a  66 y.o. male   Height - 190.5 cm (75\")  Weight - (!) 137 kg (301 lb)    Consulting Provider: - Dr. Saunders   Indication: - Mechanical AVR   Goal INR: - 2.5-3.5  Home Regimen:   - 7.5 mg Tuesday, Thursday, Saturday, Sunday    - 10mg Monday, Wednesday, Friday     Bridge Therapy: Heparin drip     Drug-Drug Interactions with current regimen:  Minocycline (PTA) - can increase bleeding risk    Warfarin Dosing During Admission:    Date  11/18           INR  1.31           Dose  (15mg)                Education Provided: Previously educated, follows with [facility] anticoagulation clinic. Will educate on adherence.     Discharge Follow up:   Following Provider -  [facility]   Follow up time range or appointment -  2-3 days after discharge       Labs:    Results from last 7 days   Lab Units 11/18/21  1114 11/17/21  1351 11/17/21  0601 11/17/21  0507 11/16/21  2253 11/16/21  2252   INR  1.31* 2.02*  --  2.79*  --  2.68*   APTT seconds  --   --   --  37.6  --   --    HEMOGLOBIN g/dL 11.4*  --  14.4  --  15.4  --    HEMATOCRIT % 37.4*  --  45.5  --  47.7  --      Results from last 7 days   Lab Units 11/18/21  1114 11/17/21  0601 11/16/21  2331   SODIUM mmol/L 138 138 137   POTASSIUM mmol/L 4.2 4.9 4.7   CHLORIDE mmol/L 105 102 95*   CO2 mmol/L 23.0 24.0 25.0   BUN mg/dL 15 24* 22   CREATININE mg/dL 0.81 1.68* 1.60*   CALCIUM mg/dL 8.5* 7.7* 9.2   BILIRUBIN mg/dL  --  1.1 1.6*   ALK PHOS U/L  --  86 113   ALT (SGPT) U/L  --  21 29   AST (SGOT) U/L  --  31 44*   GLUCOSE mg/dL 164* 200* 228*       Current dietary intake: None recorded       Assessment/Plan:   Pharmacy to dose Warfarin for mechanical AVR with goal INR 2.5-3.5.   Patients INR is subtherapeutic at 1.31. Initiate Warfarin 15mg this evening due to patient receiving Vitamin K, anti-Xa level being subtherapeutic, and current INR.   Continue to bridge with heparin and monitor s/s of bleeding, drug interactions, dietary intake and " clinical status.   Follow daily INR and dose adjust.        Thank you  Tran Dawson, Pharmacy Intern  11/18/2021  13:07 EST    Rupert Nixon, PharmD  11/18/2021  13:16 EST

## 2021-11-18 NOTE — PROGRESS NOTES
HEPARIN INFUSION  Cecilio Farias is a  66 y.o. male receiving heparin infusion.             Therapy for (VTE/Cardiac):   Cardiac - mechanical valve, bridge off Coumadin  Patient Weight: 136 kg  Initial Bolus (Y/N):   N  Any Bolus (Y/N):   Y        Signs or Symptoms of Bleeding: N/A      Cardiac or Other (Not VTE)   Initial Bolus: 60 units/kg (Max 4,000 units)  Initial rate: 12 units/kg/hr (Max 1,000 units/hr)   Anti-Xa (IU/mL) Bolus Dose Stop Infusion Rate Change Repeat Anti-Xa      ?0.19 60 units/kg 0 hrs Increase rate by   4 units/kg/hr 6 hrs       0.2 - 0.29  30 units/kg 0 hrs Increase rate by 2 units/kg/hr 6 hrs    0.3 - 0.7 0 0 hrs No change 6 hrs      0.71 - 0.99 0  0 hrs Decrease rate by 2 units/kg/hr 6 hrs            ?1 0 Hold 1 hr Decrease rate by 3 units/kg/hr 6 hrs      Recommend Xa every 6 hours.       Results from last 7 days   Lab Units 11/18/21  1114 11/17/21  1351 11/17/21  0601 11/17/21  0507 11/16/21  2253 11/16/21  2252   INR  1.31* 2.02*  --  2.79*  --    < >   HEMOGLOBIN g/dL 11.4*  --  14.4  --  15.4  --    HEMATOCRIT % 37.4*  --  45.5  --  47.7  --    PLATELETS 10*3/mm3 161  --  266  --  311  --     < > = values in this interval not displayed.          Date   Time   Anti-Xa Current Rate (Unit/kg/hr) Bolus   (Units) Rate Change   (Unit/kg/hr) New Rate (Unit/kg/hr) Next   Anti-Xa Comments  Pump Check Daily   11/17 0507 0.1 New ---- +7 7 1200 Julio Cesar CAMPOS - needs surgery; 2 units FFP + 5 mg Vit K for INR 2.68, heparin gtt bridge; julio cesar ED RN   11/17 1351 >1.1 7 -- -3 4 1800 DW RN   11/17 1730 0.1 4 -- +4 8 N/A Possible lab error with previous anti-Xa; Drip off at midnight for procedure; D/w RN    11/18 0645  0 -- +8 8 1200 D/w RN, resume per surgeon (no surgery  scheduled)   11/18 1114 0.1 8 5000 +4 12 1800 D/w RN. Pump verified.                                                                                                                                                                                       Tran Dawson, Pharmacy Intern  11/18/2021  12:35 EST

## 2021-11-19 VITALS
HEIGHT: 75 IN | OXYGEN SATURATION: 96 % | BODY MASS INDEX: 37.3 KG/M2 | TEMPERATURE: 97.1 F | DIASTOLIC BLOOD PRESSURE: 69 MMHG | SYSTOLIC BLOOD PRESSURE: 132 MMHG | HEART RATE: 56 BPM | RESPIRATION RATE: 19 BRPM | WEIGHT: 300 LBS

## 2021-11-19 LAB
ANION GAP SERPL CALCULATED.3IONS-SCNC: 9 MMOL/L (ref 5–15)
BUN SERPL-MCNC: 13 MG/DL (ref 8–23)
BUN/CREAT SERPL: 17.6 (ref 7–25)
CALCIUM SPEC-SCNC: 8.1 MG/DL (ref 8.6–10.5)
CHLORIDE SERPL-SCNC: 106 MMOL/L (ref 98–107)
CO2 SERPL-SCNC: 24 MMOL/L (ref 22–29)
CREAT SERPL-MCNC: 0.74 MG/DL (ref 0.76–1.27)
DEPRECATED RDW RBC AUTO: 49.6 FL (ref 37–54)
ERYTHROCYTE [DISTWIDTH] IN BLOOD BY AUTOMATED COUNT: 15.3 % (ref 12.3–15.4)
GFR SERPL CREATININE-BSD FRML MDRD: 106 ML/MIN/1.73
GLUCOSE BLDC GLUCOMTR-MCNC: 145 MG/DL (ref 70–130)
GLUCOSE BLDC GLUCOMTR-MCNC: 263 MG/DL (ref 70–130)
GLUCOSE SERPL-MCNC: 132 MG/DL (ref 65–99)
HCT VFR BLD AUTO: 32.6 % (ref 37.5–51)
HGB BLD-MCNC: 10.6 G/DL (ref 13–17.7)
INR PPP: 1.42 (ref 0.85–1.16)
MCH RBC QN AUTO: 28.6 PG (ref 26.6–33)
MCHC RBC AUTO-ENTMCNC: 32.5 G/DL (ref 31.5–35.7)
MCV RBC AUTO: 87.9 FL (ref 79–97)
PLATELET # BLD AUTO: 131 10*3/MM3 (ref 140–450)
PMV BLD AUTO: 11.1 FL (ref 6–12)
POTASSIUM SERPL-SCNC: 4 MMOL/L (ref 3.5–5.2)
PROTHROMBIN TIME: 16.8 SECONDS (ref 11.4–14.4)
RBC # BLD AUTO: 3.71 10*6/MM3 (ref 4.14–5.8)
SODIUM SERPL-SCNC: 139 MMOL/L (ref 136–145)
UFH PPP CHRO-ACNC: 0.29 IU/ML (ref 0.3–0.7)
UFH PPP CHRO-ACNC: 0.47 IU/ML (ref 0.3–0.7)
WBC NRBC COR # BLD: 4.54 10*3/MM3 (ref 3.4–10.8)

## 2021-11-19 PROCEDURE — 85520 HEPARIN ASSAY: CPT

## 2021-11-19 PROCEDURE — 82962 GLUCOSE BLOOD TEST: CPT

## 2021-11-19 PROCEDURE — 85027 COMPLETE CBC AUTOMATED: CPT | Performed by: INTERNAL MEDICINE

## 2021-11-19 PROCEDURE — 99239 HOSP IP/OBS DSCHRG MGMT >30: CPT | Performed by: INTERNAL MEDICINE

## 2021-11-19 PROCEDURE — 85610 PROTHROMBIN TIME: CPT | Performed by: INTERNAL MEDICINE

## 2021-11-19 PROCEDURE — 80048 BASIC METABOLIC PNL TOTAL CA: CPT | Performed by: INTERNAL MEDICINE

## 2021-11-19 RX ORDER — WARFARIN SODIUM 5 MG/1
TABLET ORAL
Status: ON HOLD
Start: 2021-11-19 | End: 2022-05-12

## 2021-11-19 RX ADMIN — GABAPENTIN 800 MG: 400 CAPSULE ORAL at 08:31

## 2021-11-19 RX ADMIN — MINOCYCLINE HYDROCHLORIDE 100 MG: 50 CAPSULE ORAL at 08:31

## 2021-11-19 NOTE — PROGRESS NOTES
"Pharmacy Consult  -  Warfarin    Cecilio Farias is a  66 y.o. male   Height - 190.5 cm (75\")  Weight - 136 kg (300 lb)    Consulting Provider: - Dr. Saunders   Indication: - Mechanical AVR   Goal INR: - 2.5-3.5  Home Regimen:   - 7.5 mg Tuesday, Thursday, Saturday, Sunday    - 10mg Monday, Wednesday, Friday     Bridge Therapy: Heparin drip     Drug-Drug Interactions with current regimen:   none    Warfarin Dosing During Admission:    Date  11/18 11/19          INR  1.31 1.42          Dose  15mg (15mg)               Education Provided: Previously educated, follows with [facility] anticoagulation clinic. Will educate on adherence.     Discharge Follow up:   Following Provider -  [facility]   Follow up time range or appointment -  INR draw 11/23 at outside lab       Labs:    Results from last 7 days   Lab Units 11/19/21  0518 11/18/21  1114 11/17/21  1351 11/17/21  0601 11/17/21  0507 11/16/21  2253 11/16/21  2252   INR  1.42* 1.31* 2.02*  --  2.79*  --  2.68*   APTT seconds  --   --   --   --  37.6  --   --    HEMOGLOBIN g/dL 10.6* 11.4*  --  14.4  --  15.4  --    HEMATOCRIT % 32.6* 37.4*  --  45.5  --  47.7  --      Results from last 7 days   Lab Units 11/19/21  0518 11/18/21  1114 11/17/21  0601 11/16/21  2331 11/16/21  2331   SODIUM mmol/L 139 138 138   < > 137   POTASSIUM mmol/L 4.0 4.2 4.9   < > 4.7   CHLORIDE mmol/L 106 105 102   < > 95*   CO2 mmol/L 24.0 23.0 24.0   < > 25.0   BUN mg/dL 13 15 24*   < > 22   CREATININE mg/dL 0.74* 0.81 1.68*   < > 1.60*   CALCIUM mg/dL 8.1* 8.5* 7.7*   < > 9.2   BILIRUBIN mg/dL  --   --  1.1  --  1.6*   ALK PHOS U/L  --   --  86  --  113   ALT (SGPT) U/L  --   --  21  --  29   AST (SGOT) U/L  --   --  31  --  44*   GLUCOSE mg/dL 132* 164* 200*   < > 228*    < > = values in this interval not displayed.       Current dietary intake: 50-75% recorded        Assessment/Plan:   Pharmacy to dose Warfarin for mechanical AVR with goal INR 2.5-3.5.   Patients INR is subtherapeutic at 1.42. Administer " another boosted dose of Warfarin 15mg this evening.  Continue to bridge with heparin and monitor s/s of bleeding, drug interactions, dietary intake and clinical status.  Recommend continuing Warfarin home regimen 11/20 and bridging with Lovenox 1mg/kg Q12H until therapeutic.   Follow daily INR and dose adjust. Upon discharge recommend INR draw 11/23.        Thank you  Tran Dawson, Pharmacy Intern  11/19/2021  11:27 EST

## 2021-11-19 NOTE — CASE MANAGEMENT/SOCIAL WORK
Continued Stay Note  [facility]     Patient Name: Cecilio Farias  MRN: [id number]  Today's Date: 11/19/2021    Admit Date: 11/16/2021     Discharge Plan     Row Name 11/19/21 1005       Plan    Plan Home    Patient/Family in Agreement with Plan yes    Plan Comments Spoke with patient in room.  His goal is still to return home at discharge.  Will await therapy recommendations to determine proper discharge placement.  CM will continue to follow.  Noa Jo RN [phone]    Final Discharge Disposition Code 01 - home or self-care               Discharge Codes    No documentation.               Expected Discharge Date and Time     Expected Discharge Date Expected Discharge Time    Nov 22, 2021             Noa Jo RN

## 2021-11-19 NOTE — DISCHARGE SUMMARY
[facility] Medicine Services  DISCHARGE SUMMARY    Patient Name: Cecilio Farias  : 1955  MRN: [id number]    Date of Admission: 2021 10:52 PM  Date of Discharge: 2021  Primary Care Physician: America Everett DO    Consults     Date and Time Order Name Status Description    2021  5:03 AM Inpatient General Surgery Consult Completed           Hospital Course     Presenting Problem:   SBO (small bowel obstruction) (HCC) [K56.609]    Active Hospital Problems    Diagnosis  POA   • **SBO (small bowel obstruction) (HCC) [K56.609]  Yes   • Diastolic CHF, chronic (HCC) [I50.32]  Yes   • Hyperbilirubinemia [E80.6]  Yes   • Chronic anticoagulation [Z79.01]  Not Applicable   • PAF on chronic warfarin (CMS/HCC) [I48.0]  Yes   • Discoloration of skin of bilateral legs likely due to minocycline use [L81.9]  Yes   • History Acinetobacter bacteremia on chronic minocycline [Z86.19]  Yes   • History of mechanical AVR for aortic stenosis  [Z95.2]  Not Applicable   • Essential hypertension [I10]  Yes   • Morbid obesity (HCC) [E66.01]  Yes   • Type 2 diabetes mellitus (HCC) [E11.9]  Yes      Resolved Hospital Problems   No resolved problems to display.          Hospital Course:    66-year-old male with history of paroxysmal defibrillation, aortic stenosis with mechanical AVR, type 2 diabetes, hypertension, morbid obesity, chronic diastolic heart failure, history of Acinetobacter bacteremia on chronic minocycline.  Patient presents to the ED with 2 to 3 days of progressively worsening abdominal pain, found to have small bowel obstruction on CT scan.  No evidence of bowel ischemia or perforation.  He did have an elevated creatinine at 1.6 on admission, likely prerenal.  Resolved with fluids.  General surgery was consulted.  Patient had multiple BMs after small bowel follow-through.  Patient is appropriate for discharge from surgical perspective.  He will follow-up for elective  repair in 1 to 2 weeks.    Patient received vitamin K to reverse INR due to concern for need for surgery.  He has mechanical valve so we will discharged on Lovenox bridge.  Patient will take 15 mg of Coumadin tonight and then resume normal dosing tomorrow.  He will follow-up with the Coumadin clinic on Tuesday with an INR check.  Discussed plan with patient and he is agreeable.  Comfortable giving himself injections.  Also discussed with pharmacist.      Discharge Follow Up Recommendations for outpatient labs/diagnostics:  Follow-up with Dr. Lowe in 2 weeks  Follow-up INR on 11/23       Day of Discharge     HPI:   Patient is doing well this morning.  Denies any abdominal pain.  Denies any nausea vomiting.  Feels ready to go home.    Review of Systems  General: denies fevers or chills  CV: denies chest pain  Resp: denies shortness of breath  Abd: denies abd pain, nausea      Vital Signs:   Temp:  [96.7 °F (35.9 °C)-98 °F (36.7 °C)] 97.1 °F (36.2 °C)  Heart Rate:  [50-66] 56  Resp:  [17-19] 19  BP: (108-143)/(47-70) 132/69     Physical Exam:  Constitutional: No acute distress, awake, alert  Respiratory: Clear to auscultation bilaterally, respiratory effort normal on RA  Cardiovascular: RRR, no murmurs, rubs, or gallops  Gastrointestinal: Positive bowel sounds, soft, nontender, nondistended  Musculoskeletal: No bilateral ankle edema  Psychiatric: Appropriate affect, cooperative  Neurologic: Oriented x 3, no focal neurological deficits  Skin: hyperpigmentation of bilateral LEs      Pertinent  and/or Most Recent Results     LAB RESULTS:      Lab 11/19/21  0518 11/19/21  0017 11/18/21  1825 11/18/21  1114 11/17/21  1738 11/17/21  1351 11/17/21  1351 11/17/21  0601 11/17/21  0507 11/17/21  0507 11/16/21  2331 11/16/21  2253 11/16/21  2252   WBC 4.54  --   --  5.49  --   --   --  10.93*  --   --   --  12.06*  --    HEMOGLOBIN 10.6*  --   --  11.4*  --   --   --  14.4  --   --   --  15.4  --    HEMATOCRIT 32.6*  --   --   37.4*  --   --   --  45.5  --   --   --  47.7  --    PLATELETS 131*  --   --  161  --   --   --  266  --   --   --  311  --    NEUTROS ABS  --   --   --  3.50  --   --   --  8.03*  --   --   --  9.37*  --    IMMATURE GRANS (ABS)  --   --   --  0.03  --   --   --  0.05  --   --   --  0.05  --    LYMPHS ABS  --   --   --  1.07  --   --   --  1.63  --   --   --  1.22  --    MONOS ABS  --   --   --  0.56  --   --   --  1.09*  --   --   --  1.20*  --    EOS ABS  --   --   --  0.31  --   --   --  0.08  --   --   --  0.16  --    MCV 87.9  --   --  90.8  --   --   --  89.2  --   --   --  86.3  --    PROCALCITONIN  --   --   --   --   --   --   --   --   --   --  0.11  --   --    LACTATE  --   --   --   --  1.9  --   --   --   --   --   --  3.3*  --    PROTIME 16.8*  --   --  15.8*  --   --  22.0*  --   --  28.3*  --   --  27.4*   APTT  --   --   --   --   --   --   --   --   --  37.6  --   --   --    HEPARIN ANTI-XA 0.29* 0.47 0.56 0.10* 0.10*   < > >1.10*  --    < > 0.10*  --   --   --     < > = values in this interval not displayed.         Lab 11/19/21  0518 11/18/21  1114 11/17/21  0601 11/16/21  2331   SODIUM 139 138 138 137   POTASSIUM 4.0 4.2 4.9 4.7   CHLORIDE 106 105 102 95*   CO2 24.0 23.0 24.0 25.0   ANION GAP 9.0 10.0 12.0 17.0*   BUN 13 15 24* 22   CREATININE 0.74* 0.81 1.68* 1.60*   GLUCOSE 132* 164* 200* 228*   CALCIUM 8.1* 8.5* 7.7* 9.2   HEMOGLOBIN A1C  --   --  8.50*  --          Lab 11/17/21  0601 11/16/21  2331   TOTAL PROTEIN 5.7* 7.7   ALBUMIN 3.20* 4.30   GLOBULIN 2.5 3.4   ALT (SGPT) 21 29   AST (SGOT) 31 44*   BILIRUBIN 1.1 1.6*   BILIRUBIN DIRECT  --  0.3   ALK PHOS 86 113   LIPASE  --  48         Lab 11/19/21  0518 11/18/21  1114 11/17/21  1351 11/17/21  0507 11/16/21  2252   PROBNP  --   --   --  157.1  --    PROTIME 16.8* 15.8* 22.0* 28.3* 27.4*   INR 1.42* 1.31* 2.02* 2.79* 2.68*             Lab 11/17/21  0601   ABO TYPING A   RH TYPING Positive   ANTIBODY SCREEN Negative         Lab  11/17/21 0533   PH, ARTERIAL 7.374   PCO2, ARTERIAL 42.2   PO2 ART 71.7*   FIO2 21   HCO3 ART 24.6   BASE EXCESS ART -0.7*   CARBOXYHEMOGLOBIN 1.5     Brief Urine Lab Results  (Last result in the past 365 days)      Color   Clarity   Blood   Leuk Est   Nitrite   Protein   CREAT   Urine HCG        11/17/21 1651 Dark Yellow   Clear   Negative   Negative   Negative   30 mg/dL (1+)               Microbiology Results (last 10 days)     Procedure Component Value - Date/Time    COVID PRE-OP / PRE-PROCEDURE SCREENING ORDER (NO ISOLATION) - Swab, Nasopharynx [[id number]]  (Normal) Collected: 11/17/21 0441    Lab Status: Final result Specimen: Swab from Nasopharynx Updated: 11/17/21 0527    Narrative:      The following orders were created for panel order COVID PRE-OP / PRE-PROCEDURE SCREENING ORDER (NO ISOLATION) - Swab, Nasopharynx.  Procedure                               Abnormality         Status                     ---------                               -----------         ------                     COVID-19, ABBOTT IN-HOUS...[[id number]]  Normal              Final result                 Please view results for these tests on the individual orders.    COVID-19, ABBOTT IN-HOUSE,NASAL Swab (NO TRANSPORT MEDIA) 2 HR TAT - Swab, Nasopharynx [[id number]]  (Normal) Collected: 11/17/21 0441    Lab Status: Final result Specimen: Swab from Nasopharynx Updated: 11/17/21 0527     COVID19 Presumptive Negative    Narrative:      Fact sheet for providers: https://www.fda.gov/media/751543/download     Fact sheet for patients: https://www.fda.gov/media/906821/download    Test performed by PCR.  If inconsistent with clinical signs and symptoms patient should be tested with different authorized molecular test.          CT Abdomen Pelvis With Contrast    Result Date: 11/17/2021  EXAMINATION: CT ABDOMEN PELVIS W CONTRAST-  INDICATION: abdominal pain, vomiting  TECHNIQUE: CT the abdomen and pelvis without IV contrast  COMPARISON: CT abdomen  and pelvis 6/10/2019  FINDINGS:  There are multiple dilated fluid-filled loops of small bowel compatible with small bowel obstruction. A transition point is identified at the lower anterior abdomen were a loop of small bowel is seen within a small ventral hernia defect (series 2 image 72); small bowel downstream from this site appear decompressed. The colon is decompressed. There appear to be multiple small ventral abdominal wall hernia defects above and below the site; immediately inferior to the identified transition point, a few decompressed normal appearing distal small bowel loops are seen within a larger component of the ventral abdominal wall hernia sac (series 2 image 81). There is no evidence of bowel ischemia or perforation. The stomach is fluid dilated. Surgical small bowel anastomosis is seen in the right upper quadrant.  Unremarkable appearance of the liver. Numerous gallstones are seen within an otherwise unremarkable appearing gallbladder. The bile ducts are normal. Normal spleen with adjacent splenule. Unremarkable appearance of the pancreas and adrenal glands. Mild atrophic appearance of the kidneys bilaterally. The ureters and bladder are unremarkable. Normal-appearing prostate and seminal vesicles. Mild atherosclerosis of the abdominal aorta without aneurysm. No bulky abdominopelvic adenopathy. No free intra-abdominal fluid, pneumoperitoneum, or fat stranding. No acute osseous findings.       Complete small bowel obstruction with transition point identified in the lower anterior abdomen at site of a ventral hernia defect as above. No bowel ischemia or perforation.   This report was finalized on 11/17/2021 10:57 AM by Gregory Arana MD.      FL Small Bowel Follow Through Single-Contrast    Result Date: 11/18/2021  EXAMINATION: FL SMALL BOWEL FOLLOW THROUGH SINGLE-CONTRAST-  INDICATION: SBFT only, concern for obstruction related to ventral hernia, has had BM since CT scan, eval for continued  obstruction  TECHNIQUE: 3 associated series were saved.  imaging reveals a nonobstructive bowel gas pattern. A single contrast study using water-soluble contrast was performed. Images of the small bowel were obtained at 30 minutes, and 75 minutes.  COMPARISON: NONE  FINDINGS: 30 minute film shows contrast opacification of multiple loops of mildly prominent proximal to mid small bowel. 75 minute film shows contrast opacification of multiple loops of mildly prominent proximal to mid small bowel, with a single loop of moderately dilated small bowel visible in the left midabdomen. This loop of bowel measures approximately 7 cm in diameter. There was contrast opacification of grossly normal-appearing distal small bowel, and colon at 75 minutes. Mild dilatation of the proximal to mid small bowel with grossly normal-appearing distal small bowel, and contrast seen within the colon likely represents a partial mid to distal small bowel obstruction. There was no evidence of complete small bowel obstruction. Further evaluation may be considered if clinically relevant.       Multiple loops of mildly prominent to moderately dilated proximal to mid small bowel with contrast opacification of the colon at 75 minutes likely represents a partial mid to distal small bowel obstruction. There was no evidence of complete small bowel obstruction. Further evaluation may be considered if clinically relevant.    This report was finalized on 11/18/2021 5:06 PM by Dr. Deborah Macias MD.      XR Abdomen KUB    Result Date: 11/17/2021  CR Abdomen 1 Vw INDICATION: NG tube placement. COMPARISON: None available FINDINGS: AP radiograph(s) of the abdomen. Tip of an NG tube is in the proximal gastric body. Visualized bowel gas pattern is within normal limits. There is excreted IV contrast in the kidneys from the patient's prior CT scan.     NG tube tip in the stomach. Signer Name: Angelo Pimentel MD  Signed: 11/17/2021 5:29 AM  Workstation  Name: [facility]  [facility]              Results for orders placed during the hospital encounter of 01/15/21    Transthoracic Echo Complete With Contrast if Necessary Per Protocol    Interpretation Summary  · The left atrium is dilated.  · There is mild concentric left ventricular hypertrophy.  · Global and segmental LV systolic function is normal. The calculated left ventricular ejection fraction is 53%.  · There is a mechanical aortic valve in the aortic position. Function appears to be normal.  · There is mitral annular calcification. Mild mitral regurgitation is seen. There appears to be some degree of chordal thickening with a 4.5 mmHg mean diastolic gradient across the mitral valve apparatus.  · Mild to moderate tricuspid regurgitation is noted. The estimated RV systolic pressure is elevated at 56 mmHg.  · No other important findings are noted on the study.      Plan for Follow-up of Pending Labs/Results:     Discharge Details        Discharge Medications      New Medications      Instructions Start Date   enoxaparin 100 MG/ML solution syringe  Commonly known as: Lovenox   1 mg/kg (140 mg), Subcutaneous, Every 12 Hours Scheduled         Changes to Medications      Instructions Start Date   insulin NPH-insulin regular (70-30) 100 UNIT/ML injection  Commonly known as: NovoLIN 70/30  What changed: how much to take   5 Units, Subcutaneous, 2 Times Daily With Meals      warfarin 5 MG tablet  Commonly known as: COUMADIN  What changed: Another medication with the same name was added. Make sure you understand how and when to take each.   TAKE ONE AND ONE-HALF TO TWO TABLETS BY MOUTH DAILY AS DIRECTED BY CLINIC      warfarin 5 MG tablet  Commonly known as: COUMADIN  What changed: You were already taking a medication with the same name, and this prescription was added. Make sure you understand how and when to take each.   Take three tabs by mouth once on 11/19/21         Continue These  Medications      Instructions Start Date   amLODIPine 10 MG tablet  Commonly known as: NORVASC   1 tablet, Oral, Every Morning      aspirin 81 MG EC tablet   81 mg, Oral, Daily      atorvastatin 40 MG tablet  Commonly known as: LIPITOR   40 mg, Oral, Nightly      carvedilol 25 MG tablet  Commonly known as: COREG   3.125 mg, Oral, 2 Times Daily With Meals      furosemide 20 MG tablet  Commonly known as: LASIX   20 mg, Oral, Daily      gabapentin 800 MG tablet  Commonly known as: NEURONTIN   800 mg, Oral, 3 Times Daily      glipizide 10 MG tablet  Commonly known as: GLUCOTROL   10 mg, Oral, 2 Times Daily Before Meals      lisinopril 20 MG tablet  Commonly known as: PRINIVIL,ZESTRIL   20 mg, Oral, 2 Times Daily      metFORMIN 500 MG tablet  Commonly known as: GLUCOPHAGE   2 tablets, Oral, Every Evening      minocycline 100 MG capsule  Commonly known as: MINOCIN,DYNACIN   1 capsule, Oral, 2 Times Daily      potassium chloride ER 20 MEQ tablet controlled-release ER tablet  Commonly known as: K-TAB   TAKE ONE TABLET BY MOUTH DAILY WITH LASIX             Allergies   Allergen Reactions   • Sulfa Antibiotics    • Amoxicillin Rash     Has tolerated ceftriaxone         Discharge Disposition:      Diet:  Hospital:  Diet Order   Procedures   • Diet Regular; Low Fiber / Low Residue, GI Soft, Consistent Carbohydrate       Activity:  Activity Instructions     Activity as Tolerated            Restrictions or Other Recommendations:         CODE STATUS:    Code Status and Medical Interventions:   Ordered at: 11/17/21 0455     Level Of Support Discussed With:    Patient     Code Status (Patient has no pulse and is not breathing):    CPR (Attempt to Resuscitate)     Medical Interventions (Patient has pulse or is breathing):    Full Support       Future Appointments   Date Time Provider Department Center   4/28/2022 11:15 AM Kurtis Smith MD [facility]       Additional Instructions for the Follow-ups that You Need to Schedule      Discharge Follow-up with Specified Provider: Dr. Lowe; 2 Weeks   As directed      To: Dr. Lowe    Follow Up: 2 Weeks                     Prerna Bates MD  11/19/21      Time Spent on Discharge:  I spent  45  minutes on this discharge activity which included: face-to-face encounter with the patient, reviewing the data in the system, coordination of the care with the nursing staff as well as consultants, documentation, and entering orders.

## 2021-11-19 NOTE — DISCHARGE INSTR - APPOINTMENTS
You have an appointment with Juan Lowe MD  on December 3, 2021 @ 9:45 AM.   Call them if you have any questions. Phone: [phone]  [facility] [address]    You have an appointment with America Everett DO  on November 24, 2021 @ 2:30 PM.   Call them if you have any questions. Phone: [phone]  [place] [id number]

## 2021-11-19 NOTE — PLAN OF CARE
Goal Outcome Evaluation:  Plan of Care Reviewed With: patient        Progress: no change  Outcome Summary: Patient HR decrease to high 40's early in shift patient noted to have rhythm change to A. Fib. Patient denies weakness/dizziness. BP stable. Patient sleeping through most of shift. Early in am patient HR decrease 30's with recovery to 40-50's without intervention. Patient remains non symptomatic. Hospitalist aprn notified of lower rate and patient BP. New order for BMP this morning Patient continues on heparin gtt without noted bleeding.

## 2021-11-20 ENCOUNTER — READMISSION MANAGEMENT (OUTPATIENT)
Dept: CALL CENTER | Facility: HOSPITAL | Age: 66
End: 2021-11-20

## 2021-11-20 NOTE — OUTREACH NOTE
Prep Survey      Responses   [facility]? [place]   Is LACE score < 7 ? No   Emergency Room discharge w/ pulse ox? No   Eligibility Readm Mgmt   Discharge diagnosis SBO (small bowel obstruction)   Does the patient have one of the following disease processes/diagnoses(primary or secondary)? CHF   Does the patient have Home health ordered? No   Is there a DME ordered? No   Comments regarding appointments per AVS   Medication alerts for this patient Lovenox   Prep survey completed? Yes          Keira Olivarez RN

## 2021-11-21 LAB
QT INTERVAL: 512 MS
QTC INTERVAL: 448 MS

## 2021-11-23 ENCOUNTER — READMISSION MANAGEMENT (OUTPATIENT)
Dept: CALL CENTER | Facility: HOSPITAL | Age: 66
End: 2021-11-23

## 2021-11-23 NOTE — OUTREACH NOTE
CHF Week 1 Survey      Responses   [facility] patient discharged from? [place]   Does the patient have one of the following disease processes/diagnoses(primary or secondary)? CHF   CHF Week 1 attempt successful? No   Unsuccessful attempts Attempt 1          Josie Villafana RN

## 2021-11-29 ENCOUNTER — TELEPHONE (OUTPATIENT)
Dept: PHARMACY | Facility: HOSPITAL | Age: 66
End: 2021-11-29

## 2021-11-29 ENCOUNTER — READMISSION MANAGEMENT (OUTPATIENT)
Dept: CALL CENTER | Facility: HOSPITAL | Age: 66
End: 2021-11-29

## 2021-11-29 NOTE — OUTREACH NOTE
CHF Week 1 Survey      Responses   [facility] patient discharged from? [place]   Does the patient have one of the following disease processes/diagnoses(primary or secondary)? CHF   CHF Week 1 attempt successful? Yes   Call start time 1559   Call end time 1603   Discharge diagnosis SBO (small bowel obstruction)   Medication alerts for this patient Lovenox- until possibly 11/30/21   Meds reviewed with patient/caregiver? Yes   Is the patient having any side effects they believe may be caused by any medication additions or changes? No   Does the patient have all medications ordered at discharge? Yes   Is the patient taking all medications as directed (includes completed medication regime)? Yes   Does the patient have a primary care provider?  Yes   Has the patient kept scheduled appointments due by today? Yes   Psychosocial issues? No   Comments Pt has had some vomiting at home. Denies pain. He is to be on soft diet but has eaten a couple of things that he should not have had, he reports. He is passing stool, has had diarrhea.   Did the patient receive a copy of their discharge instructions? Yes   Nursing interventions Reviewed instructions with patient   What is the patient's perception of their health status since discharge? Improving   Nursing interventions Nurse provided patient education   Is the patient weighing daily? No   Is the patient able to teach back signs and symptoms of worsening condition? (i.e. weight gain, shortness of air, etc.) Yes   If the patient is a current smoker, are they able to teach back resources for cessation? Not a smoker   Is the patient/caregiver able to teach back the hierarchy of who to call/visit for symptoms/problems? PCP, Specialist, Home health nurse, Urgent Care, ED, 911 Yes    CHF Week 1 call completed? Yes          Petrona Mackenzie RN

## 2021-11-30 NOTE — TELEPHONE ENCOUNTER
RN from [facility] called saying patient LVM when them re: INR.. Called patient back and LVM

## 2021-12-07 ENCOUNTER — READMISSION MANAGEMENT (OUTPATIENT)
Dept: CALL CENTER | Facility: HOSPITAL | Age: 66
End: 2021-12-07

## 2021-12-07 NOTE — OUTREACH NOTE
CHF Week 2 Survey      Responses   [facility] patient discharged from? [place]   Does the patient have one of the following disease processes/diagnoses(primary or secondary)? CHF   Week 2 attempt successful? No   Unsuccessful attempts Attempt 2          Wendy Calvillo LPN

## 2021-12-13 ENCOUNTER — TELEPHONE (OUTPATIENT)
Dept: CARDIOLOGY | Facility: CLINIC | Age: 66
End: 2021-12-13

## 2021-12-13 NOTE — TELEPHONE ENCOUNTER
Fax received from Dr Lowe at [facility]. Pt scheduled for incisional hernia repair 2/3/2022. Cardiac clearance needed with warfarin hold/Lovenox bridge instructions.    Office phone - [phone]  Office fax - [phone]    Appt needed prior to clearance, pt was last seen 4/2021. Will route to [facility]ticoag Pharmacy for bridging instructions after appt with provider and cc is granted.

## 2021-12-15 ENCOUNTER — ANTICOAGULATION VISIT (OUTPATIENT)
Dept: PHARMACY | Facility: HOSPITAL | Age: 66
End: 2021-12-15

## 2021-12-15 LAB — INR PPP: 3.2

## 2021-12-15 NOTE — PROGRESS NOTES
Anticoagulation Clinic - Remote Progress Note  REMOTE LAB     Indication: mechanical AVR  Referring Provider: Arun Jackson (12/17/2021)  Initial Warfarin Start Date: 2009  Goal INR: 2.5-3.5  Current Drug Interactions: aspirin     Diet: green beans occasionally  (~1x/week) 1/14/21; salads 5x week (2/2/21)  Alcohol: none  Tobacco: none  OTC Pain Medication:     INR History:  Date 8/2 10/26 12/21 1/19/18 2/20 3/27 4/27 5/16 6/11 7/16 9/20 11/2   Total Weekly Dose 62.5mg 62.5mg 62.5mg 62.5mg 62.5mg 62.5mg 62.5mg 62.5mg 62.5mg 62.5mg 62.5mg 62.5mg   INR 2.2 2.9 3.2 3.0 2.9 3.4 3.7 2.9 2.6 2.9 2.9 3.2   Notes             sick                Date 12/21 2/1/19 3/11 4/24 5/23 6/24 7/11 7/18 7/22 8/15 9/9 9/25   Total Weekly Dose 62.5mg 62.5mg 62.5mg 62.5mg 62.5mg 62.5mg 52.5mg 55mg 57.5mg 57.5mg 57.5mg 57.5mg   INR 3.4 3.34 2.8 3.0 3.3 3.6 2.52 2.27 2.9 2.9 2.5 2.6   Notes recv'd 12/26       self report; recent hosp; Augmentin Merrem post-disch for sepsis merrem merrem 1x incr dose, merrem        Date 11/8 12/30 1/24/20 4/13 6/2 7/8 8/31 10/13 12/1 1/14/21 2/2 3/4   Total Weekly Dose 57.5mg 57.5mg 57.5mg 57.5mg 57.5mg 57.5mg 57.5mg 60mg 60mg 60mg 60mg 60mg   INR 2.4 2.4 3.7 2.2 2.9 2.3 2.2 2.8 3.5 3.5 3.23 2.4   Notes  incr GLV   nerve control 911 supplement recv'd 7/9  incr GLV recv'd 9/2; incr GLV    incr GLV incr GLV     Date 3/30  5/10 6/21 8/3  9/27  11/16-19 12/15     Total WeeklyDose 60mg non- compliant 60mg 60mg 60mg non- compliant 60mg non-  compliant [facility] admit 60mg     INR 2.5  2.8 3.6 3.12  3.1   3.2     Notes 1x incr dose   Less GLV     2x hold &  2x incr dose during admit        **will take minocycline 100mg BID indefinitely**       Phone Interview:  Tablet Strength: 5mg tabs  Patient Contact Info: [phone] (Home); [phone] (Mobile)  Lab Contact Info: [facility] [phone]  Verbal release 7/23/19 may speak with Delaney (wife), Puneet or Ty (sons)- ok to LVM    Patient  Findings  Positives:  Upcoming invasive procedure   Negatives:  Signs/symptoms of thrombosis, Signs/symptoms of bleeding, Laboratory test error suspected, Change in health, Change in alcohol use, Change in activity, Emergency department visit, Upcoming dental procedure, Missed doses, Extra doses, Change in medications, Change in diet/appetite, Hospital admission, Bruising, Other complaints   Comments:  Patient was admitted 11/16-11/19 at [facility] for small bowel obstructions. Patient was advised post-discharge to have INR drawn. Patient was non-compliant and clinic needed to reach out to patient multiple times via telephone before he was willing to have INR drawn. During patient's admission warfarin was held 2 days in anticipation of procedure to remove SBO but ultimately surgery not performed. Patient given 2x boosted doses prior to d/c.     Otherwise, patient will have hernia repair procedure on 2/3/22. Per Linda North RN, note on 12/13:     Appt needed prior to clearance, pt was last seen 4/2021. Will route to [facility] for bridging instructions after appt with provider and cc is granted.       [facility] for Linda for further information        Plan:  1. INR is therapeutic today at 3.2. Patient was again non-compliant with requested follow-up.  Instructed Mr. Farias to continue maintenance dose of warfarin 7.5mg oral daily except 10mg on MonWedFri until recheck. (60mg/week)  2. Repeat INR in 4 weeks, 1/12/22. Patient has extensive history of non-compliance with requested follow-up. Have again stressed the importance of timely lab draws.  3. Verbal information provided over the phone. Cecilio Farias RBV dosing instructions, expresses understanding by teach back, and has no further questions at this time.    Kurtis Hackett, [facility]  12/15/2021  14:08 EST    VIKY, Rupert Nixon, PharmD, have reviewed the note in full and agree with the assessment and plan.  12/15/21  16:02 EST      Addendum 12/21: to update  the provider- Sandhya HoweD

## 2021-12-16 ENCOUNTER — READMISSION MANAGEMENT (OUTPATIENT)
Dept: CALL CENTER | Facility: HOSPITAL | Age: 66
End: 2021-12-16

## 2021-12-16 NOTE — OUTREACH NOTE
CHF Week 3 Survey      Responses   [facility] patient discharged from? [place]   Does the patient have one of the following disease processes/diagnoses(primary or secondary)? CHF   Week 3 attempt successful? No   Unsuccessful attempts Attempt 1          Kierra Arauz RN

## 2021-12-21 ENCOUNTER — READMISSION MANAGEMENT (OUTPATIENT)
Dept: CALL CENTER | Facility: HOSPITAL | Age: 66
End: 2021-12-21

## 2021-12-21 NOTE — OUTREACH NOTE
CHF Week 3 Survey      Responses   [facility] patient discharged from? [place]   Does the patient have one of the following disease processes/diagnoses(primary or secondary)? CHF   Week 3 attempt successful? No   Unsuccessful attempts Attempt 2          Petrona Mackenzie RN

## 2022-01-25 ENCOUNTER — TELEPHONE (OUTPATIENT)
Dept: PHARMACY | Facility: HOSPITAL | Age: 67
End: 2022-01-25

## 2022-03-04 DIAGNOSIS — Z95.2 HISTORY OF MECHANICAL AORTIC VALVE REPLACEMENT: Primary | ICD-10-CM

## 2022-03-08 ENCOUNTER — ANTICOAGULATION VISIT (OUTPATIENT)
Dept: PHARMACY | Facility: HOSPITAL | Age: 67
End: 2022-03-08

## 2022-03-08 LAB — INR PPP: 2.9

## 2022-03-08 NOTE — TELEPHONE ENCOUNTER
Spoke with Mr. Farias re: INR reminder    States he is on his way to [facility] in [place], [place] right now.     Should receive today or tomorrow at latest.

## 2022-03-08 NOTE — PROGRESS NOTES
Anticoagulation Clinic - Remote Progress Note  REMOTE LAB     Indication: mechanical AVR  Referring Provider: Arun Jackson (12/17/2021)  Initial Warfarin Start Date: 2009  Goal INR: 2.5-3.5  Current Drug Interactions: aspirin     Diet: not much GLV 3/9/22  Alcohol: none  Tobacco: none  OTC Pain Medication:     INR History:  Date 8/2 10/26 12/21 1/19/18 2/20 3/27 4/27 5/16 6/11 7/16 9/20 11/2   Total Weekly Dose 62.5mg 62.5mg 62.5mg 62.5mg 62.5mg 62.5mg 62.5mg 62.5mg 62.5mg 62.5mg 62.5mg 62.5mg   INR 2.2 2.9 3.2 3.0 2.9 3.4 3.7 2.9 2.6 2.9 2.9 3.2   Notes             sick                Date 12/21 2/1/19 3/11 4/24 5/23 6/24 7/11 7/18 7/22 8/15 9/9 9/25   Total Weekly Dose 62.5mg 62.5mg 62.5mg 62.5mg 62.5mg 62.5mg 52.5mg 55mg 57.5mg 57.5mg 57.5mg 57.5mg   INR 3.4 3.34 2.8 3.0 3.3 3.6 2.52 2.27 2.9 2.9 2.5 2.6   Notes recv'd 12/26       self report; recent hosp; Augmentin Merrem post-disch for sepsis merrem merrem 1x incr dose, merrem        Date 11/8 12/30 1/24/20 4/13 6/2 7/8 8/31 10/13 12/1 1/14/21 2/2 3/4   Total Weekly Dose 57.5mg 57.5mg 57.5mg 57.5mg 57.5mg 57.5mg 57.5mg 60mg 60mg 60mg 60mg 60mg   INR 2.4 2.4 3.7 2.2 2.9 2.3 2.2 2.8 3.5 3.5 3.23 2.4   Notes  incr GLV   nerve control 911 supplement recv'd 7/9  incr GLV recv'd 9/2; incr GLV    incr GLV incr GLV     Date 3/30  5/10 6/21 8/3  9/27  11/16-19 12/15  3/8   Total WeeklyDose 60mg non- compliant 60mg 60mg 60mg non- compliant 60mg non-  compliant [facility] admit 60mg Non compliance 60 mg   INR 2.5  2.8 3.6 3.12  3.1   3.2  2.9   Notes 1x incr dose   Less GLV     2x hold &  2x incr dose during admit        **will take minocycline 100mg BID indefinitely**     Phone Interview:  Tablet Strength: 5mg tabs  Patient Contact Info: [phone] (Home); [phone] (Mobile)  Lab Contact Info: [facility] [phone]  Verbal release 7/23/19 may speak with Delaney (wife), Puneet or Ty (sons)- ok to LVM    Patient Findings:  Positives:  Upcoming invasive  procedure, Change in diet/appetite, Other complaints   Negatives:  Signs/symptoms of thrombosis, Signs/symptoms of bleeding, Laboratory test error suspected, Change in health, Change in alcohol use, Change in activity, Emergency department visit, Upcoming dental procedure, Missed doses, Extra doses, Change in medications, Hospital admission, Bruising   Comments:  Has lost 50 lb over about 4-5 months due to dieting and his hernia (easy to digest foods). His repair surgery for hernia has been postponed - he will let clinic know once it is scheduled. Has stopped metformin and insulin. All other findings negative.     Plan:  1. INR is therapeutic today at 2.9. Patient was again non-compliant with requested follow-up. Instructed Mr. Farias to continue maintenance dose of warfarin 7.5 mg oral daily except 10 mg on MonWedFri until recheck (60 mg/week).  2. Repeat INR in 4 weeks, 4/5/22. Patient has extensive history of non-compliance with requested follow-up.  3. Verbal information provided over the phone. Cecilio Farias RBV dosing instructions, expresses understanding by teach back, and has no further questions at this time.    Maritza Rapp, [place]  3/8/2022  15:15 Jeanette JEAN, PharmD, have reviewed the note in full and agree with the assessment and plan.  03/09/22  14:18 EST

## 2022-03-09 RX ORDER — WARFARIN SODIUM 5 MG/1
TABLET ORAL
Qty: 60 TABLET | Refills: 2 | Status: SHIPPED | OUTPATIENT
Start: 2022-03-09 | End: 2022-06-13

## 2022-04-18 ENCOUNTER — ANTICOAGULATION VISIT (OUTPATIENT)
Dept: PHARMACY | Facility: HOSPITAL | Age: 67
End: 2022-04-18

## 2022-04-18 LAB — INR PPP: 2.8

## 2022-04-18 NOTE — PROGRESS NOTES
Anticoagulation Clinic - Remote Progress Note  REMOTE LAB     Indication: mechanical AVR  Referring Provider: Arun Jackson (12/17/2021)  Initial Warfarin Start Date: 2009  Goal INR: 2.5-3.5  Current Drug Interactions: aspirin     Diet: not much GLV 4/18/22  Alcohol: none  Tobacco: none  OTC Pain Medication:     INR History:  Date 8/2 10/26 12/21 1/19/18 2/20 3/27 4/27 5/16 6/11 7/16 9/20 11/2   Total Weekly Dose 62.5mg 62.5mg 62.5mg 62.5mg 62.5mg 62.5mg 62.5mg 62.5mg 62.5mg 62.5mg 62.5mg 62.5mg   INR 2.2 2.9 3.2 3.0 2.9 3.4 3.7 2.9 2.6 2.9 2.9 3.2   Notes             sick                Date 12/21 2/1/19 3/11 4/24 5/23 6/24 7/11 7/18 7/22 8/15 9/9 9/25   Total Weekly Dose 62.5mg 62.5mg 62.5mg 62.5mg 62.5mg 62.5mg 52.5mg 55mg 57.5mg 57.5mg 57.5mg 57.5mg   INR 3.4 3.34 2.8 3.0 3.3 3.6 2.52 2.27 2.9 2.9 2.5 2.6   Notes recv'd 12/26       self report; recent hosp; Augmentin Merrem post-disch for sepsis merrem merrem 1x incr dose, merrem        Date 11/8 12/30 1/24/20 4/13 6/2 7/8 8/31 10/13 12/1 1/14/21 2/2 3/4   Total Weekly Dose 57.5mg 57.5mg 57.5mg 57.5mg 57.5mg 57.5mg 57.5mg 60mg 60mg 60mg 60mg 60mg   INR 2.4 2.4 3.7 2.2 2.9 2.3 2.2 2.8 3.5 3.5 3.23 2.4   Notes  incr GLV   nerve control 911 supplement recv'd 7/9  incr GLV recv'd 9/2; incr GLV    incr GLV incr GLV     Date 3/30  5/10 6/21 8/3  9/27  11/16-19 12/15  3/8   Total WeeklyDose 60mg non- compliant 60mg 60mg 60mg non- compliant 60mg non-  compliant [facility] admit 60mg Non compliance 60 mg   INR 2.5  2.8 3.6 3.12  3.1   3.2  2.9   Notes 1x incr dose   Less GLV     2x hold &  2x incr dose during admit        Date 4/15       Total Weekly Dose 60 mg       INR 2.8       Notes rec'd 4/18         **will take minocycline 100mg BID indefinitely**     Phone Interview:  Tablet Strength: 5mg tabs  Patient Contact Info: [phone] (Home); [phone] (Mobile)  Lab Contact Info: [facility] [phone]  Verbal release 7/23/19 may speak with Delaney (wife),  Puneet or Ty ([facility])- ok to LVM    Patient Findings:  Negatives:  Signs/symptoms of thrombosis, Signs/symptoms of bleeding, Laboratory test error suspected, Change in health, Change in alcohol use, Change in activity, Upcoming invasive procedure, Emergency department visit, Upcoming dental procedure, Missed doses, Extra doses, Change in medications, Change in diet/appetite, Hospital admission, Bruising, Other complaints   Comments:  All findings negative per patient.      Plan:  1. INR was therapeutic on 4/15 at 2.8; result received today. Patient was again non-compliant with requested follow-up. Instructed Mr. Farias to continue maintenance dose of warfarin 7.5 mg oral daily except 10 mg on MonWedFri until recheck (60 mg/week).  2. Repeat INR in 4 weeks from last test date, 5/13/22. Patient has extensive history of non-compliance with requested follow-up.  3. Verbal information provided over the phone. Cecilio Farias RBV dosing instructions, expresses understanding by teach back, and has no further questions at this time.    Maritza Rapp CPhT  4/18/2022  13:16 EDT    I, Kurtis Mortensen, PharmD, have reviewed the note in full and agree with the assessment and plan.  04/18/22  14:28 EDT

## 2022-04-30 ENCOUNTER — HOSPITAL ENCOUNTER (INPATIENT)
Facility: HOSPITAL | Age: 67
LOS: 4 days | Discharge: HOME | End: 2022-05-04
Attending: EMERGENCY MEDICINE | Admitting: INTERNAL MEDICINE

## 2022-04-30 ENCOUNTER — APPOINTMENT (OUTPATIENT)
Dept: GENERAL RADIOLOGY | Facility: HOSPITAL | Age: 67
End: 2022-04-30

## 2022-04-30 ENCOUNTER — APPOINTMENT (OUTPATIENT)
Dept: CT IMAGING | Facility: HOSPITAL | Age: 67
End: 2022-04-30

## 2022-04-30 DIAGNOSIS — K56.609 SBO (SMALL BOWEL OBSTRUCTION): Primary | ICD-10-CM

## 2022-04-30 DIAGNOSIS — K43.6 VENTRAL HERNIA WITH BOWEL OBSTRUCTION: ICD-10-CM

## 2022-04-30 PROBLEM — E87.20 LACTIC ACIDOSIS: Status: ACTIVE | Noted: 2022-04-30

## 2022-04-30 LAB
ALBUMIN SERPL-MCNC: 3.9 G/DL (ref 3.5–5.2)
ALBUMIN/GLOB SERPL: 1.1 G/DL
ALP SERPL-CCNC: 149 U/L (ref 39–117)
ALT SERPL W P-5'-P-CCNC: 24 U/L (ref 1–41)
ANION GAP SERPL CALCULATED.3IONS-SCNC: 11 MMOL/L (ref 5–15)
AST SERPL-CCNC: 40 U/L (ref 1–40)
BASOPHILS # BLD AUTO: 0.05 10*3/MM3 (ref 0–0.2)
BASOPHILS NFR BLD AUTO: 0.5 % (ref 0–1.5)
BILIRUB SERPL-MCNC: 1.7 MG/DL (ref 0–1.2)
BUN SERPL-MCNC: 13 MG/DL (ref 8–23)
BUN/CREAT SERPL: 15.5 (ref 7–25)
CALCIUM SPEC-SCNC: 9.1 MG/DL (ref 8.6–10.5)
CHLORIDE SERPL-SCNC: 102 MMOL/L (ref 98–107)
CO2 SERPL-SCNC: 26 MMOL/L (ref 22–29)
CREAT SERPL-MCNC: 0.84 MG/DL (ref 0.76–1.27)
D-LACTATE SERPL-SCNC: 2.2 MMOL/L (ref 0.5–2)
DEPRECATED RDW RBC AUTO: 46.7 FL (ref 37–54)
EGFRCR SERPLBLD CKD-EPI 2021: 96.8 ML/MIN/1.73
EOSINOPHIL # BLD AUTO: 0.14 10*3/MM3 (ref 0–0.4)
EOSINOPHIL NFR BLD AUTO: 1.3 % (ref 0.3–6.2)
ERYTHROCYTE [DISTWIDTH] IN BLOOD BY AUTOMATED COUNT: 15 % (ref 12.3–15.4)
GLOBULIN UR ELPH-MCNC: 3.5 GM/DL
GLUCOSE SERPL-MCNC: 150 MG/DL (ref 65–99)
HCT VFR BLD AUTO: 41.5 % (ref 37.5–51)
HGB BLD-MCNC: 13.7 G/DL (ref 13–17.7)
HOLD SPECIMEN: NORMAL
HOLD SPECIMEN: NORMAL
IMM GRANULOCYTES # BLD AUTO: 0.04 10*3/MM3 (ref 0–0.05)
IMM GRANULOCYTES NFR BLD AUTO: 0.4 % (ref 0–0.5)
INR PPP: 2.3 (ref 0.84–1.13)
LIPASE SERPL-CCNC: 37 U/L (ref 13–60)
LYMPHOCYTES # BLD AUTO: 0.86 10*3/MM3 (ref 0.7–3.1)
LYMPHOCYTES NFR BLD AUTO: 8 % (ref 19.6–45.3)
MCH RBC QN AUTO: 28.4 PG (ref 26.6–33)
MCHC RBC AUTO-ENTMCNC: 33 G/DL (ref 31.5–35.7)
MCV RBC AUTO: 85.9 FL (ref 79–97)
MONOCYTES # BLD AUTO: 0.89 10*3/MM3 (ref 0.1–0.9)
MONOCYTES NFR BLD AUTO: 8.2 % (ref 5–12)
NEUTROPHILS NFR BLD AUTO: 8.81 10*3/MM3 (ref 1.7–7)
NEUTROPHILS NFR BLD AUTO: 81.6 % (ref 42.7–76)
NRBC BLD AUTO-RTO: 0 /100 WBC (ref 0–0.2)
PLATELET # BLD AUTO: 219 10*3/MM3 (ref 140–450)
PMV BLD AUTO: 10.6 FL (ref 6–12)
POTASSIUM SERPL-SCNC: 4.1 MMOL/L (ref 3.5–5.2)
PROT SERPL-MCNC: 7.4 G/DL (ref 6–8.5)
PROTHROMBIN TIME: 25.4 SECONDS (ref 11.4–14.4)
RBC # BLD AUTO: 4.83 10*6/MM3 (ref 4.14–5.8)
SODIUM SERPL-SCNC: 139 MMOL/L (ref 136–145)
TROPONIN T SERPL-MCNC: 0.03 NG/ML (ref 0–0.03)
WBC NRBC COR # BLD: 10.79 10*3/MM3 (ref 3.4–10.8)
WHOLE BLOOD HOLD SPECIMEN: NORMAL
WHOLE BLOOD HOLD SPECIMEN: NORMAL

## 2022-04-30 PROCEDURE — 74018 RADEX ABDOMEN 1 VIEW: CPT

## 2022-04-30 PROCEDURE — 71045 X-RAY EXAM CHEST 1 VIEW: CPT

## 2022-04-30 PROCEDURE — 85025 COMPLETE CBC W/AUTO DIFF WBC: CPT | Performed by: EMERGENCY MEDICINE

## 2022-04-30 PROCEDURE — 83690 ASSAY OF LIPASE: CPT | Performed by: EMERGENCY MEDICINE

## 2022-04-30 PROCEDURE — 83605 ASSAY OF LACTIC ACID: CPT | Performed by: EMERGENCY MEDICINE

## 2022-04-30 PROCEDURE — 80053 COMPREHEN METABOLIC PANEL: CPT | Performed by: EMERGENCY MEDICINE

## 2022-04-30 PROCEDURE — 25010000002 ONDANSETRON PER 1 MG: Performed by: EMERGENCY MEDICINE

## 2022-04-30 PROCEDURE — 84484 ASSAY OF TROPONIN QUANT: CPT | Performed by: EMERGENCY MEDICINE

## 2022-04-30 PROCEDURE — 93005 ELECTROCARDIOGRAM TRACING: CPT | Performed by: EMERGENCY MEDICINE

## 2022-04-30 PROCEDURE — 99284 EMERGENCY DEPT VISIT MOD MDM: CPT

## 2022-04-30 PROCEDURE — 99223 1ST HOSP IP/OBS HIGH 75: CPT | Performed by: INTERNAL MEDICINE

## 2022-04-30 PROCEDURE — 74177 CT ABD & PELVIS W/CONTRAST: CPT

## 2022-04-30 PROCEDURE — 25010000002 HYDROMORPHONE PER 4 MG: Performed by: EMERGENCY MEDICINE

## 2022-04-30 PROCEDURE — 87636 SARSCOV2 & INF A&B AMP PRB: CPT | Performed by: INTERNAL MEDICINE

## 2022-04-30 PROCEDURE — 85610 PROTHROMBIN TIME: CPT | Performed by: EMERGENCY MEDICINE

## 2022-04-30 PROCEDURE — 25010000002 IOPAMIDOL 61 % SOLUTION: Performed by: EMERGENCY MEDICINE

## 2022-04-30 RX ORDER — ONDANSETRON 2 MG/ML
4 INJECTION INTRAMUSCULAR; INTRAVENOUS ONCE
Status: COMPLETED | OUTPATIENT
Start: 2022-04-30 | End: 2022-04-30

## 2022-04-30 RX ORDER — HEPARIN SODIUM 1000 [USP'U]/ML
30 INJECTION, SOLUTION INTRAVENOUS; SUBCUTANEOUS AS NEEDED
Status: DISCONTINUED | OUTPATIENT
Start: 2022-04-30 | End: 2022-04-30

## 2022-04-30 RX ORDER — SODIUM CHLORIDE 0.9 % (FLUSH) 0.9 %
10 SYRINGE (ML) INJECTION AS NEEDED
Status: DISCONTINUED | OUTPATIENT
Start: 2022-04-30 | End: 2022-05-01

## 2022-04-30 RX ORDER — HYDROMORPHONE HYDROCHLORIDE 1 MG/ML
0.5 INJECTION, SOLUTION INTRAMUSCULAR; INTRAVENOUS; SUBCUTANEOUS ONCE
Status: COMPLETED | OUTPATIENT
Start: 2022-04-30 | End: 2022-04-30

## 2022-04-30 RX ORDER — HEPARIN SOD,PORCINE/0.9 % NACL 25000/250
16 INTRAVENOUS SOLUTION INTRAVENOUS
Status: DISCONTINUED | OUTPATIENT
Start: 2022-04-30 | End: 2022-05-04 | Stop reason: HOSPADM

## 2022-04-30 RX ORDER — HEPARIN SODIUM 1000 [USP'U]/ML
60 INJECTION, SOLUTION INTRAVENOUS; SUBCUTANEOUS AS NEEDED
Status: DISCONTINUED | OUTPATIENT
Start: 2022-04-30 | End: 2022-04-30

## 2022-04-30 RX ADMIN — SODIUM CHLORIDE 1000 ML: 9 INJECTION, SOLUTION INTRAVENOUS at 21:11

## 2022-04-30 RX ADMIN — ONDANSETRON 4 MG: 2 INJECTION INTRAMUSCULAR; INTRAVENOUS at 21:12

## 2022-04-30 RX ADMIN — HYDROMORPHONE HYDROCHLORIDE 0.5 MG: 1 INJECTION, SOLUTION INTRAMUSCULAR; INTRAVENOUS; SUBCUTANEOUS at 21:12

## 2022-04-30 RX ADMIN — IOPAMIDOL 95 ML: 612 INJECTION, SOLUTION INTRAVENOUS at 21:35

## 2022-05-01 ENCOUNTER — APPOINTMENT (OUTPATIENT)
Dept: GENERAL RADIOLOGY | Facility: HOSPITAL | Age: 67
End: 2022-05-01

## 2022-05-01 LAB
ANION GAP SERPL CALCULATED.3IONS-SCNC: 9 MMOL/L (ref 5–15)
APTT PPP: 37.5 SECONDS (ref 50–95)
APTT PPP: 40.9 SECONDS (ref 50–95)
APTT PPP: 44.1 SECONDS (ref 50–95)
BACTERIA UR QL AUTO: NORMAL /HPF
BASOPHILS # BLD AUTO: 0.03 10*3/MM3 (ref 0–0.2)
BASOPHILS # BLD AUTO: 0.04 10*3/MM3 (ref 0–0.2)
BASOPHILS NFR BLD AUTO: 0.4 % (ref 0–1.5)
BASOPHILS NFR BLD AUTO: 0.4 % (ref 0–1.5)
BILIRUB UR QL STRIP: NEGATIVE
BUN SERPL-MCNC: 13 MG/DL (ref 8–23)
BUN/CREAT SERPL: 17.8 (ref 7–25)
CALCIUM SPEC-SCNC: 7.7 MG/DL (ref 8.6–10.5)
CHLORIDE SERPL-SCNC: 112 MMOL/L (ref 98–107)
CLARITY UR: CLEAR
CO2 SERPL-SCNC: 22 MMOL/L (ref 22–29)
COLOR UR: YELLOW
CREAT SERPL-MCNC: 0.73 MG/DL (ref 0.76–1.27)
D-LACTATE SERPL-SCNC: 1.6 MMOL/L (ref 0.5–2)
DEPRECATED RDW RBC AUTO: 47.8 FL (ref 37–54)
DEPRECATED RDW RBC AUTO: 50.5 FL (ref 37–54)
EGFRCR SERPLBLD CKD-EPI 2021: 101 ML/MIN/1.73
EOSINOPHIL # BLD AUTO: 0.15 10*3/MM3 (ref 0–0.4)
EOSINOPHIL # BLD AUTO: 0.18 10*3/MM3 (ref 0–0.4)
EOSINOPHIL NFR BLD AUTO: 1.4 % (ref 0.3–6.2)
EOSINOPHIL NFR BLD AUTO: 2.6 % (ref 0.3–6.2)
ERYTHROCYTE [DISTWIDTH] IN BLOOD BY AUTOMATED COUNT: 14.9 % (ref 12.3–15.4)
ERYTHROCYTE [DISTWIDTH] IN BLOOD BY AUTOMATED COUNT: 15 % (ref 12.3–15.4)
FLUAV RNA RESP QL NAA+PROBE: NOT DETECTED
FLUBV RNA RESP QL NAA+PROBE: NOT DETECTED
GLUCOSE BLDC GLUCOMTR-MCNC: 116 MG/DL (ref 70–130)
GLUCOSE BLDC GLUCOMTR-MCNC: 121 MG/DL (ref 70–130)
GLUCOSE BLDC GLUCOMTR-MCNC: 142 MG/DL (ref 70–130)
GLUCOSE SERPL-MCNC: 124 MG/DL (ref 65–99)
GLUCOSE UR STRIP-MCNC: NEGATIVE MG/DL
HBA1C MFR BLD: 6.9 % (ref 4.8–5.6)
HCT VFR BLD AUTO: 37.3 % (ref 37.5–51)
HCT VFR BLD AUTO: 37.9 % (ref 37.5–51)
HGB BLD-MCNC: 11.4 G/DL (ref 13–17.7)
HGB BLD-MCNC: 12.2 G/DL (ref 13–17.7)
HGB UR QL STRIP.AUTO: NEGATIVE
HOLD SPECIMEN: NORMAL
HYALINE CASTS UR QL AUTO: NORMAL /LPF
IMM GRANULOCYTES # BLD AUTO: 0.02 10*3/MM3 (ref 0–0.05)
IMM GRANULOCYTES # BLD AUTO: 0.03 10*3/MM3 (ref 0–0.05)
IMM GRANULOCYTES NFR BLD AUTO: 0.3 % (ref 0–0.5)
IMM GRANULOCYTES NFR BLD AUTO: 0.3 % (ref 0–0.5)
KETONES UR QL STRIP: ABNORMAL
LEUKOCYTE ESTERASE UR QL STRIP.AUTO: NEGATIVE
LYMPHOCYTES # BLD AUTO: 0.94 10*3/MM3 (ref 0.7–3.1)
LYMPHOCYTES # BLD AUTO: 1.02 10*3/MM3 (ref 0.7–3.1)
LYMPHOCYTES NFR BLD AUTO: 14.5 % (ref 19.6–45.3)
LYMPHOCYTES NFR BLD AUTO: 8.9 % (ref 19.6–45.3)
MCH RBC QN AUTO: 28.2 PG (ref 26.6–33)
MCH RBC QN AUTO: 28.3 PG (ref 26.6–33)
MCHC RBC AUTO-ENTMCNC: 30.6 G/DL (ref 31.5–35.7)
MCHC RBC AUTO-ENTMCNC: 32.2 G/DL (ref 31.5–35.7)
MCV RBC AUTO: 87.7 FL (ref 79–97)
MCV RBC AUTO: 92.6 FL (ref 79–97)
MONOCYTES # BLD AUTO: 0.78 10*3/MM3 (ref 0.1–0.9)
MONOCYTES # BLD AUTO: 0.86 10*3/MM3 (ref 0.1–0.9)
MONOCYTES NFR BLD AUTO: 11.1 % (ref 5–12)
MONOCYTES NFR BLD AUTO: 8.1 % (ref 5–12)
NEUTROPHILS NFR BLD AUTO: 5 10*3/MM3 (ref 1.7–7)
NEUTROPHILS NFR BLD AUTO: 71.1 % (ref 42.7–76)
NEUTROPHILS NFR BLD AUTO: 8.54 10*3/MM3 (ref 1.7–7)
NEUTROPHILS NFR BLD AUTO: 80.9 % (ref 42.7–76)
NITRITE UR QL STRIP: NEGATIVE
NRBC BLD AUTO-RTO: 0 /100 WBC (ref 0–0.2)
NRBC BLD AUTO-RTO: 0 /100 WBC (ref 0–0.2)
PH UR STRIP.AUTO: 7.5 [PH] (ref 5–8)
PLATELET # BLD AUTO: 164 10*3/MM3 (ref 140–450)
PLATELET # BLD AUTO: 214 10*3/MM3 (ref 140–450)
PMV BLD AUTO: 10.8 FL (ref 6–12)
PMV BLD AUTO: 11.1 FL (ref 6–12)
POTASSIUM SERPL-SCNC: 4.5 MMOL/L (ref 3.5–5.2)
PROT UR QL STRIP: ABNORMAL
QT INTERVAL: 382 MS
QTC INTERVAL: 482 MS
RBC # BLD AUTO: 4.03 10*6/MM3 (ref 4.14–5.8)
RBC # BLD AUTO: 4.32 10*6/MM3 (ref 4.14–5.8)
RBC # UR STRIP: NORMAL /HPF
REF LAB TEST METHOD: NORMAL
SARS-COV-2 RNA RESP QL NAA+PROBE: NOT DETECTED
SODIUM SERPL-SCNC: 143 MMOL/L (ref 136–145)
SP GR UR STRIP: 1.07 (ref 1–1.03)
SQUAMOUS #/AREA URNS HPF: NORMAL /HPF
UFH PPP CHRO-ACNC: 0.1 IU/ML (ref 0.3–0.7)
UROBILINOGEN UR QL STRIP: ABNORMAL
WBC # UR STRIP: NORMAL /HPF
WBC NRBC COR # BLD: 10.56 10*3/MM3 (ref 3.4–10.8)
WBC NRBC COR # BLD: 7.03 10*3/MM3 (ref 3.4–10.8)

## 2022-05-01 PROCEDURE — 74018 RADEX ABDOMEN 1 VIEW: CPT

## 2022-05-01 PROCEDURE — 85730 THROMBOPLASTIN TIME PARTIAL: CPT

## 2022-05-01 PROCEDURE — 80048 BASIC METABOLIC PNL TOTAL CA: CPT | Performed by: NURSE PRACTITIONER

## 2022-05-01 PROCEDURE — 97530 THERAPEUTIC ACTIVITIES: CPT

## 2022-05-01 PROCEDURE — 85520 HEPARIN ASSAY: CPT

## 2022-05-01 PROCEDURE — 25010000002 HEPARIN (PORCINE) PER 1000 UNITS

## 2022-05-01 PROCEDURE — 85730 THROMBOPLASTIN TIME PARTIAL: CPT | Performed by: NURSE PRACTITIONER

## 2022-05-01 PROCEDURE — 85520 HEPARIN ASSAY: CPT | Performed by: NURSE PRACTITIONER

## 2022-05-01 PROCEDURE — 83036 HEMOGLOBIN GLYCOSYLATED A1C: CPT | Performed by: NURSE PRACTITIONER

## 2022-05-01 PROCEDURE — 82962 GLUCOSE BLOOD TEST: CPT

## 2022-05-01 PROCEDURE — 83605 ASSAY OF LACTIC ACID: CPT | Performed by: EMERGENCY MEDICINE

## 2022-05-01 PROCEDURE — 85025 COMPLETE CBC W/AUTO DIFF WBC: CPT | Performed by: NURSE PRACTITIONER

## 2022-05-01 PROCEDURE — 81001 URINALYSIS AUTO W/SCOPE: CPT | Performed by: EMERGENCY MEDICINE

## 2022-05-01 PROCEDURE — 99232 SBSQ HOSP IP/OBS MODERATE 35: CPT | Performed by: INTERNAL MEDICINE

## 2022-05-01 PROCEDURE — 97162 PT EVAL MOD COMPLEX 30 MIN: CPT

## 2022-05-01 PROCEDURE — 25010000002 HEPARIN (PORCINE) PER 1000 UNITS: Performed by: NURSE PRACTITIONER

## 2022-05-01 RX ORDER — HEPARIN SODIUM 1000 [USP'U]/ML
3800 INJECTION, SOLUTION INTRAVENOUS; SUBCUTANEOUS ONCE
Status: COMPLETED | OUTPATIENT
Start: 2022-05-01 | End: 2022-05-01

## 2022-05-01 RX ORDER — NALOXONE HCL 0.4 MG/ML
0.4 VIAL (ML) INJECTION
Status: DISCONTINUED | OUTPATIENT
Start: 2022-05-01 | End: 2022-05-04 | Stop reason: HOSPADM

## 2022-05-01 RX ORDER — INSULIN LISPRO 100 [IU]/ML
0-7 INJECTION, SOLUTION INTRAVENOUS; SUBCUTANEOUS
Status: DISCONTINUED | OUTPATIENT
Start: 2022-05-01 | End: 2022-05-04 | Stop reason: HOSPADM

## 2022-05-01 RX ORDER — SACCHAROMYCES BOULARDII 250 MG
250 CAPSULE ORAL 2 TIMES DAILY
Status: DISCONTINUED | OUTPATIENT
Start: 2022-05-01 | End: 2022-05-01

## 2022-05-01 RX ORDER — MORPHINE SULFATE 2 MG/ML
2 INJECTION, SOLUTION INTRAMUSCULAR; INTRAVENOUS
Status: DISCONTINUED | OUTPATIENT
Start: 2022-05-01 | End: 2022-05-01

## 2022-05-01 RX ORDER — SODIUM CHLORIDE 0.9 % (FLUSH) 0.9 %
10 SYRINGE (ML) INJECTION AS NEEDED
Status: DISCONTINUED | OUTPATIENT
Start: 2022-05-01 | End: 2022-05-04 | Stop reason: HOSPADM

## 2022-05-01 RX ORDER — DEXTROSE MONOHYDRATE 25 G/50ML
25 INJECTION, SOLUTION INTRAVENOUS
Status: DISCONTINUED | OUTPATIENT
Start: 2022-05-01 | End: 2022-05-04 | Stop reason: HOSPADM

## 2022-05-01 RX ORDER — SODIUM CHLORIDE 0.9 % (FLUSH) 0.9 %
10 SYRINGE (ML) INJECTION EVERY 12 HOURS SCHEDULED
Status: DISCONTINUED | OUTPATIENT
Start: 2022-05-01 | End: 2022-05-04 | Stop reason: HOSPADM

## 2022-05-01 RX ORDER — SODIUM CHLORIDE, SODIUM LACTATE, POTASSIUM CHLORIDE, CALCIUM CHLORIDE 600; 310; 30; 20 MG/100ML; MG/100ML; MG/100ML; MG/100ML
75 INJECTION, SOLUTION INTRAVENOUS CONTINUOUS
Status: DISCONTINUED | OUTPATIENT
Start: 2022-05-01 | End: 2022-05-04

## 2022-05-01 RX ORDER — NICOTINE POLACRILEX 4 MG
15 LOZENGE BUCCAL
Status: DISCONTINUED | OUTPATIENT
Start: 2022-05-01 | End: 2022-05-04 | Stop reason: HOSPADM

## 2022-05-01 RX ORDER — FAMOTIDINE 10 MG/ML
20 INJECTION, SOLUTION INTRAVENOUS EVERY 12 HOURS SCHEDULED
Status: DISCONTINUED | OUTPATIENT
Start: 2022-05-01 | End: 2022-05-04 | Stop reason: ALTCHOICE

## 2022-05-01 RX ADMIN — HEPARIN SODIUM 3800 UNITS: 1000 INJECTION, SOLUTION INTRAVENOUS; SUBCUTANEOUS at 21:33

## 2022-05-01 RX ADMIN — SODIUM CHLORIDE 1000 ML: 9 INJECTION, SOLUTION INTRAVENOUS at 00:31

## 2022-05-01 RX ADMIN — HEPARIN SODIUM 8 UNITS/KG/HR: 5000 INJECTION, SOLUTION INTRAVENOUS; SUBCUTANEOUS at 00:31

## 2022-05-01 RX ADMIN — FAMOTIDINE 20 MG: 10 INJECTION INTRAVENOUS at 21:35

## 2022-05-01 RX ADMIN — FAMOTIDINE 20 MG: 10 INJECTION INTRAVENOUS at 14:05

## 2022-05-01 RX ADMIN — Medication 10 ML: at 09:26

## 2022-05-01 RX ADMIN — SODIUM CHLORIDE, POTASSIUM CHLORIDE, SODIUM LACTATE AND CALCIUM CHLORIDE 125 ML/HR: 600; 310; 30; 20 INJECTION, SOLUTION INTRAVENOUS at 13:17

## 2022-05-01 RX ADMIN — HEPARIN SODIUM 12 UNITS/KG/HR: 5000 INJECTION, SOLUTION INTRAVENOUS; SUBCUTANEOUS at 23:27

## 2022-05-01 RX ADMIN — SODIUM CHLORIDE, POTASSIUM CHLORIDE, SODIUM LACTATE AND CALCIUM CHLORIDE 125 ML/HR: 600; 310; 30; 20 INJECTION, SOLUTION INTRAVENOUS at 21:35

## 2022-05-01 NOTE — CONSULTS
General Surgery Consultation Note    Date of Service: 5/1/2022  Cecilio Farias  [id number]  1955      Referring Provider: Nate Saunders MD    Location of Consult: Hospital floor     Reason for Consultation: Small bowel obstruction       History of Present Illness:  I am seeing, Cecilio Fairas, in consultation for Nate Saunders MD regarding small bowel obstruction.  66-year-old gentleman with past medical history significant for valvular heart disease (aortic valve replacement, mechanical), congestive heart failure, hypertension, and diabetes mellitus presents with upper abdominal pain, nausea, and vomiting.  He has had similar symptoms in the past requiring hospitalization, these improved with conservative management.  He currently has a nasogastric tube in place and denies ongoing colicky abdominal pain.  He has had a bowel movement.  He is currently on a heparin drip and his oral oral anticoagulation has been held.  Otherwise there are no other significant modifying factors or associated symptoms.     Problems Addressed this Visit        Gastrointestinal Abdominal     * (Principal) SBO (small bowel obstruction) ([facility]) - Primary      Other Visit Diagnoses     Ventral hernia with bowel obstruction          Diagnoses       Codes Comments    SBO (small bowel obstruction) ([facility])    -  Primary ICD-10-CM: K56.609  ICD-9-CM: 560.9     Ventral hernia with bowel obstruction     ICD-10-CM: K43.6  ICD-9-CM: 552.20           Past Medical History:   Diagnosis Date   • Cancer (HCC)     colon cancer with operation/ chemotherapy/radiation    • CHF (congestive heart failure) ([facility]) 2009    Acute Systolic    • Chronic anticoagulation     coumadin   • Diabetes mellitus ([facility])    • H/O aortic valve replacement    • HLD (hyperlipidemia)    • Hypertension    • Sepsis due to Acinetobacter species ([facility]) 6/13/2019       Past Surgical History:   • AORTIC VALVE REPAIR/REPLACEMENT    25 mm. [place] AVR   • ASCENDING AORTIC ANEURYSM  REPAIR   • BACK SURGERY   • CARDIAC CATHETERIZATION    Procedure: RIGHT AND LEFT HEART CATH;  Surgeon: Kurtis Smith MD;  Location: [facility] CATH INVASIVE LOCATION;  Service: Cardiology;  Laterality: N/A;   • COLON SURGERY    Colon cancer with operation   • FOOT SURGERY    bilateral 2/2 wound and trauma        Allergies   Allergen Reactions   • Sulfa Antibiotics    • Amoxicillin Rash     Has tolerated ceftriaxone       No current facility-administered medications on file prior to encounter.     Current Outpatient Medications on File Prior to Encounter   Medication Sig Dispense Refill   • amLODIPine (NORVASC) 10 MG tablet Take 1 tablet by mouth Every Morning.     • aspirin 81 MG EC tablet Take 81 mg by mouth daily.     • atorvastatin (LIPITOR) 40 MG tablet Take 40 mg by mouth Every Night.     • carvedilol (COREG) 25 MG tablet Take 3.125 mg by mouth 2 (Two) Times a Day With Meals.     • furosemide (LASIX) 20 MG tablet Take 1 tablet by mouth Daily. 90 tablet 2   • gabapentin (NEURONTIN) 800 MG tablet Take 800 mg by mouth 3 (Three) Times a Day.     • glipizide (GLUCOTROL) 10 MG tablet Take 10 mg by mouth 2 (Two) Times a Day Before Meals.     • insulin NPH-insulin regular (NOVOLIN 70/30) (70-30) 100 UNIT/ML injection Inject 5 Units under the skin into the appropriate area as directed 2 (Two) Times a Day With Meals. (Patient taking differently: Inject 15 Units under the skin into the appropriate area as directed 2 (Two) Times a Day With Meals.) 10 mL 0   • lisinopril (PRINIVIL,ZESTRIL) 20 MG tablet Take 20 mg by mouth 2 (two) times a day.     • minocycline (MINOCIN,DYNACIN) 100 MG capsule Take 1 capsule by mouth 2 (Two) Times a Day.     • potassium chloride ER (K-TAB) 20 MEQ tablet controlled-release ER tablet TAKE ONE TABLET BY MOUTH DAILY WITH LASIX 90 tablet 2   • warfarin (COUMADIN) 5 MG tablet Take three tabs by mouth once on 11/19/21  Indications: Presence of Mechanical Artificial Heart Valve     • warfarin  (COUMADIN) 5 MG tablet Take 1.5-2 tablets by mouth once daily or as directed by the anticoagulation clinic 60 tablet 2   • metFORMIN (GLUCOPHAGE) 500 MG tablet Take 2 tablets by mouth Every Evening.           Current Facility-Administered Medications:   •  dextrose (D50W) (25 g/50 mL) IV injection 25 g, 25 g, Intravenous, Q15 Min PRN, Nhi Gorman, APRN  •  dextrose (GLUTOSE) oral gel 15 g, 15 g, Oral, Q15 Min PRN, Nhi Gorman APRN  •  glucagon (human recombinant) (GLUCAGEN DIAGNOSTIC) injection 1 mg, 1 mg, Intramuscular, Q15 Min PRN, Nhi Gorman, APRN  •  heparin 33136 units/250 mL (100 units/mL) in 0.9% NaCl infusion, 8 Units/kg/hr, Intravenous, Titrated, Nhi Gorman APRN, Last Rate: 10.32 mL/hr at 05/01/22 0031, 8 Units/kg/hr at 05/01/22 0031  •  Insulin Lispro (humaLOG) injection 0-7 Units, 0-7 Units, Subcutaneous, TID AC, Nhi Gorman APRN  •  morphine injection 2 mg, 2 mg, Intravenous, Q3H PRN **AND** naloxone (NARCAN) injection 0.4 mg, 0.4 mg, Intravenous, Q5 Min PRN, Nhi Gorman APRTENNILLE  •  Pharmacy to Dose Heparin, , Does not apply, Continuous PRN, Nhi Gorman APRTENNILLE  •  sodium chloride 0.9 % flush 10 mL, 10 mL, Intravenous, PRN, Leo Mcnair MD  •  sodium chloride 0.9 % flush 10 mL, 10 mL, Intravenous, Q12H, Nhi Gorman APRN, 10 mL at 05/01/22 0926  •  sodium chloride 0.9 % flush 10 mL, 10 mL, Intravenous, PRN, Nhi Gorman APRN    Family History   Problem Relation Age of Onset   • Heart disease Mother    • Hyperlipidemia Mother    • Diabetes Mother    • Leukemia Father    • Cancer Sister    • Kidney failure Brother    • Hepatitis Brother    • Cancer Sister    • Cancer Brother    • Heart disease Brother    • Heart attack Brother      Social History     Socioeconomic History   • Marital status:    Tobacco Use   • Smoking status: Never Smoker   • Smokeless tobacco: Never Used   Vaping Use   • Vaping Use: Never used   Substance and Sexual Activity   •  "Alcohol use: No   • Drug use: No   • Sexual activity: Defer       Review of Systems:  Review of Systems   Constitutional: Positive for appetite change. Negative for fatigue.   HENT: Negative for ear discharge, mouth sores and rhinorrhea.    Eyes: Negative for photophobia and visual disturbance.   Respiratory: Negative for cough, chest tightness and stridor.    Cardiovascular: Negative for chest pain and leg swelling.   Gastrointestinal: Positive for abdominal pain, nausea and vomiting. Negative for abdominal distention.   Endocrine: Negative for polyphagia and polyuria.   Genitourinary: Negative for dysuria, frequency and hematuria.   Musculoskeletal: Negative for arthralgias, gait problem and myalgias.   Skin: Negative for pallor and rash.   Allergic/Immunologic: Negative for immunocompromised state.   Neurological: Negative for speech difficulty, numbness and headaches.   Hematological: Negative for adenopathy.   Psychiatric/Behavioral: Negative for behavioral problems and dysphoric mood. The patient is not nervous/anxious.      Otherwise the 12 point review of systems is negative.    /66 (BP Location: Right arm, Patient Position: Lying)   Pulse 76   Temp 97.7 °F (36.5 °C) (Oral)   Resp 16   Ht 190.5 cm (75\")   Wt 129 kg (285 lb)   SpO2 95%   BMI 35.62 kg/m²   Body mass index is 35.62 kg/m².    General: Mild distress  HEENT: PER, no icterus, normal sclerae  Cardiac: regular rhythm,  no audible rubs  Pulmonary: bilateral breath sounds, non labored  Abdominal: Midline incision, soft, no generalized peritonitis, reducible hernia  Neurologic: awake, alert, no obvious focal deficits  Extremities: warm, no edema  Skin: no obvious rashes nor worrisome lesions seen     CBC  Results from last 7 days   Lab Units 05/01/22  0430   WBC 10*3/mm3 7.03   HEMOGLOBIN g/dL 11.4*   HEMATOCRIT % 37.3*   PLATELETS 10*3/mm3 164       CMP  Results from last 7 days   Lab Units 05/01/22  0430 04/30/22  2041   SODIUM mmol/L 143 " 139   POTASSIUM mmol/L 4.5 4.1   CHLORIDE mmol/L 112* 102   CO2 mmol/L 22.0 26.0   BUN mg/dL 13 13   CREATININE mg/dL 0.73* 0.84   CALCIUM mg/dL 7.7* 9.1   BILIRUBIN mg/dL  --  1.7*   ALK PHOS U/L  --  149*   ALT (SGPT) U/L  --  24   AST (SGOT) U/L  --  40   GLUCOSE mg/dL 124* 150*       Radiology  Imaging Results (Last 72 Hours)     Procedure Component Value Units Date/Time    XR Abdomen KUB [[id number]] Collected: 05/01/22 0153     Updated: 05/01/22 0154    Narrative:      EXAMINATION: XR ABDOMEN KUB      DATE: 5/1/2022 1:18 AM    INDICATIONS: Tube placement.    COMPARISON: April 30, 2022 at 10:07 PM    FINDINGS/    Impression:        Enteric tube tip in the stomach.    Electronically signed by:  Dani Root M.D.    4/30/2022 11:53 PM Mountain Time    XR Abdomen KUB [[id number]] Collected: 04/30/22 2320     Updated: 04/30/22 2322    Narrative:      EXAMINATION: XR ABDOMEN KUB    DATE: 4/30/2022 10:20 PM    INDICATION:  NG tube placement;    COMPARISON:  CT abdomen and pelvis earlier today    FINDINGS:      NG tube tip in the proximal stomach, proximal sidehole in the distal esophagus.  Dilated bowel loops in the upper abdomen.  Median sternotomy and cardiac valve replacement.      Impression:        1.  Recommend 5 cm advancement of esophogastric tube for optimal position.          Electronically signed by:  Kana Erwin M.D.    4/30/2022 9:21 PM Mountain Time    CT Abdomen Pelvis With Contrast [[id number]] Collected: 04/30/22 2232     Updated: 04/30/22 2239    Narrative:      EXAMINATION:  CT ABDOMEN PELVIS W CONTRAST    DATE: 4/30/2022 9:31 PM    INDICATION: Generalized abdominal pain, vomiting ;    COMPARISON: November 17, 2021.    PROCEDURE:  CT of the abdomen and pelvis was performed following the intravenous administration of iodinated contrast material.  CT dose lowering techniques were used, to include: automated exposure control, adjustment for patient size, and or use of   iterative  reconstruction.    FINDINGS:    Visualized lower chest: Coronary artery atherosclerotic calcifications.     ABDOMEN:    Liver and Biliary system:  Liver surface nodularity. No biliary ductal dilation.    Gallbladder:  Gallstones..    Spleen:  Normal.    Pancreas:  Normal.    Adrenal glands:  2.4 cm left adrenal myelolipoma. 1.3 cm right adrenal adenoma..    Kidneys and ureters:  Kidneys enhance symmetrically. No suspicious mass. No hydronephrosis.    Aorta/IVC:  Moderate atherosclerotic calcifications. No aortic aneurysm or dissection.  IVC normal.    Lymph nodes:  No lymphadenopathy.    Gastrointestinal tract:  Stomach normal.  Fluid-filled dilated small bowel measuring up to 6 cm in diameter with transition at the complex ventral hernia. Distal small bowel decompressed. Appendix normal. No colonic wall thickening or dilation.   Posterolateral changes descending colon.    Peritoneum/Mesentery: No pneumoperitoneum.  No ascites.    Abdominal wall: Again demonstrated is complex ventral hernia containing multiple interconnected outpouchings which contain small bowel and mesentery as well as a small portion of transverse colon.    PELVIS:    Urinary bladder: Normal.    Reproductive organs: No acute abnormality by CT.    Lymph Nodes: No pelvic lymphadenopathy.    BONES:  Degenerative changes in the spine.          Impression:         1.  Small bowel obstruction with transition at the complex ventral hernia, similar to November 17, 2021 exam.  2.  Cirrhotic liver morphology.   3.  Other chronic findings as above.          Electronically signed by:  Kana Erwin M.D.    4/30/2022 8:38 PM Mountain Time    XR Chest 1 View [[id number]] Collected: 04/30/22 2135     Updated: 04/30/22 2136    Narrative:      EXAMINATION: XR CHEST 1 VW    DATE: 4/30/2022 8:56 PM    INDICATION:  Upper abdominal pain triage protocol;    COMPARISON:  January 15, 2021    FINDINGS:      No focal consolidation, pleural effusion, or  pneumothorax.    Cardiomediastinal silhouette  unchanged. Status post median sternotomy.    No acute osseous abnormality.          Impression:        1.  No acute cardiopulmonary process.          Electronically signed by:  Kana Erwin M.D.    4/30/2022 7:35 PM Mountain Time            Assessment:  Small bowel obstruction  Incisional hernia  Valvular heart disease (mechanical aortic valve)  Chronic anticoagulation  Diabetes mellitus type 2  Hypertension  Dyslipidemia  Obesity    Plan:  GI rest with nasogastric decompression, volume resuscitation, normalization of his INR, await GI function.  Repeat labs in the morning.    Ozzie Lester MD  05/01/22  12:56 EDT

## 2022-05-01 NOTE — PROGRESS NOTES
HEPARIN INFUSION  Cecilio Farias is a  66 y.o. male receiving heparin infusion.             Therapy for (VTE/Cardiac):   Cardiac  Patient Weight: 129 kg  Initial Bolus (Y/N):   N  Any Bolus (Y/N):   Y        Signs or Symptoms of Bleeding: N/A      Cardiac or Other (Not VTE)   Initial Bolus: 60 units/kg (Max 4,000 units)  Initial rate: 12 units/kg/hr (Max 1,000 units/hr)   Anti-Xa (IU/mL) Bolus Dose Stop Infusion Rate Change Repeat Anti-Xa      ?0.19 60 units/kg 0 hrs Increase rate by   4 units/kg/hr 6 hrs       0.2 - 0.29  30 units/kg 0 hrs Increase rate by 2 units/kg/hr 6 hrs    0.3 - 0.7 0 0 hrs No change 6 hrs      0.71 - 0.99 0  0 hrs Decrease rate by 2 units/kg/hr 6 hrs            ?1 0 Hold 1 hr Decrease rate by 3 units/kg/hr 6 hrs        Cardiac or Other (Not VTE)   Initial Bolus: 60 units/kg (Max 4,000 units)  Initial rate: 12 units/kg/hr (Max 1,000 units/hr)   aPTT    (sec) Bolus Dose Stop Infusion Rate Change Repeat aPTT      < 30 60 units/kg 0 hrs Increase rate by   4 units/kg/hr 6 hrs       30 - 49  30 units/kg 0 hrs Increase rate by 2 units/kg/hr 6 hrs    50 - 95 0 0 hrs No change 6 hrs      96 - 123  0  0 hrs Decrease rate by 2 units/kg/hr 6 hrs      > 123 0 Hold 1 hr Decrease rate by 3 units/kg/hr 6 hrs       Recommend Xa every 6 hours.     Results from last 7 days   Lab Units 05/01/22  0430 05/01/22  0006 04/30/22 2041   INR   --   --  2.30*   HEMOGLOBIN g/dL 11.4* 12.2* 13.7   HEMATOCRIT % 37.3* 37.9 41.5   PLATELETS 10*3/mm3 164 214 219          Date   Time   Anti-Xa Current Rate (Unit/kg/hr) Bolus   (Units) Rate Change   (Unit/kg/hr) New Rate (Unit/kg/hr) Next   Anti-Xa Comments  Pump Check Daily   5/1 0411 STAT New --- +8 8 AM Dw RN; bridging off warfarin (SBO)   5/1 1125 aPTT 40.9 8 -- +2 10 2000 D/w Tammy RN                                                                                                                                                                                                                       Willow Bautista, PharmD  5/1/2022  14:16 EDT

## 2022-05-01 NOTE — PAYOR COMM NOTE
"Ref # [id number]  Rea To RN, BSN, CMGT-BC  Phone # [phone]  Fax # [phone]  Cecilio Garcia (66 y.o. Male)             Date of Birth   1955    Social Security Number       Address   [address] [facility] [address]    Home Phone   [phone]    MRN   [id number]       [employer]ism   M[employer]onism    Marital Status                               Admission Date   22    Admission Type   Emergency    Admitting Provider   Nate Saunders MD    Attending Provider   Nate Saunders MD    Department, Room/Bed   [facility] 5G, S547/1       Discharge Date       Discharge Disposition       Discharge Destination                               Attending Provider: Nate Saunders MD    Allergies: Sulfa Antibiotics, Amoxicillin    Isolation: None   Infection: None   Code Status: CPR   Advance Care Planning Activity    Ht: 190.5 cm (75\")   Wt: 129 kg (285 lb)    Admission Cmt: None   Principal Problem: SBO (small bowel obstruction) (HCC) [K56.609]                 Active Insurance as of 2022     Primary Coverage     Payor Plan Insurance Group Employer/Plan Group    ANTHEM MEDICARE REPLACEMENT ANTHEM MEDICARE ADVANTAGE [id number]     Payor Plan Address Payor Plan Phone Number Payor Plan Fax Number Effective Dates    [address] [phone]  2022 - None Entered    [facility] [id number]       Subscriber Name Subscriber Birth Date Member ID       CECILIO GARCIA 1955 [id number]                      History & Physical      Prerna Ruby MD at [address] [facility]              [facility]  HISTORY AND PHYSICAL    Patient Name: Cecilio Garcia  : 1955  MRN: [id number]  Primary Care Physician: America Everett DO  Date of admission: 2022    Subjective   Subjective     Chief Complaint:  Abdominal pain, nausea, vomiting     HPI:  Cecilio Garcia is a 66 y.o. male with PMH significant for colon cancer, CHF, DM 2, " mechanical aortic valve replacement on chronic Coumadin, HLD, HTN, Acinetobacter on chronic minocycline with chronic BLE discoloration secondary to minocycline, who presents to the ED with complaint of abdominal pain, nausea, and vomiting.  He notes that he has had a large ventral hernia for a long time.  In November 2021 he was admitted secondary to bowel obstruction and at that time the obstruction resolved after small bowel follow-through and decision was made to have elective repair at a later date.  In February, he was planning to have hernia repair surgery when elective surgeries were held secondary to the pandemic.  He notes since that time he has been doing well. He has lost 50 pounds in efforts to get ready for surgery.  Yesterday, he developed abdominal pain with associated nausea and vomiting.  He notes that the pain comes and goes but has continued through today prompting evaluation in the ED.  Upon arrival to the ED, he is noted to have elevated lactate.  CT abdomen/pelvis is concerning for small bowel obstruction.  Of note he is on chronic Coumadin secondary to mechanical aortic valve.  He will be admitted to hospital medicine for further evaluation.    Review of Systems   Constitutional: Positive for appetite change. Negative for activity change, chills, diaphoresis, fatigue, fever and unexpected weight change.   HENT: Negative.    Eyes: Negative.  Negative for visual disturbance.   Respiratory: Negative for cough, chest tightness, shortness of breath and wheezing.    Cardiovascular: Positive for leg swelling. Negative for chest pain and palpitations.   Gastrointestinal: Positive for abdominal distention, abdominal pain, nausea and vomiting. Negative for constipation and diarrhea.   Endocrine: Negative.    Genitourinary: Negative.  Negative for difficulty urinating, dysuria, frequency and urgency.   Musculoskeletal: Negative for arthralgias, back pain, gait problem, myalgias and neck pain.   Skin:  Positive for color change (Chronic bilateral lower extremity discoloration secondary to medication). Negative for pallor, rash and wound.   Allergic/Immunologic: Negative.  Negative for immunocompromised state.   Neurological: Negative.  Negative for dizziness, speech difficulty, weakness, light-headedness and headaches.   Hematological: Bruises/bleeds easily.   Psychiatric/Behavioral: Negative.  Negative for confusion. The patient is not nervous/anxious.         All other systems reviewed and are negative.     Personal History     Past Medical History:   Diagnosis Date   • Cancer (HCC)     colon cancer with operation/ chemotherapy/radiation    • CHF (congestive heart failure) (HCC) 2009    Acute Systolic    • Chronic anticoagulation     coumadin   • Diabetes mellitus (HCC)    • H/O aortic valve replacement    • HLD (hyperlipidemia)    • Hypertension    • Sepsis due to Acinetobacter species (HCC) 6/13/2019             Past Surgical History:   Procedure Laterality Date   • AORTIC VALVE REPAIR/REPLACEMENT  2009    25 mm. [place] Kevin AVR   • ASCENDING AORTIC ANEURYSM REPAIR  2009   • BACK SURGERY     • CARDIAC CATHETERIZATION N/A 1/20/2021    Procedure: RIGHT AND LEFT HEART CATH;  Surgeon: Kurtis Smith MD;  Location: [facility] CATH INVASIVE LOCATION;  Service: Cardiology;  Laterality: N/A;   • COLON SURGERY      Colon cancer with operation   • FOOT SURGERY      bilateral 2/2 wound and trauma        Family History:  family history includes Cancer in his brother, sister, and sister; Diabetes in his mother; Heart attack in his brother; Heart disease in his brother and mother; Hepatitis in his brother; Hyperlipidemia in his mother; Kidney failure in his brother; Leukemia in his father. Otherwise pertinent FHx was reviewed and unremarkable.     Social History:  reports that he has never smoked. He has never used smokeless tobacco. He reports that he does not drink alcohol and does not use  drugs.      Medications:  amLODIPine, aspirin, atorvastatin, carvedilol, furosemide, gabapentin, glipizide, insulin NPH-insulin regular, lisinopril, metFORMIN, minocycline, potassium chloride ER, and warfarin    Allergies   Allergen Reactions   • Sulfa Antibiotics    • Amoxicillin Rash     Has tolerated ceftriaxone       Objective   Objective     Vital Signs:   Temp:  [98.9 °F (37.2 °C)] 98.9 °F (37.2 °C)  Heart Rate:  [96] 96  Resp:  [22] 22  BP: (145-148)/(80-84) 148/80    Physical Exam   Constitutional: Awake, alert  Eyes: PERRLA, sclerae anicteric, no conjunctival injection  HENT: NCAT, mucous membranes moist  Neck: Supple, no thyromegaly, no lymphadenopathy, trachea midline  Respiratory: Clear to auscultation bilaterally, nonlabored respirations   Cardiovascular: RRR, no murmurs, rubs, or gallops, palpable pedal pulses bilaterally, positive mechanical click  Gastrointestinal: Decreased bowel sounds, soft, mildly tender to palpation, distended, large ventral hernia noted  Musculoskeletal: Mild bilateral ankle edema, no clubbing or cyanosis to extremities  Psychiatric: Appropriate affect, cooperative  Neurologic: Oriented x 3, strength symmetric in all extremities, Cranial Nerves grossly intact to confrontation, speech clear  Skin: No rashes, chronic BLE discoloration (black) secondary to minocycline use       Results Reviewed:  I have personally reviewed most recent indicated data and agree with findings including:  [x]  Laboratory  [x]  Radiology  []  EKG/Telemetry  []  Pathology  []  Cardiac/Vascular Studies  [x]  Old records  []  Other:  Most pertinent findings include:      LAB RESULTS:      Lab 04/30/22 2041   WBC 10.79   HEMOGLOBIN 13.7   HEMATOCRIT 41.5   PLATELETS 219   NEUTROS ABS 8.81*   IMMATURE GRANS (ABS) 0.04   LYMPHS ABS 0.86   MONOS ABS 0.89   EOS ABS 0.14   MCV 85.9   LACTATE 2.2*   PROTIME 25.4*         Lab 04/30/22 2041   SODIUM 139   POTASSIUM 4.1   CHLORIDE 102   CO2 26.0   ANION GAP 11.0    BUN 13   CREATININE 0.84   EGFR 96.8   GLUCOSE 150*   CALCIUM 9.1         Lab 04/30/22 2041   TOTAL PROTEIN 7.4   ALBUMIN 3.90   GLOBULIN 3.5   ALT (SGPT) 24   AST (SGOT) 40   BILIRUBIN 1.7*   ALK PHOS 149*   LIPASE 37         Lab 04/30/22 2041   TROPONIN T 0.030   PROTIME 25.4*   INR 2.30*                 Brief Urine Lab Results  (Last result in the past 365 days)      Color   Clarity   Blood   Leuk Est   Nitrite   Protein   CREAT   Urine HCG        11/17/21 1651 Dark Yellow   Clear   Negative   Negative   Negative   30 mg/dL (1+)               Microbiology Results (last 10 days)     ** No results found for the last 240 hours. **          CT Abdomen Pelvis With Contrast    Result Date: 4/30/2022  EXAMINATION:  CT ABDOMEN PELVIS W CONTRAST DATE: 4/30/2022 9:31 PM INDICATION: Generalized abdominal pain, vomiting ; COMPARISON: November 17, 2021. PROCEDURE:  CT of the abdomen and pelvis was performed following the intravenous administration of iodinated contrast material.  CT dose lowering techniques were used, to include: automated exposure control, adjustment for patient size, and or use of iterative reconstruction. FINDINGS: Visualized lower chest: Coronary artery atherosclerotic calcifications.  ABDOMEN: Liver and Biliary system:  Liver surface nodularity. No biliary ductal dilation. Gallbladder:  Gallstones.. Spleen:  Normal. Pancreas:  Normal. Adrenal glands:  2.4 cm left adrenal myelolipoma. 1.3 cm right adrenal adenoma.. Kidneys and ureters:  Kidneys enhance symmetrically. No suspicious mass. No hydronephrosis. Aorta/IVC:  Moderate atherosclerotic calcifications. No aortic aneurysm or dissection.  IVC normal. Lymph nodes:  No lymphadenopathy. Gastrointestinal tract:  Stomach normal.  Fluid-filled dilated small bowel measuring up to 6 cm in diameter with transition at the complex ventral hernia. Distal small bowel decompressed. Appendix normal. No colonic wall thickening or dilation. Posterolateral changes  descending colon. Peritoneum/Mesentery: No pneumoperitoneum.  No ascites. Abdominal wall: Again demonstrated is complex ventral hernia containing multiple interconnected outpouchings which contain small bowel and mesentery as well as a small portion of transverse colon. PELVIS: Urinary bladder: Normal. Reproductive organs: No acute abnormality by CT. Lymph Nodes: No pelvic lymphadenopathy. BONES:  Degenerative changes in the spine.     Impression:  1.  Small bowel obstruction with transition at the complex ventral hernia, similar to November 17, 2021 exam. 2.  Cirrhotic liver morphology. 3.  Other chronic findings as above. Electronically signed by:  Kana Erwin M.D.  4/30/2022 8:38 PM Mountain Time    XR Chest 1 View    Result Date: 4/30/2022  EXAMINATION: XR CHEST 1 VW DATE: 4/30/2022 8:56 PM INDICATION:  Upper abdominal pain triage protocol; COMPARISON:  January 15, 2021 FINDINGS: No focal consolidation, pleural effusion, or pneumothorax. Cardiomediastinal silhouette  unchanged. Status post median sternotomy. No acute osseous abnormality.     Impression: 1.  No acute cardiopulmonary process. Electronically signed by:  Kana Erwin M.D.  4/30/2022 7:35 PM Mountain Time    XR Abdomen KUB    Result Date: 4/30/2022  EXAMINATION: XR ABDOMEN KUB DATE: 4/30/2022 10:20 PM INDICATION:  NG tube placement; COMPARISON:  CT abdomen and pelvis earlier today FINDINGS: NG tube tip in the proximal stomach, proximal sidehole in the distal esophagus. Dilated bowel loops in the upper abdomen. Median sternotomy and cardiac valve replacement.     Impression: 1.  Recommend 5 cm advancement of esophogastric tube for optimal position. Electronically signed by:  Kana Erwin M.D.  4/30/2022 9:21 PM Mountain Time      Results for orders placed during the hospital encounter of 01/15/21    Transthoracic Echo Complete With Contrast if Necessary Per Protocol    Interpretation Summary  · The left atrium is dilated.  · There is mild  concentric left ventricular hypertrophy.  · Global and segmental LV systolic function is normal. The calculated left ventricular ejection fraction is 53%.  · There is a mechanical aortic valve in the aortic position. Function appears to be normal.  · There is mitral annular calcification. Mild mitral regurgitation is seen. There appears to be some degree of chordal thickening with a 4.5 mmHg mean diastolic gradient across the mitral valve apparatus.  · Mild to moderate tricuspid regurgitation is noted. The estimated RV systolic pressure is elevated at 56 mmHg.  · No other important findings are noted on the study.      Assessment/Plan   Assessment & Plan       SBO (small bowel obstruction) (HCC)    Essential hypertension    Dyslipidemia    Type 2 diabetes mellitus (HCC)    History of mechanical AVR for aortic stenosis 2009    Chronic anticoagulation    History Acinetobacter bacteremia on chronic minocycline    Discoloration of skin of bilateral legs likely due to minocycline use    Diastolic CHF, chronic (HCC)    Lactic acidosis    66 y.o. male with PMH significant for colon cancer, CHF, DM 2, mechanical aortic valve replacement on chronic Coumadin, HLD, HTN, Acetobacter on chronic minocycline with chronic BLE discoloration secondary to minocycline, who presents to the ED with complaint of 2-day history of abdominal pain, nausea, and vomiting who was found to have concern for small bowel obstruction with a large ventral hernia.    Small bowel obstruction  Ventral hernia  - General surgery consult in the a.m.  - N.p.o.  - NG tube to low wall suction  - IV as needed pain control  - If decision is made for surgery, will likely need cardiac clearance  - CBC, BMP    Lactic acidosis  - IVF bolus given in the ED  -Hold additional fluid for now given CHF  - Reflex lactic acid pending    Valvular heart disease  - History of mechanical AVR  - On chronic Coumadin, hold while n.p.o.  - Heparin drip    History of CHF-- last echo  1/21 EF was normal  - Monitor closely for volume overload while n.p.o.  - Daily weight  - Plan to continue Lasix when appropriate    History Acinetobacter bacteremia  -chronic minocycline, plan to continue when able to take po  -chronic lower extremity leg discoloration secondary to minocycline    Diabetes mellitus 2  - FSBG ACH S  - SS insulin  - Hemoglobin A1c in the a.m.  - Hold metformin    Hypertension  Hyperlipidemia  - Plan to continue amlodipine, carvedilol, lisinopril when able to tolerate p.o.  - Plan to continue atorvastatin once able to tolerate p.o.    DVT prophylaxis: Heparin drip    CODE STATUS:    Code Status (Patient has no pulse and is not breathing): CPR (Attempt to Resuscitate)  Medical Interventions (Patient has pulse or is breathing): Full Support      This note has been completed as part of a split-shared workflow.     Signature:   Electronically signed by PHAN Elkins, 05/01/22, 12:16 AM EDT.      Attending   Admission Attestation       I have seen and examined the patient, performing an independent face-to-face diagnostic evaluation with plan of care reviewed and developed with the advanced practice clinician (APC).      Brief Summary Statement:     Cecilio Farias is a 66 y.o. male with PMH significant for colon cancer, CHF, DM 2, mechanical aortic valve replacement on chronic Coumadin, HLD, HTN, Acinetobacter on chronic minocycline with chronic BLE discoloration secondary to minocycline, who presents to the ED with complaint of abdominal pain, nausea, and vomiting.Patient reports a normal BM today, feeling better since arrival. Has lost 50 pounds-- intentionally.      Remainder of detailed HPI is as noted by APC and has been reviewed and/or edited by me for completeness.    Attending Physical Exam:  Temp:  [98.2 °F (36.8 °C)-98.9 °F (37.2 °C)] 98.2 °F (36.8 °C)  Heart Rate:  [] 66  Resp:  [18-22] 18  BP: (120-148)/(58-84) 120/58    Patient is alert and talkative in no  distress at rest  Neck is without mass or JVD  Heart is Reg wo murmur  Lungs are clear wo wheeze or crackle  Abd is soft without HSM or mass, not tender or distended  MAEW  Skin is without rash  Neurologic is exam in non-focal   Mood is appropriate      Brief Assessment/Plan :  See detailed assessment and plan developed with APC which I have reviewed and/or edited for completeness.    Admission Status: I believe that this patient meets Inpatient  Status.    Prerna Ruby MD  05/01/22                            Electronically signed by Prerna Ruby MD at 05/01/22 4420          Emergency Department Notes      Leo Mcnair MD at 04/30/22 9346          Subjective   66-year-old male presents for evaluation of abdominal pain and nausea/vomiting.  Of note, the patient has a history of a prior small bowel obstruction back in November 2021.  His obstruction resolved with conservative measures and surgery for his ventral hernia was put off to a future date.  He states that he has lost approximately 50 to 60 pounds since that time.  He states that yesterday he began experiencing vague abdominal pain that has gradually intensified since that time.  He endorses accompanying nausea and vomiting.  His last bowel movement was yesterday.  No fevers.  He states that his current symptoms feel identical to those that he has experienced in the past with prior bowel obstruction.  He currently rates his pain at 8 out of 10 in severity.  He states that his abdomen feels distended as well.          Review of Systems   Gastrointestinal: Positive for abdominal distention, abdominal pain, nausea and vomiting.   All other systems reviewed and are negative.      Past Medical History:   Diagnosis Date   • Cancer (HCC)     colon cancer with operation/ chemotherapy/radiation    • CHF (congestive heart failure) (HCC) 2009    Acute Systolic    • Chronic anticoagulation     coumadin   • Diabetes mellitus (HCC)    • H/O aortic valve  replacement    • HLD (hyperlipidemia)    • Hypertension    • Sepsis due to Acinetobacter species (HCC) 6/13/2019       Allergies   Allergen Reactions   • Sulfa Antibiotics    • Amoxicillin Rash     Has tolerated ceftriaxone       Past Surgical History:   Procedure Laterality Date   • AORTIC VALVE REPAIR/REPLACEMENT  2009    25 mm. St. Kevin AVR   • ASCENDING AORTIC ANEURYSM REPAIR  2009   • BACK SURGERY     • CARDIAC CATHETERIZATION N/A 1/20/2021    Procedure: RIGHT AND LEFT HEART CATH;  Surgeon: Kurtis Smith MD;  Location: [facility] C[facility] INVASIVE LOCATION;  Service: Cardiology;  Laterality: N/A;   • COLON SURGERY      Colon cancer with operation   • FOOT SURGERY      bilateral 2/2 wound and trauma        Family History   Problem Relation Age of Onset   • Heart disease Mother    • Hyperlipidemia Mother    • Diabetes Mother    • Leukemia Father    • Cancer Sister    • Kidney failure Brother    • Hepatitis Brother    • Cancer Sister    • Cancer Brother    • Heart disease Brother    • Heart attack Brother        Social History     Socioeconomic History   • Marital status:    Tobacco Use   • Smoking status: Never Smoker   • Smokeless tobacco: Never Used   Vaping Use   • Vaping Use: Never used   Substance and Sexual Activity   • Alcohol use: No   • Drug use: No   • Sexual activity: Defer           Objective   Physical Exam  Vitals and nursing note reviewed.   Constitutional:       Appearance: He is well-developed. He is not diaphoretic.      Comments: Nontoxic-appearing male   HENT:      Head: Normocephalic and atraumatic.   Neck:      Vascular: No JVD.   Cardiovascular:      Rate and Rhythm: Normal rate and regular rhythm.      Heart sounds: Normal heart sounds. No murmur heard.    No friction rub. No gallop.   Pulmonary:      Effort: Pulmonary effort is normal. No respiratory distress.      Breath sounds: Normal breath sounds. No wheezing or rales.   Abdominal:      General: Bowel sounds are normal. There is  distension.      Palpations: Abdomen is soft. There is no mass.      Tenderness: There is abdominal tenderness. There is guarding.      Comments: Palpable ventral hernia with tenderness noted, hernia is not firm, no overlying skin changes, no pain out of proportion to exam   Genitourinary:     Comments: No CVA tenderness present  Musculoskeletal:         General: Normal range of motion.      Cervical back: Normal range of motion.   Skin:     General: Skin is warm and dry.      Coloration: Skin is not pale.      Findings: No erythema or rash.   Neurological:      General: No focal deficit present.      Mental Status: He is alert and oriented to person, place, and time.      Comments: Normal gait   Psychiatric:         Thought Content: Thought content normal.         Judgment: Judgment normal.         Procedures          ED Course  ED Course as of 04/30/22 2324   Sat Apr 30, 2022 2115 66-year-old male presents for evaluation of abdominal pain and nausea/vomiting.  Of note, the patient was admitted for a bowel obstruction in November 2021.  Surgery was discussed at that time but was ultimately pushed off.  He states that his symptoms returned yesterday and worsened throughout the day today, prompting his visit to the emergency department.  On arrival, the patient is uncomfortable appearing.  He has a large palpable ventral hernia that is not reducible.  No overlying skin changes.  No firmness noted.  We will obtain labs and imaging, and we will reassess following initial interventions. [DD]   2124 I personally viewed the patient's x-ray images myself, and I am in agreement with the radiologist's reading for final interpretation.     [DD]   2146 CT is suggestive of bowel obstruction.  We will place an NG tube, consult surgery, and we will seek admission to the hospitalist. [DD]   2244 I spoke with Dr. Lester of general surgery who will see the patient in consult.  Awaiting callback from the hospitalist at this time.  [DD]   0790 I spoke with Dr. Ruby and the patient will be admitted under her care for further evaluation and treatment.  The patient is aware/agreeable with the plan at this time. [DD]      ED Course User Index  [DD] Leo Mcnair MD                                               Recent Results (from the past 24 hour(s))   Comprehensive Metabolic Panel    Collection Time: 04/30/22  8:41 PM    Specimen: Blood   Result Value Ref Range    Glucose 150 (H) 65 - 99 mg/dL    BUN 13 8 - 23 mg/dL    Creatinine 0.84 0.76 - 1.27 mg/dL    Sodium 139 136 - 145 mmol/L    Potassium 4.1 3.5 - 5.2 mmol/L    Chloride 102 98 - 107 mmol/L    CO2 26.0 22.0 - 29.0 mmol/L    Calcium 9.1 8.6 - 10.5 mg/dL    Total Protein 7.4 6.0 - 8.5 g/dL    Albumin 3.90 3.50 - 5.20 g/dL    ALT (SGPT) 24 1 - 41 U/L    AST (SGOT) 40 1 - 40 U/L    Alkaline Phosphatase 149 (H) 39 - 117 U/L    Total Bilirubin 1.7 (H) 0.0 - 1.2 mg/dL    Globulin 3.5 gm/dL    A/G Ratio 1.1 g/dL    BUN/Creatinine Ratio 15.5 7.0 - 25.0    Anion Gap 11.0 5.0 - 15.0 mmol/L    eGFR 96.8 >60.0 mL/min/1.73   Lipase    Collection Time: 04/30/22  8:41 PM    Specimen: Blood   Result Value Ref Range    Lipase 37 13 - 60 U/L   Troponin    Collection Time: 04/30/22  8:41 PM    Specimen: Blood   Result Value Ref Range    Troponin T 0.030 0.000 - 0.030 ng/mL   Protime-INR    Collection Time: 04/30/22  8:41 PM    Specimen: Blood   Result Value Ref Range    Protime 25.4 (H) 11.4 - 14.4 Seconds    INR 2.30 (H) 0.84 - 1.13   Green Top (Gel)    Collection Time: 04/30/22  8:41 PM   Result Value Ref Range    Extra Tube Hold for add-ons.    Lavender Top    Collection Time: 04/30/22  8:41 PM   Result Value Ref Range    Extra Tube hold for add-on    Gold Top - SST    Collection Time: 04/30/22  8:41 PM   Result Value Ref Range    Extra Tube Hold for add-ons.    Light Blue Top    Collection Time: 04/30/22  8:41 PM   Result Value Ref Range    Extra Tube hold for add-on    CBC Auto Differential     Collection Time: 04/30/22  8:41 PM    Specimen: Blood   Result Value Ref Range    WBC 10.79 3.40 - 10.80 10*3/mm3    RBC 4.83 4.14 - 5.80 10*6/mm3    Hemoglobin 13.7 13.0 - 17.7 g/dL    Hematocrit 41.5 37.5 - 51.0 %    MCV 85.9 79.0 - 97.0 fL    MCH 28.4 26.6 - 33.0 pg    MCHC 33.0 31.5 - 35.7 g/dL    RDW 15.0 12.3 - 15.4 %    RDW-SD 46.7 37.0 - 54.0 fl    MPV 10.6 6.0 - 12.0 fL    Platelets 219 140 - 450 10*3/mm3    Neutrophil % 81.6 (H) 42.7 - 76.0 %    Lymphocyte % 8.0 (L) 19.6 - 45.3 %    Monocyte % 8.2 5.0 - 12.0 %    Eosinophil % 1.3 0.3 - 6.2 %    Basophil % 0.5 0.0 - 1.5 %    Immature Grans % 0.4 0.0 - 0.5 %    Neutrophils, Absolute 8.81 (H) 1.70 - 7.00 10*3/mm3    Lymphocytes, Absolute 0.86 0.70 - 3.10 10*3/mm3    Monocytes, Absolute 0.89 0.10 - 0.90 10*3/mm3    Eosinophils, Absolute 0.14 0.00 - 0.40 10*3/mm3    Basophils, Absolute 0.05 0.00 - 0.20 10*3/mm3    Immature Grans, Absolute 0.04 0.00 - 0.05 10*3/mm3    nRBC 0.0 0.0 - 0.2 /100 WBC   Lactic Acid, Plasma    Collection Time: 04/30/22  8:41 PM    Specimen: Blood   Result Value Ref Range    Lactate 2.2 (C) 0.5 - 2.0 mmol/L     Note: In addition to lab results from this visit, the labs listed above may include labs taken at another facility or during a different encounter within the last 24 hours. Please correlate lab times with ED admission and discharge times for further clarification of the services performed during this visit.    XR Abdomen KUB   Final Result      1.  Recommend 5 cm advancement of esophogastric tube for optimal position.               Electronically signed by:  Kana Erwin M.D.     4/30/2022 9:21 PM Mountain Time      CT Abdomen Pelvis With Contrast   Final Result       1.  Small bowel obstruction with transition at the complex ventral hernia, similar to November 17, 2021 exam.   2.  Cirrhotic liver morphology.    3.  Other chronic findings as above.               Electronically signed by:  Kana Erwin M.D.     4/30/2022  "8:38 PM Mountain Time      XR Chest 1 View   Final Result      1.  No acute cardiopulmonary process.               Electronically signed by:  Kana Erwin M.D.     4/30/2022 7:35 PM Mountain Time        Vitals:    04/30/22 2034 04/30/22 2105   BP: 145/84 148/80   BP Location: Right arm    Patient Position: Sitting    Pulse: 96    Resp: 22    Temp: 98.9 °F (37.2 °C)    TempSrc: Oral    SpO2: 97% 97%   Weight: 129 kg (285 lb)    Height: 190.5 cm (75\")      Medications   sodium chloride 0.9 % flush 10 mL (has no administration in time range)   sodium chloride 0.9 % bolus 1,000 mL (has no administration in time range)   sodium chloride 0.9 % bolus 1,000 mL (0 mL Intravenous Stopped 4/30/22 2213)   ondansetron (ZOFRAN) injection 4 mg (4 mg Intravenous Given 4/30/22 2112)   HYDROmorphone (DILAUDID) injection 0.5 mg (0.5 mg Intravenous Given 4/30/22 2112)   iopamidol (ISOVUE-300) 61 % injection 100 mL (95 mL Intravenous Given 4/30/22 2135)     ECG/EMG Results (last 24 hours)     Procedure Component Value Units Date/Time    ECG 12 Lead [[id number]] Collected: 04/30/22 2041     Updated: 04/30/22 2045        ECG 12 Lead   Preliminary Result                 MDM    Final diagnoses:   SBO (small bowel obstruction) (HCC)   Ventral hernia with bowel obstruction       ED Disposition  ED Disposition     ED Disposition   Decision to Admit    Condition   --    Comment   Level of Care: Telemetry [5]   Diagnosis: SBO (small bowel obstruction) (HCC) [265172]   Admitting Physician: NAFISA GARCIA [[id number]]   Attending Physician: NAFISA GARCIA [[id number]]   Certification: I Certify That Inpatient Hospital Services Are Medically Necessary For Greater Than 2 Midnights               No follow-up provider specified.       Medication List      No changes were made to your prescriptions during this visit.          Leo Mcnair MD  04/30/22 2327      Electronically signed by Leo Mcnair MD at 04/30/22 2327         Current " Facility-Administered Medications   Medication Dose Route Frequency Provider Last Rate Last Admin   • dextrose (D50W) (25 g/50 mL) IV injection 25 g  25 g Intravenous Q15 Min PRN Nhi Gorman APRN       • dextrose (GLUTOSE) oral gel 15 g  15 g Oral Q15 Min PRN Nhi Gorman APRN       • glucagon (human recombinant) (GLUCAGEN DIAGNOSTIC) injection 1 mg  1 mg Intramuscular Q15 Min PRN Nhi Gorman APRN       • heparin 00805 units/250 mL (100 units/mL) in 0.9% NaCl infusion  8 Units/kg/hr Intravenous Titrated Nhi Gorman APRN 10.32 mL/hr at 05/01/22 0031 8 Units/kg/hr at 05/01/22 0031   • Insulin Lispro (humaLOG) injection 0-7 Units  0-7 Units Subcutaneous TID AC Nhi Gorman APRN       • morphine injection 2 mg  2 mg Intravenous Q3H PRN Nhi Gorman APRN        And   • naloxone (NARCAN) injection 0.4 mg  0.4 mg Intravenous Q5 Min PRN Nhi Gorman APRN       • Pharmacy to Dose Heparin   Does not apply Continuous PRN Nhi Gorman APRN       • sodium chloride 0.9 % flush 10 mL  10 mL Intravenous PRN Leo Mcnair MD       • sodium chloride 0.9 % flush 10 mL  10 mL Intravenous Q12H Nhi Gorman APRN   10 mL at 05/01/22 0926   • sodium chloride 0.9 % flush 10 mL  10 mL Intravenous PRN Nhi Gorman APRN         Lab Results (last 48 hours)     Procedure Component Value Units Date/Time    aPTT [[id number]] Collected: 05/01/22 1125    Specimen: Blood Updated: 05/01/22 1134    POC Glucose Once [[id number]]  (Normal) Collected: 05/01/22 1128    Specimen: Blood Updated: 05/01/22 1130     Glucose 121 mg/dL      Comment: Meter: [id number] : [id number] Gisselle Sutherland       POC Glucose Once [[id number]]  (Normal) Collected: 05/01/22 0757    Specimen: Blood Updated: 05/01/22 0759     Glucose 116 mg/dL      Comment: Meter: [id number] : [id number] Gisselle Sutherland       Basic Metabolic Panel [[id number]]  (Abnormal) Collected: 05/01/22 0430    Specimen: Blood Updated: 05/01/22  0537     Glucose 124 mg/dL      BUN 13 mg/dL      Creatinine 0.73 mg/dL      Sodium 143 mmol/L      Potassium 4.5 mmol/L      Comment: Slight hemolysis detected by analyzer. Results may be affected.        Chloride 112 mmol/L      CO2 22.0 mmol/L      Calcium 7.7 mg/dL      BUN/Creatinine Ratio 17.8     Anion Gap 9.0 mmol/L      eGFR 101.0 mL/min/1.73      Comment: National Kidney Foundation and American Society of Nephrology (ASN) Task Force recommended calculation based on the Chronic Kidney Disease Epidemiology Collaboration (CKD-EPI) equation refit without adjustment for race.       Narrative:      GFR Normal >60  Chronic Kidney Disease <60  Kidney Failure <15      CBC Auto Differential [[id number]]  (Abnormal) Collected: 05/01/22 0430    Specimen: Blood Updated: 05/01/22 0530     WBC 7.03 10*3/mm3      RBC 4.03 10*6/mm3      Hemoglobin 11.4 g/dL      Hematocrit 37.3 %      MCV 92.6 fL      MCH 28.3 pg      MCHC 30.6 g/dL      RDW 14.9 %      RDW-SD 50.5 fl      MPV 11.1 fL      Platelets 164 10*3/mm3      Neutrophil % 71.1 %      Lymphocyte % 14.5 %      Monocyte % 11.1 %      Eosinophil % 2.6 %      Basophil % 0.4 %      Immature Grans % 0.3 %      Neutrophils, Absolute 5.00 10*3/mm3      Lymphocytes, Absolute 1.02 10*3/mm3      Monocytes, Absolute 0.78 10*3/mm3      Eosinophils, Absolute 0.18 10*3/mm3      Basophils, Absolute 0.03 10*3/mm3      Immature Grans, Absolute 0.02 10*3/mm3      nRBC 0.0 /100 WBC     Hemoglobin A1c [[id number]]  (Abnormal) Collected: 05/01/22 0430    Specimen: Blood Updated: 05/01/22 0526     Hemoglobin A1C 6.90 %     Narrative:      Hemoglobin A1C Ranges:    Increased Risk for Diabetes  5.7% to 6.4%  Diabetes                     >= 6.5%  Diabetic Goal                < 7.0%    Heparin Anti-Xa [[id number]]  (Abnormal) Collected: 05/01/22 0430    Specimen: Blood Updated: 05/01/22 0526     Heparin Anti-Xa (UFH) 0.10 IU/ml     [place] Draw [[id number]] Collected: 04/30/22 2041    Specimen:  Blood Updated: 05/01/22 0049    Narrative:      The following orders were created for panel order [place] Draw.  Procedure                               Abnormality         Status                     ---------                               -----------         ------                     Green Top (Gel)[[id number]]                                  Final result               Lavender Top[[id number]]                                     Final result               Gold Top - SST[[id number]]                                   Final result               Werner Top[[id number]]                                         Final result               Light Blue Top[[id number]]                                   Final result                 Please view results for these tests on the individual orders.    Gray Top [[id number]] Collected: 04/30/22 2041    Specimen: Blood Updated: 05/01/22 0049     Extra Tube Hold for add-ons.     Comment: Auto resulted.       STAT Lactic Acid, Reflex [[id number]]  (Normal) Collected: 05/01/22 0006    Specimen: Blood Updated: 05/01/22 0042     Lactate 1.6 mmol/L      Comment: Falsely depressed results may occur on samples drawn from patients receiving N-Acetylcysteine (NAC) or Metamizole.       aPTT [[id number]]  (Abnormal) Collected: 05/01/22 0006    Specimen: Blood Updated: 05/01/22 0042     PTT 37.5 seconds     Narrative:      PTT = The equivalent PTT values for the therapeutic range of heparin levels at 0.3 to 0.5 U/ml are 55 to 70 seconds.    Heparin Anti-Xa [[id number]]  (Abnormal) Collected: 05/01/22 0006    Specimen: Blood Updated: 05/01/22 0042     Heparin Anti-Xa (UFH) 0.10 IU/ml     COVID PRE-OP / PRE-PROCEDURE SCREENING ORDER (NO ISOLATION) - Swab, Nasopharynx [[id number]]  (Normal) Collected: 04/30/22 2356    Specimen: Swab from Nasopharynx Updated: 05/01/22 0034    Narrative:      The following orders were created for panel order COVID PRE-OP / PRE-PROCEDURE SCREENING ORDER (NO ISOLATION) - Swab,  Nasopharynx.  Procedure                               Abnormality         Status                     ---------                               -----------         ------                     COVID-19 and FLU A/B PCR...[[id number]]  Normal              Final result                 Please view results for these tests on the individual orders.    COVID-19 and FLU A/B PCR - Swab, Nasopharynx [[id number]]  (Normal) Collected: 04/30/22 2356    Specimen: Swab from Nasopharynx Updated: 05/01/22 0034     COVID19 Not Detected     Influenza A PCR Not Detected     Influenza B PCR Not Detected    Narrative:      Fact sheet for providers: https://www.fda.gov/media/291208/download    Fact sheet for patients: https://www.fda.gov/media/299750/download    Test performed by PCR.    Urinalysis, Microscopic Only - Urine, Clean Catch [[id number]] Collected: 05/01/22 0006    Specimen: Urine, Clean Catch Updated: 05/01/22 0024     RBC, UA 0-2 /HPF      WBC, UA 0-2 /HPF      Bacteria, UA None Seen /HPF      Squamous Epithelial Cells, UA 0-2 /HPF      Hyaline Casts, UA 0-6 /LPF      Methodology Automated Microscopy    Urinalysis With Microscopic If Indicated (No Culture) - Urine, Clean Catch [[id number]]  (Abnormal) Collected: 05/01/22 0006    Specimen: Urine, Clean Catch Updated: 05/01/22 0024     Color, UA Yellow     Appearance, UA Clear     pH, UA 7.5     Specific Gravity, UA 1.072     Glucose, UA Negative     Ketones, UA Trace     Bilirubin, UA Negative     Blood, UA Negative     Protein, UA 30 mg/dL (1+)     Leuk Esterase, UA Negative     Nitrite, UA Negative     Urobilinogen, UA 1.0 E.U./dL    CBC & Differential [[id number]]  (Abnormal) Collected: 05/01/22 0006    Specimen: Blood Updated: 05/01/22 0021    Narrative:      The following orders were created for panel order CBC & Differential.  Procedure                               Abnormality         Status                     ---------                               -----------         ------                      CBC Auto Differential[[id number]]        Abnormal            Final result                 Please view results for these tests on the individual orders.    CBC Auto Differential [[id number]]  (Abnormal) Collected: 05/01/22 0006    Specimen: Blood Updated: 05/01/22 0021     WBC 10.56 10*3/mm3      RBC 4.32 10*6/mm3      Hemoglobin 12.2 g/dL      Hematocrit 37.9 %      MCV 87.7 fL      MCH 28.2 pg      MCHC 32.2 g/dL      RDW 15.0 %      RDW-SD 47.8 fl      MPV 10.8 fL      Platelets 214 10*3/mm3      Neutrophil % 80.9 %      Lymphocyte % 8.9 %      Monocyte % 8.1 %      Eosinophil % 1.4 %      Basophil % 0.4 %      Immature Grans % 0.3 %      Neutrophils, Absolute 8.54 10*3/mm3      Lymphocytes, Absolute 0.94 10*3/mm3      Monocytes, Absolute 0.86 10*3/mm3      Eosinophils, Absolute 0.15 10*3/mm3      Basophils, Absolute 0.04 10*3/mm3      Immature Grans, Absolute 0.03 10*3/mm3      nRBC 0.0 /100 WBC     Green Top (Gel) [[id number]] Collected: 04/30/22 2041    Specimen: Blood Updated: 04/30/22 2148     Extra Tube Hold for add-ons.     Comment: Auto resulted.       Lavender Top [[id number]] Collected: 04/30/22 2041    Specimen: Blood Updated: 04/30/22 2148     Extra Tube hold for add-on     Comment: Auto resulted       Gold Top - SST [[id number]] Collected: 04/30/22 2041    Specimen: Blood Updated: 04/30/22 2148     Extra Tube Hold for add-ons.     Comment: Auto resulted.       Light Blue Top [[id number]] Collected: 04/30/22 2041    Specimen: Blood Updated: 04/30/22 2148     Extra Tube hold for add-on     Comment: Auto resulted       Lactic Acid, Plasma [[id number]]  (Abnormal) Collected: 04/30/22 2041    Specimen: Blood Updated: 04/30/22 2128     Lactate 2.2 mmol/L      Comment: Falsely depressed results may occur on samples drawn from patients receiving N-Acetylcysteine (NAC) or Metamizole.       Comprehensive Metabolic Panel [[id number]]  (Abnormal) Collected: 04/30/22 2041    Specimen: Blood Updated:  04/30/22 2123     Glucose 150 mg/dL      BUN 13 mg/dL      Creatinine 0.84 mg/dL      Sodium 139 mmol/L      Potassium 4.1 mmol/L      Chloride 102 mmol/L      CO2 26.0 mmol/L      Calcium 9.1 mg/dL      Total Protein 7.4 g/dL      Albumin 3.90 g/dL      ALT (SGPT) 24 U/L      AST (SGOT) 40 U/L      Alkaline Phosphatase 149 U/L      Total Bilirubin 1.7 mg/dL      Globulin 3.5 gm/dL      Comment: Calculated Result        A/G Ratio 1.1 g/dL      BUN/Creatinine Ratio 15.5     Anion Gap 11.0 mmol/L      eGFR 96.8 mL/min/1.73      Comment: National Kidney Foundation and American Society of Nephrology (ASN) Task Force recommended calculation based on the Chronic Kidney Disease Epidemiology Collaboration (CKD-EPI) equation refit without adjustment for race.       Narrative:      GFR Normal >60  Chronic Kidney Disease <60  Kidney Failure <15      Lipase [[id number]]  (Normal) Collected: 04/30/22 2041    Specimen: Blood Updated: 04/30/22 2123     Lipase 37 U/L     Troponin [[id number]]  (Normal) Collected: 04/30/22 2041    Specimen: Blood Updated: 04/30/22 2119     Troponin T 0.030 ng/mL     Narrative:      Troponin T Reference Range:  <= 0.03 ng/mL-   Negative for AMI  >0.03 ng/mL-     Abnormal for myocardial necrosis.  Clinicians would have to utilize clinical acumen, EKG, Troponin and serial changes to determine if it is an Acute Myocardial Infarction or myocardial injury due to an underlying chronic condition.       Results may be falsely decreased if patient taking Biotin.      Protime-INR [[id number]]  (Abnormal) Collected: 04/30/22 2041    Specimen: Blood Updated: 04/30/22 2107     Protime 25.4 Seconds      INR 2.30    CBC & Differential [[id number]]  (Abnormal) Collected: 04/30/22 2041    Specimen: Blood Updated: 04/30/22 2052    Narrative:      The following orders were created for panel order CBC & Differential.  Procedure                               Abnormality         Status                     ---------                                -----------         ------                     CBC Auto Differential[[id number]]        Abnormal            Final result                 Please view results for these tests on the individual orders.    CBC Auto Differential [[id number]]  (Abnormal) Collected: 04/30/22 2041    Specimen: Blood Updated: 04/30/22 2052     WBC 10.79 10*3/mm3      RBC 4.83 10*6/mm3      Hemoglobin 13.7 g/dL      Hematocrit 41.5 %      MCV 85.9 fL      MCH 28.4 pg      MCHC 33.0 g/dL      RDW 15.0 %      RDW-SD 46.7 fl      MPV 10.6 fL      Platelets 219 10*3/mm3      Neutrophil % 81.6 %      Lymphocyte % 8.0 %      Monocyte % 8.2 %      Eosinophil % 1.3 %      Basophil % 0.5 %      Immature Grans % 0.4 %      Neutrophils, Absolute 8.81 10*3/mm3      Lymphocytes, Absolute 0.86 10*3/mm3      Monocytes, Absolute 0.89 10*3/mm3      Eosinophils, Absolute 0.14 10*3/mm3      Basophils, Absolute 0.05 10*3/mm3      Immature Grans, Absolute 0.04 10*3/mm3      nRBC 0.0 /100 WBC           Imaging Results (Last 48 Hours)     Procedure Component Value Units Date/Time    XR Abdomen KUB [[id number]] Collected: 05/01/22 0153     Updated: 05/01/22 0154    Narrative:      EXAMINATION: XR ABDOMEN KUB      DATE: 5/1/2022 1:18 AM    INDICATIONS: Tube placement.    COMPARISON: April 30, 2022 at 10:07 PM    FINDINGS/    Impression:        Enteric tube tip in the stomach.    Electronically signed by:  Dani Root M.D.    4/30/2022 11:53 PM Mountain Time    XR Abdomen KUB [[id number]] Collected: 04/30/22 2320     Updated: 04/30/22 2322    Narrative:      EXAMINATION: XR ABDOMEN KUB    DATE: 4/30/2022 10:20 PM    INDICATION:  NG tube placement;    COMPARISON:  CT abdomen and pelvis earlier today    FINDINGS:      NG tube tip in the proximal stomach, proximal sidehole in the distal esophagus.  Dilated bowel loops in the upper abdomen.  Median sternotomy and cardiac valve replacement.      Impression:        1.  Recommend 5 cm advancement of  esophogastric tube for optimal position.          Electronically signed by:  Kana Erwin M.D.    4/30/2022 9:21 PM Mountain Time    CT Abdomen Pelvis With Contrast [[id number]] Collected: 04/30/22 2232     Updated: 04/30/22 2239    Narrative:      EXAMINATION:  CT ABDOMEN PELVIS W CONTRAST    DATE: 4/30/2022 9:31 PM    INDICATION: Generalized abdominal pain, vomiting ;    COMPARISON: November 17, 2021.    PROCEDURE:  CT of the abdomen and pelvis was performed following the intravenous administration of iodinated contrast material.  CT dose lowering techniques were used, to include: automated exposure control, adjustment for patient size, and or use of   iterative reconstruction.    FINDINGS:    Visualized lower chest: Coronary artery atherosclerotic calcifications.     ABDOMEN:    Liver and Biliary system:  Liver surface nodularity. No biliary ductal dilation.    Gallbladder:  Gallstones..    Spleen:  Normal.    Pancreas:  Normal.    Adrenal glands:  2.4 cm left adrenal myelolipoma. 1.3 cm right adrenal adenoma..    Kidneys and ureters:  Kidneys enhance symmetrically. No suspicious mass. No hydronephrosis.    Aorta/IVC:  Moderate atherosclerotic calcifications. No aortic aneurysm or dissection.  IVC normal.    Lymph nodes:  No lymphadenopathy.    Gastrointestinal tract:  Stomach normal.  Fluid-filled dilated small bowel measuring up to 6 cm in diameter with transition at the complex ventral hernia. Distal small bowel decompressed. Appendix normal. No colonic wall thickening or dilation.   Posterolateral changes descending colon.    Peritoneum/Mesentery: No pneumoperitoneum.  No ascites.    Abdominal wall: Again demonstrated is complex ventral hernia containing multiple interconnected outpouchings which contain small bowel and mesentery as well as a small portion of transverse colon.    PELVIS:    Urinary bladder: Normal.    Reproductive organs: No acute abnormality by CT.    Lymph Nodes: No pelvic  lymphadenopathy.    BONES:  Degenerative changes in the spine.          Impression:         1.  Small bowel obstruction with transition at the complex ventral hernia, similar to 2021 exam.  2.  Cirrhotic liver morphology.   3.  Other chronic findings as above.          Electronically signed by:  Kana Erwin M.D.    2022 8:38 PM Mountain Time    XR Chest 1 View [[id number]] Collected: 22     Updated: 22    Narrative:      EXAMINATION: XR CHEST 1 VW    DATE: 2022 8:56 PM    INDICATION:  Upper abdominal pain triage protocol;    COMPARISON:  January 15, 2021    FINDINGS:      No focal consolidation, pleural effusion, or pneumothorax.    Cardiomediastinal silhouette  unchanged. Status post median sternotomy.    No acute osseous abnormality.          Impression:        1.  No acute cardiopulmonary process.          Electronically signed by:  Kana Erwin M.D.    2022 7:35 PM Mountain Time        Operative/Procedure Notes (last 48 hours)  Notes from 22 1153 through 22 1153   No notes of this type exist for this encounter.          Physician Progress Notes (last 48 hours)      Nate Saunders MD at 22 0945              [facility] Medicine Services  PROGRESS NOTE    Patient Name: Cecilio Farias  : 1955  MRN: [id number]    Date of Admission: 2022  Primary Care Physician: America Everett DO    Subjective   Subjective     CC:  SBO    HPI:  Feels much better this morning. No further abdominal pain or nausea. BM yesterday    ROS:  Gen- No fevers, chills  CV- No chest pain, palpitations  Resp- No cough, dyspnea  GI- No N/V/D, abd pain        Objective   Objective     Vital Signs:   Temp:  [97.8 °F (36.6 °C)-98.9 °F (37.2 °C)] 97.8 °F (36.6 °C)  Heart Rate:  [] 69  Resp:  [16-22] 16  BP: (120-148)/(58-84) 135/72     Physical Exam:  Constitutional - no acute distress, nontoxic, sitting up on the edge of the  bed  HEENT-NCAT, mucous membranes moist, NG  CV-RRR  Resp-CTAB  Abd-soft, hernia palpable but non tender  Ext-trace edema  Neuro-alert and oriented, speech clear, moves all extremities   Psych-normal affect   Skin- No rash on exposed UE or LE bilaterally, blue gray discoloration of legs      Results Reviewed:  LAB RESULTS:      Lab 05/01/22  0430 05/01/22 0006 04/30/22 2041   WBC 7.03 10.56 10.79   HEMOGLOBIN 11.4* 12.2* 13.7   HEMATOCRIT 37.3* 37.9 41.5   PLATELETS 164 214 219   NEUTROS ABS 5.00 8.54* 8.81*   IMMATURE GRANS (ABS) 0.02 0.03 0.04   LYMPHS ABS 1.02 0.94 0.86   MONOS ABS 0.78 0.86 0.89   EOS ABS 0.18 0.15 0.14   MCV 92.6 87.7 85.9   LACTATE  --  1.6 2.2*   PROTIME  --   --  25.4*   APTT  --  37.5*  --    HEPARIN ANTI-XA 0.10* 0.10*  --          Lab 05/01/22  0430 04/30/22 2041   SODIUM 143 139   POTASSIUM 4.5 4.1   CHLORIDE 112* 102   CO2 22.0 26.0   ANION GAP 9.0 11.0   BUN 13 13   CREATININE 0.73* 0.84   EGFR 101.0 96.8   GLUCOSE 124* 150*   CALCIUM 7.7* 9.1   HEMOGLOBIN A1C 6.90*  --          Lab 04/30/22 2041   TOTAL PROTEIN 7.4   ALBUMIN 3.90   GLOBULIN 3.5   ALT (SGPT) 24   AST (SGOT) 40   BILIRUBIN 1.7*   ALK PHOS 149*   LIPASE 37         Lab 04/30/22 2041   TROPONIN T 0.030   PROTIME 25.4*   INR 2.30*                 Brief Urine Lab Results  (Last result in the past 365 days)      Color   Clarity   Blood   Leuk Est   Nitrite   Protein   CREAT   Urine HCG        05/01/22 0006 Yellow   Clear   Negative   Negative   Negative   30 mg/dL (1+)                 Microbiology Results Abnormal     Procedure Component Value - Date/Time    COVID PRE-OP / PRE-PROCEDURE SCREENING ORDER (NO ISOLATION) - Swab, Nasopharynx [[id number]]  (Normal) Collected: 04/30/22 2356    Lab Status: Final result Specimen: Swab from Nasopharynx Updated: 05/01/22 0034    Narrative:      The following orders were created for panel order COVID PRE-OP / PRE-PROCEDURE SCREENING ORDER (NO ISOLATION) - Swab,  Nasopharynx.  Procedure                               Abnormality         Status                     ---------                               -----------         ------                     COVID-19 and FLU A/B PCR...[[id number]]  Normal              Final result                 Please view results for these tests on the individual orders.    COVID-19 and FLU A/B PCR - Swab, Nasopharynx [[id number]]  (Normal) Collected: 04/30/22 9209    Lab Status: Final result Specimen: Swab from Nasopharynx Updated: 05/01/22 0034     COVID19 Not Detected     Influenza A PCR Not Detected     Influenza B PCR Not Detected    Narrative:      Fact sheet for providers: https://www.fda.gov/media/726588/download    Fact sheet for patients: https://www.fda.gov/media/216468/download    Test performed by PCR.          CT Abdomen Pelvis With Contrast    Result Date: 4/30/2022  EXAMINATION:  CT ABDOMEN PELVIS W CONTRAST DATE: 4/30/2022 9:31 PM INDICATION: Generalized abdominal pain, vomiting ; COMPARISON: November 17, 2021. PROCEDURE:  CT of the abdomen and pelvis was performed following the intravenous administration of iodinated contrast material.  CT dose lowering techniques were used, to include: automated exposure control, adjustment for patient size, and or use of iterative reconstruction. FINDINGS: Visualized lower chest: Coronary artery atherosclerotic calcifications.  ABDOMEN: Liver and Biliary system:  Liver surface nodularity. No biliary ductal dilation. Gallbladder:  Gallstones.. Spleen:  Normal. Pancreas:  Normal. Adrenal glands:  2.4 cm left adrenal myelolipoma. 1.3 cm right adrenal adenoma.. Kidneys and ureters:  Kidneys enhance symmetrically. No suspicious mass. No hydronephrosis. Aorta/IVC:  Moderate atherosclerotic calcifications. No aortic aneurysm or dissection.  IVC normal. Lymph nodes:  No lymphadenopathy. Gastrointestinal tract:  Stomach normal.  Fluid-filled dilated small bowel measuring up to 6 cm in diameter with  transition at the complex ventral hernia. Distal small bowel decompressed. Appendix normal. No colonic wall thickening or dilation. Posterolateral changes descending colon. Peritoneum/Mesentery: No pneumoperitoneum.  No ascites. Abdominal wall: Again demonstrated is complex ventral hernia containing multiple interconnected outpouchings which contain small bowel and mesentery as well as a small portion of transverse colon. PELVIS: Urinary bladder: Normal. Reproductive organs: No acute abnormality by CT. Lymph Nodes: No pelvic lymphadenopathy. BONES:  Degenerative changes in the spine.     Impression:  1.  Small bowel obstruction with transition at the complex ventral hernia, similar to November 17, 2021 exam. 2.  Cirrhotic liver morphology. 3.  Other chronic findings as above. Electronically signed by:  Kana Erwin M.D.  4/30/2022 8:38 PM Mountain Time    XR Chest 1 View    Result Date: 4/30/2022  EXAMINATION: XR CHEST 1 VW DATE: 4/30/2022 8:56 PM INDICATION:  Upper abdominal pain triage protocol; COMPARISON:  January 15, 2021 FINDINGS: No focal consolidation, pleural effusion, or pneumothorax. Cardiomediastinal silhouette  unchanged. Status post median sternotomy. No acute osseous abnormality.     Impression: 1.  No acute cardiopulmonary process. Electronically signed by:  Kana Erwin M.D.  4/30/2022 7:35 PM Mountain Time    XR Abdomen KUB    Result Date: 5/1/2022  EXAMINATION: XR ABDOMEN KUB  DATE: 5/1/2022 1:18 AM INDICATIONS: Tube placement. COMPARISON: April 30, 2022 at 10:07 PM FINDINGS/    Impression: Enteric tube tip in the stomach. Electronically signed by:  Dani Root M.D.  4/30/2022 11:53 PM Mountain Time    XR Abdomen KUB    Result Date: 4/30/2022  EXAMINATION: XR ABDOMEN KUB DATE: 4/30/2022 10:20 PM INDICATION:  NG tube placement; COMPARISON:  CT abdomen and pelvis earlier today FINDINGS: NG tube tip in the proximal stomach, proximal sidehole in the distal esophagus. Dilated bowel loops in the  upper abdomen. Median sternotomy and cardiac valve replacement.     Impression: 1.  Recommend 5 cm advancement of esophogastric tube for optimal position. Electronically signed by:  Kana Erwin M.D.  4/30/2022 9:21 PM Mountain Time      Results for orders placed during the hospital encounter of 01/15/21    Transthoracic Echo Complete With Contrast if Necessary Per Protocol    Interpretation Summary  · The left atrium is dilated.  · There is mild concentric left ventricular hypertrophy.  · Global and segmental LV systolic function is normal. The calculated left ventricular ejection fraction is 53%.  · There is a mechanical aortic valve in the aortic position. Function appears to be normal.  · There is mitral annular calcification. Mild mitral regurgitation is seen. There appears to be some degree of chordal thickening with a 4.5 mmHg mean diastolic gradient across the mitral valve apparatus.  · Mild to moderate tricuspid regurgitation is noted. The estimated RV systolic pressure is elevated at 56 mmHg.  · No other important findings are noted on the study.      I have reviewed the medications:  Scheduled Meds:insulin lispro, 0-7 Units, Subcutaneous, TID AC  sodium chloride, 10 mL, Intravenous, Q12H      Continuous Infusions:heparin, 8 Units/kg/hr, Last Rate: 8 Units/kg/hr (05/01/22 0031)  Pharmacy to Dose Heparin,       PRN Meds:.•  dextrose  •  dextrose  •  glucagon (human recombinant)  •  Morphine **AND** naloxone  •  Pharmacy to Dose Heparin  •  sodium chloride  •  sodium chloride    Assessment/Plan   Assessment & Plan     Active Hospital Problems    Diagnosis  POA   • **SBO (small bowel obstruction) ([facility]) [K56.609]  Yes   • Lactic acidosis [E87.2]  Yes   • Diastolic CHF, chronic ([facility]) [I50.32]  Yes   • Chronic anticoagulation [Z79.01]  Not Applicable   • Discoloration of skin of bilateral legs likely due to minocycline use [L81.9]  Yes   • History Acinetobacter bacteremia on chronic minocycline [Z86.19]  Yes    • History of mechanical AVR for aortic stenosis 2009 [Z95.2]  Not Applicable   • Dyslipidemia [E78.5]  Yes   • Essential hypertension [I10]  Yes   • Type 2 diabetes mellitus (HCC) [E11.9]  Yes      Resolved Hospital Problems   No resolved problems to display.        Brief Hospital Course to date:  with PMH significant for colon cancer, CHF, DM 2, mechanical aortic valve replacement on chronic Coumadin, HLD, HTN, Acetobacter on chronic minocycline with chronic BLE discoloration secondary to minocycline, who presents to the ED with complaint of 2-day history of abdominal pain, nausea, and vomiting who was found to have concern for small bowel obstruction with a large ventral hernia.     Small bowel obstruction  Ventral hernia  - N.p.o. with NG decomression  -- general surgery evaluation     Valvular heart disease  - History of mechanical AVR  - On chronic Coumadin, hold while n.p.o.  - Heparin drip     History of CHF-- last echo 1/21 EF was normal  - Monitor closely for volume overload while n.p.o.  - Daily weights  - Plan to continue Lasix when appropriate     History Acinetobacter bacteremia  -chronic minocycline, plan to continue when able to take po  -chronic lower extremity leg discoloration secondary to minocycline     Diabetes mellitus 2  - SS insulin  - a1c 6.9  - Hold metformin     Hypertension  Hyperlipidemia  - holding home amlodipine, carvedilol, lisinopril      DVT prophylaxis: Heparin dripDVT prophylaxis:  Medical DVT prophylaxis orders are present.            Disposition: I expect the patient to be discharged TBD    CODE STATUS:   Code Status and Medical Interventions:   Ordered at: 05/01/22 0018     Code Status (Patient has no pulse and is not breathing):    CPR (Attempt to Resuscitate)     Medical Interventions (Patient has pulse or is breathing):    Full Support       Ntae Saunders MD  05/01/22                Electronically signed by Nate Saunders MD at 05/01/22 1250       Consult Notes (last  48 hours)  Notes from 04/29/22 1153 through 05/01/22 1153   No notes of this type exist for this encounter.

## 2022-05-01 NOTE — PLAN OF CARE
Goal Outcome Evaluation:  Plan of Care Reviewed With: patient, spouse, son           Outcome Evaluation: Physical therapy consult received. PT evaluation completed. Patient independent with supine to sit transfer, sit to stand transfer and marching in place. Patient unable to ambulate due to continuous NG suction. Patient presents near baseline for functional mobility. No acute PT needs identified and no PT needs at hospital discharge. Recommend patient return home with family.

## 2022-05-01 NOTE — ED PROVIDER NOTES
Subjective   66-year-old male presents for evaluation of abdominal pain and nausea/vomiting.  Of note, the patient has a history of a prior small bowel obstruction back in November 2021.  His obstruction resolved with conservative measures and surgery for his ventral hernia was put off to a future date.  He states that he has lost approximately 50 to 60 pounds since that time.  He states that yesterday he began experiencing vague abdominal pain that has gradually intensified since that time.  He endorses accompanying nausea and vomiting.  His last bowel movement was yesterday.  No fevers.  He states that his current symptoms feel identical to those that he has experienced in the past with prior bowel obstruction.  He currently rates his pain at 8 out of 10 in severity.  He states that his abdomen feels distended as well.          Review of Systems   Gastrointestinal: Positive for abdominal distention, abdominal pain, nausea and vomiting.   All other systems reviewed and are negative.      Past Medical History:   Diagnosis Date   • Cancer (HCC)     colon cancer with operation/ chemotherapy/radiation    • CHF (congestive heart failure) (HCC) 2009    Acute Systolic    • Chronic anticoagulation     coumadin   • Diabetes mellitus (HCC)    • H/O aortic valve replacement    • HLD (hyperlipidemia)    • Hypertension    • Sepsis due to Acinetobacter species (HCC) 6/13/2019       Allergies   Allergen Reactions   • Sulfa Antibiotics    • Amoxicillin Rash     Has tolerated ceftriaxone       Past Surgical History:   Procedure Laterality Date   • AORTIC VALVE REPAIR/REPLACEMENT  2009    25 mm. [place] AVR   • ASCENDING AORTIC ANEURYSM REPAIR  2009   • BACK SURGERY     • CARDIAC CATHETERIZATION N/A 1/20/2021    Procedure: RIGHT AND LEFT HEART CATH;  Surgeon: Kurtis Smith MD;  Location: [facility] CATH INVASIVE LOCATION;  Service: Cardiology;  Laterality: N/A;   • COLON SURGERY      Colon cancer with operation   • FOOT SURGERY       bilateral 2/2 wound and trauma        Family History   Problem Relation Age of Onset   • Heart disease Mother    • Hyperlipidemia Mother    • Diabetes Mother    • Leukemia Father    • Cancer Sister    • Kidney failure Brother    • Hepatitis Brother    • Cancer Sister    • Cancer Brother    • Heart disease Brother    • Heart attack Brother        Social History     Socioeconomic History   • Marital status:    Tobacco Use   • Smoking status: Never Smoker   • Smokeless tobacco: Never Used   Vaping Use   • Vaping Use: Never used   Substance and Sexual Activity   • Alcohol use: No   • Drug use: No   • Sexual activity: Defer           Objective   Physical Exam  Vitals and nursing note reviewed.   Constitutional:       Appearance: He is well-developed. He is not diaphoretic.      Comments: Nontoxic-appearing male   HENT:      Head: Normocephalic and atraumatic.   Neck:      Vascular: No JVD.   Cardiovascular:      Rate and Rhythm: Normal rate and regular rhythm.      Heart sounds: Normal heart sounds. No murmur heard.    No friction rub. No gallop.   Pulmonary:      Effort: Pulmonary effort is normal. No respiratory distress.      Breath sounds: Normal breath sounds. No wheezing or rales.   Abdominal:      General: Bowel sounds are normal. There is distension.      Palpations: Abdomen is soft. There is no mass.      Tenderness: There is abdominal tenderness. There is guarding.      Comments: Palpable ventral hernia with tenderness noted, hernia is not firm, no overlying skin changes, no pain out of proportion to exam   Genitourinary:     Comments: No CVA tenderness present  Musculoskeletal:         General: Normal range of motion.      Cervical back: Normal range of motion.   Skin:     General: Skin is warm and dry.      Coloration: Skin is not pale.      Findings: No erythema or rash.   Neurological:      General: No focal deficit present.      Mental Status: He is alert and oriented to person, place, and time.       Comments: Normal gait   Psychiatric:         Thought Content: Thought content normal.         Judgment: Judgment normal.         Procedures           ED Course  ED Course as of 04/30/22 2324   Sat Apr 30, 2022 2115 66-year-old male presents for evaluation of abdominal pain and nausea/vomiting.  Of note, the patient was admitted for a bowel obstruction in November 2021.  Surgery was discussed at that time but was ultimately pushed off.  He states that his symptoms returned yesterday and worsened throughout the day today, prompting his visit to the emergency department.  On arrival, the patient is uncomfortable appearing.  He has a large palpable ventral hernia that is not reducible.  No overlying skin changes.  No firmness noted.  We will obtain labs and imaging, and we will reassess following initial interventions. [DD]   2124 I personally viewed the patient's x-ray images myself, and I am in agreement with the radiologist's reading for final interpretation.     [DD]   2146 CT is suggestive of bowel obstruction.  We will place an NG tube, consult surgery, and we will seek admission to the hospitalist. [DD]   2244 I spoke with Dr. Lester of general surgery who will see the patient in consult.  Awaiting callback from the hospitalist at this time. [DD]   2317 I spoke with Dr. Ruby and the patient will be admitted under her care for further evaluation and treatment.  The patient is aware/agreeable with the plan at this time. [DD]      ED Course User Index  [DD] Leo Mcnair MD                                               Recent Results (from the past 24 hour(s))   Comprehensive Metabolic Panel    Collection Time: 04/30/22  8:41 PM    Specimen: Blood   Result Value Ref Range    Glucose 150 (H) 65 - 99 mg/dL    BUN 13 8 - 23 mg/dL    Creatinine 0.84 0.76 - 1.27 mg/dL    Sodium 139 136 - 145 mmol/L    Potassium 4.1 3.5 - 5.2 mmol/L    Chloride 102 98 - 107 mmol/L    CO2 26.0 22.0 - 29.0 mmol/L    Calcium  9.1 8.6 - 10.5 mg/dL    Total Protein 7.4 6.0 - 8.5 g/dL    Albumin 3.90 3.50 - 5.20 g/dL    ALT (SGPT) 24 1 - 41 U/L    AST (SGOT) 40 1 - 40 U/L    Alkaline Phosphatase 149 (H) 39 - 117 U/L    Total Bilirubin 1.7 (H) 0.0 - 1.2 mg/dL    Globulin 3.5 gm/dL    A/G Ratio 1.1 g/dL    BUN/Creatinine Ratio 15.5 7.0 - 25.0    Anion Gap 11.0 5.0 - 15.0 mmol/L    eGFR 96.8 >60.0 mL/min/1.73   Lipase    Collection Time: 04/30/22  8:41 PM    Specimen: Blood   Result Value Ref Range    Lipase 37 13 - 60 U/L   Troponin    Collection Time: 04/30/22  8:41 PM    Specimen: Blood   Result Value Ref Range    Troponin T 0.030 0.000 - 0.030 ng/mL   Protime-INR    Collection Time: 04/30/22  8:41 PM    Specimen: Blood   Result Value Ref Range    Protime 25.4 (H) 11.4 - 14.4 Seconds    INR 2.30 (H) 0.84 - 1.13   Green Top (Gel)    Collection Time: 04/30/22  8:41 PM   Result Value Ref Range    Extra Tube Hold for add-ons.    Lavender Top    Collection Time: 04/30/22  8:41 PM   Result Value Ref Range    Extra Tube hold for add-on    Gold Top - SST    Collection Time: 04/30/22  8:41 PM   Result Value Ref Range    Extra Tube Hold for add-ons.    Light Blue Top    Collection Time: 04/30/22  8:41 PM   Result Value Ref Range    Extra Tube hold for add-on    CBC Auto Differential    Collection Time: 04/30/22  8:41 PM    Specimen: Blood   Result Value Ref Range    WBC 10.79 3.40 - 10.80 10*3/mm3    RBC 4.83 4.14 - 5.80 10*6/mm3    Hemoglobin 13.7 13.0 - 17.7 g/dL    Hematocrit 41.5 37.5 - 51.0 %    MCV 85.9 79.0 - 97.0 fL    MCH 28.4 26.6 - 33.0 pg    MCHC 33.0 31.5 - 35.7 g/dL    RDW 15.0 12.3 - 15.4 %    RDW-SD 46.7 37.0 - 54.0 fl    MPV 10.6 6.0 - 12.0 fL    Platelets 219 140 - 450 10*3/mm3    Neutrophil % 81.6 (H) 42.7 - 76.0 %    Lymphocyte % 8.0 (L) 19.6 - 45.3 %    Monocyte % 8.2 5.0 - 12.0 %    Eosinophil % 1.3 0.3 - 6.2 %    Basophil % 0.5 0.0 - 1.5 %    Immature Grans % 0.4 0.0 - 0.5 %    Neutrophils, Absolute 8.81 (H) 1.70 - 7.00  "10*3/mm3    Lymphocytes, Absolute 0.86 0.70 - 3.10 10*3/mm3    Monocytes, Absolute 0.89 0.10 - 0.90 10*3/mm3    Eosinophils, Absolute 0.14 0.00 - 0.40 10*3/mm3    Basophils, Absolute 0.05 0.00 - 0.20 10*3/mm3    Immature Grans, Absolute 0.04 0.00 - 0.05 10*3/mm3    nRBC 0.0 0.0 - 0.2 /100 WBC   Lactic Acid, Plasma    Collection Time: 04/30/22  8:41 PM    Specimen: Blood   Result Value Ref Range    Lactate 2.2 (C) 0.5 - 2.0 mmol/L     Note: In addition to lab results from this visit, the labs listed above may include labs taken at another facility or during a different encounter within the last 24 hours. Please correlate lab times with ED admission and discharge times for further clarification of the services performed during this visit.    XR Abdomen KUB   Final Result      1.  Recommend 5 cm advancement of esophogastric tube for optimal position.               Electronically signed by:  Kana Erwin M.D.     4/30/2022 9:21 PM Mountain Time      CT Abdomen Pelvis With Contrast   Final Result       1.  Small bowel obstruction with transition at the complex ventral hernia, similar to November 17, 2021 exam.   2.  Cirrhotic liver morphology.    3.  Other chronic findings as above.               Electronically signed by:  Kana Erwin M.D.     4/30/2022 8:38 PM Mountain Time      XR Chest 1 View   Final Result      1.  No acute cardiopulmonary process.               Electronically signed by:  Kana Erwin M.D.     4/30/2022 7:35 PM Mountain Time        Vitals:    04/30/22 2034 04/30/22 2105   BP: 145/84 148/80   BP Location: Right arm    Patient Position: Sitting    Pulse: 96    Resp: 22    Temp: 98.9 °F (37.2 °C)    TempSrc: Oral    SpO2: 97% 97%   Weight: 129 kg (285 lb)    Height: 190.5 cm (75\")      Medications   sodium chloride 0.9 % flush 10 mL (has no administration in time range)   sodium chloride 0.9 % bolus 1,000 mL (has no administration in time range)   sodium chloride 0.9 % bolus 1,000 mL (0 mL " Intravenous Stopped 4/30/22 2213)   ondansetron (ZOFRAN) injection 4 mg (4 mg Intravenous Given 4/30/22 2112)   HYDROmorphone (DILAUDID) injection 0.5 mg (0.5 mg Intravenous Given 4/30/22 2112)   iopamidol (ISOVUE-300) 61 % injection 100 mL (95 mL Intravenous Given 4/30/22 2135)     ECG/EMG Results (last 24 hours)     Procedure Component Value Units Date/Time    ECG 12 Lead [[id number]] Collected: 04/30/22 2041     Updated: 04/30/22 2045        ECG 12 Lead   Preliminary Result                 MDM    Final diagnoses:   SBO (small bowel obstruction) (HCC)   Ventral hernia with bowel obstruction       ED Disposition  ED Disposition     ED Disposition   Decision to Admit    Condition   --    Comment   Level of Care: Telemetry [5]   Diagnosis: SBO (small bowel obstruction) (HCC) [165263]   Admitting Physician: NAFISA GARCIA [[id number]]   Attending Physician: NAFISA GARCIA [[id number]]   Certification: I Certify That Inpatient Hospital Services Are Medically Necessary For Greater Than 2 Midnights               No follow-up provider specified.       Medication List      No changes were made to your prescriptions during this visit.          Leo Mcnair MD  04/30/22 5263

## 2022-05-01 NOTE — THERAPY EVALUATION
Patient Name: Cecilio Farias  : 1955    MRN: [id number]                              Today's Date: 2022       Admit Date: 2022    Visit Dx:     ICD-10-CM ICD-9-CM   1. SBO (small bowel obstruction) (HCC)  K56.609 560.9   2. Ventral hernia with bowel obstruction  K43.6 552.20     Patient Active Problem List   Diagnosis   • Essential hypertension   • Dyslipidemia   • Morbid obesity (HCC)   • Type 2 diabetes mellitus (HCC)   • History of mechanical AVR for aortic stenosis    • Sepsis due to Acinetobacter species (HCC)   • Chronic anticoagulation   • Anemia   • Acute diastolic CHF. LVEF 53% (CMS/HCC)   • History Acinetobacter bacteremia on chronic minocycline   • PAF on chronic warfarin (CMS/HCC)   • Discoloration of skin of bilateral legs likely due to minocycline use   • RLL lung nodule. Incidental finding and requires follow-up   • Dyspnea and mild hypoxemia   • Moderate PAH. PA 55/20, wedge 15 mmHg.  Multifactorial (CMS/HCC)   • Bilateral small pleural effusions   • SBO (small bowel obstruction) (HCC)   • Diastolic CHF, chronic (HCC)   • Hyperbilirubinemia   • Lactic acidosis     Past Medical History:   Diagnosis Date   • Cancer (HCC)     colon cancer with operation/ chemotherapy/radiation    • CHF (congestive heart failure) (HCC)     Acute Systolic    • Chronic anticoagulation     coumadin   • Diabetes mellitus (HCC)    • H/O aortic valve replacement    • HLD (hyperlipidemia)    • Hypertension    • Sepsis due to Acinetobacter species (HCC) 2019     Past Surgical History:   Procedure Laterality Date   • AORTIC VALVE REPAIR/REPLACEMENT      25 mm. [place] AVR   • ASCENDING AORTIC ANEURYSM REPAIR     • BACK SURGERY     • CARDIAC CATHETERIZATION N/A 2021    Procedure: RIGHT AND LEFT HEART CATH;  Surgeon: Kurtis Smith MD;  Location: [facility] CATH INVASIVE LOCATION;  Service: Cardiology;  Laterality: N/A;   • COLON SURGERY      Colon cancer with operation   • FOOT SURGERY       bilateral 2/2 wound and trauma       General Information     Row Name 05/01/22 1516          Physical Therapy Time and Intention    Document Type evaluation  -ML     Mode of Treatment physical therapy  -ML     Row Name 05/01/22 1516          General Information    Patient Profile Reviewed yes  -ML     Prior Level of Function independent:;all household mobility;community mobility;gait;transfer;ADL's  -ML     Existing Precautions/Restrictions no known precautions/restrictions  -ML     Barriers to Rehab medically complex  -ML     Row Name 05/01/22 1516          Living Environment    People in Home child(marysol), adult;spouse  -ML     Row Name 05/01/22 1516          Home Main Entrance    Number of Stairs, Main Entrance one  -ML     Row Name 05/01/22 1516          Stairs Within Home, Primary    Number of Stairs, Within Home, Primary two  -ML     Row Name 05/01/22 1516          Cognition    Orientation Status (Cognition) oriented x 4  -ML           User Key  (r) = Recorded By, (t) = Taken By, (c) = Cosigned By    Initials Name Provider Type    Ena Chacon Physical Therapist               Mobility     Row Name 05/01/22 1517          Bed Mobility    Bed Mobility supine-sit  -ML     Supine-Sit [place] (Bed Mobility) independent  -ML     Row Name 05/01/22 1517          Sit-Stand Transfer    Sit-Stand [place] (Transfers) independent  -ML     Assistive Device (Sit-Stand Transfers) other (see comments)  no AD  -ML     Row Name 05/01/22 1517          Gait/Stairs (Locomotion)    [place] Level (Gait) unable to assess  Patient on continuous NG suction  -ML     Comment, (Gait/Stairs) Patient unable to ambulate today due to continuous NG suction. Patient able to stand and march in place.  -ML           User Key  (r) = Recorded By, (t) = Taken By, (c) = Cosigned By    Initials Name Provider Type    Ena Chacon Physical Therapist               Obj/Interventions     Row Name 05/01/22 1519          Range of Motion  Comprehensive    General Range of Motion no range of motion deficits identified  -ML     Row Name 05/01/22 1519          Strength Comprehensive (MMT)    General Manual Muscle Testing (MMT) Assessment no strength deficits identified  -[facility] Name 05/01/22 1519          Balance    Balance Assessment sitting static balance;sitting dynamic balance;standing static balance;sit to stand dynamic balance;standing dynamic balance  -ML     Static Sitting Balance independent  -ML     Dynamic Sitting Balance independent  -ML     Position, Sitting Balance unsupported;sitting edge of bed  -ML     Sit to Stand Dynamic Balance independent  -ML     Static Standing Balance independent  -ML     Dynamic Standing Balance independent  -ML     Position/Device Used, Standing Balance unsupported  -ML     Balance Interventions sitting;standing;sit to stand;static;dynamic  -ML           User Key  (r) = Recorded By, (t) = Taken By, (c) = Cosigned By    Initials Name Provider Type    ML Ena Thao Physical Therapist               Goals/Plan    No documentation.                Clinical Impression     [facility] Name 05/01/22 1519          Pain    Pretreatment Pain Rating 0/10 - no pain  -     Posttreatment Pain Rating 0/10 - no pain  -ML     [facility] Name 05/01/22 1519          Plan of Care Review    Plan of Care Reviewed With patient;spouse;son  -     Outcome Evaluation Physical therapy consult received. PT evaluation completed. Patient independent with supine to sit transfer, sit to stand transfer and maching in place. Patient unable to ambulate due to continuous NG suction. Patient presents near baseline for functional mobility. No acute PT needs identified and no PT needs at hospital discharge. Recommend patient return home with family.  -     Row Name 05/01/22 1519          Therapy Assessment/Plan (PT)    Patient/Family Therapy Goals Statement (PT) home with family  -     Criteria for Skilled Interventions Met (PT) no;no problems  identified which require skilled intervention  -     Row Name 05/01/22 1519          Vital Signs    Pre Patient Position Supine  -ML     Intra Patient Position Standing  -ML     Post Patient Position Sitting  -ML     Row Name 05/01/22 1519          Positioning and Restraints    Pre-Treatment Position in bed  -ML     Post Treatment Position bed  -ML     In Bed notified nsg;sitting EOB;call light within reach;encouraged to call for assist;with family/caregiver;with other staff  -           User Key  (r) = Recorded By, (t) = Taken By, (c) = Cosigned By    Initials Name Provider Type    Ena Chacon Physical Therapist               Outcome Measures     Row Name 05/01/22 1524 05/01/22 0800       How much help from another person do you currently need...    Turning from your back to your side while in flat bed without using bedrails? 4  -ML 4  -KH    Moving from lying on back to sitting on the side of a flat bed without bedrails? 4  -ML 4  -KH    Moving to and from a bed to a chair (including a wheelchair)? 4  -ML 4  -KH    Standing up from a chair using your arms (e.g., wheelchair, bedside chair)? 4  -ML 4  -KH    Climbing 3-5 steps with a railing? 4  -ML 3  -KH    To walk in hospital room? 4  -ML 3  -KH    AM-PAC 6 Clicks Score (PT) 24  -ML 22  -KH    Highest level of mobility 8 --> Walked 250 feet or more  -ML 7 --> Walked 25 feet or more  -KH          User Key  (r) = Recorded By, (t) = Taken By, (c) = Cosigned By    Initials Name Provider Type    Tammy Sahu, RN Registered Nurse    Ena Chacon Physical Therapist                             Physical Therapy Education                 Title: PT OT SLP Therapies (Done)     Topic: Physical Therapy (Done)     Point: Mobility training (Done)     Learning Progress Summary           Patient Acceptance, E, VU by GARCÍA at 5/1/2022 1524   Family Acceptance, E, VU by GARCÍA at 5/1/2022 1524                   Point: Home exercise program (Done)     Learning Progress  Summary           Patient Acceptance, E, VU by ML at 5/1/2022 1524   Family Acceptance, E, VU by ML at 5/1/2022 1524                   Point: Body mechanics (Done)     Learning Progress Summary           Patient Acceptance, E, VU by ML at 5/1/2022 1524   Family Acceptance, E, VU by ML at 5/1/2022 1524                   Point: Precautions (Done)     Learning Progress Summary           Patient Acceptance, E, VU by ML at 5/1/2022 1524   Family Acceptance, E, VU by ML at 5/1/2022 1524                               User Key     Initials Effective Dates Name Provider Type Discipline     04/22/21 -  Ena Thao Physical Therapist PT              PT Recommendation and Plan     Plan of Care Reviewed With: patient, spouse, son  Outcome Evaluation: Physical therapy consult received. PT evaluation completed. Patient independent with supine to sit transfer, sit to stand transfer and maching in place. Patient unable to ambulate due to continuous NG suction. Patient presents near baseline for functional mobility. No acute PT needs identified and no PT needs at hospital discharge. Recommend patient return home with family.     Time Calculation:    PT Charges     Row Name 05/01/22 1525             Time Calculation    Start Time 1500  -ML      PT Received On 05/01/22  -ML              Time Calculation- PT    Total Timed Code Minutes- PT 25 minute(s)  -ML              Timed Charges    73150 - PT Therapeutic Activity Minutes 9  -ML              Untimed Charges    PT Eval/Re-eval Minutes 16  -ML              Total Minutes    Timed Charges Total Minutes 9  -ML      Untimed Charges Total Minutes 16  -ML       Total Minutes 25  -ML            User Key  (r) = Recorded By, (t) = Taken By, (c) = Cosigned By    Initials Name Provider Type     Ena Thao Physical Therapist              Therapy Charges for Today     Code Description Service Date Service Provider Modifiers Qty    23178938117  PT THERAPEUTIC ACT EA 15 MIN 5/1/2022 Master  Ena GP 1    45292561911 HC PT EVAL MOD COMPLEXITY 2 5/1/2022 Ena Thao GP 1          PT G-Codes  AM-PAC 6 Clicks Score (PT): 24    Ena Thao  5/1/2022

## 2022-05-01 NOTE — PLAN OF CARE
Goal Outcome Evaluation:      VSS. RA. A&Ox4. Pt states he had multiple loose BM overnight. Pt has no c/o nausea, vomiting, or pain. Abdomen distended but nontender. NG tube to LWS. Pt allowed ice chips, popsicles, and one cup of sherbert qshift per order. Pt sitting up throughout the shift. No further concerns at this time.     Progress: improving  Problem: Adult Inpatient Plan of Care  Goal: Plan of Care Review  Outcome: Ongoing, Progressing  Flowsheets (Taken 5/1/2022 1624)  Progress: improving  Goal: Patient-Specific Goal (Individualized)  Outcome: Ongoing, Progressing  Goal: Absence of Hospital-Acquired Illness or Injury  Outcome: Ongoing, Progressing  Intervention: Identify and Manage Fall Risk  Recent Flowsheet Documentation  Taken 5/1/2022 1600 by Tammy Catalan RN  Safety Promotion/Fall Prevention:   activity supervised   assistive device/personal items within reach   clutter free environment maintained   fall prevention program maintained   nonskid shoes/slippers when out of bed   room organization consistent   safety round/check completed   toileting scheduled  Taken 5/1/2022 1400 by Tammy Catalan RN  Safety Promotion/Fall Prevention:   activity supervised   assistive device/personal items within reach   clutter free environment maintained   fall prevention program maintained   nonskid shoes/slippers when out of bed   room organization consistent   safety round/check completed   toileting scheduled  Taken 5/1/2022 1200 by Tammy Catalan, RN  Safety Promotion/Fall Prevention:   activity supervised   clutter free environment maintained   assistive device/personal items within reach   fall prevention program maintained   nonskid shoes/slippers when out of bed   safety round/check completed   toileting scheduled   room organization consistent  Taken 5/1/2022 1000 by Tammy Catalan, RN  Safety Promotion/Fall Prevention:   activity supervised   assistive device/personal items within reach   clutter  free environment maintained   fall prevention program maintained   nonskid shoes/slippers when out of bed   room organization consistent   safety round/check completed   toileting scheduled  Taken 5/1/2022 0800 by Tammy Catalan RN  Safety Promotion/Fall Prevention:   activity supervised   assistive device/personal items within reach   clutter free environment maintained   fall prevention program maintained   nonskid shoes/slippers when out of bed   room organization consistent   safety round/check completed   toileting scheduled  Intervention: Prevent Skin Injury  Recent Flowsheet Documentation  Taken 5/1/2022 1600 by Tammy Catalan RN  Body Position:   position changed independently   sitting up in bed  Skin Protection:   adhesive use limited   incontinence pads utilized   skin-to-skin areas padded   tubing/devices free from skin contact   transparent dressing maintained   skin-to-device areas padded  Taken 5/1/2022 1400 by Tammy Catalan RN  Body Position:   position changed independently   sitting up in bed  Skin Protection:   adhesive use limited   incontinence pads utilized   transparent dressing maintained   skin-to-skin areas padded   skin-to-device areas padded   tubing/devices free from skin contact  Taken 5/1/2022 1200 by Tammy Catalan RN  Body Position:   position changed independently   supine  Skin Protection:   adhesive use limited   incontinence pads utilized   tubing/devices free from skin contact   transparent dressing maintained   skin-to-device areas padded   skin-to-skin areas padded  Taken 5/1/2022 1000 by Tammy Catalan RN  Body Position:   position changed independently   neutral body alignment   legs elevated  Skin Protection:   adhesive use limited   incontinence pads utilized   tubing/devices free from skin contact   transparent dressing maintained   skin-to-skin areas padded   skin-to-device areas padded  Taken 5/1/2022 0800 by Tammy Catalan RN  Body Position:    position changed independently   neutral body alignment   legs elevated  Skin Protection:   adhesive use limited   incontinence pads utilized   tubing/devices free from skin contact   transparent dressing maintained   skin-to-skin areas padded   skin-to-device areas padded  Intervention: Prevent and Manage VTE (Venous Thromboembolism) Risk  Recent Flowsheet Documentation  Taken 5/1/2022 1600 by Tammy Catalan RN  Activity Management:   activity adjusted per tolerance   activity encouraged  Taken 5/1/2022 1400 by Tammy Catalan RN  Activity Management:   activity adjusted per tolerance   activity encouraged  Taken 5/1/2022 1200 by Tammy Catalan RN  Activity Management: activity adjusted per tolerance  Taken 5/1/2022 1000 by Tammy Catalan RN  Activity Management:   activity adjusted per tolerance   up in chair  Taken 5/1/2022 0800 by Tammy Catalan RN  Activity Management:   activity adjusted per tolerance   ambulated in room   up in lounge/playroom  VTE Prevention/Management: (heparin gtt)   bleeding risk factor(s) identified   dorsiflexion/plantar flexion performed   other (see comments)  Intervention: Prevent Infection  Recent Flowsheet Documentation  Taken 5/1/2022 1600 by Tammy Catalan RN  Infection Prevention:   environmental surveillance performed   hand hygiene promoted   rest/sleep promoted   personal protective equipment utilized  Taken 5/1/2022 1400 by Tammy Catalan RN  Infection Prevention:   environmental surveillance performed   hand hygiene promoted   personal protective equipment utilized   rest/sleep promoted  Taken 5/1/2022 1200 by Tammy Catalan RN  Infection Prevention:   environmental surveillance performed   hand hygiene promoted   personal protective equipment utilized   rest/sleep promoted  Taken 5/1/2022 1000 by Tammy Catalan RN  Infection Prevention:   environmental surveillance performed   hand hygiene promoted   personal protective equipment utilized    rest/sleep promoted  Taken 5/1/2022 0800 by Tammy Catalan RN  Infection Prevention:   environmental surveillance performed   hand hygiene promoted   personal protective equipment utilized   rest/sleep promoted  Goal: Optimal Comfort and Wellbeing  Outcome: Ongoing, Progressing  Intervention: Monitor Pain and Promote Comfort  Recent Flowsheet Documentation  Taken 5/1/2022 1600 by Tammy Catalan RN  Pain Management Interventions:   quiet environment facilitated   relaxation techniques promoted  Taken 5/1/2022 1400 by Tammy Catalan RN  Pain Management Interventions:   quiet environment facilitated   relaxation techniques promoted  Taken 5/1/2022 1200 by Tammy Catalan RN  Pain Management Interventions:   quiet environment facilitated   relaxation techniques promoted  Taken 5/1/2022 1000 by Tammy Catalan RN  Pain Management Interventions:   quiet environment facilitated   relaxation techniques promoted  Taken 5/1/2022 0800 by Tammy Catalan RN  Pain Management Interventions:   quiet environment facilitated   relaxation techniques promoted  Intervention: Provide Person-Centered Care  Recent Flowsheet Documentation  Taken 5/1/2022 1600 by Tammy Catalan RN  Trust Relationship/Rapport:   care explained   questions answered   thoughts/feelings acknowledged   reassurance provided   questions encouraged   empathic listening provided   emotional support provided   choices provided  Taken 5/1/2022 1400 by Tammy Catalan RN  Trust Relationship/Rapport:   care explained   choices provided   emotional support provided   empathic listening provided   questions answered   questions encouraged   reassurance provided   thoughts/feelings acknowledged  Taken 5/1/2022 1200 by Tammy Catalan RN  Trust Relationship/Rapport:   care explained   choices provided   emotional support provided   reassurance provided   thoughts/feelings acknowledged   questions encouraged   questions answered   empathic listening  provided  Taken 5/1/2022 1000 by Tammy Catalan RN  Trust Relationship/Rapport:   care explained   choices provided   emotional support provided   empathic listening provided   questions encouraged   thoughts/feelings acknowledged   reassurance provided   questions answered  Taken 5/1/2022 0800 by Tammy Catalan RN  Trust Relationship/Rapport:   care explained   choices provided   emotional support provided   empathic listening provided   questions answered   questions encouraged   reassurance provided   thoughts/feelings acknowledged  Goal: Readiness for Transition of Care  Outcome: Ongoing, Progressing  Intervention: Mutually Develop Transition Plan  Recent Flowsheet Documentation  Taken 5/1/2022 1152 by Tammy Catalan RN  Transportation Anticipated: family or friend will provide  Patient/Family Anticipated Services at Transition: none  Patient/Family Anticipates Transition to: home     Problem: Pain Acute  Goal: Acceptable Pain Control and Functional Ability  Outcome: Ongoing, Progressing  Intervention: Prevent or Manage Pain  Recent Flowsheet Documentation  Taken 5/1/2022 1400 by Tammy Catalan RN  Medication Review/Management: medications reviewed  Taken 5/1/2022 0800 by Tammy Catalan RN  Medication Review/Management: medications reviewed  Intervention: Develop Pain Management Plan  Recent Flowsheet Documentation  Taken 5/1/2022 1600 by Tammy Catalan RN  Pain Management Interventions:   quiet environment facilitated   relaxation techniques promoted  Taken 5/1/2022 1400 by Tammy Catalan RN  Pain Management Interventions:   quiet environment facilitated   relaxation techniques promoted  Taken 5/1/2022 1200 by Tammy Catalan RN  Pain Management Interventions:   quiet environment facilitated   relaxation techniques promoted  Taken 5/1/2022 1000 by Tammy Catalan RN  Pain Management Interventions:   quiet environment facilitated   relaxation techniques promoted  Taken 5/1/2022 0800 by  [place], Kourtnie, RN  Pain Management Interventions:   quiet environment facilitated   relaxation techniques promoted  Intervention: Optimize Psychosocial Wellbeing  Recent Flowsheet Documentation  Taken 5/1/2022 1600 by Tammy Catalan RN  Diversional Activities: television  Taken 5/1/2022 1400 by Tammy Catalan RN  Diversional Activities: television  Taken 5/1/2022 1200 by Tammy Catalan RN  Diversional Activities: television  Taken 5/1/2022 1000 by Tammy Catalan RN  Diversional Activities: television  Taken 5/1/2022 0800 by Tammy Catalan RN  Diversional Activities: television

## 2022-05-01 NOTE — PROGRESS NOTES
[facility] Medicine Services  PROGRESS NOTE    Patient Name: Cecilio Farias  : 1955  MRN: [id number]    Date of Admission: 2022  Primary Care Physician: America Everett DO    Subjective   Subjective     CC:  SBO    HPI:  Feels much better this morning. No further abdominal pain or nausea. BM yesterday    ROS:  Gen- No fevers, chills  CV- No chest pain, palpitations  Resp- No cough, dyspnea  GI- No N/V/D, abd pain        Objective   Objective     Vital Signs:   Temp:  [97.8 °F (36.6 °C)-98.9 °F (37.2 °C)] 97.8 °F (36.6 °C)  Heart Rate:  [] 69  Resp:  [16-22] 16  BP: (120-148)/(58-84) 135/72     Physical Exam:  Constitutional - no acute distress, nontoxic, sitting up on the edge of the bed  HEENT-NCAT, mucous membranes moist, NG  CV-RRR  Resp-CTAB  Abd-soft, hernia palpable but non tender  Ext-trace edema  Neuro-alert and oriented, speech clear, moves all extremities   Psych-normal affect   Skin- No rash on exposed UE or LE bilaterally, blue gray discoloration of legs      Results Reviewed:  LAB RESULTS:      Lab 22  0430 22  0006 22   WBC 7.03 10.56 10.79   HEMOGLOBIN 11.4* 12.2* 13.7   HEMATOCRIT 37.3* 37.9 41.5   PLATELETS 164 214 219   NEUTROS ABS 5.00 8.54* 8.81*   IMMATURE GRANS (ABS) 0.02 0.03 0.04   LYMPHS ABS 1.02 0.94 0.86   MONOS ABS 0.78 0.86 0.89   EOS ABS 0.18 0.15 0.14   MCV 92.6 87.7 85.9   LACTATE  --  1.6 2.2*   PROTIME  --   --  25.4*   APTT  --  37.5*  --    HEPARIN ANTI-XA 0.10* 0.10*  --          Lab 22  0430 22   SODIUM 143 139   POTASSIUM 4.5 4.1   CHLORIDE 112* 102   CO2 22.0 26.0   ANION GAP 9.0 11.0   BUN 13 13   CREATININE 0.73* 0.84   EGFR 101.0 96.8   GLUCOSE 124* 150*   CALCIUM 7.7* 9.1   HEMOGLOBIN A1C 6.90*  --          Lab 22   TOTAL PROTEIN 7.4   ALBUMIN 3.90   GLOBULIN 3.5   ALT (SGPT) 24   AST (SGOT) 40   BILIRUBIN 1.7*   ALK PHOS 149*   LIPASE 37         Lab 22    TROPONIN T 0.030   PROTIME 25.4*   INR 2.30*                 Brief Urine Lab Results  (Last result in the past 365 days)      Color   Clarity   Blood   Leuk Est   Nitrite   Protein   CREAT   Urine HCG        05/01/22 0006 Yellow   Clear   Negative   Negative   Negative   30 mg/dL (1+)                 Microbiology Results Abnormal     Procedure Component Value - Date/Time    COVID PRE-OP / PRE-PROCEDURE SCREENING ORDER (NO ISOLATION) - Swab, Nasopharynx [[id number]]  (Normal) Collected: 04/30/22 2356    Lab Status: Final result Specimen: Swab from Nasopharynx Updated: 05/01/22 0034    Narrative:      The following orders were created for panel order COVID PRE-OP / PRE-PROCEDURE SCREENING ORDER (NO ISOLATION) - Swab, Nasopharynx.  Procedure                               Abnormality         Status                     ---------                               -----------         ------                     COVID-19 and FLU A/B PCR...[[id number]]  Normal              Final result                 Please view results for these tests on the individual orders.    COVID-19 and FLU A/B PCR - Swab, Nasopharynx [[id number]]  (Normal) Collected: 04/30/22 2356    Lab Status: Final result Specimen: Swab from Nasopharynx Updated: 05/01/22 0034     COVID19 Not Detected     Influenza A PCR Not Detected     Influenza B PCR Not Detected    Narrative:      Fact sheet for providers: https://www.fda.gov/media/425328/download    Fact sheet for patients: https://www.fda.gov/media/649984/download    Test performed by PCR.          CT Abdomen Pelvis With Contrast    Result Date: 4/30/2022  EXAMINATION:  CT ABDOMEN PELVIS W CONTRAST DATE: 4/30/2022 9:31 PM INDICATION: Generalized abdominal pain, vomiting ; COMPARISON: November 17, 2021. PROCEDURE:  CT of the abdomen and pelvis was performed following the intravenous administration of iodinated contrast material.  CT dose lowering techniques were used, to include: automated exposure control,  adjustment for patient size, and or use of iterative reconstruction. FINDINGS: Visualized lower chest: Coronary artery atherosclerotic calcifications.  ABDOMEN: Liver and Biliary system:  Liver surface nodularity. No biliary ductal dilation. Gallbladder:  Gallstones.. Spleen:  Normal. Pancreas:  Normal. Adrenal glands:  2.4 cm left adrenal myelolipoma. 1.3 cm right adrenal adenoma.. Kidneys and ureters:  Kidneys enhance symmetrically. No suspicious mass. No hydronephrosis. Aorta/IVC:  Moderate atherosclerotic calcifications. No aortic aneurysm or dissection.  IVC normal. Lymph nodes:  No lymphadenopathy. Gastrointestinal tract:  Stomach normal.  Fluid-filled dilated small bowel measuring up to 6 cm in diameter with transition at the complex ventral hernia. Distal small bowel decompressed. Appendix normal. No colonic wall thickening or dilation. Posterolateral changes descending colon. Peritoneum/Mesentery: No pneumoperitoneum.  No ascites. Abdominal wall: Again demonstrated is complex ventral hernia containing multiple interconnected outpouchings which contain small bowel and mesentery as well as a small portion of transverse colon. PELVIS: Urinary bladder: Normal. Reproductive organs: No acute abnormality by CT. Lymph Nodes: No pelvic lymphadenopathy. BONES:  Degenerative changes in the spine.     Impression:  1.  Small bowel obstruction with transition at the complex ventral hernia, similar to November 17, 2021 exam. 2.  Cirrhotic liver morphology. 3.  Other chronic findings as above. Electronically signed by:  Kana Erwin M.D.  4/30/2022 8:38 PM Mountain Time    XR Chest 1 View    Result Date: 4/30/2022  EXAMINATION: XR CHEST 1 VW DATE: 4/30/2022 8:56 PM INDICATION:  Upper abdominal pain triage protocol; COMPARISON:  January 15, 2021 FINDINGS: No focal consolidation, pleural effusion, or pneumothorax. Cardiomediastinal silhouette  unchanged. Status post median sternotomy. No acute osseous abnormality.      Impression: 1.  No acute cardiopulmonary process. Electronically signed by:  Kana Erwin M.D.  4/30/2022 7:35 PM Mountain Time    XR Abdomen KUB    Result Date: 5/1/2022  EXAMINATION: XR ABDOMEN KUB  DATE: 5/1/2022 1:18 AM INDICATIONS: Tube placement. COMPARISON: April 30, 2022 at 10:07 PM FINDINGS/    Impression: Enteric tube tip in the stomach. Electronically signed by:  Dani Root M.D.  4/30/2022 11:53 PM Mountain Time    XR Abdomen KUB    Result Date: 4/30/2022  EXAMINATION: XR ABDOMEN KUB DATE: 4/30/2022 10:20 PM INDICATION:  NG tube placement; COMPARISON:  CT abdomen and pelvis earlier today FINDINGS: NG tube tip in the proximal stomach, proximal sidehole in the distal esophagus. Dilated bowel loops in the upper abdomen. Median sternotomy and cardiac valve replacement.     Impression: 1.  Recommend 5 cm advancement of esophogastric tube for optimal position. Electronically signed by:  Kana Erwin M.D.  4/30/2022 9:21 PM Mountain Time      Results for orders placed during the hospital encounter of 01/15/21    Transthoracic Echo Complete With Contrast if Necessary Per Protocol    Interpretation Summary  · The left atrium is dilated.  · There is mild concentric left ventricular hypertrophy.  · Global and segmental LV systolic function is normal. The calculated left ventricular ejection fraction is 53%.  · There is a mechanical aortic valve in the aortic position. Function appears to be normal.  · There is mitral annular calcification. Mild mitral regurgitation is seen. There appears to be some degree of chordal thickening with a 4.5 mmHg mean diastolic gradient across the mitral valve apparatus.  · Mild to moderate tricuspid regurgitation is noted. The estimated RV systolic pressure is elevated at 56 mmHg.  · No other important findings are noted on the study.      I have reviewed the medications:  Scheduled Meds:insulin lispro, 0-7 Units, Subcutaneous, TID AC  sodium chloride, 10 mL, Intravenous,  Q12H      Continuous Infusions:heparin, 8 Units/kg/hr, Last Rate: 8 Units/kg/hr (05/01/22 0031)  Pharmacy to Dose Heparin,       PRN Meds:.•  dextrose  •  dextrose  •  glucagon (human recombinant)  •  Morphine **AND** naloxone  •  Pharmacy to Dose Heparin  •  sodium chloride  •  sodium chloride    Assessment/Plan   Assessment & Plan     Active Hospital Problems    Diagnosis  POA   • **SBO (small bowel obstruction) ([facility]) [K56.609]  Yes   • Lactic acidosis [E87.2]  Yes   • Diastolic CHF, chronic ([facility]) [I50.32]  Yes   • Chronic anticoagulation [Z79.01]  Not Applicable   • Discoloration of skin of bilateral legs likely due to minocycline use [L81.9]  Yes   • History Acinetobacter bacteremia on chronic minocycline [Z86.19]  Yes   • History of mechanical AVR for aortic stenosis 2009 [Z95.2]  Not Applicable   • Dyslipidemia [E78.5]  Yes   • Essential hypertension [I10]  Yes   • Type 2 diabetes mellitus ([facility]) [E11.9]  Yes      Resolved Hospital Problems   No resolved problems to display.        Brief Hospital Course to date:  with PMH significant for colon cancer, CHF, DM 2, mechanical aortic valve replacement on chronic Coumadin, HLD, HTN, Acetobacter on chronic minocycline with chronic BLE discoloration secondary to minocycline, who presents to the ED with complaint of 2-day history of abdominal pain, nausea, and vomiting who was found to have concern for small bowel obstruction with a large ventral hernia.     Small bowel obstruction  Ventral hernia  - N.p.o. with NG decomression  -- general surgery evaluation     Valvular heart disease  - History of mechanical AVR  - On chronic Coumadin, hold while n.p.o.  - Heparin drip     History of CHF-- last echo 1/21 EF was normal  - Monitor closely for volume overload while n.p.o.  - Daily weights  - Plan to continue Lasix when appropriate     History Acinetobacter bacteremia  -chronic minocycline, plan to continue when able to take po  -chronic lower extremity leg discoloration  secondary to minocycline     Diabetes mellitus 2  - SS insulin  - a1c 6.9  - Hold metformin     Hypertension  Hyperlipidemia  - holding home amlodipine, carvedilol, lisinopril      DVT prophylaxis: Heparin dripDVT prophylaxis:  Medical DVT prophylaxis orders are present.            Disposition: I expect the patient to be discharged TBD    CODE STATUS:   Code Status and Medical Interventions:   Ordered at: 05/01/22 0018     Code Status (Patient has no pulse and is not breathing):    CPR (Attempt to Resuscitate)     Medical Interventions (Patient has pulse or is breathing):    Full Support       Nate Saunders MD  05/01/22

## 2022-05-01 NOTE — H&P
Psychiatric Medicine Services  HISTORY AND PHYSICAL    Patient Name: Cecilio Farias  : 1955  MRN: [id number]  Primary Care Physician: America Everett DO  Date of admission: 2022    Subjective   Subjective     Chief Complaint:  Abdominal pain, nausea, vomiting     HPI:  Cecilio Farias is a 66 y.o. male with PMH significant for colon cancer, CHF, DM 2, mechanical aortic valve replacement on chronic Coumadin, HLD, HTN, Acinetobacter on chronic minocycline with chronic BLE discoloration secondary to minocycline, who presents to the ED with complaint of abdominal pain, nausea, and vomiting.  He notes that he has had a large ventral hernia for a long time.  In 2021 he was admitted secondary to bowel obstruction and at that time the obstruction resolved after small bowel follow-through and decision was made to have elective repair at a later date.  In February, he was planning to have hernia repair surgery when elective surgeries were held secondary to the pandemic.  He notes since that time he has been doing well. He has lost 50 pounds in efforts to get ready for surgery.  Yesterday, he developed abdominal pain with associated nausea and vomiting.  He notes that the pain comes and goes but has continued through today prompting evaluation in the ED.  Upon arrival to the ED, he is noted to have elevated lactate.  CT abdomen/pelvis is concerning for small bowel obstruction.  Of note he is on chronic Coumadin secondary to mechanical aortic valve.  He will be admitted to hospital medicine for further evaluation.    Review of Systems   Constitutional: Positive for appetite change. Negative for activity change, chills, diaphoresis, fatigue, fever and unexpected weight change.   HENT: Negative.    Eyes: Negative.  Negative for visual disturbance.   Respiratory: Negative for cough, chest tightness, shortness of breath and wheezing.    Cardiovascular: Positive for leg swelling.  Negative for chest pain and palpitations.   Gastrointestinal: Positive for abdominal distention, abdominal pain, nausea and vomiting. Negative for constipation and diarrhea.   Endocrine: Negative.    Genitourinary: Negative.  Negative for difficulty urinating, dysuria, frequency and urgency.   Musculoskeletal: Negative for arthralgias, back pain, gait problem, myalgias and neck pain.   Skin: Positive for color change (Chronic bilateral lower extremity discoloration secondary to medication). Negative for pallor, rash and wound.   Allergic/Immunologic: Negative.  Negative for immunocompromised state.   Neurological: Negative.  Negative for dizziness, speech difficulty, weakness, light-headedness and headaches.   Hematological: Bruises/bleeds easily.   Psychiatric/Behavioral: Negative.  Negative for confusion. The patient is not nervous/anxious.         All other systems reviewed and are negative.     Personal History     Past Medical History:   Diagnosis Date   • Cancer (HCC)     colon cancer with operation/ chemotherapy/radiation    • CHF (congestive heart failure) (HCC) 2009    Acute Systolic    • Chronic anticoagulation     coumadin   • Diabetes mellitus (HCC)    • H/O aortic valve replacement    • HLD (hyperlipidemia)    • Hypertension    • Sepsis due to Acinetobacter species (HCC) 6/13/2019             Past Surgical History:   Procedure Laterality Date   • AORTIC VALVE REPAIR/REPLACEMENT  2009    25 mm. [place] AVR   • ASCENDING AORTIC ANEURYSM REPAIR  2009   • BACK SURGERY     • CARDIAC CATHETERIZATION N/A 1/20/2021    Procedure: RIGHT AND LEFT HEART CATH;  Surgeon: Kurtis Smith MD;  Location: [facility] CATH INVASIVE LOCATION;  Service: Cardiology;  Laterality: N/A;   • COLON SURGERY      Colon cancer with operation   • FOOT SURGERY      bilateral 2/2 wound and trauma        Family History:  family history includes Cancer in his brother, sister, and sister; Diabetes in his mother; Heart attack in his brother;  Heart disease in his brother and mother; Hepatitis in his brother; Hyperlipidemia in his mother; Kidney failure in his brother; Leukemia in his father. Otherwise pertinent FHx was reviewed and unremarkable.     Social History:  reports that he has never smoked. He has never used smokeless tobacco. He reports that he does not drink alcohol and does not use drugs.      Medications:  amLODIPine, aspirin, atorvastatin, carvedilol, furosemide, gabapentin, glipizide, insulin NPH-insulin regular, lisinopril, metFORMIN, minocycline, potassium chloride ER, and warfarin    Allergies   Allergen Reactions   • Sulfa Antibiotics    • Amoxicillin Rash     Has tolerated ceftriaxone       Objective   Objective     Vital Signs:   Temp:  [98.9 °F (37.2 °C)] 98.9 °F (37.2 °C)  Heart Rate:  [96] 96  Resp:  [22] 22  BP: (145-148)/(80-84) 148/80    Physical Exam   Constitutional: Awake, alert  Eyes: PERRLA, sclerae anicteric, no conjunctival injection  HENT: NCAT, mucous membranes moist  Neck: Supple, no thyromegaly, no lymphadenopathy, trachea midline  Respiratory: Clear to auscultation bilaterally, nonlabored respirations   Cardiovascular: RRR, no murmurs, rubs, or gallops, palpable pedal pulses bilaterally, positive mechanical click  Gastrointestinal: Decreased bowel sounds, soft, mildly tender to palpation, distended, large ventral hernia noted  Musculoskeletal: Mild bilateral ankle edema, no clubbing or cyanosis to extremities  Psychiatric: Appropriate affect, cooperative  Neurologic: Oriented x 3, strength symmetric in all extremities, Cranial Nerves grossly intact to confrontation, speech clear  Skin: No rashes, chronic BLE discoloration (black) secondary to minocycline use       Results Reviewed:  I have personally reviewed most recent indicated data and agree with findings including:  [x]  Laboratory  [x]  Radiology  []  EKG/Telemetry  []  Pathology  []  Cardiac/Vascular Studies  [x]  Old records  []  Other:  Most pertinent  findings include:      LAB RESULTS:      Lab 04/30/22 2041   WBC 10.79   HEMOGLOBIN 13.7   HEMATOCRIT 41.5   PLATELETS 219   NEUTROS ABS 8.81*   IMMATURE GRANS (ABS) 0.04   LYMPHS ABS 0.86   MONOS ABS 0.89   EOS ABS 0.14   MCV 85.9   LACTATE 2.2*   PROTIME 25.4*         Lab 04/30/22 2041   SODIUM 139   POTASSIUM 4.1   CHLORIDE 102   CO2 26.0   ANION GAP 11.0   BUN 13   CREATININE 0.84   EGFR 96.8   GLUCOSE 150*   CALCIUM 9.1         Lab 04/30/22 2041   TOTAL PROTEIN 7.4   ALBUMIN 3.90   GLOBULIN 3.5   ALT (SGPT) 24   AST (SGOT) 40   BILIRUBIN 1.7*   ALK PHOS 149*   LIPASE 37         Lab 04/30/22 2041   TROPONIN T 0.030   PROTIME 25.4*   INR 2.30*                 Brief Urine Lab Results  (Last result in the past 365 days)      Color   Clarity   Blood   Leuk Est   Nitrite   Protein   CREAT   Urine HCG        11/17/21 1651 Dark Yellow   Clear   Negative   Negative   Negative   30 mg/dL (1+)               Microbiology Results (last 10 days)     ** No results found for the last 240 hours. **          CT Abdomen Pelvis With Contrast    Result Date: 4/30/2022  EXAMINATION:  CT ABDOMEN PELVIS W CONTRAST DATE: 4/30/2022 9:31 PM INDICATION: Generalized abdominal pain, vomiting ; COMPARISON: November 17, 2021. PROCEDURE:  CT of the abdomen and pelvis was performed following the intravenous administration of iodinated contrast material.  CT dose lowering techniques were used, to include: automated exposure control, adjustment for patient size, and or use of iterative reconstruction. FINDINGS: Visualized lower chest: Coronary artery atherosclerotic calcifications.  ABDOMEN: Liver and Biliary system:  Liver surface nodularity. No biliary ductal dilation. Gallbladder:  Gallstones.. Spleen:  Normal. Pancreas:  Normal. Adrenal glands:  2.4 cm left adrenal myelolipoma. 1.3 cm right adrenal adenoma.. Kidneys and ureters:  Kidneys enhance symmetrically. No suspicious mass. No hydronephrosis. Aorta/IVC:  Moderate atherosclerotic  calcifications. No aortic aneurysm or dissection.  IVC normal. Lymph nodes:  No lymphadenopathy. Gastrointestinal tract:  Stomach normal.  Fluid-filled dilated small bowel measuring up to 6 cm in diameter with transition at the complex ventral hernia. Distal small bowel decompressed. Appendix normal. No colonic wall thickening or dilation. Posterolateral changes descending colon. Peritoneum/Mesentery: No pneumoperitoneum.  No ascites. Abdominal wall: Again demonstrated is complex ventral hernia containing multiple interconnected outpouchings which contain small bowel and mesentery as well as a small portion of transverse colon. PELVIS: Urinary bladder: Normal. Reproductive organs: No acute abnormality by CT. Lymph Nodes: No pelvic lymphadenopathy. BONES:  Degenerative changes in the spine.     Impression:  1.  Small bowel obstruction with transition at the complex ventral hernia, similar to November 17, 2021 exam. 2.  Cirrhotic liver morphology. 3.  Other chronic findings as above. Electronically signed by:  Kana Erwin M.D.  4/30/2022 8:38 PM Mountain Time    XR Chest 1 View    Result Date: 4/30/2022  EXAMINATION: XR CHEST 1 VW DATE: 4/30/2022 8:56 PM INDICATION:  Upper abdominal pain triage protocol; COMPARISON:  January 15, 2021 FINDINGS: No focal consolidation, pleural effusion, or pneumothorax. Cardiomediastinal silhouette  unchanged. Status post median sternotomy. No acute osseous abnormality.     Impression: 1.  No acute cardiopulmonary process. Electronically signed by:  Kana Erwin M.D.  4/30/2022 7:35 PM Mountain Time    XR Abdomen KUB    Result Date: 4/30/2022  EXAMINATION: XR ABDOMEN KUB DATE: 4/30/2022 10:20 PM INDICATION:  NG tube placement; COMPARISON:  CT abdomen and pelvis earlier today FINDINGS: NG tube tip in the proximal stomach, proximal sidehole in the distal esophagus. Dilated bowel loops in the upper abdomen. Median sternotomy and cardiac valve replacement.     Impression: 1.   Recommend 5 cm advancement of esophogastric tube for optimal position. Electronically signed by:  Kana Erwin M.D.  4/30/2022 9:21 PM Mountain Time      Results for orders placed during the hospital encounter of 01/15/21    Transthoracic Echo Complete With Contrast if Necessary Per Protocol    Interpretation Summary  · The left atrium is dilated.  · There is mild concentric left ventricular hypertrophy.  · Global and segmental LV systolic function is normal. The calculated left ventricular ejection fraction is 53%.  · There is a mechanical aortic valve in the aortic position. Function appears to be normal.  · There is mitral annular calcification. Mild mitral regurgitation is seen. There appears to be some degree of chordal thickening with a 4.5 mmHg mean diastolic gradient across the mitral valve apparatus.  · Mild to moderate tricuspid regurgitation is noted. The estimated RV systolic pressure is elevated at 56 mmHg.  · No other important findings are noted on the study.      Assessment/Plan   Assessment & Plan       SBO (small bowel obstruction) (HCC)    Essential hypertension    Dyslipidemia    Type 2 diabetes mellitus (HCC)    History of mechanical AVR for aortic stenosis 2009    Chronic anticoagulation    History Acinetobacter bacteremia on chronic minocycline    Discoloration of skin of bilateral legs likely due to minocycline use    Diastolic CHF, chronic (HCC)    Lactic acidosis    66 y.o. male with PMH significant for colon cancer, CHF, DM 2, mechanical aortic valve replacement on chronic Coumadin, HLD, HTN, Acetobacter on chronic minocycline with chronic BLE discoloration secondary to minocycline, who presents to the ED with complaint of 2-day history of abdominal pain, nausea, and vomiting who was found to have concern for small bowel obstruction with a large ventral hernia.    Small bowel obstruction  Ventral hernia  - General surgery consult in the a.m.  - N.p.o.  - NG tube to low wall suction  -  IV as needed pain control  - If decision is made for surgery, will likely need cardiac clearance  - CBC, BMP    Lactic acidosis  - IVF bolus given in the ED  -Hold additional fluid for now given CHF  - Reflex lactic acid pending    Valvular heart disease  - History of mechanical AVR  - On chronic Coumadin, hold while n.p.o.  - Heparin drip    History of CHF-- last echo 1/21 EF was normal  - Monitor closely for volume overload while n.p.o.  - Daily weight  - Plan to continue Lasix when appropriate    History Acinetobacter bacteremia  -chronic minocycline, plan to continue when able to take po  -chronic lower extremity leg discoloration secondary to minocycline    Diabetes mellitus 2  - FSBG ACH S  - SS insulin  - Hemoglobin A1c in the a.m.  - Hold metformin    Hypertension  Hyperlipidemia  - Plan to continue amlodipine, carvedilol, lisinopril when able to tolerate p.o.  - Plan to continue atorvastatin once able to tolerate p.o.    DVT prophylaxis: Heparin drip    CODE STATUS:    Code Status (Patient has no pulse and is not breathing): CPR (Attempt to Resuscitate)  Medical Interventions (Patient has pulse or is breathing): Full Support      This note has been completed as part of a split-shared workflow.     Signature:   Electronically signed by PHAN Elkins, 05/01/22, 12:16 AM EDT.      Attending   Admission Attestation       I have seen and examined the patient, performing an independent face-to-face diagnostic evaluation with plan of care reviewed and developed with the advanced practice clinician (APC).      Brief Summary Statement:     Cecilio Farias is a 66 y.o. male with PMH significant for colon cancer, CHF, DM 2, mechanical aortic valve replacement on chronic Coumadin, HLD, HTN, Acinetobacter on chronic minocycline with chronic BLE discoloration secondary to minocycline, who presents to the ED with complaint of abdominal pain, nausea, and vomiting.Patient reports a normal BM today, feeling better  since arrival. Has lost 50 pounds-- intentionally.      Remainder of detailed HPI is as noted by APC and has been reviewed and/or edited by me for completeness.    Attending Physical Exam:  Temp:  [98.2 °F (36.8 °C)-98.9 °F (37.2 °C)] 98.2 °F (36.8 °C)  Heart Rate:  [] 66  Resp:  [18-22] 18  BP: (120-148)/(58-84) 120/58    Patient is alert and talkative in no distress at rest  Neck is without mass or JVD  Heart is Reg wo murmur  Lungs are clear wo wheeze or crackle  Abd is soft without HSM or mass, not tender or distended  MAEW  Skin is without rash  Neurologic is exam in non-focal   Mood is appropriate      Brief Assessment/Plan :  See detailed assessment and plan developed with APC which I have reviewed and/or edited for completeness.    Admission Status: I believe that this patient meets Inpatient  Status.    Prerna Ruby MD  05/01/22

## 2022-05-02 LAB
ALBUMIN SERPL-MCNC: 3.1 G/DL (ref 3.5–5.2)
ALBUMIN/GLOB SERPL: 1.1 G/DL
ALP SERPL-CCNC: 110 U/L (ref 39–117)
ALT SERPL W P-5'-P-CCNC: 16 U/L (ref 1–41)
ANION GAP SERPL CALCULATED.3IONS-SCNC: 7 MMOL/L (ref 5–15)
APTT PPP: 71.7 SECONDS (ref 50–95)
AST SERPL-CCNC: 28 U/L (ref 1–40)
BILIRUB SERPL-MCNC: 1.1 MG/DL (ref 0–1.2)
BUN SERPL-MCNC: 8 MG/DL (ref 8–23)
BUN/CREAT SERPL: 12.3 (ref 7–25)
CALCIUM SPEC-SCNC: 7.9 MG/DL (ref 8.6–10.5)
CHLORIDE SERPL-SCNC: 108 MMOL/L (ref 98–107)
CO2 SERPL-SCNC: 24 MMOL/L (ref 22–29)
CREAT SERPL-MCNC: 0.65 MG/DL (ref 0.76–1.27)
EGFRCR SERPLBLD CKD-EPI 2021: 104.6 ML/MIN/1.73
GLOBULIN UR ELPH-MCNC: 2.9 GM/DL
GLUCOSE BLDC GLUCOMTR-MCNC: 104 MG/DL (ref 70–130)
GLUCOSE BLDC GLUCOMTR-MCNC: 97 MG/DL (ref 70–130)
GLUCOSE BLDC GLUCOMTR-MCNC: 97 MG/DL (ref 70–130)
GLUCOSE SERPL-MCNC: 119 MG/DL (ref 65–99)
INR PPP: 1.75 (ref 0.84–1.13)
MAGNESIUM SERPL-MCNC: 1.9 MG/DL (ref 1.6–2.4)
PHOSPHATE SERPL-MCNC: 2.5 MG/DL (ref 2.5–4.5)
POTASSIUM SERPL-SCNC: 3.7 MMOL/L (ref 3.5–5.2)
PROT SERPL-MCNC: 6 G/DL (ref 6–8.5)
PROTHROMBIN TIME: 20.4 SECONDS (ref 11.4–14.4)
SODIUM SERPL-SCNC: 139 MMOL/L (ref 136–145)
UFH PPP CHRO-ACNC: 0.1 IU/ML (ref 0.3–0.7)
UFH PPP CHRO-ACNC: 0.14 IU/ML (ref 0.3–0.7)
UFH PPP CHRO-ACNC: 0.26 IU/ML (ref 0.3–0.7)

## 2022-05-02 PROCEDURE — 25010000002 MORPHINE PER 10 MG: Performed by: INTERNAL MEDICINE

## 2022-05-02 PROCEDURE — 85730 THROMBOPLASTIN TIME PARTIAL: CPT

## 2022-05-02 PROCEDURE — 25010000002 HEPARIN (PORCINE) PER 1000 UNITS

## 2022-05-02 PROCEDURE — 99232 SBSQ HOSP IP/OBS MODERATE 35: CPT | Performed by: INTERNAL MEDICINE

## 2022-05-02 PROCEDURE — 83735 ASSAY OF MAGNESIUM: CPT | Performed by: SURGERY

## 2022-05-02 PROCEDURE — 85520 HEPARIN ASSAY: CPT

## 2022-05-02 PROCEDURE — 85610 PROTHROMBIN TIME: CPT | Performed by: SURGERY

## 2022-05-02 PROCEDURE — 84100 ASSAY OF PHOSPHORUS: CPT | Performed by: SURGERY

## 2022-05-02 PROCEDURE — 80053 COMPREHEN METABOLIC PANEL: CPT | Performed by: SURGERY

## 2022-05-02 PROCEDURE — 82962 GLUCOSE BLOOD TEST: CPT

## 2022-05-02 RX ORDER — HEPARIN SODIUM 1000 [USP'U]/ML
3900 INJECTION, SOLUTION INTRAVENOUS; SUBCUTANEOUS ONCE
Status: COMPLETED | OUTPATIENT
Start: 2022-05-02 | End: 2022-05-02

## 2022-05-02 RX ORDER — MINOCYCLINE HYDROCHLORIDE 50 MG/1
100 CAPSULE ORAL 2 TIMES DAILY
Status: DISCONTINUED | OUTPATIENT
Start: 2022-05-02 | End: 2022-05-04 | Stop reason: HOSPADM

## 2022-05-02 RX ORDER — ATORVASTATIN CALCIUM 40 MG/1
40 TABLET, FILM COATED ORAL NIGHTLY
Status: DISCONTINUED | OUTPATIENT
Start: 2022-05-02 | End: 2022-05-04 | Stop reason: HOSPADM

## 2022-05-02 RX ORDER — MORPHINE SULFATE 2 MG/ML
2 INJECTION, SOLUTION INTRAMUSCULAR; INTRAVENOUS ONCE
Status: COMPLETED | OUTPATIENT
Start: 2022-05-02 | End: 2022-05-02

## 2022-05-02 RX ORDER — GABAPENTIN 400 MG/1
800 CAPSULE ORAL EVERY 8 HOURS SCHEDULED
Status: DISCONTINUED | OUTPATIENT
Start: 2022-05-02 | End: 2022-05-04 | Stop reason: HOSPADM

## 2022-05-02 RX ADMIN — MINOCYCLINE HYDROCHLORIDE 100 MG: 50 CAPSULE ORAL at 17:41

## 2022-05-02 RX ADMIN — Medication 10 ML: at 22:36

## 2022-05-02 RX ADMIN — GABAPENTIN 800 MG: 400 CAPSULE ORAL at 14:52

## 2022-05-02 RX ADMIN — MINOCYCLINE HYDROCHLORIDE 100 MG: 50 CAPSULE ORAL at 22:35

## 2022-05-02 RX ADMIN — FAMOTIDINE 20 MG: 10 INJECTION INTRAVENOUS at 08:37

## 2022-05-02 RX ADMIN — SODIUM CHLORIDE, POTASSIUM CHLORIDE, SODIUM LACTATE AND CALCIUM CHLORIDE 125 ML/HR: 600; 310; 30; 20 INJECTION, SOLUTION INTRAVENOUS at 06:08

## 2022-05-02 RX ADMIN — FAMOTIDINE 20 MG: 10 INJECTION INTRAVENOUS at 22:36

## 2022-05-02 RX ADMIN — ATORVASTATIN CALCIUM 40 MG: 40 TABLET, FILM COATED ORAL at 22:35

## 2022-05-02 RX ADMIN — HEPARIN SODIUM 3900 UNITS: 1000 INJECTION, SOLUTION INTRAVENOUS; SUBCUTANEOUS at 19:26

## 2022-05-02 RX ADMIN — GABAPENTIN 800 MG: 400 CAPSULE ORAL at 22:37

## 2022-05-02 RX ADMIN — MORPHINE SULFATE 2 MG: 2 INJECTION, SOLUTION INTRAMUSCULAR; INTRAVENOUS at 02:26

## 2022-05-02 NOTE — CONSULTS
"Diabetes Education  Assessment/Teaching    Patient Name:  Cecilio Farias  YOB: 1955  MRN: [id number]  Admit Date:  4/30/2022    Spoke with patient at bedside. Patient very well versed on diabetes education. Discussed and taught about type 2 diabetes self-management, risk factors, and importance of blood glucose control to reduce complications. Target blood glucose readings and A1c goals per ADA were reviewed. Reviewed with patient current A1c 6.9 and discussed its significance. Signs, symptoms, and treatment of hyperglycemia and hypoglycemia were discussed. Patient stated \"I have had some lows before but not in a while.I only take my insulin if it's above a certain number and I am only on Glipizide and not Metformin anymore.\" Lifestyle changes such as physical activity with MD approval and healthy eating were encouraged. Patient stated \" I check my blood sugar two to three times a day at home and I keep a logbook.\" Provided patient with copy of [employer]'s \"What is Diabetes\" handout, \"Blood Glucose Goals\" handout, and \"What is A1c\" handout.      Electronically signed by:  Nidhi Hull RN  05/02/22 16:24 EDT  "

## 2022-05-02 NOTE — CASE MANAGEMENT/SOCIAL WORK
Discharge Planning Assessment  [facility]     Patient Name: Cecilio Farias  MRN: [id number]  Today's Date: 5/2/2022    Admit Date: 4/30/2022     Discharge Needs Assessment     Row Name 05/02/22 0943       Living Environment    People in Home spouse    Current Living Arrangements home       Resource/Environmental Concerns    Transportation Concerns none       Discharge Needs Assessment    Equipment Currently Used at Home none               Discharge Plan     Row Name 05/02/22 0944       Plan    Plan Home @ d/c    Patient/Family in Agreement with Plan yes    Plan Comments Spoke w/pt and grandson @ bedside.Pt lives in [place] w/spouse in house w/stairs inside.  Currently uses no equipment @ home. Uses [facility] in [place] for medications. PCP Dr Everett.Denies any d/c needs @ this time.    Final Discharge Disposition Code 01 - home or self-care              Continued Care and Services - Admitted Since 4/30/2022    Coordination has not been started for this encounter.       Expected Discharge Date and Time     Expected Discharge Date Expected Discharge Time    May 4, 2022          Demographic Summary    No documentation.                Functional Status     Row Name 05/02/22 0942       Functional Status    Usual Activity Tolerance good       Functional Status, IADL    Medications assistive person    Meal Preparation assistive person    Housekeeping assistive person    Laundry assistive person    Shopping assistive person               Psychosocial    No documentation.                Abuse/Neglect    No documentation.                Legal    No documentation.                Substance Abuse    No documentation.                Patient Forms    No documentation.                   Noa Jo RN

## 2022-05-02 NOTE — PROGRESS NOTES
"General Surgery Daily Progress Note    Subjective:  Passing flatus.  Denies abdominal pain.    Objective:  /78 (BP Location: Right arm, Patient Position: Sitting)   Pulse (!) 46   Temp 97.7 °F (36.5 °C) (Oral)   Resp 18   Ht 190.5 cm (75\")   Wt 130 kg (287 lb)   SpO2 96%   BMI 35.87 kg/m²       General Appearance: No apparent distress  Eyes: Anicteric  Neck: Trachea midline   Cardiovascular:  RRR without murmur nor rub  Lungs:  Bilateral respirations unlabored   Abdomen:  Soft, non-tender, Incisional hernia hernia  Extremities:  No cyanosis or edema, lower extremity stained black skin  Skin:  No obvious rashes   Neurologic: awake and conversant       Imaging Results (Last 24 Hours)     ** No results found for the last 24 hours. **          CBC:  Results from last 7 days   Lab Units 05/01/22  0430   WBC 10*3/mm3 7.03   HEMOGLOBIN g/dL 11.4*   HEMATOCRIT % 37.3*   PLATELETS 10*3/mm3 164       CMP:  Results from last 7 days   Lab Units 05/02/22  0312   SODIUM mmol/L 139   POTASSIUM mmol/L 3.7   CHLORIDE mmol/L 108*   CO2 mmol/L 24.0   BUN mg/dL 8   CREATININE mg/dL 0.65*   CALCIUM mg/dL 7.9*   BILIRUBIN mg/dL 1.1   ALK PHOS U/L 110   ALT (SGPT) U/L 16   AST (SGOT) U/L 28   GLUCOSE mg/dL 119*         Assessment:  Incisional hernia  Small bowel obstruction resolving  Valvular heart disease, status post aortic mechanical replacement  Anticoagulated on heparin  Hypertension  Diabetes mellitus    Plan:   Feeling better with small bowel obstruction resolving.  DC nasogastric tube and begin clear liquids.  At this point I discussed repair of his incisional hernia and lysis of adhesions possible bowel resection with he and his son.  The risks and benefits of Incisional herniorrhaphy, lysis of adhesions, and placement of mesh were discussed with the patient by me. Our discussion included, but was not limited to: bleeding (Chronic anticoagulation for his mechanical aortic valve), infection, injury to adjacent " viscera (colon, small bowel etc.), mesh complications (fistula formation etc.), recurrence, anastomotic leak, need for re-intervention, chronic pain, need for an open operation, and medical issues from a cardiopulmonary and deep venous thrombosis standpoint. He and his son understand.  At this point he wishes to resume diet and will think about repair of his ventral hernia.    Ozzie Lester MD - 5/2/2022, 12:27 EDT

## 2022-05-02 NOTE — PROGRESS NOTES
[facility] Medicine Services  PROGRESS NOTE    Patient Name: Cecilio Farias  : 1955  MRN: [id number]    Date of Admission: 2022  Primary Care Physician: America Everett DO    Subjective   Subjective     CC: F/U SBO    HPI:  Feeling well today.  Has passed some gas but no stool yet.  No abdominal pain.    ROS:  Gen- No fevers, chills  CV- No chest pain, palpitations  Resp- No cough, dyspnea  GI- As above    Objective   Objective     Vital Signs:   Temp:  [97.7 °F (36.5 °C)-98.5 °F (36.9 °C)] 97.7 °F (36.5 °C)  Heart Rate:  [46-64] 46  Resp:  [16-18] 18  BP: (112-148)/(41-78) 138/78     Physical Exam:  Constitutional: No acute distress, awake, alert, sitting up on edge of bed  HENT: NCAT, mucous membranes moist, NG tube in place  Respiratory: Clear to auscultation bilaterally, respiratory effort normal   Cardiovascular: Irregular, mechanical valve click  Gastrointestinal: Positive bowel sounds, soft, nontender, nondistended  Musculoskeletal: No bilateral ankle edema  Psychiatric: Appropriate affect, cooperative  Neurologic: Cranial Nerves grossly intact to confrontation, speech clear  Skin: No rashes on exposed surfaces    Results Reviewed:  LAB RESULTS:      Lab 22  0912 22  0312 22  1940 22  1125 22  0430 22  0006 221   WBC  --   --   --   --  7.03 10.56 10.79   HEMOGLOBIN  --   --   --   --  11.4* 12.2* 13.7   HEMATOCRIT  --   --   --   --  37.3* 37.9 41.5   PLATELETS  --   --   --   --  164 214 219   NEUTROS ABS  --   --   --   --  5.00 8.54* 8.81*   IMMATURE GRANS (ABS)  --   --   --   --  0.02 0.03 0.04   LYMPHS ABS  --   --   --   --  1.02 0.94 0.86   MONOS ABS  --   --   --   --  0.78 0.86 0.89   EOS ABS  --   --   --   --  0.18 0.15 0.14   MCV  --   --   --   --  92.6 87.7 85.9   LACTATE  --   --   --   --   --  1.6 2.2*   PROTIME  --  20.4*  --   --   --   --  25.4*   APTT  --  71.7 44.1* 40.9*  --  37.5*  --     HEPARIN ANTI-XA 0.10* 0.14* 0.10*  --  0.10* 0.10*  --          Lab 05/02/22  0312 05/01/22  0430 04/30/22 2041   SODIUM 139 143 139   POTASSIUM 3.7 4.5 4.1   CHLORIDE 108* 112* 102   CO2 24.0 22.0 26.0   ANION GAP 7.0 9.0 11.0   BUN 8 13 13   CREATININE 0.65* 0.73* 0.84   EGFR 104.6 101.0 96.8   GLUCOSE 119* 124* 150*   CALCIUM 7.9* 7.7* 9.1   MAGNESIUM 1.9  --   --    PHOSPHORUS 2.5  --   --    HEMOGLOBIN A1C  --  6.90*  --          Lab 05/02/22 0312 04/30/22 2041   TOTAL PROTEIN 6.0 7.4   ALBUMIN 3.10* 3.90   GLOBULIN 2.9 3.5   ALT (SGPT) 16 24   AST (SGOT) 28 40   BILIRUBIN 1.1 1.7*   ALK PHOS 110 149*   LIPASE  --  37         Lab 05/02/22 0312 04/30/22 2041   TROPONIN T  --  0.030   PROTIME 20.4* 25.4*   INR 1.75* 2.30*                 Brief Urine Lab Results  (Last result in the past 365 days)      Color   Clarity   Blood   Leuk Est   Nitrite   Protein   CREAT   Urine HCG        05/01/22 0006 Yellow   Clear   Negative   Negative   Negative   30 mg/dL (1+)                 Microbiology Results Abnormal     Procedure Component Value - Date/Time    COVID PRE-OP / PRE-PROCEDURE SCREENING ORDER (NO ISOLATION) - Swab, Nasopharynx [[id number]]  (Normal) Collected: 04/30/22 2356    Lab Status: Final result Specimen: Swab from Nasopharynx Updated: 05/01/22 0034    Narrative:      The following orders were created for panel order COVID PRE-OP / PRE-PROCEDURE SCREENING ORDER (NO ISOLATION) - Swab, Nasopharynx.  Procedure                               Abnormality         Status                     ---------                               -----------         ------                     COVID-19 and FLU A/B PCR...[[id number]]  Normal              Final result                 Please view results for these tests on the individual orders.    COVID-19 and FLU A/B PCR - Swab, Nasopharynx [[id number]]  (Normal) Collected: 04/30/22 0736    Lab Status: Final result Specimen: Swab from Nasopharynx Updated: 05/01/22 0034     COVID19  Not Detected     Influenza A PCR Not Detected     Influenza B PCR Not Detected    Narrative:      Fact sheet for providers: https://www.fda.gov/media/086759/download    Fact sheet for patients: https://www.fda.gov/media/531221/download    Test performed by PCR.          CT Abdomen Pelvis With Contrast    Result Date: 4/30/2022  EXAMINATION:  CT ABDOMEN PELVIS W CONTRAST DATE: 4/30/2022 9:31 PM INDICATION: Generalized abdominal pain, vomiting ; COMPARISON: November 17, 2021. PROCEDURE:  CT of the abdomen and pelvis was performed following the intravenous administration of iodinated contrast material.  CT dose lowering techniques were used, to include: automated exposure control, adjustment for patient size, and or use of iterative reconstruction. FINDINGS: Visualized lower chest: Coronary artery atherosclerotic calcifications.  ABDOMEN: Liver and Biliary system:  Liver surface nodularity. No biliary ductal dilation. Gallbladder:  Gallstones.. Spleen:  Normal. Pancreas:  Normal. Adrenal glands:  2.4 cm left adrenal myelolipoma. 1.3 cm right adrenal adenoma.. Kidneys and ureters:  Kidneys enhance symmetrically. No suspicious mass. No hydronephrosis. Aorta/IVC:  Moderate atherosclerotic calcifications. No aortic aneurysm or dissection.  IVC normal. Lymph nodes:  No lymphadenopathy. Gastrointestinal tract:  Stomach normal.  Fluid-filled dilated small bowel measuring up to 6 cm in diameter with transition at the complex ventral hernia. Distal small bowel decompressed. Appendix normal. No colonic wall thickening or dilation. Posterolateral changes descending colon. Peritoneum/Mesentery: No pneumoperitoneum.  No ascites. Abdominal wall: Again demonstrated is complex ventral hernia containing multiple interconnected outpouchings which contain small bowel and mesentery as well as a small portion of transverse colon. PELVIS: Urinary bladder: Normal. Reproductive organs: No acute abnormality by CT. Lymph Nodes: No pelvic  lymphadenopathy. BONES:  Degenerative changes in the spine.     Impression:  1.  Small bowel obstruction with transition at the complex ventral hernia, similar to November 17, 2021 exam. 2.  Cirrhotic liver morphology. 3.  Other chronic findings as above. Electronically signed by:  Kana Erwin M.D.  4/30/2022 8:38 PM Mountain Time    XR Chest 1 View    Result Date: 4/30/2022  EXAMINATION: XR CHEST 1 VW DATE: 4/30/2022 8:56 PM INDICATION:  Upper abdominal pain triage protocol; COMPARISON:  January 15, 2021 FINDINGS: No focal consolidation, pleural effusion, or pneumothorax. Cardiomediastinal silhouette  unchanged. Status post median sternotomy. No acute osseous abnormality.     Impression: 1.  No acute cardiopulmonary process. Electronically signed by:  Kana Erwin M.D.  4/30/2022 7:35 PM Mountain Time    XR Abdomen KUB    Result Date: 5/1/2022  EXAMINATION: XR ABDOMEN KUB  DATE: 5/1/2022 1:18 AM INDICATIONS: Tube placement. COMPARISON: April 30, 2022 at 10:07 PM FINDINGS/    Impression: Enteric tube tip in the stomach. Electronically signed by:  Dani Root M.D.  4/30/2022 11:53 PM Mountain Time    XR Abdomen KUB    Result Date: 4/30/2022  EXAMINATION: XR ABDOMEN KUB DATE: 4/30/2022 10:20 PM INDICATION:  NG tube placement; COMPARISON:  CT abdomen and pelvis earlier today FINDINGS: NG tube tip in the proximal stomach, proximal sidehole in the distal esophagus. Dilated bowel loops in the upper abdomen. Median sternotomy and cardiac valve replacement.     Impression: 1.  Recommend 5 cm advancement of esophogastric tube for optimal position. Electronically signed by:  Kana Erwin M.D.  4/30/2022 9:21 PM Mountain Time      Results for orders placed during the hospital encounter of 01/15/21    Transthoracic Echo Complete With Contrast if Necessary Per Protocol    Interpretation Summary  · The left atrium is dilated.  · There is mild concentric left ventricular hypertrophy.  · Global and segmental LV systolic  function is normal. The calculated left ventricular ejection fraction is 53%.  · There is a mechanical aortic valve in the aortic position. Function appears to be normal.  · There is mitral annular calcification. Mild mitral regurgitation is seen. There appears to be some degree of chordal thickening with a 4.5 mmHg mean diastolic gradient across the mitral valve apparatus.  · Mild to moderate tricuspid regurgitation is noted. The estimated RV systolic pressure is elevated at 56 mmHg.  · No other important findings are noted on the study.      I have reviewed the medications:  Scheduled Meds:famotidine, 20 mg, Intravenous, Q12H  insulin lispro, 0-7 Units, Subcutaneous, TID AC  sodium chloride, 10 mL, Intravenous, Q12H      Continuous Infusions:heparin, 15 Units/kg/hr, Last Rate: 15 Units/kg/hr (05/02/22 1125)  lactated ringers, 125 mL/hr, Last Rate: 125 mL/hr (05/02/22 0608)  Pharmacy to Dose Heparin,       PRN Meds:.•  dextrose  •  dextrose  •  glucagon (human recombinant)  •  [DISCONTINUED] Morphine **AND** naloxone  •  Pharmacy to Dose Heparin  •  sodium chloride    Assessment/Plan   Assessment & Plan     Active Hospital Problems    Diagnosis  POA   • **SBO (small bowel obstruction) ([facility]) [K56.609]  Yes   • Lactic acidosis [E87.2]  Yes   • Diastolic CHF, chronic ([facility]) [I50.32]  Yes   • Chronic anticoagulation [Z79.01]  Not Applicable   • Discoloration of skin of bilateral legs likely due to minocycline use [L81.9]  Yes   • History Acinetobacter bacteremia on chronic minocycline [Z86.19]  Yes   • PAF on chronic warfarin (CMS/HCC) [I48.0]  Yes   • History of mechanical AVR for aortic stenosis 2009 [Z95.2]  Not Applicable   • Dyslipidemia [E78.5]  Yes   • Essential hypertension [I10]  Yes   • Type 2 diabetes mellitus (HCC) [E11.9]  Yes      Resolved Hospital Problems   No resolved problems to display.        Brief Hospital Course to date:  66 year old male with history of colon cancer, CHF, DM 2, mechanical aortic  valve replacement on chronic Coumadin, HLD, HTN, Acetobacter on chronic minocycline with chronic BLE discoloration secondary to minocycline, who presented to the ED with complaint of 2-day history of abdominal pain, nausea, and vomiting and was found to have concern for small bowel obstruction with a large ventral hernia.    This patient's problems and plans were partially entered by my partner and updated as appropriate by me 05/02/22.     Small bowel obstruction  Ventral hernia  -General surgery following  -Continue NG tube until removed by general surgery     Valvular heart disease with mechanical aortic valve  -Heparin drip while warfarin on hold     Chronic diastolic CHF  -Will need to resume lasix when appropriate     History Acinetobacter bacteremia  -On chronic minocycline, plan to continue when able to take PO  -Chronic lower extremity leg discoloration secondary to minocycline     Diabetes mellitus 2  -A1c 6.9%  -Hold metformin, continue SSI     Hypertension  Hyperlipidemia  -Holding home amlodipine, carvedilol, lisinopril      DVT prophylaxis:  Medical DVT prophylaxis orders are present.       AM-PAC 6 Clicks Score (PT): 22 (05/01/22 2000)    Disposition: I expect the patient to be discharged pending clinical improvement    CODE STATUS:   Code Status and Medical Interventions:   Ordered at: 05/01/22 0018     Code Status (Patient has no pulse and is not breathing):    CPR (Attempt to Resuscitate)     Medical Interventions (Patient has pulse or is breathing):    Full Support       Stephania Morales MD  05/02/22

## 2022-05-02 NOTE — PROGRESS NOTES
HEPARIN INFUSION    Cecilio Farias is a 66 y.o. male receiving heparin infusion.    Therapy for (VTE/Cardiac): Cardiac (home warfarin on hold)  Patient Weight: 129 kg  Initial Bolus (Y/N): No  Any Bolus (Y/N): Yes    Signs or Symptoms of Bleeding: none noted    Cardiac or Other (Not VTE)   Initial Bolus: 60 units/kg (Max 4,000 units)  Initial rate: 12 units/kg/hr (Max 1,000 units/hr)   Anti-Xa (IU/mL) Bolus Dose Stop Infusion Rate Change Repeat Anti-Xa      ?0.19 60 units/kg 0 hrs Increase rate by 4 units/kg/hr 6 hrs       0.2 - 0.29  30 units/kg 0 hrs Increase rate by 2 units/kg/hr 6 hrs    0.3 - 0.7 0 0 hrs No change 6 hrs      0.71 - 0.99 0  0 hrs Decrease rate by 2 units/kg/hr 6 hrs            ?1 0 Hold 1 hr Decrease rate by 3 units/kg/hr 6 hrs      Results from last 7 days   Lab Units 05/02/22  0312 05/01/22  0430 05/01/22  0006 04/30/22 2041   INR  1.75*  --   --  2.30*   HEMOGLOBIN g/dL  --  11.4* 12.2* 13.7   HEMATOCRIT %  --  37.3* 37.9 41.5   PLATELETS 10*3/mm3  --  164 214 219        Date   Time   Anti-Xa Current Rate (Unit/kg/hr) Bolus   (Units) Rate Change   (Unit/kg/hr) New Rate (Unit/kg/hr) Next   Anti-Xa Comments  Pump Check Daily   5/1 0000 0.1 (aPTT 37.5) New --- +8 8 AM Julio Cesar RN; bridging off warfarin (SBO)   5/1 1125 aPTT 40.9 8 -- +2 10 2000 D/w ELIJAH Munoz   5/1 1940 aPTT 44.1 10 3800 +2 12 0300 D/W Liane NAVA   5/2 0312 aPTT  71.7 12 -- -- 12 0900 Julio Cesar NAVA   5/2 0912 0.1 12 -- +3 15 1800 D/w Robyn NAVA; possible IV issues, will forgo bolus for now                                                                                                                                                                                     Matthew Leiva, PharmD, BCPS  5/2/2022  11:46 EDT

## 2022-05-03 LAB
GLUCOSE BLDC GLUCOMTR-MCNC: 111 MG/DL (ref 70–130)
GLUCOSE BLDC GLUCOMTR-MCNC: 119 MG/DL (ref 70–130)
GLUCOSE BLDC GLUCOMTR-MCNC: 145 MG/DL (ref 70–130)
UFH PPP CHRO-ACNC: 0.39 IU/ML (ref 0.3–0.7)
UFH PPP CHRO-ACNC: 0.59 IU/ML (ref 0.3–0.7)
UFH PPP CHRO-ACNC: 0.61 IU/ML (ref 0.3–0.7)

## 2022-05-03 PROCEDURE — 85520 HEPARIN ASSAY: CPT

## 2022-05-03 PROCEDURE — 82962 GLUCOSE BLOOD TEST: CPT

## 2022-05-03 PROCEDURE — 99232 SBSQ HOSP IP/OBS MODERATE 35: CPT | Performed by: INTERNAL MEDICINE

## 2022-05-03 PROCEDURE — 25010000002 HEPARIN (PORCINE) PER 1000 UNITS

## 2022-05-03 RX ADMIN — Medication 10 ML: at 09:25

## 2022-05-03 RX ADMIN — GABAPENTIN 800 MG: 400 CAPSULE ORAL at 05:53

## 2022-05-03 RX ADMIN — FAMOTIDINE 20 MG: 10 INJECTION INTRAVENOUS at 21:12

## 2022-05-03 RX ADMIN — GABAPENTIN 800 MG: 400 CAPSULE ORAL at 13:08

## 2022-05-03 RX ADMIN — MINOCYCLINE HYDROCHLORIDE 100 MG: 50 CAPSULE ORAL at 10:23

## 2022-05-03 RX ADMIN — MINOCYCLINE HYDROCHLORIDE 100 MG: 50 CAPSULE ORAL at 21:12

## 2022-05-03 RX ADMIN — FAMOTIDINE 20 MG: 10 INJECTION INTRAVENOUS at 09:23

## 2022-05-03 RX ADMIN — HEPARIN SODIUM 17 UNITS/KG/HR: 5000 INJECTION, SOLUTION INTRAVENOUS; SUBCUTANEOUS at 06:30

## 2022-05-03 RX ADMIN — ATORVASTATIN CALCIUM 40 MG: 40 TABLET, FILM COATED ORAL at 21:12

## 2022-05-03 RX ADMIN — GABAPENTIN 800 MG: 400 CAPSULE ORAL at 21:12

## 2022-05-03 RX ADMIN — Medication 10 ML: at 21:12

## 2022-05-03 RX ADMIN — SODIUM CHLORIDE, POTASSIUM CHLORIDE, SODIUM LACTATE AND CALCIUM CHLORIDE 75 ML/HR: 600; 310; 30; 20 INJECTION, SOLUTION INTRAVENOUS at 04:39

## 2022-05-03 NOTE — PROGRESS NOTES
HEPARIN INFUSION    Cecilio Farias is a 66 y.o. male receiving heparin infusion.    Therapy for (VTE/Cardiac): Cardiac (home warfarin on hold)  Patient Weight: 129 kg  Initial Bolus (Y/N): No  Any Bolus (Y/N): Yes    Signs or Symptoms of Bleeding: none noted     Cardiac or Other (Not VTE)   Initial Bolus: 60 units/kg (Max 5,000 units)  Initial rate: 12 units/kg/hr (Max 1,000 units/hr)   Anti Xa Rebolus Infusion Hold time Change infusion Dose (Units/kg/hr) Next Anti Xa or aPTT Level Due   < 0.1 50 Units/kg  (4000 Units Max) None Increase by   3 Units/kg/hr 6 hours   0.1 - 0.19 25 Units/kg  (2000 Units Max) None Increase by   2 Units/kg/hr 6 hours   0.2 - 0.29 0 None Increase by   1 Units/kg/hr 6 hours   0.3 - 0.5 0 None No Change 6 hours (after 2 consecutive levels in range check qAM)   0.51 - 0.6 0 None Decrease by   1 Units/kg/hr 6 hours   0.61 - 0.8 0 30 Minutes Decrease by   2 Units/kg/hr 6 hours   0.81 - 1 0 60 Minutes Decrease by   3 Units/kg/hr 6 hours   >1 0 Hold  After Anti Xa less than 0.5  decrease previous rate by   4 Units/kg/hr  Every 2 hours until Anti Xa less than 0.5 then when infusion restarts in 6 hours     Results from last 7 days   Lab Units 05/02/22  0312 05/01/22  0430 05/01/22  0006 04/30/22  2041   INR  1.75*  --   --  2.30*   HEMOGLOBIN g/dL  --  11.4* 12.2* 13.7   HEMATOCRIT %  --  37.3* 37.9 41.5   PLATELETS 10*3/mm3  --  164 214 219        Date   Time   Anti-Xa Current Rate (Unit/kg/hr) Bolus   (Units) Rate Change   (Unit/kg/hr) New Rate (Unit/kg/hr) Next   Anti-Xa Comments  Pump Check Daily   5/1 0000 0.1 (aPTT 37.5) New --- +8 8 AM Dw RN; bridging off warfarin (SBO)   5/1 1125 aPTT 40.9 8 -- +2 10 2000 D/w ELIJAH Munoz   5/1 1940 aPTT 44.1 10 3800 +2 12 0300 D/W Liane NAVA   5/2 0312 aPTT  71.7 12 -- -- 12 0900 Dw RN   5/2 0912 0.1 12 -- +3 15 1800 D/w Robyn NAVA; possible IV issues, will forgo bolus for now   5/2 1756 0.26 15 3900 +2 17 0200 D/w ELIJAH Carlson   5/3 0159 0.59 17 -- -- 17 1000 Dw  RN   5/3 1031 0.61 17 -- -1 16 2000 D/w Nash Leiva, PharmD, BCPS  5/3/2022  13:00 EDT

## 2022-05-03 NOTE — PLAN OF CARE
Problem: Adult Inpatient Plan of Care  Goal: Plan of Care Review  Outcome: Ongoing, Progressing  Goal: Patient-Specific Goal (Individualized)  Outcome: Ongoing, Progressing  Goal: Absence of Hospital-Acquired Illness or Injury  Outcome: Ongoing, Progressing  Intervention: Identify and Manage Fall Risk  Description: Perform standard risk assessment on admission using a validated tool or comprehensive approach appropriate to the patient; reassess fall risk frequently, with change in status or transfer to another level of care.  Communicate fall injury risk to interprofessional healthcare team.  Determine need for increased observation, equipment and environmental modification, such as low bed, signage and supportive, nonskid footwear.  Adjust safety measures to individual developmental age, stage and identified risk factors.  Reinforce the importance of safety and physical activity with patient and family.  Perform regular intentional rounding to assess need for position change, pain assessment and personal needs, including assistance with toileting.  Recent Flowsheet Documentation  Taken 5/3/2022 0400 by Liane Thompson, RN  Safety Promotion/Fall Prevention:   activity supervised   assistive device/personal items within reach   clutter free environment maintained   fall prevention program maintained   lighting adjusted   mobility aid in reach   room organization consistent   safety round/check completed  Taken 5/3/2022 0200 by Liane Thompson, RN  Safety Promotion/Fall Prevention:   activity supervised   assistive device/personal items within reach   clutter free environment maintained   fall prevention program maintained   lighting adjusted   mobility aid in reach   nonskid shoes/slippers when out of bed   room organization consistent   safety round/check completed  Taken 5/3/2022 0000 by Liane Thompson, RN  Safety Promotion/Fall Prevention:   activity supervised   assistive device/personal items within reach    clutter free environment maintained   fall prevention program maintained   lighting adjusted   mobility aid in reach   nonskid shoes/slippers when out of bed   room organization consistent   safety round/check completed  Taken 5/2/2022 2200 by Liane Thompson RN  Safety Promotion/Fall Prevention:   activity supervised   assistive device/personal items within reach   clutter free environment maintained   fall prevention program maintained   lighting adjusted   mobility aid in reach   nonskid shoes/slippers when out of bed   room organization consistent  Taken 5/2/2022 2000 by Liane Thompson, RN  Safety Promotion/Fall Prevention:   activity supervised   assistive device/personal items within reach   clutter free environment maintained   fall prevention program maintained   lighting adjusted   mobility aid in reach   nonskid shoes/slippers when out of bed   room organization consistent   safety round/check completed  Intervention: Prevent Skin Injury  Description: Perform a screening for skin injury risk, such as pressure or moisture associated skin damage on admission and at regular intervals throughout hospital stay.  Keep all areas of skin (especially folds) clean and dry.  Maintain adequate skin hydration.  Relieve and redistribute pressure and protect bony prominences; implement measures based on patient-specific risk factors.  Match turning and repositioning schedule to clinical condition.  Encourage weight shift frequently; assist with reposition if unable to complete independently.  Float heels off bed; avoid pressure on the Achilles tendon.  Keep skin free from extended contact with medical devices.  Encourage functional activity and mobility, as early as tolerated.  Use aids (e.g., slide boards, mechanical lift) during transfer.  Recent Flowsheet Documentation  Taken 5/3/2022 0400 by Liane Thompson, RN  Body Position: position changed independently  Skin Protection:   adhesive use limited   tubing/devices  free from skin contact   transparent dressing maintained   skin-to-skin areas padded   skin-to-device areas padded  Taken 5/3/2022 0200 by Liane Thompson RN  Body Position: position changed independently  Taken 5/3/2022 0000 by Liane Thompson RN  Body Position: position changed independently  Skin Protection:   adhesive use limited   transparent dressing maintained   skin-to-skin areas padded   skin-to-device areas padded   tubing/devices free from skin contact  Taken 5/2/2022 2200 by Liane Thompson RN  Body Position: position changed independently  Skin Protection:   adhesive use limited   transparent dressing maintained   skin-to-skin areas padded   skin-to-device areas padded   tubing/devices free from skin contact  Taken 5/2/2022 2000 by Liane Thompson RN  Body Position: position changed independently  Skin Protection:   adhesive use limited   tubing/devices free from skin contact   transparent dressing maintained   skin-to-skin areas padded   skin-to-device areas padded  Intervention: Prevent and Manage VTE (Venous Thromboembolism) Risk  Description: Assess for VTE (venous thromboembolism) risk.  Encourage and assist with early ambulation.  Initiate and maintain compression or other therapy, as indicated, based on identified risk in accordance with organizational protocol and provider order.  Encourage both active and passive leg exercises while in bed, if unable to ambulate.  Recent Flowsheet Documentation  Taken 5/3/2022 0400 by Liane Thompson RN  Activity Management: activity adjusted per tolerance  Taken 5/3/2022 0200 by Liane Thompson RN  Activity Management: activity adjusted per tolerance  Taken 5/3/2022 0000 by Liane Thompson RN  Activity Management: activity adjusted per tolerance  Taken 5/2/2022 2200 by Liane Thompson RN  Activity Management: activity adjusted per tolerance  Taken 5/2/2022 2000 by Liane Thompson RN  Activity Management: activity adjusted per tolerance  Intervention: Prevent  Infection  Description: Maintain skin and mucous membrane integrity; promote hand, oral and pulmonary hygiene.  Optimize fluid balance, nutrition, sleep and glycemic control to maximize infection resistance.  Identify potential sources of infection early to prevent or mitigate progression of infection (e.g., wound, lines, devices).  Evaluate ongoing need for invasive devices; remove promptly when no longer indicated.  Recent Flowsheet Documentation  Taken 5/3/2022 0400 by Liane Thompson RN  Infection Prevention:   environmental surveillance performed   equipment surfaces disinfected   hand hygiene promoted   personal protective equipment utilized   rest/sleep promoted  Taken 5/3/2022 0200 by Liane Thompson RN  Infection Prevention:   environmental surveillance performed   equipment surfaces disinfected   hand hygiene promoted   personal protective equipment utilized   rest/sleep promoted  Taken 5/3/2022 0000 by Liane Thompson RN  Infection Prevention:   environmental surveillance performed   equipment surfaces disinfected   hand hygiene promoted   personal protective equipment utilized   rest/sleep promoted  Taken 5/2/2022 2200 by Liane Thompson RN  Infection Prevention:   environmental surveillance performed   equipment surfaces disinfected   hand hygiene promoted   personal protective equipment utilized   rest/sleep promoted  Taken 5/2/2022 2000 by Liane Thompson RN  Infection Prevention:   environmental surveillance performed   equipment surfaces disinfected   hand hygiene promoted   personal protective equipment utilized   rest/sleep promoted  Goal: Optimal Comfort and Wellbeing  Outcome: Ongoing, Progressing  Goal: Readiness for Transition of Care  Outcome: Ongoing, Progressing   Goal Outcome Evaluation:

## 2022-05-03 NOTE — PROGRESS NOTES
[facility] Medicine Services  PROGRESS NOTE    Patient Name: Cecilio Farias  : 1955  MRN: [id number]    Date of Admission: 2022  Primary Care Physician: America Everett DO    Subjective   Subjective     CC: F/U SBO    HPI:  He has passed gas but no stool yet.  Hoping to be able to go home soon.    ROS:  Gen- No fevers, chills  CV- No chest pain, palpitations  Resp- No cough, dyspnea  GI- As above    Objective   Objective     Vital Signs:   Temp:  [97.5 °F (36.4 °C)-98.4 °F (36.9 °C)] 98.2 °F (36.8 °C)  Heart Rate:  [40-73] 57  Resp:  [17-18] 18  BP: (119-145)/(54-95) 119/54     Physical Exam:  Constitutional: No acute distress, awake, alert, sitting up in chair  HENT: NCAT, mucous membranes moist  Respiratory: Clear to auscultation bilaterally, respiratory effort normal   Cardiovascular: Irregular, mechanical valve click  Gastrointestinal: Positive bowel sounds, soft, nontender, nondistended  Musculoskeletal: No bilateral ankle edema  Psychiatric: Appropriate affect, cooperative  Neurologic: Cranial Nerves grossly intact to confrontation, speech clear  Skin: No rashes on exposed surfaces.  LE discoloration from minocycline     Results Reviewed:  LAB RESULTS:      Lab 22  0159 22  1756 22  0912 22  0312 22  1940 22  1125 22  0430 22  0006 22  0006 22  2041   WBC  --   --   --   --   --   --  7.03  --  10.56 10.79   HEMOGLOBIN  --   --   --   --   --   --  11.4*  --  12.2* 13.7   HEMATOCRIT  --   --   --   --   --   --  37.3*  --  37.9 41.5   PLATELETS  --   --   --   --   --   --  164  --  214 219   NEUTROS ABS  --   --   --   --   --   --  5.00  --  8.54* 8.81*   IMMATURE GRANS (ABS)  --   --   --   --   --   --  0.02  --  0.03 0.04   LYMPHS ABS  --   --   --   --   --   --  1.02  --  0.94 0.86   MONOS ABS  --   --   --   --   --   --  0.78  --  0.86 0.89   EOS ABS  --   --   --   --   --   --  0.18  --  0.15  0.14   MCV  --   --   --   --   --   --  92.6  --  87.7 85.9   LACTATE  --   --   --   --   --   --   --   --  1.6 2.2*   PROTIME  --   --   --  20.4*  --   --   --   --   --  25.4*   APTT  --   --   --  71.7 44.1* 40.9*  --   --  37.5*  --    HEPARIN ANTI-XA 0.59 0.26* 0.10* 0.14* 0.10*  --  0.10*   < > 0.10*  --     < > = values in this interval not displayed.         Lab 05/02/22 0312 05/01/22 0430 04/30/22 2041   SODIUM 139 143 139   POTASSIUM 3.7 4.5 4.1   CHLORIDE 108* 112* 102   CO2 24.0 22.0 26.0   ANION GAP 7.0 9.0 11.0   BUN 8 13 13   CREATININE 0.65* 0.73* 0.84   EGFR 104.6 101.0 96.8   GLUCOSE 119* 124* 150*   CALCIUM 7.9* 7.7* 9.1   MAGNESIUM 1.9  --   --    PHOSPHORUS 2.5  --   --    HEMOGLOBIN A1C  --  6.90*  --          Lab 05/02/22 0312 04/30/22 2041   TOTAL PROTEIN 6.0 7.4   ALBUMIN 3.10* 3.90   GLOBULIN 2.9 3.5   ALT (SGPT) 16 24   AST (SGOT) 28 40   BILIRUBIN 1.1 1.7*   ALK PHOS 110 149*   LIPASE  --  37         Lab 05/02/22 0312 04/30/22 2041   TROPONIN T  --  0.030   PROTIME 20.4* 25.4*   INR 1.75* 2.30*                 Brief Urine Lab Results  (Last result in the past 365 days)      Color   Clarity   Blood   Leuk Est   Nitrite   Protein   CREAT   Urine HCG        05/01/22 0006 Yellow   Clear   Negative   Negative   Negative   30 mg/dL (1+)                 Microbiology Results Abnormal     Procedure Component Value - Date/Time    COVID PRE-OP / PRE-PROCEDURE SCREENING ORDER (NO ISOLATION) - Swab, Nasopharynx [[id number]]  (Normal) Collected: 04/30/22 1913    Lab Status: Final result Specimen: Swab from Nasopharynx Updated: 05/01/22 0034    Narrative:      The following orders were created for panel order COVID PRE-OP / PRE-PROCEDURE SCREENING ORDER (NO ISOLATION) - Swab, Nasopharynx.  Procedure                               Abnormality         Status                     ---------                               -----------         ------                     COVID-19 and FLU A/B  PCR...[[id number]]  Normal              Final result                 Please view results for these tests on the individual orders.    COVID-19 and FLU A/B PCR - Swab, Nasopharynx [[id number]]  (Normal) Collected: 04/30/22 6961    Lab Status: Final result Specimen: Swab from Nasopharynx Updated: 05/01/22 0034     COVID19 Not Detected     Influenza A PCR Not Detected     Influenza B PCR Not Detected    Narrative:      Fact sheet for providers: https://www.fda.gov/media/069533/download    Fact sheet for patients: https://www.fda.gov/media/792748/download    Test performed by PCR.          No radiology results from the last 24 hrs    Results for orders placed during the hospital encounter of 01/15/21    Transthoracic Echo Complete With Contrast if Necessary Per Protocol    Interpretation Summary  · The left atrium is dilated.  · There is mild concentric left ventricular hypertrophy.  · Global and segmental LV systolic function is normal. The calculated left ventricular ejection fraction is 53%.  · There is a mechanical aortic valve in the aortic position. Function appears to be normal.  · There is mitral annular calcification. Mild mitral regurgitation is seen. There appears to be some degree of chordal thickening with a 4.5 mmHg mean diastolic gradient across the mitral valve apparatus.  · Mild to moderate tricuspid regurgitation is noted. The estimated RV systolic pressure is elevated at 56 mmHg.  · No other important findings are noted on the study.      I have reviewed the medications:  Scheduled Meds:atorvastatin, 40 mg, Oral, Nightly  famotidine, 20 mg, Intravenous, Q12H  gabapentin, 800 mg, Oral, Q8H  insulin lispro, 0-7 Units, Subcutaneous, TID AC  minocycline, 100 mg, Oral, BID  sodium chloride, 10 mL, Intravenous, Q12H      Continuous Infusions:heparin, 17 Units/kg/hr, Last Rate: 17 Units/kg/hr (05/03/22 0630)  lactated ringers, 75 mL/hr, Last Rate: 75 mL/hr (05/03/22 4536)  Pharmacy to Dose Heparin,       PRN  Meds:.dextrose  •  dextrose  •  glucagon (human recombinant)  •  [DISCONTINUED] Morphine **AND** naloxone  •  Pharmacy to Dose Heparin  •  sodium chloride    Assessment/Plan   Assessment & Plan     Active Hospital Problems    Diagnosis  POA   • **SBO (small bowel obstruction) (HCC) [K56.609]  Yes   • Lactic acidosis [E87.2]  Yes   • Diastolic CHF, chronic (HCC) [I50.32]  Yes   • Chronic anticoagulation [Z79.01]  Not Applicable   • Discoloration of skin of bilateral legs likely due to minocycline use [L81.9]  Yes   • History Acinetobacter bacteremia on chronic minocycline [Z86.19]  Yes   • PAF on chronic warfarin (CMS/HCC) [I48.0]  Yes   • History of mechanical AVR for aortic stenosis 2009 [Z95.2]  Not Applicable   • Dyslipidemia [E78.5]  Yes   • Essential hypertension [I10]  Yes   • Type 2 diabetes mellitus (HCC) [E11.9]  Yes      Resolved Hospital Problems   No resolved problems to display.        Brief Hospital Course to date:  66 year old male with history of colon cancer, CHF, DM 2, mechanical aortic valve replacement on chronic Coumadin, HLD, HTN, Acetobacter on chronic minocycline with chronic BLE discoloration secondary to minocycline, who presented to the ED with complaint of 2-day history of abdominal pain, nausea, and vomiting and was found to have concern for small bowel obstruction with a large ventral hernia.    This patient's problems and plans were partially entered by my partner and updated as appropriate by me 05/03/22.     Small bowel obstruction  Ventral hernia  -General surgery following, NG tube removed and diet advanced yesterday.  Home when tolerating a diet and having bowel movements.  -They are discussing eventual surgery for lysis of adhesions and ventral hernia repair     Valvular heart disease with mechanical aortic valve  -Heparin drip while warfarin on hold  -Will need warfarin with lovenox bridge at discharge     Chronic diastolic CHF  -Will need to resume lasix when  appropriate     History Acinetobacter bacteremia  -Resumed minocycline  -Chronic lower extremity leg discoloration secondary to minocycline     Diabetes mellitus 2  -A1c 6.9%  -Hold metformin, continue SSI     Hypertension  Hyperlipidemia  -Resumed home amlodipine, lisinopril   -Carvedilol held due to bradycardia     DVT prophylaxis:  Medical DVT prophylaxis orders are present.       AM-PAC 6 Clicks Score (PT): 22 (05/02/22 2000)    Disposition: I expect the patient to be discharged as soon as he has a bowel movement    CODE STATUS:   Code Status and Medical Interventions:   Ordered at: 05/01/22 0018     Code Status (Patient has no pulse and is not breathing):    CPR (Attempt to Resuscitate)     Medical Interventions (Patient has pulse or is breathing):    Full Support       Stephania Morales MD  05/03/22

## 2022-05-03 NOTE — PROGRESS NOTES
"General Surgery Daily Progress Note    Subjective:  No new complaints    Objective:  /70 (BP Location: Left arm, Patient Position: Sitting)   Pulse (!) 47   Temp 98.1 °F (36.7 °C) (Oral)   Resp 16   Ht 190.5 cm (75\")   Wt 132 kg (290 lb 3.2 oz)   SpO2 96%   BMI 36.27 kg/m²       General Appearance: Sitting in the chair in no apparent distress  Eyes: Anicteric  Neck: Trachea midline   Cardiovascular:  RRR without murmur nor rub  Lungs:  Bilateral respirations unlabored   Abdomen:  Soft, No peritonitis, soft hernia, incarcerated  Extremities:  No cyanosis or edema   Skin:  No obvious rashes   Neurologic: awake and conversant      Imaging Results (Last 24 Hours)     ** No results found for the last 24 hours. **          CBC:  Results from last 7 days   Lab Units 05/01/22  0430   WBC 10*3/mm3 7.03   HEMOGLOBIN g/dL 11.4*   HEMATOCRIT % 37.3*   PLATELETS 10*3/mm3 164       CMP:  Results from last 7 days   Lab Units 05/02/22  0312   SODIUM mmol/L 139   POTASSIUM mmol/L 3.7   CHLORIDE mmol/L 108*   CO2 mmol/L 24.0   BUN mg/dL 8   CREATININE mg/dL 0.65*   CALCIUM mg/dL 7.9*   BILIRUBIN mg/dL 1.1   ALK PHOS U/L 110   ALT (SGPT) U/L 16   AST (SGOT) U/L 28   GLUCOSE mg/dL 119*         Assessment:  Small bowel obstruction-resolving.  Incisional hernia  Valvular heart disease  Chronic anticoagulation, mechanical aortic valve  Hypertension  Diabetes mellitus    Plan:   Feeling better.  Hungry for more food.  Passing flatus without worsening abdominal pain.  He is \"thinking about repair\".  If he is tolerating a regular diet without worsening abdominal symptoms and having GI function I am okay with discharge.    Ozzie Lester MD - 5/3/2022, 12:11 EDT        "

## 2022-05-04 ENCOUNTER — READMISSION MANAGEMENT (OUTPATIENT)
Dept: CALL CENTER | Facility: HOSPITAL | Age: 67
End: 2022-05-04

## 2022-05-04 VITALS
HEIGHT: 75 IN | HEART RATE: 63 BPM | SYSTOLIC BLOOD PRESSURE: 151 MMHG | OXYGEN SATURATION: 97 % | TEMPERATURE: 97.7 F | DIASTOLIC BLOOD PRESSURE: 69 MMHG | BODY MASS INDEX: 35.93 KG/M2 | RESPIRATION RATE: 16 BRPM | WEIGHT: 289 LBS

## 2022-05-04 LAB
GLUCOSE BLDC GLUCOMTR-MCNC: 121 MG/DL (ref 70–130)
GLUCOSE BLDC GLUCOMTR-MCNC: 133 MG/DL (ref 70–130)
GLUCOSE BLDC GLUCOMTR-MCNC: 145 MG/DL (ref 70–130)
UFH PPP CHRO-ACNC: 0.46 IU/ML (ref 0.3–0.7)

## 2022-05-04 PROCEDURE — 25010000002 HEPARIN (PORCINE) PER 1000 UNITS

## 2022-05-04 PROCEDURE — 99239 HOSP IP/OBS DSCHRG MGMT >30: CPT | Performed by: INTERNAL MEDICINE

## 2022-05-04 PROCEDURE — 97530 THERAPEUTIC ACTIVITIES: CPT

## 2022-05-04 PROCEDURE — 85520 HEPARIN ASSAY: CPT

## 2022-05-04 PROCEDURE — 82962 GLUCOSE BLOOD TEST: CPT

## 2022-05-04 PROCEDURE — 97110 THERAPEUTIC EXERCISES: CPT

## 2022-05-04 PROCEDURE — 97116 GAIT TRAINING THERAPY: CPT

## 2022-05-04 RX ORDER — ENOXAPARIN SODIUM 150 MG/ML
120 INJECTION SUBCUTANEOUS EVERY 12 HOURS SCHEDULED
Qty: 16 ML | Refills: 0 | Status: SHIPPED | OUTPATIENT
Start: 2022-05-04 | End: 2022-05-13 | Stop reason: HOSPADM

## 2022-05-04 RX ORDER — AMLODIPINE BESYLATE 10 MG/1
10 TABLET ORAL EVERY MORNING
Status: DISCONTINUED | OUTPATIENT
Start: 2022-05-04 | End: 2022-05-04 | Stop reason: HOSPADM

## 2022-05-04 RX ORDER — HUMAN INSULIN 100 [USP'U]/ML
15 INJECTION, SUSPENSION SUBCUTANEOUS 2 TIMES DAILY WITH MEALS
Start: 2022-05-04 | End: 2022-08-01

## 2022-05-04 RX ORDER — LISINOPRIL 20 MG/1
20 TABLET ORAL
Status: DISCONTINUED | OUTPATIENT
Start: 2022-05-04 | End: 2022-05-04 | Stop reason: HOSPADM

## 2022-05-04 RX ORDER — FAMOTIDINE 20 MG/1
20 TABLET, FILM COATED ORAL
Status: DISCONTINUED | OUTPATIENT
Start: 2022-05-04 | End: 2022-05-04 | Stop reason: HOSPADM

## 2022-05-04 RX ADMIN — Medication 10 ML: at 08:37

## 2022-05-04 RX ADMIN — FAMOTIDINE 20 MG: 10 INJECTION INTRAVENOUS at 08:36

## 2022-05-04 RX ADMIN — LISINOPRIL 20 MG: 20 TABLET ORAL at 13:08

## 2022-05-04 RX ADMIN — SODIUM CHLORIDE, POTASSIUM CHLORIDE, SODIUM LACTATE AND CALCIUM CHLORIDE 75 ML/HR: 600; 310; 30; 20 INJECTION, SOLUTION INTRAVENOUS at 06:15

## 2022-05-04 RX ADMIN — HEPARIN SODIUM 16 UNITS/KG/HR: 5000 INJECTION, SOLUTION INTRAVENOUS; SUBCUTANEOUS at 13:09

## 2022-05-04 RX ADMIN — MINOCYCLINE HYDROCHLORIDE 100 MG: 50 CAPSULE ORAL at 08:36

## 2022-05-04 RX ADMIN — GABAPENTIN 800 MG: 400 CAPSULE ORAL at 13:08

## 2022-05-04 RX ADMIN — GABAPENTIN 800 MG: 400 CAPSULE ORAL at 06:15

## 2022-05-04 RX ADMIN — AMLODIPINE BESYLATE 10 MG: 10 TABLET ORAL at 13:08

## 2022-05-04 NOTE — DISCHARGE SUMMARY
[facility] Medicine Services  DISCHARGE SUMMARY    Patient Name: Cecilio Farias  : 1955  MRN: [id number]    Date of Admission: 2022  8:49 PM  Date of Discharge:  2022  Primary Care Physician: America Everett DO    Consults     Date and Time Order Name Status Description    2022  3:23 AM Inpatient General Surgery Consult Completed           Hospital Course     Presenting Problem:   SBO (small bowel obstruction) (HCC) [K56.609]    Active Hospital Problems    Diagnosis  POA   • **SBO (small bowel obstruction) (HCC) [K56.609]  Yes   • Lactic acidosis [E87.2]  Yes   • Diastolic CHF, chronic (HCC) [I50.32]  Yes   • Chronic anticoagulation [Z79.01]  Not Applicable   • Discoloration of skin of bilateral legs likely due to minocycline use [L81.9]  Yes   • History Acinetobacter bacteremia on chronic minocycline [Z86.19]  Yes   • PAF on chronic warfarin (CMS/HCC) [I48.0]  Yes   • History of mechanical AVR for aortic stenosis  [Z95.2]  Not Applicable   • Dyslipidemia [E78.5]  Yes   • Essential hypertension [I10]  Yes   • Type 2 diabetes mellitus (HCC) [E11.9]  Yes      Resolved Hospital Problems   No resolved problems to display.          Hospital Course:  Cecilio Farias is a 66 y.o. male with history of colon cancer, CHF, DM 2, mechanical aortic valve replacement on chronic Coumadin, HLD, HTN, Acetobacter on chronic minocycline with chronic BLE discoloration secondary to minocycline, who presented to the ED with complaint of 2-day history of abdominal pain, nausea, and vomiting and was found to have concern for small bowel obstruction with a large ventral hernia.     This patient's problems and plans were partially entered by my partner and updated as appropriate by me 22.     Small bowel obstruction  Ventral hernia  -General surgery following, NG tube removed and diet advanced.  Home when tolerating a diet and having bowel movements.  -They are discussing  eventual surgery for lysis of adhesions and ventral hernia repair  -He finally had a bowel movement this afternoon and was tolerating a diet so will be discharged home to follow up with Dr. Lester outpatient     Valvular heart disease with mechanical aortic valve  -Prescribed lovenox bridge at discharge and he will have his INR checked Friday.     Hypertension  Hyperlipidemia  -Carvedilol held at discharge due to bradycardia      Discharge Follow Up Recommendations for outpatient labs/diagnostics:  F/U PCP 1 week  F/U Dr. Lester 1 month  INR check on Friday    Day of Discharge     HPI:   He is feeling well today.  Tolerated a diet and wants to go home.  Finally had a bowel movement this afternoon.    Review of Systems  Gen- No fevers, chills  GI- As above    Vital Signs:   Temp:  [97.7 °F (36.5 °C)-98.2 °F (36.8 °C)] 97.7 °F (36.5 °C)  Heart Rate:  [53-72] 63  Resp:  [16-18] 16  BP: (127-156)/(62-78) 151/69      Physical Exam:  Constitutional: No acute distress, awake, alert, sitting up in chair  HENT: NCAT, mucous membranes moist  Respiratory: Respiratory effort normal   Cardiovascular: Irregular, mechanical valve click  Gastrointestinal: Soft, nontender, nondistended  Musculoskeletal: No bilateral ankle edema  Psychiatric: Appropriate affect, cooperative  Neurologic: Speech clear and fluent  Skin: No rashes on exposed surfaces    Pertinent  and/or Most Recent Results     LAB RESULTS:      Lab 05/04/22  0341 05/03/22  2107 05/03/22  1031 05/03/22  0159 05/02/22  1756 05/02/22  0912 05/02/22  0312 05/01/22  1940 05/01/22  1125 05/01/22  0430 05/01/22  0006 05/01/22  0006 04/30/22  2041   WBC  --   --   --   --   --   --   --   --   --  7.03  --  10.56 10.79   HEMOGLOBIN  --   --   --   --   --   --   --   --   --  11.4*  --  12.2* 13.7   HEMATOCRIT  --   --   --   --   --   --   --   --   --  37.3*  --  37.9 41.5   PLATELETS  --   --   --   --   --   --   --   --   --  164  --  214 219   NEUTROS ABS  --   --   --    --   --   --   --   --   --  5.00  --  8.54* 8.81*   IMMATURE GRANS (ABS)  --   --   --   --   --   --   --   --   --  0.02  --  0.03 0.04   LYMPHS ABS  --   --   --   --   --   --   --   --   --  1.02  --  0.94 0.86   MONOS ABS  --   --   --   --   --   --   --   --   --  0.78  --  0.86 0.89   EOS ABS  --   --   --   --   --   --   --   --   --  0.18  --  0.15 0.14   MCV  --   --   --   --   --   --   --   --   --  92.6  --  87.7 85.9   LACTATE  --   --   --   --   --   --   --   --   --   --   --  1.6 2.2*   PROTIME  --   --   --   --   --   --  20.4*  --   --   --   --   --  25.4*   APTT  --   --   --   --   --   --  71.7 44.1* 40.9*  --   --  37.5*  --    HEPARIN ANTI-XA 0.46 0.39 0.61 0.59 0.26*   < > 0.14* 0.10*  --  0.10*   < > 0.10*  --     < > = values in this interval not displayed.         Lab 05/02/22 0312 05/01/22 0430 04/30/22 2041   SODIUM 139 143 139   POTASSIUM 3.7 4.5 4.1   CHLORIDE 108* 112* 102   CO2 24.0 22.0 26.0   ANION GAP 7.0 9.0 11.0   BUN 8 13 13   CREATININE 0.65* 0.73* 0.84   EGFR 104.6 101.0 96.8   GLUCOSE 119* 124* 150*   CALCIUM 7.9* 7.7* 9.1   MAGNESIUM 1.9  --   --    PHOSPHORUS 2.5  --   --    HEMOGLOBIN A1C  --  6.90*  --          Lab 05/02/22 0312 04/30/22 2041   TOTAL PROTEIN 6.0 7.4   ALBUMIN 3.10* 3.90   GLOBULIN 2.9 3.5   ALT (SGPT) 16 24   AST (SGOT) 28 40   BILIRUBIN 1.1 1.7*   ALK PHOS 110 149*   LIPASE  --  37         Lab 05/02/22  0312 04/30/22 2041   TROPONIN T  --  0.030   PROTIME 20.4* 25.4*   INR 1.75* 2.30*                 Brief Urine Lab Results  (Last result in the past 365 days)      Color   Clarity   Blood   Leuk Est   Nitrite   Protein   CREAT   Urine HCG        05/01/22 0006 Yellow   Clear   Negative   Negative   Negative   30 mg/dL (1+)               Microbiology Results (last 10 days)     Procedure Component Value - Date/Time    COVID PRE-OP / PRE-PROCEDURE SCREENING ORDER (NO ISOLATION) - Swab, Nasopharynx [[id number]]  (Normal) Collected: 04/30/22  2356    Lab Status: Final result Specimen: Swab from Nasopharynx Updated: 05/01/22 0034    Narrative:      The following orders were created for panel order COVID PRE-OP / PRE-PROCEDURE SCREENING ORDER (NO ISOLATION) - Swab, Nasopharynx.  Procedure                               Abnormality         Status                     ---------                               -----------         ------                     COVID-19 and FLU A/B PCR...[[id number]]  Normal              Final result                 Please view results for these tests on the individual orders.    COVID-19 and FLU A/B PCR - Swab, Nasopharynx [[id number]]  (Normal) Collected: 04/30/22 2356    Lab Status: Final result Specimen: Swab from Nasopharynx Updated: 05/01/22 0034     COVID19 Not Detected     Influenza A PCR Not Detected     Influenza B PCR Not Detected    Narrative:      Fact sheet for providers: https://www.fda.gov/media/375460/download    Fact sheet for patients: https://www.fda.gov/media/420577/download    Test performed by PCR.          CT Abdomen Pelvis With Contrast    Result Date: 4/30/2022  EXAMINATION:  CT ABDOMEN PELVIS W CONTRAST DATE: 4/30/2022 9:31 PM INDICATION: Generalized abdominal pain, vomiting ; COMPARISON: November 17, 2021. PROCEDURE:  CT of the abdomen and pelvis was performed following the intravenous administration of iodinated contrast material.  CT dose lowering techniques were used, to include: automated exposure control, adjustment for patient size, and or use of iterative reconstruction. FINDINGS: Visualized lower chest: Coronary artery atherosclerotic calcifications.  ABDOMEN: Liver and Biliary system:  Liver surface nodularity. No biliary ductal dilation. Gallbladder:  Gallstones.. Spleen:  Normal. Pancreas:  Normal. Adrenal glands:  2.4 cm left adrenal myelolipoma. 1.3 cm right adrenal adenoma.. Kidneys and ureters:  Kidneys enhance symmetrically. No suspicious mass. No hydronephrosis. Aorta/IVC:  Moderate  atherosclerotic calcifications. No aortic aneurysm or dissection.  IVC normal. Lymph nodes:  No lymphadenopathy. Gastrointestinal tract:  Stomach normal.  Fluid-filled dilated small bowel measuring up to 6 cm in diameter with transition at the complex ventral hernia. Distal small bowel decompressed. Appendix normal. No colonic wall thickening or dilation. Posterolateral changes descending colon. Peritoneum/Mesentery: No pneumoperitoneum.  No ascites. Abdominal wall: Again demonstrated is complex ventral hernia containing multiple interconnected outpouchings which contain small bowel and mesentery as well as a small portion of transverse colon. PELVIS: Urinary bladder: Normal. Reproductive organs: No acute abnormality by CT. Lymph Nodes: No pelvic lymphadenopathy. BONES:  Degenerative changes in the spine.      1.  Small bowel obstruction with transition at the complex ventral hernia, similar to November 17, 2021 exam. 2.  Cirrhotic liver morphology. 3.  Other chronic findings as above. Electronically signed by:  Kana Erwin M.D.  4/30/2022 8:38 PM Mountain Time    XR Chest 1 View    Result Date: 4/30/2022  EXAMINATION: XR CHEST 1 VW DATE: 4/30/2022 8:56 PM INDICATION:  Upper abdominal pain triage protocol; COMPARISON:  January 15, 2021 FINDINGS: No focal consolidation, pleural effusion, or pneumothorax. Cardiomediastinal silhouette  unchanged. Status post median sternotomy. No acute osseous abnormality.     1.  No acute cardiopulmonary process. Electronically signed by:  Kana Erwin M.D.  4/30/2022 7:35 PM Mountain Time    XR Abdomen KUB    Result Date: 5/1/2022  EXAMINATION: XR ABDOMEN KUB  DATE: 5/1/2022 1:18 AM INDICATIONS: Tube placement. COMPARISON: April 30, 2022 at 10:07 PM FINDINGS/    Enteric tube tip in the stomach. Electronically signed by:  Dani Root M.D.  4/30/2022 11:53 PM Mountain Time    XR Abdomen KUB    Result Date: 4/30/2022  EXAMINATION: XR ABDOMEN KUB DATE: 4/30/2022 10:20 PM  INDICATION:  NG tube placement; COMPARISON:  CT abdomen and pelvis earlier today FINDINGS: NG tube tip in the proximal stomach, proximal sidehole in the distal esophagus. Dilated bowel loops in the upper abdomen. Median sternotomy and cardiac valve replacement.     1.  Recommend 5 cm advancement of esophogastric tube for optimal position. Electronically signed by:  Kana Erwin M.D.  4/30/2022 9:21 PM Mountain Time              Results for orders placed during the hospital encounter of 01/15/21    Transthoracic Echo Complete With Contrast if Necessary Per Protocol    Interpretation Summary  · The left atrium is dilated.  · There is mild concentric left ventricular hypertrophy.  · Global and segmental LV systolic function is normal. The calculated left ventricular ejection fraction is 53%.  · There is a mechanical aortic valve in the aortic position. Function appears to be normal.  · There is mitral annular calcification. Mild mitral regurgitation is seen. There appears to be some degree of chordal thickening with a 4.5 mmHg mean diastolic gradient across the mitral valve apparatus.  · Mild to moderate tricuspid regurgitation is noted. The estimated RV systolic pressure is elevated at 56 mmHg.  · No other important findings are noted on the study.      Plan for Follow-up of Pending Labs/Results: None pending    Discharge Details        Discharge Medications      New Medications      Instructions Start Date   Enoxaparin Sodium 120 MG/0.8ML solution prefilled syringe syringe  Commonly known as: Lovenox   120 mg, Subcutaneous, Every 12 Hours Scheduled, Or until instructed to discontinue by anticoagulation clinic.  Have your INR checked on Friday.         Continue These Medications      Instructions Start Date   amLODIPine 10 MG tablet  Commonly known as: NORVASC   1 tablet, Oral, Every Morning      aspirin 81 MG EC tablet   81 mg, Oral, Daily      atorvastatin 40 MG tablet  Commonly known as: LIPITOR   40 mg, Oral,  Nightly      furosemide 20 MG tablet  Commonly known as: LASIX   20 mg, Oral, Daily      gabapentin 800 MG tablet  Commonly known as: NEURONTIN   800 mg, Oral, 3 Times Daily      glipizide 10 MG tablet  Commonly known as: GLUCOTROL   10 mg, Oral, 2 Times Daily Before Meals      lisinopril 20 MG tablet  Commonly known as: PRINIVIL,ZESTRIL   20 mg, Oral, 2 Times Daily      minocycline 100 MG capsule  Commonly known as: MINOCIN,DYNACIN   1 capsule, Oral, 2 Times Daily      NovoLIN 70/30 (70-30) 100 UNIT/ML injection  Generic drug: insulin NPH-insulin regular   15 Units, Subcutaneous, 2 Times Daily With Meals      potassium chloride ER 20 MEQ tablet controlled-release ER tablet  Commonly known as: K-TAB   TAKE ONE TABLET BY MOUTH DAILY WITH LASIX      warfarin 5 MG tablet  Commonly known as: COUMADIN   Take three tabs by mouth once on 11/19/21      warfarin 5 MG tablet  Commonly known as: COUMADIN   Take 1.5-2 tablets by mouth once daily or as directed by the anticoagulation clinic         Stop These Medications    carvedilol 25 MG tablet  Commonly known as: COREG            Allergies   Allergen Reactions   • Sulfa Antibiotics    • Amoxicillin Rash     Has tolerated ceftriaxone         Discharge Disposition:  Home or Self Care    Diet:  Hospital:  Diet Order   Procedures   • Diet Regular; Thin; Consistent Carbohydrate, Cardiac, GI Soft, Low Fiber / Low Residue, Low Vitamin K, Low Sodium; No Added Salt            CODE STATUS:    Code Status and Medical Interventions:   Ordered at: 05/01/22 0018     Code Status (Patient has no pulse and is not breathing):    CPR (Attempt to Resuscitate)     Medical Interventions (Patient has pulse or is breathing):    Full Support       Future Appointments   Date Time Provider Department Center   5/10/2022 10:15 AM Arun Jackson MD [facility]       Additional Instructions for the Follow-ups that You Need to Schedule     Discharge Follow-up with PCP   As directed        Currently Documented PCP:    America Everett DO    PCP Phone Number:    [phone]     Follow Up Details: 1 week         Discharge Follow-up with Specified Provider: Dr. Lester; 1 Month   As directed      To: Dr. Lester    Follow Up: 1 Month                     Stephania Morales MD  05/04/22      Time Spent on Discharge:  I spent  31  minutes on this discharge activity which included: face-to-face encounter with the patient, reviewing the data in the system, coordination of the care with the nursing staff as well as consultants, documentation, and entering orders.

## 2022-05-04 NOTE — PLAN OF CARE
Problem: Adult Inpatient Plan of Care  Goal: Absence of Hospital-Acquired Illness or Injury  Intervention: Identify and Manage Fall Risk  Recent Flowsheet Documentation  Taken 5/4/2022 1400 by Willow Tavarez RN  Safety Promotion/Fall Prevention:   activity supervised   assistive device/personal items within reach   clutter free environment maintained   fall prevention program maintained   nonskid shoes/slippers when out of bed   safety round/check completed   toileting scheduled  Taken 5/4/2022 1200 by Willow Tavarez RN  Safety Promotion/Fall Prevention:   activity supervised   assistive device/personal items within reach   clutter free environment maintained   fall prevention program maintained   nonskid shoes/slippers when out of bed   safety round/check completed   toileting scheduled  Taken 5/4/2022 1000 by Willow Tavarez RN  Safety Promotion/Fall Prevention:   activity supervised   assistive device/personal items within reach   clutter free environment maintained   fall prevention program maintained   nonskid shoes/slippers when out of bed   safety round/check completed   toileting scheduled  Taken 5/4/2022 0800 by Willow Tavarez RN  Safety Promotion/Fall Prevention:   activity supervised   assistive device/personal items within reach   clutter free environment maintained   fall prevention program maintained   nonskid shoes/slippers when out of bed   safety round/check completed   toileting scheduled  Intervention: Prevent Skin Injury  Recent Flowsheet Documentation  Taken 5/4/2022 1400 by Willow Tavarez RN  Body Position: position changed independently  Skin Protection:   adhesive use limited   incontinence pads utilized   tubing/devices free from skin contact  Taken 5/4/2022 1200 by Willow Tavarez RN  Body Position: position changed independently  Skin Protection:   adhesive use limited   incontinence pads utilized   tubing/devices free from skin contact  Taken 5/4/2022 1000 by Willow Tavarez RN  Body  Position: position changed independently  Skin Protection:   adhesive use limited   incontinence pads utilized   tubing/devices free from skin contact  Taken 5/4/2022 0800 by Willow Tavarez RN  Body Position: position changed independently  Skin Protection:   adhesive use limited   incontinence pads utilized   tubing/devices free from skin contact  Intervention: Prevent and Manage VTE (Venous Thromboembolism) Risk  Recent Flowsheet Documentation  Taken 5/4/2022 1400 by Willow Tavarez RN  Activity Management:   activity adjusted per tolerance   activity encouraged  Taken 5/4/2022 1200 by Willow Tavarez RN  Activity Management:   activity adjusted per tolerance   activity encouraged  Taken 5/4/2022 1000 by Willow Tavarez RN  Activity Management:   activity adjusted per tolerance   activity encouraged  Taken 5/4/2022 0800 by Willow Tavarez RN  VTE Prevention/Management:   bilateral   dorsiflexion/plantar flexion performed   bleeding risk factor(s) identified  Intervention: Prevent Infection  Recent Flowsheet Documentation  Taken 5/4/2022 1400 by Willow Tavarez RN  Infection Prevention: environmental surveillance performed  Taken 5/4/2022 1200 by Willow Tavarez RN  Infection Prevention: environmental surveillance performed  Taken 5/4/2022 1000 by Willow Tavarez RN  Infection Prevention: environmental surveillance performed  Taken 5/4/2022 0800 by Willow Tavarez RN  Infection Prevention: environmental surveillance performed  Goal: Optimal Comfort and Wellbeing  Intervention: Provide Person-Centered Care  Recent Flowsheet Documentation  Taken 5/4/2022 0800 by Willow Tavarez RN  Trust Relationship/Rapport:   care explained   choices provided   thoughts/feelings acknowledged     Problem: Pain Acute  Goal: Acceptable Pain Control and Functional Ability  Intervention: Prevent or Manage Pain  Recent Flowsheet Documentation  Taken 5/4/2022 1400 by Willow Tavarez RN  Medication Review/Management: medications reviewed  Taken  5/4/2022 1200 by Willow Tavarez, RN  Medication Review/Management: medications reviewed  Taken 5/4/2022 1000 by Willow Tavaerz, RN  Medication Review/Management: medications reviewed  Taken 5/4/2022 0800 by Willow Tavarez, RN  Medication Review/Management: medications reviewed  Intervention: Optimize Psychosocial Wellbeing  Recent Flowsheet Documentation  Taken 5/4/2022 0800 by Willow Tavarez, RN  Diversional Activities: television   Goal Outcome Evaluation:

## 2022-05-04 NOTE — CASE MANAGEMENT/SOCIAL WORK
Continued Stay Note  [facility]     Patient Name: Cecilio Farias  MRN: [id number]  Today's Date: 5/4/2022    Admit Date: 4/30/2022     Discharge Plan     Row Name 05/04/22 1527       Plan    Plan discharge plan    Plan Comments I met with pt in room and grandson regarding discharge plan.  Pt plans to go home with family and denies any discharge needs at this time. CM will cont to follow.    Final Discharge Disposition Code 01 - home or self-care               Discharge Codes    No documentation.               Expected Discharge Date and Time     Expected Discharge Date Expected Discharge Time    May 4, 2022             Kathy Lockhart RN

## 2022-05-04 NOTE — THERAPY TREATMENT NOTE
Patient Name: Cecilio Farias  : 1955    MRN: [id number]                              Today's Date: 2022       Admit Date: 2022    Visit Dx:     ICD-10-CM ICD-9-CM   1. SBO (small bowel obstruction) (HCC)  K56.609 560.9   2. Ventral hernia with bowel obstruction  K43.6 552.20     Patient Active Problem List   Diagnosis   • Essential hypertension   • Dyslipidemia   • Morbid obesity (HCC)   • Type 2 diabetes mellitus (HCC)   • History of mechanical AVR for aortic stenosis    • Sepsis due to Acinetobacter species (HCC)   • Chronic anticoagulation   • Anemia   • Acute diastolic CHF. LVEF 53% (CMS/HCC)   • History Acinetobacter bacteremia on chronic minocycline   • PAF on chronic warfarin (CMS/HCC)   • Discoloration of skin of bilateral legs likely due to minocycline use   • RLL lung nodule. Incidental finding and requires follow-up   • Dyspnea and mild hypoxemia   • Moderate PAH. PA 55/20, wedge 15 mmHg.  Multifactorial (CMS/HCC)   • Bilateral small pleural effusions   • SBO (small bowel obstruction) (HCC)   • Diastolic CHF, chronic (HCC)   • Hyperbilirubinemia   • Lactic acidosis     Past Medical History:   Diagnosis Date   • Cancer (HCC)     colon cancer with operation/ chemotherapy/radiation    • CHF (congestive heart failure) (HCC)     Acute Systolic    • Chronic anticoagulation     coumadin   • Diabetes mellitus (HCC)    • H/O aortic valve replacement    • HLD (hyperlipidemia)    • Hypertension    • Sepsis due to Acinetobacter species (HCC) 2019     Past Surgical History:   Procedure Laterality Date   • AORTIC VALVE REPAIR/REPLACEMENT      25 mm. St. Kevin AVR   • ASCENDING AORTIC ANEURYSM REPAIR     • BACK SURGERY     • CARDIAC CATHETERIZATION N/A 2021    Procedure: RIGHT AND LEFT HEART CATH;  Surgeon: Kurtis Smith MD;  Location: [facility] CATH INVASIVE LOCATION;  Service: Cardiology;  Laterality: N/A;   • COLON SURGERY      Colon cancer with operation   • FOOT SURGERY       bilateral 2/2 wound and trauma       General Information     Row Name 05/04/22 1522          Physical Therapy Time and Intention    Document Type therapy note (daily note)  -DM     Mode of Treatment physical therapy  -DM     Row Name 05/04/22 1522          General Information    Existing Precautions/Restrictions other (see comments)  SBP; NG to wall suct now d/c'd;PAF;h/o [place]an Rep. 2-3-22  -DM           User Key  (r) = Recorded By, (t) = Taken By, (c) = Cosigned By    Initials Name Provider Type    DM Nereida Garcia, PT Physical Therapist               Mobility     Row Name 05/04/22 1522          Bed Mobility    Comment, (Bed Mobility) UIC;son states pt furniture surfs@ home & chair rail-surfed yest w/ gt;pt declines loaner SPC; Tele. elsy replaced;Issued respirex (2250 cc);practiced PLB exer (diffic retaining cues for correct technique; son & PT cont.cued)  -DM     Row Name 05/04/22 1522          Transfers    Comment, (Transfers) Cues for HP, seq  -DM     Row Name 05/04/22 1522          Sit-Stand Transfer    Sit-Stand [place] (Transfers) verbal cues;modified independence  -DM     Assistive Device (Sit-Stand Transfers) --  chair armrests;decl SPC  -DM     Row Name 05/04/22 1522          Gait/Stairs (Locomotion)    Atl[place] Level (Gait) contact guard  2 stand.rests  -DM     Assistive Device (Gait) other (see comments)  Init resting L hand on IV pole; decl. trial w/ SPC;Pt. switched to recip arm swing w/ latter phase of gt trial;port pulse ox: desat 95%, HR 78  -DM     Distance in Feet (Gait) 480  -DM     Deviations/Abnormal Patterns (Gait) bilateral deviations;base of support, wide;jack decreased;stride length decreased  -DM     Bilateral Gait Deviations heel strike decreased  C/O B feet numb; decl. donning shoes;occ gazing @ floor  -DM     Comment, (Gait/Stairs) cues for trunk ext/focusing ahead, PLB;o2 sat recovered to 98%  -DM           User Key  (r) = Recorded By, (t) = Taken By, (c) = Cosigned  By    Initials Name Provider Type    DM Nereida Garcia, PT Physical Therapist               Obj/Interventions     Row Name 05/04/22 1522          Motor Skills    Therapeutic Exercise shoulder;hip;knee;ankle  HEP issued & instructed w/ son present (is a DPT student)  -DM     Row Name 05/04/22 1522          Shoulder (Therapeutic Exercise)    Shoulder (Therapeutic Exercise) AROM (active range of motion)  -DM     Shoulder AROM (Therapeutic Exercise) bilateral;flexion;extension;aBduction;aDduction;sitting;10 repetitions;other (see comments)  Biceps curls; deep inspirations w/ shldr exer  -DM     Row Name 05/04/22 1522          Hip (Therapeutic Exercise)    Hip (Therapeutic Exercise) AROM (active range of motion);AAROM (active assistive range of motion);isometric exercises  -DM     Hip AROM (Therapeutic Exercise) bilateral;flexion;extension;external rotation;internal rotation;sitting;10 repetitions  Modif sit.amrit d/t shanelle.rep. 2/'22  -DM     Hip AAROM (Therapeutic Exercise) bilateral;aBduction;aDduction;sitting;10 repetitions  -DM     Hip Isometrics (Therapeutic Exercise) bilateral;gluteal sets;sitting;10 repetitions  diffic comprehending  -DM     Row Name 05/04/22 1522          Knee (Therapeutic Exercise)    Knee (Therapeutic Exercise) AROM (active range of motion);AAROM (active assistive range of motion)  -DM     Knee AROM (Therapeutic Exercise) bilateral;flexion;extension;SAQ (short arc quad);LAQ (long arc quad);heel slides;sitting;10 repetitions  -DM     Knee AAROM (Therapeutic Exercise) bilateral;flexion;sitting;10 repetitions  Min A w/ flex during h.slides  -DM     Row Name 05/04/22 1522          Ankle (Therapeutic Exercise)    Ankle (Therapeutic Exercise) AROM (active range of motion);AAROM (active assistive range of motion)  -DM     Ankle AROM (Therapeutic Exercise) bilateral;dorsiflexion;plantarflexion;sitting;10 repetitions  -DM     Ankle AAROM (Therapeutic Exercise) bilateral;sitting;10 repetitions;other  (see comments)  AC  -DM     Row Name 05/04/22 1522          Balance    Balance Assessment sitting static balance;sitting dynamic balance;standing static balance;standing dynamic balance  -DM     Static Sitting Balance independent  -DM     Dynamic Sitting Balance independent  -DM     Position, Sitting Balance unsupported;sitting in chair  -DM     Static Standing Balance verbal cues;modified independence  L hand on IV pole  -DM     Dynamic Standing Balance verbal cues;supervision  WS to init sidestepping/backing to recliner  -DM     Position/Device Used, Standing Balance supported  -DM     Balance Interventions sitting;standing;static;dynamic;weight shifting activity  -DM           User Key  (r) = Recorded By, (t) = Taken By, (c) = Cosigned By    Initials Name Provider Type    DM Nereida Garcia, PT Physical Therapist               Goals/Plan    No documentation.                Clinical Impression     Row Name 05/04/22 1522          Pain    Pretreatment Pain Rating 0/10 - no pain  -DM     Posttreatment Pain Rating 0/10 - no pain  -DM     Row Name 05/04/22 1522          Plan of Care Review    Plan of Care Reviewed With patient;son  -DM     Progress improving  -DM     Outcome Evaluation Amb 480 ft w/ CGA ( 2 stand.rests) & init holding IV pole w/ L hand, then recip armswing  w/ latter segment, + performed ther exer per HEP, but diffic w/ processing cues & retaining method for PLB, eliana. & recip armswing. Desat 95% on RA, & elev  w/ gt.  -DM     Row Name 05/04/22 1522          Vital Signs    Pre Systolic BP Rehab 151  -DM     Pre Treatment Diastolic BP 69  -DM     Intra Systolic BP Rehab 153  -DM     Intra Treatment Diastolic BP 74  -DM     Post Systolic BP Rehab 169  -DM     Post Treatment Diastolic BP 86  -DM     Pretreatment Heart Rate (beats/min) 63  -DM     Intratreatment Heart Rate (beats/min) 78  -DM     Posttreatment Heart Rate (beats/min) 70  -DM     Pre SpO2 (%) 99  -DM     O2 Delivery Pre Treatment  room air  -DM     Intra SpO2 (%) 95  -DM     O2 Delivery Intra Treatment room air  -DM     Post SpO2 (%) 98  -DM     O2 Delivery Post Treatment room air  -DM     Pre Patient Position Sitting  -DM     Intra Patient Position Standing  -DM     Post Patient Position Sitting  -DM     Rest Breaks  2  -DM     Row Name 05/04/22 1522          Positioning and Restraints    Pre-Treatment Position sitting in chair/recliner  -DM     Post Treatment Position chair  -DM     In Chair notified nsg;reclined;call light within reach;encouraged to call for assist;with family/caregiver;waffle cushion;legs elevated  -DM           User Key  (r) = Recorded By, (t) = Taken By, (c) = Cosigned By    Initials Name Provider Type    Nereida Can, PT Physical Therapist               Outcome Measures     Row Name 05/04/22 1522 05/04/22 0800       How much help from another person do you currently need...    Turning from your back to your side while in flat bed without using bedrails? 4  -DM 4  -KH    Moving from lying on back to sitting on the side of a flat bed without bedrails? 4  -DM 4  -KH    Moving to and from a bed to a chair (including a wheelchair)? 3  -DM 4  -KH    Standing up from a chair using your arms (e.g., wheelchair, bedside chair)? 4  -DM 4  -KH    Climbing 3-5 steps with a railing? 3  -DM 4  -KH    To walk in hospital room? 4  -DM 4  -KH    AM-PAC 6 Clicks Score (PT) 22  -DM 24  -KH    Highest level of mobility 7 --> Walked 25 feet or more  -DM 8 --> Walked 250 feet or more  -KH    Row Name 05/04/22 1522          Functional Assessment    Outcome Measure Options AM-PAC 6 Clicks Basic Mobility (PT)  -DM           User Key  (r) = Recorded By, (t) = Taken By, (c) = Cosigned By    Initials Name Provider Type    Nereida Can, PT Physical Therapist    Willow Desir, RN Registered Nurse                             Physical Therapy Education                 Title: PT OT SLP Therapies (In Progress)     Topic: Physical  Therapy (In Progress)     Point: Mobility training (In Progress)     Learning Progress Summary           Patient Eager, E,D,H, NR by DM at 5/4/2022 1622    Acceptance, E, VU by ML at 5/1/2022 1524   Family Eager, E,D,H, NR by DM at 5/4/2022 1622    Acceptance, E, VU by ML at 5/1/2022 1524                   Point: Home exercise program (In Progress)     Learning Progress Summary           Patient Eager, E,D,H, NR by DM at 5/4/2022 1622    Acceptance, E, VU by ML at 5/1/2022 1524   Family Eager, E,D,H, NR by DM at 5/4/2022 1622    Acceptance, E, VU by ML at 5/1/2022 1524                   Point: Body mechanics (In Progress)     Learning Progress Summary           Patient Eager, E,D,H, NR by DM at 5/4/2022 1622    Acceptance, E, VU by ML at 5/1/2022 1524   Family Eager, E,D,H, NR by DM at 5/4/2022 1622    Acceptance, E, VU by ML at 5/1/2022 1524                   Point: Precautions (In Progress)     Learning Progress Summary           Patient Eager, E,D,H, NR by DM at 5/4/2022 1622    Acceptance, E, VU by ML at 5/1/2022 1524   Family Eager, E,D,H, NR by DM at 5/4/2022 1622    Acceptance, E, VU by ML at 5/1/2022 1524                               User Key     Initials Effective Dates Name Provider Type Discipline     06/16/21 -  Nereida Garcia, PT Physical Therapist PT     04/22/21 -  Ena Thao Physical Therapist PT              PT Recommendation and Plan     Plan of Care Reviewed With: patient, son  Progress: improving  Outcome Evaluation: Amb 480 ft w/ CGA ( 2 stand.rests) & init holding IV pole w/ L hand, then recip armswing  w/ latter segment, + performed ther exer per HEP, but diffic w/ processing cues & retaining method for PLB, eliana. & recip armswing. Desat 95% on RA, & elev  w/ gt.     Time Calculation:    PT Charges     Row Name 05/04/22 1622             Time Calculation    Start Time 1522  -DM      PT Received On 05/04/22  -DM      PT Goal Re-Cert Due Date 05/11/22  -DM              Time  Calculation- PT    Total Timed Code Minutes- PT 40 minute(s)  -DM              Timed Charges    56425 - PT Therapeutic Exercise Minutes 13  -DM      74161 - Gait Training Minutes  18  -DM      72203 - PT Therapeutic Activity Minutes 9  -DM              Total Minutes    Timed Charges Total Minutes 40  -DM       Total Minutes 40  -DM            User Key  (r) = Recorded By, (t) = Taken By, (c) = Cosigned By    Initials Name Provider Type    DM Nereida Garcia, PT Physical Therapist              Therapy Charges for Today     Code Description Service Date Service Provider Modifiers Qty    78721604509 HC PT THER PROC EA 15 MIN 5/4/2022 Nereida Garcia, PT GP 1    54239843337 HC GAIT TRAINING EA 15 MIN 5/4/2022 Nereida Garcia, PT GP 1    64266781736 HC PT THERAPEUTIC ACT EA 15 MIN 5/4/2022 Nereida Garcia, PT GP 1          PT G-Codes  Outcome Measure Options: AM-PAC 6 Clicks Basic Mobility (PT)  AM-PAC 6 Clicks Score (PT): 22    Nereida Garcia, PT  5/4/2022

## 2022-05-05 NOTE — OUTREACH NOTE
Prep Survey    Flowsheet Row Responses   [facility]? [place]   Is LACE score < 7 ? No   Emergency Room discharge w/ pulse ox? No   Eligibility Readm Mgmt   Discharge diagnosis SBO (small bowel obstruction   Does the patient have one of the following disease processes/diagnoses(primary or secondary)? Other   Does the patient have Home health ordered? No   Is there a DME ordered? No   Prep survey completed? Yes          YOLA HACKETT - Registered Nurse

## 2022-05-06 ENCOUNTER — READMISSION MANAGEMENT (OUTPATIENT)
Dept: CALL CENTER | Facility: HOSPITAL | Age: 67
End: 2022-05-06

## 2022-05-06 NOTE — OUTREACH NOTE
Medical Week 1 Survey    Flowsheet Row Responses   [facility] patient discharged from? [place]   Does the patient have one of the following disease processes/diagnoses(primary or secondary)? Other   Week 1 attempt successful? No   Unsuccessful attempts Attempt 1          CA GIBBONS - Registered Nurse

## 2022-05-09 NOTE — PROGRESS NOTES
[facility] Cardiology  Follow Up Visit  Cecilio Farias  1955    VISIT DATE:  05/10/22    PCP:   America Everett,   [address] [address]          CC:  Morbid obesity (CMS/HCC) and Aortic valve disorders  I35.9]      Problem List:  1. Severe valvular aortic stenosis:  a. Acute systolic CHF, winter 2009.   b. LVEF 20% to 25%.   c. Normal coronary arteries.   d. 25 mm. St. Kevin AVR and repair of ascending aneurysm, March 2009.  i. Paroxysmal atrial fibrillation, resolved.   e. MUGA EF = 64%, September 2010.  f. EF >70%, mechanical aortic valve with normal function (7/5/16).  g. ESTHER, 6/12/2019 - negative for vegetation or endocarditis, EF 56-60%  h. Echo, 1/16/2021: mechanical AV normal function, EF 53%  2. PA hypertension with right heart failure, new Afib January 2021   a. Echo 1/16/21: EF 53%, mechanical aortic valve with normal function, MAC with mild MR, RVSP 56mmHg   b. Right and LHC 1/20/21: mod-severe PAH due to elevated pulmonary vascular resistance; normal forward CI, nonobstructive CAD  c. PFTs 1/21/2021: Severe combined obstruction and restriction. Air trapping.   3. Paroxysmal atrial fibrillation  a. Noted on admission 01/2021, rate controlled and asymptomatic   4. Hypertension.   5. Dyslipidemia.   6. Diabetes mellitus, type 2.   7. Right lower lobe nodule  8. Morbid obesity.   9. Colon cancer with operation/chemotherapy/radiation.   10. Status post operations, remote.  11. Acinetobacter sepsis, 6/2019  a. IV antibiotics followed by ID.  b. Chronic suppressive Rx planned by ID      History of Present Illness:  Cecilio Farias  Is a 66 y.o. male with pertinent cardiac history detailed above.  Patient returning for follow-up.  Previously followed with Dr. Smith.  Patient was admitted earlier this month with a small bowel obstruction.  He has had prior colon cancer resection and has a residual hernia.  He will probably need surgery in the interim future.  He is on  warfarin anticoagulation for his mechanical aortic valve and he said still doing Lovenox bridging.  He goes for a follow-up INR in [place] again tomorrow.  He will stop Lovenox when his INR is greater than 2-1/2.  He denies chest pain dyspnea or wheezing.  Other issue of note is he has chronic black skin from minocycline use and is on this for long-term suppression of the previous Acinetobacter sepsis.  Blood pressures well controlled.  He is in sinus rhythm by exam.      Patient Active Problem List    Diagnosis Date Noted   • Lactic acidosis 04/30/2022   • SBO (small bowel obstruction) ([facility]) 11/17/2021   • Diastolic CHF, chronic ([facility]) 11/17/2021   • Hyperbilirubinemia 11/17/2021   • Dyspnea and mild hypoxemia 01/21/2021   • Moderate PAH. PA 55/20, wedge 15 mmHg.  Multifactorial (CMS/[facility]) 01/21/2021   • Bilateral small pleural effusions 01/21/2021   • RLL lung nodule. Incidental finding and requires follow-up 01/17/2021   • Chronic anticoagulation 01/15/2021   • Anemia 01/15/2021   • Acute diastolic CHF. LVEF 53% (CMS/[facility]) 01/15/2021   • History Acinetobacter bacteremia on chronic minocycline 01/15/2021   • PAF on chronic warfarin (CMS/[facility]) 01/15/2021     Note Last Updated: 1/16/2021     · Atrial fibrillation noted following AVR/ascending aortic aneurysm repair, 2009  · [facility] admission for CHF with atrial fibrillation, 1/15/2021  · [id number] 3 (HTN, DM, CHF)     • Discoloration of skin of bilateral legs likely due to minocycline use 01/15/2021   • Sepsis due to Acinetobacter species ([facility]) 06/13/2019   • History of mechanical AVR for aortic stenosis 2009 06/10/2019     Note Last Updated: 1/16/2021     · Acute systolic CHF with aortic stenosis, 2009  · Status post mechanical AVR (25 mm [place] Kevin tilting-disc) with repair of ascending aorta, 3/2009  · ESTHER (6/12/2019): LVEF 60%.  Mild to moderate LVH.  Normal functioning mechanical aortic valve.  No vegetations.     • Essential hypertension 06/28/2016   •  Dyslipidemia 06/28/2016   • Morbid obesity (HCC) 06/28/2016   • Type 2 diabetes mellitus (HCC) 06/28/2016       Allergies   Allergen Reactions   • Sulfa Antibiotics    • Amoxicillin Rash     Has tolerated ceftriaxone       Social History     Socioeconomic History   • Marital status:    Tobacco Use   • Smoking status: Never Smoker   • Smokeless tobacco: Never Used   Vaping Use   • Vaping Use: Never used   Substance and Sexual Activity   • Alcohol use: No   • Drug use: No   • Sexual activity: Defer       Family History   Problem Relation Age of Onset   • Heart disease Mother    • Hyperlipidemia Mother    • Diabetes Mother    • Leukemia Father    • Cancer Sister    • Kidney failure Brother    • Hepatitis Brother    • Cancer Sister    • Cancer Brother    • Heart disease Brother    • Heart attack Brother        Current Medications:    Current Outpatient Medications:   •  amLODIPine (NORVASC) 10 MG tablet, Take 1 tablet by mouth Every Morning., Disp: , Rfl:   •  aspirin 81 MG EC tablet, Take 81 mg by mouth daily., Disp: , Rfl:   •  atorvastatin (LIPITOR) 40 MG tablet, Take 40 mg by mouth Every Night., Disp: , Rfl:   •  Enoxaparin Sodium (Lovenox) 120 MG/0.8ML solution prefilled syringe syringe, Inject 0.8 mL under the skin into the appropriate area as directed Every 12 (Twelve) Hours for 10 days. Or until instructed to discontinue by anticoagulation clinic.  Have your INR checked on Friday., Disp: 16 mL, Rfl: 0  •  furosemide (LASIX) 20 MG tablet, Take 1 tablet by mouth Daily., Disp: 90 tablet, Rfl: 2  •  gabapentin (NEURONTIN) 800 MG tablet, Take 800 mg by mouth 3 (Three) Times a Day., Disp: , Rfl:   •  glipizide (GLUCOTROL) 10 MG tablet, Take 10 mg by mouth 2 (Two) Times a Day Before Meals., Disp: , Rfl:   •  insulin NPH-insulin regular (NovoLIN 70/30) (70-30) 100 UNIT/ML injection, Inject 15 Units under the skin into the appropriate area as directed 2 (Two) Times a Day With Meals., Disp: , Rfl:   •  lisinopril  "(PRINIVIL,ZESTRIL) 20 MG tablet, Take 20 mg by mouth 2 (two) times a day., Disp: , Rfl:   •  minocycline (MINOCIN,DYNACIN) 100 MG capsule, Take 1 capsule by mouth 2 (Two) Times a Day., Disp: , Rfl:   •  potassium chloride ER (K-TAB) 20 MEQ tablet controlled-release ER tablet, TAKE ONE TABLET BY MOUTH DAILY WITH LASIX, Disp: 90 tablet, Rfl: 2  •  warfarin (COUMADIN) 5 MG tablet, Take three tabs by mouth once on 11/19/21  Indications: Presence of Mechanical Artificial Heart Valve, Disp: , Rfl:   •  warfarin (COUMADIN) 5 MG tablet, Take 1.5-2 tablets by mouth once daily or as directed by the anticoagulation clinic, Disp: 60 tablet, Rfl: 2     Review of Systems   Cardiovascular: Negative for chest pain and irregular heartbeat.   Respiratory: Negative for shortness of breath and wheezing.    Gastrointestinal: Positive for abdominal pain.       Vitals:    05/10/22 1031   BP: 138/44   BP Location: Right arm   Patient Position: Sitting   Pulse: 93   SpO2: 97%   Weight: 127 kg (280 lb)   Height: 190.5 cm (75\")       Physical Exam  Constitutional:       Appearance: Normal appearance.   Cardiovascular:      Rate and Rhythm: Normal rate and regular rhythm.      Comments: Crisp mechanical S2 noted  Pulmonary:      Effort: Pulmonary effort is normal.      Breath sounds: Normal breath sounds.   Abdominal:      Palpations: Abdomen is soft.      Comments: Multiple surgical scars, soft hernias present.   Skin:     Comments: The patient has black skin on the bilateral legs that has been attributed to long-term minocycline use.   Neurological:      General: No focal deficit present.      Mental Status: He is alert. Mental status is at baseline.         Diagnostic Data:  Procedures  No results found for: CHLPL, TRIG, HDL, LDLDIRECT  Lab Results   Component Value Date    GLUCOSE 119 (H) 05/02/2022    BUN 8 05/02/2022    CREATININE 0.65 (L) 05/02/2022     05/02/2022    K 3.7 05/02/2022     (H) 05/02/2022    CO2 24.0 05/02/2022 "     Lab Results   Component Value Date    HGBA1C 6.90 (H) 05/01/2022     Lab Results   Component Value Date    WBC 7.03 05/01/2022    HGB 11.4 (L) 05/01/2022    HCT 37.3 (L) 05/01/2022     05/01/2022       Assessment:   Diagnosis Plan   1. Abnormal PFTs  Ambulatory Referral to Pulmonology       Plan:      1.  Mechanical aortic valve, [place] Kevin  -On warfarin anticoagulation  -Echo 2021, EF 53%, mild mitral regurgitation, mild to moderate tricuspid regurgitation, well-functioning mechanical aortic valve  -Need to see how INRs are doing, he is on a lovenox bridge  -he is getting INR at [place], once back to 2.5 he [place] top lovenox    2.  Nonobstructive CAD  -Heart catheterization in 2021 with no obstructive disease    3.  Hypertension  -amlodipine 10mg, lisinopril 20mg BID  -Off of Coreg due to bradycardia    4.  Paroxysmal A. Fib  -on warfarin AC  -sinus on most recent EKGs    5.  JI  -does not have CPAP  -Previous abnormal PFTs  -Refer to pulmonary for further evaluation    6.  Pulmonary hypertension  -Mean PA pressure 30 with pulmonary capillary wedge pressure 15  -Severe combined obstruction and restriction on PFTs, he has not had any pulmonary follow-up in the past, will refer him there    7.  DM  -followed by his PCP  -atorvastatin 40mg      8.  SBO/hernia  -Will likely require additional abdominal surgery.  He will need to be coordinated to have Lovenox bridging at that time as well.      9.  Legs black from minocycline  -On long-term antibiotics due to prior Acinetobacter sepsis, mechanical aortic valve  -follows with Dr. Kaur    Follow-up 6 months      Arun Jackson MD [facility]

## 2022-05-10 ENCOUNTER — OFFICE VISIT (OUTPATIENT)
Dept: CARDIOLOGY | Facility: CLINIC | Age: 67
End: 2022-05-10

## 2022-05-10 ENCOUNTER — READMISSION MANAGEMENT (OUTPATIENT)
Dept: CALL CENTER | Facility: HOSPITAL | Age: 67
End: 2022-05-10

## 2022-05-10 VITALS
OXYGEN SATURATION: 97 % | WEIGHT: 280 LBS | HEIGHT: 75 IN | SYSTOLIC BLOOD PRESSURE: 138 MMHG | BODY MASS INDEX: 34.82 KG/M2 | DIASTOLIC BLOOD PRESSURE: 44 MMHG | HEART RATE: 93 BPM

## 2022-05-10 DIAGNOSIS — R94.2 ABNORMAL PFTS: Primary | ICD-10-CM

## 2022-05-10 PROCEDURE — 99214 OFFICE O/P EST MOD 30 MIN: CPT | Performed by: INTERNAL MEDICINE

## 2022-05-10 NOTE — OUTREACH NOTE
Medical Week 1 Survey    Flowsheet Row Responses   [facility] patient discharged from? [facility]   Does the patient have one of the following disease processes/diagnoses(primary or secondary)? Other   Week 1 attempt successful? Yes   Call start time 1503   Call end time 1505   Discharge diagnosis SBO (small bowel obstruction   Person spoke with today (if not patient) and relationship patient   Meds reviewed with patient/caregiver? Yes   Is the patient having any side effects they believe may be caused by any medication additions or changes? No   Does the patient have all medications ordered at discharge? Yes   Is the patient taking all medications as directed (includes completed medication regime)? Yes   Does the patient have a primary care provider?  Yes   Does the patient have an appointment with their PCP within 7 days of discharge? Yes   Comments regarding PCP Pt will see PCP tomorrow   Has the patient kept scheduled appointments due by today? Yes   Did the patient receive a copy of their discharge instructions? Yes   Nursing interventions Reviewed instructions with patient   What is the patient's perception of their health status since discharge? Improving   Is the patient/caregiver able to teach back the hierarchy of who to call/visit for symptoms/problems? PCP, Specialist, Home health nurse, Urgent Care, ED, 911 Yes   If the patient is a current smoker, are they able to teach back resources for cessation? Not a smoker   Week 1 call completed? Yes   Wrap up additional comments Pt state he is improving.           CHINO HOOVER - Registered Nurse

## 2022-05-11 ENCOUNTER — APPOINTMENT (OUTPATIENT)
Dept: CT IMAGING | Facility: HOSPITAL | Age: 67
End: 2022-05-11

## 2022-05-11 ENCOUNTER — HOSPITAL ENCOUNTER (INPATIENT)
Facility: HOSPITAL | Age: 67
LOS: 2 days | Discharge: HOME | End: 2022-05-13
Attending: EMERGENCY MEDICINE | Admitting: INTERNAL MEDICINE

## 2022-05-11 ENCOUNTER — APPOINTMENT (OUTPATIENT)
Dept: CARDIOLOGY | Facility: HOSPITAL | Age: 67
End: 2022-05-11

## 2022-05-11 DIAGNOSIS — S30.1XXA HEMATOMA OF RECTUS SHEATH, INITIAL ENCOUNTER: ICD-10-CM

## 2022-05-11 DIAGNOSIS — Z79.01 ANTICOAGULATED: ICD-10-CM

## 2022-05-11 DIAGNOSIS — R10.12 LEFT UPPER QUADRANT ABDOMINAL PAIN: Primary | ICD-10-CM

## 2022-05-11 DIAGNOSIS — K43.6 VENTRAL HERNIA WITH BOWEL OBSTRUCTION: ICD-10-CM

## 2022-05-11 DIAGNOSIS — K56.600 PARTIAL SMALL BOWEL OBSTRUCTION: ICD-10-CM

## 2022-05-11 LAB
ABO GROUP BLD: NORMAL
ALBUMIN SERPL-MCNC: 3.5 G/DL (ref 3.5–5.2)
ALBUMIN/GLOB SERPL: 1.1 G/DL
ALP SERPL-CCNC: 114 U/L (ref 39–117)
ALT SERPL W P-5'-P-CCNC: 18 U/L (ref 1–41)
ANION GAP SERPL CALCULATED.3IONS-SCNC: 10 MMOL/L (ref 5–15)
AST SERPL-CCNC: 33 U/L (ref 1–40)
BASOPHILS # BLD AUTO: 0.04 10*3/MM3 (ref 0–0.2)
BASOPHILS NFR BLD AUTO: 0.7 % (ref 0–1.5)
BH CV ECHO MEAS - AO MAX PG: 13.6 MMHG
BH CV ECHO MEAS - AO MEAN PG: 7.7 MMHG
BH CV ECHO MEAS - AO ROOT DIAM: 3.9 CM
BH CV ECHO MEAS - AO V2 MAX: 184 CM/SEC
BH CV ECHO MEAS - AO V2 VTI: 38.8 CM
BH CV ECHO MEAS - AVA(I,D): 1.87 CM2
BH CV ECHO MEAS - EDV(CUBED): 65 ML
BH CV ECHO MEAS - EDV(MOD-SP2): 89 ML
BH CV ECHO MEAS - EDV(MOD-SP4): 108 ML
BH CV ECHO MEAS - EF(MOD-SP2): 74.7 %
BH CV ECHO MEAS - EF(MOD-SP4): 57.1 %
BH CV ECHO MEAS - ESV(CUBED): 19 ML
BH CV ECHO MEAS - ESV(MOD-SP2): 22.5 ML
BH CV ECHO MEAS - ESV(MOD-SP4): 46.3 ML
BH CV ECHO MEAS - FS: 33.6 %
BH CV ECHO MEAS - IVS/LVPW: 0.78 CM
BH CV ECHO MEAS - IVSD: 1.39 CM
BH CV ECHO MEAS - LA DIMENSION: 4.4 CM
BH CV ECHO MEAS - LAT PEAK E' VEL: 10.7 CM/SEC
BH CV ECHO MEAS - LV MASS(C)D: 255.3 GRAMS
BH CV ECHO MEAS - LV MAX PG: 5.2 MMHG
BH CV ECHO MEAS - LV MEAN PG: 2.7 MMHG
BH CV ECHO MEAS - LV V1 MAX: 113 CM/SEC
BH CV ECHO MEAS - LV V1 VTI: 22.3 CM
BH CV ECHO MEAS - LVIDD: 4 CM
BH CV ECHO MEAS - LVIDS: 2.7 CM
BH CV ECHO MEAS - LVOT AREA: 3.3 CM2
BH CV ECHO MEAS - LVOT DIAM: 2.04 CM
BH CV ECHO MEAS - LVPWD: 1.6 CM
BH CV ECHO MEAS - MED PEAK E' VEL: 4.2 CM/SEC
BH CV ECHO MEAS - MV A MAX VEL: 133.2 CM/SEC
BH CV ECHO MEAS - MV DEC SLOPE: 659 CM/SEC2
BH CV ECHO MEAS - MV DEC TIME: 0.19 MSEC
BH CV ECHO MEAS - MV E MAX VEL: 104 CM/SEC
BH CV ECHO MEAS - MV E/A: 0.78
BH CV ECHO MEAS - MV MEAN PG: 5 MMHG
BH CV ECHO MEAS - MV P1/2T: 61.5 MSEC
BH CV ECHO MEAS - PA ACC TIME: 0.12 SEC
BH CV ECHO MEAS - PA PR(ACCEL): 24.3 MMHG
BH CV ECHO MEAS - PA V2 MAX: 146.6 CM/SEC
BH CV ECHO MEAS - RAP SYSTOLE: 3 MMHG
BH CV ECHO MEAS - RVSP: 38 MMHG
BH CV ECHO MEAS - SV(LVOT): 72.6 ML
BH CV ECHO MEAS - SV(MOD-SP2): 66.5 ML
BH CV ECHO MEAS - SV(MOD-SP4): 61.7 ML
BH CV ECHO MEAS - TAPSE (>1.6): 1.32 CM
BH CV ECHO MEAS - TR MAX PG: 5.3 MMHG
BH CV ECHO MEAS - TR MAX VEL: 114.6 CM/SEC
BH CV ECHO MEASUREMENTS AVERAGE E/E' RATIO: 13.96
BH CV XLRA - RV BASE: 4.1 CM
BH CV XLRA - RV LENGTH: 7.5 CM
BH CV XLRA - RV MID: 3.9 CM
BH CV XLRA - TDI S': 6.5 CM/SEC
BILIRUB SERPL-MCNC: 0.9 MG/DL (ref 0–1.2)
BLD GP AB SCN SERPL QL: NEGATIVE
BUN SERPL-MCNC: 13 MG/DL (ref 8–23)
BUN/CREAT SERPL: 19.1 (ref 7–25)
CALCIUM SPEC-SCNC: 8.5 MG/DL (ref 8.6–10.5)
CHLORIDE SERPL-SCNC: 104 MMOL/L (ref 98–107)
CO2 SERPL-SCNC: 25 MMOL/L (ref 22–29)
CREAT SERPL-MCNC: 0.68 MG/DL (ref 0.76–1.27)
D-LACTATE SERPL-SCNC: 1.3 MMOL/L (ref 0.5–2)
DEPRECATED RDW RBC AUTO: 47.8 FL (ref 37–54)
EGFRCR SERPLBLD CKD-EPI 2021: 103.2 ML/MIN/1.73
EOSINOPHIL # BLD AUTO: 0.17 10*3/MM3 (ref 0–0.4)
EOSINOPHIL NFR BLD AUTO: 2.9 % (ref 0.3–6.2)
ERYTHROCYTE [DISTWIDTH] IN BLOOD BY AUTOMATED COUNT: 14.8 % (ref 12.3–15.4)
GLOBULIN UR ELPH-MCNC: 3.3 GM/DL
GLUCOSE BLDC GLUCOMTR-MCNC: 139 MG/DL (ref 70–130)
GLUCOSE SERPL-MCNC: 166 MG/DL (ref 65–99)
HCT VFR BLD AUTO: 32.1 % (ref 37.5–51)
HCT VFR BLD AUTO: 36.5 % (ref 37.5–51)
HGB BLD-MCNC: 10.8 G/DL (ref 13–17.7)
HGB BLD-MCNC: 11.7 G/DL (ref 13–17.7)
IMM GRANULOCYTES # BLD AUTO: 0.02 10*3/MM3 (ref 0–0.05)
IMM GRANULOCYTES NFR BLD AUTO: 0.3 % (ref 0–0.5)
INR PPP: 1.87 (ref 0.84–1.13)
LEFT ATRIUM VOLUME INDEX: 28.6 ML/M2
LIPASE SERPL-CCNC: 46 U/L (ref 13–60)
LYMPHOCYTES # BLD AUTO: 0.9 10*3/MM3 (ref 0.7–3.1)
LYMPHOCYTES NFR BLD AUTO: 15.3 % (ref 19.6–45.3)
MAXIMAL PREDICTED HEART RATE: 155 BPM
MCH RBC QN AUTO: 28.1 PG (ref 26.6–33)
MCHC RBC AUTO-ENTMCNC: 32.1 G/DL (ref 31.5–35.7)
MCV RBC AUTO: 87.7 FL (ref 79–97)
MONOCYTES # BLD AUTO: 0.65 10*3/MM3 (ref 0.1–0.9)
MONOCYTES NFR BLD AUTO: 11 % (ref 5–12)
NEUTROPHILS NFR BLD AUTO: 4.11 10*3/MM3 (ref 1.7–7)
NEUTROPHILS NFR BLD AUTO: 69.8 % (ref 42.7–76)
NRBC BLD AUTO-RTO: 0 /100 WBC (ref 0–0.2)
PLATELET # BLD AUTO: 183 10*3/MM3 (ref 140–450)
PMV BLD AUTO: 10 FL (ref 6–12)
POTASSIUM SERPL-SCNC: 4.5 MMOL/L (ref 3.5–5.2)
PROT SERPL-MCNC: 6.8 G/DL (ref 6–8.5)
PROTHROMBIN TIME: 21.5 SECONDS (ref 11.4–14.4)
RBC # BLD AUTO: 4.16 10*6/MM3 (ref 4.14–5.8)
RH BLD: POSITIVE
SARS-COV-2 RDRP RESP QL NAA+PROBE: NORMAL
SODIUM SERPL-SCNC: 139 MMOL/L (ref 136–145)
STRESS TARGET HR: 132 BPM
T&S EXPIRATION DATE: NORMAL
WBC NRBC COR # BLD: 5.89 10*3/MM3 (ref 3.4–10.8)

## 2022-05-11 PROCEDURE — 99223 1ST HOSP IP/OBS HIGH 75: CPT | Performed by: INTERNAL MEDICINE

## 2022-05-11 PROCEDURE — 93306 TTE W/DOPPLER COMPLETE: CPT | Performed by: INTERNAL MEDICINE

## 2022-05-11 PROCEDURE — 85610 PROTHROMBIN TIME: CPT | Performed by: PHYSICIAN ASSISTANT

## 2022-05-11 PROCEDURE — 25010000002 MORPHINE PER 10 MG: Performed by: EMERGENCY MEDICINE

## 2022-05-11 PROCEDURE — 25010000002 ONDANSETRON PER 1 MG: Performed by: PHYSICIAN ASSISTANT

## 2022-05-11 PROCEDURE — 25010000002 METHYLNALTREXONE 12 MG/0.6ML SOLUTION: Performed by: SURGERY

## 2022-05-11 PROCEDURE — 86900 BLOOD TYPING SEROLOGIC ABO: CPT | Performed by: INTERNAL MEDICINE

## 2022-05-11 PROCEDURE — 83690 ASSAY OF LIPASE: CPT | Performed by: PHYSICIAN ASSISTANT

## 2022-05-11 PROCEDURE — 80053 COMPREHEN METABOLIC PANEL: CPT | Performed by: PHYSICIAN ASSISTANT

## 2022-05-11 PROCEDURE — 85025 COMPLETE CBC W/AUTO DIFF WBC: CPT | Performed by: PHYSICIAN ASSISTANT

## 2022-05-11 PROCEDURE — 87635 SARS-COV-2 COVID-19 AMP PRB: CPT | Performed by: INTERNAL MEDICINE

## 2022-05-11 PROCEDURE — 86850 RBC ANTIBODY SCREEN: CPT | Performed by: INTERNAL MEDICINE

## 2022-05-11 PROCEDURE — 99284 EMERGENCY DEPT VISIT MOD MDM: CPT

## 2022-05-11 PROCEDURE — 93306 TTE W/DOPPLER COMPLETE: CPT

## 2022-05-11 PROCEDURE — 85018 HEMOGLOBIN: CPT | Performed by: INTERNAL MEDICINE

## 2022-05-11 PROCEDURE — 83605 ASSAY OF LACTIC ACID: CPT | Performed by: PHYSICIAN ASSISTANT

## 2022-05-11 PROCEDURE — 74177 CT ABD & PELVIS W/CONTRAST: CPT

## 2022-05-11 PROCEDURE — 25010000002 IOPAMIDOL 61 % SOLUTION: Performed by: EMERGENCY MEDICINE

## 2022-05-11 PROCEDURE — 86901 BLOOD TYPING SEROLOGIC RH(D): CPT | Performed by: INTERNAL MEDICINE

## 2022-05-11 PROCEDURE — 25010000002 HYDROMORPHONE PER 4 MG: Performed by: INTERNAL MEDICINE

## 2022-05-11 PROCEDURE — 25010000002 HYDROMORPHONE PER 4 MG: Performed by: EMERGENCY MEDICINE

## 2022-05-11 PROCEDURE — 85014 HEMATOCRIT: CPT | Performed by: INTERNAL MEDICINE

## 2022-05-11 PROCEDURE — 82962 GLUCOSE BLOOD TEST: CPT

## 2022-05-11 RX ORDER — SODIUM CHLORIDE 9 MG/ML
75 INJECTION, SOLUTION INTRAVENOUS CONTINUOUS
Status: DISCONTINUED | OUTPATIENT
Start: 2022-05-11 | End: 2022-05-11

## 2022-05-11 RX ORDER — GABAPENTIN 400 MG/1
800 CAPSULE ORAL EVERY 8 HOURS SCHEDULED
Status: DISCONTINUED | OUTPATIENT
Start: 2022-05-11 | End: 2022-05-13 | Stop reason: HOSPADM

## 2022-05-11 RX ORDER — HYDROMORPHONE HYDROCHLORIDE 1 MG/ML
0.5 INJECTION, SOLUTION INTRAMUSCULAR; INTRAVENOUS; SUBCUTANEOUS ONCE
Status: COMPLETED | OUTPATIENT
Start: 2022-05-11 | End: 2022-05-11

## 2022-05-11 RX ORDER — POLYETHYLENE GLYCOL 3350 17 G/17G
17 POWDER, FOR SOLUTION ORAL DAILY
Status: DISCONTINUED | OUTPATIENT
Start: 2022-05-11 | End: 2022-05-13 | Stop reason: HOSPADM

## 2022-05-11 RX ORDER — NALOXONE HCL 0.4 MG/ML
0.4 VIAL (ML) INJECTION
Status: DISCONTINUED | OUTPATIENT
Start: 2022-05-11 | End: 2022-05-13 | Stop reason: HOSPADM

## 2022-05-11 RX ORDER — DEXTROSE MONOHYDRATE 25 G/50ML
25 INJECTION, SOLUTION INTRAVENOUS
Status: DISCONTINUED | OUTPATIENT
Start: 2022-05-11 | End: 2022-05-13 | Stop reason: HOSPADM

## 2022-05-11 RX ORDER — SODIUM CHLORIDE 0.9 % (FLUSH) 0.9 %
10 SYRINGE (ML) INJECTION EVERY 12 HOURS SCHEDULED
Status: DISCONTINUED | OUTPATIENT
Start: 2022-05-11 | End: 2022-05-13 | Stop reason: HOSPADM

## 2022-05-11 RX ORDER — ONDANSETRON 2 MG/ML
4 INJECTION INTRAMUSCULAR; INTRAVENOUS EVERY 6 HOURS PRN
Status: DISCONTINUED | OUTPATIENT
Start: 2022-05-11 | End: 2022-05-13 | Stop reason: HOSPADM

## 2022-05-11 RX ORDER — NICOTINE POLACRILEX 4 MG
15 LOZENGE BUCCAL
Status: DISCONTINUED | OUTPATIENT
Start: 2022-05-11 | End: 2022-05-13 | Stop reason: HOSPADM

## 2022-05-11 RX ORDER — SODIUM CHLORIDE 0.9 % (FLUSH) 0.9 %
10 SYRINGE (ML) INJECTION AS NEEDED
Status: DISCONTINUED | OUTPATIENT
Start: 2022-05-11 | End: 2022-05-13 | Stop reason: HOSPADM

## 2022-05-11 RX ORDER — MORPHINE SULFATE 2 MG/ML
2 INJECTION, SOLUTION INTRAMUSCULAR; INTRAVENOUS ONCE
Status: COMPLETED | OUTPATIENT
Start: 2022-05-11 | End: 2022-05-11

## 2022-05-11 RX ORDER — MINOCYCLINE HYDROCHLORIDE 50 MG/1
100 CAPSULE ORAL 2 TIMES DAILY
Status: DISCONTINUED | OUTPATIENT
Start: 2022-05-11 | End: 2022-05-13 | Stop reason: HOSPADM

## 2022-05-11 RX ORDER — HYDROMORPHONE HYDROCHLORIDE 1 MG/ML
0.5 INJECTION, SOLUTION INTRAMUSCULAR; INTRAVENOUS; SUBCUTANEOUS
Status: DISCONTINUED | OUTPATIENT
Start: 2022-05-11 | End: 2022-05-13 | Stop reason: HOSPADM

## 2022-05-11 RX ORDER — ONDANSETRON 2 MG/ML
4 INJECTION INTRAMUSCULAR; INTRAVENOUS ONCE
Status: COMPLETED | OUTPATIENT
Start: 2022-05-11 | End: 2022-05-11

## 2022-05-11 RX ORDER — OXYCODONE HYDROCHLORIDE 5 MG/1
5 TABLET ORAL EVERY 4 HOURS PRN
Status: DISCONTINUED | OUTPATIENT
Start: 2022-05-11 | End: 2022-05-13 | Stop reason: HOSPADM

## 2022-05-11 RX ORDER — INSULIN LISPRO 100 [IU]/ML
0-9 INJECTION, SOLUTION INTRAVENOUS; SUBCUTANEOUS
Status: DISCONTINUED | OUTPATIENT
Start: 2022-05-11 | End: 2022-05-13 | Stop reason: HOSPADM

## 2022-05-11 RX ORDER — ACETAMINOPHEN 325 MG/1
650 TABLET ORAL EVERY 6 HOURS PRN
Status: DISCONTINUED | OUTPATIENT
Start: 2022-05-11 | End: 2022-05-13 | Stop reason: HOSPADM

## 2022-05-11 RX ADMIN — IOPAMIDOL 99 ML: 612 INJECTION, SOLUTION INTRAVENOUS at 09:28

## 2022-05-11 RX ADMIN — GABAPENTIN 800 MG: 400 CAPSULE ORAL at 21:45

## 2022-05-11 RX ADMIN — HYDROMORPHONE HYDROCHLORIDE 0.5 MG: 1 INJECTION, SOLUTION INTRAMUSCULAR; INTRAVENOUS; SUBCUTANEOUS at 13:19

## 2022-05-11 RX ADMIN — HYDROMORPHONE HYDROCHLORIDE 0.5 MG: 1 INJECTION, SOLUTION INTRAMUSCULAR; INTRAVENOUS; SUBCUTANEOUS at 11:49

## 2022-05-11 RX ADMIN — OXYCODONE 5 MG: 5 TABLET ORAL at 16:42

## 2022-05-11 RX ADMIN — GABAPENTIN 800 MG: 400 CAPSULE ORAL at 16:42

## 2022-05-11 RX ADMIN — HYDROMORPHONE HYDROCHLORIDE 0.5 MG: 1 INJECTION, SOLUTION INTRAMUSCULAR; INTRAVENOUS; SUBCUTANEOUS at 20:04

## 2022-05-11 RX ADMIN — OXYCODONE 5 MG: 5 TABLET ORAL at 21:47

## 2022-05-11 RX ADMIN — POLYETHYLENE GLYCOL 3350 17 G: 17 POWDER, FOR SOLUTION ORAL at 16:42

## 2022-05-11 RX ADMIN — ONDANSETRON 4 MG: 2 INJECTION INTRAMUSCULAR; INTRAVENOUS at 09:09

## 2022-05-11 RX ADMIN — METHYLNALTREXONE BROMIDE 6 MG: 12 INJECTION, SOLUTION SUBCUTANEOUS at 16:43

## 2022-05-11 RX ADMIN — SODIUM CHLORIDE 125 ML/HR: 9 INJECTION, SOLUTION INTRAVENOUS at 09:04

## 2022-05-11 RX ADMIN — SODIUM CHLORIDE 125 ML/HR: 9 INJECTION, SOLUTION INTRAVENOUS at 13:19

## 2022-05-11 RX ADMIN — MORPHINE SULFATE 2 MG: 2 INJECTION, SOLUTION INTRAMUSCULAR; INTRAVENOUS at 09:08

## 2022-05-11 RX ADMIN — MINOCYCLINE HYDROCHLORIDE 100 MG: 50 CAPSULE ORAL at 20:04

## 2022-05-11 RX ADMIN — HYDROMORPHONE HYDROCHLORIDE 0.5 MG: 1 INJECTION, SOLUTION INTRAMUSCULAR; INTRAVENOUS; SUBCUTANEOUS at 17:06

## 2022-05-11 NOTE — CASE MANAGEMENT/SOCIAL WORK
Discharge Planning Assessment  [facility]     Patient Name: Cecilio Farias  MRN: [id number]  Today's Date: 5/11/2022    Admit Date: 5/11/2022     Discharge Needs Assessment     Row Name 05/11/22 1202       Living Environment    People in Home spouse    Name(s) of People in Home Lilibeth Farias Spouse [phone]  [phone]    Current Living Arrangements home    Primary Care Provided by self    Provides Primary Care For no one    Family Caregiver if Needed spouse    Family Caregiver Names Lilibeth Farias Spouse [phone]  [phone]    Quality of Family Relationships involved    Able to Return to Prior Arrangements yes       Resource/Environmental Concerns    Resource/Environmental Concerns none    Transportation Concerns none       Transition Planning    Patient/Family Anticipates Transition to home    Patient/Family Anticipated Services at Transition none    Transportation Anticipated family or friend will provide       Discharge Needs Assessment    Readmission Within the Last 30 Days current reason for admission unrelated to previous admission    Equipment Currently Used at Home none    Concerns to be Addressed discharge planning    Anticipated Changes Related to Illness none    Equipment Needed After Discharge other (see comments)  TBD    Discharge Facility/Level of Care Needs other (see comments)  TBD               Discharge Plan     Row Name 05/11/22 1217       Plan    Plan IDP    Patient/Family in Agreement with Plan yes    Plan Comments Pt was recently d/c’d from our hospital on 5/4/22 back home. Pt lives in [facility] [place] w/ spouse in a house w/ stairs inside. Pt currently uses no DME equipment at home. Pt has medical coverage through [place] [employer] [facility]. Pt uses [facility] pharmacy in [place] for medications. Pts PCP is America Everett DO. Family able to provide transportation at d/c. CM will continue to follow for d/c needs.    Final Discharge Disposition Code 30 - still a patient               Continued Care and Services - Admitted Since 5/11/2022    Coordination has not been started for this encounter.          Demographic Summary     Row Name 05/11/22 1208       General Information    Arrived From home    Referral Source emergency department    Reason for Consult discharge planning    Preferred Language English    General Information Comments Pts PCP is America Everett DO       Contact Information    Contact Information Comments Lilibeth Farias Spouse [phone]               Functional Status     Row Name 05/11/22 1209       Functional Status, IADL    Medications independent    Meal Preparation independent    Housekeeping independent    Laundry independent    Shopping independent       Employment/    Employment Status retired               Psychosocial    No documentation.                Abuse/Neglect    No documentation.                Legal    No documentation.                Substance Abuse    No documentation.                Patient Forms    No documentation.                   KWASI Ruiz

## 2022-05-11 NOTE — H&P
[facility] Medicine Services  HISTORY AND PHYSICAL    Patient Name: Cecilio Farias  : 1955  MRN: [id number]  Primary Care Physician: America Everett DO  Date of admission: 2022      Subjective   Subjective     Chief Complaint:  Abdominal pain    HPI:  Cecilio Farias is a 66 y.o. male with history of colon cancer s/p resection, ventral hernia, diastolic CHF, mechanical aortic valve on warfarin, atrial fibrillation, DM2, history of acinetobacter bacteremia on chronic minocycline with chronic lower extremity discoloration due to minocycline who was recently admitted with SBO.  He did not wish to pursue surgery during last admission but rather wanted to plan for eventual surgery with lysis of adhesions and ventral hernia repair as an outpatient.  SBO resolved and he was discharged with lovenox bridge back to warfarin.  He reports that he began having some left upper abdominal pain a few days ago in the location that he had been giving lovenox injections so he switched to giving the lovenox on the right side.  He developed severe upper abdominal pain yesterday and decreased bowel function so he returned to the ED for evaluation.  He denies vomiting. He was found to have partial SBO and left rectus sheath hematoma with active extravasation, as well as a small right rectus sheath hematoma.  He reports pain improved some with abdominal manipulation/hernia reduction in the ED.  Admission was requested for further evaluation.      Review of Systems   Gen- No fevers, chills  CV- No chest pain, palpitations  Resp- No cough, dyspnea  GI- As above    All other systems reviewed and are negative.     Personal History     Past Medical History:   Diagnosis Date   • Cancer (HCC)     colon cancer with operation/ chemotherapy/radiation    • CHF (congestive heart failure) (HCC)     Acute Systolic    • Chronic anticoagulation     coumadin   • Diabetes mellitus (HCC)    • H/O aortic valve  replacement    • HLD (hyperlipidemia)    • Hypertension    • Sepsis due to Acinetobacter species (HCC) 6/13/2019             Past Surgical History:   Procedure Laterality Date   • AORTIC VALVE REPAIR/REPLACEMENT  2009    25 mm. [place] AVR   • ASCENDING AORTIC ANEURYSM REPAIR  2009   • BACK SURGERY     • CARDIAC CATHETERIZATION N/A 1/20/2021    Procedure: RIGHT AND LEFT HEART CATH;  Surgeon: Kurtis Smith MD;  Location: [facility] CATH INVASIVE LOCATION;  Service: Cardiology;  Laterality: N/A;   • COLON SURGERY      Colon cancer with operation   • FOOT SURGERY      bilateral 2/2 wound and trauma        Family History:  family history includes Cancer in his brother, sister, and sister; Diabetes in his mother; Heart attack in his brother; Heart disease in his brother and mother; Hepatitis in his brother; Hyperlipidemia in his mother; Kidney failure in his brother; Leukemia in his father. Otherwise pertinent FHx was reviewed and unremarkable.     Social History:  reports that he has never smoked. He has never used smokeless tobacco. He reports that he does not drink alcohol and does not use drugs.  Social History     Social History Narrative   • Not on file       Medications:  Available home medication information reviewed.  Medications Prior to Admission   Medication Sig Dispense Refill Last Dose   • amLODIPine (NORVASC) 10 MG tablet Take 1 tablet by mouth Every Morning.      • aspirin 81 MG EC tablet Take 81 mg by mouth daily.      • atorvastatin (LIPITOR) 40 MG tablet Take 40 mg by mouth Every Night.      • Enoxaparin Sodium (Lovenox) 120 MG/0.8ML solution prefilled syringe syringe Inject 0.8 mL under the skin into the appropriate area as directed Every 12 (Twelve) Hours for 10 days. Or until instructed to discontinue by anticoagulation clinic.  Have your INR checked on Friday. 16 mL 0    • furosemide (LASIX) 20 MG tablet Take 1 tablet by mouth Daily. 90 tablet 2    • gabapentin (NEURONTIN) 800 MG tablet Take 800  mg by mouth 3 (Three) Times a Day.      • glipizide (GLUCOTROL) 10 MG tablet Take 10 mg by mouth 2 (Two) Times a Day Before Meals.      • insulin NPH-insulin regular (NovoLIN 70/30) (70-30) 100 UNIT/ML injection Inject 15 Units under the skin into the appropriate area as directed 2 (Two) Times a Day With Meals.      • lisinopril (PRINIVIL,ZESTRIL) 20 MG tablet Take 20 mg by mouth 2 (two) times a day.      • minocycline (MINOCIN,DYNACIN) 100 MG capsule Take 1 capsule by mouth 2 (Two) Times a Day.      • potassium chloride ER (K-TAB) 20 MEQ tablet controlled-release ER tablet TAKE ONE TABLET BY MOUTH DAILY WITH LASIX 90 tablet 2    • warfarin (COUMADIN) 5 MG tablet Take three tabs by mouth once on 11/19/21  Indications: Presence of Mechanical Artificial Heart Valve      • warfarin (COUMADIN) 5 MG tablet Take 1.5-2 tablets by mouth once daily or as directed by the anticoagulation clinic 60 tablet 2        Allergies   Allergen Reactions   • Sulfa Antibiotics    • Amoxicillin Rash     Has tolerated ceftriaxone       Objective   Objective     Vital Signs:   Temp:  [97.5 °F (36.4 °C)-98.1 °F (36.7 °C)] 98.1 °F (36.7 °C)  Heart Rate:  [79-96] 94  Resp:  [18-20] 18  BP: (122-160)/(62-82) 160/67       Physical Exam   Constitutional: Awake, alert, laying in bed  Eyes: Sclerae anicteric, no conjunctival injection  HENT: NCAT, mucous membranes moist  Neck: Supple, trachea midline  Respiratory: Clear to auscultation bilaterally, nonlabored respirations on room air  Cardiovascular: RRR, mechanical valve click  Gastrointestinal: Positive bowel sounds, left upper abdominal wall firmness, reducible ventral hernia, soft and nontender with hernia palpation  Musculoskeletal: No bilateral ankle edema, no clubbing or cyanosis to extremities  Psychiatric: Appropriate affect, cooperative  Neurologic: Speech clear and fluent  Skin: Bilateral LE discoloration    Result Review:  I have personally reviewed the results from the time of this  admission to 5/11/2022 15:28 EDT and agree with these findings:  [x]  Laboratory  []  Microbiology  [x]  Radiology  []  EKG/Telemetry   [x]  Cardiology/Vascular   []  Pathology  [x]  Old records  []  Other:  Most notable findings include:       LAB RESULTS:      Lab 05/11/22  0904   WBC 5.89   HEMOGLOBIN 11.7*   HEMATOCRIT 36.5*   PLATELETS 183   NEUTROS ABS 4.11   IMMATURE GRANS (ABS) 0.02   LYMPHS ABS 0.90   MONOS ABS 0.65   EOS ABS 0.17   MCV 87.7   LACTATE 1.3   PROTIME 21.5*   INR 1.87*         Lab 05/11/22  0904   SODIUM 139   POTASSIUM 4.5   CHLORIDE 104   CO2 25.0   ANION GAP 10.0   BUN 13   CREATININE 0.68*   EGFR 103.2   GLUCOSE 166*   CALCIUM 8.5*         Lab 05/11/22  0904   TOTAL PROTEIN 6.8   ALBUMIN 3.50   GLOBULIN 3.3   ALT (SGPT) 18   AST (SGOT) 33   BILIRUBIN 0.9   ALK PHOS 114   LIPASE 46                     UA    Urinalysis 11/17/21 11/17/21 5/1/22 5/1/22    1651 1651 0006 0006   Squamous Epithelial Cells, UA  0-2  0-2   Specific Gravity, UA 1.043 (A)  1.072 (A)    Ketones, UA Trace (A)  Trace (A)    Blood, UA Negative  Negative    Leukocytes, UA Negative  Negative    Nitrite, UA Negative  Negative    RBC, UA  0-2  0-2   WBC, UA  0-2  0-2   Bacteria, UA  None Seen  None Seen   (A) Abnormal value              Microbiology Results (last 10 days)     ** No results found for the last 240 hours. **          CT Abdomen Pelvis With Contrast    Result Date: 5/11/2022  CT ABDOMEN PELVIS W CONTRAST-  Date of Exam: 5/11/2022 9:20 AM  Indication: abdominal pain, recent bowel obstruction.  Comparison: CT from 04/30/2022  Technique: Contiguous axial CT images were obtained from the lung bases to the pubic symphysis following uneventful administration of 100 mL of Isovue-370. Sagittal and coronal reconstructions were performed. Automated exposure control and iterative reconstruction methods were used.  FINDINGS: Limited views of the lung bases are unremarkable. The liver is normal in appearance. There is  cholelithiasis without evidence of cholecystitis. Common bile duct is normal in size. Portal vasculature is patent. Pancreas is unremarkable. Spleen is normal. Tiny right adrenal nodule is unchanged. There is a fat-containing left adrenal nodule consistent with a myelolipoma thinning of the renal cortices bilaterally. There is a subcentimeter low density lesion within the left kidney, most consistent with a simple cyst. Moderate vascular calcifications are identified. No adenopathy is identified. The bladder is unremarkable. Postsurgical changes in the sigmoid colon remainder of the colon is unremarkable.  There are multiple fat-containing ventral hernias which are redemonstrated. Several contain loops of small bowel, or a small knuckle of small bowel there is a left ventral hernia just to the left of midline best seen on axial image #132 weekends contains a small loop of small bowel. The more proximal small bowel is dilated with an air-fluid level measuring 4.7 cm in diameter. Findings are most consistent with at least a partial small bowel obstruction.  There is gas within the right rectus abdominis muscle as well as a small right rectus abdominis hematoma. There is a moderate left rectus abdominis hematoma measuring 11.2 x 6.8 x 16 cm. There are a few areas of contrast enhancement within the hematoma and there is a fluid fluid level within the hematoma. Findings are consistent with an acute on subacute rectus abdominis hematoma.       Impression:  1. Multiple ventral hernias are redemonstrated, several with loops of bowel. There is a small ventral hernia to the left of midline containing a loop of bowel which does demonstrate at least a partial small bowel obstruction. This is best seen on image #131. The small bowel obstruction seen on prior exam has resolved. 2. There is a large left rectus abdominous hematoma with several foci of enhancement which can be seen with active extravasation. There is also a fluid  fluid level. Findings are consistent with an acute on subacute hematoma. This is likely cysts sequela of subcutaneous anticoagulation injections, recommend clinical correlation. 3. Small right rectus abdominous hematoma.    This report was finalized on 5/11/2022 9:48 AM by Steve Dean MD.        Results for orders placed during the hospital encounter of 01/15/21    Transthoracic Echo Complete With Contrast if Necessary Per Protocol    Interpretation Summary  · The left atrium is dilated.  · There is mild concentric left ventricular hypertrophy.  · Global and segmental LV systolic function is normal. The calculated left ventricular ejection fraction is 53%.  · There is a mechanical aortic valve in the aortic position. Function appears to be normal.  · There is mitral annular calcification. Mild mitral regurgitation is seen. There appears to be some degree of chordal thickening with a 4.5 mmHg mean diastolic gradient across the mitral valve apparatus.  · Mild to moderate tricuspid regurgitation is noted. The estimated RV systolic pressure is elevated at 56 mmHg.  · No other important findings are noted on the study.      Assessment & Plan   Assessment & Plan     Active Hospital Problems    Diagnosis  POA   • **SBO (small bowel obstruction) (HCC) [K56.609]  Yes   • Left upper quadrant abdominal pain [R10.12]  Yes   • Rectus sheath hematoma [S30.1XXA]  Yes   • Diastolic CHF, chronic (HCC) [I50.32]  Yes   • Moderate PAH. PA 55/20, wedge 15 mmHg.  Multifactorial (CMS/HCC) [I27.21]  Yes   • PAF on chronic warfarin (CMS/HCC) [I48.0]  Yes     · Atrial fibrillation noted following AVR/ascending aortic aneurysm repair, 2009  · [facility] admission for CHF with atrial fibrillation, 1/15/2021  · [id number] 3 (HTN, DM, CHF)     • Discoloration of skin of bilateral legs likely due to minocycline use [L81.9]  Yes   • History Acinetobacter bacteremia on chronic minocycline [Z86.19]  Yes   • History of mechanical AVR for aortic  stenosis 2009 [Z95.2]  Not Applicable     · Acute systolic CHF with aortic stenosis, 2009  · Status post mechanical AVR (25 mm [place] tilting-disc) with repair of ascending aorta, 3/2009  · [facility] (6/12/2019): LVEF 60%.  Mild to moderate LVH.  Normal functioning mechanical aortic valve.  No vegetations.     • Type 2 diabetes mellitus (HCC) [E11.9]  Yes   • Morbid obesity (HCC) [E66.01]  Yes   • Essential hypertension [I10]  Yes   • Dyslipidemia [E78.5]  Yes     Partial SBO  Rectus sheath hematoma  -General surgery evaluation today.    -Needs surgery once rectus sheath hematoma improves-may need to be as outpatient unless he decompensates  -Abdominal binder at all times  -Clear liquid diet per surgery  -Pain control  -Hold anticoagulation for ~48 hours at least  -Serial H/H.  If hemoglobin decreasing, will need INR reversal (probably FFP to avoid vitamin K which would make it difficult to reach therapeutic INR in the near future.)  -No injections in abdomen-if discharged, may be able to do without bridging    HTN  HLD  Chronic diastolic CHF  -Hold antihypertensives for now until reliably taking PO, resume as tolerated    Mechanical aortic valve on warfarin  Atrial fibrillation  -Daily INR  -Discussed with his cardiologist, Dr. Jackson.  Will obtain echocardiogram.  Can consult cardiology for input as needed.    History of acinetobacter bacteremia  -Continue minocycline    DM2  -SSI    DVT prophylaxis:  SCDs      CODE STATUS:  He would not want prolonged intubation  Code Status and Medical Interventions:   Ordered at: 05/11/22 1510     Level Of Support Discussed With:    Patient     Code Status (Patient has no pulse and is not breathing):    CPR (Attempt to Resuscitate)     Medical Interventions (Patient has pulse or is breathing):    Full Support         Stephania Morales MD  05/11/22

## 2022-05-11 NOTE — ED PROVIDER NOTES
Subjective   Mr. Farias is a 66-year-old male who presents to the emergency department with left upper abdominal pain and nausea.  He has a remote history of colon cancer treated to resolution with partial colectomy some 23 years ago chronic diastolic heart failure, mechanical aortic valve replacement (on warfarin), PAF, HLD, hypertension and type 2 diabetes.  He was just discharged home from our facility last week after a stay for small bowel obstruction and ventral hernia.  The patient states that his pain is back and is now more in the left upper abdomen.  The patient has had normal bowel movements.  His most recent bowel movement was yesterday.  No bloody stools.  No urinary symptoms.  No fever or chills.          Review of Systems   Constitutional: Negative for chills and fever.   HENT: Negative for sore throat.    Respiratory: Negative for cough and shortness of breath.    Cardiovascular: Negative for chest pain.   Gastrointestinal: Positive for abdominal pain (Left upper) and nausea. Negative for blood in stool, constipation, diarrhea and vomiting.   Genitourinary: Negative for dysuria.   Musculoskeletal: Negative for back pain.   Skin: Negative for pallor and rash.   Allergic/Immunologic: Negative for immunocompromised state.   Neurological: Negative for light-headedness and headaches.   Hematological: Bruises/bleeds easily (On Coumadin).   Psychiatric/Behavioral: Negative.        Past Medical History:   Diagnosis Date   • Cancer (HCC)     colon cancer with operation/ chemotherapy/radiation    • CHF (congestive heart failure) (HCC) 2009    Acute Systolic    • Chronic anticoagulation     coumadin   • Diabetes mellitus (HCC)    • H/O aortic valve replacement    • HLD (hyperlipidemia)    • Hypertension    • Sepsis due to Acinetobacter species ([facility]) 6/13/2019       Allergies   Allergen Reactions   • Sulfa Antibiotics    • Amoxicillin Rash     Has tolerated ceftriaxone       Past Surgical History:   Procedure  Laterality Date   • AORTIC VALVE REPAIR/REPLACEMENT  2009    25 mm. [place] AVR   • ASCENDING AORTIC ANEURYSM REPAIR  2009   • BACK SURGERY     • CARDIAC CATHETERIZATION N/A 1/20/2021    Procedure: RIGHT AND LEFT HEART CATH;  Surgeon: Kurtis Smith MD;  Location: [facility] CATH INVASIVE LOCATION;  Service: Cardiology;  Laterality: N/A;   • COLON SURGERY      Colon cancer with operation   • FOOT SURGERY      bilateral 2/2 wound and trauma        Family History   Problem Relation Age of Onset   • Heart disease Mother    • Hyperlipidemia Mother    • Diabetes Mother    • Leukemia Father    • Cancer Sister    • Kidney failure Brother    • Hepatitis Brother    • Cancer Sister    • Cancer Brother    • Heart disease Brother    • Heart attack Brother        Social History     Socioeconomic History   • Marital status:    Tobacco Use   • Smoking status: Never Smoker   • Smokeless tobacco: Never Used   Vaping Use   • Vaping Use: Never used   Substance and Sexual Activity   • Alcohol use: No   • Drug use: No   • Sexual activity: Defer           Objective   Physical Exam  Constitutional:       General: He is not in acute distress.     Appearance: He is well-developed. He is not toxic-appearing.   HENT:      Head: Normocephalic.      Nose: Nose normal.      Mouth/Throat:      Mouth: Mucous membranes are moist.   Eyes:      General: No scleral icterus.     Conjunctiva/sclera: Conjunctivae normal.      Pupils: Pupils are equal, round, and reactive to light.   Cardiovascular:      Rate and Rhythm: Normal rate and regular rhythm.      Pulses: Normal pulses.      Heart sounds: Normal heart sounds.   Pulmonary:      Effort: Pulmonary effort is normal.      Breath sounds: Normal breath sounds.   Abdominal:      General: Bowel sounds are normal.      Tenderness: There is abdominal tenderness (Moderate left upper quadrant tenderness.). There is no guarding or rebound.      Hernia: A hernia is present. Hernia is present in the  ventral area.   Musculoskeletal:         General: Normal range of motion.      Cervical back: Normal range of motion and neck supple.      Right lower leg: No edema.      Left lower leg: No edema.   Skin:     General: Skin is warm and dry.      Coloration: Skin is not jaundiced or pale.      Findings: No rash.   Neurological:      General: No focal deficit present.      Mental Status: He is alert and oriented to person, place, and time.   Psychiatric:         Mood and Affect: Mood normal.         Procedures       Differential dx includes incarcerated hernia, small bowel obstruction, constipation, kidney stone, UTI, etc.    ED Course      Labs are bland.  White count is 5.89.  Lactate is 1.3.  INR if 1.87.      CT abd/pelvis:  1. Multiple ventral hernias are redemonstrated, several with loops of  bowel. There is a small ventral hernia to the left of midline containing  a loop of bowel which does demonstrate at least a partial small bowel  obstruction. This is best seen on image #131. The small bowel  obstruction seen on prior exam has resolved.  2. There is a large left rectus abdominous hematoma with several foci of  enhancement which can be seen with active extravasation. There is also a  fluid fluid level. Findings are consistent with an acute on subacute  hematoma. This is likely cysts sequela of subcutaneous anticoagulation  injections, recommend clinical correlation.  3. Small right rectus abdominous hematoma.    I spoke with the pt about his workup.  I was able to at least partially reduce a palpable hernia in the left upper abdomen with gently pressure and he got considerable relief from the pain.      I spoke with Dr. Lester, who advised to admit to hospitalist and he would consult.                                               MDM    Final diagnoses:   Left upper quadrant abdominal pain   Partial small bowel obstruction (HCC)   Ventral hernia with bowel obstruction   Anticoagulated       ED Disposition  ED  Disposition     ED Disposition   Decision to Admit    Condition   --    Comment   --             No follow-up provider specified.       Medication List      No changes were made to your prescriptions during this visit.          Gerald Badillo, PA  05/11/22 114

## 2022-05-11 NOTE — CONSULTS
General Surgery Consultation Note    Date of Service: 5/11/2022  Cecilio Farias  [id number]  1955      Referring Provider: Stephania Morales MD    Location of Consult: Hospital floor     Reason for Consultation: Incisional hernia, rectus sheath hematoma, partial small bowel obstruction       History of Present Illness:  I am seeing, Cecilio Farias, in consultation for Stephania Morales MD regarding an incisional hernia with partial small bowel obstruction and a rectus sheath hematoma.  66-year-old gentleman with a history of valvular heart disease (mechanical aortic valve) has presented multiple times to the hospital with symptoms of a bowel obstruction.  He was released with bridging Lovenox and presents now with a large rectus sheath hematoma.  He did have some associated nausea and vomiting though in the emergency room they reduced his hernia with significant improvement in his GI symptoms.  He currently feels much better though still has pain with his rectus sheath hematoma.  He denies any fevers, chills, jaundice, acholic stools, tea colored urine, or dysuria.      Problems Addressed this Visit        Other    Left upper quadrant abdominal pain - Primary      Other Visit Diagnoses     Partial small bowel obstruction (HCC)        Ventral hernia with bowel obstruction        Anticoagulated          Diagnoses       Codes Comments    Left upper quadrant abdominal pain    -  Primary ICD-10-CM: R10.12  ICD-9-CM: 789.02     Partial small bowel obstruction (HCC)     ICD-10-CM: K56.600  ICD-9-CM: 560.9     Ventral hernia with bowel obstruction     ICD-10-CM: K43.6  ICD-9-CM: 552.20     Anticoagulated     ICD-10-CM: Z79.01  ICD-9-CM: V58.61           Past Medical History:   Diagnosis Date   • Cancer (HCC)     colon cancer with operation/ chemotherapy/radiation    • CHF (congestive heart failure) (HCC) 2009    Acute Systolic    • Chronic anticoagulation     coumadin   • Diabetes mellitus (HCC)    • H/O aortic valve  replacement    • HLD (hyperlipidemia)    • Hypertension    • Sepsis due to Acinetobacter species (HCC) 6/13/2019       Past Surgical History:   • AORTIC VALVE REPAIR/REPLACEMENT    25 mm. [place] Kevin AVR   • ASCENDING AORTIC ANEURYSM REPAIR   • BACK SURGERY   • CARDIAC CATHETERIZATION    Procedure: RIGHT AND LEFT HEART CATH;  Surgeon: Kurtis Smith MD;  Location: [facility] CATH INVASIVE LOCATION;  Service: Cardiology;  Laterality: N/A;   • COLON SURGERY    Colon cancer with operation   • FOOT SURGERY    bilateral 2/2 wound and trauma        Allergies   Allergen Reactions   • Sulfa Antibiotics    • Amoxicillin Rash     Has tolerated ceftriaxone       No current facility-administered medications on file prior to encounter.     Current Outpatient Medications on File Prior to Encounter   Medication Sig Dispense Refill   • amLODIPine (NORVASC) 10 MG tablet Take 1 tablet by mouth Every Morning.     • aspirin 81 MG EC tablet Take 81 mg by mouth daily.     • atorvastatin (LIPITOR) 40 MG tablet Take 40 mg by mouth Every Night.     • Enoxaparin Sodium (Lovenox) 120 MG/0.8ML solution prefilled syringe syringe Inject 0.8 mL under the skin into the appropriate area as directed Every 12 (Twelve) Hours for 10 days. Or until instructed to discontinue by anticoagulation clinic.  Have your INR checked on Friday. 16 mL 0   • furosemide (LASIX) 20 MG tablet Take 1 tablet by mouth Daily. 90 tablet 2   • gabapentin (NEURONTIN) 800 MG tablet Take 800 mg by mouth 3 (Three) Times a Day.     • glipizide (GLUCOTROL) 10 MG tablet Take 10 mg by mouth 2 (Two) Times a Day Before Meals.     • insulin NPH-insulin regular (NovoLIN 70/30) (70-30) 100 UNIT/ML injection Inject 15 Units under the skin into the appropriate area as directed 2 (Two) Times a Day With Meals.     • lisinopril (PRINIVIL,ZESTRIL) 20 MG tablet Take 20 mg by mouth 2 (two) times a day.     • minocycline (MINOCIN,DYNACIN) 100 MG capsule Take 1 capsule by mouth 2 (Two) Times a Day.      • potassium chloride ER (K-TAB) 20 MEQ tablet controlled-release ER tablet TAKE ONE TABLET BY MOUTH DAILY WITH LASIX 90 tablet 2   • warfarin (COUMADIN) 5 MG tablet Take three tabs by mouth once on 11/19/21  Indications: Presence of Mechanical Artificial Heart Valve     • warfarin (COUMADIN) 5 MG tablet Take 1.5-2 tablets by mouth once daily or as directed by the anticoagulation clinic 60 tablet 2         Current Facility-Administered Medications:   •  dextrose (D50W) (25 g/50 mL) IV injection 25 g, 25 g, Intravenous, Q15 Min PRN, Stephania Morales MD  •  dextrose (GLUTOSE) oral gel 15 g, 15 g, Oral, Q15 Min PRN, Stephania Morales MD  •  gabapentin (NEURONTIN) capsule 800 mg, 800 mg, Oral, Q8H, Stephania Morales MD  •  glucagon (human recombinant) (GLUCAGEN DIAGNOSTIC) injection 1 mg, 1 mg, Intramuscular, Q15 Min PRN, Stephania Morales MD  •  HYDROmorphone (DILAUDID) injection 0.5 mg, 0.5 mg, Intravenous, Q2H PRN **AND** naloxone (NARCAN) injection 0.4 mg, 0.4 mg, Intravenous, Q5 Min PRN, Stephania Morales MD  •  Insulin Lispro (humaLOG) injection 0-9 Units, 0-9 Units, Subcutaneous, TID AC, Stephania Morales MD  •  minocycline (MINOCIN,DYNACIN) capsule 100 mg, 100 mg, Oral, BID, Stephania Morales MD  •  ondansetron (ZOFRAN) injection 4 mg, 4 mg, Intravenous, Q6H PRN, Stephania Morales MD  •  [COMPLETED] Insert peripheral IV, , , Once **AND** sodium chloride 0.9 % flush 10 mL, 10 mL, Intravenous, PRN, Gerald Badillo PA  •  sodium chloride 0.9 % flush 10 mL, 10 mL, Intravenous, Q12H, Stephania Morales MD  •  sodium chloride 0.9 % flush 10 mL, 10 mL, Intravenous, PRN, Stephania Morales MD  •  sodium chloride 0.9 % infusion, 75 mL/hr, Intravenous, Continuous, Stephania Morales MD, Last Rate: 125 mL/hr at 05/11/22 1319, 125 mL/hr at 05/11/22 1319    Family History   Problem Relation Age of Onset   • Heart disease Mother    • Hyperlipidemia Mother    • Diabetes Mother    • Leukemia Father    • Cancer Sister    • Kidney failure Brother    •  "Hepatitis Brother    • Cancer Sister    • Cancer Brother    • Heart disease Brother    • Heart attack Brother      Social History     Socioeconomic History   • Marital status:    Tobacco Use   • Smoking status: Never Smoker   • Smokeless tobacco: Never Used   Vaping Use   • Vaping Use: Never used   Substance and Sexual Activity   • Alcohol use: No   • Drug use: No   • Sexual activity: Defer       Review of Systems:  Review of Systems   Constitutional: Positive for appetite change. Negative for fatigue.   HENT: Negative for drooling, hearing loss and postnasal drip.    Eyes: Negative for photophobia and visual disturbance.   Respiratory: Negative for cough, chest tightness and stridor.    Cardiovascular: Negative for chest pain and leg swelling.   Gastrointestinal: Positive for abdominal pain, nausea and vomiting.   Endocrine: Negative for polyphagia and polyuria.   Genitourinary: Negative for difficulty urinating, frequency and hematuria.   Musculoskeletal: Negative for back pain and myalgias.   Skin: Negative for pallor and rash.   Allergic/Immunologic: Negative for immunocompromised state.   Neurological: Negative for dizziness, syncope and numbness.   Hematological: Negative for adenopathy.   Psychiatric/Behavioral: Negative for agitation, hallucinations and self-injury.     Otherwise the 12 point review of systems is negative.    /67 (BP Location: Left arm, Patient Position: Lying)   Pulse 94   Temp 98.1 °F (36.7 °C) (Oral)   Resp 18   Ht 190.5 cm (75\")   Wt 127 kg (279 lb 8 oz)   SpO2 99%   BMI 34.94 kg/m²   Body mass index is 34.94 kg/m².    General: Mild distress  HEENT: PER, no icterus, normal sclerae  Cardiac: regular rhythm,  no audible rubs  Pulmonary: bilateral breath sounds, non labored  Abdominal: Firm rectus sheath bilaterally.  Soft hernia.  Reducible.  Neurologic: awake, alert, no obvious focal deficits  Extremities: warm, no edema  Skin: no obvious rashes nor worrisome lesions " seen    CBC  Results from last 7 days   Lab Units 05/11/22  0904   WBC 10*3/mm3 5.89   HEMOGLOBIN g/dL 11.7*   HEMATOCRIT % 36.5*   PLATELETS 10*3/mm3 183       CMP  Results from last 7 days   Lab Units 05/11/22  0904   SODIUM mmol/L 139   POTASSIUM mmol/L 4.5   CHLORIDE mmol/L 104   CO2 mmol/L 25.0   BUN mg/dL 13   CREATININE mg/dL 0.68*   CALCIUM mg/dL 8.5*   BILIRUBIN mg/dL 0.9   ALK PHOS U/L 114   ALT (SGPT) U/L 18   AST (SGOT) U/L 33   GLUCOSE mg/dL 166*       Radiology  Imaging Results (Last 72 Hours)     Procedure Component Value Units Date/Time    CT Abdomen Pelvis With Contrast [[id number]] Collected: 05/11/22 0937     Updated: 05/11/22 0959    Narrative:      CT ABDOMEN PELVIS W CONTRAST-     Date of Exam: 5/11/2022 9:20 AM     Indication: abdominal pain, recent bowel obstruction.     Comparison: CT from 04/30/2022     Technique: Contiguous axial CT images were obtained from the lung bases  to the pubic symphysis following uneventful administration of 100 mL of  Isovue-370. Sagittal and coronal reconstructions were performed.   Automated exposure control and iterative reconstruction methods were  used.     FINDINGS:  Limited views of the lung bases are unremarkable. The liver is normal in  appearance. There is cholelithiasis without evidence of cholecystitis.  Common bile duct is normal in size. Portal vasculature is patent.  Pancreas is unremarkable. Spleen is normal. Tiny right adrenal nodule is  unchanged. There is a fat-containing left adrenal nodule consistent with  a myelolipoma thinning of the renal cortices bilaterally. There is a  subcentimeter low density lesion within the left kidney, most consistent  with a simple cyst. Moderate vascular calcifications are identified. No  adenopathy is identified. The bladder is unremarkable. Postsurgical  changes in the sigmoid colon remainder of the colon is unremarkable.     There are multiple fat-containing ventral hernias which are  redemonstrated. Several  contain loops of small bowel, or a small knuckle  of small bowel there is a left ventral hernia just to the left of  midline best seen on axial image #132 weekends contains a small loop of  small bowel. The more proximal small bowel is dilated with an air-fluid  level measuring 4.7 cm in diameter. Findings are most consistent with at  least a partial small bowel obstruction.     There is gas within the right rectus abdominis muscle as well as a small  right rectus abdominis hematoma. There is a moderate left rectus  abdominis hematoma measuring 11.2 x 6.8 x 16 cm. There are a few areas  of contrast enhancement within the hematoma and there is a fluid fluid  level within the hematoma. Findings are consistent with an acute on  subacute rectus abdominis hematoma.          Impression:         1. Multiple ventral hernias are redemonstrated, several with loops of  bowel. There is a small ventral hernia to the left of midline containing  a loop of bowel which does demonstrate at least a partial small bowel  obstruction. This is best seen on image #131. The small bowel  obstruction seen on prior exam has resolved.  2. There is a large left rectus abdominous hematoma with several foci of  enhancement which can be seen with active extravasation. There is also a  fluid fluid level. Findings are consistent with an acute on subacute  hematoma. This is likely cysts sequela of subcutaneous anticoagulation  injections, recommend clinical correlation.  3. Small right rectus abdominous hematoma.           This report was finalized on 5/11/2022 9:48 AM by Steve Dean MD.               Assessment:  Bilateral rectus sheath hematoma greater on the left.  Partial small bowel obstruction  Incisional hernia  Valvular heart disease  Diabetes mellitus  Chronic congestive heart failure  History of Acinetobacter on chronic minocycline    Plan:  I discussed repair with him during his last hospital stay and he did not wish to proceed at that  time.  Now he has a bilateral rectus sheath hematoma greater on the left.  This will need to be resolved prior to surgical intervention for abdominal wall reconstruction.  He feels much better after hernia reduction.  He needs to wear an abdominal binder at all times.  I am okay with clear liquids at this point.    Ozzie Lester MD  05/11/22  15:33 EDT

## 2022-05-12 ENCOUNTER — READMISSION MANAGEMENT (OUTPATIENT)
Dept: CALL CENTER | Facility: HOSPITAL | Age: 67
End: 2022-05-12

## 2022-05-12 LAB
ALBUMIN SERPL-MCNC: 3.2 G/DL (ref 3.5–5.2)
ALBUMIN/GLOB SERPL: 1.2 G/DL
ALP SERPL-CCNC: 107 U/L (ref 39–117)
ALT SERPL W P-5'-P-CCNC: 15 U/L (ref 1–41)
ANION GAP SERPL CALCULATED.3IONS-SCNC: 7 MMOL/L (ref 5–15)
APTT PPP: 31.7 SECONDS (ref 60–90)
AST SERPL-CCNC: 25 U/L (ref 1–40)
BASOPHILS # BLD AUTO: 0.05 10*3/MM3 (ref 0–0.2)
BASOPHILS NFR BLD AUTO: 0.7 % (ref 0–1.5)
BILIRUB SERPL-MCNC: 1 MG/DL (ref 0–1.2)
BUN SERPL-MCNC: 11 MG/DL (ref 8–23)
BUN/CREAT SERPL: 16.2 (ref 7–25)
CALCIUM SPEC-SCNC: 8.3 MG/DL (ref 8.6–10.5)
CHLORIDE SERPL-SCNC: 103 MMOL/L (ref 98–107)
CO2 SERPL-SCNC: 27 MMOL/L (ref 22–29)
CREAT SERPL-MCNC: 0.68 MG/DL (ref 0.76–1.27)
DEPRECATED RDW RBC AUTO: 48.8 FL (ref 37–54)
EGFRCR SERPLBLD CKD-EPI 2021: 103.2 ML/MIN/1.73
EOSINOPHIL # BLD AUTO: 0.12 10*3/MM3 (ref 0–0.4)
EOSINOPHIL NFR BLD AUTO: 1.7 % (ref 0.3–6.2)
ERYTHROCYTE [DISTWIDTH] IN BLOOD BY AUTOMATED COUNT: 14.6 % (ref 12.3–15.4)
GLOBULIN UR ELPH-MCNC: 2.6 GM/DL
GLUCOSE BLDC GLUCOMTR-MCNC: 168 MG/DL (ref 70–130)
GLUCOSE BLDC GLUCOMTR-MCNC: 220 MG/DL (ref 70–130)
GLUCOSE BLDC GLUCOMTR-MCNC: 324 MG/DL (ref 70–130)
GLUCOSE SERPL-MCNC: 139 MG/DL (ref 65–99)
HCT VFR BLD AUTO: 30.4 % (ref 37.5–51)
HCT VFR BLD AUTO: 30.5 % (ref 37.5–51)
HCT VFR BLD AUTO: 32 % (ref 37.5–51)
HGB BLD-MCNC: 10 G/DL (ref 13–17.7)
HGB BLD-MCNC: 10 G/DL (ref 13–17.7)
HGB BLD-MCNC: 10.1 G/DL (ref 13–17.7)
IMM GRANULOCYTES # BLD AUTO: 0.02 10*3/MM3 (ref 0–0.05)
IMM GRANULOCYTES NFR BLD AUTO: 0.3 % (ref 0–0.5)
INR PPP: 1.9 (ref 0.84–1.13)
LYMPHOCYTES # BLD AUTO: 0.88 10*3/MM3 (ref 0.7–3.1)
LYMPHOCYTES NFR BLD AUTO: 12.7 % (ref 19.6–45.3)
MCH RBC QN AUTO: 28.2 PG (ref 26.6–33)
MCHC RBC AUTO-ENTMCNC: 31.3 G/DL (ref 31.5–35.7)
MCV RBC AUTO: 90.4 FL (ref 79–97)
MONOCYTES # BLD AUTO: 0.81 10*3/MM3 (ref 0.1–0.9)
MONOCYTES NFR BLD AUTO: 11.7 % (ref 5–12)
NEUTROPHILS NFR BLD AUTO: 5.05 10*3/MM3 (ref 1.7–7)
NEUTROPHILS NFR BLD AUTO: 72.9 % (ref 42.7–76)
NRBC BLD AUTO-RTO: 0 /100 WBC (ref 0–0.2)
PLATELET # BLD AUTO: 177 10*3/MM3 (ref 140–450)
PMV BLD AUTO: 10.2 FL (ref 6–12)
POTASSIUM SERPL-SCNC: 4.8 MMOL/L (ref 3.5–5.2)
PROT SERPL-MCNC: 5.8 G/DL (ref 6–8.5)
PROTHROMBIN TIME: 21.8 SECONDS (ref 11.4–14.4)
RBC # BLD AUTO: 3.54 10*6/MM3 (ref 4.14–5.8)
SODIUM SERPL-SCNC: 137 MMOL/L (ref 136–145)
WBC NRBC COR # BLD: 6.93 10*3/MM3 (ref 3.4–10.8)

## 2022-05-12 PROCEDURE — 85025 COMPLETE CBC W/AUTO DIFF WBC: CPT | Performed by: INTERNAL MEDICINE

## 2022-05-12 PROCEDURE — 82962 GLUCOSE BLOOD TEST: CPT

## 2022-05-12 PROCEDURE — 63710000001 INSULIN LISPRO (HUMAN) PER 5 UNITS: Performed by: INTERNAL MEDICINE

## 2022-05-12 PROCEDURE — 99232 SBSQ HOSP IP/OBS MODERATE 35: CPT | Performed by: INTERNAL MEDICINE

## 2022-05-12 PROCEDURE — 85014 HEMATOCRIT: CPT | Performed by: INTERNAL MEDICINE

## 2022-05-12 PROCEDURE — 85018 HEMOGLOBIN: CPT | Performed by: INTERNAL MEDICINE

## 2022-05-12 PROCEDURE — 80053 COMPREHEN METABOLIC PANEL: CPT | Performed by: INTERNAL MEDICINE

## 2022-05-12 PROCEDURE — 85610 PROTHROMBIN TIME: CPT | Performed by: INTERNAL MEDICINE

## 2022-05-12 PROCEDURE — 25010000002 HYDROMORPHONE PER 4 MG: Performed by: INTERNAL MEDICINE

## 2022-05-12 PROCEDURE — 85730 THROMBOPLASTIN TIME PARTIAL: CPT | Performed by: INTERNAL MEDICINE

## 2022-05-12 RX ORDER — HEPARIN SOD,PORCINE/0.9 % NACL 25000/250
1000 INTRAVENOUS SOLUTION INTRAVENOUS
Status: DISCONTINUED | OUTPATIENT
Start: 2022-05-12 | End: 2022-05-12

## 2022-05-12 RX ORDER — SODIUM CHLORIDE, SODIUM LACTATE, POTASSIUM CHLORIDE, CALCIUM CHLORIDE 600; 310; 30; 20 MG/100ML; MG/100ML; MG/100ML; MG/100ML
9 INJECTION, SOLUTION INTRAVENOUS CONTINUOUS
Status: CANCELLED | OUTPATIENT
Start: 2022-05-12

## 2022-05-12 RX ORDER — MIDAZOLAM HYDROCHLORIDE 1 MG/ML
0.5 INJECTION INTRAMUSCULAR; INTRAVENOUS
Status: CANCELLED | OUTPATIENT
Start: 2022-05-12

## 2022-05-12 RX ORDER — SODIUM CHLORIDE 0.9 % (FLUSH) 0.9 %
10 SYRINGE (ML) INJECTION EVERY 12 HOURS SCHEDULED
Status: CANCELLED | OUTPATIENT
Start: 2022-05-12

## 2022-05-12 RX ORDER — HEPARIN SODIUM 1000 [USP'U]/ML
60 INJECTION, SOLUTION INTRAVENOUS; SUBCUTANEOUS AS NEEDED
Status: DISCONTINUED | OUTPATIENT
Start: 2022-05-12 | End: 2022-05-12

## 2022-05-12 RX ORDER — LIDOCAINE HYDROCHLORIDE 10 MG/ML
0.5 INJECTION, SOLUTION EPIDURAL; INFILTRATION; INTRACAUDAL; PERINEURAL ONCE AS NEEDED
Status: CANCELLED | OUTPATIENT
Start: 2022-05-12

## 2022-05-12 RX ORDER — HEPARIN SODIUM 1000 [USP'U]/ML
30 INJECTION, SOLUTION INTRAVENOUS; SUBCUTANEOUS AS NEEDED
Status: DISCONTINUED | OUTPATIENT
Start: 2022-05-12 | End: 2022-05-12

## 2022-05-12 RX ORDER — MIDAZOLAM HYDROCHLORIDE 1 MG/ML
1 INJECTION INTRAMUSCULAR; INTRAVENOUS
Status: CANCELLED | OUTPATIENT
Start: 2022-05-12

## 2022-05-12 RX ORDER — SODIUM CHLORIDE 0.9 % (FLUSH) 0.9 %
10 SYRINGE (ML) INJECTION AS NEEDED
Status: CANCELLED | OUTPATIENT
Start: 2022-05-12

## 2022-05-12 RX ORDER — FAMOTIDINE 10 MG/ML
20 INJECTION, SOLUTION INTRAVENOUS ONCE
Status: CANCELLED | OUTPATIENT
Start: 2022-05-12 | End: 2022-05-12

## 2022-05-12 RX ORDER — FAMOTIDINE 20 MG/1
20 TABLET, FILM COATED ORAL ONCE
Status: CANCELLED | OUTPATIENT
Start: 2022-05-12 | End: 2022-05-12

## 2022-05-12 RX ADMIN — OXYCODONE 5 MG: 5 TABLET ORAL at 04:00

## 2022-05-12 RX ADMIN — MINOCYCLINE HYDROCHLORIDE 100 MG: 50 CAPSULE ORAL at 08:00

## 2022-05-12 RX ADMIN — HYDROMORPHONE HYDROCHLORIDE 0.5 MG: 1 INJECTION, SOLUTION INTRAMUSCULAR; INTRAVENOUS; SUBCUTANEOUS at 00:08

## 2022-05-12 RX ADMIN — INSULIN LISPRO 2 UNITS: 100 INJECTION, SOLUTION INTRAVENOUS; SUBCUTANEOUS at 08:00

## 2022-05-12 RX ADMIN — INSULIN LISPRO 4 UNITS: 100 INJECTION, SOLUTION INTRAVENOUS; SUBCUTANEOUS at 12:05

## 2022-05-12 RX ADMIN — OXYCODONE 5 MG: 5 TABLET ORAL at 18:00

## 2022-05-12 RX ADMIN — OXYCODONE 5 MG: 5 TABLET ORAL at 22:46

## 2022-05-12 RX ADMIN — POLYETHYLENE GLYCOL 3350 17 G: 17 POWDER, FOR SOLUTION ORAL at 08:00

## 2022-05-12 RX ADMIN — Medication 10 ML: at 21:33

## 2022-05-12 RX ADMIN — GABAPENTIN 800 MG: 400 CAPSULE ORAL at 15:21

## 2022-05-12 RX ADMIN — GABAPENTIN 800 MG: 400 CAPSULE ORAL at 21:33

## 2022-05-12 RX ADMIN — MINOCYCLINE HYDROCHLORIDE 100 MG: 50 CAPSULE ORAL at 21:33

## 2022-05-12 RX ADMIN — INSULIN LISPRO 7 UNITS: 100 INJECTION, SOLUTION INTRAVENOUS; SUBCUTANEOUS at 17:47

## 2022-05-12 RX ADMIN — GABAPENTIN 800 MG: 400 CAPSULE ORAL at 05:39

## 2022-05-12 RX ADMIN — HYDROMORPHONE HYDROCHLORIDE 0.5 MG: 1 INJECTION, SOLUTION INTRAMUSCULAR; INTRAVENOUS; SUBCUTANEOUS at 07:53

## 2022-05-12 RX ADMIN — OXYCODONE 5 MG: 5 TABLET ORAL at 12:05

## 2022-05-12 RX ADMIN — Medication 10 ML: at 08:00

## 2022-05-12 NOTE — PROGRESS NOTES
[facility] Services  PROGRESS NOTE    Patient Name: Cecilio Farias  : 1955  MRN: [id number]    Date of Admission: 2022  Primary Care Physician: America Eevrett DO    Subjective   Subjective     CC:  F/u retroperitoneal hematoma, PSBO    HPI:  Patient resting in bed. Feels pretty well. Mild abdominal pain but doing well with the abdominal binder. Tolerating clear liquids.    ROS:  Gen- No fevers, chills  CV- No chest pain, palpitations  Resp- No cough, dyspnea  GI- No N/V/D,+abd pain    Objective   Objective     Vital Signs:   Temp:  [98.1 °F (36.7 °C)-98.9 °F (37.2 °C)] (P) 98.9 °F (37.2 °C)  Heart Rate:  [] 91  Resp:  [18-20] (P) 19  BP: (112-160)/(62-82) 120/65     Physical Exam:  Constitutional: No acute distress, awake, alert  HENT: NCAT, mucous membranes moist  Respiratory: Clear to auscultation bilaterally, respiratory effort normal   Cardiovascular: RRR, no murmurs, rubs, or gallops  Gastrointestinal:  Soft, mildly tender in bilateral upper quadrants, nondistended abdominal binder in place  Musculoskeletal: No bilateral ankle edema  Psychiatric: Appropriate affect, cooperative  Neurologic: Oriented x 3, strength symmetric in all extremities, Cranial Nerves grossly intact to confrontation, speech clear  Skin: chronic discoloration of the bilateral LE      Results Reviewed:  LAB RESULTS:      Lab 22  0812 22  0458 22  2359 22  1627 22  0904   WBC 6.93  --   --   --  5.89   HEMOGLOBIN 10.0*  --  10.0* 10.8* 11.7*   HEMATOCRIT 32.0*  --  30.5* 32.1* 36.5*   PLATELETS 177  --   --   --  183   NEUTROS ABS 5.05  --   --   --  4.11   IMMATURE GRANS (ABS) 0.02  --   --   --  0.02   LYMPHS ABS 0.88  --   --   --  0.90   MONOS ABS 0.81  --   --   --  0.65   EOS ABS 0.12  --   --   --  0.17   MCV 90.4  --   --   --  87.7   LACTATE  --   --   --   --  1.3   PROTIME  --  21.8*  --   --  21.5*   APTT  --  31.7*  --   --   --           Lab 05/12/22  0458 05/11/22  0904   SODIUM 137 139   POTASSIUM 4.8 4.5   CHLORIDE 103 104   CO2 27.0 25.0   ANION GAP 7.0 10.0   BUN 11 13   CREATININE 0.68* 0.68*   EGFR 103.2 103.2   GLUCOSE 139* 166*   CALCIUM 8.3* 8.5*         Lab 05/12/22  0458 05/11/22  0904   TOTAL PROTEIN 5.8* 6.8   ALBUMIN 3.20* 3.50   GLOBULIN 2.6 3.3   ALT (SGPT) 15 18   AST (SGOT) 25 33   BILIRUBIN 1.0 0.9   ALK PHOS 107 114   LIPASE  --  46         Lab 05/12/22  0458 05/11/22  0904   PROTIME 21.8* 21.5*   INR 1.90* 1.87*             Lab 05/11/22  1814   ABO TYPING A   RH TYPING Positive   ANTIBODY SCREEN Negative         Brief Urine Lab Results  (Last result in the past 365 days)      Color   Clarity   Blood   Leuk Est   Nitrite   Protein   CREAT   Urine HCG        05/01/22 0006 Yellow   Clear   Negative   Negative   Negative   30 mg/dL (1+)                 Microbiology Results Abnormal     Procedure Component Value - Date/Time    COVID PRE-OP / PRE-PROCEDURE SCREENING ORDER (NO ISOLATION) - Swab, Nasopharynx [[id number]]  (Normal) Collected: 05/11/22 2321    Lab Status: Final result Specimen: Swab from Nasopharynx Updated: 05/11/22 2342    Narrative:      The following orders were created for panel order COVID PRE-OP / PRE-PROCEDURE SCREENING ORDER (NO ISOLATION) - Swab, Nasopharynx.  Procedure                               Abnormality         Status                     ---------                               -----------         ------                     COVID-19, ABBOTT IN-HOUS...[[id number]]  Normal              Final result                 Please view results for these tests on the individual orders.    COVID-19, ABBOTT IN-HOUSE,NASAL Swab (NO TRANSPORT MEDIA) 2 HR TAT - Swab, Nasopharynx [[id number]]  (Normal) Collected: 05/11/22 2321    Lab Status: Final result Specimen: Swab from Nasopharynx Updated: 05/11/22 2342     COVID19 Presumptive Negative    Narrative:      Fact sheet for providers:  https://www.fda.gov/media/288704/download     Fact sheet for patients: https://www.fda.gov/media/215881/download    Test performed by PCR.  If inconsistent with clinical signs and symptoms patient should be tested with different authorized molecular test.          Adult Transthoracic Echo Complete W/ Cont if Necessary Per Protocol    Result Date: 5/11/2022  · Left ventricular ejection fraction appears to be 56 - 60%. · Left ventricular wall thickness is consistent with moderate concentric hypertrophy · Moderate mitral annular calcification is present · Mild mitral valve stenosis is present. The mitral valve mean gradient is 5 mmHg. There is moderate thickening of the anterior leaflet subvalvular apparatus. · There is a 25 mm, [employer]. Kevin mechanical aortic valve prosthesis present · The aortic valve peak and mean gradients are within defined limits. The prosthetic aortic valve is normal. · The right ventricular cavity is mildly dilated. · Mild tricuspid valve regurgitation is present. Estimated right ventricular systolic pressure from tricuspid regurgitation is normal (<35 mmHg). · Borderline dilation of the aortic root is present measures 3.9 cm.      CT Abdomen Pelvis With Contrast    Result Date: 5/11/2022  CT ABDOMEN PELVIS W CONTRAST-  Date of Exam: 5/11/2022 9:20 AM  Indication: abdominal pain, recent bowel obstruction.  Comparison: CT from 04/30/2022  Technique: Contiguous axial CT images were obtained from the lung bases to the pubic symphysis following uneventful administration of 100 mL of Isovue-370. Sagittal and coronal reconstructions were performed. Automated exposure control and iterative reconstruction methods were used.  FINDINGS: Limited views of the lung bases are unremarkable. The liver is normal in appearance. There is cholelithiasis without evidence of cholecystitis. Common bile duct is normal in size. Portal vasculature is patent. Pancreas is unremarkable. Spleen is normal. Tiny right adrenal  nodule is unchanged. There is a fat-containing left adrenal nodule consistent with a myelolipoma thinning of the renal cortices bilaterally. There is a subcentimeter low density lesion within the left kidney, most consistent with a simple cyst. Moderate vascular calcifications are identified. No adenopathy is identified. The bladder is unremarkable. Postsurgical changes in the sigmoid colon remainder of the colon is unremarkable.  There are multiple fat-containing ventral hernias which are redemonstrated. Several contain loops of small bowel, or a small knuckle of small bowel there is a left ventral hernia just to the left of midline best seen on axial image #132 weekends contains a small loop of small bowel. The more proximal small bowel is dilated with an air-fluid level measuring 4.7 cm in diameter. Findings are most consistent with at least a partial small bowel obstruction.  There is gas within the right rectus abdominis muscle as well as a small right rectus abdominis hematoma. There is a moderate left rectus abdominis hematoma measuring 11.2 x 6.8 x 16 cm. There are a few areas of contrast enhancement within the hematoma and there is a fluid fluid level within the hematoma. Findings are consistent with an acute on subacute rectus abdominis hematoma.       Impression:  1. Multiple ventral hernias are redemonstrated, several with loops of bowel. There is a small ventral hernia to the left of midline containing a loop of bowel which does demonstrate at least a partial small bowel obstruction. This is best seen on image #131. The small bowel obstruction seen on prior exam has resolved. 2. There is a large left rectus abdominous hematoma with several foci of enhancement which can be seen with active extravasation. There is also a fluid fluid level. Findings are consistent with an acute on subacute hematoma. This is likely cysts sequela of subcutaneous anticoagulation injections, recommend clinical correlation. 3.  Small right rectus abdominous hematoma.    This report was finalized on 5/11/2022 9:48 AM by Steve Dean MD.        Results for orders placed during the hospital encounter of 05/11/22    Adult Transthoracic Echo Complete W/ Cont if Necessary Per Protocol    Interpretation Summary  · Left ventricular ejection fraction appears to be 56 - 60%.  · Left ventricular wall thickness is consistent with moderate concentric hypertrophy  · Moderate mitral annular calcification is present  · Mild mitral valve stenosis is present. The mitral valve mean gradient is 5 mmHg. There is moderate thickening of the anterior leaflet subvalvular apparatus.  · There is a 25 mm, [employer] Kevin mechanical aortic valve prosthesis present  · The aortic valve peak and mean gradients are within defined limits. The prosthetic aortic valve is normal.  · The right ventricular cavity is mildly dilated.  · Mild tricuspid valve regurgitation is present. Estimated right ventricular systolic pressure from tricuspid regurgitation is normal (<35 mmHg).  · Borderline dilation of the aortic root is present measures 3.9 cm.      I have reviewed the medications:  Scheduled Meds:gabapentin, 800 mg, Oral, Q8H  insulin lispro, 0-9 Units, Subcutaneous, TID AC  methylnaltrexone, 6 mg, Subcutaneous, Every Other Day  minocycline, 100 mg, Oral, BID  polyethylene glycol, 17 g, Oral, Daily  sodium chloride, 10 mL, Intravenous, Q12H      Continuous Infusions:   PRN Meds:.•  acetaminophen  •  dextrose  •  dextrose  •  glucagon (human recombinant)  •  HYDROmorphone **AND** naloxone  •  ondansetron  •  oxyCODONE  •  [COMPLETED] Insert peripheral IV **AND** sodium chloride  •  sodium chloride    Assessment & Plan   Assessment & Plan     Active Hospital Problems    Diagnosis  POA   • **SBO (small bowel obstruction) ([facility]) [K56.609]  Yes   • Left upper quadrant abdominal pain [R10.12]  Yes   • Rectus sheath hematoma [S30.1XXA]  Yes   • Diastolic CHF, chronic ([facility]) [I50.32]  Yes   •  Moderate PAH. PA 55/20, wedge 15 mmHg.  Multifactorial (CMS/HCC) [I27.21]  Yes   • PAF on chronic warfarin (CMS/HCC) [I48.0]  Yes   • Discoloration of skin of bilateral legs likely due to minocycline use [L81.9]  Yes   • History Acinetobacter bacteremia on chronic minocycline [Z86.19]  Yes   • History of mechanical AVR for aortic stenosis 2009 [Z95.2]  Not Applicable   • Type 2 diabetes mellitus (HCC) [E11.9]  Yes   • Morbid obesity (HCC) [E66.01]  Yes   • Essential hypertension [I10]  Yes   • Dyslipidemia [E78.5]  Yes      Resolved Hospital Problems   No resolved problems to display.        Brief Hospital Course to date:  Cecilio Farias is a 66 y.o. male with history of colon cancer s/p resection, ventral hernia, diastolic CHF, mechanical aortic valve on warfarin, atrial fibrillation, DM2, history of acinetobacter bacteremia on chronic minocycline with chronic lower extremity discoloration due to minocycline who was recently admitted with SBO.  He did not wish to pursue surgery during last admission but rather wanted to plan for eventual surgery with lysis of adhesions and ventral hernia repair as an outpatient.  SBO resolved and he was discharged with lovenox bridge back to warfarin.he developed worsening abdominal pain and presented back to the ED for further evaluation.    Partial SBO  Incisional Hernia  Rectus sheath hematoma  -General surgery following  -Needs surgery once rectus sheath hematoma improves-may need to be as outpatient unless he decompensates  -Abdominal binder at all times  -Clear liquid diet and advance as tolerated  -Pain control PRN  -Hold anticoagulation for ~48 hours at least, plan to   -Serial H/H have remained stable, continue to monitor  -No injections in abdomen-if discharged     HTN  HLD  Chronic diastolic CHF  -Hold antihypertensives for now, he remains normotensive     Mechanical aortic valve on warfarin  Atrial fibrillation  -follow daily INR  -Discussed with his cardiologist,   Manuel today. OK for 48 hr holiday and plan to restart his coumadin tomorrow with NO bridging planned. Will need to coordinate prior to eventual surgery to discuss anticoagulation perioperatively  -Echocardiogram with EF 56-60%, Aortic valve appears to be within normal limits.     History of acinetobacter bacteremia  -Continue minocycline     DM2  -SSI    DVT prophylaxis:  Mechanical DVT prophylaxis orders are present.       AM-PAC 6 Clicks Score (PT): 23 (05/11/22 2004)    Disposition: I expect the patient to be discharged possible tomorrow    CODE STATUS:   Code Status and Medical Interventions:   Ordered at: 05/11/22 1510     Level Of Support Discussed With:    Patient     Code Status (Patient has no pulse and is not breathing):    CPR (Attempt to Resuscitate)     Medical Interventions (Patient has pulse or is breathing):    Full Support       Jayna Burgess, DO  05/12/22

## 2022-05-12 NOTE — PLAN OF CARE
Goal Outcome Evaluation:  Plan of Care Reviewed With: patient        Progress: improving  Outcome Evaluation: PT reports pain improving, Lacie given x 2 and Dilaudid x 1. VSS. Compliant w/ abdominal binder. Up to chair today. No new complaints.

## 2022-05-12 NOTE — PLAN OF CARE
Goal Outcome Evaluation:  Plan of Care Reviewed With: patient        Progress: improving  Outcome Evaluation: VSS. Pain medication given PRN. Abdominal binder in place. Resting well. Will continue to monitor.

## 2022-05-12 NOTE — PROGRESS NOTES
"General Surgery Daily Progress Note    Subjective:  Tolerating clear liquids.  Passing flatus.    Objective:  BP (P) 120/65 (BP Location: Left arm, Patient Position: Lying)   Pulse 91   Temp (P) 98.9 °F (37.2 °C) (Oral)   Resp (P) 19   Ht 190.5 cm (75\")   Wt 127 kg (279 lb)   SpO2 95%   BMI 34.87 kg/m²       General Appearance: Mild distress  Eyes: Anicteric  Neck: Trachea midline   Cardiovascular:  RRR without murmur nor rub  Lungs:  Bilateral respirations unlabored   Abdomen:  Soft, tenderness along the rectus sheath, no peritonitis  Extremities:  No cyanosis or edema, sustained skin noted  Skin:  No obvious rashes   Neurologic: awake and conversant       Imaging Results (Last 24 Hours)     Procedure Component Value Units Date/Time    CT Abdomen Pelvis With Contrast [[id number]] Collected: 05/11/22 0937     Updated: 05/11/22 0959    Narrative:      CT ABDOMEN PELVIS W CONTRAST-     Date of Exam: 5/11/2022 9:20 AM     Indication: abdominal pain, recent bowel obstruction.     Comparison: CT from 04/30/2022     Technique: Contiguous axial CT images were obtained from the lung bases  to the pubic symphysis following uneventful administration of 100 mL of  Isovue-370. Sagittal and coronal reconstructions were performed.   Automated exposure control and iterative reconstruction methods were  used.     FINDINGS:  Limited views of the lung bases are unremarkable. The liver is normal in  appearance. There is cholelithiasis without evidence of cholecystitis.  Common bile duct is normal in size. Portal vasculature is patent.  Pancreas is unremarkable. Spleen is normal. Tiny right adrenal nodule is  unchanged. There is a fat-containing left adrenal nodule consistent with  a myelolipoma thinning of the renal cortices bilaterally. There is a  subcentimeter low density lesion within the left kidney, most consistent  with a simple cyst. Moderate vascular calcifications are identified. No  adenopathy is identified. The " bladder is unremarkable. Postsurgical  changes in the sigmoid colon remainder of the colon is unremarkable.     There are multiple fat-containing ventral hernias which are  redemonstrated. Several contain loops of small bowel, or a small knuckle  of small bowel there is a left ventral hernia just to the left of  midline best seen on axial image #132 weekends contains a small loop of  small bowel. The more proximal small bowel is dilated with an air-fluid  level measuring 4.7 cm in diameter. Findings are most consistent with at  least a partial small bowel obstruction.     There is gas within the right rectus abdominis muscle as well as a small  right rectus abdominis hematoma. There is a moderate left rectus  abdominis hematoma measuring 11.2 x 6.8 x 16 cm. There are a few areas  of contrast enhancement within the hematoma and there is a fluid fluid  level within the hematoma. Findings are consistent with an acute on  subacute rectus abdominis hematoma.          Impression:         1. Multiple ventral hernias are redemonstrated, several with loops of  bowel. There is a small ventral hernia to the left of midline containing  a loop of bowel which does demonstrate at least a partial small bowel  obstruction. This is best seen on image #131. The small bowel  obstruction seen on prior exam has resolved.  2. There is a large left rectus abdominous hematoma with several foci of  enhancement which can be seen with active extravasation. There is also a  fluid fluid level. Findings are consistent with an acute on subacute  hematoma. This is likely cysts sequela of subcutaneous anticoagulation  injections, recommend clinical correlation.  3. Small right rectus abdominous hematoma.           This report was finalized on 5/11/2022 9:48 AM by Steve Dean MD.             CBC:  Results from last 7 days   Lab Units 05/11/22  2359 05/11/22  1627 05/11/22  0904   WBC 10*3/mm3  --   --  5.89   HEMOGLOBIN g/dL 10.0*   < > 11.7*    HEMATOCRIT % 30.5*   < > 36.5*   PLATELETS 10*3/mm3  --   --  183    < > = values in this interval not displayed.       CMP:  Results from last 7 days   Lab Units 05/12/22  0458   SODIUM mmol/L 137   POTASSIUM mmol/L 4.8   CHLORIDE mmol/L 103   CO2 mmol/L 27.0   BUN mg/dL 11   CREATININE mg/dL 0.68*   CALCIUM mg/dL 8.3*   BILIRUBIN mg/dL 1.0   ALK PHOS U/L 107   ALT (SGPT) U/L 15   AST (SGOT) U/L 25   GLUCOSE mg/dL 139*         Assessment:  Bilateral rectus sheath hematoma  Incisional hernia  Valvular heart disease, mechanical aortic valve replacement    Plan:   May advance diet as tolerated.  Will need to have his bilateral rectus sheath hematomas resolved prior to abdominal wall reconstruction.    Ozzie Lester MD - 5/12/2022, 08:41 EDT

## 2022-05-12 NOTE — OUTREACH NOTE
Medical Week 2 Survey    Flowsheet Row Responses   [facility] patient discharged from? [facility]   Does the patient have one of the following disease processes/diagnoses(primary or secondary)? Other   Week 2 attempt successful? No   Revoke Readmitted          JAMES HASTINGS - Registered Nurse

## 2022-05-13 ENCOUNTER — READMISSION MANAGEMENT (OUTPATIENT)
Dept: CALL CENTER | Facility: HOSPITAL | Age: 67
End: 2022-05-13

## 2022-05-13 ENCOUNTER — TELEPHONE (OUTPATIENT)
Dept: PHARMACY | Facility: HOSPITAL | Age: 67
End: 2022-05-13

## 2022-05-13 VITALS
RESPIRATION RATE: 18 BRPM | DIASTOLIC BLOOD PRESSURE: 75 MMHG | OXYGEN SATURATION: 96 % | WEIGHT: 279 LBS | BODY MASS INDEX: 34.69 KG/M2 | HEART RATE: 78 BPM | HEIGHT: 75 IN | SYSTOLIC BLOOD PRESSURE: 117 MMHG | TEMPERATURE: 98.3 F

## 2022-05-13 LAB
GLUCOSE BLDC GLUCOMTR-MCNC: 191 MG/DL (ref 70–130)
GLUCOSE BLDC GLUCOMTR-MCNC: 259 MG/DL (ref 70–130)
HCT VFR BLD AUTO: 28.4 % (ref 37.5–51)
HCT VFR BLD AUTO: 32.9 % (ref 37.5–51)
HGB BLD-MCNC: 10.6 G/DL (ref 13–17.7)
HGB BLD-MCNC: 9.4 G/DL (ref 13–17.7)
INR PPP: 1.71 (ref 0.84–1.13)
PROTHROMBIN TIME: 20 SECONDS (ref 11.4–14.4)

## 2022-05-13 PROCEDURE — 63710000001 INSULIN LISPRO (HUMAN) PER 5 UNITS: Performed by: INTERNAL MEDICINE

## 2022-05-13 PROCEDURE — 85014 HEMATOCRIT: CPT | Performed by: INTERNAL MEDICINE

## 2022-05-13 PROCEDURE — 82962 GLUCOSE BLOOD TEST: CPT

## 2022-05-13 PROCEDURE — 85018 HEMOGLOBIN: CPT | Performed by: INTERNAL MEDICINE

## 2022-05-13 PROCEDURE — 85610 PROTHROMBIN TIME: CPT | Performed by: INTERNAL MEDICINE

## 2022-05-13 PROCEDURE — 99239 HOSP IP/OBS DSCHRG MGMT >30: CPT | Performed by: INTERNAL MEDICINE

## 2022-05-13 PROCEDURE — 25010000002 METHYLNALTREXONE 12 MG/0.6ML SOLUTION: Performed by: SURGERY

## 2022-05-13 RX ORDER — OXYCODONE HYDROCHLORIDE 5 MG/1
5 TABLET ORAL EVERY 4 HOURS PRN
Qty: 12 TABLET | Refills: 0 | Status: SHIPPED | OUTPATIENT
Start: 2022-05-13 | End: 2022-05-18

## 2022-05-13 RX ADMIN — INSULIN LISPRO 6 UNITS: 100 INJECTION, SOLUTION INTRAVENOUS; SUBCUTANEOUS at 12:01

## 2022-05-13 RX ADMIN — Medication 10 ML: at 08:37

## 2022-05-13 RX ADMIN — METHYLNALTREXONE BROMIDE 6 MG: 12 INJECTION, SOLUTION SUBCUTANEOUS at 08:36

## 2022-05-13 RX ADMIN — OXYCODONE 5 MG: 5 TABLET ORAL at 13:38

## 2022-05-13 RX ADMIN — GABAPENTIN 800 MG: 400 CAPSULE ORAL at 04:56

## 2022-05-13 RX ADMIN — INSULIN LISPRO 2 UNITS: 100 INJECTION, SOLUTION INTRAVENOUS; SUBCUTANEOUS at 08:35

## 2022-05-13 RX ADMIN — MINOCYCLINE HYDROCHLORIDE 100 MG: 50 CAPSULE ORAL at 08:35

## 2022-05-13 RX ADMIN — POLYETHYLENE GLYCOL 3350 17 G: 17 POWDER, FOR SOLUTION ORAL at 08:35

## 2022-05-13 RX ADMIN — OXYCODONE 5 MG: 5 TABLET ORAL at 04:55

## 2022-05-13 NOTE — DISCHARGE SUMMARY
[facility] Medicine Services  DISCHARGE SUMMARY    Patient Name: Cecilio Farias  : 1955  MRN: [id number]    Date of Admission: 2022  7:55 AM  Date of Discharge:  2022  Primary Care Physician: America Everett DO    Consults     Date and Time Order Name Status Description    2022  3:10 PM Inpatient General Surgery Consult Completed     2022  3:23 AM Inpatient General Surgery Consult Completed           Hospital Course     Presenting Problem:   Left upper quadrant abdominal pain [R10.12]    Active Hospital Problems    Diagnosis  POA   • **SBO (small bowel obstruction) (HCC) [K56.609]  Yes   • Left upper quadrant abdominal pain [R10.12]  Yes   • Rectus sheath hematoma [S30.1XXA]  Yes   • Diastolic CHF, chronic (HCC) [I50.32]  Yes   • Moderate PAH. PA 55/20, wedge 15 mmHg.  Multifactorial (CMS/HCC) [I27.21]  Yes   • PAF on chronic warfarin (CMS/HCC) [I48.0]  Yes   • Discoloration of skin of bilateral legs likely due to minocycline use [L81.9]  Yes   • History Acinetobacter bacteremia on chronic minocycline [Z86.19]  Yes   • History of mechanical AVR for aortic stenosis  [Z95.2]  Not Applicable   • Type 2 diabetes mellitus (HCC) [E11.9]  Yes   • Morbid obesity (HCC) [E66.01]  Yes   • Essential hypertension [I10]  Yes   • Dyslipidemia [E78.5]  Yes      Resolved Hospital Problems   No resolved problems to display.          Hospital Course:  Cecilio Farias is a 66 y.o. male with history of colon cancer s/p resection, ventral hernia, diastolic CHF, mechanical aortic valve on warfarin, atrial fibrillation, DM2, history of acinetobacter bacteremia on chronic minocycline with chronic lower extremity discoloration due to minocycline who was recently admitted with SBO.  He did not wish to pursue surgery during last admission but rather wanted to plan for eventual surgery with lysis of adhesions and ventral hernia repair as an outpatient.  SBO resolved and he was  discharged with lovenox bridge back to warfarin.he developed worsening abdominal pain and presented back to the ED for further evaluation.    Partial SBO - resolved  Incisional Hernia  Rectus sheath hematoma  -General surgery followed, recommend abdominal binder at all times, needs surgery once rectus sheath hematoma improves and plan outpatient f/u in 3-4 weeks w/Dr. Lester  -tolerating regular diet without pain, nausea or vomiting  -hemoglobin remains stable, okay to resume AC today with Warfarin, planning NO Bridge     HTN  HLD  Chronic diastolic CHF  -resume norvasc and lisinopril as tolerated, BP has remained well controlled here, have asked him to hold these several more days until PCP f/u to make sure he does not become hypotensive.    Mechanical aortic valve on warfarin  Atrial fibrillation  -INR 1.7 today  -Discussed with his cardiologist, Dr. Jackson today, feels patient is low risk for thrombotic events and due to bleeding, recommended holding anticoagulation for 48 hr and will plan to restart his coumadin today with NO bridging planned. Patient will have INR check on Monday and faxed to  AC clinic.  -Echocardiogram completed and EF 56-60%, Aortic valve appears to be within normal limits.     History of acinetobacter bacteremia  -Continue minocycline     DM2  -resume home regimen at discharg     Discharge Follow Up Recommendations for outpatient labs/diagnostics:  - f/u with Dr. Lester in 3-4 weeks to discuss surgical plans, need to wear abdominal binder at all times, patient aware  - f/u on Monday for INR check in [place], results to be sent to  Anticoagulation clinic  - f/u with PCP in 1 week for BP check, recommend holding blood pressure medications and monitoring blood pressure at home    Day of Discharge     HPI:   Patient sitting up in bedside chair, feels great. Tolerating diet. No abdominal pain. No nausea/vomiting.    Review of Systems  Gen- No fevers, chills  CV- No chest pain,  palpitations  Resp- No cough, dyspnea  GI- No N/V/D, abd pain    Vital Signs:   Temp:  [98.3 °F (36.8 °C)-98.5 °F (36.9 °C)] 98.3 °F (36.8 °C)  Heart Rate:  [62-95] 78  Resp:  [16-18] 18  BP: (102-123)/(64-75) 117/75      Physical Exam:  Constitutional: No acute distress, awake, alert  HENT: NCAT, mucous membranes moist  Respiratory: Clear to auscultation bilaterally, respiratory effort normal   Cardiovascular: RRR, no murmurs, rubs, or gallops  Gastrointestinal: Positive bowel sounds, soft, nontender, nondistended, abdominal binder in place  Musculoskeletal: No bilateral ankle edema  Psychiatric: Appropriate affect, cooperative  Neurologic: Oriented x 3, strength symmetric in all extremities, Cranial Nerves grossly intact to confrontation, speech clear  Skin: No rashes      Pertinent  and/or Most Recent Results     LAB RESULTS:      Lab 05/13/22  0805 05/12/22  2336 05/12/22  1657 05/12/22  0812 05/12/22  0458 05/11/22  2359 05/11/22  1627 05/11/22  0904   WBC  --   --   --  6.93  --   --   --  5.89   HEMOGLOBIN 10.6* 9.4* 10.1* 10.0*  --  10.0*   < > 11.7*   HEMATOCRIT 32.9* 28.4* 30.4* 32.0*  --  30.5*   < > 36.5*   PLATELETS  --   --   --  177  --   --   --  183   NEUTROS ABS  --   --   --  5.05  --   --   --  4.11   IMMATURE GRANS (ABS)  --   --   --  0.02  --   --   --  0.02   LYMPHS ABS  --   --   --  0.88  --   --   --  0.90   MONOS ABS  --   --   --  0.81  --   --   --  0.65   EOS ABS  --   --   --  0.12  --   --   --  0.17   MCV  --   --   --  90.4  --   --   --  87.7   LACTATE  --   --   --   --   --   --   --  1.3   PROTIME 20.0*  --   --   --  21.8*  --   --  21.5*   APTT  --   --   --   --  31.7*  --   --   --     < > = values in this interval not displayed.         Lab 05/12/22  0458 05/11/22  0904   SODIUM 137 139   POTASSIUM 4.8 4.5   CHLORIDE 103 104   CO2 27.0 25.0   ANION GAP 7.0 10.0   BUN 11 13   CREATININE 0.68* 0.68*   EGFR 103.2 103.2   GLUCOSE 139* 166*   CALCIUM 8.3* 8.5*         Lab  05/12/22  0458 05/11/22  0904   TOTAL PROTEIN 5.8* 6.8   ALBUMIN 3.20* 3.50   GLOBULIN 2.6 3.3   ALT (SGPT) 15 18   AST (SGOT) 25 33   BILIRUBIN 1.0 0.9   ALK PHOS 107 114   LIPASE  --  46         Lab 05/13/22  0805 05/12/22  0458 05/11/22  0904   PROTIME 20.0* 21.8* 21.5*   INR 1.71* 1.90* 1.87*             Lab 05/11/22  1814   ABO TYPING A   RH TYPING Positive   ANTIBODY SCREEN Negative         Brief Urine Lab Results  (Last result in the past 365 days)      Color   Clarity   Blood   Leuk Est   Nitrite   Protein   CREAT   Urine HCG        05/01/22 0006 Yellow   Clear   Negative   Negative   Negative   30 mg/dL (1+)               Microbiology Results (last 10 days)     Procedure Component Value - Date/Time    COVID PRE-OP / PRE-PROCEDURE SCREENING ORDER (NO ISOLATION) - Swab, Nasopharynx [[id number]]  (Normal) Collected: 05/11/22 2321    Lab Status: Final result Specimen: Swab from Nasopharynx Updated: 05/11/22 2342    Narrative:      The following orders were created for panel order COVID PRE-OP / PRE-PROCEDURE SCREENING ORDER (NO ISOLATION) - Swab, Nasopharynx.  Procedure                               Abnormality         Status                     ---------                               -----------         ------                     COVID-19, ABBOTT IN-HOUS...[[id number]]  Normal              Final result                 Please view results for these tests on the individual orders.    COVID-19, ABBOTT IN-HOUSE,NASAL Swab (NO TRANSPORT MEDIA) 2 HR TAT - Swab, Nasopharynx [[id number]]  (Normal) Collected: 05/11/22 2321    Lab Status: Final result Specimen: Swab from Nasopharynx Updated: 05/11/22 2342     COVID19 Presumptive Negative    Narrative:      Fact sheet for providers: https://www.fda.gov/media/791847/download     Fact sheet for patients: https://www.fda.gov/media/210921/download    Test performed by PCR.  If inconsistent with clinical signs and symptoms patient should be tested with different authorized  molecular test.          Adult Transthoracic Echo Complete W/ Cont if Necessary Per Protocol    Result Date: 5/11/2022  · Left ventricular ejection fraction appears to be 56 - 60%. · Left ventricular wall thickness is consistent with moderate concentric hypertrophy · Moderate mitral annular calcification is present · Mild mitral valve stenosis is present. The mitral valve mean gradient is 5 mmHg. There is moderate thickening of the anterior leaflet subvalvular apparatus. · There is a 25 mm, St. Kevin mechanical aortic valve prosthesis present · The aortic valve peak and mean gradients are within defined limits. The prosthetic aortic valve is normal. · The right ventricular cavity is mildly dilated. · Mild tricuspid valve regurgitation is present. Estimated right ventricular systolic pressure from tricuspid regurgitation is normal (<35 mmHg). · Borderline dilation of the aortic root is present measures 3.9 cm.      CT Abdomen Pelvis With Contrast    Result Date: 5/11/2022  CT ABDOMEN PELVIS W CONTRAST-  Date of Exam: 5/11/2022 9:20 AM  Indication: abdominal pain, recent bowel obstruction.  Comparison: CT from 04/30/2022  Technique: Contiguous axial CT images were obtained from the lung bases to the pubic symphysis following uneventful administration of 100 mL of Isovue-370. Sagittal and coronal reconstructions were performed. Automated exposure control and iterative reconstruction methods were used.  FINDINGS: Limited views of the lung bases are unremarkable. The liver is normal in appearance. There is cholelithiasis without evidence of cholecystitis. Common bile duct is normal in size. Portal vasculature is patent. Pancreas is unremarkable. Spleen is normal. Tiny right adrenal nodule is unchanged. There is a fat-containing left adrenal nodule consistent with a myelolipoma thinning of the renal cortices bilaterally. There is a subcentimeter low density lesion within the left kidney, most consistent with a simple cyst.  Moderate vascular calcifications are identified. No adenopathy is identified. The bladder is unremarkable. Postsurgical changes in the sigmoid colon remainder of the colon is unremarkable.  There are multiple fat-containing ventral hernias which are redemonstrated. Several contain loops of small bowel, or a small knuckle of small bowel there is a left ventral hernia just to the left of midline best seen on axial image #132 weekends contains a small loop of small bowel. The more proximal small bowel is dilated with an air-fluid level measuring 4.7 cm in diameter. Findings are most consistent with at least a partial small bowel obstruction.  There is gas within the right rectus abdominis muscle as well as a small right rectus abdominis hematoma. There is a moderate left rectus abdominis hematoma measuring 11.2 x 6.8 x 16 cm. There are a few areas of contrast enhancement within the hematoma and there is a fluid fluid level within the hematoma. Findings are consistent with an acute on subacute rectus abdominis hematoma.        1. Multiple ventral hernias are redemonstrated, several with loops of bowel. There is a small ventral hernia to the left of midline containing a loop of bowel which does demonstrate at least a partial small bowel obstruction. This is best seen on image #131. The small bowel obstruction seen on prior exam has resolved. 2. There is a large left rectus abdominous hematoma with several foci of enhancement which can be seen with active extravasation. There is also a fluid fluid level. Findings are consistent with an acute on subacute hematoma. This is likely cysts sequela of subcutaneous anticoagulation injections, recommend clinical correlation. 3. Small right rectus abdominous hematoma.    This report was finalized on 5/11/2022 9:48 AM by Steve Dean MD.                Results for orders placed during the hospital encounter of 05/11/22    Adult Transthoracic Echo Complete W/ Cont if Necessary Per  Protocol    Interpretation Summary  · Left ventricular ejection fraction appears to be 56 - 60%.  · Left ventricular wall thickness is consistent with moderate concentric hypertrophy  · Moderate mitral annular calcification is present  · Mild mitral valve stenosis is present. The mitral valve mean gradient is 5 mmHg. There is moderate thickening of the anterior leaflet subvalvular apparatus.  · There is a 25 mm, St. Kevin mechanical aortic valve prosthesis present  · The aortic valve peak and mean gradients are within defined limits. The prosthetic aortic valve is normal.  · The right ventricular cavity is mildly dilated.  · Mild tricuspid valve regurgitation is present. Estimated right ventricular systolic pressure from tricuspid regurgitation is normal (<35 mmHg).  · Borderline dilation of the aortic root is present measures 3.9 cm.      Plan for Follow-up of Pending Labs/Results:   Discharge Details        Discharge Medications      New Medications      Instructions Start Date   oxyCODONE 5 MG immediate release tablet  Commonly known as: ROXICODONE   5 mg, Oral, Every 4 Hours PRN         Continue These Medications      Instructions Start Date   amLODIPine 10 MG tablet  Commonly known as: NORVASC   1 tablet, Oral, Every Morning      aspirin 81 MG EC tablet   81 mg, Oral, Daily      atorvastatin 40 MG tablet  Commonly known as: LIPITOR   40 mg, Oral, Nightly      furosemide 20 MG tablet  Commonly known as: LASIX   20 mg, Oral, Daily      gabapentin 800 MG tablet  Commonly known as: NEURONTIN   800 mg, Oral, 3 Times Daily      glipizide 10 MG tablet  Commonly known as: GLUCOTROL   10 mg, Oral, 2 Times Daily Before Meals      lisinopril 20 MG tablet  Commonly known as: PRINIVIL,ZESTRIL   20 mg, Oral, 2 Times Daily      minocycline 100 MG capsule  Commonly known as: MINOCIN,DYNACIN   1 capsule, Oral, 2 Times Daily      NovoLIN 70/30 (70-30) 100 UNIT/ML injection  Generic drug: insulin NPH-insulin regular   15 Units,  Subcutaneous, 2 Times Daily With Meals      potassium chloride ER 20 MEQ tablet controlled-release ER tablet  Commonly known as: K-TAB   TAKE ONE TABLET BY MOUTH DAILY WITH LASIX      warfarin 5 MG tablet  Commonly known as: COUMADIN   Take 1.5-2 tablets by mouth once daily or as directed by the anticoagulation clinic         Stop These Medications    Enoxaparin Sodium 120 MG/0.8ML solution prefilled syringe syringe  Commonly known as: Lovenox            Allergies   Allergen Reactions   • Sulfa Antibiotics    • Amoxicillin Rash     Has tolerated ceftriaxone         Discharge Disposition:  Home or Self Care    Diet:  Hospital:  Diet Order   Procedures   • Diet Regular; Cardiac, Consistent Carbohydrate       Activity:  - as tolerated    Restrictions or Other Recommendations:  - none       CODE STATUS:    Code Status and Medical Interventions:   Ordered at: 05/11/22 1510     Level Of Support Discussed With:    Patient     Code Status (Patient has no pulse and is not breathing):    CPR (Attempt to Resuscitate)     Medical Interventions (Patient has pulse or is breathing):    Full Support       Future Appointments   Date Time Provider Department Center   6/30/2022  2:45 PM RAD TECH [facility]   6/30/2022  3:00 PM [facility], [facility] LAB 3 [facility]   6/30/2022  3:30 PM Dani To MD [facility]   1/17/2023 10:30 AM Arun Jackson MD [facility]                 Jayna Burgess DO  05/13/22      Time Spent on Discharge:  I spent  40 minutes on this discharge activity which included: face-to-face encounter with the patient, reviewing the data in the system, coordination of the care with the nursing staff as well as consultants, documentation, and entering orders.

## 2022-05-13 NOTE — PROGRESS NOTES
"General Surgery Daily Progress Note    Subjective:  Doing well overall. Less pain with the hematoma. Tolerating PO.     Objective:  /75 (BP Location: Right arm, Patient Position: Sitting)   Pulse 78   Temp 98.3 °F (36.8 °C) (Oral)   Resp 18   Ht 190.5 cm (75\")   Wt 127 kg (279 lb)   SpO2 96%   BMI 34.87 kg/m²       General Appearance: NAD  Eyes: Anicteric  Neck: Trachea midline   Cardiovascular:  RRR without murmur nor rub  Lungs:  Bilateral respirations unlabored   Abdomen:  Soft, non-tender, no masses, no organomegaly   Extremities:  No cyanosis or edema, stained skin noted.  Skin:  No obvious rashes   Neurologic: awake and conversant       Imaging Results (Last 24 Hours)     ** No results found for the last 24 hours. **          CBC:  Results from last 7 days   Lab Units 05/13/22  0805 05/12/22  1657 05/12/22  0812   WBC 10*3/mm3  --   --  6.93   HEMOGLOBIN g/dL 10.6*   < > 10.0*   HEMATOCRIT % 32.9*   < > 32.0*   PLATELETS 10*3/mm3  --   --  177    < > = values in this interval not displayed.       CMP:  Results from last 7 days   Lab Units 05/12/22  0458   SODIUM mmol/L 137   POTASSIUM mmol/L 4.8   CHLORIDE mmol/L 103   CO2 mmol/L 27.0   BUN mg/dL 11   CREATININE mg/dL 0.68*   CALCIUM mg/dL 8.3*   BILIRUBIN mg/dL 1.0   ALK PHOS U/L 107   ALT (SGPT) U/L 15   AST (SGOT) U/L 25   GLUCOSE mg/dL 139*         Assessment:  Bilateral rectus sheath hematoma  Incisional hernia  Valvular heart disease, mechanical aortic valve replacement    Plan:   ECHO appears well.  Will need to have his bilateral rectus sheath hematomas resolved prior to abdominal wall reconstruction. Wear the abdominal binder at all times.     Follow up at [facility], [phone], in 3-4 weeks.     Ozzie Lester MD - 5/13/2022, 09:45 EDT        "

## 2022-05-13 NOTE — DISCHARGE INSTRUCTIONS
I WOULD RECOMMEND HOLDING YOUR BLOOD PRESSURE MEDICATIONS FOR A COUPLE MORE DAYS DUE TO RISK OF LOW BLOOD PRESSURE IF YOU RESUME THEM ALL AT ONE TIME, YOU MAY NEED TO WAIT UNTIL YOU FOLLOW UP WITH YOUR PRIMARY CARE DOCTOR TO RESUME THEM ALL.    YOU NEED TO HAVE AN INR CHECKED ON MONDAY, DR. CORTES WILL FOLLOW UP THIS RESULT

## 2022-05-13 NOTE — TELEPHONE ENCOUNTER
Spoke with patient at bedside. Instructed him to take 10mg today, tomorrow, and 7.5mg on Sunday. Given patient's hematomas and low clot risk per cards, will not bridge with lovenox. Mr Farias agreed. He will follow up on Monday where he might need a higher boost after 3 days of warfarin restart.

## 2022-05-13 NOTE — TELEPHONE ENCOUNTER
Per cardiology patient will likely be discharged today.     Per Dr. Jackson - will need INR check on Monday, 5/16/22.

## 2022-05-14 NOTE — OUTREACH NOTE
Prep Survey    Flowsheet Row Responses   [facility] facility patient discharged from? [place]   Is LACE score < 7 ? No   Emergency Room discharge w/ pulse ox? No   Eligibility Readm Mgmt   Discharge diagnosis small bowel obstruction   Does the patient have one of the following disease processes/diagnoses(primary or secondary)? Other   Does the patient have Home health ordered? No   Is there a DME ordered? No   Comments regarding appointments abdominal binder at all times, needs surgery once rectus sheath hematoma improves and plan outpatient f/u in 3-4 weeks w/Dr. Lester   Prep survey completed? Yes          YOLA HACKETT - Registered Nurse

## 2022-05-16 NOTE — TELEPHONE ENCOUNTER
"Called patient re: follow up INR post hospital discharge    He admits he went to lab this morning at [facility] but it was \"too crowded\" so he left. He is agreeable to go back this afternoon to check INR.    Per Jeanette Sorensen, PharmD, instructed Mr. Farias to take 10 mg of warfarin tonight until we receive INR tomorrow.  "

## 2022-05-17 ENCOUNTER — READMISSION MANAGEMENT (OUTPATIENT)
Dept: CALL CENTER | Facility: HOSPITAL | Age: 67
End: 2022-05-17

## 2022-05-17 NOTE — TELEPHONE ENCOUNTER
LVM re: overdue INR    Advised patient to take warfarin 7.5 mg tonight and clinic would f/u tomorrow

## 2022-05-17 NOTE — OUTREACH NOTE
Medical Week 1 Survey    Flowsheet Row Responses   [facility] patient discharged from? [place]   Does the patient have one of the following disease processes/diagnoses(primary or secondary)? Other   Week 1 attempt successful? No   Unsuccessful attempts Attempt 1          GAUTAM LINDA - Registered Nurse

## 2022-05-18 ENCOUNTER — ANTICOAGULATION VISIT (OUTPATIENT)
Dept: PHARMACY | Facility: HOSPITAL | Age: 67
End: 2022-05-18

## 2022-05-18 LAB — INR PPP: 1.9

## 2022-05-18 NOTE — PROGRESS NOTES
Anticoagulation Clinic - Remote Progress Note  REMOTE LAB     Indication: mechanical AVR  Referring Provider: Arun Jackson (12/17/2021)  Initial Warfarin Start Date: 2009  Goal INR: 2.5-3.5  Current Drug Interactions: aspirin     Diet: not much GLV 4/18/22  Alcohol: none  Tobacco: none  OTC Pain Medication:     INR History:  Date 8/2 10/26 12/21 1/19/18 2/20 3/27 4/27 5/16 6/11 7/16 9/20 11/2   Total Weekly Dose 62.5mg 62.5mg 62.5mg 62.5mg 62.5mg 62.5mg 62.5mg 62.5mg 62.5mg 62.5mg 62.5mg 62.5mg   INR 2.2 2.9 3.2 3.0 2.9 3.4 3.7 2.9 2.6 2.9 2.9 3.2   Notes             sick                Date 12/21 2/1/19 3/11 4/24 5/23 6/24 7/11 7/18 7/22 8/15 9/9 9/25   Total Weekly Dose 62.5mg 62.5mg 62.5mg 62.5mg 62.5mg 62.5mg 52.5mg 55mg 57.5mg 57.5mg 57.5mg 57.5mg   INR 3.4 3.34 2.8 3.0 3.3 3.6 2.52 2.27 2.9 2.9 2.5 2.6   Notes recv'd 12/26       self report; recent hosp; Augmentin Merrem post-disch for sepsis merrem merrem 1x incr dose, merrem        Date 11/8 12/30 1/24/20 4/13 6/2 7/8 8/31 10/13 12/1 1/14/21 2/2 3/4   Total Weekly Dose 57.5mg 57.5mg 57.5mg 57.5mg 57.5mg 57.5mg 57.5mg 60mg 60mg 60mg 60mg 60mg   INR 2.4 2.4 3.7 2.2 2.9 2.3 2.2 2.8 3.5 3.5 3.23 2.4   Notes  incr GLV   nerve control 911 supplement recv'd 7/9  incr GLV recv'd 9/2; incr GLV    incr GLV incr GLV     Date 3/30  5/10 6/21 8/3  9/27  11/16-19 12/15  3/8   Total WeeklyDose 60mg non- compliant 60mg 60mg 60mg non- compliant 60mg non-  compliant [facility] admit 60mg Non compliance 60 mg   INR 2.5  2.8 3.6 3.12  3.1   3.2  2.9   Notes 1xboost   Less GLV     2x hold &  2x boost          Date 4/15  5/11-5/13 5/18            Total Weekly Dose 60 mg  [facility]   45 mg            INR 2.8    1.9            Notes rec'd 4/18 No bridge heldx2              **will take minocycline 100mg BID indefinitely**     Phone Interview:  Tablet Strength: 5mg tabs  Patient Contact Info: [phone] (Home); [phone] (Mobile)  Lab Contact Info: [facility]  [phone]  Verbal release 7/23/19 may speak with Delaney (wife), Puneet or Ty (sons)- ok to [facility]    Patient Findings  Positives:  Missed doses, Change in medications, Other complaints   Negatives:  Signs/symptoms of thrombosis, Signs/symptoms of bleeding, Laboratory test error suspected, Change in health, Change in alcohol use, Change in activity, Upcoming invasive procedure, Emergency department visit, Upcoming dental procedure, Extra doses, Change in diet/appetite, Hospital admission, Bruising   Comments:  In  [facility] 5/11 - 5/13. Partial SBO - resolved; Incisional Hernia Rectus sheath hematoma -General surgery followed, recommend abdominal binder at all times, needs surgery once rectus sheath hematoma improves and plan outpatient f/u in 3-4 weeks w/Dr. Lester     He stated that his appetite is back to normal.  He has taken some oxycodone since discharge. He stated that he only received 12 tablets.     He was due to recheck on Monday, 5/16.  EM spoke with patient at bedside. Instructed him to take warfarin 10mg today, tomorrow, and 7.5mg on Sunday. Given patient's hematomas and low clot risk per cards, will not bridge with lovenox. Mr Farias agreed. He will follow up on Monday where he might need a higher boost after 3 days of warfarin restart.     Plan:    1. INR was SUB therapeutic yesterday at 1.9; result received today. He was admitted to [facility] from 5/11 thru 5/13 (warfarin held).  Instructed Mr. Farias to boost warfarin 12.5 mg tonight, 10 mg tomorrow, then on Friday, resume usual maintenance dose of warfarin 7.5 mg oral daily except 10 mg on MonWedFri until recheck (65 mg/week, 8.3% increase).  2. Repeat INR in one week, on 5/25. He was agreeable to recheck next week.  Patient has extensive history of non-compliance with requested follow-up.  3. Verbal information provided over the phone. Cecilio Farias RBV dosing instructions, expresses understanding by teach back, and has no further questions at this  time.    Arabella Hebert, PharmD  5/18/2022  13:44 EDT

## 2022-05-20 ENCOUNTER — OFFICE VISIT (OUTPATIENT)
Dept: CARDIOLOGY | Facility: HOSPITAL | Age: 67
End: 2022-05-20

## 2022-05-20 VITALS — SYSTOLIC BLOOD PRESSURE: 148 MMHG | DIASTOLIC BLOOD PRESSURE: 76 MMHG

## 2022-05-20 DIAGNOSIS — Z95.2 HISTORY OF MECHANICAL AORTIC VALVE REPLACEMENT: Primary | ICD-10-CM

## 2022-05-20 DIAGNOSIS — I10 ESSENTIAL HYPERTENSION: ICD-10-CM

## 2022-05-20 DIAGNOSIS — I48.0 PAROXYSMAL ATRIAL FIBRILLATION: ICD-10-CM

## 2022-05-20 PROCEDURE — 99442 PR PHYS/QHP TELEPHONE EVALUATION 11-20 MIN: CPT | Performed by: NURSE PRACTITIONER

## 2022-05-20 RX ORDER — POTASSIUM CHLORIDE 1500 MG/1
20 TABLET, FILM COATED, EXTENDED RELEASE ORAL DAILY
Qty: 90 TABLET | Refills: 0 | Status: SHIPPED | OUTPATIENT
Start: 2022-05-20

## 2022-05-20 NOTE — PROGRESS NOTES
Mode of visit: Telephone   Location of patient: Home   You have chosen to receive care through the use of telemedicine. Telemedicine enables health care providers at different locations to provide safe, effective, and convenient care through the use of technology. As with any health care service, there are risks associated with the use of telemedicine, including equipment failure, poor connections, and  issues.  • Do you understand the risks and benefits of telemedicine as I have explained them to you? Yes  • Have your questions regarding telemedicine been answered? Yes  • Do you consent to the use of telemedicine in your medical care today? Yes      Chief Complaint  Hospital Follow Up Visit    [facility]  [facility]  Telemedicine note    This was a telephone video enabled telemedicine encounter.    Subjective    History of Present Illness {CC  Problem List  Visit  Diagnosis   Encounters  Notes  Medications  Labs  Result Review Imaging  Media :23}     Cecilio Farias, 66 y.o. male with aortic stenosis s/p mechanical AVR 2009, PAH, AF, HTN, dyslipidemia, HTN, T2DM presents to [facility] telemedicine visit for Hospital Follow Up Visit.    Patient recently evaluated at [facility] emergency department for abdominal pain and found to have rectus sheath hematoma. Lovenox was discontinued and patient discharged on warfarin only; no bridge. Patient was also instructed to hold amlodipine/lisinopril at discharge as BP was well-controlled. Patient completed follow-up with anticoagulation clinic with warfarin adjusted for INR 1.9.    Patient presents today feeling well overall from a cardiovascular standpoint. He does report elevated blood pressure on recent monitoring with SBP greater than 140, follow-up with PCP scheduled in approximately 2 weeks but no f/u yet since discharge. He denies anginal symptoms, dyspnea, tachypalpitations, near  syncope/syncope.      Objective     Vital Signs:   Vitals:    05/20/22 0904   BP: 148/76     There is no height or weight on file to calculate BMI.  Physical Exam  Vitals reviewed.   Pulmonary:      Effort: Pulmonary effort is normal. No respiratory distress.   Neurological:      Mental Status: He is alert and oriented to person, place, and time.   Psychiatric:         Mood and Affect: Mood normal.         Behavior: Behavior normal.         Thought Content: Thought content normal.       Data Reviewed:{ Labs  Result Review  Imaging  Med Tab  Media :23}     Protime-INR (05/17/2022)  Hemoglobin & Hematocrit, Blood (05/13/2022 08:05)  Comprehensive Metabolic Panel (05/12/2022 04:58)    Adult Transthoracic Echo Complete W/ Cont if Necessary Per Protocol (05/11/2022 17:06)  Cardiac Catheterization/Vascular Study (01/20/2021 09:02)    Discharge Summary by Jayna Burgess DO (05/13/2022 10:12)  Progress Notes by Arun Jackson MD (05/10/2022 10:15)      Assessment and Plan {CC Problem List  Visit Diagnosis  ROS  Review (Popup)  [employer] Maintenance  Quality  BestPractice  Medications  SmartSets  SnapShot Encounters  Media :23}     This visit has been scheduled as a telephone visit to comply with patient safety concerns in accordance with CDC recommendations. Total time of discussion was 14 minutes.    1. History of mechanical AVR for aortic stenosis 2009  -Continue warfarin and anticoagulation clinic follow-up as directed  -Recent TTE with peak/mean gradient within defined limits    2. Essential hypertension  -Amlodipine/lisinopril held at ED discharge, not restarted yet. Elevated on last BP check  -Restart amlodipine 10mg daily  -Continue home BP monitoring, f/u with PCP as scheduled for possible re-initiation of lisinopril if BP still elevated.  -Potassium refilled    3. PAF on chronic warfarin (CMS/HCC)  -NSR on recent ECG, denies tachypalpitations  -Continue anticoagulation with warfarin as  above  -Continue with cardiology f/u as scheduled    Follow Up {Instructions Charge Capture  Follow-up Communications :23}   Return if symptoms worsen or fail to improve.    Patient was given instructions and counseling regarding his condition or for health maintenance advice. Please see specific information pulled into the AVS if appropriate.  Patient was instructed to call the [facility] Center with any questions, concerns, or worsening symptoms.    *Please note that portions of this note were completed with a voice recognition program. Efforts were made to edit the dictations, but occasionally words are mistranscribed.

## 2022-05-23 ENCOUNTER — READMISSION MANAGEMENT (OUTPATIENT)
Dept: CALL CENTER | Facility: HOSPITAL | Age: 67
End: 2022-05-23

## 2022-05-23 NOTE — OUTREACH NOTE
Medical Week 1 Survey    Flowsheet Row Responses   [facility] patient discharged from? [facility]   Does the patient have one of the following disease processes/diagnoses(primary or secondary)? Other   Week 1 attempt successful? Yes   Call start time 1431   Call end time 1440   Discharge diagnosis small bowel obstruction   Person spoke with today (if not patient) and relationship patient   Meds reviewed with patient/caregiver? Yes   Is the patient having any side effects they believe may be caused by any medication additions or changes? No   Does the patient have all medications ordered at discharge? Yes   Is the patient taking all medications as directed (includes completed medication regime)? Yes   Does the patient have a primary care provider?  Yes   Does the patient have an appointment with their PCP within 7 days of discharge? Greater than 7 days   Comments regarding PCP Pt will see primary this (previous appt was rescheduled).   Has the patient kept scheduled appointments due by today? Yes   Psychosocial issues? No   Did the patient receive a copy of their discharge instructions? Yes   Nursing interventions Reviewed instructions with patient   What is the patient's perception of their health status since discharge? Improving   Is the patient/caregiver able to teach back the hierarchy of who to call/visit for symptoms/problems? PCP, Specialist, Home health nurse, Urgent Care, ED, 911 Yes   If the patient is a current smoker, are they able to teach back resources for cessation? Not a smoker   Additional teach back comments patient's nephew is a paramedic and will check BP on wednesday   Week 1 call completed? Yes   Wrap up additional comments Pt state he is improving.           CHINO HOOVER - Registered Nurse

## 2022-06-03 ENCOUNTER — TELEPHONE (OUTPATIENT)
Dept: PHARMACY | Facility: HOSPITAL | Age: 67
End: 2022-06-03

## 2022-06-07 ENCOUNTER — TRANSCRIBE ORDERS (OUTPATIENT)
Dept: LAB | Facility: HOSPITAL | Age: 67
End: 2022-06-07

## 2022-06-07 ENCOUNTER — ANTICOAGULATION VISIT (OUTPATIENT)
Dept: PHARMACY | Facility: HOSPITAL | Age: 67
End: 2022-06-07

## 2022-06-07 ENCOUNTER — LAB (OUTPATIENT)
Dept: LAB | Facility: HOSPITAL | Age: 67
End: 2022-06-07

## 2022-06-07 DIAGNOSIS — Z95.2 HISTORY OF MECHANICAL AORTIC VALVE REPLACEMENT: ICD-10-CM

## 2022-06-07 DIAGNOSIS — Z95.2 HEART VALVE REPLACED BY TRANSPLANT: ICD-10-CM

## 2022-06-07 DIAGNOSIS — I33.0 ACUTE AND SUBACUTE BACTERIAL ENDOCARDITIS: ICD-10-CM

## 2022-06-07 DIAGNOSIS — I33.0 ACUTE AND SUBACUTE BACTERIAL ENDOCARDITIS: Primary | ICD-10-CM

## 2022-06-07 LAB
ALBUMIN SERPL-MCNC: 3.6 G/DL (ref 3.5–5.2)
ALBUMIN/GLOB SERPL: 1.1 G/DL
ALP SERPL-CCNC: 124 U/L (ref 39–117)
ALT SERPL W P-5'-P-CCNC: 16 U/L (ref 1–41)
ANION GAP SERPL CALCULATED.3IONS-SCNC: 7 MMOL/L (ref 5–15)
AST SERPL-CCNC: 28 U/L (ref 1–40)
BASOPHILS # BLD AUTO: 0.06 10*3/MM3 (ref 0–0.2)
BASOPHILS NFR BLD AUTO: 1.2 % (ref 0–1.5)
BILIRUB SERPL-MCNC: 0.8 MG/DL (ref 0–1.2)
BUN SERPL-MCNC: 22 MG/DL (ref 8–23)
BUN/CREAT SERPL: 25.9 (ref 7–25)
CALCIUM SPEC-SCNC: 8.7 MG/DL (ref 8.6–10.5)
CHLORIDE SERPL-SCNC: 108 MMOL/L (ref 98–107)
CO2 SERPL-SCNC: 26 MMOL/L (ref 22–29)
CREAT SERPL-MCNC: 0.85 MG/DL (ref 0.76–1.27)
CRP SERPL-MCNC: 0.33 MG/DL (ref 0–0.5)
DEPRECATED RDW RBC AUTO: 51.9 FL (ref 37–54)
EGFRCR SERPLBLD CKD-EPI 2021: 96.4 ML/MIN/1.73
EOSINOPHIL # BLD AUTO: 0.23 10*3/MM3 (ref 0–0.4)
EOSINOPHIL NFR BLD AUTO: 4.5 % (ref 0.3–6.2)
ERYTHROCYTE [DISTWIDTH] IN BLOOD BY AUTOMATED COUNT: 15.5 % (ref 12.3–15.4)
ERYTHROCYTE [SEDIMENTATION RATE] IN BLOOD: 61 MM/HR (ref 0–20)
GLOBULIN UR ELPH-MCNC: 3.2 GM/DL
GLUCOSE SERPL-MCNC: 110 MG/DL (ref 65–99)
HCT VFR BLD AUTO: 36.9 % (ref 37.5–51)
HGB BLD-MCNC: 11.3 G/DL (ref 13–17.7)
IMM GRANULOCYTES # BLD AUTO: 0.02 10*3/MM3 (ref 0–0.05)
IMM GRANULOCYTES NFR BLD AUTO: 0.4 % (ref 0–0.5)
INR PPP: 2.76 (ref 0.84–1.13)
LYMPHOCYTES # BLD AUTO: 1.1 10*3/MM3 (ref 0.7–3.1)
LYMPHOCYTES NFR BLD AUTO: 21.5 % (ref 19.6–45.3)
MCH RBC QN AUTO: 28 PG (ref 26.6–33)
MCHC RBC AUTO-ENTMCNC: 30.6 G/DL (ref 31.5–35.7)
MCV RBC AUTO: 91.3 FL (ref 79–97)
MONOCYTES # BLD AUTO: 0.62 10*3/MM3 (ref 0.1–0.9)
MONOCYTES NFR BLD AUTO: 12.1 % (ref 5–12)
NEUTROPHILS NFR BLD AUTO: 3.09 10*3/MM3 (ref 1.7–7)
NEUTROPHILS NFR BLD AUTO: 60.3 % (ref 42.7–76)
NRBC BLD AUTO-RTO: 0 /100 WBC (ref 0–0.2)
PLATELET # BLD AUTO: 228 10*3/MM3 (ref 140–450)
PMV BLD AUTO: 9.4 FL (ref 6–12)
POTASSIUM SERPL-SCNC: 4.2 MMOL/L (ref 3.5–5.2)
PROT SERPL-MCNC: 6.8 G/DL (ref 6–8.5)
PROTHROMBIN TIME: 29.3 SECONDS (ref 11.4–14.4)
RBC # BLD AUTO: 4.04 10*6/MM3 (ref 4.14–5.8)
SODIUM SERPL-SCNC: 141 MMOL/L (ref 136–145)
WBC NRBC COR # BLD: 5.12 10*3/MM3 (ref 3.4–10.8)

## 2022-06-07 PROCEDURE — 85652 RBC SED RATE AUTOMATED: CPT

## 2022-06-07 PROCEDURE — 85610 PROTHROMBIN TIME: CPT

## 2022-06-07 PROCEDURE — 85025 COMPLETE CBC W/AUTO DIFF WBC: CPT

## 2022-06-07 PROCEDURE — 36415 COLL VENOUS BLD VENIPUNCTURE: CPT

## 2022-06-07 PROCEDURE — 80053 COMPREHEN METABOLIC PANEL: CPT

## 2022-06-07 PROCEDURE — 86140 C-REACTIVE PROTEIN: CPT

## 2022-06-07 NOTE — PROGRESS NOTES
Anticoagulation Clinic - Remote Progress Note  REMOTE LAB     Indication: mechanical AVR  Referring Provider: Arun Jackson (12/17/2021)  Initial Warfarin Start Date: 2009  Goal INR: 2.5-3.5  Current Drug Interactions: aspirin     Diet: not much GLV 4/18/22  Alcohol: none  Tobacco: none  OTC Pain Medication:     INR History:  Date 8/2 10/26 12/21 1/19/18 2/20 3/27 4/27 5/16 6/11 7/16 9/20 11/2   Total Weekly Dose 62.5mg 62.5mg 62.5mg 62.5mg 62.5mg 62.5mg 62.5mg 62.5mg 62.5mg 62.5mg 62.5mg 62.5mg   INR 2.2 2.9 3.2 3.0 2.9 3.4 3.7 2.9 2.6 2.9 2.9 3.2   Notes             sick                Date 12/21 2/1/19 3/11 4/24 5/23 6/24 7/11 7/18 7/22 8/15 9/9 9/25   Total Weekly Dose 62.5mg 62.5mg 62.5mg 62.5mg 62.5mg 62.5mg 52.5mg 55mg 57.5mg 57.5mg 57.5mg 57.5mg   INR 3.4 3.34 2.8 3.0 3.3 3.6 2.52 2.27 2.9 2.9 2.5 2.6   Notes recv'd 12/26       self report; recent hosp; Augmentin Merrem post-disch for sepsis merrem merrem 1x incr dose, merrem        Date 11/8 12/30 1/24/20 4/13 6/2 7/8 8/31 10/13 12/1 1/14/21 2/2 3/4   Total Weekly Dose 57.5mg 57.5mg 57.5mg 57.5mg 57.5mg 57.5mg 57.5mg 60mg 60mg 60mg 60mg 60mg   INR 2.4 2.4 3.7 2.2 2.9 2.3 2.2 2.8 3.5 3.5 3.23 2.4   Notes  incr GLV   nerve control 911 supplement recv'd 7/9  incr GLV recv'd 9/2; incr GLV    incr GLV incr GLV     Date 3/30  5/10 6/21 8/3  9/27  11/16-19 12/15  3/8   Total WeeklyDose 60mg non- compliant 60mg 60mg 60mg non- compliant 60mg non-  compliant [facility] admit 60mg Non compliance 60 mg   INR 2.5  2.8 3.6 3.12  3.1   3.2  2.9   Notes 1xboost   Less GLV     2x hold &  2x boost          Date 4/15  5/11-5/13 5/18 6/7           Total Weekly Dose 60 mg  [facility]   45 mg  60 mg            INR 2.8    1.9 2.76           Notes rec'd 4/18 No bridge heldx2              **will take minocycline 100mg BID indefinitely**     Phone Interview:  Tablet Strength: 5mg tabs  Patient Contact Info: [phone] (Home); [phone] (Mobile)  Lab Contact Info: [facility]  [place] [phone]  Verbal release 7/23/19 may speak with Bassemruth (wife), Puneet or Ty (sons)- ok to LVM    Patient Findings  Negatives:  Signs/symptoms of thrombosis, Signs/symptoms of bleeding, Laboratory test error suspected, Change in health, Change in alcohol use, Change in activity, Upcoming invasive procedure, Emergency department visit, Upcoming dental procedure, Missed doses, Extra doses, Change in medications, Change in diet/appetite, Hospital admission, Bruising, Other complaints   Comments:  His minocycline ppx for blood disorder has been resumed since discharge. He was admitted to [facility] from 5/11 thru 5/13 (warfarin held).  Unable to reach Mr. Farias until 6/15, though [facility] since 6/7.  Encouraged him to contact the clinic within 24 hrs of lab draw even if he missed messages from clinic  Otherwise, above findings negative     Plan:  1. INR is therapeutic today at  2.67 (6/7). UNABLE TO GET IN CONTACT WITH Mr. Farias until 6/15.  Instructed Mr. Farias to continue usual maintenance regimen of warfarin 7.5 mg oral daily except 10 mg on MonWedFri until recheck   2. Repeat INR in four weeks from last check, on 7/5. He declined checking any sooner.  Patient has extensive history of non-compliance with requested follow-up.  3. Verbal information provided over the phone. Cecilio Farias RBV dosing instructions, expresses understanding by teach back, and has no further questions at this time.    Arabella Hebert, PharmD  6/15/2022  09:14 EDT

## 2022-06-13 RX ORDER — WARFARIN SODIUM 5 MG/1
TABLET ORAL
Qty: 60 TABLET | Refills: 2 | Status: SHIPPED | OUTPATIENT
Start: 2022-06-13 | End: 2022-09-13 | Stop reason: SDUPTHER

## 2022-07-11 ENCOUNTER — TELEPHONE (OUTPATIENT)
Dept: PHARMACY | Facility: HOSPITAL | Age: 67
End: 2022-07-11

## 2022-08-01 ENCOUNTER — APPOINTMENT (OUTPATIENT)
Dept: CT IMAGING | Facility: HOSPITAL | Age: 67
End: 2022-08-01

## 2022-08-01 ENCOUNTER — HOSPITAL ENCOUNTER (INPATIENT)
Facility: HOSPITAL | Age: 67
LOS: 3 days | Discharge: HOME | End: 2022-08-05
Attending: EMERGENCY MEDICINE | Admitting: INTERNAL MEDICINE

## 2022-08-01 DIAGNOSIS — Z79.01 ANTICOAGULATED: ICD-10-CM

## 2022-08-01 DIAGNOSIS — K56.609 SMALL BOWEL OBSTRUCTION: Primary | ICD-10-CM

## 2022-08-01 DIAGNOSIS — R10.32 ACUTE ABDOMINAL PAIN IN LEFT LOWER QUADRANT: ICD-10-CM

## 2022-08-01 LAB
ALBUMIN SERPL-MCNC: 4.2 G/DL (ref 3.5–5.2)
ALBUMIN/GLOB SERPL: 1.3 G/DL
ALP SERPL-CCNC: 131 U/L (ref 39–117)
ALT SERPL W P-5'-P-CCNC: 22 U/L (ref 1–41)
ANION GAP SERPL CALCULATED.3IONS-SCNC: 10 MMOL/L (ref 5–15)
APTT PPP: 58.7 SECONDS (ref 60–90)
AST SERPL-CCNC: 37 U/L (ref 1–40)
BACTERIA UR QL AUTO: ABNORMAL /HPF
BASOPHILS # BLD AUTO: 0.05 10*3/MM3 (ref 0–0.2)
BASOPHILS NFR BLD AUTO: 0.5 % (ref 0–1.5)
BILIRUB SERPL-MCNC: 1.7 MG/DL (ref 0–1.2)
BILIRUB UR QL STRIP: NEGATIVE
BUN SERPL-MCNC: 24 MG/DL (ref 8–23)
BUN/CREAT SERPL: 25.3 (ref 7–25)
CALCIUM SPEC-SCNC: 9 MG/DL (ref 8.6–10.5)
CHLORIDE SERPL-SCNC: 101 MMOL/L (ref 98–107)
CLARITY UR: CLEAR
CO2 SERPL-SCNC: 29 MMOL/L (ref 22–29)
COLOR UR: ABNORMAL
CREAT SERPL-MCNC: 0.95 MG/DL (ref 0.76–1.27)
D-LACTATE SERPL-SCNC: 1.7 MMOL/L (ref 0.5–2)
DEPRECATED RDW RBC AUTO: 50.1 FL (ref 37–54)
EGFRCR SERPLBLD CKD-EPI 2021: 88.8 ML/MIN/1.73
EOSINOPHIL # BLD AUTO: 0.15 10*3/MM3 (ref 0–0.4)
EOSINOPHIL NFR BLD AUTO: 1.6 % (ref 0.3–6.2)
ERYTHROCYTE [DISTWIDTH] IN BLOOD BY AUTOMATED COUNT: 15.9 % (ref 12.3–15.4)
FLUAV RNA RESP QL NAA+PROBE: NOT DETECTED
FLUBV RNA RESP QL NAA+PROBE: NOT DETECTED
GLOBULIN UR ELPH-MCNC: 3.3 GM/DL
GLUCOSE BLDC GLUCOMTR-MCNC: 130 MG/DL (ref 70–130)
GLUCOSE SERPL-MCNC: 181 MG/DL (ref 65–99)
GLUCOSE UR STRIP-MCNC: NEGATIVE MG/DL
HBA1C MFR BLD: 6.5 % (ref 4.8–5.6)
HCT VFR BLD AUTO: 47.2 % (ref 37.5–51)
HGB BLD-MCNC: 14.7 G/DL (ref 13–17.7)
HGB UR QL STRIP.AUTO: NEGATIVE
HOLD SPECIMEN: NORMAL
HYALINE CASTS UR QL AUTO: ABNORMAL /LPF
IMM GRANULOCYTES # BLD AUTO: 0.08 10*3/MM3 (ref 0–0.05)
IMM GRANULOCYTES NFR BLD AUTO: 0.9 % (ref 0–0.5)
INR PPP: 2.72 (ref 0.84–1.13)
KETONES UR QL STRIP: ABNORMAL
LEUKOCYTE ESTERASE UR QL STRIP.AUTO: NEGATIVE
LIPASE SERPL-CCNC: 43 U/L (ref 13–60)
LYMPHOCYTES # BLD AUTO: 1.03 10*3/MM3 (ref 0.7–3.1)
LYMPHOCYTES NFR BLD AUTO: 11.1 % (ref 19.6–45.3)
MCH RBC QN AUTO: 27.2 PG (ref 26.6–33)
MCHC RBC AUTO-ENTMCNC: 31.1 G/DL (ref 31.5–35.7)
MCV RBC AUTO: 87.2 FL (ref 79–97)
MONOCYTES # BLD AUTO: 0.69 10*3/MM3 (ref 0.1–0.9)
MONOCYTES NFR BLD AUTO: 7.5 % (ref 5–12)
NEUTROPHILS NFR BLD AUTO: 7.24 10*3/MM3 (ref 1.7–7)
NEUTROPHILS NFR BLD AUTO: 78.4 % (ref 42.7–76)
NITRITE UR QL STRIP: NEGATIVE
NRBC BLD AUTO-RTO: 0 /100 WBC (ref 0–0.2)
PH UR STRIP.AUTO: 5.5 [PH] (ref 5–8)
PLATELET # BLD AUTO: 238 10*3/MM3 (ref 140–450)
PMV BLD AUTO: 10.4 FL (ref 6–12)
POTASSIUM SERPL-SCNC: 4.7 MMOL/L (ref 3.5–5.2)
PROT SERPL-MCNC: 7.5 G/DL (ref 6–8.5)
PROT UR QL STRIP: ABNORMAL
PROTHROMBIN TIME: 29 SECONDS (ref 11.4–14.4)
RBC # BLD AUTO: 5.41 10*6/MM3 (ref 4.14–5.8)
RBC # UR STRIP: ABNORMAL /HPF
REF LAB TEST METHOD: ABNORMAL
SARS-COV-2 RNA RESP QL NAA+PROBE: NOT DETECTED
SODIUM SERPL-SCNC: 140 MMOL/L (ref 136–145)
SP GR UR STRIP: 1.02 (ref 1–1.03)
SQUAMOUS #/AREA URNS HPF: ABNORMAL /HPF
UFH PPP CHRO-ACNC: 0.1 IU/ML (ref 0.3–0.7)
UROBILINOGEN UR QL STRIP: ABNORMAL
WBC # UR STRIP: ABNORMAL /HPF
WBC NRBC COR # BLD: 9.24 10*3/MM3 (ref 3.4–10.8)
WHOLE BLOOD HOLD COAG: NORMAL
WHOLE BLOOD HOLD SPECIMEN: NORMAL

## 2022-08-01 PROCEDURE — 99284 EMERGENCY DEPT VISIT MOD MDM: CPT

## 2022-08-01 PROCEDURE — 99223 1ST HOSP IP/OBS HIGH 75: CPT | Performed by: FAMILY MEDICINE

## 2022-08-01 PROCEDURE — 83690 ASSAY OF LIPASE: CPT

## 2022-08-01 PROCEDURE — G0378 HOSPITAL OBSERVATION PER HR: HCPCS

## 2022-08-01 PROCEDURE — 85610 PROTHROMBIN TIME: CPT | Performed by: EMERGENCY MEDICINE

## 2022-08-01 PROCEDURE — 83605 ASSAY OF LACTIC ACID: CPT

## 2022-08-01 PROCEDURE — 81001 URINALYSIS AUTO W/SCOPE: CPT

## 2022-08-01 PROCEDURE — 85520 HEPARIN ASSAY: CPT | Performed by: FAMILY MEDICINE

## 2022-08-01 PROCEDURE — 85730 THROMBOPLASTIN TIME PARTIAL: CPT | Performed by: FAMILY MEDICINE

## 2022-08-01 PROCEDURE — 25010000002 HYDROMORPHONE PER 4 MG: Performed by: EMERGENCY MEDICINE

## 2022-08-01 PROCEDURE — 80053 COMPREHEN METABOLIC PANEL: CPT

## 2022-08-01 PROCEDURE — 85025 COMPLETE CBC W/AUTO DIFF WBC: CPT

## 2022-08-01 PROCEDURE — 74176 CT ABD & PELVIS W/O CONTRAST: CPT

## 2022-08-01 PROCEDURE — 25010000002 ONDANSETRON PER 1 MG: Performed by: EMERGENCY MEDICINE

## 2022-08-01 PROCEDURE — 25010000002 HEPARIN (PORCINE) 25000-0.45 UT/250ML-% SOLUTION: Performed by: FAMILY MEDICINE

## 2022-08-01 PROCEDURE — 83036 HEMOGLOBIN GLYCOSYLATED A1C: CPT | Performed by: FAMILY MEDICINE

## 2022-08-01 PROCEDURE — 25010000002 MORPHINE PER 10 MG: Performed by: FAMILY MEDICINE

## 2022-08-01 PROCEDURE — 93005 ELECTROCARDIOGRAM TRACING: CPT | Performed by: FAMILY MEDICINE

## 2022-08-01 PROCEDURE — 87636 SARSCOV2 & INF A&B AMP PRB: CPT | Performed by: FAMILY MEDICINE

## 2022-08-01 PROCEDURE — 82962 GLUCOSE BLOOD TEST: CPT

## 2022-08-01 RX ORDER — MORPHINE SULFATE 2 MG/ML
2 INJECTION, SOLUTION INTRAMUSCULAR; INTRAVENOUS
Status: DISCONTINUED | OUTPATIENT
Start: 2022-08-01 | End: 2022-08-05 | Stop reason: HOSPADM

## 2022-08-01 RX ORDER — SODIUM CHLORIDE 9 MG/ML
75 INJECTION, SOLUTION INTRAVENOUS CONTINUOUS
Status: DISCONTINUED | OUTPATIENT
Start: 2022-08-01 | End: 2022-08-05 | Stop reason: HOSPADM

## 2022-08-01 RX ORDER — ONDANSETRON 2 MG/ML
4 INJECTION INTRAMUSCULAR; INTRAVENOUS ONCE
Status: COMPLETED | OUTPATIENT
Start: 2022-08-01 | End: 2022-08-01

## 2022-08-01 RX ORDER — ONDANSETRON 2 MG/ML
4 INJECTION INTRAMUSCULAR; INTRAVENOUS EVERY 6 HOURS PRN
Status: DISCONTINUED | OUTPATIENT
Start: 2022-08-01 | End: 2022-08-05 | Stop reason: HOSPADM

## 2022-08-01 RX ORDER — INSULIN LISPRO 100 [IU]/ML
0-7 INJECTION, SOLUTION INTRAVENOUS; SUBCUTANEOUS
Status: DISCONTINUED | OUTPATIENT
Start: 2022-08-01 | End: 2022-08-05 | Stop reason: HOSPADM

## 2022-08-01 RX ORDER — HYDRALAZINE HYDROCHLORIDE 20 MG/ML
10 INJECTION INTRAMUSCULAR; INTRAVENOUS EVERY 6 HOURS PRN
Status: DISCONTINUED | OUTPATIENT
Start: 2022-08-01 | End: 2022-08-05 | Stop reason: HOSPADM

## 2022-08-01 RX ORDER — SODIUM CHLORIDE 0.9 % (FLUSH) 0.9 %
10 SYRINGE (ML) INJECTION AS NEEDED
Status: DISCONTINUED | OUTPATIENT
Start: 2022-08-01 | End: 2022-08-05 | Stop reason: HOSPADM

## 2022-08-01 RX ORDER — DEXTROSE MONOHYDRATE 25 G/50ML
25 INJECTION, SOLUTION INTRAVENOUS
Status: DISCONTINUED | OUTPATIENT
Start: 2022-08-01 | End: 2022-08-05 | Stop reason: HOSPADM

## 2022-08-01 RX ORDER — SODIUM CHLORIDE 0.9 % (FLUSH) 0.9 %
10 SYRINGE (ML) INJECTION EVERY 12 HOURS SCHEDULED
Status: DISCONTINUED | OUTPATIENT
Start: 2022-08-01 | End: 2022-08-05 | Stop reason: HOSPADM

## 2022-08-01 RX ORDER — OXYMETAZOLINE HYDROCHLORIDE 0.05 G/100ML
1 SPRAY NASAL ONCE
Status: COMPLETED | OUTPATIENT
Start: 2022-08-01 | End: 2022-08-01

## 2022-08-01 RX ORDER — HEPARIN SODIUM 1000 [USP'U]/ML
2000 INJECTION, SOLUTION INTRAVENOUS; SUBCUTANEOUS AS NEEDED
Status: DISCONTINUED | OUTPATIENT
Start: 2022-08-01 | End: 2022-08-01

## 2022-08-01 RX ORDER — ONDANSETRON 4 MG/1
4 TABLET, FILM COATED ORAL EVERY 6 HOURS PRN
Status: DISCONTINUED | OUTPATIENT
Start: 2022-08-01 | End: 2022-08-05 | Stop reason: HOSPADM

## 2022-08-01 RX ORDER — HEPARIN SODIUM 1000 [USP'U]/ML
4000 INJECTION, SOLUTION INTRAVENOUS; SUBCUTANEOUS AS NEEDED
Status: DISCONTINUED | OUTPATIENT
Start: 2022-08-01 | End: 2022-08-01

## 2022-08-01 RX ORDER — SODIUM CHLORIDE 9 MG/ML
10 INJECTION INTRAVENOUS AS NEEDED
Status: DISCONTINUED | OUTPATIENT
Start: 2022-08-01 | End: 2022-08-05 | Stop reason: HOSPADM

## 2022-08-01 RX ORDER — NALOXONE HCL 0.4 MG/ML
0.4 VIAL (ML) INJECTION
Status: DISCONTINUED | OUTPATIENT
Start: 2022-08-01 | End: 2022-08-05 | Stop reason: HOSPADM

## 2022-08-01 RX ORDER — HEPARIN SODIUM 10000 [USP'U]/100ML
12 INJECTION, SOLUTION INTRAVENOUS
Status: DISCONTINUED | OUTPATIENT
Start: 2022-08-01 | End: 2022-08-05

## 2022-08-01 RX ORDER — HYDROMORPHONE HYDROCHLORIDE 1 MG/ML
0.5 INJECTION, SOLUTION INTRAMUSCULAR; INTRAVENOUS; SUBCUTANEOUS ONCE
Status: COMPLETED | OUTPATIENT
Start: 2022-08-01 | End: 2022-08-01

## 2022-08-01 RX ORDER — ACETAMINOPHEN 650 MG/1
650 SUPPOSITORY RECTAL EVERY 4 HOURS PRN
Status: DISCONTINUED | OUTPATIENT
Start: 2022-08-01 | End: 2022-08-05 | Stop reason: HOSPADM

## 2022-08-01 RX ORDER — HEPARIN SODIUM 1000 [USP'U]/ML
4000 INJECTION, SOLUTION INTRAVENOUS; SUBCUTANEOUS ONCE
Status: COMPLETED | OUTPATIENT
Start: 2022-08-02 | End: 2022-08-01

## 2022-08-01 RX ORDER — CARVEDILOL 3.12 MG/1
3.12 TABLET ORAL 2 TIMES DAILY
COMMUNITY
Start: 2022-06-20

## 2022-08-01 RX ORDER — ACETAMINOPHEN 325 MG/1
650 TABLET ORAL EVERY 4 HOURS PRN
Status: DISCONTINUED | OUTPATIENT
Start: 2022-08-01 | End: 2022-08-05 | Stop reason: HOSPADM

## 2022-08-01 RX ORDER — ACETAMINOPHEN 160 MG/5ML
650 SOLUTION ORAL EVERY 4 HOURS PRN
Status: DISCONTINUED | OUTPATIENT
Start: 2022-08-01 | End: 2022-08-05 | Stop reason: HOSPADM

## 2022-08-01 RX ORDER — NICOTINE POLACRILEX 4 MG
15 LOZENGE BUCCAL
Status: DISCONTINUED | OUTPATIENT
Start: 2022-08-01 | End: 2022-08-05 | Stop reason: HOSPADM

## 2022-08-01 RX ADMIN — MORPHINE SULFATE 2 MG: 2 INJECTION, SOLUTION INTRAMUSCULAR; INTRAVENOUS at 14:41

## 2022-08-01 RX ADMIN — Medication 10 ML: at 20:00

## 2022-08-01 RX ADMIN — HEPARIN SODIUM 8 UNITS/KG/HR: 10000 INJECTION, SOLUTION INTRAVENOUS at 16:43

## 2022-08-01 RX ADMIN — SODIUM CHLORIDE 75 ML/HR: 9 INJECTION, SOLUTION INTRAVENOUS at 16:40

## 2022-08-01 RX ADMIN — HEPARIN SODIUM 4000 UNITS: 1000 INJECTION, SOLUTION INTRAVENOUS; SUBCUTANEOUS at 23:21

## 2022-08-01 RX ADMIN — ONDANSETRON 4 MG: 2 INJECTION INTRAMUSCULAR; INTRAVENOUS at 12:19

## 2022-08-01 RX ADMIN — HYDROMORPHONE HYDROCHLORIDE 0.5 MG: 1 INJECTION, SOLUTION INTRAMUSCULAR; INTRAVENOUS; SUBCUTANEOUS at 12:19

## 2022-08-01 RX ADMIN — MORPHINE SULFATE 2 MG: 2 INJECTION, SOLUTION INTRAMUSCULAR; INTRAVENOUS at 22:35

## 2022-08-01 RX ADMIN — MORPHINE SULFATE 2 MG: 2 INJECTION, SOLUTION INTRAMUSCULAR; INTRAVENOUS at 19:54

## 2022-08-01 RX ADMIN — OXYMETAZOLINE HCL 1 SPRAY: 0.05 SPRAY NASAL at 14:41

## 2022-08-01 NOTE — ED PROVIDER NOTES
Subjective   Mr. Farias presents with abdominal pain, distention, and vomiting.  He typically has bowel movements daily but has had none for 3 days.  He notes no passage of flatus today.  He denies fevers, chills, shortness of breath or upper respiratory symptoms.  He has history of small bowel obstruction and tells me this feels similar.  He has had prior colon cancer resection.  He was admitted in May of this year for similar symptoms.  He improved with conservative management.  Surgery was considered but he had rectus sheath hematoma at the time and it is noted that surgery will be considered when that has resolved.  He is on chronic Coumadin for mechanical aortic valve replacement.  He notes that the pain is intermittent and comes in waves.      Abdominal Pain  Pain location:  Generalized  Pain quality: cramping    Pain radiates to:  Does not radiate  Pain severity:  Severe  Onset quality:  Gradual  Timing:  Intermittent  Progression:  Worsening  Chronicity:  Recurrent  Context comment:  With prior abdominal surgery on multiple hernias  Relieved by:  Nothing  Worsened by:  Nothing  Ineffective treatments:  None tried  Associated symptoms: anorexia, constipation, nausea and vomiting    Associated symptoms: no chest pain, no chills, no cough, no diarrhea, no fever and no shortness of breath        Review of Systems   Constitutional: Negative for chills and fever.   HENT: Negative for congestion and rhinorrhea.    Respiratory: Negative for cough and shortness of breath.    Cardiovascular: Negative for chest pain.   Gastrointestinal: Positive for abdominal pain, anorexia, constipation, nausea and vomiting. Negative for diarrhea.   Genitourinary: Negative for difficulty urinating.   Musculoskeletal: Negative for neck pain.   Neurological: Negative for dizziness and headaches.   Psychiatric/Behavioral: Negative for confusion.   All other systems reviewed and are negative.      Past Medical History:   Diagnosis Date   •  Cancer (HCC)     colon cancer with operation/ chemotherapy/radiation    • CHF (congestive heart failure) (HCC) 2009    Acute Systolic    • Chronic anticoagulation     coumadin   • Diabetes mellitus (HCC)    • H/O aortic valve replacement    • HLD (hyperlipidemia)    • Hypertension    • Sepsis due to Acinetobacter species (HCC) 6/13/2019       Allergies   Allergen Reactions   • Sulfa Antibiotics    • Amoxicillin Rash     Has tolerated ceftriaxone       Past Surgical History:   Procedure Laterality Date   • AORTIC VALVE REPAIR/REPLACEMENT  2009    25 mm. [place] AVR   • ASCENDING AORTIC ANEURYSM REPAIR  2009   • BACK SURGERY     • CARDIAC CATHETERIZATION N/A 1/20/2021    Procedure: RIGHT AND LEFT HEART CATH;  Surgeon: Kurtis Smith MD;  Location: [facility] CATH INVASIVE LOCATION;  Service: Cardiology;  Laterality: N/A;   • COLON SURGERY      Colon cancer with operation   • FOOT SURGERY      bilateral 2/2 wound and trauma        Family History   Problem Relation Age of Onset   • Heart disease Mother    • Hyperlipidemia Mother    • Diabetes Mother    • Leukemia Father    • Cancer Sister    • Kidney failure Brother    • Hepatitis Brother    • Cancer Sister    • Cancer Brother    • Heart disease Brother    • Heart attack Brother        Social History     Socioeconomic History   • Marital status:    Tobacco Use   • Smoking status: Never Smoker   • Smokeless tobacco: Never Used   Vaping Use   • Vaping Use: Never used   Substance and Sexual Activity   • Alcohol use: No   • Drug use: No   • Sexual activity: Defer           Objective   Physical Exam  Vitals and nursing note reviewed.   Constitutional:       General: He is not in acute distress.     Appearance: Normal appearance.   HENT:      Head: Normocephalic and atraumatic.      Nose: Nose normal. No congestion or rhinorrhea.      Mouth/Throat:      Mouth: Mucous membranes are moist.      Pharynx: No posterior oropharyngeal erythema.   Eyes:      General: No  scleral icterus.     Conjunctiva/sclera: Conjunctivae normal.   Neck:      Comments: No JVD  Cardiovascular:      Rate and Rhythm: Normal rate and regular rhythm.      Heart sounds: No murmur heard.    No friction rub.   Pulmonary:      Effort: Pulmonary effort is normal. No respiratory distress.      Breath sounds: Normal breath sounds. No wheezing or rales.   Abdominal:      General: Bowel sounds are normal. There is distension.      Tenderness: There is abdominal tenderness. There is no guarding or rebound.      Comments: Abdomen is distended and fairly firm.  Bowel sounds are present.  Rebound tenderness is absent.  He has multiple palpable hernias.  There is no erythema or bruising noted over the abdominal wall.  He has mild diffuse tenderness.   Musculoskeletal:         General: No tenderness.      Cervical back: Normal range of motion and neck supple.      Right lower leg: No edema.      Left lower leg: No edema.   Skin:     General: Skin is warm and dry.      Capillary Refill: Capillary refill takes less than 2 seconds.      Coloration: Skin is not pale.   Neurological:      General: No focal deficit present.      Mental Status: He is alert and oriented to person, place, and time.      Coordination: Coordination normal.   Psychiatric:         Mood and Affect: Mood normal.         Behavior: Behavior normal.         Thought Content: Thought content normal.         Procedures           ED Course  ED Course as of 08/01/22 1537   Mon Aug 01, 2022   1320 I have reviewed his CAT scan and I see multiple dilated fluid-filled loops of small bowel consistent with small bowel obstruction.  I am awaiting final read from radiology however.  We will asked the nurses to place an NG tube.  I have paged Dr. Lester who is on-call for general surgery. [DT]   1321 I spoke with Mr. Farias and his son about diagnosis and plan for admission. [DT]      ED Course User Index  [DT] Sampson Wu MD                                            MDM  Number of Diagnoses or Management Options  Acute abdominal pain in left lower quadrant: new and requires workup  Small bowel obstruction (HCC): new and requires workup     Amount and/or Complexity of Data Reviewed  Clinical lab tests: ordered and reviewed  Tests in the radiology section of CPT®: ordered and reviewed  Review and summarize past medical records: yes  Discuss the patient with other providers: yes  Independent visualization of images, tracings, or specimens: yes    Patient Progress  Patient progress: improved      Final diagnoses:   Small bowel obstruction (HCC)   Anticoagulated   Acute abdominal pain in left lower quadrant       ED Disposition  ED Disposition     ED Disposition   Decision to Admit    Condition   --    Comment   Level of Care: Telemetry [5]   Diagnosis: Small bowel obstruction (HCC) [[id number]]   Admitting Physician: ROSELINE STEARNS [[id number]]   Attending Physician: ROSELINE STEARNS [[id number]]               No follow-up provider specified.       Medication List      No changes were made to your prescriptions during this visit.          Sampson Wu MD  08/01/22 2326

## 2022-08-01 NOTE — H&P
[facility] Medicine Services  HISTORY AND PHYSICAL    Patient Name: Cecilio Farias  : 1955  MRN: [id number]  Primary Care Physician: America Everett DO  Date of admission: 2022      Subjective   Subjective     Chief Complaint:  Abdominal pain, N/V    HPI:  Cecilio Farias is a 66 y.o. male with PMHx colon cancer s/p surgery/chemo/radiation, CHF, Mechanical aortic valve (St Kevin), Non-Obstructive CAD, HTN, PAF, JI, Pulmonary HTN, DM, history of Acinetobacter sepsis on chronic suppressive Minocycline with black discoloration of B/L legs. Patient presented to ED with abdominal pain x 3 days, N/V. Patient states he noticed that he hasn't had bowel movements like his normal.   Patient with prior admissions for SBO. He was supposed to see Dr Lester in the office next week. Pt with known ventral hernia that needs repair and possible lysis of adhesions. NG ordered in ED. Dr Lester notified by ED provider. Hospital medicine asked to admit.     Review of Systems   Constitutional: Negative for activity change, chills, fatigue and fever.   HENT: Negative for congestion and sore throat.    Eyes: Negative for visual disturbance.   Respiratory: Negative for cough, shortness of breath and wheezing.    Cardiovascular: Negative for chest pain, palpitations and leg swelling.   Gastrointestinal: Positive for abdominal pain, constipation, nausea and vomiting. Negative for diarrhea.   Genitourinary: Negative for dysuria and urgency.   Musculoskeletal: Negative for back pain.   Skin: Negative for pallor.   Neurological: Negative for dizziness and weakness.   Psychiatric/Behavioral: Negative for confusion.        Personal History     Past Medical History:   Diagnosis Date   • Cancer (HCC)     colon cancer with operation/ chemotherapy/radiation    • CHF (congestive heart failure) (HCC)     Acute Systolic    • Chronic anticoagulation     coumadin   • Diabetes mellitus (HCC)    • H/O aortic  valve replacement    • HLD (hyperlipidemia)    • Hypertension    • Sepsis due to Acinetobacter species (HCC) 6/13/2019             Past Surgical History:   Procedure Laterality Date   • AORTIC VALVE REPAIR/REPLACEMENT  2009    25 mm. [place]. Kevin AVR   • ASCENDING AORTIC ANEURYSM REPAIR  2009   • BACK SURGERY     • CARDIAC CATHETERIZATION N/A 1/20/2021    Procedure: RIGHT AND LEFT HEART CATH;  Surgeon: Kurtis Smith MD;  Location: [facility] CATH INVASIVE LOCATION;  Service: Cardiology;  Laterality: N/A;   • COLON SURGERY      Colon cancer with operation   • FOOT SURGERY      bilateral 2/2 wound and trauma        Family History:  family history includes Cancer in his brother, sister, and sister; Diabetes in his mother; Heart attack in his brother; Heart disease in his brother and mother; Hepatitis in his brother; Hyperlipidemia in his mother; Kidney failure in his brother; Leukemia in his father. Otherwise pertinent FHx was reviewed and unremarkable.     Social History:  reports that he has never smoked. He has never used smokeless tobacco. He reports that he does not drink alcohol and does not use drugs.  Social History     Social History Narrative   • Not on file       Medications:  Available home medication information reviewed.  (Not in a hospital admission)      Allergies   Allergen Reactions   • Sulfa Antibiotics    • Amoxicillin Rash     Has tolerated ceftriaxone       Objective   Objective     Vital Signs:   Temp:  [97.5 °F (36.4 °C)] 97.5 °F (36.4 °C)  Heart Rate:  [89] 89  Resp:  [18] 18  BP: (154)/(89) 154/89       Physical Exam   Constitutional: Awake, alert, NAD, non-toxic   Eyes: PERRLA, sclerae anicteric, no conjunctival injection  HENT: NCAT, mucous membranes moist  Neck: Supple, no thyromegaly, no lymphadenopathy, trachea midline  Respiratory: Clear to auscultation bilaterally, nonlabored respirations   Cardiovascular: RRR, no murmurs, rubs, or gallops, palpable pedal pulses  bilaterally  Gastrointestinal: Positive bowel sounds, soft, non-distended, mild diffuse TTP but no R/R/G  Musculoskeletal: No bilateral ankle edema, no clubbing or cyanosis to extremities  Psychiatric: Appropriate affect, cooperative  Neurologic: Oriented x 3, strength symmetric in all extremities, Cranial Nerves grossly intact to confrontation, speech clear  Skin: No rashes, B/L LE black discolored from ABX     Result Review:  I have personally reviewed the results from the time of this admission to 8/1/2022 13:57 EDT and agree with these findings:  [x]  Laboratory list / accordion  []  Microbiology  [x]  Radiology  []  EKG/Telemetry   []  Cardiology/Vascular   []  Pathology  [x]  Old records  []  Other:      LAB RESULTS:      Lab 08/01/22  1038   WBC 9.24   HEMOGLOBIN 14.7   HEMATOCRIT 47.2   PLATELETS 238   NEUTROS ABS 7.24*   IMMATURE GRANS (ABS) 0.08*   LYMPHS ABS 1.03   MONOS ABS 0.69   EOS ABS 0.15   MCV 87.2   LACTATE 1.7   PROTIME 29.0*   INR 2.72*         Lab 08/01/22  1038   SODIUM 140   POTASSIUM 4.7   CHLORIDE 101   CO2 29.0   ANION GAP 10.0   BUN 24*   CREATININE 0.95   EGFR 88.8   GLUCOSE 181*   CALCIUM 9.0         Lab 08/01/22  1038   TOTAL PROTEIN 7.5   ALBUMIN 4.20   GLOBULIN 3.3   ALT (SGPT) 22   AST (SGOT) 37   BILIRUBIN 1.7*   ALK PHOS 131*   LIPASE 43                     UA    Urinalysis 11/17/21 11/17/21 5/1/22 5/1/22 8/1/22 8/1/22    1651 1651 0006 0006 1109 1109   Squamous Epithelial Cells, UA  0-2  0-2  3-6 (A)   Specific Gravity, UA 1.043 (A)  1.072 (A)  1.021    Ketones, UA Trace (A)  Trace (A)  Trace (A)    Blood, UA Negative  Negative  Negative    Leukocytes, UA Negative  Negative  Negative    Nitrite, UA Negative  Negative  Negative    RBC, UA  0-2  0-2  3-6 (A)   WBC, UA  0-2  0-2  3-5 (A)   Bacteria, UA  None Seen  None Seen  Trace   (A) Abnormal value              Microbiology Results (last 10 days)     ** No results found for the last 240 hours. **          No radiology results  from the last 24 hrs    Results for orders placed during the hospital encounter of 05/11/22    Adult Transthoracic Echo Complete W/ Cont if Necessary Per Protocol    Interpretation Summary  · Left ventricular ejection fraction appears to be 56 - 60%.  · Left ventricular wall thickness is consistent with moderate concentric hypertrophy  · Moderate mitral annular calcification is present  · Mild mitral valve stenosis is present. The mitral valve mean gradient is 5 mmHg. There is moderate thickening of the anterior leaflet subvalvular apparatus.  · There is a 25 mm, [employer]. Kevin mechanical aortic valve prosthesis present  · The aortic valve peak and mean gradients are within defined limits. The prosthetic aortic valve is normal.  · The right ventricular cavity is mildly dilated.  · Mild tricuspid valve regurgitation is present. Estimated right ventricular systolic pressure from tricuspid regurgitation is normal (<35 mmHg).  · Borderline dilation of the aortic root is present measures 3.9 cm.      Assessment & Plan   Assessment & Plan     Active Hospital Problems    Diagnosis  POA   • **SBO (small bowel obstruction) ([facility]) [K56.609]  Yes   • Diastolic CHF, chronic ([facility]) [I50.32]  Yes   • Moderate PAH. PA 55/20, wedge 15 mmHg.  Multifactorial (CMS/HCC) [I27.21]  Yes   • History Acinetobacter bacteremia on chronic minocycline [Z86.19]  Yes   • History of mechanical AVR for aortic stenosis 2009 [Z95.2]  Not Applicable     · Acute systolic CHF with aortic stenosis, 2009  · Status post mechanical AVR (25 mm [place] tilting-disc) with repair of ascending aorta, 3/2009  · ESTHER (6/12/2019): LVEF 60%.  Mild to moderate LVH.  Normal functioning mechanical aortic valve.  No vegetations.     • Type 2 diabetes mellitus (HCC) [E11.9]  Yes   • Essential hypertension [I10]  Yes   • Dyslipidemia [E78.5]  Yes   • Morbid obesity ([facility]) [E66.01]  Yes     Recurrent SBO  Ventral Hernia   -Pt known to Dr Lester, contacted from ED and will see in  consult  -NPO   -IVF, NS 75cc/hr, monitor respiratory status, history of DHF  -Pain control PRN   -NG placed in ED   -Lactic normal     Mechanical AVR on Coumadin  -INR therapeutic 2.72  -Hold Coumadin in case surgery occurs this admit  -Heparin gtt in meantime   -Echo 5/11/22: EF 56-60%, prosthetic valve normal   -Follows with Dr Jackson    Recent rectus sheath hematoma   -Occurred with Lovenox injections   -CT shows significant decrease from prior exam     DM2  -Hold home Glucotrol  -On NPH 70/30 at home   -LDSSI for now    History of Acinetobacter sepsis  -On chronic Minocycline suppression (pt with chronic black skin discoloration of B/L LE), hold ABX while NPO    HTN  HLD  -Hold home medications while NPO  -PRN Hydralazine for SBP>170     DVT prophylaxis:  Heparin gtt     CODE STATUS:  Full Code   There are no questions and answers to display.         Karen Valerio, DO  08/01/22

## 2022-08-01 NOTE — PLAN OF CARE
Problem: Adult Inpatient Plan of Care  Goal: Plan of Care Review  Outcome: Ongoing, Progressing  Flowsheets (Taken 8/1/2022 1618)  Outcome Evaluation: Pt arrived on floor from ED. Pt has NG tube in right nare at 66. Heparin drip started at 8units/kg/hr.  Goal: Patient-Specific Goal (Individualized)  Outcome: Ongoing, Progressing  Goal: Absence of Hospital-Acquired Illness or Injury  Outcome: Ongoing, Progressing  Goal: Optimal Comfort and Wellbeing  Outcome: Ongoing, Progressing  Goal: Readiness for Transition of Care  Outcome: Ongoing, Progressing  Intervention: Mutually Develop Transition Plan  Flowsheets  Taken 8/1/2022 1550  Equipment Currently Used at Home: glucometer  Taken 8/1/2022 1545  Transportation Anticipated: family or friend will provide  Patient/Family Anticipated Services at Transition: none  Patient/Family Anticipates Transition to: home with family   Goal Outcome Evaluation:              Outcome Evaluation: Pt arrived on floor from ED. Pt has NG tube in right nare at 66. Heparin drip started at 8units/kg/hr.

## 2022-08-02 LAB
ANION GAP SERPL CALCULATED.3IONS-SCNC: 10 MMOL/L (ref 5–15)
BASOPHILS # BLD AUTO: 0.05 10*3/MM3 (ref 0–0.2)
BASOPHILS NFR BLD AUTO: 0.8 % (ref 0–1.5)
BUN SERPL-MCNC: 24 MG/DL (ref 8–23)
BUN/CREAT SERPL: 26.4 (ref 7–25)
CALCIUM SPEC-SCNC: 8 MG/DL (ref 8.6–10.5)
CHLORIDE SERPL-SCNC: 104 MMOL/L (ref 98–107)
CO2 SERPL-SCNC: 26 MMOL/L (ref 22–29)
CREAT SERPL-MCNC: 0.91 MG/DL (ref 0.76–1.27)
DEPRECATED RDW RBC AUTO: 51.5 FL (ref 37–54)
EGFRCR SERPLBLD CKD-EPI 2021: 93.5 ML/MIN/1.73
EOSINOPHIL # BLD AUTO: 0.25 10*3/MM3 (ref 0–0.4)
EOSINOPHIL NFR BLD AUTO: 3.8 % (ref 0.3–6.2)
ERYTHROCYTE [DISTWIDTH] IN BLOOD BY AUTOMATED COUNT: 15.9 % (ref 12.3–15.4)
GLUCOSE BLDC GLUCOMTR-MCNC: 112 MG/DL (ref 70–130)
GLUCOSE BLDC GLUCOMTR-MCNC: 122 MG/DL (ref 70–130)
GLUCOSE BLDC GLUCOMTR-MCNC: 124 MG/DL (ref 70–130)
GLUCOSE SERPL-MCNC: 128 MG/DL (ref 65–99)
HCT VFR BLD AUTO: 43.6 % (ref 37.5–51)
HGB BLD-MCNC: 13.5 G/DL (ref 13–17.7)
IMM GRANULOCYTES # BLD AUTO: 0.02 10*3/MM3 (ref 0–0.05)
IMM GRANULOCYTES NFR BLD AUTO: 0.3 % (ref 0–0.5)
LYMPHOCYTES # BLD AUTO: 1.19 10*3/MM3 (ref 0.7–3.1)
LYMPHOCYTES NFR BLD AUTO: 18.1 % (ref 19.6–45.3)
MCH RBC QN AUTO: 27.5 PG (ref 26.6–33)
MCHC RBC AUTO-ENTMCNC: 31 G/DL (ref 31.5–35.7)
MCV RBC AUTO: 88.8 FL (ref 79–97)
MONOCYTES # BLD AUTO: 0.65 10*3/MM3 (ref 0.1–0.9)
MONOCYTES NFR BLD AUTO: 9.9 % (ref 5–12)
NEUTROPHILS NFR BLD AUTO: 4.43 10*3/MM3 (ref 1.7–7)
NEUTROPHILS NFR BLD AUTO: 67.1 % (ref 42.7–76)
NRBC BLD AUTO-RTO: 0 /100 WBC (ref 0–0.2)
PLATELET # BLD AUTO: 181 10*3/MM3 (ref 140–450)
PMV BLD AUTO: 10.3 FL (ref 6–12)
POTASSIUM SERPL-SCNC: 4.1 MMOL/L (ref 3.5–5.2)
QT INTERVAL: 510 MS
QTC INTERVAL: 546 MS
RBC # BLD AUTO: 4.91 10*6/MM3 (ref 4.14–5.8)
SODIUM SERPL-SCNC: 140 MMOL/L (ref 136–145)
UFH PPP CHRO-ACNC: 0.35 IU/ML (ref 0.3–0.7)
UFH PPP CHRO-ACNC: 0.39 IU/ML (ref 0.3–0.7)
WBC NRBC COR # BLD: 6.59 10*3/MM3 (ref 3.4–10.8)

## 2022-08-02 PROCEDURE — 25010000002 HEPARIN (PORCINE) PER 1000 UNITS: Performed by: FAMILY MEDICINE

## 2022-08-02 PROCEDURE — 25010000002 HEPARIN (PORCINE) 25000-0.45 UT/250ML-% SOLUTION: Performed by: FAMILY MEDICINE

## 2022-08-02 PROCEDURE — 80048 BASIC METABOLIC PNL TOTAL CA: CPT | Performed by: FAMILY MEDICINE

## 2022-08-02 PROCEDURE — 85025 COMPLETE CBC W/AUTO DIFF WBC: CPT | Performed by: FAMILY MEDICINE

## 2022-08-02 PROCEDURE — 82962 GLUCOSE BLOOD TEST: CPT

## 2022-08-02 PROCEDURE — 85520 HEPARIN ASSAY: CPT | Performed by: FAMILY MEDICINE

## 2022-08-02 PROCEDURE — 85520 HEPARIN ASSAY: CPT | Performed by: INTERNAL MEDICINE

## 2022-08-02 PROCEDURE — 99232 SBSQ HOSP IP/OBS MODERATE 35: CPT | Performed by: INTERNAL MEDICINE

## 2022-08-02 RX ADMIN — Medication 10 ML: at 21:00

## 2022-08-02 RX ADMIN — HEPARIN SODIUM 11 UNITS/KG/HR: 10000 INJECTION, SOLUTION INTRAVENOUS at 14:10

## 2022-08-02 RX ADMIN — SODIUM CHLORIDE 75 ML/HR: 9 INJECTION, SOLUTION INTRAVENOUS at 06:17

## 2022-08-02 NOTE — PROGRESS NOTES
[facility] Medicine Services  PROGRESS NOTE    Patient Name: Cecilio Farias  : 1955  MRN: [id number]    Date of Admission: 2022  Primary Care Physician: America Everett DO    Subjective   Subjective     CC:  Recurrent SBO    HPI:  Pt complaining of dry mouth this am. Still having output from NGT, no flatus nor BM yet.  Denies any abdominal pain.     ROS:  Gen- No fevers, chills  CV- No chest pain, palpitations  Resp- No cough, dyspnea  GI- as above        Objective   Objective     Vital Signs:   Temp:  [97.5 °F (36.4 °C)-98.5 °F (36.9 °C)] 97.5 °F (36.4 °C)  Heart Rate:  [59-96] 65  Resp:  [18] 18  BP: (127-155)/(57-89) 127/57     Physical Exam:  Constitutional: No acute distress, awake, alert  HENT: NCAT, mucous membranes dry, NGT to LWS with bilious fluid present  Respiratory: Clear to auscultation bilaterally, respiratory effort normal   Cardiovascular: RRR, no murmurs, rubs, or gallops  Gastrointestinal: hypoactive bowel sounds, soft, nontender, nondistended  Musculoskeletal: No bilateral ankle edema  Psychiatric: Appropriate affect, cooperative  Neurologic: Oriented x 3, strength symmetric in all extremities, Cranial Nerves grossly intact to confrontation, speech clear  Skin: No rashes, BLE with significant dark skin discoloration    Results Reviewed:  LAB RESULTS:      Lab 22  06223 22  1038   WBC 6.59  --  9.24   HEMOGLOBIN 13.5  --  14.7   HEMATOCRIT 43.6  --  47.2   PLATELETS 181  --  238   NEUTROS ABS 4.43  --  7.24*   IMMATURE GRANS (ABS) 0.02  --  0.08*   LYMPHS ABS 1.19  --  1.03   MONOS ABS 0.65  --  0.69   EOS ABS 0.25  --  0.15   MCV 88.8  --  87.2   LACTATE  --   --  1.7   PROTIME  --   --  29.0*   APTT  --  58.7*  --    HEPARIN ANTI-XA 0.39 0.10*  --          Lab 22  0604 22  1038   SODIUM 140 140   POTASSIUM 4.1 4.7   CHLORIDE 104 101   CO2 26.0 29.0   ANION GAP 10.0 10.0   BUN 24* 24*   CREATININE 0.91 0.95   EGFR  93.5 88.8   GLUCOSE 128* 181*   CALCIUM 8.0* 9.0   HEMOGLOBIN A1C  --  6.50*         Lab 08/01/22  1038   TOTAL PROTEIN 7.5   ALBUMIN 4.20   GLOBULIN 3.3   ALT (SGPT) 22   AST (SGOT) 37   BILIRUBIN 1.7*   ALK PHOS 131*   LIPASE 43         Lab 08/01/22  1038   PROTIME 29.0*   INR 2.72*                 Brief Urine Lab Results  (Last result in the past 365 days)      Color   Clarity   Blood   Leuk Est   Nitrite   Protein   CREAT   Urine HCG        08/01/22 1109 Dark Yellow   Clear   Negative   Negative   Negative   100 mg/dL (2+)                 Microbiology Results Abnormal     Procedure Component Value - Date/Time    COVID PRE-OP / PRE-PROCEDURE SCREENING ORDER (NO ISOLATION) - Swab, Nasopharynx [[id number]]  (Normal) Collected: 08/01/22 1745    Lab Status: Final result Specimen: Swab from Nasopharynx Updated: 08/01/22 1818    Narrative:      The following orders were created for panel order COVID PRE-OP / PRE-PROCEDURE SCREENING ORDER (NO ISOLATION) - Swab, Nasopharynx.  Procedure                               Abnormality         Status                     ---------                               -----------         ------                     COVID-19 and FLU A/B PCR...[[id number]]  Normal              Final result                 Please view results for these tests on the individual orders.    COVID-19 and FLU A/B PCR - Swab, Nasopharynx [[id number]]  (Normal) Collected: 08/01/22 1745    Lab Status: Final result Specimen: Swab from Nasopharynx Updated: 08/01/22 1818     COVID19 Not Detected     Influenza A PCR Not Detected     Influenza B PCR Not Detected    Narrative:      Fact sheet for providers: https://www.fda.gov/media/964748/download    Fact sheet for patients: https://www.fda.gov/media/764731/download    Test performed by PCR.          CT Abdomen Pelvis Without Contrast    Result Date: 8/1/2022  DATE OF EXAM: 8/1/2022 1:12 PM  PROCEDURE: CT ABDOMEN PELVIS WO CONTRAST-  INDICATIONS: Bowel obstruction, history of  rectus sheath hematoma  COMPARISON: 5/11/2022  TECHNIQUE: Routine transaxial slices were obtained through the abdomen and pelvis without the administration of intravenous contrast. Reconstructed coronal and sagittal images were also obtained. Automated exposure control and iterative construction methods were used.  The radiation dose reduction device was turned on for each scan per the ALARA (As Low as Reasonably Achievable) protocol.  FINDINGS: Previous rectus sheath hematoma has been reduced to an area of mild abdominal wall thickening, presumably organized and slowly reabsorbing hematoma, decreased from approximately 14 x 6.5 cm to approximately 7.3 x 2.5 cm. No new abdominal wall hematoma is seen.  Included lung bases appear clear. Numerous gallstones are again seen in the otherwise normal-appearing gallbladder. Left lobe liver enlargement and liver capsular nodularity is concerning for cirrhosis. No focal hepatic lesions are identified. No biliary ductal dilatation is seen. Spleen is not enlarged. Pancreas, adrenal glands, and kidneys appear within normal limits. No upper abdominal free air, ascites, or adenopathy is seen.  The stomach is distended with fluid and a small amount of food. There is dilatation of the duodenum and jejunum up to level of the patient's lower abdominal midline hernia, where there appears to be obstruction, with dilated entering loop to the right, and two decompressed exam loops to the left and along the inferior margin of the hernia site. The inferior margin of this hernia site, within the patient's pannus is complex and may reflect additional sites of herniation or may be interconnected with the main hernia, images 129 through 148. No bowel wall pneumatosis is seen and there is little evidence to suggest venous congestion.  Above the level of the obstructing small bowel hernia, there is an adjacent but separate herniation of already decompressed small bowel, with no obvious  strangulation, and cephalad of this, additional fat-only containing nonstrangulated hernia with trace ascites, and more medial colon-containing hernia with no evidence of strangulation. No hernia is seen above the level of the colon containing hernia on axial image 88.  Elsewhere, terminal ileum cecum and appendix appear normal. Bladder is normally distended and normal in appearance. No ascites or free air is identified. Bony structures appear to be intact.       Impression:  1. Multiple midline abdominal wall hernias, from cephalad to caudal:   a: Midline hernia containing a small knuckle of colon, nonstrangulated   b: Fat-containing colon to the left of the midline, non-strangulated   c: Small hernia containing already decompressed small bowel, non-strangulated.   d: Complex appearing hernia containing several bowel loops, and the apparent site of       obstruction, images 121 through 126. The inferior margin of this hernia, within the patient's pannus, is complex and may contain additional areas of abdominal wall weakness/herniation.  2. Cholelithiasis, but no evidence of cholecystitis.  3. Liver morphology concerning for cirrhosis. No additional findings to suggest cirrhosis.  4. Small organized appearing left rectus sheath hematoma significantly decreased from prior exam.  This report was finalized on 8/1/2022 2:10 PM by Dr. Miguel Rodriguez MD.        Results for orders placed during the hospital encounter of 05/11/22    Adult Transthoracic Echo Complete W/ Cont if Necessary Per Protocol    Interpretation Summary  · Left ventricular ejection fraction appears to be 56 - 60%.  · Left ventricular wall thickness is consistent with moderate concentric hypertrophy  · Moderate mitral annular calcification is present  · Mild mitral valve stenosis is present. The mitral valve mean gradient is 5 mmHg. There is moderate thickening of the anterior leaflet subvalvular apparatus.  · There is a 25 mm, [employer]. Kevin mechanical aortic  valve prosthesis present  · The aortic valve peak and mean gradients are within defined limits. The prosthetic aortic valve is normal.  · The right ventricular cavity is mildly dilated.  · Mild tricuspid valve regurgitation is present. Estimated right ventricular systolic pressure from tricuspid regurgitation is normal (<35 mmHg).  · Borderline dilation of the aortic root is present measures 3.9 cm.      I have reviewed the medications:  Scheduled Meds:insulin lispro, 0-7 Units, Subcutaneous, TID AC  sodium chloride, 10 mL, Intravenous, Q12H      Continuous Infusions:heparin, 11 Units/kg/hr, Last Rate: 11 Units/kg/hr (08/01/22 2321)  Pharmacy to Dose Heparin,   sodium chloride, 75 mL/hr, Last Rate: 75 mL/hr (08/02/22 0617)      PRN Meds:.•  acetaminophen **OR** acetaminophen **OR** acetaminophen  •  dextrose  •  dextrose  •  glucagon (human recombinant)  •  hydrALAZINE  •  Morphine **AND** naloxone  •  ondansetron **OR** ondansetron  •  Pharmacy to Dose Heparin  •  Sodium Chloride (PF)  •  sodium chloride    Assessment & Plan   Assessment & Plan     Active Hospital Problems    Diagnosis  POA   • **SBO (small bowel obstruction) (HCC) [K56.609]  Yes   • History of mechanical AVR for aortic stenosis 2009 [Z95.2]  Not Applicable     Priority: Medium   • Type 2 diabetes mellitus (HCC) [E11.9]  Yes     Priority: Low   • Essential hypertension [I10]  Yes     Priority: Low   • Dyslipidemia [E78.5]  Yes     Priority: Low   • Small bowel obstruction (HCC) [K56.609]  Yes   • Diastolic CHF, chronic (HCC) [I50.32]  Yes   • Moderate PAH. PA 55/20, wedge 15 mmHg.  Multifactorial (CMS/HCC) [I27.21]  Yes   • History Acinetobacter bacteremia on chronic minocycline [Z86.19]  Yes   • Morbid obesity (HCC) [E66.01]  Yes      Resolved Hospital Problems   No resolved problems to display.        Brief Hospital Course to date:  Cecilio Farias is a 66 y.o. male with PMHx colon cancer s/p surgery/chemo/radiation, CHF, Mechanical aortic valve  ([employer] Kevin), Non-Obstructive CAD, HTN, PAF, JI, Pulmonary HTN, DM, history of Acinetobacter sepsis on chronic suppressive Minocycline who presented with recurrent SBO.      Plan:    Recurrent SBO  Ventral Hernia   -Pt known to Dr Lester, contacted from ED and will see in consult  -NPO   -IVF, NS 75cc/hr, monitor respiratory status, history of DHF  -Pain control PRN   -NG placed in ED   -Lactic normal      Mechanical AVR on Coumadin  -INR therapeutic 2.72 on admission  -Hold Coumadin in case surgery occurs this admit  -Heparin gtt in meantime   -Echo 5/11/22: EF 56-60%, prosthetic valve normal   -Follows with Dr Jackson     Recent rectus sheath hematoma   -Occurred with Lovenox injections   -CT shows significant decrease from prior exam      DM2  -Hold home Glucotrol  -On NPH 70/30 at home   -LDSSI for now     History of Acinetobacter sepsis  -On chronic Minocycline suppression (pt with chronic black skin discoloration of B/L LE), hold ABX while NPO     HTN  HLD  -Hold home medications while NPO  -PRN Hydralazine for SBP>170        Expected Discharge Location and Transportation: home via private vehicle  Expected Discharge Date: 8/4/22    DVT prophylaxis:  Medical DVT prophylaxis orders are present.     AM-PAC 6 Clicks Score (PT): 21 (08/01/22 9781)    CODE STATUS:   Code Status and Medical Interventions:   Ordered at: 08/01/22 0678     Level Of Support Discussed With:    Patient     Code Status (Patient has no pulse and is not breathing):    CPR (Attempt to Resuscitate)     Medical Interventions (Patient has pulse or is breathing):    Full Support       Annalisa Duarte MD  08/02/22

## 2022-08-02 NOTE — PAYOR COMM NOTE
"Cecilio Farias (66 y.o. Male)             Date of Birth   1955    Social Security Number       Address   [address] [facility] [address]    Home Phone   [phone]    MRN   [id number]       M[employer]onism   Hoahaoism    Marital Status                               Admission Date   8/1/22    Admission Type   Emergency    Admitting Provider   Annalisa Duarte MD    Attending Provider   Annalisa Duarte MD    Department, Room/Bed   [address], S515/1       Discharge Date       Discharge Disposition       Discharge Destination                               Attending Provider: Annalisa Duarte MD    Allergies: Sulfa Antibiotics, Amoxicillin    Isolation: None   Infection: None   Code Status: CPR   Advance Care Planning Activity    Ht: 190.5 cm (75\")   Wt: 125 kg (275 lb)    Admission Cmt: None   Principal Problem: SBO (small bowel obstruction) ([facility]) [K56.609]                 Active Insurance as of 8/1/2022     Primary Coverage     Payor Plan Insurance Group Employer/Plan Group    ANTHEM MEDICARE REPLACEMENT ANTHEM MEDICARE ADVANTAGE [id number]     Payor Plan Address Payor Plan Phone Number Payor Plan Fax Number Effective Dates    [address] [phone]  1/1/2022 - None Entered    [facility] [address]       Subscriber Name Subscriber Birth Date Member ID       CECILIO FARIAS 1955 [id number]                 Emergency Contacts      (Rel.) Home Phone Work Phone Mobile Phone    Bassem Fariasne (Spouse) [phone] -- [phone]    GERSON FARIAS (Son) -- -- [phone]    DinoraPuneet (Son) [phone] -- [phone]            [place]: NPI [id number] Tax ID [id number]  Insurance Information                ANTHEM MEDICARE REPLACEMENT/ANTHEM MEDICARE ADVANTAGE Phone: [phone]    Subscriber: Cecilio Farias Subscriber#: [id number]    Group#: [id number] Precert#: [id number]             History & Physical      Karen Valerio, DO at " 22 1357              [facility] Medicine Services  HISTORY AND PHYSICAL    Patient Name: Cecilio Farias  : 1955  MRN: [id number]  Primary Care Physician: America Everett DO  Date of admission: 2022      Subjective   Subjective     Chief Complaint:  Abdominal pain, N/V    HPI:  Cecilio Farias is a 66 y.o. male with PMHx colon cancer s/p surgery/chemo/radiation, CHF, Mechanical aortic valve (St Kevin), Non-Obstructive CAD, HTN, PAF, JI, Pulmonary HTN, DM, history of Acinetobacter sepsis on chronic suppressive Minocycline with black discoloration of B/L legs. Patient presented to ED with abdominal pain x 3 days, N/V. Patient states he noticed that he hasn't had bowel movements like his normal.   Patient with prior admissions for SBO. He was supposed to see Dr Lester in the office next week. Pt with known ventral hernia that needs repair and possible lysis of adhesions. NG ordered in ED. Dr Lester notified by ED provider. Hospital medicine asked to admit.     Review of Systems   Constitutional: Negative for activity change, chills, fatigue and fever.   HENT: Negative for congestion and sore throat.    Eyes: Negative for visual disturbance.   Respiratory: Negative for cough, shortness of breath and wheezing.    Cardiovascular: Negative for chest pain, palpitations and leg swelling.   Gastrointestinal: Positive for abdominal pain, constipation, nausea and vomiting. Negative for diarrhea.   Genitourinary: Negative for dysuria and urgency.   Musculoskeletal: Negative for back pain.   Skin: Negative for pallor.   Neurological: Negative for dizziness and weakness.   Psychiatric/Behavioral: Negative for confusion.        Personal History     Past Medical History:   Diagnosis Date   • Cancer (HCC)     colon cancer with operation/ chemotherapy/radiation    • CHF (congestive heart failure) (HCC)     Acute Systolic    • Chronic anticoagulation     coumadin   • Diabetes mellitus  (HCC)    • H/O aortic valve replacement    • HLD (hyperlipidemia)    • Hypertension    • Sepsis due to Acinetobacter species (HCC) 6/13/2019             Past Surgical History:   Procedure Laterality Date   • AORTIC VALVE REPAIR/REPLACEMENT  2009    25 mm. [place]. Kevin AVR   • ASCENDING AORTIC ANEURYSM REPAIR  2009   • BACK SURGERY     • CARDIAC CATHETERIZATION N/A 1/20/2021    Procedure: RIGHT AND LEFT HEART CATH;  Surgeon: Kurtis Smith MD;  Location: [facility] CATH INVASIVE LOCATION;  Service: Cardiology;  Laterality: N/A;   • COLON SURGERY      Colon cancer with operation   • FOOT SURGERY      bilateral 2/2 wound and trauma        Family History:  family history includes Cancer in his brother, sister, and sister; Diabetes in his mother; Heart attack in his brother; Heart disease in his brother and mother; Hepatitis in his brother; Hyperlipidemia in his mother; Kidney failure in his brother; Leukemia in his father. Otherwise pertinent FHx was reviewed and unremarkable.     Social History:  reports that he has never smoked. He has never used smokeless tobacco. He reports that he does not drink alcohol and does not use drugs.  Social History     Social History Narrative   • Not on file       Medications:  Available home medication information reviewed.  (Not in a hospital admission)      Allergies   Allergen Reactions   • Sulfa Antibiotics    • Amoxicillin Rash     Has tolerated ceftriaxone       Objective   Objective     Vital Signs:   Temp:  [97.5 °F (36.4 °C)] 97.5 °F (36.4 °C)  Heart Rate:  [89] 89  Resp:  [18] 18  BP: (154)/(89) 154/89       Physical Exam   Constitutional: Awake, alert, NAD, non-toxic   Eyes: PERRLA, sclerae anicteric, no conjunctival injection  HENT: NCAT, mucous membranes moist  Neck: Supple, no thyromegaly, no lymphadenopathy, trachea midline  Respiratory: Clear to auscultation bilaterally, nonlabored respirations   Cardiovascular: RRR, no murmurs, rubs, or gallops, palpable pedal pulses  bilaterally  Gastrointestinal: Positive bowel sounds, soft, non-distended, mild diffuse TTP but no R/R/G  Musculoskeletal: No bilateral ankle edema, no clubbing or cyanosis to extremities  Psychiatric: Appropriate affect, cooperative  Neurologic: Oriented x 3, strength symmetric in all extremities, Cranial Nerves grossly intact to confrontation, speech clear  Skin: No rashes, B/L LE black discolored from ABX     Result Review:  I have personally reviewed the results from the time of this admission to 8/1/2022 13:57 EDT and agree with these findings:  [x]  Laboratory list / accordion  []  Microbiology  [x]  Radiology  []  EKG/Telemetry   []  Cardiology/Vascular   []  Pathology  [x]  Old records  []  Other:      LAB RESULTS:      Lab 08/01/22  1038   WBC 9.24   HEMOGLOBIN 14.7   HEMATOCRIT 47.2   PLATELETS 238   NEUTROS ABS 7.24*   IMMATURE GRANS (ABS) 0.08*   LYMPHS ABS 1.03   MONOS ABS 0.69   EOS ABS 0.15   MCV 87.2   LACTATE 1.7   PROTIME 29.0*   INR 2.72*         Lab 08/01/22  1038   SODIUM 140   POTASSIUM 4.7   CHLORIDE 101   CO2 29.0   ANION GAP 10.0   BUN 24*   CREATININE 0.95   EGFR 88.8   GLUCOSE 181*   CALCIUM 9.0         Lab 08/01/22  1038   TOTAL PROTEIN 7.5   ALBUMIN 4.20   GLOBULIN 3.3   ALT (SGPT) 22   AST (SGOT) 37   BILIRUBIN 1.7*   ALK PHOS 131*   LIPASE 43                     UA    Urinalysis 11/17/21 11/17/21 5/1/22 5/1/22 8/1/22 8/1/22    1651 1651 0006 0006 1109 1109   Squamous Epithelial Cells, UA  0-2  0-2  3-6 (A)   Specific Gravity, UA 1.043 (A)  1.072 (A)  1.021    Ketones, UA Trace (A)  Trace (A)  Trace (A)    Blood, UA Negative  Negative  Negative    Leukocytes, UA Negative  Negative  Negative    Nitrite, UA Negative  Negative  Negative    RBC, UA  0-2  0-2  3-6 (A)   WBC, UA  0-2  0-2  3-5 (A)   Bacteria, UA  None Seen  None Seen  Trace   (A) Abnormal value              Microbiology Results (last 10 days)     ** No results found for the last 240 hours. **          No radiology results  from the last 24 hrs    Results for orders placed during the hospital encounter of 05/11/22    Adult Transthoracic Echo Complete W/ Cont if Necessary Per Protocol    Interpretation Summary  · Left ventricular ejection fraction appears to be 56 - 60%.  · Left ventricular wall thickness is consistent with moderate concentric hypertrophy  · Moderate mitral annular calcification is present  · Mild mitral valve stenosis is present. The mitral valve mean gradient is 5 mmHg. There is moderate thickening of the anterior leaflet subvalvular apparatus.  · There is a 25 mm, St. Kevin mechanical aortic valve prosthesis present  · The aortic valve peak and mean gradients are within defined limits. The prosthetic aortic valve is normal.  · The right ventricular cavity is mildly dilated.  · Mild tricuspid valve regurgitation is present. Estimated right ventricular systolic pressure from tricuspid regurgitation is normal (<35 mmHg).  · Borderline dilation of the aortic root is present measures 3.9 cm.      Assessment & Plan   Assessment & Plan     Active Hospital Problems    Diagnosis  POA   • **SBO (small bowel obstruction) ([facility]) [K56.609]  Yes   • Diastolic CHF, chronic ([facility]) [I50.32]  Yes   • Moderate PAH. PA 55/20, wedge 15 mmHg.  Multifactorial (CMS/HCC) [I27.21]  Yes   • History Acinetobacter bacteremia on chronic minocycline [Z86.19]  Yes   • History of mechanical AVR for aortic stenosis 2009 [Z95.2]  Not Applicable     · Acute systolic CHF with aortic stenosis, 2009  · Status post mechanical AVR (25 mm [place] Kevin tilting-disc) with repair of ascending aorta, 3/2009  · ESTHER (6/12/2019): LVEF 60%.  Mild to moderate LVH.  Normal functioning mechanical aortic valve.  No vegetations.     • Type 2 diabetes mellitus (HCC) [E11.9]  Yes   • Essential hypertension [I10]  Yes   • Dyslipidemia [E78.5]  Yes   • Morbid obesity ([facility]) [E66.01]  Yes     Recurrent SBO  Ventral Hernia   -Pt known to Dr Lester, contacted from ED and will see in  consult  -NPO   -IVF, NS 75cc/hr, monitor respiratory status, history of DHF  -Pain control PRN   -NG placed in ED   -Lactic normal     Mechanical AVR on Coumadin  -INR therapeutic 2.72  -Hold Coumadin in case surgery occurs this admit  -Heparin gtt in meantime   -Echo 5/11/22: EF 56-60%, prosthetic valve normal   -Follows with Dr Jackson    Recent rectus sheath hematoma   -Occurred with Lovenox injections   -CT shows significant decrease from prior exam     DM2  -Hold home Glucotrol  -On NPH 70/30 at home   -LDSSI for now    History of Acinetobacter sepsis  -On chronic Minocycline suppression (pt with chronic black skin discoloration of B/L LE), hold ABX while NPO    HTN  HLD  -Hold home medications while NPO  -PRN Hydralazine for SBP>170     DVT prophylaxis:  Heparin gtt     CODE STATUS:  Full Code   There are no questions and answers to display.         Karen Valerio DO  08/01/22    Electronically signed by Karen Valerio DO at 08/01/22 1420          Emergency Department Notes      Sampson Wu MD at 08/01/22 1215          Subjective   Mr. Farias presents with abdominal pain, distention, and vomiting.  He typically has bowel movements daily but has had none for 3 days.  He notes no passage of flatus today.  He denies fevers, chills, shortness of breath or upper respiratory symptoms.  He has history of small bowel obstruction and tells me this feels similar.  He has had prior colon cancer resection.  He was admitted in May of this year for similar symptoms.  He improved with conservative management.  Surgery was considered but he had rectus sheath hematoma at the time and it is noted that surgery will be considered when that has resolved.  He is on chronic Coumadin for mechanical aortic valve replacement.  He notes that the pain is intermittent and comes in waves.      Abdominal Pain  Pain location:  Generalized  Pain quality: cramping    Pain radiates to:  Does not radiate  Pain severity:   Severe  Onset quality:  Gradual  Timing:  Intermittent  Progression:  Worsening  Chronicity:  Recurrent  Context comment:  With prior abdominal surgery on multiple hernias  Relieved by:  Nothing  Worsened by:  Nothing  Ineffective treatments:  None tried  Associated symptoms: anorexia, constipation, nausea and vomiting    Associated symptoms: no chest pain, no chills, no cough, no diarrhea, no fever and no shortness of breath        Review of Systems   Constitutional: Negative for chills and fever.   HENT: Negative for congestion and rhinorrhea.    Respiratory: Negative for cough and shortness of breath.    Cardiovascular: Negative for chest pain.   Gastrointestinal: Positive for abdominal pain, anorexia, constipation, nausea and vomiting. Negative for diarrhea.   Genitourinary: Negative for difficulty urinating.   Musculoskeletal: Negative for neck pain.   Neurological: Negative for dizziness and headaches.   Psychiatric/Behavioral: Negative for confusion.   All other systems reviewed and are negative.      Past Medical History:   Diagnosis Date   • Cancer (HCC)     colon cancer with operation/ chemotherapy/radiation    • CHF (congestive heart failure) (HCC) 2009    Acute Systolic    • Chronic anticoagulation     coumadin   • Diabetes mellitus (HCC)    • H/O aortic valve replacement    • HLD (hyperlipidemia)    • Hypertension    • Sepsis due to Acinetobacter species (HCC) 6/13/2019       Allergies   Allergen Reactions   • Sulfa Antibiotics    • Amoxicillin Rash     Has tolerated ceftriaxone       Past Surgical History:   Procedure Laterality Date   • AORTIC VALVE REPAIR/REPLACEMENT  2009    25 mm. [place]. Kevin AVR   • ASCENDING AORTIC ANEURYSM REPAIR  2009   • BACK SURGERY     • CARDIAC CATHETERIZATION N/A 1/20/2021    Procedure: RIGHT AND LEFT HEART CATH;  Surgeon: Kurtis Smith MD;  Location: [facility] CATH INVASIVE LOCATION;  Service: Cardiology;  Laterality: N/A;   • COLON SURGERY      Colon cancer with operation    • FOOT SURGERY      bilateral 2/2 wound and trauma        Family History   Problem Relation Age of Onset   • Heart disease Mother    • Hyperlipidemia Mother    • Diabetes Mother    • Leukemia Father    • Cancer Sister    • Kidney failure Brother    • Hepatitis Brother    • Cancer Sister    • Cancer Brother    • Heart disease Brother    • Heart attack Brother        Social History     Socioeconomic History   • Marital status:    Tobacco Use   • Smoking status: Never Smoker   • Smokeless tobacco: Never Used   Vaping Use   • Vaping Use: Never used   Substance and Sexual Activity   • Alcohol use: No   • Drug use: No   • Sexual activity: Defer           Objective   Physical Exam  Vitals and nursing note reviewed.   Constitutional:       General: He is not in acute distress.     Appearance: Normal appearance.   HENT:      Head: Normocephalic and atraumatic.      Nose: Nose normal. No congestion or rhinorrhea.      Mouth/Throat:      Mouth: Mucous membranes are moist.      Pharynx: No posterior oropharyngeal erythema.   Eyes:      General: No scleral icterus.     Conjunctiva/sclera: Conjunctivae normal.   Neck:      Comments: No JVD  Cardiovascular:      Rate and Rhythm: Normal rate and regular rhythm.      Heart sounds: No murmur heard.    No friction rub.   Pulmonary:      Effort: Pulmonary effort is normal. No respiratory distress.      Breath sounds: Normal breath sounds. No wheezing or rales.   Abdominal:      General: Bowel sounds are normal. There is distension.      Tenderness: There is abdominal tenderness. There is no guarding or rebound.      Comments: Abdomen is distended and fairly firm.  Bowel sounds are present.  Rebound tenderness is absent.  He has multiple palpable hernias.  There is no erythema or bruising noted over the abdominal wall.  He has mild diffuse tenderness.   Musculoskeletal:         General: No tenderness.      Cervical back: Normal range of motion and neck supple.      Right lower  leg: No edema.      Left lower leg: No edema.   Skin:     General: Skin is warm and dry.      Capillary Refill: Capillary refill takes less than 2 seconds.      Coloration: Skin is not pale.   Neurological:      General: No focal deficit present.      Mental Status: He is alert and oriented to person, place, and time.      Coordination: Coordination normal.   Psychiatric:         Mood and Affect: Mood normal.         Behavior: Behavior normal.         Thought Content: Thought content normal.         Procedures          ED Course  ED Course as of 08/01/22 1537   Mon Aug 01, 2022   1320 I have reviewed his CAT scan and I see multiple dilated fluid-filled loops of small bowel consistent with small bowel obstruction.  I am awaiting final read from radiology however.  We will asked the nurses to place an NG tube.  I have paged Dr. Lester who is on-call for general surgery. [DT]   1321 I spoke with Mr. Farias and his son about diagnosis and plan for admission. [DT]      ED Course User Index  [DT] Sampson Wu MD                                           MDM  Number of Diagnoses or Management Options  Acute abdominal pain in left lower quadrant: new and requires workup  Small bowel obstruction (HCC): new and requires workup     Amount and/or Complexity of Data Reviewed  Clinical lab tests: ordered and reviewed  Tests in the radiology section of CPT®: ordered and reviewed  Review and summarize past medical records: yes  Discuss the patient with other providers: yes  Independent visualization of images, tracings, or specimens: yes    Patient Progress  Patient progress: improved      Final diagnoses:   Small bowel obstruction (HCC)   Anticoagulated   Acute abdominal pain in left lower quadrant       ED Disposition  ED Disposition     ED Disposition   Decision to Admit    Condition   --    Comment   Level of Care: Telemetry [5]   Diagnosis: Small bowel obstruction (HCC) [111909]   Admitting Physician: ROSELINE STEARNS  [[id number]]   Attending Physician: ROSELINE VALERIO [[id number]]               No follow-up provider specified.       Medication List      No changes were made to your prescriptions during this visit.          Sampson Wu MD  08/01/22 1537      Electronically signed by Sampson Wu MD at 08/01/22 1537       Vital Signs (last day)     Date/Time Temp Temp src Pulse Resp BP Patient Position SpO2    08/02/22 0900 -- -- 60 -- -- -- 96    08/02/22 0700 97.5 (36.4) Oral 65 18 127/57 Lying 92    08/02/22 0600 -- -- 73 -- -- -- 92    08/02/22 0200 -- -- 59 -- -- -- 92    08/02/22 0000 -- -- 68 -- -- -- 91    08/01/22 2219 98.2 (36.8) Oral 78 18 132/73 Lying 93    08/01/22 2000 -- -- 96 -- -- -- 94    08/01/22 1906 98.5 (36.9) Oral 63 18 135/83 Lying 95    08/01/22 1527 97.9 (36.6) Oral 94 18 155/84 Sitting 96    08/01/22 1512 -- -- -- -- 132/72 -- 97    08/01/22 1025 97.5 (36.4) Oral 89 18 154/89 Sitting 98            Current Facility-Administered Medications   Medication Dose Route Frequency Provider Last Rate Last Admin   • acetaminophen (TYLENOL) tablet 650 mg  650 mg Oral Q4H PRN Roseline Valerio DO        Or   • acetaminophen (TYLENOL) 160 MG/5ML solution 650 mg  650 mg Oral Q4H PRN Roseline Valerio DO        Or   • acetaminophen (TYLENOL) suppository 650 mg  650 mg Rectal Q4H PRN Roseline Valerio DO       • dextrose (D50W) (25 g/50 mL) IV injection 25 g  25 g Intravenous Q15 Min PRN Roseline Valerio DO       • dextrose (GLUTOSE) oral gel 15 g  15 g Oral Q15 Min PRN Roseline Valerio DO       • glucagon (human recombinant) (GLUCAGEN DIAGNOSTIC) injection 1 mg  1 mg Intramuscular Q15 Min PRN Roseline Valerio DO       • heparin 89969 units/250 mL (100 units/mL) in 0.45 % NaCl infusion  11 Units/kg/hr Intravenous Titrated Roseline Valerio DO 13.75 mL/hr at 08/01/22 2321 11 Units/kg/hr at 08/01/22 2321   • hydrALAZINE (APRESOLINE) injection 10 mg  10 mg Intravenous Q6H PRN Roseline Valerio, DO        • Insulin Lispro (humaLOG) injection 0-7 Units  0-7 Units Subcutaneous TID AC Karen Valerio, DO       • morphine injection 2 mg  2 mg Intravenous Q2H PRN Karen Valerio DO   2 mg at 08/01/22 2235    And   • naloxone (NARCAN) injection 0.4 mg  0.4 mg Intravenous Q5 Min PRN Karen Valerio DO       • ondansetron (ZOFRAN) tablet 4 mg  4 mg Oral Q6H PRN Karen Valerio DO        Or   • ondansetron (ZOFRAN) injection 4 mg  4 mg Intravenous Q6H PRN Karen Valerio, DO       • Pharmacy to Dose Heparin   Does not apply Continuous PRN Karen Valerio DO       • Sodium Chloride (PF) 0.9 % 10 mL  10 mL Intravenous PRN Emergency, Triage Protocol, MD       • sodium chloride 0.9 % flush 10 mL  10 mL Intravenous Q12H Karen Valerio DO   10 mL at 08/01/22 2000   • sodium chloride 0.9 % flush 10 mL  10 mL Intravenous PRN Karen Valerio DO       • sodium chloride 0.9 % infusion  75 mL/hr Intravenous Continuous Karen Valerio DO 75 mL/hr at 08/02/22 0617 75 mL/hr at 08/02/22 0617       Lab Results (last 24 hours)     Procedure Component Value Units Date/Time    POC Glucose Once [[id number]]  (Normal) Collected: 08/02/22 0730    Specimen: Blood Updated: 08/02/22 0733     Glucose 112 mg/dL      Comment: Meter: [id number] : [id number] Gisselle Sutherland       Heparin Anti-Xa [[id number]]  (Normal) Collected: 08/02/22 0604    Specimen: Blood Updated: 08/02/22 0656     Heparin Anti-Xa (UFH) 0.39 IU/ml     Basic Metabolic Panel [[id number]]  (Abnormal) Collected: 08/02/22 0604    Specimen: Blood Updated: 08/02/22 0655     Glucose 128 mg/dL      BUN 24 mg/dL      Creatinine 0.91 mg/dL      Sodium 140 mmol/L      Potassium 4.1 mmol/L      Chloride 104 mmol/L      CO2 26.0 mmol/L      Calcium 8.0 mg/dL      BUN/Creatinine Ratio 26.4     Anion Gap 10.0 mmol/L      eGFR 93.5 mL/min/1.73      Comment: National Kidney Foundation and American Society of Nephrology (ASN) Task Force recommended calculation based  on the Chronic Kidney Disease Epidemiology Collaboration (CKD-EPI) equation refit without adjustment for race.       Narrative:      GFR Normal >60  Chronic Kidney Disease <60  Kidney Failure <15      CBC & Differential [[id number]]  (Abnormal) Collected: 08/02/22 0604    Specimen: Blood Updated: 08/02/22 0635    Narrative:      The following orders were created for panel order CBC & Differential.  Procedure                               Abnormality         Status                     ---------                               -----------         ------                     CBC Auto Differential[[id number]]        Abnormal            Final result                 Please view results for these tests on the individual orders.    CBC Auto Differential [[id number]]  (Abnormal) Collected: 08/02/22 0604    Specimen: Blood Updated: 08/02/22 0635     WBC 6.59 10*3/mm3      RBC 4.91 10*6/mm3      Hemoglobin 13.5 g/dL      Hematocrit 43.6 %      MCV 88.8 fL      MCH 27.5 pg      MCHC 31.0 g/dL      RDW 15.9 %      RDW-SD 51.5 fl      MPV 10.3 fL      Platelets 181 10*3/mm3      Neutrophil % 67.1 %      Lymphocyte % 18.1 %      Monocyte % 9.9 %      Eosinophil % 3.8 %      Basophil % 0.8 %      Immature Grans % 0.3 %      Neutrophils, Absolute 4.43 10*3/mm3      Lymphocytes, Absolute 1.19 10*3/mm3      Monocytes, Absolute 0.65 10*3/mm3      Eosinophils, Absolute 0.25 10*3/mm3      Basophils, Absolute 0.05 10*3/mm3      Immature Grans, Absolute 0.02 10*3/mm3      nRBC 0.0 /100 WBC     aPTT [[id number]]  (Abnormal) Collected: 08/01/22 2223    Specimen: Blood Updated: 08/01/22 2310     PTT 58.7 seconds     Narrative:      PTT = The equivalent PTT values for the therapeutic range of heparin levels at 0.3 to 0.5 U/ml are 60 to 70 seconds.    Heparin Anti-Xa [[id number]]  (Abnormal) Collected: 08/01/22 2223    Specimen: Blood Updated: 08/01/22 2310     Heparin Anti-Xa (UFH) 0.10 IU/ml     COVID PRE-OP / PRE-PROCEDURE SCREENING ORDER (NO  ISOLATION) - Swab, Nasopharynx [[id number]]  (Normal) Collected: 08/01/22 1745    Specimen: Swab from Nasopharynx Updated: 08/01/22 1818    Narrative:      The following orders were created for panel order COVID PRE-OP / PRE-PROCEDURE SCREENING ORDER (NO ISOLATION) - Swab, Nasopharynx.  Procedure                               Abnormality         Status                     ---------                               -----------         ------                     COVID-19 and FLU A/B PCR...[[id number]]  Normal              Final result                 Please view results for these tests on the individual orders.    COVID-19 and FLU A/B PCR - Swab, Nasopharynx [[id number]]  (Normal) Collected: 08/01/22 1745    Specimen: Swab from Nasopharynx Updated: 08/01/22 1818     COVID19 Not Detected     Influenza A PCR Not Detected     Influenza B PCR Not Detected    Narrative:      Fact sheet for providers: https://www.fda.gov/media/244378/download    Fact sheet for patients: https://www.fda.gov/media/890490/download    Test performed by PCR.    Hemoglobin A1c [[id number]]  (Abnormal) Collected: 08/01/22 1038    Specimen: Blood Updated: 08/01/22 1631     Hemoglobin A1C 6.50 %     Narrative:      Hemoglobin A1C Ranges:    Increased Risk for Diabetes  5.7% to 6.4%  Diabetes                     >= 6.5%  Diabetic Goal                < 7.0%    POC Glucose Once [[id number]]  (Normal) Collected: 08/01/22 1544    Specimen: Blood Updated: 08/01/22 1602     Glucose 130 mg/dL      Comment: Meter: [id number] : [id number] Day Latia       [place] Draw [[id number]] Collected: 08/01/22 1038    Specimen: Blood Updated: 08/01/22 1448    Narrative:      The following orders were created for panel order [place] Draw.  Procedure                               Abnormality         Status                     ---------                               -----------         ------                     Green Top (Gel)[[id number]]                                   Final result               Lavender Top[[id number]]                                     Final result               Gold Top - SST[[id number]]                                   Final result               Werner Top[[id number]]                                         Final result               Light Blue Top[[id number]]                                   Final result                 Please view results for these tests on the individual orders.    Gray Top [[id number]] Collected: 08/01/22 1038    Specimen: Blood Updated: 08/01/22 1448     Extra Tube Hold for add-ons.     Comment: Auto resulted.       Protime-INR [[id number]]  (Abnormal) Collected: 08/01/22 1038    Specimen: Blood Updated: 08/01/22 1213     Protime 29.0 Seconds      INR 2.72    Urinalysis, Microscopic Only - Urine, Clean Catch [[id number]]  (Abnormal) Collected: 08/01/22 1109    Specimen: Urine, Clean Catch Updated: 08/01/22 1203     RBC, UA 3-6 /HPF      WBC, UA 3-5 /HPF      Bacteria, UA Trace /HPF      Squamous Epithelial Cells, UA 3-6 /HPF      Hyaline Casts, UA 7-12 /LPF      Methodology Manual Light Microscopy    Gold Top - SST [[id number]] Collected: 08/01/22 1038    Specimen: Blood Updated: 08/01/22 1148     Extra Tube Hold for add-ons.     Comment: Auto resulted.       Green Top (Gel) [[id number]] Collected: 08/01/22 1038    Specimen: Blood Updated: 08/01/22 1148     Extra Tube Hold for add-ons.     Comment: Auto resulted.       Lavender Top [[id number]] Collected: 08/01/22 1038    Specimen: Blood Updated: 08/01/22 1148     Extra Tube hold for add-on     Comment: Auto resulted       Light Blue Top [[id number]] Collected: 08/01/22 1038    Specimen: Blood Updated: 08/01/22 1148     Extra Tube Hold for add-ons.     Comment: Auto resulted       Urinalysis With Microscopic If Indicated (No Culture) - Urine, Clean Catch [[id number]]  (Abnormal) Collected: 08/01/22 1109    Specimen: Urine, Clean Catch Updated: 08/01/22 1146     Color, UA Dark Yellow     Appearance,  UA Clear     pH, UA 5.5     Specific Gravity, UA 1.021     Glucose, UA Negative     Ketones, UA Trace     Bilirubin, UA Negative     Blood, UA Negative     Protein,  mg/dL (2+)     Leuk Esterase, UA Negative     Nitrite, UA Negative     Urobilinogen, UA 1.0 E.U./dL    Comprehensive Metabolic Panel [[id number]]  (Abnormal) Collected: 08/01/22 1038    Specimen: Blood Updated: 08/01/22 1125     Glucose 181 mg/dL      BUN 24 mg/dL      Creatinine 0.95 mg/dL      Sodium 140 mmol/L      Potassium 4.7 mmol/L      Comment: Slight hemolysis detected by analyzer. Results may be affected.        Chloride 101 mmol/L      CO2 29.0 mmol/L      Calcium 9.0 mg/dL      Total Protein 7.5 g/dL      Albumin 4.20 g/dL      ALT (SGPT) 22 U/L      AST (SGOT) 37 U/L      Alkaline Phosphatase 131 U/L      Total Bilirubin 1.7 mg/dL      Globulin 3.3 gm/dL      Comment: Calculated Result        A/G Ratio 1.3 g/dL      BUN/Creatinine Ratio 25.3     Anion Gap 10.0 mmol/L      eGFR 88.8 mL/min/1.73      Comment: National Kidney Foundation and American Society of Nephrology (ASN) Task Force recommended calculation based on the Chronic Kidney Disease Epidemiology Collaboration (CKD-EPI) equation refit without adjustment for race.       Narrative:      GFR Normal >60  Chronic Kidney Disease <60  Kidney Failure <15      Lipase [[id number]]  (Normal) Collected: 08/01/22 1038    Specimen: Blood Updated: 08/01/22 1125     Lipase 43 U/L     CBC & Differential [[id number]]  (Abnormal) Collected: 08/01/22 1038    Specimen: Blood Updated: 08/01/22 1124    Narrative:      The following orders were created for panel order CBC & Differential.  Procedure                               Abnormality         Status                     ---------                               -----------         ------                     CBC Auto Differential[[id number]]        Abnormal            Final result                 Please view results for these tests on the individual  orders.    CBC Auto Differential [[id number]]  (Abnormal) Collected: 08/01/22 1038    Specimen: Blood Updated: 08/01/22 1124     WBC 9.24 10*3/mm3      RBC 5.41 10*6/mm3      Hemoglobin 14.7 g/dL      Hematocrit 47.2 %      MCV 87.2 fL      MCH 27.2 pg      MCHC 31.1 g/dL      RDW 15.9 %      RDW-SD 50.1 fl      MPV 10.4 fL      Platelets 238 10*3/mm3      Neutrophil % 78.4 %      Lymphocyte % 11.1 %      Monocyte % 7.5 %      Eosinophil % 1.6 %      Basophil % 0.5 %      Immature Grans % 0.9 %      Neutrophils, Absolute 7.24 10*3/mm3      Lymphocytes, Absolute 1.03 10*3/mm3      Monocytes, Absolute 0.69 10*3/mm3      Eosinophils, Absolute 0.15 10*3/mm3      Basophils, Absolute 0.05 10*3/mm3      Immature Grans, Absolute 0.08 10*3/mm3      nRBC 0.0 /100 WBC     Lactic Acid, Plasma [[id number]]  (Normal) Collected: 08/01/22 1038    Specimen: Blood Updated: 08/01/22 1124     Lactate 1.7 mmol/L      Comment: Falsely depressed results may occur on samples drawn from patients receiving N-Acetylcysteine (NAC) or Metamizole.           Imaging Results (Last 24 Hours)     Procedure Component Value Units Date/Time    CT Abdomen Pelvis Without Contrast [[id number]] Collected: 08/01/22 1357     Updated: 08/01/22 1413    Narrative:      DATE OF EXAM: 8/1/2022 1:12 PM     PROCEDURE: CT ABDOMEN PELVIS WO CONTRAST-     INDICATIONS: Bowel obstruction, history of rectus sheath hematoma     COMPARISON: 5/11/2022     TECHNIQUE: Routine transaxial slices were obtained through the abdomen  and pelvis without the administration of intravenous contrast.  Reconstructed coronal and sagittal images were also obtained. Automated  exposure control and iterative construction methods were used.     The radiation dose reduction device was turned on for each scan per the  ALARA (As Low as Reasonably Achievable) protocol.     FINDINGS:  Previous rectus sheath hematoma has been reduced to an area of mild  abdominal wall thickening, presumably organized  and slowly reabsorbing  hematoma, decreased from approximately 14 x 6.5 cm to approximately 7.3  x 2.5 cm. No new abdominal wall hematoma is seen.     Included lung bases appear clear. Numerous gallstones are again seen in  the otherwise normal-appearing gallbladder. Left lobe liver enlargement  and liver capsular nodularity is concerning for cirrhosis. No focal  hepatic lesions are identified. No biliary ductal dilatation is seen.  Spleen is not enlarged. Pancreas, adrenal glands, and kidneys appear  within normal limits. No upper abdominal free air, ascites, or  adenopathy is seen.      The stomach is distended with fluid and a small amount of food. There is  dilatation of the duodenum and jejunum up to level of the patient's  lower abdominal midline hernia, where there appears to be obstruction,  with dilated entering loop to the right, and two decompressed exam loops  to the left and along the inferior margin of the hernia site. The  inferior margin of this hernia site, within the patient's pannus is  complex and may reflect additional sites of herniation or may be  interconnected with the main hernia, images 129 through 148. No bowel  wall pneumatosis is seen and there is little evidence to suggest venous  congestion.      Above the level of the obstructing small bowel hernia, there is an  adjacent but separate herniation of already decompressed small bowel,  with no obvious strangulation, and cephalad of this, additional fat-only  containing nonstrangulated hernia with trace ascites, and more medial  colon-containing hernia with no evidence of strangulation. No hernia is  seen above the level of the colon containing hernia on axial image 88.     Elsewhere, terminal ileum cecum and appendix appear normal. Bladder is  normally distended and normal in appearance. No ascites or free air is  identified. Bony structures appear to be intact.          Impression:         1. Multiple midline abdominal wall hernias,  from cephalad to caudal:    a: Midline hernia containing a small knuckle of colon, nonstrangulated    b: Fat-containing colon to the left of the midline, non-strangulated    c: Small hernia containing already decompressed small bowel,  non-strangulated.    d: Complex appearing hernia containing several bowel loops, and the  apparent site of       obstruction, images 121 through 126. The inferior  margin of this hernia, within the patient's pannus, is complex and may  contain additional areas of abdominal wall weakness/herniation.     2. Cholelithiasis, but no evidence of cholecystitis.     3. Liver morphology concerning for cirrhosis. No additional findings to  suggest cirrhosis.     4. Small organized appearing left rectus sheath hematoma significantly  decreased from prior exam.     This report was finalized on 8/1/2022 2:10 PM by Dr. Miguel Rodriguez MD.           Orders (last 24 hrs)      Start     Ordered    08/02/22 1200  Heparin Anti-Xa  Timed         08/02/22 0709    08/02/22 0929  Inpatient Admission  Once         08/02/22 0929    08/02/22 0838  PT Consult: Eval & Treat Functional Mobility Below Baseline  Once         08/02/22 0837    08/02/22 0734  POC Glucose Once  PROCEDURE ONCE         08/02/22 0730    08/02/22 0600  Basic Metabolic Panel  Morning Draw         08/01/22 1546    08/02/22 0600  CBC (No Diff)  Morning Draw,   Status:  Canceled         08/01/22 1546    08/02/22 0600  CBC & Differential  Every Third Day      Comments: Discontinue After Heparin Stopped      08/01/22 1546    08/02/22 0600  CBC Auto Differential  PROCEDURE ONCE        Comments: Discontinue After Heparin Stopped      08/01/22 2205    08/02/22 0600  Heparin Anti-Xa  Timed         08/01/22 2317    08/02/22 0015  heparin (porcine) injection 4,000 Units  Once         08/01/22 2317    08/01/22 2259  NG Tube to Low Wall Suction  Continuous         08/01/22 2259    08/01/22 2200  Heparin Anti-Xa  Timed         08/01/22 1614    08/01/22 2100   sodium chloride 0.9 % flush 10 mL  Every 12 Hours Scheduled         08/01/22 1546    08/01/22 1800  Oral Care  2 Times Daily       08/01/22 1546    08/01/22 1747  ECG 12 Lead  Once         08/01/22 1747    08/01/22 1730  Insulin Lispro (humaLOG) injection 0-7 Units  3 Times Daily Before Meals         08/01/22 1546    08/01/22 1700  POC Glucose TID AC  3 Times Daily Before Meals       08/01/22 1546    08/01/22 1645  sodium chloride 0.9 % infusion  Continuous         08/01/22 1546    08/01/22 1645  heparin 07495 units/250 mL (100 units/mL) in 0.45 % NaCl infusion  Titrated         08/01/22 1546    08/01/22 1607  Inpatient Spiritual Care Consult  Once        Provider:  (Not yet assigned)    08/01/22 1607    08/01/22 1603  POC Glucose Once  PROCEDURE ONCE         08/01/22 1544    08/01/22 1600  Vital Signs  Every 4 Hours       08/01/22 1546    08/01/22 1547  Intake & Output  Every Shift       08/01/22 1546    08/01/22 1547  Weigh Patient  Once         08/01/22 1546    08/01/22 1547  Oxygen Therapy- Nasal Cannula; Titrate for SPO2: 90% - 95%  Continuous         08/01/22 1546    08/01/22 1547  Insert Peripheral IV  Once         08/01/22 1546    08/01/22 1547  Saline Lock & Maintain IV Access  Continuous         08/01/22 1546    08/01/22 1547  Initiate Observation Status  Once         08/01/22 1546    08/01/22 1547  Code Status and Medical Interventions:  Continuous         08/01/22 1546    08/01/22 1547  VTE Prophylaxis Not Indicated: Other: Patient Currently Anticoagulated / Receiving Prophylaxis  Once         08/01/22 1546    08/01/22 1547  Pulse Oximetry, Continuous  Continuous         08/01/22 1546    08/01/22 1547  Cardiac Monitoring  Continuous         08/01/22 1546    08/01/22 1547  NPO Diet NPO Type: Strict NPO  Diet Effective Now         08/01/22 1546    08/01/22 1547  Inpatient General Surgery Consult  Once        Specialty:  General Surgery  Provider:  Ozzie Lester MD    08/01/22 1546    08/01/22  1547  COVID PRE-OP / PRE-PROCEDURE SCREENING ORDER (NO ISOLATION) - Swab, Nasopharynx  Once         08/01/22 1546    08/01/22 1547  Initiate Heparin Protocol  Until Discontinued         08/01/22 1546    08/01/22 1547  Notify Provider Platelet Count Less Than [id number]  Until Discontinued         08/01/22 1546    08/01/22 1547  Stop Infusion & Notify Provider if Bleeding Occurs  Until Discontinued         08/01/22 1546    08/01/22 1547  Heparin Anti-Xa  Once,   Status:  Canceled        Comments: Prior to Initial Heparin Bolus      08/01/22 1546    08/01/22 1547  aPTT  STAT        Comments: Prior to Initial Heparin Bolus      08/01/22 1546    08/01/22 1547  Do NOT Hold Basal or Correction Scale Insulin When Patient is NPO, Hold Scheduled Mealtime (Bolus) Insulin if NPO  Continuous         08/01/22 1546    08/01/22 1547  Follow [facility] Hypoglycemia Standing Orders For Blood Glucose Less Than 70 mg/dL  Until Discontinued        Comments: ALERT PATIENT - NOT NPO & CAN SAFELY SWALLOW  Administer 4 oz Fruit Juice OR 4 oz Regular Soda OR 8 oz Milk OR 15-30 grams (1 tube) of Glucose Gel.  Recheck Blood Glucose Approximately 15 Minutes After Ingestion, Repeat Treatment & Continue to Recheck Blood Sugar Approximately Every 15 Minutes Until Blood Glucose is 70 or Higher.  Once Blood Glucose is 70 or Higher & if It Will Be More Than 60 Minutes Until Next Meal, Provide Appropriate Snack (Including Carbohydrate Food) Based on Meal Plan [place]. Give Meal Tray As Soon As Possible.    PATIENT HAS IV ACCESS - UNRESPONSIVE, NPO OR UNABLE TO SAFELY SWALLOW  Administer 25g (50ml) D50W IV Push.  Recheck Blood Glucose Approximately 15 Minutes After Administration, if Blood Glucose Remains Less Than 70, Repeat Treatment   Recheck Blood Glucose Approximately 15 Minutes After 2nd Administration, if Blood Glucose Remains Less Than 70 After 2nd Dose of D50W, Contact Provider for Further Treatment Orders & Consider Adding IVF With D5W for Mainte  "tenance    PATIENT WITHOUT IV ACCESS - UNRESPONSIVE, NPO OR UNABLE TO SAFELY SWALLOW  Administer 1mg Glucagon SQ & Establish IV Access.  Turn Patient on Side - Nausea / Vomiting May Occur.  Recheck Blood Glucose Approximately 15 Minutes After Administration.  If Blood Glucose Remains Less Than 70, Administer 25g D50W IV Push (50ml).  Recheck Blood Glucose Approximately 15 Minutes After Administration of D50W, if Blood Glucose Remains Less Than 70, Contact Provider for Further Treatment Orders & C  Consider Adding IVF With D5 for Maintenance    Document Event & Patient Response to Interventions in EMR, Document Medications on MAR  Notify Provider if Hypoglycemia Treatment Needed    08/01/22 1546    08/01/22 1547  Hemoglobin A1c  Once         08/01/22 1546    08/01/22 1547  COVID-19 and FLU A/B PCR - Swab, Nasopharynx  PROCEDURE ONCE         08/01/22 1546    08/01/22 1546  acetaminophen (TYLENOL) tablet 650 mg  Every 4 Hours PRN        \"Or\" Linked Group Details    08/01/22 1546    08/01/22 1546  acetaminophen (TYLENOL) 160 MG/5ML solution 650 mg  Every 4 Hours PRN        \"Or\" Linked Group Details    08/01/22 1546    08/01/22 1546  acetaminophen (TYLENOL) suppository 650 mg  Every 4 Hours PRN        \"Or\" Linked Group Details    08/01/22 1546    08/01/22 1546  ondansetron (ZOFRAN) tablet 4 mg  Every 6 Hours PRN        \"Or\" Linked Group Details    08/01/22 1546    08/01/22 1546  ondansetron (ZOFRAN) injection 4 mg  Every 6 Hours PRN        \"Or\" Linked Group Details    08/01/22 1546    08/01/22 1546  heparin (porcine) injection 4,000 Units  As Needed,   Status:  Discontinued         08/01/22 1546    08/01/22 1546  heparin (porcine) injection 2,000 Units  As Needed,   Status:  Discontinued         08/01/22 1546    08/01/22 1546  Pharmacy to Dose Heparin  Continuous PRN         08/01/22 1546    08/01/22 1546  dextrose (GLUTOSE) oral gel 15 g  Every 15 Minutes PRN         08/01/22 1546    08/01/22 1546  dextrose (D50W) (25 " "g/50 mL) IV injection 25 g  Every 15 Minutes PRN         08/01/22 1546    08/01/22 1546  glucagon (human recombinant) (GLUCAGEN DIAGNOSTIC) injection 1 mg  Every 15 Minutes PRN         08/01/22 1546    08/01/22 1546  hydrALAZINE (APRESOLINE) injection 10 mg  Every 6 Hours PRN         08/01/22 1546    08/01/22 1546  sodium chloride 0.9 % flush 10 mL  As Needed         08/01/22 1546    08/01/22 1431  morphine injection 2 mg  Every 2 Hours PRN        \"And\" Linked Group Details    08/01/22 1431    08/01/22 1431  naloxone (NARCAN) injection 0.4 mg  Every 5 Minutes PRN        \"And\" Linked Group Details    08/01/22 1431    08/01/22 1346  oxymetazoline (AFRIN) nasal spray 1 spray  Once         08/01/22 1344    08/01/22 1337  ED Bed Request  Once         08/01/22 1336    08/01/22 1323  Nasogastric tube insertion  Once         08/01/22 1322    08/01/22 1215  HYDROmorphone (DILAUDID) injection 0.5 mg  Once         08/01/22 1213    08/01/22 1215  ondansetron (ZOFRAN) injection 4 mg  Once         08/01/22 1213    08/01/22 1205  CT Abdomen Pelvis Without Contrast  1 Time Imaging        Comments: NON-CONTRASTED STUDY      08/01/22 1204    08/01/22 1205  Protime-INR  Once         08/01/22 1204    08/01/22 1137  Urinalysis, Microscopic Only - Urine, Clean Catch  Once         08/01/22 1136    08/01/22 1100  Comprehensive Metabolic Panel  Once         08/01/22 1034    08/01/22 1100  Lipase  Once         08/01/22 1034    08/01/22 1034  NPO Diet NPO Type: Strict NPO  Diet Effective Now,   Status:  Canceled         08/01/22 1034    08/01/22 1034  Undress & Gown  Once         08/01/22 1034    08/01/22 1034  Insert Peripheral IV  Once         08/01/22 1034    08/01/22 1034  [place] Draw  Once         08/01/22 1034    08/01/22 1034  CBC & Differential  Once         08/01/22 1034    08/01/22 1034  Urinalysis With Microscopic If Indicated (No Culture) - Urine, Clean Catch  Once         08/01/22 1034    08/01/22 1034  Lactic Acid, Plasma  Once "         08/01/22 1034    08/01/22 1034  Green Top (Gel)  PROCEDURE ONCE         08/01/22 1034    08/01/22 1034  Lavender Top  PROCEDURE ONCE         08/01/22 1034    08/01/22 1034  Gold Top - SST  PROCEDURE ONCE         08/01/22 1034    08/01/22 1034  Werner Top  PROCEDURE ONCE         08/01/22 1034    08/01/22 1034  Light Blue Top  PROCEDURE ONCE         08/01/22 1034    08/01/22 1034  CBC Auto Differential  PROCEDURE ONCE         08/01/22 1034    08/01/22 1033  Sodium Chloride (PF) 0.9 % 10 mL  As Needed         08/01/22 1034    06/20/22 0000  carvedilol (COREG) 3.125 MG tablet  2 Times Daily         08/01/22 1112    Unscheduled  Up With Assistance  As Needed       08/01/22 1546    --  insulin lispro protamine-insulin lispro (humaLOG 75-25) (75-25) 100 UNIT/ML suspension injection  2 Times Daily With Meals         08/01/22 1425                Physician Progress Notes (last 24 hours)  Notes from 08/01/22 0937 through 08/02/22 0937   No notes of this type exist for this encounter.         Consult Notes (last 24 hours)  Notes from 08/01/22 0937 through 08/02/22 0937   No notes of this type exist for this encounter.

## 2022-08-02 NOTE — PROGRESS NOTES
HEPARIN INFUSION  Cecilio Farias is a  66 y.o. male receiving heparin infusion.     Therapy for (VTE/Cardiac): Cardiac (Mech AVR on warfarin PTA)  Patient Weight: 125 kg  Initial Bolus (Y/N): No (current INR 2.72)  Any Bolus (Y/N): Yes    Signs or Symptoms of Bleeding: None per RN    Cardiac or Other (Not VTE)   Initial Bolus: 60 units/kg (Max 4,000 units)  Initial rate: 12 units/kg/hr (Max 1,000 units/hr)   Anti Xa Rebolus Infusion Hold time Change infusion Dose (Units/kg/hr) Next Anti Xa or aPTT Level Due   < 0.11 50 Units/kg  (4000 Units Max) None Increase by  3 Units/kg/hr 6 hours   0.11- 0.19 25 Units/kg  (2000 Units Max) None Increase by  2 Units/kg/hr 6 hours   0.2 - 0.29 0 None Increase by  1 Units/kg/hr 6 hours   0.3 - 0.5 0 None No Change 6 hours (after 2 consecutive levels in range check qAM)   0.51 - 0.6 0 None Decrease by  1 Units/kg/hr 6 hours   0.61 - 0.8 0 30 Minutes Decrease by  2 Units/kg/hr 6 hours   0.81 - 1 0 60 Minutes Decrease by  3 Units/kg/hr 6 hours   >1 0 Hold  After Anti Xa less than 0.5 decrease previous rate by  4 Units/kg/hr  Every 2 hours until Anti Xa  less than 0.5 then when infusion restarts in 6 hours     Recommend Xa every 6 hours.     Results from last 7 days   Lab Units 08/02/22  0604 08/01/22  1038   INR   --  2.72*   HEMOGLOBIN g/dL 13.5 14.7   HEMATOCRIT % 43.6 47.2   PLATELETS 10*3/mm3 181 238       Date   Time   Anti-Xa Current Rate (Unit/kg/hr) Bolus   (Units) Rate Change   (Unit/kg/hr) New Rate (Unit/kg/hr) Next   Anti-Xa Comments  Pump Check Daily   8/01 16:20 -- -- No initial -- 8 22:00 D/W Bela NAVA   8/1 2223 0.10 8 4000 +3 11 0600 D/w ELIJAH Munoz   8/2 0604 0.39 11 -- -- 11 1200 Spoke to ELIJAH Ramirez

## 2022-08-02 NOTE — PLAN OF CARE
Goal Outcome Evaluation:           Progress: improving  Outcome Evaluation: Pt has NG tube to right nare on low intermittent suction (see flowsheets for shift output). Pt c/o abdominal pain relieved with PRN pain medication. Pt on heparin drip being titrated per pharmacy. A fib per telemetry.

## 2022-08-02 NOTE — PROGRESS NOTES
HEPARIN INFUSION  Cecilio Farias is a  66 y.o. male receiving heparin infusion.     Therapy for (VTE/Cardiac): Cardiac (Mech AVR on warfarin PTA)  Patient Weight: 125 kg  Initial Bolus (Y/N): No (current INR 2.72)  Any Bolus (Y/N): Yes    Signs or Symptoms of Bleeding: None per RN    Cardiac or Other (Not VTE)   Initial Bolus: 60 units/kg (Max 4,000 units)  Initial rate: 12 units/kg/hr (Max 1,000 units/hr)   Anti Xa Rebolus Infusion Hold time Change infusion Dose (Units/kg/hr) Next Anti Xa or aPTT Level Due   < 0.11 50 Units/kg  (4000 Units Max) None Increase by  3 Units/kg/hr 6 hours   0.11- 0.19 25 Units/kg  (2000 Units Max) None Increase by  2 Units/kg/hr 6 hours   0.2 - 0.29 0 None Increase by  1 Units/kg/hr 6 hours   0.3 - 0.5 0 None No Change 6 hours (after 2 consecutive levels in range check qAM)   0.51 - 0.6 0 None Decrease by  1 Units/kg/hr 6 hours   0.61 - 0.8 0 30 Minutes Decrease by  2 Units/kg/hr 6 hours   0.81 - 1 0 60 Minutes Decrease by  3 Units/kg/hr 6 hours   >1 0 Hold  After Anti Xa less than 0.5 decrease previous rate by  4 Units/kg/hr  Every 2 hours until Anti Xa  less than 0.5 then when infusion restarts in 6 hours     Recommend Xa every 6 hours.     Results from last 7 days   Lab Units 08/01/22  1038   INR  2.72*   HEMOGLOBIN g/dL 14.7   HEMATOCRIT % 47.2   PLATELETS 10*3/mm3 238       Date   Time   Anti-Xa Current Rate (Unit/kg/hr) Bolus   (Units) Rate Change   (Unit/kg/hr) New Rate (Unit/kg/hr) Next   Anti-Xa Comments  Pump Check Daily   8/01 16:20 -- -- No initial -- 8 22:00 D/W Bela NAVA   8/1 2223 0.10 8 4000 +3 11 0600 D/w Bri, RN Taylor Riedel, PharmD, [facility]  8/1/2022  23:16 EDT

## 2022-08-02 NOTE — CASE MANAGEMENT/SOCIAL WORK
Discharge Planning Assessment  [facility]     Patient Name: Cecilio Farias  MRN: [id number]  Today's Date: 8/2/2022    Admit Date: 8/1/2022     Discharge Needs Assessment     Row Name 08/02/22 0933       Living Environment    People in Home spouse    Current Living Arrangements home    Living Arrangement Comments Supportive son.       Discharge Needs Assessment    Equipment Currently Used at Home none    Equipment Needed After Discharge none    Discharge Coordination/Progress Denies current use of HH or outpt services.               Discharge Plan     Row Name 08/02/22 0934       Plan    Plan Home at DC    Patient/Family in Agreement with Plan yes    Plan Comments I spoke with the pt and his son. They deny any DC needs at this tme.    Final Discharge Disposition Code 01 - home or self-care    Row Name 08/02/22 0816       Plan    Final Discharge Disposition Code 01 - home or self-care              Continued Care and Services - Admitted Since 8/1/2022    Coordination has not been started for this encounter.       Expected Discharge Date and Time     Expected Discharge Date Expected Discharge Time    Aug 4, 2022          Demographic Summary    No documentation.                Functional Status     Row Name 08/02/22 0933       Functional Status    Usual Activity Tolerance good       Functional Status, IADL    Medications independent    Meal Preparation independent    Housekeeping independent    Laundry independent    Shopping independent               Psychosocial    No documentation.                Abuse/Neglect    No documentation.                Legal    No documentation.                Substance Abuse    No documentation.                Patient Forms    No documentation.                   Marita Zarco RN

## 2022-08-03 LAB
ANION GAP SERPL CALCULATED.3IONS-SCNC: 7 MMOL/L (ref 5–15)
BASOPHILS # BLD AUTO: 0.05 10*3/MM3 (ref 0–0.2)
BASOPHILS NFR BLD AUTO: 0.8 % (ref 0–1.5)
BUN SERPL-MCNC: 15 MG/DL (ref 8–23)
BUN/CREAT SERPL: 22.7 (ref 7–25)
CALCIUM SPEC-SCNC: 7.9 MG/DL (ref 8.6–10.5)
CHLORIDE SERPL-SCNC: 108 MMOL/L (ref 98–107)
CO2 SERPL-SCNC: 27 MMOL/L (ref 22–29)
CREAT SERPL-MCNC: 0.66 MG/DL (ref 0.76–1.27)
DEPRECATED RDW RBC AUTO: 51.1 FL (ref 37–54)
EGFRCR SERPLBLD CKD-EPI 2021: 104.1 ML/MIN/1.73
EOSINOPHIL # BLD AUTO: 0.24 10*3/MM3 (ref 0–0.4)
EOSINOPHIL NFR BLD AUTO: 3.8 % (ref 0.3–6.2)
ERYTHROCYTE [DISTWIDTH] IN BLOOD BY AUTOMATED COUNT: 15.6 % (ref 12.3–15.4)
GLUCOSE BLDC GLUCOMTR-MCNC: 104 MG/DL (ref 70–130)
GLUCOSE BLDC GLUCOMTR-MCNC: 97 MG/DL (ref 70–130)
GLUCOSE BLDC GLUCOMTR-MCNC: 99 MG/DL (ref 70–130)
GLUCOSE SERPL-MCNC: 98 MG/DL (ref 65–99)
HCT VFR BLD AUTO: 39.3 % (ref 37.5–51)
HGB BLD-MCNC: 12 G/DL (ref 13–17.7)
IMM GRANULOCYTES # BLD AUTO: 0.02 10*3/MM3 (ref 0–0.05)
IMM GRANULOCYTES NFR BLD AUTO: 0.3 % (ref 0–0.5)
LYMPHOCYTES # BLD AUTO: 1.06 10*3/MM3 (ref 0.7–3.1)
LYMPHOCYTES NFR BLD AUTO: 16.7 % (ref 19.6–45.3)
MCH RBC QN AUTO: 27.3 PG (ref 26.6–33)
MCHC RBC AUTO-ENTMCNC: 30.5 G/DL (ref 31.5–35.7)
MCV RBC AUTO: 89.5 FL (ref 79–97)
MONOCYTES # BLD AUTO: 0.56 10*3/MM3 (ref 0.1–0.9)
MONOCYTES NFR BLD AUTO: 8.8 % (ref 5–12)
NEUTROPHILS NFR BLD AUTO: 4.4 10*3/MM3 (ref 1.7–7)
NEUTROPHILS NFR BLD AUTO: 69.6 % (ref 42.7–76)
NRBC BLD AUTO-RTO: 0 /100 WBC (ref 0–0.2)
PLATELET # BLD AUTO: 160 10*3/MM3 (ref 140–450)
PMV BLD AUTO: 10.4 FL (ref 6–12)
POTASSIUM SERPL-SCNC: 4 MMOL/L (ref 3.5–5.2)
RBC # BLD AUTO: 4.39 10*6/MM3 (ref 4.14–5.8)
SODIUM SERPL-SCNC: 142 MMOL/L (ref 136–145)
UFH PPP CHRO-ACNC: 0.25 IU/ML (ref 0.3–0.7)
UFH PPP CHRO-ACNC: 0.35 IU/ML (ref 0.3–0.7)
UFH PPP CHRO-ACNC: 0.38 IU/ML (ref 0.3–0.7)
WBC NRBC COR # BLD: 6.33 10*3/MM3 (ref 3.4–10.8)

## 2022-08-03 PROCEDURE — 25010000002 HEPARIN (PORCINE) 25000-0.45 UT/250ML-% SOLUTION

## 2022-08-03 PROCEDURE — 82962 GLUCOSE BLOOD TEST: CPT

## 2022-08-03 PROCEDURE — 80048 BASIC METABOLIC PNL TOTAL CA: CPT | Performed by: INTERNAL MEDICINE

## 2022-08-03 PROCEDURE — 25010000002 MORPHINE PER 10 MG: Performed by: FAMILY MEDICINE

## 2022-08-03 PROCEDURE — 85520 HEPARIN ASSAY: CPT

## 2022-08-03 PROCEDURE — 99232 SBSQ HOSP IP/OBS MODERATE 35: CPT | Performed by: INTERNAL MEDICINE

## 2022-08-03 PROCEDURE — 85025 COMPLETE CBC W/AUTO DIFF WBC: CPT | Performed by: INTERNAL MEDICINE

## 2022-08-03 RX ADMIN — SODIUM CHLORIDE 75 ML/HR: 9 INJECTION, SOLUTION INTRAVENOUS at 21:49

## 2022-08-03 RX ADMIN — HEPARIN SODIUM 12 UNITS/KG/HR: 10000 INJECTION, SOLUTION INTRAVENOUS at 09:27

## 2022-08-03 RX ADMIN — MORPHINE SULFATE 2 MG: 2 INJECTION, SOLUTION INTRAMUSCULAR; INTRAVENOUS at 06:32

## 2022-08-03 RX ADMIN — SODIUM CHLORIDE 75 ML/HR: 9 INJECTION, SOLUTION INTRAVENOUS at 09:28

## 2022-08-03 NOTE — PLAN OF CARE
Problem: Adult Inpatient Plan of Care  Goal: Plan of Care Review  Outcome: Ongoing, Progressing  Flowsheets  Taken 8/3/2022 1825 by Kierra Green, RN  Outcome Evaluation: Pt's NG tube hooked up to gravity. Pt has no nausea no vomiting. Will continue nto monitor.  Taken 8/2/2022 0502 by Irasema Cardenas RN  Progress: improving  Goal: Patient-Specific Goal (Individualized)  Outcome: Ongoing, Progressing  Goal: Absence of Hospital-Acquired Illness or Injury  Outcome: Ongoing, Progressing  Intervention: Identify and Manage Fall Risk  Flowsheets  Taken 8/3/2022 1800  Safety Promotion/Fall Prevention:   activity supervised   assistive device/personal items within reach   clutter free environment maintained   fall prevention program maintained   nonskid shoes/slippers when out of bed   safety round/check completed  Taken 8/3/2022 1600  Safety Promotion/Fall Prevention:   activity supervised   assistive device/personal items within reach   clutter free environment maintained   nonskid shoes/slippers when out of bed   safety round/check completed  Taken 8/3/2022 1400  Safety Promotion/Fall Prevention:   activity supervised   assistive device/personal items within reach   clutter free environment maintained   fall prevention program maintained   nonskid shoes/slippers when out of bed   safety round/check completed  Taken 8/3/2022 1203  Safety Promotion/Fall Prevention:   activity supervised   assistive device/personal items within reach   clutter free environment maintained   nonskid shoes/slippers when out of bed   safety round/check completed  Taken 8/3/2022 1000  Safety Promotion/Fall Prevention:   activity supervised   assistive device/personal items within reach   clutter free environment maintained   nonskid shoes/slippers when out of bed   safety round/check completed  Taken 8/3/2022 0750  Safety Promotion/Fall Prevention:   activity supervised   assistive device/personal items within reach   clutter free  environment maintained   nonskid shoes/slippers when out of bed  Intervention: Prevent Skin Injury  Flowsheets  Taken 8/3/2022 1800  Body Position: position changed independently  Taken 8/3/2022 1600  Body Position: position changed independently  Taken 8/3/2022 1400  Body Position: position changed independently  Taken 8/3/2022 1203  Body Position: position changed independently  Taken 8/3/2022 1000  Body Position: position changed independently  Taken 8/3/2022 0750  Body Position: position changed independently  Intervention: Prevent and Manage VTE (Venous Thromboembolism) Risk  Flowsheets  Taken 8/3/2022 1800 by Kierra Green RN  Activity Management: activity adjusted per tolerance  Taken 8/3/2022 1600 by Kierra Green RN  Activity Management: activity adjusted per tolerance  Taken 8/3/2022 1400 by Kierra Green RN  Activity Management: activity adjusted per tolerance  Taken 8/3/2022 1203 by Kierra Green RN  Activity Management: activity adjusted per tolerance  Taken 8/3/2022 1000 by Kierra Green RN  Activity Management: activity adjusted per tolerance  Taken 8/3/2022 0750 by Kierra Green RN  Activity Management: activity adjusted per tolerance  VTE Prevention/Management:   bilateral   dorsiflexion/plantar flexion performed  Taken 8/2/2022 2000 by Stephania Gomez RN  Range of Motion: active ROM (range of motion) encouraged  Intervention: Prevent Infection  Flowsheets  Taken 8/3/2022 1800  Infection Prevention:   rest/sleep promoted   single patient room provided   visitors restricted/screened  Taken 8/3/2022 1600  Infection Prevention:   rest/sleep promoted   single patient room provided   visitors restricted/screened  Taken 8/3/2022 1400  Infection Prevention:   rest/sleep promoted   single patient room provided   visitors restricted/screened  Taken 8/3/2022 1203  Infection Prevention:   rest/sleep promoted   single patient room provided   visitors restricted/screened  Taken 8/3/2022  1000  Infection Prevention:   rest/sleep promoted   single patient room provided   visitors restricted/screened  Taken 8/3/2022 0750  Infection Prevention:   rest/sleep promoted   single patient room provided   visitors restricted/screened  Goal: Optimal Comfort and Wellbeing  Outcome: Ongoing, Progressing  Intervention: Provide Person-Centered Care  Flowsheets (Taken 8/3/2022 0750)  Trust Relationship/Rapport:   care explained   questions answered   reassurance provided   thoughts/feelings acknowledged  Goal: Readiness for Transition of Care  Outcome: Ongoing, Progressing  Intervention: Mutually Develop Transition Plan  Flowsheets  Taken 8/2/2022 0933 by Marita Zarco, RN  Equipment Needed After Discharge: none  Discharge Coordination/Progress: Denies current use of HH or outpt services.  Equipment Currently Used at Home: none  Taken 8/1/2022 1545 by Kierra Green, RN  Transportation Anticipated: family or friend will provide  Patient/Family Anticipated Services at Transition: none  Patient/Family Anticipates Transition to: home with family   Goal Outcome Evaluation:              Outcome Evaluation: Pt's NG tube hooked up to gravity. Pt has no nausea no vomiting. Will continue nto monitor.

## 2022-08-03 NOTE — PROGRESS NOTES
[facility] Medicine Services  PROGRESS NOTE    Patient Name: Cecilio Farias  : 1955  MRN: [id number]    Date of Admission: 2022  Primary Care Physician: America Everett DO    Subjective   Subjective     CC:  Recurrent SBO    HPI:  Resting in bed. Son is at bedside in chair. Reports he is feeling better and tolerated a popsicle. NGT output noted, nursing reported 500cc since placement. Had BM yesterday as well and passing gas    ROS:  Gen- No fevers, chills  CV- No chest pain, palpitations  Resp- No cough, dyspnea  GI- as above        Objective   Objective     Vital Signs:   Temp:  [97.7 °F (36.5 °C)-98.5 °F (36.9 °C)] 98.5 °F (36.9 °C)  Heart Rate:  [54-99] 70  Resp:  [18] 18  BP: (131-150)/(70-83) 139/74     Physical Exam:      GEN: NAD, resting in bed, awake  HEENT: on room air, atraumatic, normocephalic  NECK: supple, no masses  RESP: on room air, normal effort  CV: on tele, sinus rhythm  GI: soft with slight distention which patient reported is improved, +BS, NGT with bilious output  PSYCH: normal affect, appropriate  NEURO: awake, alert, no focal deficits noted  MSK: no edema noted  SKIN: no rashes noted       Results Reviewed:  LAB RESULTS:      Lab 22  0541 22  1549 22  0604 22  2223 22  1038   WBC 6.33  --  6.59  --  9.24   HEMOGLOBIN 12.0*  --  13.5  --  14.7   HEMATOCRIT 39.3  --  43.6  --  47.2   PLATELETS 160  --  181  --  238   NEUTROS ABS 4.40  --  4.43  --  7.24*   IMMATURE GRANS (ABS) 0.02  --  0.02  --  0.08*   LYMPHS ABS 1.06  --  1.19  --  1.03   MONOS ABS 0.56  --  0.65  --  0.69   EOS ABS 0.24  --  0.25  --  0.15   MCV 89.5  --  88.8  --  87.2   LACTATE  --   --   --   --  1.7   PROTIME  --   --   --   --  29.0*   APTT  --   --   --  58.7*  --    HEPARIN ANTI-XA 0.25* 0.35 0.39 0.10*  --          Lab 22  0541 22  0604 22  1038   SODIUM 142 140 140   POTASSIUM 4.0 4.1 4.7   CHLORIDE 108* 104 101   CO2  27.0 26.0 29.0   ANION GAP 7.0 10.0 10.0   BUN 15 24* 24*   CREATININE 0.66* 0.91 0.95   EGFR 104.1 93.5 88.8   GLUCOSE 98 128* 181*   CALCIUM 7.9* 8.0* 9.0   HEMOGLOBIN A1C  --   --  6.50*         Lab 08/01/22  1038   TOTAL PROTEIN 7.5   ALBUMIN 4.20   GLOBULIN 3.3   ALT (SGPT) 22   AST (SGOT) 37   BILIRUBIN 1.7*   ALK PHOS 131*   LIPASE 43         Lab 08/01/22  1038   PROTIME 29.0*   INR 2.72*                 Brief Urine Lab Results  (Last result in the past 365 days)      Color   Clarity   Blood   Leuk Est   Nitrite   Protein   CREAT   Urine HCG        08/01/22 1109 Dark Yellow   Clear   Negative   Negative   Negative   100 mg/dL (2+)                 Microbiology Results Abnormal     Procedure Component Value - Date/Time    COVID PRE-OP / PRE-PROCEDURE SCREENING ORDER (NO ISOLATION) - Swab, Nasopharynx [[id number]]  (Normal) Collected: 08/01/22 1745    Lab Status: Final result Specimen: Swab from Nasopharynx Updated: 08/01/22 1818    Narrative:      The following orders were created for panel order COVID PRE-OP / PRE-PROCEDURE SCREENING ORDER (NO ISOLATION) - Swab, Nasopharynx.  Procedure                               Abnormality         Status                     ---------                               -----------         ------                     COVID-19 and FLU A/B PCR...[[id number]]  Normal              Final result                 Please view results for these tests on the individual orders.    COVID-19 and FLU A/B PCR - Swab, Nasopharynx [[id number]]  (Normal) Collected: 08/01/22 1745    Lab Status: Final result Specimen: Swab from Nasopharynx Updated: 08/01/22 1818     COVID19 Not Detected     Influenza A PCR Not Detected     Influenza B PCR Not Detected    Narrative:      Fact sheet for providers: https://www.fda.gov/media/774312/download    Fact sheet for patients: https://www.fda.gov/media/598038/download    Test performed by PCR.          CT Abdomen Pelvis Without Contrast    Result Date: 8/1/2022  DATE  OF EXAM: 8/1/2022 1:12 PM  PROCEDURE: CT ABDOMEN PELVIS WO CONTRAST-  INDICATIONS: Bowel obstruction, history of rectus sheath hematoma  COMPARISON: 5/11/2022  TECHNIQUE: Routine transaxial slices were obtained through the abdomen and pelvis without the administration of intravenous contrast. Reconstructed coronal and sagittal images were also obtained. Automated exposure control and iterative construction methods were used.  The radiation dose reduction device was turned on for each scan per the ALARA (As Low as Reasonably Achievable) protocol.  FINDINGS: Previous rectus sheath hematoma has been reduced to an area of mild abdominal wall thickening, presumably organized and slowly reabsorbing hematoma, decreased from approximately 14 x 6.5 cm to approximately 7.3 x 2.5 cm. No new abdominal wall hematoma is seen.  Included lung bases appear clear. Numerous gallstones are again seen in the otherwise normal-appearing gallbladder. Left lobe liver enlargement and liver capsular nodularity is concerning for cirrhosis. No focal hepatic lesions are identified. No biliary ductal dilatation is seen. Spleen is not enlarged. Pancreas, adrenal glands, and kidneys appear within normal limits. No upper abdominal free air, ascites, or adenopathy is seen.  The stomach is distended with fluid and a small amount of food. There is dilatation of the duodenum and jejunum up to level of the patient's lower abdominal midline hernia, where there appears to be obstruction, with dilated entering loop to the right, and two decompressed exam loops to the left and along the inferior margin of the hernia site. The inferior margin of this hernia site, within the patient's pannus is complex and may reflect additional sites of herniation or may be interconnected with the main hernia, images 129 through 148. No bowel wall pneumatosis is seen and there is little evidence to suggest venous congestion.  Above the level of the obstructing small bowel  hernia, there is an adjacent but separate herniation of already decompressed small bowel, with no obvious strangulation, and cephalad of this, additional fat-only containing nonstrangulated hernia with trace ascites, and more medial colon-containing hernia with no evidence of strangulation. No hernia is seen above the level of the colon containing hernia on axial image 88.  Elsewhere, terminal ileum cecum and appendix appear normal. Bladder is normally distended and normal in appearance. No ascites or free air is identified. Bony structures appear to be intact.       Impression:  1. Multiple midline abdominal wall hernias, from cephalad to caudal:   a: Midline hernia containing a small knuckle of colon, nonstrangulated   b: Fat-containing colon to the left of the midline, non-strangulated   c: Small hernia containing already decompressed small bowel, non-strangulated.   d: Complex appearing hernia containing several bowel loops, and the apparent site of       obstruction, images 121 through 126. The inferior margin of this hernia, within the patient's pannus, is complex and may contain additional areas of abdominal wall weakness/herniation.  2. Cholelithiasis, but no evidence of cholecystitis.  3. Liver morphology concerning for cirrhosis. No additional findings to suggest cirrhosis.  4. Small organized appearing left rectus sheath hematoma significantly decreased from prior exam.  This report was finalized on 8/1/2022 2:10 PM by Dr. Miguel Rodriguez MD.        Results for orders placed during the hospital encounter of 05/11/22    Adult Transthoracic Echo Complete W/ Cont if Necessary Per Protocol    Interpretation Summary  · Left ventricular ejection fraction appears to be 56 - 60%.  · Left ventricular wall thickness is consistent with moderate concentric hypertrophy  · Moderate mitral annular calcification is present  · Mild mitral valve stenosis is present. The mitral valve mean gradient is 5 mmHg. There is moderate  thickening of the anterior leaflet subvalvular apparatus.  · There is a 25 mm, [employer] Kevin mechanical aortic valve prosthesis present  · The aortic valve peak and mean gradients are within defined limits. The prosthetic aortic valve is normal.  · The right ventricular cavity is mildly dilated.  · Mild tricuspid valve regurgitation is present. Estimated right ventricular systolic pressure from tricuspid regurgitation is normal (<35 mmHg).  · Borderline dilation of the aortic root is present measures 3.9 cm.      I have reviewed the medications:  Scheduled Meds:insulin lispro, 0-7 Units, Subcutaneous, TID AC  sodium chloride, 10 mL, Intravenous, Q12H      Continuous Infusions:heparin, 12 Units/kg/hr, Last Rate: 12 Units/kg/hr (08/03/22 0927)  Pharmacy to Dose Heparin,   sodium chloride, 75 mL/hr, Last Rate: 75 mL/hr (08/03/22 0928)      PRN Meds:.•  acetaminophen **OR** acetaminophen **OR** acetaminophen  •  dextrose  •  dextrose  •  glucagon (human recombinant)  •  hydrALAZINE  •  Morphine **AND** naloxone  •  ondansetron **OR** ondansetron  •  Pharmacy to Dose Heparin  •  Sodium Chloride (PF)  •  sodium chloride    Assessment & Plan   Assessment & Plan     Active Hospital Problems    Diagnosis  POA   • **SBO (small bowel obstruction) (HCC) [K56.609]  Yes   • Small bowel obstruction (HCC) [K56.609]  Yes   • Diastolic CHF, chronic (HCC) [I50.32]  Yes   • Moderate PAH. PA 55/20, wedge 15 mmHg.  Multifactorial (CMS/HCC) [I27.21]  Yes   • History Acinetobacter bacteremia on chronic minocycline [Z86.19]  Yes   • History of mechanical AVR for aortic stenosis 2009 [Z95.2]  Not Applicable   • Type 2 diabetes mellitus (HCC) [E11.9]  Yes   • Essential hypertension [I10]  Yes   • Dyslipidemia [E78.5]  Yes   • Morbid obesity (HCC) [E66.01]  Yes      Resolved Hospital Problems   No resolved problems to display.        Brief Hospital Course to date:  Cecilio Farias is a 66 y.o. male with PMHx colon cancer s/p surgery/chemo/radiation,  CHF, Mechanical aortic valve (St Kevin), Non-Obstructive CAD, HTN, PAF, JI, Pulmonary HTN, DM, history of Acinetobacter sepsis on chronic suppressive Minocycline who presented with recurrent SBO.      This patient's problems and plans were partially entered by my partner and updated as appropriate by me 08/03/22.        Recurrent SBO  Ventral Hernia   -Pt known to Dr Lester, has seen per patient report   -NPO   -IVF, NS 75cc/hr, monitor respiratory status, history of DHF  -Pain control PRN   -NG placed in ED   -Lactic normal      Mechanical AVR on Coumadin  -INR therapeutic 2.72 on admission  -Hold Coumadin in case surgery occurs this admit  -Heparin gtt in meantime   -Echo 5/11/22: EF 56-60%, prosthetic valve normal   -Follows with Dr Jackson     Recent rectus sheath hematoma   -Occurred with Lovenox injections   -CT shows significant decrease from prior exam      DM2  -Hold home Glucotrol  -On NPH 70/30 at home   -LDSSI for now     History of Acinetobacter sepsis  -On chronic Minocycline suppression (pt with chronic black skin discoloration of B/L LE), hold ABX while NPO     HTN  HLD  -Hold home medications while NPO  -PRN Hydralazine for SBP>170        Expected Discharge Location and Transportation: home via private vehicle  Expected Discharge Date: 8/5/22    DVT prophylaxis:  Medical DVT prophylaxis orders are present.     AM-PAC 6 Clicks Score (PT): 22 (08/03/22 0750)    CODE STATUS:   Code Status and Medical Interventions:   Ordered at: 08/01/22 9066     Level Of Support Discussed With:    Patient     Code Status (Patient has no pulse and is not breathing):    CPR (Attempt to Resuscitate)     Medical Interventions (Patient has pulse or is breathing):    Full Support       Alie Goff MD  08/03/22

## 2022-08-03 NOTE — PLAN OF CARE
Goal Outcome Evaluation:   Pt VSS, no complaints of pain, pt has had persistent nose bleed, large BM yesterday and wants NG out.

## 2022-08-03 NOTE — PROGRESS NOTES
"General Surgery Daily Progress Note    Subjective:  Patient feeling better. BM.    Objective:  /79 (BP Location: Left arm, Patient Position: Lying)   Pulse 81   Temp 98.1 °F (36.7 °C) (Oral)   Resp 18   Ht 190.5 cm (75\")   Wt 125 kg (275 lb)   SpO2 97%   BMI 34.37 kg/m²       General Appearance: NAD  Eyes: Anicteric  Neck: Trachea midline   Cardiovascular:  RRR without murmur nor rub  Lungs:  Bilateral respirations unlabored   Abdomen:  Soft, non-tender, no masses, no organomegaly   Extremities:  No cyanosis or edema   Skin:  No obvious rashes   Neurologic: awake and conversant       Imaging Results (Last 24 Hours)     ** No results found for the last 24 hours. **          CBC:  Results from last 7 days   Lab Units 08/03/22  0541   WBC 10*3/mm3 6.33   HEMOGLOBIN g/dL 12.0*   HEMATOCRIT % 39.3   PLATELETS 10*3/mm3 160       CMP:  Results from last 7 days   Lab Units 08/03/22  0541 08/02/22  0604 08/01/22  1038   SODIUM mmol/L 142   < > 140   POTASSIUM mmol/L 4.0   < > 4.7   CHLORIDE mmol/L 108*   < > 101   CO2 mmol/L 27.0   < > 29.0   BUN mg/dL 15   < > 24*   CREATININE mg/dL 0.66*   < > 0.95   CALCIUM mg/dL 7.9*   < > 9.0   BILIRUBIN mg/dL  --   --  1.7*   ALK PHOS U/L  --   --  131*   ALT (SGPT) U/L  --   --  22   AST (SGOT) U/L  --   --  37   GLUCOSE mg/dL 98   < > 181*    < > = values in this interval not displayed.         Assessment:  Recurrent SBO - resolving  Chronic acinetobacter  Valvular heart disease     Plan:   Feeling better. He does not want to proceed with surgical correction at this point. May clamp the NGT. If no nausea nor vomiting then DC and start clear liquids.    Ozzie Lester MD - 8/3/2022, 15:31 EDT        "

## 2022-08-03 NOTE — PROGRESS NOTES
HEPARIN INFUSION  Cecilio Farias is a  66 y.o. male receiving heparin infusion.     Therapy for (VTE/Cardiac): Cardiac (Mech AVR on warfarin PTA)  Patient Weight: 125 kg  Initial Bolus (Y/N): No (current INR 2.72)  Any Bolus (Y/N): Yes    Signs or Symptoms of Bleeding:  nose bleed per RN note, mech valve, heparin subtherapeutic, mech valve, continue for now    Cardiac or Other (Not VTE)   Initial Bolus: 60 units/kg (Max 4,000 units)  Initial rate: 12 units/kg/hr (Max 1,000 units/hr)   Anti Xa Rebolus Infusion Hold time Change infusion Dose (Units/kg/hr) Next Anti Xa or aPTT Level Due   < 0.11 50 Units/kg  (4000 Units Max) None Increase by  3 Units/kg/hr 6 hours   0.11- 0.19 25 Units/kg  (2000 Units Max) None Increase by  2 Units/kg/hr 6 hours   0.2 - 0.29 0 None Increase by  1 Units/kg/hr 6 hours   0.3 - 0.5 0 None No Change 6 hours (after 2 consecutive levels in range check qAM)   0.51 - 0.6 0 None Decrease by  1 Units/kg/hr 6 hours   0.61 - 0.8 0 30 Minutes Decrease by  2 Units/kg/hr 6 hours   0.81 - 1 0 60 Minutes Decrease by  3 Units/kg/hr 6 hours   >1 0 Hold  After Anti Xa less than 0.5 decrease previous rate by  4 Units/kg/hr  Every 2 hours until Anti Xa  less than 0.5 then when infusion restarts in 6 hours     Recommend Xa every 6 hours.     Results from last 7 days   Lab Units 08/02/22  0604 08/01/22  1038   INR   --  2.72*   HEMOGLOBIN g/dL 13.5 14.7   HEMATOCRIT % 43.6 47.2   PLATELETS 10*3/mm3 181 238       Date   Time   Anti-Xa Current Rate (Unit/kg/hr) Bolus   (Units) Rate Change   (Unit/kg/hr) New Rate (Unit/kg/hr) Next   Anti-Xa Comments  Pump Check Daily   8/01 16:20 -- -- No initial -- 8 22:00 D/W Bela NAVA   8/1 2223 0.10 8 4000 +3 11 0600 D/w ELIJAH Munoz   8/2 0604 0.39 11 -- -- 11 1200 Spoke to ELIJAH Ramirez   8/2 1549 0.36 11 -- -- 11 0600 Spoke with Rn   8/3 0657 0.25 11 -- +1 12 1300 DW ELIJAH                                                                                                                                                                                        Thank you,    Kayode ArthurD, SRI, BCPS  8/3/2022  07:01 EDT

## 2022-08-04 LAB
ALBUMIN SERPL-MCNC: 2.9 G/DL (ref 3.5–5.2)
ALBUMIN/GLOB SERPL: 1 G/DL
ALP SERPL-CCNC: 90 U/L (ref 39–117)
ALT SERPL W P-5'-P-CCNC: 12 U/L (ref 1–41)
ANION GAP SERPL CALCULATED.3IONS-SCNC: 7 MMOL/L (ref 5–15)
AST SERPL-CCNC: 25 U/L (ref 1–40)
BASOPHILS # BLD AUTO: 0.04 10*3/MM3 (ref 0–0.2)
BASOPHILS NFR BLD AUTO: 0.8 % (ref 0–1.5)
BILIRUB SERPL-MCNC: 1 MG/DL (ref 0–1.2)
BUN SERPL-MCNC: 10 MG/DL (ref 8–23)
BUN/CREAT SERPL: 17.2 (ref 7–25)
CALCIUM SPEC-SCNC: 8.2 MG/DL (ref 8.6–10.5)
CHLORIDE SERPL-SCNC: 106 MMOL/L (ref 98–107)
CO2 SERPL-SCNC: 24 MMOL/L (ref 22–29)
CREAT SERPL-MCNC: 0.58 MG/DL (ref 0.76–1.27)
DEPRECATED RDW RBC AUTO: 50.5 FL (ref 37–54)
EGFRCR SERPLBLD CKD-EPI 2021: 108.2 ML/MIN/1.73
EOSINOPHIL # BLD AUTO: 0.27 10*3/MM3 (ref 0–0.4)
EOSINOPHIL NFR BLD AUTO: 5.4 % (ref 0.3–6.2)
ERYTHROCYTE [DISTWIDTH] IN BLOOD BY AUTOMATED COUNT: 15.7 % (ref 12.3–15.4)
GLOBULIN UR ELPH-MCNC: 3 GM/DL
GLUCOSE BLDC GLUCOMTR-MCNC: 102 MG/DL (ref 70–130)
GLUCOSE BLDC GLUCOMTR-MCNC: 124 MG/DL (ref 70–130)
GLUCOSE BLDC GLUCOMTR-MCNC: 89 MG/DL (ref 70–130)
GLUCOSE SERPL-MCNC: 84 MG/DL (ref 65–99)
HCT VFR BLD AUTO: 36.7 % (ref 37.5–51)
HGB BLD-MCNC: 11.5 G/DL (ref 13–17.7)
IMM GRANULOCYTES # BLD AUTO: 0.02 10*3/MM3 (ref 0–0.05)
IMM GRANULOCYTES NFR BLD AUTO: 0.4 % (ref 0–0.5)
INR PPP: 2.01 (ref 0.84–1.13)
LYMPHOCYTES # BLD AUTO: 0.86 10*3/MM3 (ref 0.7–3.1)
LYMPHOCYTES NFR BLD AUTO: 17.1 % (ref 19.6–45.3)
MCH RBC QN AUTO: 27.7 PG (ref 26.6–33)
MCHC RBC AUTO-ENTMCNC: 31.3 G/DL (ref 31.5–35.7)
MCV RBC AUTO: 88.4 FL (ref 79–97)
MONOCYTES # BLD AUTO: 0.47 10*3/MM3 (ref 0.1–0.9)
MONOCYTES NFR BLD AUTO: 9.3 % (ref 5–12)
NEUTROPHILS NFR BLD AUTO: 3.38 10*3/MM3 (ref 1.7–7)
NEUTROPHILS NFR BLD AUTO: 67 % (ref 42.7–76)
NRBC BLD AUTO-RTO: 0 /100 WBC (ref 0–0.2)
PLATELET # BLD AUTO: 155 10*3/MM3 (ref 140–450)
PMV BLD AUTO: 10.1 FL (ref 6–12)
POTASSIUM SERPL-SCNC: 3.9 MMOL/L (ref 3.5–5.2)
PROT SERPL-MCNC: 5.9 G/DL (ref 6–8.5)
PROTHROMBIN TIME: 22.8 SECONDS (ref 11.4–14.4)
RBC # BLD AUTO: 4.15 10*6/MM3 (ref 4.14–5.8)
SODIUM SERPL-SCNC: 137 MMOL/L (ref 136–145)
UFH PPP CHRO-ACNC: 0.43 IU/ML (ref 0.3–0.7)
WBC NRBC COR # BLD: 5.04 10*3/MM3 (ref 3.4–10.8)

## 2022-08-04 PROCEDURE — 99233 SBSQ HOSP IP/OBS HIGH 50: CPT | Performed by: INTERNAL MEDICINE

## 2022-08-04 PROCEDURE — 97161 PT EVAL LOW COMPLEX 20 MIN: CPT

## 2022-08-04 PROCEDURE — 85610 PROTHROMBIN TIME: CPT | Performed by: SURGERY

## 2022-08-04 PROCEDURE — 25010000002 HEPARIN (PORCINE) 25000-0.45 UT/250ML-% SOLUTION

## 2022-08-04 PROCEDURE — 85025 COMPLETE CBC W/AUTO DIFF WBC: CPT | Performed by: INTERNAL MEDICINE

## 2022-08-04 PROCEDURE — 82962 GLUCOSE BLOOD TEST: CPT

## 2022-08-04 PROCEDURE — 80053 COMPREHEN METABOLIC PANEL: CPT | Performed by: INTERNAL MEDICINE

## 2022-08-04 PROCEDURE — 85520 HEPARIN ASSAY: CPT

## 2022-08-04 RX ORDER — CARVEDILOL 3.12 MG/1
3.12 TABLET ORAL 2 TIMES DAILY
Status: DISCONTINUED | OUTPATIENT
Start: 2022-08-04 | End: 2022-08-05 | Stop reason: HOSPADM

## 2022-08-04 RX ORDER — MINOCYCLINE HYDROCHLORIDE 50 MG/1
100 CAPSULE ORAL 2 TIMES DAILY
Status: DISCONTINUED | OUTPATIENT
Start: 2022-08-04 | End: 2022-08-05 | Stop reason: HOSPADM

## 2022-08-04 RX ORDER — FUROSEMIDE 20 MG/1
20 TABLET ORAL DAILY
Status: DISCONTINUED | OUTPATIENT
Start: 2022-08-04 | End: 2022-08-05 | Stop reason: HOSPADM

## 2022-08-04 RX ORDER — ATORVASTATIN CALCIUM 40 MG/1
40 TABLET, FILM COATED ORAL NIGHTLY
Status: DISCONTINUED | OUTPATIENT
Start: 2022-08-04 | End: 2022-08-05 | Stop reason: HOSPADM

## 2022-08-04 RX ORDER — AMLODIPINE BESYLATE 5 MG/1
10 TABLET ORAL EVERY MORNING
Status: DISCONTINUED | OUTPATIENT
Start: 2022-08-04 | End: 2022-08-05 | Stop reason: HOSPADM

## 2022-08-04 RX ORDER — GABAPENTIN 400 MG/1
800 CAPSULE ORAL EVERY 8 HOURS PRN
Status: DISCONTINUED | OUTPATIENT
Start: 2022-08-04 | End: 2022-08-05 | Stop reason: HOSPADM

## 2022-08-04 RX ADMIN — ATORVASTATIN CALCIUM 40 MG: 40 TABLET, FILM COATED ORAL at 21:19

## 2022-08-04 RX ADMIN — HEPARIN SODIUM 12 UNITS/KG/HR: 10000 INJECTION, SOLUTION INTRAVENOUS at 18:32

## 2022-08-04 RX ADMIN — AMLODIPINE BESYLATE 10 MG: 5 TABLET ORAL at 09:05

## 2022-08-04 RX ADMIN — HEPARIN SODIUM 12 UNITS/KG/HR: 10000 INJECTION, SOLUTION INTRAVENOUS at 01:48

## 2022-08-04 RX ADMIN — MINOCYCLINE HYDROCHLORIDE 100 MG: 50 CAPSULE ORAL at 21:19

## 2022-08-04 RX ADMIN — GABAPENTIN 800 MG: 400 CAPSULE ORAL at 21:28

## 2022-08-04 RX ADMIN — SODIUM CHLORIDE 75 ML/HR: 9 INJECTION, SOLUTION INTRAVENOUS at 12:41

## 2022-08-04 RX ADMIN — MINOCYCLINE HYDROCHLORIDE 100 MG: 50 CAPSULE ORAL at 09:05

## 2022-08-04 RX ADMIN — CARVEDILOL 3.12 MG: 3.12 TABLET, FILM COATED ORAL at 09:04

## 2022-08-04 RX ADMIN — FUROSEMIDE 20 MG: 20 TABLET ORAL at 09:05

## 2022-08-04 RX ADMIN — Medication 10 ML: at 21:20

## 2022-08-04 RX ADMIN — GABAPENTIN 800 MG: 400 CAPSULE ORAL at 09:04

## 2022-08-04 NOTE — CASE MANAGEMENT/SOCIAL WORK
Continued Stay Note  [facility]     Patient Name: Cecilio Farias  MRN: [id number]  Today's Date: 8/4/2022    Admit Date: 8/1/2022     Discharge Plan     Row Name 08/04/22 1536       Plan    Plan Home at D/C.    Plan Comments I spoke with pt at bedside. Hoping to go home tomorrow. Denies any d/c needs at this time. Will continue to follow.    Final Discharge Disposition Code 01 - home or self-care               Discharge Codes    No documentation.               Expected Discharge Date and Time     Expected Discharge Date Expected Discharge Time    Aug 4, 2022             Angelika Boles RN

## 2022-08-04 NOTE — PROGRESS NOTES
"                    Clinical Nutrition       Patient Name: Cecilio Farias  YOB: 1955  MRN: [id number]  Date of Encounter: 08/04/22 10:04 EDT  Admission date: 8/1/2022      Reason for Visit   NPO/Clear Liquid x 3 days      EMR  Reviewed   Yes    Height: Height: 190.5 cm (75\")  Weight: Weight: 125 kg (275 lb) (08/01/22 1025)  BMI: BMI (Calculated): 34.4    Problem:  Recurrent SBO  Ventral hernia  NGT to LWS (8/1-8/3)       Reported/Observed/Food/Nutrition Related - Comments   RD notes patient presented with N/V x 3 days on admission, so total of 6 days with minimal PO intake. Dr. Lester recommending surgery, patient does not want to have surgery at this time. NGT was removed last night, trialing clear liquids today.       Current Nutrition Prescription     Diet Clear Liquid; Thin; Consistent Carbohydrate    Average Intake from Charting: insufficient data    Nutrition Diagnosis     Problem Inadequate oral intake   Etiology Altered GI function   Signs/Symptoms NPO/Clear liquids x 3 days during this admission; minimal PO intake x 6 days total   Status:    Actions     Follow treatment progress, Care plan reviewed, Encourage intake, Supplement provided    -Ordered Boost Breeze 2x daily.    Monitor Per Protocol    Peyton Campos RD  Time Spent: 15 min        "

## 2022-08-04 NOTE — PLAN OF CARE
Goal Outcome Evaluation:  Plan of Care Reviewed With: patient, son        Progress: improving  Outcome Evaluation: PT eval completed. Patient appears near baseline w/ good strength, safety awareness, and stability. He ambulated 300ft w/ supervision and no LOB. Provided education re: HEP and home safety. No further acute PT services indicated. Anticipate safe D/C home w/ family when medically appropriate.

## 2022-08-04 NOTE — PROGRESS NOTES
[facility] Medicine Services  PROGRESS NOTE    Patient Name: Cecilio Farias  : 1955  MRN: [id number]    Date of Admission: 2022  Primary Care Physician: America Everett DO    Subjective   Subjective     CC:  Recurrent SBO    HPI:  Resting in chair. NGT out. 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 Have d/w Dr Lester. Patient reports he has tolerated some popsicles    ROS:  Gen- No fevers, chills  CV- No chest pain, palpitations  Resp- No cough, dyspnea  GI- as above        Objective   Objective     Vital Signs:   Temp:  [97.7 °F (36.5 °C)-98.7 °F (37.1 °C)] 97.7 °F (36.5 °C)  Heart Rate:  [48-72] 71  Resp:  [16-18] 16  BP: (139-170)/(69-92) 142/70     Physical Exam:      GEN: NAD, resting in bed, awake, NGT out  HEENT: on room air, atraumatic, normocephalic  NECK: supple, no masses  RESP: on room air, normal effort  CV: on tele, sinus rhythm  PSYCH: normal affect, appropriate  NEURO: awake, alert, no focal deficits noted  MSK: no edema noted  SKIN: no rashes noted       Results Reviewed:  LAB RESULTS:      Lab 22  0559 22  2217 22  1434 22  0541 22  1549 22  0604 22  2223 22  2223 22  1038   WBC 5.04  --   --  6.33  --  6.59  --   --  9.24   HEMOGLOBIN 11.5*  --   --  12.0*  --  13.5  --   --  14.7   HEMATOCRIT 36.7*  --   --  39.3  --  43.6  --   --  47.2   PLATELETS 155  --   --  160  --  181  --   --  238   NEUTROS ABS 3.38  --   --  4.40  --  4.43  --   --  7.24*   IMMATURE GRANS (ABS) 0.02  --   --  0.02  --  0.02  --   --  0.08*   LYMPHS ABS 0.86  --   --  1.06  --  1.19  --   --  1.03   MONOS ABS 0.47  --   --  0.56  --  0.65  --   --  0.69   EOS ABS 0.27  --   --  0.24  --  0.25  --   --  0.15   MCV 88.4  --   --  89.5  --  88.8  --   --  87.2   LACTATE  --   --   --   --   --   --   --   --  1.7   PROTIME 22.8*  --   --   --   --    --   --   --  29.0*   APTT  --   --   --   --   --   --   --  58.7*  --    HEPARIN ANTI-XA 0.43 0.35 0.38 0.25* 0.35 0.39   < > 0.10*  --     < > = values in this interval not displayed.         Lab 08/04/22  0559 08/03/22  0541 08/02/22  0604 08/01/22  1038   SODIUM 137 142 140 140   POTASSIUM 3.9 4.0 4.1 4.7   CHLORIDE 106 108* 104 101   CO2 24.0 27.0 26.0 29.0   ANION GAP 7.0 7.0 10.0 10.0   BUN 10 15 24* 24*   CREATININE 0.58* 0.66* 0.91 0.95   EGFR 108.2 104.1 93.5 88.8   GLUCOSE 84 98 128* 181*   CALCIUM 8.2* 7.9* 8.0* 9.0   HEMOGLOBIN A1C  --   --   --  6.50*         Lab 08/04/22  0559 08/01/22  1038   TOTAL PROTEIN 5.9* 7.5   ALBUMIN 2.90* 4.20   GLOBULIN 3.0 3.3   ALT (SGPT) 12 22   AST (SGOT) 25 37   BILIRUBIN 1.0 1.7*   ALK PHOS 90 131*   LIPASE  --  43         Lab 08/04/22  0559 08/01/22  1038   PROTIME 22.8* 29.0*   INR 2.01* 2.72*                 Brief Urine Lab Results  (Last result in the past 365 days)      Color   Clarity   Blood   Leuk Est   Nitrite   Protein   CREAT   Urine HCG        08/01/22 1109 Dark Yellow   Clear   Negative   Negative   Negative   100 mg/dL (2+)                 Microbiology Results Abnormal     Procedure Component Value - Date/Time    COVID PRE-OP / PRE-PROCEDURE SCREENING ORDER (NO ISOLATION) - Swab, Nasopharynx [[id number]]  (Normal) Collected: 08/01/22 1745    Lab Status: Final result Specimen: Swab from Nasopharynx Updated: 08/01/22 1818    Narrative:      The following orders were created for panel order COVID PRE-OP / PRE-PROCEDURE SCREENING ORDER (NO ISOLATION) - Swab, Nasopharynx.  Procedure                               Abnormality         Status                     ---------                               -----------         ------                     COVID-19 and FLU A/B PCR...[[id number]]  Normal              Final result                 Please view results for these tests on the individual orders.    COVID-19 and FLU A/B PCR - Swab, Nasopharynx [[id number]]   (Normal) Collected: 08/01/22 1745    Lab Status: Final result Specimen: Swab from Nasopharynx Updated: 08/01/22 1818     COVID19 Not Detected     Influenza A PCR Not Detected     Influenza B PCR Not Detected    Narrative:      Fact sheet for providers: https://www.fda.gov/media/055140/download    Fact sheet for patients: https://www.fda.gov/media/366683/download    Test performed by PCR.          No radiology results from the last 24 hrs    Results for orders placed during the hospital encounter of 05/11/22    Adult Transthoracic Echo Complete W/ Cont if Necessary Per Protocol    Interpretation Summary  · Left ventricular ejection fraction appears to be 56 - 60%.  · Left ventricular wall thickness is consistent with moderate concentric hypertrophy  · Moderate mitral annular calcification is present  · Mild mitral valve stenosis is present. The mitral valve mean gradient is 5 mmHg. There is moderate thickening of the anterior leaflet subvalvular apparatus.  · There is a 25 mm, St. Kevin mechanical aortic valve prosthesis present  · The aortic valve peak and mean gradients are within defined limits. The prosthetic aortic valve is normal.  · The right ventricular cavity is mildly dilated.  · Mild tricuspid valve regurgitation is present. Estimated right ventricular systolic pressure from tricuspid regurgitation is normal (<35 mmHg).  · Borderline dilation of the aortic root is present measures 3.9 cm.      I have reviewed the medications:  Scheduled Meds:amLODIPine, 10 mg, Oral, QAM  atorvastatin, 40 mg, Oral, Nightly  carvedilol, 3.125 mg, Oral, BID  furosemide, 20 mg, Oral, Daily  insulin lispro, 0-7 Units, Subcutaneous, TID AC  minocycline, 100 mg, Oral, BID  sodium chloride, 10 mL, Intravenous, Q12H      Continuous Infusions:heparin, 12 Units/kg/hr, Last Rate: 12 Units/kg/hr (08/04/22 0148)  Pharmacy to Dose Heparin,   sodium chloride, 75 mL/hr, Last Rate: 75 mL/hr (08/04/22 1241)      PRN Meds:.•  acetaminophen  **OR** acetaminophen **OR** acetaminophen  •  dextrose  •  dextrose  •  gabapentin  •  glucagon (human recombinant)  •  hydrALAZINE  •  Morphine **AND** naloxone  •  ondansetron **OR** ondansetron  •  Pharmacy to Dose Heparin  •  Sodium Chloride (PF)  •  sodium chloride    Assessment & Plan   Assessment & Plan     Active Hospital Problems    Diagnosis  POA   • **SBO (small bowel obstruction) (HCC) [K56.609]  Yes   • Small bowel obstruction (HCC) [K56.609]  Yes   • Diastolic CHF, chronic (HCC) [I50.32]  Yes   • Moderate PAH. PA 55/20, wedge 15 mmHg.  Multifactorial (CMS/HCC) [I27.21]  Yes   • History Acinetobacter bacteremia on chronic minocycline [Z86.19]  Yes   • History of mechanical AVR for aortic stenosis 2009 [Z95.2]  Not Applicable   • Type 2 diabetes mellitus (HCC) [E11.9]  Yes   • Essential hypertension [I10]  Yes   • Dyslipidemia [E78.5]  Yes   • Morbid obesity (HCC) [E66.01]  Yes      Resolved Hospital Problems   No resolved problems to display.        Brief Hospital Course to date:  Cecilio Farias is a 66 y.o. male with PMHx colon cancer s/p surgery/chemo/radiation, CHF, Mechanical aortic valve (St Kevin), Non-Obstructive CAD, HTN, PAF, JI, Pulmonary HTN, DM, history of Acinetobacter sepsis on chronic suppressive Minocycline who presented with recurrent SBO.      This patient's problems and plans were partially entered by my partner and updated as appropriate by me 08/04/22.        Recurrent SBO  Ventral Hernia   -Pt known to Dr Lester, is following, I have d/w him. Patient has been recommended to have surgical intervention given his multiple presentations with issue, however patient has voiced option to return home and wishes to consider this in the future  -managing medically given above, NGT removed- adv diet as tolerated  -resumed oral home meds      Mechanical AVR on Coumadin  -INR therapeutic 2.72 on admission  -Hold Coumadin in case surgery occurs this admit  -Heparin gtt in meantime   -Echo  5/11/22: EF 56-60%, prosthetic valve normal   -Follows with Dr Jackson     Recent rectus sheath hematoma   -Occurred with Lovenox injections   -CT shows significant decrease from prior exam      DM2  -Hold home Glucotrol  -On NPH 70/30 at home   -LDSSI for now     History of Acinetobacter sepsis  -On chronic Minocycline suppression (pt with chronic black skin discoloration of B/L LE), resumed today as no longer NPO  -surgery has requested ID consultation to help with abx perioperative choice in the event that patient requires OR      HTN  HLD  -home medications resumed        Expected Discharge Location and Transportation: home via private vehicle  Expected Discharge Date: 8/5/22    DVT prophylaxis:  Medical DVT prophylaxis orders are present.     AM-PAC 6 Clicks Score (PT): 22 (08/04/22 0800)    CODE STATUS:   Code Status and Medical Interventions:   Ordered at: 08/01/22 1546     Level Of Support Discussed With:    Patient     Code Status (Patient has no pulse and is not breathing):    CPR (Attempt to Resuscitate)     Medical Interventions (Patient has pulse or is breathing):    Full Support       Alie Goff MD  08/04/22

## 2022-08-04 NOTE — THERAPY DISCHARGE NOTE
Patient Name: Cecilio Farias  : 1955    MRN: [id number]                              Today's Date: 2022       Admit Date: 2022    Visit Dx:     ICD-10-CM ICD-9-CM   1. Small bowel obstruction (HCC)  K56.609 560.9   2. Anticoagulated  Z79.01 V58.61   3. Acute abdominal pain in left lower quadrant  R10.32 789.04     338.19     Patient Active Problem List   Diagnosis   • Essential hypertension   • Dyslipidemia   • Morbid obesity (HCC)   • Type 2 diabetes mellitus (HCC)   • History of mechanical AVR for aortic stenosis    • Sepsis due to Acinetobacter species (HCC)   • Chronic anticoagulation   • Anemia   • Acute diastolic CHF. LVEF 53% (CMS/HCC)   • History Acinetobacter bacteremia on chronic minocycline   • PAF on chronic warfarin (CMS/HCC)   • Discoloration of skin of bilateral legs likely due to minocycline use   • RLL lung nodule. Incidental finding and requires follow-up   • Dyspnea and mild hypoxemia   • Moderate PAH. PA 55/20, wedge 15 mmHg.  Multifactorial (CMS/HCC)   • Bilateral small pleural effusions   • SBO (small bowel obstruction) (HCC)   • Diastolic CHF, chronic (HCC)   • Hyperbilirubinemia   • Lactic acidosis   • Left upper quadrant abdominal pain   • Rectus sheath hematoma   • Small bowel obstruction (HCC)     Past Medical History:   Diagnosis Date   • Cancer (HCC)     colon cancer with operation/ chemotherapy/radiation    • CHF (congestive heart failure) (HCC)     Acute Systolic    • Chronic anticoagulation     coumadin   • Diabetes mellitus (HCC)    • H/O aortic valve replacement    • HLD (hyperlipidemia)    • Hypertension    • Sepsis due to Acinetobacter species (HCC) 2019     Past Surgical History:   Procedure Laterality Date   • AORTIC VALVE REPAIR/REPLACEMENT      25 mm. St. Kevin AVR   • ASCENDING AORTIC ANEURYSM REPAIR     • BACK SURGERY     • CARDIAC CATHETERIZATION N/A 2021    Procedure: RIGHT AND LEFT HEART CATH;  Surgeon: Kurtis Smith MD;   Location: [facility] INVASIVE LOCATION;  Service: Cardiology;  Laterality: N/A;   • COLON SURGERY      Colon cancer with operation   • FOOT SURGERY      bilateral 2/2 wound and trauma       General Information     Row Name 08/04/22 0930          Physical Therapy Time and Intention    Document Type evaluation;discharge evaluation/summary  -MB     Mode of Treatment physical therapy  -MB     Row Name 08/04/22 0930          General Information    Patient Profile Reviewed yes  -MB     Prior Level of Function independent:;bed mobility;ADL's;transfer;gait;community mobility;all household mobility;driving  -MB     Existing Precautions/Restrictions fall  -MB     Barriers to Rehab previous functional deficit  -MB     Row Name 08/04/22 0930          Living Environment    People in Home spouse;child(marysol), adult  -MB     Row Name 08/04/22 0930          Home Main Entrance    Number of Stairs, Main Entrance one  -MB     Stair Railings, Main Entrance none  -MB     Row Name 08/04/22 0930          Stairs Within Home, Primary    Number of Stairs, Within Home, Primary two  -MB     Stair Railings, Within Home, Primary none  -MB     Row Name 08/04/22 0930          Cognition    Orientation Status (Cognition) oriented x 4  -MB     Row Name 08/04/22 0930          Safety Issues, Functional Mobility    Impairments Affecting Function (Mobility) balance;endurance/activity tolerance;sensation/sensory awareness  -MB     Comment, Safety Issues/Impairments (Mobility) neuropathy B feet  -MB           User Key  (r) = Recorded By, (t) = Taken By, (c) = Cosigned By    Initials Name Provider Type    Sera Pa M, PT Physical Therapist               Mobility     Row Name 08/04/22 1402          Bed Mobility    Comment, (Bed Mobility) Pt. received and left sitting UIC.  -MB     Row Name 08/04/22 1402          Transfers    Comment, (Transfers) Pt. demo safe and appropriate transfer technique.  -MB     Row Name 08/04/22 1402          Sit-Stand  Transfer    Sit-Stand [place] (Transfers) independent  -MB     Row Name 08/04/22 1402          Gait/Stairs (Locomotion)    [place] Level (Gait) supervision  -MB     Assistive Device (Gait) other (see comments)  Pt. requested to hold IV pole. Demo improve stability w/ UE support; issued SPC.  -MB     Distance in Feet (Gait) 300  -MB     Deviations/Abnormal Patterns (Gait) jack decreased;gait speed decreased;stride length decreased  -MB     Bilateral Gait Deviations heel strike decreased  -MB     Comment, (Gait/Stairs) Pt. ambulated w/ step through gait pattern w/ slower jack. VCs for forward gaze. No LOB or instability.  -MB           User Key  (r) = Recorded By, (t) = Taken By, (c) = Cosigned By    Initials Name Provider Type    Sera Pa, PT Physical Therapist               Obj/Interventions     Row Name 08/04/22 1405          Strength Comprehensive (MMT)    General Manual Muscle Testing (MMT) Assessment no strength deficits identified  -MB     Comment, General Manual Muscle Testing (MMT) Assessment BLEs and BUEs grossly 4+/5  -MB     Row Name 08/04/22 1405          Motor Skills    Therapeutic Exercise hip;knee;ankle  -[facility] Name 08/04/22 1405          Hip (Therapeutic Exercise)    Hip (Therapeutic Exercise) strengthening exercise  -MB     Hip Strengthening (Therapeutic Exercise) bilateral;marching while seated  -[facility] Name 08/04/22 1405          Knee (Therapeutic Exercise)    Knee (Therapeutic Exercise) strengthening exercise  -MB     Knee Strengthening (Therapeutic Exercise) bilateral;LAQ (long arc quad)  -MB     Row Name 08/04/22 1405          Ankle (Therapeutic Exercise)    Ankle (Therapeutic Exercise) strengthening exercise  -MB     Ankle Strengthening (Therapeutic Exercise) bilateral;dorsiflexion;plantarflexion  -MB     Row Name 08/04/22 1405          Balance    Balance Assessment sitting static balance;standing static balance;standing dynamic balance  -MB     Static  Sitting Balance independent  -MB     Position, Sitting Balance unsupported  -MB     Static Standing Balance independent  -MB     Dynamic Standing Balance supervision  -MB     Position/Device Used, Standing Balance unsupported  -MB     Balance Interventions standing;sit to stand;weight shifting activity;dynamic reaching  -MB     Row Name 08/04/22 1409          Sensory Assessment (Somatosensory)    Sensory Assessment (Somatosensory) bilateral LE  -MB     Bilateral LE Sensory Assessment light touch awareness;absent;proprioception;impaired  -MB           User Key  (r) = Recorded By, (t) = Taken By, (c) = Cosigned By    Initials Name Provider Type    Sera Pa, PT Physical Therapist               Goals/Plan    No documentation.                Clinical Impression     Row Name 08/04/22 1406          Pain    Pretreatment Pain Rating 0/10 - no pain  -MB     Posttreatment Pain Rating 0/10 - no pain  -MB     Row Name 08/04/22 1406          Plan of Care Review    Plan of Care Reviewed With patient;son  -MB     Progress improving  -MB     Outcome Evaluation PT eval completed. Patient appears near baseline w/ good strength, safety awareness, and stability. He ambulated 300ft w/ supervision and no LOB. Provided education re: HEP and home safety. No further acute PT services indicated. Anticipate safe D/C home w/ family when medically appropriate.  -MB     Row Name 08/04/22 1406          Therapy Assessment/Plan (PT)    Criteria for Skilled Interventions Met (PT) no;no problems identified which require skilled intervention  -MB     Therapy Frequency (PT) evaluation only  -MB     Row Name 08/04/22 1408          Vital Signs    Pre Systolic BP Rehab 147  -MB     Pre Treatment Diastolic BP 79  -MB     Pretreatment Heart Rate (beats/min) 65  -MB     Pre SpO2 (%) 98  -MB     O2 Delivery Pre Treatment room air  -MB     O2 Delivery Intra Treatment room air  -MB     Post SpO2 (%) 95  -MB     O2 Delivery Post Treatment room air   -MB     Pre Patient Position Sitting  -MB     Intra Patient Position Standing  -MB     Post Patient Position Sitting  -MB     Row Name 08/04/22 1406          Positioning and Restraints    Pre-Treatment Position sitting in chair/recliner  -MB     Post Treatment Position chair  -MB     In Chair notified nsg;reclined;call light within reach;encouraged to call for assist;with family/caregiver;legs elevated;heels elevated  -MB           User Key  (r) = Recorded By, (t) = Taken By, (c) = Cosigned By    Initials Name Provider Type    Sera Pa, PT Physical Therapist               Outcome Measures     Row Name 08/04/22 1413 08/04/22 0800       How much help from another person do you currently need...    Turning from your back to your side while in flat bed without using bedrails? 4  -MB 4  -JL    Moving from lying on back to sitting on the side of a flat bed without bedrails? 4  -MB 4  -JL    Moving to and from a bed to a chair (including a wheelchair)? 4  -MB 4  -JL    Standing up from a chair using your arms (e.g., wheelchair, bedside chair)? 4  -MB 4  -JL    Climbing 3-5 steps with a railing? 3  -MB 3  -JL    To walk in hospital room? 4  -MB 3  -JL    AM-PAC 6 Clicks Score (PT) 23  -MB 22  -JL    Highest level of mobility 7 --> Walked 25 feet or more  -MB 7 --> Walked 25 feet or more  -JL          User Key  (r) = Recorded By, (t) = Taken By, (c) = Cosigned By    Initials Name Provider Type    Sera Pa, PT Physical Therapist    Puneet Herrera RN Registered Nurse                PT Recommendation and Plan     Plan of Care Reviewed With: patient, son  Progress: improving  Outcome Evaluation: PT eval completed. Patient appears near baseline w/ good strength, safety awareness, and stability. He ambulated 300ft w/ supervision and no LOB. Provided education re: HEP and home safety. No further acute PT services indicated. Anticipate safe D/C home w/ family when medically appropriate.     Time  Calculation:    PT Charges     Row Name 08/04/22 1414             Time Calculation    Start Time 0930  -MB      PT Received On 08/04/22  -MB              Untimed Charges    PT Eval/Re-eval Minutes 48  -MB              Total Minutes    Untimed Charges Total Minutes 48  -MB       Total Minutes 48  -MB            User Key  (r) = Recorded By, (t) = Taken By, (c) = Cosigned By    Initials Name Provider Type    Sera Pa, PT Physical Therapist              Therapy Charges for Today     Code Description Service Date Service Provider Modifiers Qty    [id number] HC PT EVAL LOW COMPLEXITY 4 8/4/2022 Sera Pak, PT GP 1          PT G-Codes  AM-PAC 6 Clicks Score (PT): 23    PT Discharge Summary  Anticipated Discharge Disposition (PT): home with assist    Sera Pak, MATEO  8/4/2022

## 2022-08-04 NOTE — PROGRESS NOTES
"General Surgery Daily Progress Note    Subjective:  NG tube out, small nosebleed, no nausea or vomiting.    Objective:  /79 (BP Location: Right arm, Patient Position: Sitting)   Pulse 65   Temp 98.7 °F (37.1 °C) (Oral)   Resp 16   Ht 190.5 cm (75\")   Wt 125 kg (275 lb)   SpO2 98%   BMI 34.37 kg/m²       General Appearance:   Eyes: Anicteric  Neck: Trachea midline   Cardiovascular:  RRR without murmur nor rub  Lungs:  Bilateral respirations unlabored   Abdomen:  Soft, non-tender, no masses, no organomegaly   Extremities:  No cyanosis or edema   Skin:  No obvious rashes   Neurologic: awake and conversant       Imaging Results (Last 24 Hours)     ** No results found for the last 24 hours. **          CBC:  Results from last 7 days   Lab Units 08/04/22  0559   WBC 10*3/mm3 5.04   HEMOGLOBIN g/dL 11.5*   HEMATOCRIT % 36.7*   PLATELETS 10*3/mm3 155       CMP:  Results from last 7 days   Lab Units 08/04/22  0559   SODIUM mmol/L 137   POTASSIUM mmol/L 3.9   CHLORIDE mmol/L 106   CO2 mmol/L 24.0   BUN mg/dL 10   CREATININE mg/dL 0.58*   CALCIUM mg/dL 8.2*   BILIRUBIN mg/dL 1.0   ALK PHOS U/L 90   ALT (SGPT) U/L 12   AST (SGOT) U/L 25   GLUCOSE mg/dL 84         Assessment:  Recurrent small bowel obstruction - resolving    Plan:   Clear liquid diet.  He is not ready to have this fixed.  I have discussed surgical intervention with he and his son multiple times.    Ozzie Lester MD - 8/4/2022, 09:20 EDT        "

## 2022-08-04 NOTE — PROGRESS NOTES
HEPARIN INFUSION  Cecilio Farias is a  66 y.o. male receiving heparin infusion.     Therapy for (VTE/Cardiac): Cardiac (Mech AVR on warfarin PTA)  Patient Weight: 125 kg  Initial Bolus (Y/N): No (current INR 2.72)  Any Bolus (Y/N): Yes    Signs or Symptoms of Bleeding: Mild nose bleeding d/t NG being pulled, but nothing too concerning per RN.     Cardiac or Other (Not VTE)   Initial Bolus: 60 units/kg (Max 4,000 units)  Initial rate: 12 units/kg/hr (Max 1,000 units/hr)   Anti Xa Rebolus Infusion Hold time Change infusion Dose (Units/kg/hr) Next Anti Xa or aPTT Level Due   < 0.11 50 Units/kg  (4000 Units Max) None Increase by  3 Units/kg/hr 6 hours   0.11- 0.19 25 Units/kg  (2000 Units Max) None Increase by  2 Units/kg/hr 6 hours   0.2 - 0.29 0 None Increase by  1 Units/kg/hr 6 hours   0.3 - 0.5 0 None No Change 6 hours (after 2 consecutive levels in range check qAM)   0.51 - 0.6 0 None Decrease by  1 Units/kg/hr 6 hours   0.61 - 0.8 0 30 Minutes Decrease by  2 Units/kg/hr 6 hours   0.81 - 1 0 60 Minutes Decrease by  3 Units/kg/hr 6 hours   >1 0 Hold  After Anti Xa less than 0.5 decrease previous rate by  4 Units/kg/hr  Every 2 hours until Anti Xa  less than 0.5 then when infusion restarts in 6 hours     Recommend Xa every 6 hours.     Results from last 7 days   Lab Units 08/04/22  0559 08/03/22  0541 08/02/22  0604 08/01/22  1038   INR  2.01*  --   --  2.72*   HEMOGLOBIN g/dL 11.5* 12.0* 13.5 14.7   HEMATOCRIT % 36.7* 39.3 43.6 47.2   PLATELETS 10*3/mm3 155 160 181 238       Date   Time   Anti-Xa Current Rate (Unit/kg/hr) Bolus   (Units) Rate Change   (Unit/kg/hr) New Rate (Unit/kg/hr) Next   Anti-Xa Comments  Pump Check Daily   8/01 16:20 -- -- No initial -- 8 22:00 D/W Bela NAVA   8/1 2223 0.10 8 4000 +3 11 0600 D/w ELIJAH Munoz   8/2 0604 0.39 11 -- -- 11 1200 Spoke to ELIJAH Ramirez   8/2 1549 0.36 11 -- -- 11 0600 Spoke with Rn   8/3 0657 0.25 11 -- +1 12 1300 DW RN   8/3 1521 0.38 12 -- -- 12 2100    8/3 2217 0.35 12 --  -- 12 0600 D/w ELIJAH Starkey   8/4 0647 0.47 12 -- -- 12 AM Danay VERA RN in MDR                                                                                                                                                    Thank you,    Kayode ArthurD, SRI, BCPS  8/4/2022  14:26 EDT

## 2022-08-04 NOTE — CONSULTS
INFECTIOUS DISEASE CONSULT/INITIAL HOSPITAL VISIT    Cecilio Farias  1955  [id number]    Date of Consult: 8/4/2022    Admission Date: 8/1/2022      Requesting Provider: No ref. provider found  Evaluating Physician: Oscar Kaur MD    Reason for Consultation:  Acinetobacter prosthetic endocarditis    History of present illness:    Patient is a 66 y.o. 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  I initially saw him in 6/19 with Acinetobacter bacteremia after he presented with a right hallux infection.  He had 2 positive blood cultures for Acinetobacter.  His right hallux was inflamed but did not appear to be severely cellulitic.  This raised concern about possible prosthetic valve endocarditis.  He has remained on chronic oral minocycline suppression due to his risk for prosthetic valve endocarditis.  His minocycline suppression has resulted in chronic severe lower extremity cutaneous darkening.  He has otherwise tolerated his minocycline well. Over the last few months he has suffered from recurrent small bowel obstruction and an abdominal wall hematoma related to prior Lovenox therapy.  He was readmitted on 8/1 with abdominal pain nausea, and vomiting consistent with small bowel obstruction.  He has been followed by Dr. Lester and is known to have a ventral hernia that requires repair and possible lysis of adhesions.  An NG tube was placed which resulted in epistaxis.  His NG tube is now out.  He is able to tolerate some oral liquids.  His nausea and vomiting have improved and his abdominal pain is resolved.  He has now decided that he may proceed with abdominal surgery in approximately 1 month.  He has remained afebrile.    Past Medical  History:   Diagnosis Date   • Cancer (HCC)     colon cancer with operation/ chemotherapy/radiation    • CHF (congestive heart failure) (HCC) 2009    Acute Systolic    • Chronic anticoagulation     coumadin   • Diabetes mellitus (HCC)    • H/O aortic valve replacement    • HLD (hyperlipidemia)    • Hypertension    • Sepsis due to Acinetobacter species (HCC) 6/13/2019       Past Surgical History:   Procedure Laterality Date   • AORTIC VALVE REPAIR/REPLACEMENT  2009    25 mm. [place] [facility] AVR   • ASCENDING AORTIC ANEURYSM REPAIR  2009   • BACK SURGERY     • CARDIAC CATHETERIZATION N/A 1/20/2021    Procedure: RIGHT AND LEFT HEART CATH;  Surgeon: Kurtis Smith MD;  Location: [facility] CATH INVASIVE LOCATION;  Service: Cardiology;  Laterality: N/A;   • COLON SURGERY      Colon cancer with operation   • FOOT SURGERY      bilateral 2/2 wound and trauma        Family History   Problem Relation Age of Onset   • Heart disease Mother    • Hyperlipidemia Mother    • Diabetes Mother    • Leukemia Father    • Cancer Sister    • Kidney failure Brother    • Hepatitis Brother    • Cancer Sister    • Cancer Brother    • Heart disease Brother    • Heart attack Brother        Social History     Socioeconomic History   • Marital status:    Tobacco Use   • Smoking status: Never Smoker   • Smokeless tobacco: Never Used   Vaping Use   • Vaping Use: Never used   Substance and Sexual Activity   • Alcohol use: No   • Drug use: No   • Sexual activity: Defer       Allergies   Allergen Reactions   • Sulfa Antibiotics    • Amoxicillin Rash     Has tolerated ceftriaxone         Medication:    Current Facility-Administered Medications:   •  acetaminophen (TYLENOL) tablet 650 mg, 650 mg, Oral, Q4H PRN **OR** acetaminophen (TYLENOL) 160 MG/5ML solution 650 mg, 650 mg, Oral, Q4H PRN **OR** acetaminophen (TYLENOL) suppository 650 mg, 650 mg, Rectal, Q4H PRN, Karen Valerio DO  •  dextrose (D50W) (25 g/50 mL) IV injection 25 g, 25 g,  Intravenous, Q15 Min PRN, Karen Valerio DO  •  dextrose (GLUTOSE) oral gel 15 g, 15 g, Oral, Q15 Min PRN, Karen Valerio DO  •  glucagon (human recombinant) (GLUCAGEN DIAGNOSTIC) injection 1 mg, 1 mg, Intramuscular, Q15 Min PRN, Karen Valerio DO  •  heparin 79097 units/250 mL (100 units/mL) in 0.45 % NaCl infusion, 12 Units/kg/hr, Intravenous, Titrated, Kayode Childers PharmD, Last Rate: 15 mL/hr at 08/04/22 0148, 12 Units/kg/hr at 08/04/22 0148  •  hydrALAZINE (APRESOLINE) injection 10 mg, 10 mg, Intravenous, Q6H PRN, Karen Valerio DO  •  Insulin Lispro (humaLOG) injection 0-7 Units, 0-7 Units, Subcutaneous, TID AC, Karen Valerio DO  •  morphine injection 2 mg, 2 mg, Intravenous, Q2H PRN, 2 mg at 08/03/22 0632 **AND** naloxone (NARCAN) injection 0.4 mg, 0.4 mg, Intravenous, Q5 Min PRN, Karen Valerio DO  •  ondansetron (ZOFRAN) tablet 4 mg, 4 mg, Oral, Q6H PRN **OR** ondansetron (ZOFRAN) injection 4 mg, 4 mg, Intravenous, Q6H PRN, Karen Valerio DO  •  Pharmacy to Dose Heparin, , Does not apply, Continuous PRN, Karen Valerio DO  •  Sodium Chloride (PF) 0.9 % 10 mL, 10 mL, Intravenous, PRN, Emergency, Triage Protocol, MD  •  sodium chloride 0.9 % flush 10 mL, 10 mL, Intravenous, Q12H, Karen Valerio DO, 10 mL at 08/02/22 2100  •  sodium chloride 0.9 % flush 10 mL, 10 mL, Intravenous, PRN, Karen Valerio DO  •  sodium chloride 0.9 % infusion, 75 mL/hr, Intravenous, Continuous, Karen Valerio DO, Last Rate: 75 mL/hr at 08/03/22 2149, 75 mL/hr at 08/03/22 2149    Antibiotics:  Anti-Infectives (From admission, onward)    None            Review of Systems:  Constitutional-- No Fever, chills or sweats.  HEENT-- No new vision, hearing or throat complaints.  No epistaxis or oral sores.   CV--He has a history of aortic valve disease and is status post [place] aortic valve replacement.  He has presumed Acinetobacter aortic valve endocarditis with a prior history of  Acinetobacter bacteremia  Resp-- No SOB/cough/Hemoptysis  GI- He has a ventral hernia with recurrent small bowel obstruction  -- No dysuria, hematuria, or flank pain.  Denies hesitancy, urgency or flank pain.  Heme- No active bruising or bleeding;  MS-- no swelling or pain in the bones or joints of arms/legs.  No new back pain.  Neuro-- No acute focal weakness or numbness in the arms or legs.  No seizures.  Skin--No rashes or lesions  Endocrine-he has a history of type 2 diabetes mellitus      Physical Exam:   Vital Signs  Temp (24hrs), Av.1 °F (36.7 °C), Min:97.8 °F (36.6 °C), Max:98.5 °F (36.9 °C)    Temp  Min: 97.8 °F (36.6 °C)  Max: 98.5 °F (36.9 °C)  BP  Min: 139/71  Max: 171/79  Pulse  Min: 48  Max: 81  Resp  Min: 16  Max: 18  SpO2  Min: 94 %  Max: 98 %    GENERAL: Awake and alert, in no acute distress.   HEENT: Normocephalic, atraumatic.  PERRL. EOMI. No conjunctival injection. No icterus. Oropharynx clear without evidence of thrush or exudate.  NECK: Supple   HEART: He has a prosthetic valve click with a 1-2/6 systolic murmur  LUNGS: Clear to auscultation bilaterally without wheezing, rales, rhonchi. Normal respiratory effort. Nonlabored. No dullness.  ABDOMEN: Obese but soft and nontender.   EXT:  Both distal lower extremities are purplish/back  :  Without Adam catheter.  MSK:  No focal joint swelling or erythema  SKIN: Warm and dry without cutaneous eruptions on Inspection/palpation.    NEURO: Oriented to PPT.Motor 5/5 strength bilaterally  PSYCHIATRIC: Normal insight and judgement. Cooperative with PE    Laboratory Data    Results from last 7 days   Lab Units 22  0559 22  0541 22  0604   WBC 10*3/mm3 5.04 6.33 6.59   HEMOGLOBIN g/dL 11.5* 12.0* 13.5   HEMATOCRIT % 36.7* 39.3 43.6   PLATELETS 10*3/mm3 155 160 181     Results from last 7 days   Lab Units 22  0541   SODIUM mmol/L 142   POTASSIUM mmol/L 4.0   CHLORIDE mmol/L 108*   CO2 mmol/L 27.0   BUN mg/dL 15   CREATININE  mg/dL 0.66*   GLUCOSE mg/dL 98   CALCIUM mg/dL 7.9*     Results from last 7 days   Lab Units 08/01/22  1038   ALK PHOS U/L 131*   BILIRUBIN mg/dL 1.7*   ALT (SGPT) U/L 22   AST (SGOT) U/L 37             Results from last 7 days   Lab Units 08/01/22  1038   LACTATE mmol/L 1.7             Estimated Creatinine Clearance: 159.4 mL/min (A) (by C-G formula based on SCr of 0.66 mg/dL (L)).      Microbiology:  No results found for: ACANTHNAEG, AFBCX, BPERTUSSISCX, BLOODCX  No results found for: BCIDPCR, CXREFLEX, CSFCX, CULTURETIS  No results found for: CULTURES, HSVCX, URCX  No results found for: EYECULTURE, GCCX, HSVCULTURE, LABHSV  No results found for: LEGIONELLA, MRSACX, MUMPSCX, MYCOPLASCX  No results found for: NOCARDIACX, STOOLCX  No results found for: THROATCX, UNSTIMCULT, URINECX, CULTURE, VZVCULTUR  No results found for: VIRALCULTU, WOUNDCX        Radiology:  Imaging Results (Last 72 Hours)     Procedure Component Value Units Date/Time    CT Abdomen Pelvis Without Contrast [[id number]] Collected: 08/01/22 1357     Updated: 08/01/22 1413    Narrative:      DATE OF EXAM: 8/1/2022 1:12 PM     PROCEDURE: CT ABDOMEN PELVIS WO CONTRAST-     INDICATIONS: Bowel obstruction, history of rectus sheath hematoma     COMPARISON: 5/11/2022     TECHNIQUE: Routine transaxial slices were obtained through the abdomen  and pelvis without the administration of intravenous contrast.  Reconstructed coronal and sagittal images were also obtained. Automated  exposure control and iterative construction methods were used.     The radiation dose reduction device was turned on for each scan per the  ALARA (As Low as Reasonably Achievable) protocol.     FINDINGS:  Previous rectus sheath hematoma has been reduced to an area of mild  abdominal wall thickening, presumably organized and slowly reabsorbing  hematoma, decreased from approximately 14 x 6.5 cm to approximately 7.3  x 2.5 cm. No new abdominal wall hematoma is seen.     Included lung  bases appear clear. Numerous gallstones are again seen in  the otherwise normal-appearing gallbladder. Left lobe liver enlargement  and liver capsular nodularity is concerning for cirrhosis. No focal  hepatic lesions are identified. No biliary ductal dilatation is seen.  Spleen is not enlarged. Pancreas, adrenal glands, and kidneys appear  within normal limits. No upper abdominal free air, ascites, or  adenopathy is seen.      The stomach is distended with fluid and a small amount of food. There is  dilatation of the duodenum and jejunum up to level of the patient's  lower abdominal midline hernia, where there appears to be obstruction,  with dilated entering loop to the right, and two decompressed exam loops  to the left and along the inferior margin of the hernia site. The  inferior margin of this hernia site, within the patient's pannus is  complex and may reflect additional sites of herniation or may be  interconnected with the main hernia, images 129 through 148. No bowel  wall pneumatosis is seen and there is little evidence to suggest venous  congestion.      Above the level of the obstructing small bowel hernia, there is an  adjacent but separate herniation of already decompressed small bowel,  with no obvious strangulation, and cephalad of this, additional fat-only  containing nonstrangulated hernia with trace ascites, and more medial  colon-containing hernia with no evidence of strangulation. No hernia is  seen above the level of the colon containing hernia on axial image 88.     Elsewhere, terminal ileum cecum and appendix appear normal. Bladder is  normally distended and normal in appearance. No ascites or free air is  identified. Bony structures appear to be intact.          Impression:         1. Multiple midline abdominal wall hernias, from cephalad to caudal:    a: Midline hernia containing a small knuckle of colon, nonstrangulated    b: Fat-containing colon to the left of the midline,  non-strangulated    c: Small hernia containing already decompressed small bowel,  non-strangulated.    d: Complex appearing hernia containing several bowel loops, and the  apparent site of       obstruction, images 121 through 126. The inferior  margin of this hernia, within the patient's pannus, is complex and may  contain additional areas of abdominal wall weakness/herniation.     2. Cholelithiasis, but no evidence of cholecystitis.     3. Liver morphology concerning for cirrhosis. No additional findings to  suggest cirrhosis.     4. Small organized appearing left rectus sheath hematoma significantly  decreased from prior exam.     This report was finalized on 2022 2:10 PM by Dr. Miguel Rodriguez MD.               Impression:   1.  Acinetobacter bacteremia/endocarditis-he has presumed prosthetic valve Acinetobacter endocarditis suppressed on chronic minocycline therapy.  He is not a candidate for surgical intervention and if he relapses this will pose a high risk for severe morbidity and mortality.  I have therefore left him on chronic indefinite oral minocycline suppression.   2.  Valvular heart disease-status post [place] mechanical aortic valve Replacement  3.  Type 2 diabetes mellitus  4.  Morbid obesity  5.  Recent rectus sheath hematoma  6.  Recurrent bowel obstruction  7.  Ventral hernia    PLAN/RECOMMENDATIONS:   Thank you for asking us to see Cecilio Farias, I recommend the followin.  Resume minocycline 100 mg by mouth twice a day-done  2.  Surgical intervention for Dr. Tayler Kaur MD  2022  06:50 EDT

## 2022-08-04 NOTE — PLAN OF CARE
Goal Outcome Evaluation:              Outcome Evaluation: Pt had NG tube removed overnight, diet changed to liquids tolerating diet with no N/V/D ambulating in hallway x2 with PT and son. Will need to bridge with lovenox injection before discharge anticipate discharge once medically stable.

## 2022-08-05 ENCOUNTER — READMISSION MANAGEMENT (OUTPATIENT)
Dept: CALL CENTER | Facility: HOSPITAL | Age: 67
End: 2022-08-05

## 2022-08-05 VITALS
DIASTOLIC BLOOD PRESSURE: 82 MMHG | HEART RATE: 54 BPM | TEMPERATURE: 98.6 F | RESPIRATION RATE: 16 BRPM | SYSTOLIC BLOOD PRESSURE: 105 MMHG | OXYGEN SATURATION: 100 % | HEIGHT: 75 IN | WEIGHT: 275 LBS | BODY MASS INDEX: 34.19 KG/M2

## 2022-08-05 LAB
ALBUMIN SERPL-MCNC: 2.8 G/DL (ref 3.5–5.2)
ALBUMIN/GLOB SERPL: 1 G/DL
ALP SERPL-CCNC: 83 U/L (ref 39–117)
ALT SERPL W P-5'-P-CCNC: 14 U/L (ref 1–41)
ANION GAP SERPL CALCULATED.3IONS-SCNC: 8 MMOL/L (ref 5–15)
AST SERPL-CCNC: 30 U/L (ref 1–40)
BASOPHILS # BLD AUTO: 0.04 10*3/MM3 (ref 0–0.2)
BASOPHILS NFR BLD AUTO: 0.9 % (ref 0–1.5)
BILIRUB SERPL-MCNC: 0.9 MG/DL (ref 0–1.2)
BUN SERPL-MCNC: 9 MG/DL (ref 8–23)
BUN/CREAT SERPL: 17 (ref 7–25)
CALCIUM SPEC-SCNC: 8.1 MG/DL (ref 8.6–10.5)
CHLORIDE SERPL-SCNC: 108 MMOL/L (ref 98–107)
CO2 SERPL-SCNC: 24 MMOL/L (ref 22–29)
CREAT SERPL-MCNC: 0.53 MG/DL (ref 0.76–1.27)
DEPRECATED RDW RBC AUTO: 49.3 FL (ref 37–54)
EGFRCR SERPLBLD CKD-EPI 2021: 111.2 ML/MIN/1.73
EOSINOPHIL # BLD AUTO: 0.26 10*3/MM3 (ref 0–0.4)
EOSINOPHIL NFR BLD AUTO: 6.1 % (ref 0.3–6.2)
ERYTHROCYTE [DISTWIDTH] IN BLOOD BY AUTOMATED COUNT: 15.5 % (ref 12.3–15.4)
GLOBULIN UR ELPH-MCNC: 2.9 GM/DL
GLUCOSE BLDC GLUCOMTR-MCNC: 133 MG/DL (ref 70–130)
GLUCOSE BLDC GLUCOMTR-MCNC: 235 MG/DL (ref 70–130)
GLUCOSE SERPL-MCNC: 98 MG/DL (ref 65–99)
HCT VFR BLD AUTO: 34.4 % (ref 37.5–51)
HGB BLD-MCNC: 11.1 G/DL (ref 13–17.7)
IMM GRANULOCYTES # BLD AUTO: 0.01 10*3/MM3 (ref 0–0.05)
IMM GRANULOCYTES NFR BLD AUTO: 0.2 % (ref 0–0.5)
INR PPP: 1.64 (ref 0.84–1.13)
LYMPHOCYTES # BLD AUTO: 1.08 10*3/MM3 (ref 0.7–3.1)
LYMPHOCYTES NFR BLD AUTO: 25.4 % (ref 19.6–45.3)
MCH RBC QN AUTO: 28.2 PG (ref 26.6–33)
MCHC RBC AUTO-ENTMCNC: 32.3 G/DL (ref 31.5–35.7)
MCV RBC AUTO: 87.5 FL (ref 79–97)
MONOCYTES # BLD AUTO: 0.56 10*3/MM3 (ref 0.1–0.9)
MONOCYTES NFR BLD AUTO: 13.2 % (ref 5–12)
NEUTROPHILS NFR BLD AUTO: 2.3 10*3/MM3 (ref 1.7–7)
NEUTROPHILS NFR BLD AUTO: 54.2 % (ref 42.7–76)
NRBC BLD AUTO-RTO: 0 /100 WBC (ref 0–0.2)
PLATELET # BLD AUTO: 147 10*3/MM3 (ref 140–450)
PMV BLD AUTO: 10.1 FL (ref 6–12)
POTASSIUM SERPL-SCNC: 3.6 MMOL/L (ref 3.5–5.2)
PROT SERPL-MCNC: 5.7 G/DL (ref 6–8.5)
PROTHROMBIN TIME: 19.4 SECONDS (ref 11.4–14.4)
RBC # BLD AUTO: 3.93 10*6/MM3 (ref 4.14–5.8)
SODIUM SERPL-SCNC: 140 MMOL/L (ref 136–145)
UFH PPP CHRO-ACNC: 0.4 IU/ML (ref 0.3–0.7)
WBC NRBC COR # BLD: 4.25 10*3/MM3 (ref 3.4–10.8)

## 2022-08-05 PROCEDURE — 99239 HOSP IP/OBS DSCHRG MGMT >30: CPT | Performed by: INTERNAL MEDICINE

## 2022-08-05 PROCEDURE — 80053 COMPREHEN METABOLIC PANEL: CPT | Performed by: INTERNAL MEDICINE

## 2022-08-05 PROCEDURE — 85520 HEPARIN ASSAY: CPT

## 2022-08-05 PROCEDURE — 85025 COMPLETE CBC W/AUTO DIFF WBC: CPT | Performed by: FAMILY MEDICINE

## 2022-08-05 PROCEDURE — 82962 GLUCOSE BLOOD TEST: CPT

## 2022-08-05 PROCEDURE — 25010000002 ENOXAPARIN PER 10 MG: Performed by: INTERNAL MEDICINE

## 2022-08-05 PROCEDURE — 85610 PROTHROMBIN TIME: CPT | Performed by: INTERNAL MEDICINE

## 2022-08-05 RX ORDER — WARFARIN SODIUM 5 MG/1
10 TABLET ORAL
Status: DISCONTINUED | OUTPATIENT
Start: 2022-08-05 | End: 2022-08-05 | Stop reason: HOSPADM

## 2022-08-05 RX ORDER — ENOXAPARIN SODIUM 150 MG/ML
120 INJECTION SUBCUTANEOUS EVERY 12 HOURS
Status: DISCONTINUED | OUTPATIENT
Start: 2022-08-05 | End: 2022-08-05 | Stop reason: HOSPADM

## 2022-08-05 RX ORDER — ENOXAPARIN SODIUM 150 MG/ML
120 INJECTION SUBCUTANEOUS EVERY 12 HOURS
Qty: 4.8 ML | Refills: 0 | Status: SHIPPED | OUTPATIENT
Start: 2022-08-05 | End: 2022-08-09 | Stop reason: SDUPTHER

## 2022-08-05 RX ADMIN — ENOXAPARIN SODIUM 120 MG: 120 INJECTION SUBCUTANEOUS at 09:22

## 2022-08-05 RX ADMIN — AMLODIPINE BESYLATE 10 MG: 5 TABLET ORAL at 06:20

## 2022-08-05 RX ADMIN — GABAPENTIN 800 MG: 400 CAPSULE ORAL at 09:22

## 2022-08-05 RX ADMIN — CARVEDILOL 3.12 MG: 3.12 TABLET, FILM COATED ORAL at 09:21

## 2022-08-05 RX ADMIN — MINOCYCLINE HYDROCHLORIDE 100 MG: 50 CAPSULE ORAL at 09:22

## 2022-08-05 NOTE — PROGRESS NOTES
INFECTIOUS DISEASE  Progress Note    Cecilio Farias  1955  [id number]      Admission Date: 8/1/2022      Requesting Provider: No ref. provider found  Evaluating Physician: Oscar Kaur MD    Reason for Consultation:  Acinetobacter prosthetic endocarditis    History of present illness:    8/4/22: Patient is a 66 y.o. 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  I initially saw him in 6/19 with Acinetobacter bacteremia after he presented with a right hallux infection.  He had 2 positive blood cultures for Acinetobacter.  His right hallux was inflamed but did not appear to be severely cellulitic.  This raised concern about possible prosthetic valve endocarditis.  He has remained on chronic oral minocycline suppression due to his risk for prosthetic valve endocarditis.  His minocycline suppression has resulted in chronic severe lower extremity cutaneous darkening.  He has otherwise tolerated his minocycline well. Over the last few months he has suffered from recurrent small bowel obstruction and an abdominal wall hematoma related to prior Lovenox therapy.  He was readmitted on 8/1 with abdominal pain nausea, and vomiting consistent with small bowel obstruction.  He has been followed by Dr. Lester and is known to have a ventral hernia that requires repair and possible lysis of adhesions.  An NG tube was placed which resulted in epistaxis.  His NG tube is now out.  He is able to tolerate some oral liquids.  His nausea and vomiting have improved and his abdominal pain is resolved.  He has now decided that he may proceed with abdominal surgery in approximately 1 month.  He has remained afebrile.    8/5/22: He has remained afebrile. His white blood cell  count today is 4.3.  His creatinine is 0.53.  He does not wish to proceed with abdominal surgery at this time but he will reconsider this issue in approximately 1 month.  He has decreased abdominal pain.  He denies nausea and vomiting.    Past Medical History:   Diagnosis Date   • Cancer (HCC)     colon cancer with operation/ chemotherapy/radiation    • CHF (congestive heart failure) (HCC) 2009    Acute Systolic    • Chronic anticoagulation     coumadin   • Diabetes mellitus (HCC)    • H/O aortic valve replacement    • HLD (hyperlipidemia)    • Hypertension    • Sepsis due to Acinetobacter species (HCC) 6/13/2019       Past Surgical History:   Procedure Laterality Date   • AORTIC VALVE REPAIR/REPLACEMENT  2009    25 mm. [facility] [facility] AVR   • ASCENDING AORTIC ANEURYSM REPAIR  2009   • BACK SURGERY     • CARDIAC CATHETERIZATION N/A 1/20/2021    Procedure: RIGHT AND LEFT HEART CATH;  Surgeon: Kurtis Smith MD;  Location: [facility] CATH INVASIVE LOCATION;  Service: Cardiology;  Laterality: N/A;   • COLON SURGERY      Colon cancer with operation   • FOOT SURGERY      bilateral 2/2 wound and trauma        Family History   Problem Relation Age of Onset   • Heart disease Mother    • Hyperlipidemia Mother    • Diabetes Mother    • Leukemia Father    • Cancer Sister    • Kidney failure Brother    • Hepatitis Brother    • Cancer Sister    • Cancer Brother    • Heart disease Brother    • Heart attack Brother        Social History     Socioeconomic History   • Marital status:    Tobacco Use   • Smoking status: Never Smoker   • Smokeless tobacco: Never Used   Vaping Use   • Vaping Use: Never used   Substance and Sexual Activity   • Alcohol use: No   • Drug use: No   • Sexual activity: Defer       Allergies   Allergen Reactions   • Sulfa Antibiotics    • Amoxicillin Rash     Has tolerated ceftriaxone         Medication:    Current Facility-Administered Medications:   •  acetaminophen (TYLENOL) tablet 650 mg, 650 mg, Oral, Q4H  PRN **OR** acetaminophen (TYLENOL) 160 MG/5ML solution 650 mg, 650 mg, Oral, Q4H PRN **OR** acetaminophen (TYLENOL) suppository 650 mg, 650 mg, Rectal, Q4H PRN, Karen Valerio DO  •  amLODIPine (NORVASC) tablet 10 mg, 10 mg, Oral, QAM, Alie Goff MD, 10 mg at 08/05/22 0620  •  atorvastatin (LIPITOR) tablet 40 mg, 40 mg, Oral, Nightly, Alie Goff MD, 40 mg at 08/04/22 2119  •  carvedilol (COREG) tablet 3.125 mg, 3.125 mg, Oral, BID, Alie Goff MD, 3.125 mg at 08/04/22 0904  •  dextrose (D50W) (25 g/50 mL) IV injection 25 g, 25 g, Intravenous, Q15 Min PRN, Karen Valerio DO  •  dextrose (GLUTOSE) oral gel 15 g, 15 g, Oral, Q15 Min PRN, Karen Valerio DO  •  furosemide (LASIX) tablet 20 mg, 20 mg, Oral, Daily, Alie Goff MD, 20 mg at 08/04/22 0905  •  gabapentin (NEURONTIN) capsule 800 mg, 800 mg, Oral, Q8H PRN, Alie Goff MD, 800 mg at 08/04/22 2128  •  glucagon (human recombinant) (GLUCAGEN DIAGNOSTIC) injection 1 mg, 1 mg, Intramuscular, Q15 Min PRN, Karen Valerio DO  •  heparin 74885 units/250 mL (100 units/mL) in 0.45 % NaCl infusion, 12 Units/kg/hr, Intravenous, Titrated, Kayode Childers, PharmD, Last Rate: 15 mL/hr at 08/04/22 1832, 12 Units/kg/hr at 08/04/22 1832  •  hydrALAZINE (APRESOLINE) injection 10 mg, 10 mg, Intravenous, Q6H PRN, Karen Valerio DO  •  Insulin Lispro (humaLOG) injection 0-7 Units, 0-7 Units, Subcutaneous, TID AC, Karen Valerio DO  •  minocycline (MINOCIN,DYNACIN) capsule 100 mg, 100 mg, Oral, BID, Alie Goff MD, 100 mg at 08/04/22 2119  •  morphine injection 2 mg, 2 mg, Intravenous, Q2H PRN, 2 mg at 08/03/22 0632 **AND** naloxone (NARCAN) injection 0.4 mg, 0.4 mg, Intravenous, Q5 Min PRN, Karen Valerio, DO  •  ondansetron (ZOFRAN) tablet 4 mg, 4 mg, Oral, Q6H PRN **OR** ondansetron (ZOFRAN) injection 4 mg, 4 mg, Intravenous, Q6H PRN, Karen Valerio, DO  •  Pharmacy to Dose Heparin, , Does not apply, Continuous PRN,  Karen Valerio DO  •  Sodium Chloride (PF) 0.9 % 10 mL, 10 mL, Intravenous, PRN, Emergency, Triage Protocol, MD  •  sodium chloride 0.9 % flush 10 mL, 10 mL, Intravenous, Q12H, Karen Valerio DO, 10 mL at 22 2120  •  sodium chloride 0.9 % flush 10 mL, 10 mL, Intravenous, PRN, Karen Valerio DO  •  sodium chloride 0.9 % infusion, 75 mL/hr, Intravenous, Continuous, Karen Valerio DO, Last Rate: 75 mL/hr at 22 1241, 75 mL/hr at 22 1241    Antibiotics:  Anti-Infectives (From admission, onward)    Ordered     Dose/Rate Route Frequency Start Stop    22 0810  minocycline (MINOCIN,DYNACIN) capsule 100 mg        Ordering Provider: Alie Goff MD    100 mg Oral 2 Times Daily 22 0900 22 0859            Review of Systems:  See HPI      Physical Exam:   Vital Signs  Temp (24hrs), Av.1 °F (36.7 °C), Min:97.7 °F (36.5 °C), Max:98.4 °F (36.9 °C)    Temp  Min: 97.7 °F (36.5 °C)  Max: 98.4 °F (36.9 °C)  BP  Min: 124/62  Max: 144/81  Pulse  Min: 47  Max: 71  Resp  Min: 16  Max: 16  SpO2  Min: 95 %  Max: 97 %    GENERAL: Awake and alert, in no acute distress.   HEENT: Normocephalic, atraumatic.  PERRL. EOMI. No conjunctival injection. No icterus. Oropharynx clear without evidence of thrush or exudate.  NECK: Supple   HEART: He has a prosthetic valve click with a 1-2/6 systolic murmur  LUNGS: Clear to auscultation bilaterally without wheezing, rales, rhonchi. Normal respiratory effort. Nonlabored. No dullness.  ABDOMEN: Obese but soft and nontender.   EXT:  Both distal lower extremities are purplish/back  :  Without Adam catheter.  MSK:  No focal joint swelling or erythema  SKIN: Warm and dry without cutaneous eruptions on Inspection/palpation.    NEURO: Oriented to PPT.Motor 5/5 strength bilaterally  PSYCHIATRIC: Normal insight and judgement. Cooperative with PE    Laboratory Data    Results from last 7 days   Lab Units 22  0404 22  0559 22  0570    WBC 10*3/mm3 4.25 5.04 6.33   HEMOGLOBIN g/dL 11.1* 11.5* 12.0*   HEMATOCRIT % 34.4* 36.7* 39.3   PLATELETS 10*3/mm3 147 155 160     Results from last 7 days   Lab Units 08/05/22  0404   SODIUM mmol/L 140   POTASSIUM mmol/L 3.6   CHLORIDE mmol/L 108*   CO2 mmol/L 24.0   BUN mg/dL 9   CREATININE mg/dL 0.53*   GLUCOSE mg/dL 98   CALCIUM mg/dL 8.1*     Results from last 7 days   Lab Units 08/05/22  0404   ALK PHOS U/L 83   BILIRUBIN mg/dL 0.9   ALT (SGPT) U/L 14   AST (SGOT) U/L 30             Results from last 7 days   Lab Units 08/01/22  1038   LACTATE mmol/L 1.7             Estimated Creatinine Clearance: 198.5 mL/min (A) (by C-G formula based on SCr of 0.53 mg/dL (L)).      Microbiology:  No results found for: ACANTHNAEG, AFBCX, BPERTUSSISCX, BLOODCX  No results found for: BCIDPCR, CXREFLEX, CSFCX, CULTURETIS  No results found for: CULTURES, HSVCX, URCX  No results found for: EYECULTURE, GCCX, HSVCULTURE, LABHSV  No results found for: LEGIONELLA, MRSACX, MUMPSCX, MYCOPLASCX  No results found for: NOCARDIACX, STOOLCX  No results found for: THROATCX, UNSTIMCULT, URINECX, CULTURE, VZVCULTUR  No results found for: VIRALCULTU, WOUNDCX        Radiology:  Imaging Results (Last 72 Hours)     ** No results found for the last 72 hours. **            Impression:   1.  Acinetobacter bacteremia/endocarditis-he has presumed prosthetic valve Acinetobacter endocarditis suppressed on chronic minocycline therapy.  He is not a candidate for surgical intervention and if he relapses this will pose a high risk for severe morbidity and mortality.  I have therefore left him on chronic indefinite oral minocycline suppression.   2.  Valvular heart disease-status post [place] mechanical aortic valve Replacement  3.  Type 2 diabetes mellitus  4.  Morbid obesity  5.  Recent rectus sheath hematoma  6.  Recurrent bowel obstruction-she would like to postpone surgery for the time being.  He is clinically improved and should be able to  discharge home today.  7.  Ventral hernia    PLAN/RECOMMENDATIONS:   1.  Resume minocycline 100 mg by mouth twice a day-done  2.  Surgical intervention per Dr. Lester  3.  Discharge to home today  4.  Follow-up with me as scheduled       Oscar Kaur MD  8/5/2022  08:20 EDT

## 2022-08-05 NOTE — DISCHARGE SUMMARY
[facility] Medicine Services  DISCHARGE SUMMARY    Patient Name: Cecilio Farias  : 1955  MRN: [id number]    Date of Admission: 2022 10:47 AM  Date of Discharge:  2022  Primary Care Physician: America Everett DO    Consults     Date and Time Order Name Status Description    2022 12:33 AM Inpatient Infectious Diseases Consult Completed     2022  3:46 PM Inpatient General Surgery Consult Completed           Hospital Course     Presenting Problem:   Small bowel obstruction (HCC) [K56.609]    Active Hospital Problems    Diagnosis  POA   • **SBO (small bowel obstruction) (HCC) [K56.609]  Yes   • Small bowel obstruction (HCC) [K56.609]  Yes   • Diastolic CHF, chronic (HCC) [I50.32]  Yes   • Moderate PAH. PA 55/20, wedge 15 mmHg.  Multifactorial (CMS/HCC) [I27.21]  Yes   • History Acinetobacter bacteremia on chronic minocycline [Z86.19]  Yes   • History of mechanical AVR for aortic stenosis  [Z95.2]  Not Applicable   • Type 2 diabetes mellitus (HCC) [E11.9]  Yes   • Essential hypertension [I10]  Yes   • Dyslipidemia [E78.5]  Yes   • Morbid obesity (HCC) [E66.01]  Yes      Resolved Hospital Problems   No resolved problems to display.      Hospital Course:  Cecilio Farias is a 66 y.o. male with PMHx colon cancer s/p surgery/chemo/radiation, CHF, Mechanical aortic valve ([employer]), Non-Obstructive CAD, HTN, PAF, JI, Pulmonary HTN, DM, history of Acinetobacter sepsis on chronic suppressive Minocycline who presented with recurrent SBO.     Recurrent SBO  Ventral Hernia   -Pt known to Dr Lester,  was consulted, patient has continued to decline surgical intervention despite multiple presentation of the same issue.  Reports that he now plans to follow-up in 1 month for possible surgical intervention  -Status post conservative management, NG tube has since been removed, tolerating diet      Mechanical AVR on Coumadin  -INR therapeutic 2.72 on admission, 1.64  today  -Coumadin was held for possible procedure, started on heparin gtt.   -Patient to resume home Coumadin with Lovenox bridge as INR is now subtherapeutic.  He will follow-up with anticoagulation clinic as currently scheduled.  -Echo 5/11/22: EF 56-60%, prosthetic valve normal   -Follows with Dr Jackson     Recent rectus sheath hematoma   -Occurred with Lovenox injections (poor injection technique)  -CT shows significant decrease from prior exam      DM2  -Resume home regimen     History of Acinetobacter sepsis  -On chronic Minocycline suppression (pt with chronic black skin discoloration of B/L LE), resumed today as no longer NPO  -surgery has requested ID consultation to help with abx perioperative choice in the event that patient requires OR      HTN  HLD  -Continue home meds    Discharge Follow Up Recommendations for outpatient labs/diagnostics:  Follow up with PCP in 1 week  Follow-up on Monday for INR check in [place] and follow-up with anticoagulation clinic  Follow-up with Dr. Lester in 1 month for possible surgical plans      Day of Discharge     HPI: No acute events overnight, patient says she feels well, denies any abdominal pain, tolerating CLD    Review of Systems  Gen- No fevers, chills  CV- No chest pain, palpitations  Resp- No cough, dyspnea  GI- No N/V/D, abd pain    Vital Signs:   Temp:  [97.7 °F (36.5 °C)-98.4 °F (36.9 °C)] 98.4 °F (36.9 °C)  Heart Rate:  [47-71] 47  Resp:  [16] 16  BP: (124-144)/(62-81) 132/74      Physical Exam:  Constitutional: No acute distress, awake, alert  HENT: NCAT, mucous membranes moist  Respiratory: Clear to auscultation bilaterally, respiratory effort normal   Cardiovascular: mechanical sounds, no murmurs, rubs, or gallops  Gastrointestinal: Positive bowel sounds, soft, nontender, nondistended  Musculoskeletal: No bilateral ankle edema  Psychiatric: Appropriate affect, cooperative  Neurologic: Oriented x 3, non-focal  Skin: No rashes, LE  discoloration    Pertinent  and/or Most Recent Results     LAB RESULTS:      Lab 08/05/22  0404 08/04/22  0559 08/03/22  2217 08/03/22  1434 08/03/22  0541 08/02/22  1549 08/02/22 0604 08/01/22 2223 08/01/22 2223 08/01/22  1038   WBC 4.25 5.04  --   --  6.33  --  6.59  --   --  9.24   HEMOGLOBIN 11.1* 11.5*  --   --  12.0*  --  13.5  --   --  14.7   HEMATOCRIT 34.4* 36.7*  --   --  39.3  --  43.6  --   --  47.2   PLATELETS 147 155  --   --  160  --  181  --   --  238   NEUTROS ABS 2.30 3.38  --   --  4.40  --  4.43  --   --  7.24*   IMMATURE GRANS (ABS) 0.01 0.02  --   --  0.02  --  0.02  --   --  0.08*   LYMPHS ABS 1.08 0.86  --   --  1.06  --  1.19  --   --  1.03   MONOS ABS 0.56 0.47  --   --  0.56  --  0.65  --   --  0.69   EOS ABS 0.26 0.27  --   --  0.24  --  0.25  --   --  0.15   MCV 87.5 88.4  --   --  89.5  --  88.8  --   --  87.2   LACTATE  --   --   --   --   --   --   --   --   --  1.7   PROTIME 19.4* 22.8*  --   --   --   --   --   --   --  29.0*   APTT  --   --   --   --   --   --   --   --  58.7*  --    HEPARIN ANTI-XA 0.40 0.43 0.35 0.38 0.25*   < > 0.39   < > 0.10*  --     < > = values in this interval not displayed.         Lab 08/05/22  0404 08/04/22 0559 08/03/22 0541 08/02/22 0604 08/01/22  1038   SODIUM 140 137 142 140 140   POTASSIUM 3.6 3.9 4.0 4.1 4.7   CHLORIDE 108* 106 108* 104 101   CO2 24.0 24.0 27.0 26.0 29.0   ANION GAP 8.0 7.0 7.0 10.0 10.0   BUN 9 10 15 24* 24*   CREATININE 0.53* 0.58* 0.66* 0.91 0.95   EGFR 111.2 108.2 104.1 93.5 88.8   GLUCOSE 98 84 98 128* 181*   CALCIUM 8.1* 8.2* 7.9* 8.0* 9.0   HEMOGLOBIN A1C  --   --   --   --  6.50*         Lab 08/05/22  0404 08/04/22  0559 08/01/22  1038   TOTAL PROTEIN 5.7* 5.9* 7.5   ALBUMIN 2.80* 2.90* 4.20   GLOBULIN 2.9 3.0 3.3   ALT (SGPT) 14 12 22   AST (SGOT) 30 25 37   BILIRUBIN 0.9 1.0 1.7*   ALK PHOS 83 90 131*   LIPASE  --   --  43         Lab 08/05/22  0404 08/04/22  0559 08/01/22  1038   PROTIME 19.4* 22.8* 29.0*   INR  1.64* 2.01* 2.72*                 Brief Urine Lab Results  (Last result in the past 365 days)      Color   Clarity   Blood   Leuk Est   Nitrite   Protein   CREAT   Urine HCG        08/01/22 1109 Dark Yellow   Clear   Negative   Negative   Negative   100 mg/dL (2+)               Microbiology Results (last 10 days)     Procedure Component Value - Date/Time    COVID PRE-OP / PRE-PROCEDURE SCREENING ORDER (NO ISOLATION) - Swab, Nasopharynx [[id number]]  (Normal) Collected: 08/01/22 1745    Lab Status: Final result Specimen: Swab from Nasopharynx Updated: 08/01/22 1818    Narrative:      The following orders were created for panel order COVID PRE-OP / PRE-PROCEDURE SCREENING ORDER (NO ISOLATION) - Swab, Nasopharynx.  Procedure                               Abnormality         Status                     ---------                               -----------         ------                     COVID-19 and FLU A/B PCR...[[id number]]  Normal              Final result                 Please view results for these tests on the individual orders.    COVID-19 and FLU A/B PCR - Swab, Nasopharynx [[id number]]  (Normal) Collected: 08/01/22 1745    Lab Status: Final result Specimen: Swab from Nasopharynx Updated: 08/01/22 1818     COVID19 Not Detected     Influenza A PCR Not Detected     Influenza B PCR Not Detected    Narrative:      Fact sheet for providers: https://www.fda.gov/media/888922/download    Fact sheet for patients: https://www.fda.gov/media/785221/download    Test performed by PCR.          CT Abdomen Pelvis Without Contrast    Result Date: 8/1/2022  DATE OF EXAM: 8/1/2022 1:12 PM  PROCEDURE: CT ABDOMEN PELVIS WO CONTRAST-  INDICATIONS: Bowel obstruction, history of rectus sheath hematoma  COMPARISON: 5/11/2022  TECHNIQUE: Routine transaxial slices were obtained through the abdomen and pelvis without the administration of intravenous contrast. Reconstructed coronal and sagittal images were also obtained. Automated exposure  control and iterative construction methods were used.  The radiation dose reduction device was turned on for each scan per the ALARA (As Low as Reasonably Achievable) protocol.  FINDINGS: Previous rectus sheath hematoma has been reduced to an area of mild abdominal wall thickening, presumably organized and slowly reabsorbing hematoma, decreased from approximately 14 x 6.5 cm to approximately 7.3 x 2.5 cm. No new abdominal wall hematoma is seen.  Included lung bases appear clear. Numerous gallstones are again seen in the otherwise normal-appearing gallbladder. Left lobe liver enlargement and liver capsular nodularity is concerning for cirrhosis. No focal hepatic lesions are identified. No biliary ductal dilatation is seen. Spleen is not enlarged. Pancreas, adrenal glands, and kidneys appear within normal limits. No upper abdominal free air, ascites, or adenopathy is seen.  The stomach is distended with fluid and a small amount of food. There is dilatation of the duodenum and jejunum up to level of the patient's lower abdominal midline hernia, where there appears to be obstruction, with dilated entering loop to the right, and two decompressed exam loops to the left and along the inferior margin of the hernia site. The inferior margin of this hernia site, within the patient's pannus is complex and may reflect additional sites of herniation or may be interconnected with the main hernia, images 129 through 148. No bowel wall pneumatosis is seen and there is little evidence to suggest venous congestion.  Above the level of the obstructing small bowel hernia, there is an adjacent but separate herniation of already decompressed small bowel, with no obvious strangulation, and cephalad of this, additional fat-only containing nonstrangulated hernia with trace ascites, and more medial colon-containing hernia with no evidence of strangulation. No hernia is seen above the level of the colon containing hernia on axial image 88.   Elsewhere, terminal ileum cecum and appendix appear normal. Bladder is normally distended and normal in appearance. No ascites or free air is identified. Bony structures appear to be intact.        1. Multiple midline abdominal wall hernias, from cephalad to caudal:   a: Midline hernia containing a small knuckle of colon, nonstrangulated   b: Fat-containing colon to the left of the midline, non-strangulated   c: Small hernia containing already decompressed small bowel, non-strangulated.   d: Complex appearing hernia containing several bowel loops, and the apparent site of       obstruction, images 121 through 126. The inferior margin of this hernia, within the patient's pannus, is complex and may contain additional areas of abdominal wall weakness/herniation.  2. Cholelithiasis, but no evidence of cholecystitis.  3. Liver morphology concerning for cirrhosis. No additional findings to suggest cirrhosis.  4. Small organized appearing left rectus sheath hematoma significantly decreased from prior exam.  This report was finalized on 8/1/2022 2:10 PM by Dr. Miguel Rodriguez MD.            Results for orders placed during the hospital encounter of 05/11/22    Adult Transthoracic Echo Complete W/ Cont if Necessary Per Protocol    Interpretation Summary  · Left ventricular ejection fraction appears to be 56 - 60%.  · Left ventricular wall thickness is consistent with moderate concentric hypertrophy  · Moderate mitral annular calcification is present  · Mild mitral valve stenosis is present. The mitral valve mean gradient is 5 mmHg. There is moderate thickening of the anterior leaflet subvalvular apparatus.  · There is a 25 mm, [employer] mechanical aortic valve prosthesis present  · The aortic valve peak and mean gradients are within defined limits. The prosthetic aortic valve is normal.  · The right ventricular cavity is mildly dilated.  · Mild tricuspid valve regurgitation is present. Estimated right ventricular systolic pressure  from tricuspid regurgitation is normal (<35 mmHg).  · Borderline dilation of the aortic root is present measures 3.9 cm.      Plan for Follow-up of Pending Labs/Results:     Discharge Details        Discharge Medications      New Medications      Instructions Start Date   Enoxaparin Sodium 120 MG/0.8ML solution prefilled syringe syringe  Commonly known as: LOVENOX   120 mg, Subcutaneous, Every 12 Hours         Changes to Medications      Instructions Start Date   warfarin 5 MG tablet  Commonly known as: COUMADIN  What changed: additional instructions   TAKE ONE AND ONE-HALF TO TWO TABLETS BY MOUTH ONCE DAILY OR AS DIRECTED BY THE ANTICOAGULATION CLINIC         Continue These Medications      Instructions Start Date   amLODIPine 10 MG tablet  Commonly known as: NORVASC   1 tablet, Oral, Every Morning      aspirin 81 MG EC tablet   81 mg, Oral, Daily, OTC      atorvastatin 40 MG tablet  Commonly known as: LIPITOR   40 mg, Oral, Nightly      carvedilol 3.125 MG tablet  Commonly known as: COREG   3.125 mg, Oral, 2 Times Daily      furosemide 20 MG tablet  Commonly known as: LASIX   20 mg, Oral, Daily      gabapentin 800 MG tablet  Commonly known as: NEURONTIN   800 mg, Oral, 3 Times Daily      glipizide 5 MG tablet  Commonly known as: GLUCOTROL   5 mg, Oral, 2 Times Daily Before Meals      insulin lispro protamine-insulin lispro (75-25) 100 UNIT/ML suspension injection  Commonly known as: humaLOG 75-25   15 Units, Subcutaneous, 2 Times Daily With Meals      minocycline 100 MG capsule  Commonly known as: MINOCIN,DYNACIN   1 capsule, Oral, 2 Times Daily, For 30 days, refilled on 07-08-22      potassium chloride ER 20 MEQ tablet controlled-release ER tablet  Commonly known as: K-TAB   20 mEq, Oral, Daily             Allergies   Allergen Reactions   • Sulfa Antibiotics    • Amoxicillin Rash     Has tolerated ceftriaxone       Discharge Disposition: Home  Home or Self Care  Diet:  Hospital:  Diet Order   Procedures   • Diet  Regular; Cardiac       Activity: As tolerated    Restrictions or Other Recommendations:  None       CODE STATUS:    Code Status and Medical Interventions:   Ordered at: 08/01/22 1546     Level Of Support Discussed With:    Patient     Code Status (Patient has no pulse and is not breathing):    CPR (Attempt to Resuscitate)     Medical Interventions (Patient has pulse or is breathing):    Full Support       Future Appointments   Date Time Provider Department Center   1/17/2023 10:30 AM Arun Jackson MD [facility] Bradshaw MD  08/05/22      Time Spent on Discharge:  I spent  35  minutes on this discharge activity which included: face-to-face encounter with the patient, reviewing the data in the system, coordination of the care with the nursing staff as well as consultants, documentation, and entering orders.

## 2022-08-05 NOTE — PROGRESS NOTES
"Pharmacy Consult  -  Warfarin  Cecilio Farias is a 66 y.o. male   Height - 190.5 cm (75\")  Weight - 125 kg (275 lb)    Consulting Provider: hospitalist  Indication: mAVR  Goal INR: 2.5-3.5  Home Regimen: warfarin 7.5 mg oral daily except 10 mg on MonWedFri  Bridge Therapy: yes -- heparin drip current admission, plan for subQ Lovenox outpt    Drug-Drug Interactions with current regimen:  Minocycline (PTA) - may increase the INR    Warfarin Dosing During Admission:  Date  8/1 8/2 8/3 8/4 8/5       INR  2.72 -- -- 2.01 1.64       Dose  HOLD HOLD HOLD HOLD 10mg         Education Provided: Patient has been extensively educated by the [facility] staff. Discharge plan communicated by [facility] today. Patient has no further questions at this time.    Discharge Follow up:   Following Provider - [facility]   Follow up time range or appointment - Monday 8/8    Labs:  Results from last 7 days   Lab Units 08/05/22 0404 08/04/22 0559 08/03/22  0541 08/02/22  0604 08/01/22 2223 08/01/22  1038   INR  1.64* 2.01*  --   --   --  2.72*   APTT seconds  --   --   --   --  58.7*  --    HEMOGLOBIN g/dL 11.1* 11.5* 12.0* 13.5  --  14.7   HEMATOCRIT % 34.4* 36.7* 39.3 43.6  --  47.2     Results from last 7 days   Lab Units 08/05/22 0404 08/04/22 0559 08/03/22  0541 08/02/22  0604 08/01/22  1038   SODIUM mmol/L 140 137 142   < > 140   POTASSIUM mmol/L 3.6 3.9 4.0   < > 4.7   CHLORIDE mmol/L 108* 106 108*   < > 101   CO2 mmol/L 24.0 24.0 27.0   < > 29.0   BUN mg/dL 9 10 15   < > 24*   CREATININE mg/dL 0.53* 0.58* 0.66*   < > 0.95   CALCIUM mg/dL 8.1* 8.2* 7.9*   < > 9.0   BILIRUBIN mg/dL 0.9 1.0  --   --  1.7*   ALK PHOS U/L 83 90  --   --  131*   ALT (SGPT) U/L 14 12  --   --  22   AST (SGOT) U/L 30 25  --   --  37   GLUCOSE mg/dL 98 84 98   < > 181*    < > = values in this interval not displayed.     Diet Order   Procedures    Diet Regular; Cardiac     Assessment/Plan:  INR is subtherapeutic today prior to discharge. " Recommend resuming warfarin with Lovenox bridge:  --Warfarin 10mg daily (note recent home dose, above)  --Lovenox 120mg subQ q12h (note patient was previously injecting incorrectly >> abdominal hematoma - written and verbal education provided today re: correct technique)  Repeat INR and follow up with the [facility] Monday 8/8 for further instructions.  Pharmacy will continue to follow and adjust warfarin dosing based on INR trend, clinical status, dietary intake, and drug-drug interactions while Mr. Farias remains in the hospital.    Thank you,  Madie Lino, PharmD, BCPS  8/5/2022  09:02 EDT

## 2022-08-05 NOTE — PROGRESS NOTES
"General Surgery Daily Progress Note    Subjective:  No new complaints.    Objective:  /74 (BP Location: Left arm, Patient Position: Lying)   Pulse 68   Temp 98.4 °F (36.9 °C) (Oral)   Resp 16   Ht 190.5 cm (75\")   Wt 125 kg (275 lb)   SpO2 95%   BMI 34.37 kg/m²       General Appearance: NAD  Eyes: Anicteric  Neck: Trachea midline   Cardiovascular:  RRR without murmur nor rub  Lungs:  Bilateral respirations unlabored   Abdomen:  Soft, non-tender, no masses, no organomegaly   Extremities:  No cyanosis or edema   Skin:  No obvious rashes   Neurologic: awake and conversant       Imaging Results (Last 24 Hours)     ** No results found for the last 24 hours. **          CBC:  Results from last 7 days   Lab Units 08/05/22  0404   WBC 10*3/mm3 4.25   HEMOGLOBIN g/dL 11.1*   HEMATOCRIT % 34.4*   PLATELETS 10*3/mm3 147       CMP:  Results from last 7 days   Lab Units 08/05/22  0404   SODIUM mmol/L 140   POTASSIUM mmol/L 3.6   CHLORIDE mmol/L 108*   CO2 mmol/L 24.0   BUN mg/dL 9   CREATININE mg/dL 0.53*   CALCIUM mg/dL 8.1*   BILIRUBIN mg/dL 0.9   ALK PHOS U/L 83   ALT (SGPT) U/L 14   AST (SGOT) U/L 30   GLUCOSE mg/dL 98         Assessment:  Recurrent SBO - resolving  Chronic acinetobacter  Valvular heart disease     Plan:   Tolerating some PO with GI function. May advance diet as tolerated. Discussed the need for repair of his incisional hernias to prevent these frequent recurrent bowel obstructions. He and his son understand. Call with questions.    Ozzie Lester MD - 8/5/2022, 10:41 EDT        "

## 2022-08-05 NOTE — PLAN OF CARE
Goal Outcome Evaluation:           Progress: improving    Pt being D/C home, Lovenox and D/C teaching completed. Pt is stable at this time

## 2022-08-06 NOTE — OUTREACH NOTE
Prep Survey    Flowsheet Row Responses   [facility] facility patient discharged from? [place]   Is LACE score < 7 ? No   Emergency Room discharge w/ pulse ox? No   Eligibility Readm Mgmt   Discharge diagnosis small bowel obstruction   Does the patient have one of the following disease processes/diagnoses(primary or secondary)? Other   Does the patient have Home health ordered? No   Is there a DME ordered? No   Prep survey completed? Yes          YOLA HACKETT - Registered Nurse

## 2022-08-09 ENCOUNTER — READMISSION MANAGEMENT (OUTPATIENT)
Dept: CALL CENTER | Facility: HOSPITAL | Age: 67
End: 2022-08-09

## 2022-08-09 ENCOUNTER — ANTICOAGULATION VISIT (OUTPATIENT)
Dept: PHARMACY | Facility: HOSPITAL | Age: 67
End: 2022-08-09

## 2022-08-09 LAB — INR PPP: 1.6

## 2022-08-09 RX ORDER — ENOXAPARIN SODIUM 150 MG/ML
120 INJECTION SUBCUTANEOUS EVERY 12 HOURS
Qty: 8 ML | Refills: 0 | Status: SHIPPED | OUTPATIENT
Start: 2022-08-09 | End: 2022-08-14

## 2022-08-09 NOTE — OUTREACH NOTE
Medical Week 1 Survey    Flowsheet Row Responses   [facility] patient discharged from? [place]   Does the patient have one of the following disease processes/diagnoses(primary or secondary)? Other   Week 1 attempt successful? Yes   Call start time 1612   Call end time 1615   Discharge diagnosis small bowel obstruction   Meds reviewed with patient/caregiver? Yes   Is the patient having any side effects they believe may be caused by any medication additions or changes? No   Does the patient have all medications ordered at discharge? Yes   Is the patient taking all medications as directed (includes completed medication regime)? Yes   Does the patient have a primary care provider?  Yes   Comments regarding PCP PCP 8/12/22   Has the patient kept scheduled appointments due by today? N/A   Psychosocial issues? No   Did the patient receive a copy of their discharge instructions? Yes   Nursing interventions Reviewed instructions with patient   What is the patient's perception of their health status since discharge? Improving   Is the patient/caregiver able to teach back signs and symptoms related to disease process for when to call PCP? Yes   Is the patient/caregiver able to teach back signs and symptoms related to disease process for when to call 911? Yes   Is the patient/caregiver able to teach back the hierarchy of who to call/visit for symptoms/problems? PCP, Specialist, Home health nurse, Urgent Care, ED, 911 Yes   If the patient is a current smoker, are they able to teach back resources for cessation? Not a smoker   Week 1 call completed? Yes   Wrap up additional comments Pt has questions re: Warfarin. Encouraged pt to contact cardiology when call ended-pt reported he was calling as soon as call ended           PORTER H - Registered Nurse

## 2022-08-09 NOTE — PROGRESS NOTES
Anticoagulation Clinic - Remote Progress Note  REMOTE LAB     Indication: mechanical AVR  Referring Provider: Arun Jackson (12/17/2021)  Initial Warfarin Start Date: 2009  Goal INR: 2.5-3.5  Current Drug Interactions: aspirin     Diet: not much GLV 4/18/22  Alcohol: none  Tobacco: none  OTC Pain Medication:     INR History:  Date 8/2 10/26 12/21 1/19/18 2/20 3/27 4/27 5/16 6/11 7/16 9/20 11/2   Total Weekly Dose 62.5mg 62.5mg 62.5mg 62.5mg 62.5mg 62.5mg 62.5mg 62.5mg 62.5mg 62.5mg 62.5mg 62.5mg   INR 2.2 2.9 3.2 3.0 2.9 3.4 3.7 2.9 2.6 2.9 2.9 3.2   Notes             sick                Date 12/21 2/1/19 3/11 4/24 5/23 6/24 7/11 7/18 7/22 8/15 9/9 9/25   Total Weekly Dose 62.5mg 62.5mg 62.5mg 62.5mg 62.5mg 62.5mg 52.5mg 55mg 57.5mg 57.5mg 57.5mg 57.5mg   INR 3.4 3.34 2.8 3.0 3.3 3.6 2.52 2.27 2.9 2.9 2.5 2.6   Notes recv'd 12/26       self report; recent hosp; Augmentin Merrem post-disch for sepsis merrem merrem 1x incr dose, merrem        Date 11/8 12/30 1/24/20 4/13 6/2 7/8 8/31 10/13 12/1 1/14/21 2/2 3/4   Total Weekly Dose 57.5mg 57.5mg 57.5mg 57.5mg 57.5mg 57.5mg 57.5mg 60mg 60mg 60mg 60mg 60mg   INR 2.4 2.4 3.7 2.2 2.9 2.3 2.2 2.8 3.5 3.5 3.23 2.4   Notes  incr GLV   nerve control 911 supplement recv'd 7/9  incr GLV recv'd 9/2; incr GLV    incr GLV incr GLV     Date 3/30  5/10 6/21 8/3  9/27  11/16-19 12/15  3/8   Total WeeklyDose 60mg non- compliant 60mg 60mg 60mg non- compliant 60mg non-  compliant BHL admit 60mg Non compliance 60 mg   INR 2.5  2.8 3.6 3.12  3.1   3.2  2.9   Notes 1xboost   Less GLV     2x hold &  2x boost          Date 4/15  5/11-5/13 5/18 6/7 8/9         Total Weekly Dose 60 mg  BH Papi   45 mg  60 mg  hospital admission          INR 2.8    1.9 2.76 Hold x 4 days 1.6         Notes rec'd 4/18 No bridge heldx2              Warfarin Dosing During Admission:  Date  8/1 8/2 8/3 8/4 8/5           INR  2.72 -- -- 2.01 1.64           Dose  HOLD HOLD HOLD HOLD 10mg           **will take  minocycline 100mg BID indefinitely**     Phone Interview:  Tablet Strength: 5mg tabs  Patient Contact Info: [phone] (Home); [phone] (Mobile)  Lab Contact Info: [facility] [phone]  Verbal release 7/23/19 may speak with Delaney (wife), Puneet or Ty (sons)- ok to LVM    Patient Findings    Positives:  Hospital admission   Negatives:  Signs/symptoms of thrombosis, Signs/symptoms of bleeding, Laboratory test error suspected, Change in health, Change in alcohol use, Change in activity, Upcoming invasive procedure, Emergency department visit, Upcoming dental procedure, Missed doses, Extra doses, Change in medications, Change in diet/appetite, Bruising, Other complaints   Comments:  Pt has been recently discharged from the hospital CC: Presenting Problem:  Small bowel obstruction (HCC) [K56.609]     1. INR is subtherapeutic today prior to discharge. Recommend resuming warfarin with Lovenox bridge:   --Warfarin 10mg daily (note recent home dose, above)   --Lovenox 120mg subQ q12h (note patient was previously injecting incorrectly >> abdominal hematoma - written and verbal education provided today re: correct technique)     Plan:  1. INR is therapeutic today at 1.6 (8/8).  Last dose of lovenox was yesterday 8/8. Mr. Farias until 6/15.  Instructed Mr. Farias to increase dose to 12.5mg today and then normal 10mg tomorrow until repeat INR on Thursday. Will take 7.5mg on Thurday. Of note previous maintenance dose is warfarin 7.5 mg oral daily except 10 mg on MonWedFri until recheck   2. Repeat INR on 8/11.  Patient has extensive history of non-compliance with requested follow-up.  3. Verbal information provided over the phone. Cecilio Farias RBV dosing instructions, expresses understanding by teach back, and has no further questions at this time.    Olga Friend, PharmD  8/9/2022  15:30 EDT

## 2022-08-12 ENCOUNTER — ANTICOAGULATION VISIT (OUTPATIENT)
Dept: PHARMACY | Facility: HOSPITAL | Age: 67
End: 2022-08-12

## 2022-08-12 LAB — INR PPP: 3.5

## 2022-08-12 NOTE — PROGRESS NOTES
Anticoagulation Clinic - Remote Progress Note  REMOTE LAB     Indication: mechanical AVR  Referring Provider: Arun Jackson (12/17/2021)  Initial Warfarin Start Date: 2009  Goal INR: 2.5-3.5  Current Drug Interactions: aspirin     Diet: not much GLV 4/18/22  Alcohol: none  Tobacco: none  OTC Pain Medication:     INR History:  Date 8/2 10/26 12/21 1/19/18 2/20 3/27 4/27 5/16 6/11 7/16 9/20 11/2   Total Weekly Dose 62.5mg 62.5mg 62.5mg 62.5mg 62.5mg 62.5mg 62.5mg 62.5mg 62.5mg 62.5mg 62.5mg 62.5mg   INR 2.2 2.9 3.2 3.0 2.9 3.4 3.7 2.9 2.6 2.9 2.9 3.2   Notes             sick                Date 12/21 2/1/19 3/11 4/24 5/23 6/24 7/11 7/18 7/22 8/15 9/9 9/25   Total Weekly Dose 62.5mg 62.5mg 62.5mg 62.5mg 62.5mg 62.5mg 52.5mg 55mg 57.5mg 57.5mg 57.5mg 57.5mg   INR 3.4 3.34 2.8 3.0 3.3 3.6 2.52 2.27 2.9 2.9 2.5 2.6   Notes recv'd 12/26       self report; recent hosp; Augmentin Merrem post-disch for sepsis merrem merrem 1x incr dose, merrem        Date 11/8 12/30 1/24/20 4/13 6/2 7/8 8/31 10/13 12/1 1/14/21 2/2 3/4   Total Weekly Dose 57.5mg 57.5mg 57.5mg 57.5mg 57.5mg 57.5mg 57.5mg 60mg 60mg 60mg 60mg 60mg   INR 2.4 2.4 3.7 2.2 2.9 2.3 2.2 2.8 3.5 3.5 3.23 2.4   Notes  incr GLV   nerve control 911 supplement recv'd 7/9  incr GLV recv'd 9/2; incr GLV    incr GLV incr GLV     Date 3/30  5/10 6/21 8/3  9/27  11/16-19 12/15  3/8   Total WeeklyDose 60mg non- compliant 60mg 60mg 60mg non- compliant 60mg non-  compliant BHL admit 60mg Non compliance 60 mg   INR 2.5  2.8 3.6 3.12  3.1   3.2  2.9   Notes 1xboost   Less GLV     2x hold &  2x boost          Date 4/15  5/11-5/13 5/18 6/7 8/9 8/12        Total Weekly Dose 60 mg  BH Papi   45 mg  60 mg  hospital admission 65mg 70mg        INR 2.8    1.9 2.76 Hold x 4 days 1.6 3.5        Notes rec'd 4/18 No bridge heldx2   lovenox           Warfarin Dosing During Admission:  Date  8/1 8/2 8/3 8/4 8/5           INR  2.72 -- -- 2.01 1.64           Dose  HOLD HOLD HOLD HOLD 10mg            **will take minocycline 100mg BID indefinitely**     Phone Interview:  Tablet Strength: 5mg tabs  Patient Contact Info: [phone] (Home); [phone] (Mobile)  Lab Contact Info: [facility] [phone]  Verbal release 7/23/19 may speak with Delaney (wife), Puneet or Ty (sons)- ok to LVM    Patient Findings  Negatives:  Signs/symptoms of thrombosis, Signs/symptoms of bleeding, Laboratory test error suspected, Change in health, Change in alcohol use, Change in activity, Upcoming invasive procedure, Emergency department visit, Upcoming dental procedure, Missed doses, Extra doses, Change in medications, Change in diet/appetite, Hospital admission, Bruising, Other complaints   Comments:  Pt has been recently discharged from the hospital CC: Presenting Problem:  Small bowel obstruction (HCC) [K56.609]     1. INR is subtherapeutic today prior to discharge. Recommend resuming warfarin with Lovenox bridge:   --Warfarin 10mg daily (note recent home dose, above)   --Lovenox 120mg subQ q12h (note patient was previously injecting incorrectly >> abdominal hematoma - written and verbal education provided today re: correct technique)     Plan:  1. INR is therapeutic today at 3.5 (8/12). Pt INR has been Labile. At this time, instructed to stop lovenox dose at this time, Mr. Farias to resume previous maintenance dose is warfarin 7.5 mg oral daily except 10 mg on MonWedFri until recheck.  2. Repeat INR on 8/19.  Patient has extensive history of non-compliance with requested follow-up.  3. Verbal information provided over the phone. Cecilio Farias RBV dosing instructions, expresses understanding by teach back, and has no further questions at this time.    Olga Friend, PharmD  8/12/2022  10:05 EDT

## 2022-08-30 ENCOUNTER — TELEPHONE (OUTPATIENT)
Dept: PHARMACY | Facility: HOSPITAL | Age: 67
End: 2022-08-30

## 2022-09-13 ENCOUNTER — ANTICOAGULATION VISIT (OUTPATIENT)
Dept: PHARMACY | Facility: HOSPITAL | Age: 67
End: 2022-09-13

## 2022-09-13 LAB — INR PPP: 3.1

## 2022-09-13 RX ORDER — WARFARIN SODIUM 5 MG/1
TABLET ORAL
Qty: 140 TABLET | Refills: 1 | Status: SHIPPED | OUTPATIENT
Start: 2022-09-13 | End: 2022-11-15 | Stop reason: SDUPTHER

## 2022-09-13 NOTE — PROGRESS NOTES
Anticoagulation Clinic - Remote Progress Note  REMOTE LAB     Indication: mechanical AVR  Referring Provider: Arun Jackson (12/17/2021)  Initial Warfarin Start Date: 2009  Goal INR: 2.5-3.5  Current Drug Interactions: aspirin     Diet: not much GLV 4/18/22  Alcohol: none  Tobacco: none  OTC Pain Medication:     INR History:  Date 8/2 10/26 12/21 1/19/18 2/20 3/27 4/27 5/16 6/11 7/16 9/20 11/2   Total Weekly Dose 62.5mg 62.5mg 62.5mg 62.5mg 62.5mg 62.5mg 62.5mg 62.5mg 62.5mg 62.5mg 62.5mg 62.5mg   INR 2.2 2.9 3.2 3.0 2.9 3.4 3.7 2.9 2.6 2.9 2.9 3.2   Notes             sick                Date 12/21 2/1/19 3/11 4/24 5/23 6/24 7/11 7/18 7/22 8/15 9/9 9/25   Total Weekly Dose 62.5mg 62.5mg 62.5mg 62.5mg 62.5mg 62.5mg 52.5mg 55mg 57.5mg 57.5mg 57.5mg 57.5mg   INR 3.4 3.34 2.8 3.0 3.3 3.6 2.52 2.27 2.9 2.9 2.5 2.6   Notes recv'd 12/26       self report; recent hosp; Augmentin Merrem post-disch for sepsis merrem merrem 1x incr dose, merrem        Date 11/8 12/30 1/24/20 4/13 6/2 7/8 8/31 10/13 12/1 1/14/21 2/2 3/4   Total Weekly Dose 57.5mg 57.5mg 57.5mg 57.5mg 57.5mg 57.5mg 57.5mg 60mg 60mg 60mg 60mg 60mg   INR 2.4 2.4 3.7 2.2 2.9 2.3 2.2 2.8 3.5 3.5 3.23 2.4   Notes  incr GLV   nerve control 911 supplement recv'd 7/9  incr GLV recv'd 9/2; incr GLV    incr GLV incr GLV     Date 3/30  5/10 6/21 8/3  9/27  11/16-19 12/15  3/8   Total WeeklyDose 60mg non- compliant 60mg 60mg 60mg non- compliant 60mg non-  compliant BHL admit 60mg Non compliance 60 mg   INR 2.5  2.8 3.6 3.12  3.1   3.2  2.9   Notes 1xboost   Less GLV     2x hold &  2x boost          Date 4/15  5/11-5/13 5/18 6/7 8/9 8/12 9/6       Total Weekly Dose 60 mg  BH Papi   45 mg  60 mg  hospital admission 65mg 70mg 60 mg       INR 2.8    1.9 2.76 Hold x 4 days 1.6 3.5 3.1       Notes rec'd 4/18 No bridge heldx2   lovenox  rec 9/13         Warfarin Dosing During Admission:  Date  8/1 8/2 8/3 8/4 8/5           INR  2.72 -- -- 2.01 1.64           Dose  HOLD HOLD  HOLD HOLD 10mg           **will take minocycline 100mg BID indefinitely**     Phone Interview:  Tablet Strength: 5mg tabs  Patient Contact Info: [phone] (Home); [phone] (Mobile)  Lab Contact Info: [facility] [phone]  Verbal release 7/23/19 may speak with Delaney (wife), Puneet or Ty (sons)- ok to LVM    Patient Findings    Negatives:  Signs/symptoms of thrombosis, Signs/symptoms of bleeding, Laboratory test error suspected, Change in health, Change in alcohol use, Change in activity, Upcoming invasive procedure, Emergency department visit, Upcoming dental procedure, Missed doses, Extra doses, Change in medications, Change in diet/appetite, Hospital admission, Bruising, Other complaints   Comments:  All findings negative per pt.      Plan:  1. INR was therapeutic 9/6/22 at 3.1, results received via fax today 9/13/22. Insructed Mr. Farias to continue maintenance dose of warfarin 7.5 mg oral daily except 10 mg on MonWedFri until recheck.  2. Repeat INR on 9/20/22.  Patient has extensive history of non-compliance with requested follow-up.  3. Verbal information provided over the phone. Cecilio Farias RBV dosing instructions, expresses understanding by teach back, and has no further questions at this time.  4. Pt requests refills.    Femi Gorman CPhT  9/13/2022  12:14 EDT     If WNL can check q4w  IAdore, PharmD, have reviewed the note in full and agree with the assessment and plan.  09/14/22  10:38 EDT

## 2022-10-05 ENCOUNTER — ANTICOAGULATION VISIT (OUTPATIENT)
Dept: PHARMACY | Facility: HOSPITAL | Age: 67
End: 2022-10-05

## 2022-10-05 LAB — INR PPP: 2.7

## 2022-10-05 NOTE — PROGRESS NOTES
Anticoagulation Clinic - Remote Progress Note  REMOTE LAB     Indication: mechanical AVR  Referring Provider: Arun Jackson (12/17/2021)  Initial Warfarin Start Date: 2009  Goal INR: 2.5-3.5  Current Drug Interactions: aspirin     Diet: not much GLV 4/18/22  Alcohol: none  Tobacco: none  OTC Pain Medication:     INR History:  Date 8/2 10/26 12/21 1/19/18 2/20 3/27 4/27 5/16 6/11 7/16 9/20 11/2   Total Weekly Dose 62.5mg 62.5mg 62.5mg 62.5mg 62.5mg 62.5mg 62.5mg 62.5mg 62.5mg 62.5mg 62.5mg 62.5mg   INR 2.2 2.9 3.2 3.0 2.9 3.4 3.7 2.9 2.6 2.9 2.9 3.2   Notes             sick                Date 12/21 2/1/19 3/11 4/24 5/23 6/24 7/11 7/18 7/22 8/15 9/9 9/25   Total Weekly Dose 62.5mg 62.5mg 62.5mg 62.5mg 62.5mg 62.5mg 52.5mg 55mg 57.5mg 57.5mg 57.5mg 57.5mg   INR 3.4 3.34 2.8 3.0 3.3 3.6 2.52 2.27 2.9 2.9 2.5 2.6   Notes recv'd 12/26       self report; recent hosp; Augmentin Merrem post-disch for sepsis merrem merrem 1x incr dose, merrem        Date 11/8 12/30 1/24/20 4/13 6/2 7/8 8/31 10/13 12/1 1/14/21 2/2 3/4   Total Weekly Dose 57.5mg 57.5mg 57.5mg 57.5mg 57.5mg 57.5mg 57.5mg 60mg 60mg 60mg 60mg 60mg   INR 2.4 2.4 3.7 2.2 2.9 2.3 2.2 2.8 3.5 3.5 3.23 2.4   Notes  incr GLV   nerve control 911 supplement recv'd 7/9  incr GLV recv'd 9/2; incr GLV    incr GLV incr GLV     Date 3/30  5/10 6/21 8/3  9/27  11/16-19 12/15  3/8   Total WeeklyDose 60mg non- compliant 60mg 60mg 60mg non- compliant 60mg non-  compliant BHL admit 60mg Non compliance 60 mg   INR 2.5  2.8 3.6 3.12  3.1   3.2  2.9   Notes 1xboost   Less GLV     2x hold &  2x boost          Date 4/15  5/11-5/13  5/18 6/7  8/9 8/12 9/6 10/4      Total Weekly Dose 60 mg  BH Papi   45 mg  60 mg  hospital admission 65mg 70mg 60 mg 60 mg      INR 2.8    1.9 2.76 Hold x 4 days 1.6 3.5 3.1 2.7      Notes rec'd 4/18 No bridge heldx2   lovenox  rec 9/13         Warfarin Dosing During Admission:  Date  8/1 8/2 8/3 8/4 8/5           INR  2.72 -- -- 2.01 1.64           Dose   HOLD HOLD HOLD HOLD 10mg           **will take minocycline 100mg BID indefinitely**     Phone Interview:  Tablet Strength: 5mg tabs  Patient Contact Info: [phone] (Home); [phone] (Mobile)  Lab Contact Info: [facility] [phone]  Verbal release 7/23/19 may speak with Bassemruth (wife), Puneet or Ty (sons)- ok to [facility]        Patient Findings    Negatives:  Signs/symptoms of thrombosis, Signs/symptoms of bleeding, Laboratory test error suspected, Change in health, Change in alcohol use, Change in activity, Upcoming invasive procedure, Emergency department visit, Upcoming dental procedure, Missed doses, Extra doses, Change in medications, Change in diet/appetite, Hospital admission, Bruising, Other complaints   Comments:  All findings negative per pt.      Plan:  1. INR was therapeutic yesterday at 2.7. Insructed Mr. Farias to continue maintenance dose of warfarin 7.5 mg oral daily except 10 mg on MonWedFri until recheck.  2. Repeat INR in four weeks, 11/1/22.  Patient has extensive history of non-compliance with requested follow-up.  3. Verbal information provided over the phone. Cecilio Farias RBV dosing instructions, expresses understanding by teach back, and has no further questions at this time.      Sandhya LincolnD.  10/07/22   09:25 EDT

## 2022-11-10 ENCOUNTER — TELEPHONE (OUTPATIENT)
Dept: PHARMACY | Facility: HOSPITAL | Age: 67
End: 2022-11-10

## 2022-11-15 ENCOUNTER — ANTICOAGULATION VISIT (OUTPATIENT)
Dept: PHARMACY | Facility: HOSPITAL | Age: 67
End: 2022-11-15

## 2022-11-15 DIAGNOSIS — Z95.2 HISTORY OF MECHANICAL AORTIC VALVE REPLACEMENT: Primary | ICD-10-CM

## 2022-11-15 LAB — INR PPP: 2.8

## 2022-11-15 RX ORDER — WARFARIN SODIUM 5 MG/1
TABLET ORAL
Qty: 140 TABLET | Refills: 1 | Status: SHIPPED | OUTPATIENT
Start: 2022-11-15 | End: 2023-01-09 | Stop reason: SDUPTHER

## 2022-11-15 NOTE — PROGRESS NOTES
Anticoagulation Clinic - Remote Progress Note  REMOTE LAB     Indication: mechanical AVR  Referring Provider: Arun Jackson (12/17/2021)  Initial Warfarin Start Date: 2009  Goal INR: 2.5-3.5  Current Drug Interactions: aspirin     Diet: not much GLV 4/18/22  Alcohol: none  Tobacco: none  OTC Pain Medication:     INR History:  Date 8/2 10/26 12/21 1/19/18 2/20 3/27 4/27 5/16 6/11 7/16 9/20 11/2   Total Weekly Dose 62.5mg 62.5mg 62.5mg 62.5mg 62.5mg 62.5mg 62.5mg 62.5mg 62.5mg 62.5mg 62.5mg 62.5mg   INR 2.2 2.9 3.2 3.0 2.9 3.4 3.7 2.9 2.6 2.9 2.9 3.2   Notes             sick                Date 12/21 2/1/19 3/11 4/24 5/23 6/24 7/11 7/18 7/22 8/15 9/9 9/25   Total Weekly Dose 62.5mg 62.5mg 62.5mg 62.5mg 62.5mg 62.5mg 52.5mg 55mg 57.5mg 57.5mg 57.5mg 57.5mg   INR 3.4 3.34 2.8 3.0 3.3 3.6 2.52 2.27 2.9 2.9 2.5 2.6   Notes recv'd 12/26       self report; recent hosp; Augmentin Merrem post-disch for sepsis merrem merrem 1x incr dose, merrem        Date 11/8 12/30 1/24/20 4/13 6/2 7/8 8/31 10/13 12/1 1/14/21 2/2 3/4   Total Weekly Dose 57.5mg 57.5mg 57.5mg 57.5mg 57.5mg 57.5mg 57.5mg 60mg 60mg 60mg 60mg 60mg   INR 2.4 2.4 3.7 2.2 2.9 2.3 2.2 2.8 3.5 3.5 3.23 2.4   Notes  incr GLV   nerve control 911 supplement recv'd 7/9  incr GLV recv'd 9/2; incr GLV    incr GLV incr GLV     Date 3/30  5/10 6/21 8/3  9/27  11/16-19 12/15  3/8   Total WeeklyDose 60mg non- compliant 60mg 60mg 60mg non- compliant 60mg non-  compliant BHL admit 60mg Non compliance 60 mg   INR 2.5  2.8 3.6 3.12  3.1   3.2  2.9   Notes 1xboost   Less GLV     2x hold &  2x boost          Date 4/15  5/11-5/13  5/18 6/7  8/9 8/12 9/6 10/4 11/14     Total Weekly Dose 60 mg  BH Papi   45 mg  60 mg  hospital admission 65mg 70mg 60 mg 60 mg 60 mg     INR 2.8    1.9 2.76 Hold x 4 days 1.6 3.5 3.1 2.7 2.8     Notes rec'd 4/18 No bridge heldx2   lovenox  rec 9/13  rec 11/15       Warfarin Dosing During Admission:  Date  8/1 8/2 8/3 8/4 8/5           INR  2.72 -- -- 2.01  1.64           Dose  HOLD HOLD HOLD HOLD 10mg           **will take minocycline 100mg BID indefinitely**     Phone Interview:  Tablet Strength: 5mg tabs  Patient Contact Info: [phone] (Home); [phone] (Mobile)  Lab Contact Info: [facility] [phone]  Verbal release 7/23/19 may speak with Bassemruth (wife), Puneet or Ty (sons)- ok to [facility]      Patient Findings  Negatives:  Signs/symptoms of thrombosis, Signs/symptoms of bleeding, Laboratory test error suspected, Change in health, Change in alcohol use, Change in activity, Upcoming invasive procedure, Emergency department visit, Upcoming dental procedure, Missed doses, Extra doses, Change in medications, Change in diet/appetite, Hospital admission, Bruising, Other complaints   Comments:  All findings negative per patient        Plan:  1. INR was therapeutic yesterday at 2.8. Insructed Mr. Farias to continue maintenance dose of warfarin 7.5 mg oral daily except 10 mg on MonWedFri until recheck.  2. Repeat INR in four weeks, 12/12/22.  Patient has extensive history of non-compliance with requested follow-up.  3. Verbal information provided over the phone. Cecilio Farias RBV dosing instructions, expresses understanding by teach back, and has no further questions at this time.      Lio Mendoza, Pharmacy Technician  11/15/2022  11:40 Collins JEAN, PharmD, have reviewed the note in full and agree with the assessment and plan.  11/15/22  14:42 EST

## 2022-12-20 ENCOUNTER — TELEPHONE (OUTPATIENT)
Dept: PHARMACY | Facility: HOSPITAL | Age: 67
End: 2022-12-20

## 2023-01-05 LAB — INR PPP: 2.1

## 2023-01-06 ENCOUNTER — ANTICOAGULATION VISIT (OUTPATIENT)
Dept: PHARMACY | Facility: HOSPITAL | Age: 68
End: 2023-01-06
Payer: MEDICARE

## 2023-01-06 DIAGNOSIS — Z95.2 HISTORY OF MECHANICAL AORTIC VALVE REPLACEMENT: ICD-10-CM

## 2023-01-09 RX ORDER — WARFARIN SODIUM 5 MG/1
TABLET ORAL
Qty: 140 TABLET | Refills: 0 | Status: SHIPPED | OUTPATIENT
Start: 2023-01-09 | End: 2023-02-10 | Stop reason: SDUPTHER

## 2023-01-09 NOTE — PROGRESS NOTES
Anticoagulation Clinic - Remote Progress Note  REMOTE LAB     Indication: mechanical AVR  Referring Provider: Arun Jackson (12/17/2021)  Initial Warfarin Start Date: 2009  Goal INR: 2.5-3.5  Current Drug Interactions: aspirin     Diet: not much GLV 1/9/2023  Alcohol: none  Tobacco: none  OTC Pain Medication:     INR History:  Date 8/2 10/26 12/21 1/19/18 2/20 3/27 4/27 5/16 6/11 7/16 9/20 11/2   Total Weekly Dose 62.5mg 62.5mg 62.5mg 62.5mg 62.5mg 62.5mg 62.5mg 62.5mg 62.5mg 62.5mg 62.5mg 62.5mg   INR 2.2 2.9 3.2 3.0 2.9 3.4 3.7 2.9 2.6 2.9 2.9 3.2   Notes             sick                Date 12/21 2/1/19 3/11 4/24 5/23 6/24 7/11 7/18 7/22 8/15 9/9 9/25   Total Weekly Dose 62.5mg 62.5mg 62.5mg 62.5mg 62.5mg 62.5mg 52.5mg 55mg 57.5mg 57.5mg 57.5mg 57.5mg   INR 3.4 3.34 2.8 3.0 3.3 3.6 2.52 2.27 2.9 2.9 2.5 2.6   Notes recv'd 12/26       self report; recent hosp; Augmentin Merrem post-disch for sepsis merrem merrem 1x incr dose, merrem            Date 11/8 12/30 1/24/20 4/13 6/2 7/8 8/31 10/13 12/1 1/14/21 2/2 3/4   Total Weekly Dose 57.5mg 57.5mg 57.5mg 57.5mg 57.5mg 57.5mg 57.5mg 60mg 60mg 60mg 60mg 60mg   INR 2.4 2.4 3.7 2.2 2.9 2.3 2.2 2.8 3.5 3.5 3.23 2.4   Notes   incr GLV     nerve control 911 supplement recv'd 7/9  incr GLV recv'd 9/2; incr GLV       incr GLV incr GLV      Date 3/30   5/10 6/21 8/3   9/27   11/16-19 12/15   3/8   Total WeeklyDose 60mg non- compliant 60mg 60mg 60mg non- compliant 60mg non-  compliant BHL admit 60mg Non compliance 60 mg   INR 2.5   2.8 3.6 3.12   3.1     3.2   2.9   Notes 1xboost     Less GLV         2x hold &  2x boost              Date 4/15  5/11-5/13  5/18 6/7   8/9 8/12 9/6 10/4 11/14 1/6/23     Total Weekly Dose 60 mg  BH Papi   45 mg  60 mg  hospital admission 65mg 70mg 60 mg 60 mg 60 mg 60mg     INR 2.8    1.9 2.76 Hold x 4 days 1.6 3.5 3.1 2.7 2.8 2.1     Notes rec'd 4/18 No bridge heldx2     lovenox   rec 9/13   rec 11/15 rec 1/6        Warfarin Dosing During  Admission:  Date  8/1 8/2 8/3 8/4 8/5           INR  2.72 -- -- 2.01 1.64           Dose  HOLD HOLD HOLD HOLD 10mg           **will take minocycline 100mg BID indefinitely**      Phone Interview:  Tablet Strength: 5mg tabs  Patient Contact Info: [phone] (Home); [phone] (Mobile)  Lab Contact Info: [facility] [phone]  Verbal release 7/23/19 may speak with Delaney (wife), Puneet or Ty (sons)- ok to [place] [facility]     Patient Findings  Negatives:  Signs/symptoms of thrombosis, Signs/symptoms of bleeding, Laboratory test error suspected, Change in health, Change in alcohol use, Change in activity, Upcoming invasive procedure, Emergency department visit, Upcoming dental procedure, Missed doses, Extra doses, Change in medications, Change in diet/appetite, Hospital admission, Bruising, Other complaints   Comments:  Increase in GLV with the holidays. Broccoli and green beans over the holidays.       Plan:  1. INR was SUBtherapeutic 1/6/2023 at 2.1. Unable to reach patient until 1/9. Instructed Mr. Farias to boost dose today (1/9/23) to 12.5mg continue maintenance dose of warfarin 7.5 mg oral daily except 10 mg on MonWedFri until recheck.  2. Repeat INR in 1.5 week, 1/17/22. Preferred repeat on 1/12, however patient will be out of town until 1/16. Patient has extensive history of non-compliance with requested follow-up.  3. Verbal information provided over the phone. Cecilio Farias RBV dosing instructions, expresses understanding by teach back, and has no further questions at this time.  4. Reminded patient of appointment with Dr. Jackson next week. Pt prefers to test in the clinic instead of remote while he is here seeing Dr. Jackson. Scheduled appointment.     Olga Friend, PharmD  01/09/23  08:57 EST

## 2023-02-08 ENCOUNTER — TELEPHONE (OUTPATIENT)
Dept: PHARMACY | Facility: HOSPITAL | Age: 68
End: 2023-02-08

## 2023-02-10 ENCOUNTER — ANTICOAGULATION VISIT (OUTPATIENT)
Dept: PHARMACY | Facility: HOSPITAL | Age: 68
End: 2023-02-10
Payer: MEDICARE

## 2023-02-10 DIAGNOSIS — Z95.2 HISTORY OF MECHANICAL AORTIC VALVE REPLACEMENT: ICD-10-CM

## 2023-02-10 LAB — INR PPP: 2.9

## 2023-02-10 RX ORDER — WARFARIN SODIUM 5 MG/1
TABLET ORAL
Qty: 140 TABLET | Refills: 0 | Status: SHIPPED | OUTPATIENT
Start: 2023-02-10

## 2023-02-15 ENCOUNTER — TELEPHONE (OUTPATIENT)
Dept: PHARMACY | Facility: HOSPITAL | Age: 68
End: 2023-02-15

## 2023-02-22 DIAGNOSIS — Z95.2 HISTORY OF MECHANICAL AORTIC VALVE REPLACEMENT: ICD-10-CM

## 2023-02-22 NOTE — PROGRESS NOTES
Anticoagulation Clinic - Remote Progress Note  REMOTE LAB     Indication: mechanical AVR  Referring Provider: Arun Jackson (12/17/2021)  Initial Warfarin Start Date: 2009  Goal INR: 2.5-3.5  Current Drug Interactions: aspirin     Diet: not much GLV 1/9/2023  Alcohol: none  Tobacco: none  OTC Pain Medication:     INR History:  Date 8/2 10/26 12/21 1/19/18 2/20 3/27 4/27 5/16 6/11 7/16 9/20 11/2   Total Weekly Dose 62.5mg 62.5mg 62.5mg 62.5mg 62.5mg 62.5mg 62.5mg 62.5mg 62.5mg 62.5mg 62.5mg 62.5mg   INR 2.2 2.9 3.2 3.0 2.9 3.4 3.7 2.9 2.6 2.9 2.9 3.2   Notes             sick                Date 12/21 2/1/19 3/11 4/24 5/23 6/24 7/11 7/18 7/22 8/15 9/9 9/25   Total Weekly Dose 62.5mg 62.5mg 62.5mg 62.5mg 62.5mg 62.5mg 52.5mg 55mg 57.5mg 57.5mg 57.5mg 57.5mg   INR 3.4 3.34 2.8 3.0 3.3 3.6 2.52 2.27 2.9 2.9 2.5 2.6   Notes recv'd 12/26       self report; recent hosp; Augmentin Merrem post-disch for sepsis merrem merrem 1x incr dose, merrem            Date 11/8 12/30 1/24/20 4/13 6/2 7/8 8/31 10/13 12/1 1/14/21 2/2 3/4   Total Weekly Dose 57.5mg 57.5mg 57.5mg 57.5mg 57.5mg 57.5mg 57.5mg 60mg 60mg 60mg 60mg 60mg   INR 2.4 2.4 3.7 2.2 2.9 2.3 2.2 2.8 3.5 3.5 3.23 2.4   Notes   incr GLV     nerve control 911 supplement recv'd 7/9  incr GLV recv'd 9/2; incr GLV       incr GLV incr GLV      Date 3/30   5/10 6/21 8/3   9/27   11/16-19 12/15   3/8   Total WeeklyDose 60mg non- compliant 60mg 60mg 60mg non- compliant 60mg non-  compliant B[facility] admit 60mg Non compliance 60 mg   INR 2.5   2.8 3.6 3.12   3.1     3.2   2.9   Notes 1xboost     Less GLV         2x hold &  2x boost              Date 4/15  5/11-5/13  5/18 6/7   8/9 8/12 9/6 10/4 11/14 1/6/23  2/8       Total Weekly Dose 60 mg  BH Papi   45 mg  60 mg  hospital admission 65mg 70mg 60 mg 60 mg 60 mg 60mg  60 mg       INR 2.8    1.9 2.76 Hold x 4 days 1.6 3.5 3.1 2.7 2.8 2.1  2.9       Notes rec'd 4/18 No bridge heldx2     lovenox   rec 9/13   rec 11/15 rec 1/6  rec 2/10  1x  boost, unable to reach until 2/22          **will take minocycline 100mg BID indefinitely**      Phone Interview:  Tablet Strength: 5mg tabs  Patient Contact Info: [phone] (Home); [phone] (Mobile)  Lab Contact Info: [facility] [phone]  Verbal release 7/23/19 may speak with Bsasemruth (wife), Puneet or Ty (sons)- ok to LV       Patient Findings  Negatives:  Signs/symptoms of thrombosis, Signs/symptoms of bleeding, Laboratory test error suspected, Change in health, Change in alcohol use, Change in activity, Upcoming invasive procedure, Emergency department visit, Upcoming dental procedure, Missed doses, Extra doses, Change in medications, Change in diet/appetite, Hospital admission, Bruising, Other complaints   Comments:  All findings negative per patient     Unable to contact patient until 2/22/23        Plan:  1. INR was therapeutic on 2/8 at 2.9 (goal2.5-3.5). Instructed Mr. Farias to continue maintenance dose of warfarin 7.5 mg oral daily except 10 mg on MonWedFri until recheck.  2. Repeat INR, 3/8/23. Patient has extensive history of non-compliance with requested follow-up.  3. Verbal information provided over the phone. Cecilio Farias RBV dosing instructions, expresses understanding by teach back, and has no further questions at this time.     Lio Mendoza, Pharmacy Technician  2/22/2023  15:26 MIGEL SALMON, Arabella Hebert, PharmD, have reviewed the note in full and agree with the assessment and plan.  02/22/23  15:53 EST

## 2023-02-23 RX ORDER — WARFARIN SODIUM 5 MG/1
TABLET ORAL
Qty: 140 TABLET | Refills: 0 | OUTPATIENT
Start: 2023-02-23

## 2023-03-28 ENCOUNTER — TRANSCRIBE ORDERS (OUTPATIENT)
Dept: LAB | Facility: HOSPITAL | Age: 68
End: 2023-03-28
Payer: MEDICARE

## 2023-03-28 ENCOUNTER — LAB (OUTPATIENT)
Dept: LAB | Facility: HOSPITAL | Age: 68
End: 2023-03-28
Payer: MEDICARE

## 2023-03-28 DIAGNOSIS — Z95.2 HISTORY OF PROSTHETIC AORTIC VALVE: ICD-10-CM

## 2023-03-28 DIAGNOSIS — I33.0 ACUTE AND SUBACUTE BACTERIAL ENDOCARDITIS: Primary | ICD-10-CM

## 2023-03-28 DIAGNOSIS — I33.0 ACUTE AND SUBACUTE BACTERIAL ENDOCARDITIS: ICD-10-CM

## 2023-03-28 DIAGNOSIS — T50.995A ADVERSE EFFECT OF OTHER DRUGS, MEDICAMENTS AND BIOLOGICAL SUBSTANCES, INITIAL ENCOUNTER: ICD-10-CM

## 2023-03-28 LAB
ALBUMIN SERPL-MCNC: 3.5 G/DL (ref 3.5–5.2)
ALBUMIN/GLOB SERPL: 1.1 G/DL
ALP SERPL-CCNC: 101 U/L (ref 39–117)
ALT SERPL W P-5'-P-CCNC: 21 U/L (ref 1–41)
ANION GAP SERPL CALCULATED.3IONS-SCNC: 8 MMOL/L (ref 5–15)
AST SERPL-CCNC: 31 U/L (ref 1–40)
BASOPHILS # BLD AUTO: 0.05 10*3/MM3 (ref 0–0.2)
BASOPHILS NFR BLD AUTO: 0.8 % (ref 0–1.5)
BILIRUB SERPL-MCNC: 0.6 MG/DL (ref 0–1.2)
BUN SERPL-MCNC: 21 MG/DL (ref 8–23)
BUN/CREAT SERPL: 26.6 (ref 7–25)
CALCIUM SPEC-SCNC: 8.9 MG/DL (ref 8.6–10.5)
CHLORIDE SERPL-SCNC: 109 MMOL/L (ref 98–107)
CO2 SERPL-SCNC: 27 MMOL/L (ref 22–29)
CREAT SERPL-MCNC: 0.79 MG/DL (ref 0.76–1.27)
CRP SERPL-MCNC: <0.3 MG/DL (ref 0–0.5)
DEPRECATED RDW RBC AUTO: 52.1 FL (ref 37–54)
EGFRCR SERPLBLD CKD-EPI 2021: 98 ML/MIN/1.73
EOSINOPHIL # BLD AUTO: 0.25 10*3/MM3 (ref 0–0.4)
EOSINOPHIL NFR BLD AUTO: 3.9 % (ref 0.3–6.2)
ERYTHROCYTE [DISTWIDTH] IN BLOOD BY AUTOMATED COUNT: 15.7 % (ref 12.3–15.4)
ERYTHROCYTE [SEDIMENTATION RATE] IN BLOOD: 31 MM/HR (ref 0–20)
GLOBULIN UR ELPH-MCNC: 3.2 GM/DL
GLUCOSE SERPL-MCNC: 118 MG/DL (ref 65–99)
HCT VFR BLD AUTO: 43.5 % (ref 37.5–51)
HGB BLD-MCNC: 13.4 G/DL (ref 13–17.7)
IMM GRANULOCYTES # BLD AUTO: 0.03 10*3/MM3 (ref 0–0.05)
IMM GRANULOCYTES NFR BLD AUTO: 0.5 % (ref 0–0.5)
LYMPHOCYTES # BLD AUTO: 1.22 10*3/MM3 (ref 0.7–3.1)
LYMPHOCYTES NFR BLD AUTO: 19.2 % (ref 19.6–45.3)
MCH RBC QN AUTO: 27.9 PG (ref 26.6–33)
MCHC RBC AUTO-ENTMCNC: 30.8 G/DL (ref 31.5–35.7)
MCV RBC AUTO: 90.6 FL (ref 79–97)
MONOCYTES # BLD AUTO: 0.66 10*3/MM3 (ref 0.1–0.9)
MONOCYTES NFR BLD AUTO: 10.4 % (ref 5–12)
NEUTROPHILS NFR BLD AUTO: 4.15 10*3/MM3 (ref 1.7–7)
NEUTROPHILS NFR BLD AUTO: 65.2 % (ref 42.7–76)
NRBC BLD AUTO-RTO: 0 /100 WBC (ref 0–0.2)
PLATELET # BLD AUTO: 165 10*3/MM3 (ref 140–450)
PMV BLD AUTO: 11.5 FL (ref 6–12)
POTASSIUM SERPL-SCNC: 4.7 MMOL/L (ref 3.5–5.2)
PROT SERPL-MCNC: 6.7 G/DL (ref 6–8.5)
RBC # BLD AUTO: 4.8 10*6/MM3 (ref 4.14–5.8)
SODIUM SERPL-SCNC: 144 MMOL/L (ref 136–145)
WBC NRBC COR # BLD: 6.36 10*3/MM3 (ref 3.4–10.8)

## 2023-03-28 PROCEDURE — 86140 C-REACTIVE PROTEIN: CPT

## 2023-03-28 PROCEDURE — 85025 COMPLETE CBC W/AUTO DIFF WBC: CPT

## 2023-03-28 PROCEDURE — 80053 COMPREHEN METABOLIC PANEL: CPT

## 2023-03-28 PROCEDURE — 36415 COLL VENOUS BLD VENIPUNCTURE: CPT

## 2023-03-28 PROCEDURE — 85652 RBC SED RATE AUTOMATED: CPT

## 2023-03-31 DIAGNOSIS — Z95.2 HISTORY OF MECHANICAL AORTIC VALVE REPLACEMENT: Primary | ICD-10-CM

## 2023-03-31 DIAGNOSIS — I48.0 PAROXYSMAL ATRIAL FIBRILLATION: ICD-10-CM

## 2023-04-11 LAB — INR PPP: 2.2

## 2023-04-13 ENCOUNTER — ANTICOAGULATION VISIT (OUTPATIENT)
Dept: PHARMACY | Facility: HOSPITAL | Age: 68
End: 2023-04-13
Payer: MEDICARE

## 2023-04-13 NOTE — PROGRESS NOTES
Anticoagulation Clinic - Remote Progress Note  REMOTE LAB     Indication: mechanical AVR  Referring Provider: Arun Jackson (12/17/2021)  Initial Warfarin Start Date: 2009  Goal INR: 2.5-3.5  Current Drug Interactions: aspirin     Diet: not much GLV 1/9/2023  Alcohol: none  Tobacco: none  OTC Pain Medication:     INR History:  Date 8/2 10/26 12/21 1/19/18 2/20 3/27 4/27 5/16 6/11 7/16 9/20 11/2   Total Weekly Dose 62.5mg 62.5mg 62.5mg 62.5mg 62.5mg 62.5mg 62.5mg 62.5mg 62.5mg 62.5mg 62.5mg 62.5mg   INR 2.2 2.9 3.2 3.0 2.9 3.4 3.7 2.9 2.6 2.9 2.9 3.2   Notes             sick                Date 12/21 2/1/19 3/11 4/24 5/23 6/24 7/11 7/18 7/22 8/15 9/9 9/25   Total Weekly Dose 62.5mg 62.5mg 62.5mg 62.5mg 62.5mg 62.5mg 52.5mg 55mg 57.5mg 57.5mg 57.5mg 57.5mg   INR 3.4 3.34 2.8 3.0 3.3 3.6 2.52 2.27 2.9 2.9 2.5 2.6   Notes recv'd 12/26       self report; recent hosp; Augmentin Merrem post-disch for sepsis merrem merrem 1x incr dose, merrem            Date 11/8 12/30 1/24/20 4/13 6/2 7/8 8/31 10/13 12/1 1/14/21 2/2 3/4   Total Weekly Dose 57.5mg 57.5mg 57.5mg 57.5mg 57.5mg 57.5mg 57.5mg 60mg 60mg 60mg 60mg 60mg   INR 2.4 2.4 3.7 2.2 2.9 2.3 2.2 2.8 3.5 3.5 3.23 2.4   Notes   incr GLV     nerve control 911 supplement recv'd 7/9  incr GLV recv'd 9/2; incr GLV       incr GLV incr GLV      Date 3/30   5/10 6/21 8/3   9/27   11/16-19 12/15   3/8   Total WeeklyDose 60mg non- compliant 60mg 60mg 60mg non- compliant 60mg non-  compliant BHL admit 60mg Non compliance 60 mg   INR 2.5   2.8 3.6 3.12   3.1     3.2   2.9   Notes 1xboost     Less GLV         2x hold &  2x boost              Date 4/15  5/11-5/13  5/18 6/7   8/9 8/12 9/6 10/4 11/14 1/6/23  2/8  4/11     Total Weekly Dose 60 mg  BH Papi   45 mg  60 mg  hospital admission 65mg 70mg 60 mg 60 mg 60 mg 60mg  60 mg Non- compliance 60mg     INR 2.8    1.9 2.76 Hold x 4 days 1.6 3.5 3.1 2.7 2.8 2.1  2.9  2.2     Notes rec'd 4/18 No bridge heldx2     lovenox   rec 9/13   rec  11/15 rec 1/6  rec 2/10  1x boost, unable to reach until 2/22  Rec'd 4/13        **will take minocycline 100mg BID indefinitely**      Phone Interview:  Tablet Strength: 5mg tabs  Patient Contact Info: [phone] (Home); [phone] (Mobile)  Lab Contact Info: [facility] [phone]  Verbal release 7/23/19 may speak with Delaney (wife), Puneet or Ty (sons)- ok to LVM     Patient Findings  Positives:  Missed doses, Change in diet/appetite   Negatives:  Signs/symptoms of thrombosis, Signs/symptoms of bleeding, Laboratory test error suspected, Change in health, Change in alcohol use, Change in activity, Upcoming invasive procedure, Emergency department visit, Upcoming dental procedure, Extra doses, Change in medications, Hospital admission, Bruising, Other complaints   Comments:  Pt has called cardiology instead of the AC clinic. He will plan to save our phone number. Pt went to [place] prior to this INR draw. He reports possible missed dose while away and increase in GLV.      Plan:  1. INR was slightly SUBtherapeutic on 4/11 at 2.2 (goal2.5-3.5). Unable to reach patient until 4/18. Confirmed he has our phone number. Instructed Mr. Farias to boost dose today to 10mg then continue maintenance dose of warfarin 7.5 mg oral daily except 10 mg on MonWedFri until recheck.  2. Repeat INR, 5/2/23. Patient has extensive history of non-compliance with requested follow-up.  3. Verbal information provided over the phone. Cecilio Farias RBV dosing instructions, expresses understanding by teach back, and has no further questions at this time.    Olga Friend, PharmD   4/13/2023  13:44 EDT

## 2023-05-11 ENCOUNTER — TELEPHONE (OUTPATIENT)
Dept: PHARMACY | Facility: HOSPITAL | Age: 68
End: 2023-05-11

## 2023-06-14 ENCOUNTER — ANTICOAGULATION VISIT (OUTPATIENT)
Dept: PHARMACY | Facility: HOSPITAL | Age: 68
End: 2023-06-14
Payer: MEDICARE

## 2023-06-14 NOTE — PROGRESS NOTES
Anticoagulation Clinic - Remote Progress Note  REMOTE LAB     Indication: mechanical AVR  Referring Provider: Arun Jackson (12/17/2021)  Initial Warfarin Start Date: 2009  Goal INR: 2.5-3.5  Current Drug Interactions: aspirin     Diet: not much GLV 1/9/2023  Alcohol: none  Tobacco: none  OTC Pain Medication:     INR History:  Date 8/2 10/26 12/21 1/19/18 2/20 3/27 4/27 5/16 6/11 7/16 9/20 11/2   Total Weekly Dose 62.5mg 62.5mg 62.5mg 62.5mg 62.5mg 62.5mg 62.5mg 62.5mg 62.5mg 62.5mg 62.5mg 62.5mg   INR 2.2 2.9 3.2 3.0 2.9 3.4 3.7 2.9 2.6 2.9 2.9 3.2   Notes             sick                Date 12/21 2/1/19 3/11 4/24 5/23 6/24 7/11 7/18 7/22 8/15 9/9 9/25   Total Weekly Dose 62.5mg 62.5mg 62.5mg 62.5mg 62.5mg 62.5mg 52.5mg 55mg 57.5mg 57.5mg 57.5mg 57.5mg   INR 3.4 3.34 2.8 3.0 3.3 3.6 2.52 2.27 2.9 2.9 2.5 2.6   Notes recv'd 12/26       self report; recent hosp; Augmentin Merrem post-disch for sepsis merrem merrem 1x incr dose, merrem            Date 11/8 12/30 1/24/20 4/13 6/2 7/8 8/31 10/13 12/1 1/14/21 2/2 3/4   Total Weekly Dose 57.5mg 57.5mg 57.5mg 57.5mg 57.5mg 57.5mg 57.5mg 60mg 60mg 60mg 60mg 60mg   INR 2.4 2.4 3.7 2.2 2.9 2.3 2.2 2.8 3.5 3.5 3.23 2.4   Notes   incr GLV     nerve control 911 supplement recv'd 7/9  incr GLV recv'd 9/2; incr GLV       incr GLV incr GLV      Date 3/30   5/10 6/21 8/3   9/27   11/16-19 12/15   3/8   Total WeeklyDose 60mg non- compliant 60mg 60mg 60mg non- compliant 60mg non-  compliant BHL admit 60mg Non compliance 60 mg   INR 2.5   2.8 3.6 3.12   3.1     3.2   2.9   Notes 1xboost     Less GLV         2x hold &  2x boost              Date 4/15  5/11-5/13  5/18 6/7   8/9 8/12 9/6 10/4 11/14 1/6/23  2/8  4/11   Total Weekly Dose 60 mg  BH Papi   45 mg  60 mg  hospital admission 65mg 70mg 60 mg 60 mg 60 mg 60mg  60 mg Non- compliance 60mg   INR 2.8    1.9 2.76 Hold x 4 days 1.6 3.5 3.1 2.7 2.8 2.1  2.9  2.2   Notes rec'd 4/18 No bridge heldx2     lovenox   rec 9/13   rec 11/15 rec  1/6  rec 2/10  1x boost, unable to reach until 2/22  Rec'd 4/13      Date  6/13             Total WeeklyDose Non-compliant 60 mg             INR  2.7             Notes  Rec 6/14               **will take minocycline 100mg BID indefinitely**      Phone Interview:  Tablet Strength: 5mg tabs  Patient Contact Info: [phone] (Home); [phone] (Mobile)  Lab Contact Info: [facility] [phone]  Verbal release 7/23/19 may speak with Delaney (wife), Puneet or Ty (sons)- ok to LVM     *LVM 6/14/2023 @ 16:07 EDT*    *DISREGARD FOLLOWING UNTIL PT IS CONTACTED*    Patient Findings  Positives:  Missed doses, Change in diet/appetite   Negatives:  Signs/symptoms of thrombosis, Signs/symptoms of bleeding, Laboratory test error suspected, Change in health, Change in alcohol use, Change in activity, Upcoming invasive procedure, Emergency department visit, Upcoming dental procedure, Extra doses, Change in medications, Hospital admission, Bruising, Other complaints   Comments:  Pt has called cardiology instead of the AC clinic. He will plan to save our phone number. Pt went to [place] prior to this INR draw. He reports possible missed dose while away and increase in GLV.      Plan:  1. INR was slightly SUBtherapeutic on 4/11 at 2.2 (goal2.5-3.5). Unable to reach patient until 4/18. Confirmed he has our phone number. Instructed Mr. Farias to boost dose today to 10mg then continue maintenance dose of warfarin 7.5 mg oral daily except 10 mg on MonWedFri until recheck.  2. Repeat INR, 5/2/23. Patient has extensive history of non-compliance with requested follow-up.  3. Verbal information provided over the phone. Cecilio Farias RBV dosing instructions, expresses understanding by teach back, and has no further questions at this time.

## 2023-06-16 LAB — INR PPP: 2.7

## 2023-06-16 NOTE — PROGRESS NOTES
Anticoagulation Clinic - Remote Progress Note  REMOTE LAB     Indication: mechanical AVR  Referring Provider: Arun Jackson (12/17/2021)  Initial Warfarin Start Date: 2009  Goal INR: 2.5-3.5  Current Drug Interactions: aspirin     Diet: not much GLV 1/9/2023  Alcohol: none  Tobacco: none  OTC Pain Medication:     INR History:  Date 8/2 10/26 12/21 1/19/18 2/20 3/27 4/27 5/16 6/11 7/16 9/20 11/2   Total Weekly Dose 62.5mg 62.5mg 62.5mg 62.5mg 62.5mg 62.5mg 62.5mg 62.5mg 62.5mg 62.5mg 62.5mg 62.5mg   INR 2.2 2.9 3.2 3.0 2.9 3.4 3.7 2.9 2.6 2.9 2.9 3.2   Notes             sick                Date 12/21 2/1/19 3/11 4/24 5/23 6/24 7/11 7/18 7/22 8/15 9/9 9/25   Total Weekly Dose 62.5mg 62.5mg 62.5mg 62.5mg 62.5mg 62.5mg 52.5mg 55mg 57.5mg 57.5mg 57.5mg 57.5mg   INR 3.4 3.34 2.8 3.0 3.3 3.6 2.52 2.27 2.9 2.9 2.5 2.6   Notes recv'd 12/26       self report; recent hosp; Augmentin Merrem post-disch for sepsis merrem merrem 1x incr dose, merrem            Date 11/8 12/30 1/24/20 4/13 6/2 7/8 8/31 10/13 12/1 1/14/21 2/2 3/4   Total Weekly Dose 57.5mg 57.5mg 57.5mg 57.5mg 57.5mg 57.5mg 57.5mg 60mg 60mg 60mg 60mg 60mg   INR 2.4 2.4 3.7 2.2 2.9 2.3 2.2 2.8 3.5 3.5 3.23 2.4   Notes   incr GLV     nerve control 911 supplement recv'd 7/9  incr GLV recv'd 9/2; incr GLV       incr GLV incr GLV      Date 3/30   5/10 6/21 8/3   9/27   11/16-19 12/15   3/8   Total WeeklyDose 60mg non- compliant 60mg 60mg 60mg non- compliant 60mg non-  compliant BHL admit 60mg Non compliance 60 mg   INR 2.5   2.8 3.6 3.12   3.1     3.2   2.9   Notes 1xboost     Less GLV         2x hold &  2x boost              Date 4/15  5/11-5/13  5/18 6/7   8/9 8/12 9/6 10/4 11/14 1/6/23  2/8  4/11   Total Weekly Dose 60 mg  BH Papi   45 mg  60 mg  hospital admission 65mg 70mg 60 mg 60 mg 60 mg 60mg  60 mg Non- compliance 60mg   INR 2.8    1.9 2.76 Hold x 4 days 1.6 3.5 3.1 2.7 2.8 2.1  2.9  2.2   Notes rec'd 4/18 No bridge heldx2     lovenox   rec 9/13   rec 11/15 rec  1/6  rec 2/10  1x boost, unable to reach until 2/22  Rec'd 4/13      Date  6/13             Total WeeklyDose Non-compliant 60 mg             INR  2.7             Notes  Rec 6/14               **will take minocycline 100mg BID indefinitely**      Phone Interview:  Tablet Strength: 5mg tabs  Patient Contact Info: [phone] (Home); [phone] (Mobile)  Lab Contact Info: [facility] [phone]  Verbal release 7/23/19 may speak with Delaney (wife), Puneet or Ty (sons)- ok to LVM     Patient Findings  Positives: Change in medications   Negatives: Signs/symptoms of thrombosis, Signs/symptoms of bleeding, Laboratory test error suspected, Change in health, Change in alcohol use, Change in activity, Upcoming invasive procedure, Emergency department visit, Upcoming dental procedure, Missed doses, Extra doses, Change in diet/appetite, Hospital admission, Bruising, Other complaints   Comments: Patient  reports he Transitioned  from gabapentin to lyrica a week ago  All other findings negative per patient      Plan:  1. INR was therapeutic on 6/13 at 2.7 (goal 2.5-3.5). Unable to reach patient until 6/16. Confirmed he has our phone number. Instructed Mr. Farias to continue maintenance dose of warfarin 7.5 mg oral daily except 10 mg on MonWedFri until recheck.  2. Repeat INR, two weeks 6/27/23. Patient has extensive history of non-compliance with requested follow-up.  3. Verbal information provided over the phone. Cecilio Farias RBV dosing instructions, expresses understanding by teach back, and has no further questions at this time.    Lio Mendoza, Pharmacy Technician  6/16/2023  16:47 EDT    I, Olga Friend, PharmD, have reviewed the note in full and agree with the assessment and plan.  06/19/23  10:45 EDT

## 2023-07-18 ENCOUNTER — TELEPHONE (OUTPATIENT)
Dept: PHARMACY | Facility: HOSPITAL | Age: 68
End: 2023-07-18

## 2023-08-04 ENCOUNTER — ANTICOAGULATION VISIT (OUTPATIENT)
Dept: PHARMACY | Facility: HOSPITAL | Age: 68
End: 2023-08-04
Payer: MEDICARE

## 2023-08-04 NOTE — PROGRESS NOTES
Anticoagulation Clinic - Remote Progress Note  REMOTE LAB     Indication: mechanical AVR  Referring Provider: Arun Jackson (12/17/2021)  Initial Warfarin Start Date: 2009  Goal INR: 2.5-3.5  Current Drug Interactions: aspirin     Diet: not much GLV 1/9/2023  Alcohol: none  Tobacco: none  OTC Pain Medication:     INR History:  Date 8/2 10/26 12/21 1/19/18 2/20 3/27 4/27 5/16 6/11 7/16 9/20 11/2   Total Weekly Dose 62.5mg 62.5mg 62.5mg 62.5mg 62.5mg 62.5mg 62.5mg 62.5mg 62.5mg 62.5mg 62.5mg 62.5mg   INR 2.2 2.9 3.2 3.0 2.9 3.4 3.7 2.9 2.6 2.9 2.9 3.2   Notes             sick                Date 12/21 2/1/19 3/11 4/24 5/23 6/24 7/11 7/18 7/22 8/15 9/9 9/25   Total Weekly Dose 62.5mg 62.5mg 62.5mg 62.5mg 62.5mg 62.5mg 52.5mg 55mg 57.5mg 57.5mg 57.5mg 57.5mg   INR 3.4 3.34 2.8 3.0 3.3 3.6 2.52 2.27 2.9 2.9 2.5 2.6   Notes recv'd 12/26       self report; recent hosp; Augmentin Merrem post-disch for sepsis merrem merrem 1x incr dose, merrem            Date 11/8 12/30 1/24/20 4/13 6/2 7/8 8/31 10/13 12/1 1/14/21 2/2 3/4   Total Weekly Dose 57.5mg 57.5mg 57.5mg 57.5mg 57.5mg 57.5mg 57.5mg 60mg 60mg 60mg 60mg 60mg   INR 2.4 2.4 3.7 2.2 2.9 2.3 2.2 2.8 3.5 3.5 3.23 2.4   Notes   incr GLV     nerve control 911 supplement recv'd 7/9  incr GLV recv'd 9/2; incr GLV       incr GLV incr GLV      Date 3/30   5/10 6/21 8/3   9/27   11/16-19 12/15   3/8   Total WeeklyDose 60mg non- compliant 60mg 60mg 60mg non- compliant 60mg non-  compliant BHL admit 60mg Non compliance 60 mg   INR 2.5   2.8 3.6 3.12   3.1     3.2   2.9   Notes 1xboost     Less GLV         2x hold &  2x boost              Date 4/15  5/11-5/13  5/18 6/7   8/9 8/12 9/6 10/4 11/14 1/6/23  2/8  4/11   Total Weekly Dose 60 mg  BH Papi   45 mg  60 mg  hospital admission 65mg 70mg 60 mg 60 mg 60 mg 60mg  60 mg Non- compliance 60mg   INR 2.8    1.9 2.76 Hold x 4 days 1.6 3.5 3.1 2.7 2.8 2.1  2.9  2.2   Notes rec'd 4/18 No bridge heldx2     lovenox   rec 9/13   rec 11/15 rec  1/6  rec 2/10  1x boost, unable to reach until 2/22  Rec'd 4/13      Date  6/13 8/3/23            Total WeeklyDose Non-compliant 60 mg 60 mg            INR  2.7 2.6            Notes  Rec 6/14 rec 8/4                  **will take minocycline 100mg BID indefinitely**      Phone Interview:  Tablet Strength: 5mg tabs  Patient Contact Info: [phone] (Home); [phone] (Mobile)  Lab Contact Info: [facility] [phone]  Verbal release 7/23/19 may speak with Delaney (wife), Puneet or Ty (sons)- ok to [facility]       Patient Findings  Negatives: Signs/symptoms of thrombosis, Signs/symptoms of bleeding, Laboratory test error suspected, Change in health, Change in alcohol use, Change in activity, Upcoming invasive procedure, Emergency department visit, Upcoming dental procedure, Missed doses, Extra doses, Change in medications, Change in diet/appetite, Hospital admission, Bruising, Other complaints   Comments: All findings negative per patient        Plan:  1. INR was therapeutic on 8/3 at 2.6 (goal 2.5-3.5). Unable to reach patient until 8/10/23. Instructed Mr. Farias to continue maintenance dose of warfarin 7.5 mg oral daily except 10 mg on MonWedFri until recheck.  2. Repeat INR in four weeks  8/31/23. Patient has extensive history of non-compliance with requested follow-up.  3. Verbal information provided over the phone. Cecilio GUTIÉRREZ Dinora RBV dosing instructions, expresses understanding by teach back, and has no further questions at this time.  4. Patient request refill     Lio Mendoza, Pharmacy Technician  8/4/2023  16:15 EDT    I, Olga Friend, PharmD, have reviewed the note in full and agree with the assessment and plan.  08/10/23  11:03 EDT

## 2023-08-10 DIAGNOSIS — Z95.2 HISTORY OF MECHANICAL AORTIC VALVE REPLACEMENT: ICD-10-CM

## 2023-08-10 LAB — INR PPP: 2.6

## 2023-09-13 RX ORDER — WARFARIN SODIUM 5 MG/1
TABLET ORAL
Qty: 140 TABLET | Refills: 0 | Status: SHIPPED | OUTPATIENT
Start: 2023-09-13

## 2023-10-04 ENCOUNTER — TELEPHONE (OUTPATIENT)
Dept: CARDIOLOGY | Facility: CLINIC | Age: 68
End: 2023-10-04
Payer: MEDICARE

## 2023-10-04 NOTE — TELEPHONE ENCOUNTER
Caller: Cecilio Farias    Relationship: Self    Best call back number: [phone]    What is the best time to reach you: ANY    Who are you requesting to speak with (clinical staff, provider,  specific staff member): ANY    What was the call regarding: PATIENT IS CURRENTLY SCHEDULED FOR A F/U VISIT ON 12-12 @ 2;30PM . HE STATES SOMEONE CONTACTED HIM IN REGARDS TO MOVING THE APPOINTMENT UP. HE STATES HE WOULD PREFER TO LEAVE HIS APPT. ON 12-12 DUE TO TRANSPORTATION ISSUES. PLEASE CONTACT PATIENT IF YOU HAVE ANY QUESTIONS.     Is it okay if the provider responds through MyChart: BY PHONE IF NECESSARY

## 2023-10-10 ENCOUNTER — TRANSCRIBE ORDERS (OUTPATIENT)
Dept: LAB | Facility: HOSPITAL | Age: 68
End: 2023-10-10
Payer: MEDICARE

## 2023-10-10 ENCOUNTER — LAB (OUTPATIENT)
Dept: LAB | Facility: HOSPITAL | Age: 68
End: 2023-10-10
Payer: MEDICARE

## 2023-10-10 ENCOUNTER — ANTICOAGULATION VISIT (OUTPATIENT)
Dept: PHARMACY | Facility: HOSPITAL | Age: 68
End: 2023-10-10
Payer: MEDICARE

## 2023-10-10 DIAGNOSIS — Z95.2 HISTORY OF MECHANICAL AORTIC VALVE REPLACEMENT: ICD-10-CM

## 2023-10-10 DIAGNOSIS — I33.0 BACTERIAL ENDOCARDITIS, UNSPECIFIED CHRONICITY: ICD-10-CM

## 2023-10-10 DIAGNOSIS — T36.0X5A PENICILLIN CLASS DRUG CAUSING ADVERSE EFFECTS IN THERAPEUTIC USE, INITIAL ENCOUNTER: ICD-10-CM

## 2023-10-10 DIAGNOSIS — Z95.2 HEART VALVE REPLACED BY TRANSPLANT: ICD-10-CM

## 2023-10-10 DIAGNOSIS — I33.0 ACUTE AND SUBACUTE BACTERIAL ENDOCARDITIS: Primary | ICD-10-CM

## 2023-10-10 DIAGNOSIS — I48.0 PAROXYSMAL ATRIAL FIBRILLATION: ICD-10-CM

## 2023-10-10 DIAGNOSIS — I33.0 ACUTE AND SUBACUTE BACTERIAL ENDOCARDITIS: ICD-10-CM

## 2023-10-10 LAB
ALBUMIN SERPL-MCNC: 3.6 G/DL (ref 3.5–5.2)
ALBUMIN/GLOB SERPL: 1 G/DL
ALP SERPL-CCNC: 83 U/L (ref 39–117)
ALT SERPL W P-5'-P-CCNC: 24 U/L (ref 1–41)
ANION GAP SERPL CALCULATED.3IONS-SCNC: 8 MMOL/L (ref 5–15)
AST SERPL-CCNC: 36 U/L (ref 1–40)
BASOPHILS # BLD AUTO: 0.06 10*3/MM3 (ref 0–0.2)
BASOPHILS NFR BLD AUTO: 0.9 % (ref 0–1.5)
BILIRUB SERPL-MCNC: 0.7 MG/DL (ref 0–1.2)
BUN SERPL-MCNC: 30 MG/DL (ref 8–23)
BUN/CREAT SERPL: 33 (ref 7–25)
CALCIUM SPEC-SCNC: 8.7 MG/DL (ref 8.6–10.5)
CHLORIDE SERPL-SCNC: 105 MMOL/L (ref 98–107)
CO2 SERPL-SCNC: 28 MMOL/L (ref 22–29)
CREAT SERPL-MCNC: 0.91 MG/DL (ref 0.76–1.27)
CRP SERPL-MCNC: 0.44 MG/DL (ref 0–0.5)
DEPRECATED RDW RBC AUTO: 49.7 FL (ref 37–54)
EGFRCR SERPLBLD CKD-EPI 2021: 93 ML/MIN/1.73
EOSINOPHIL # BLD AUTO: 0.39 10*3/MM3 (ref 0–0.4)
EOSINOPHIL NFR BLD AUTO: 5.9 % (ref 0.3–6.2)
ERYTHROCYTE [DISTWIDTH] IN BLOOD BY AUTOMATED COUNT: 14.3 % (ref 12.3–15.4)
ERYTHROCYTE [SEDIMENTATION RATE] IN BLOOD: 35 MM/HR (ref 0–20)
GLOBULIN UR ELPH-MCNC: 3.6 GM/DL
GLUCOSE SERPL-MCNC: 127 MG/DL (ref 65–99)
HCT VFR BLD AUTO: 43.8 % (ref 37.5–51)
HGB BLD-MCNC: 13.8 G/DL (ref 13–17.7)
IMM GRANULOCYTES # BLD AUTO: 0.03 10*3/MM3 (ref 0–0.05)
IMM GRANULOCYTES NFR BLD AUTO: 0.5 % (ref 0–0.5)
INR PPP: 2.56 (ref 0.89–1.12)
LYMPHOCYTES # BLD AUTO: 1.51 10*3/MM3 (ref 0.7–3.1)
LYMPHOCYTES NFR BLD AUTO: 22.7 % (ref 19.6–45.3)
MCH RBC QN AUTO: 29.7 PG (ref 26.6–33)
MCHC RBC AUTO-ENTMCNC: 31.5 G/DL (ref 31.5–35.7)
MCV RBC AUTO: 94.2 FL (ref 79–97)
MONOCYTES # BLD AUTO: 0.76 10*3/MM3 (ref 0.1–0.9)
MONOCYTES NFR BLD AUTO: 11.4 % (ref 5–12)
NEUTROPHILS NFR BLD AUTO: 3.89 10*3/MM3 (ref 1.7–7)
NEUTROPHILS NFR BLD AUTO: 58.6 % (ref 42.7–76)
NRBC BLD AUTO-RTO: 0 /100 WBC (ref 0–0.2)
PLATELET # BLD AUTO: 180 10*3/MM3 (ref 140–450)
PMV BLD AUTO: 10 FL (ref 6–12)
POTASSIUM SERPL-SCNC: 4.7 MMOL/L (ref 3.5–5.2)
PROT SERPL-MCNC: 7.2 G/DL (ref 6–8.5)
PROTHROMBIN TIME: 27.7 SECONDS (ref 12.2–14.5)
RBC # BLD AUTO: 4.65 10*6/MM3 (ref 4.14–5.8)
SODIUM SERPL-SCNC: 141 MMOL/L (ref 136–145)
WBC NRBC COR # BLD: 6.64 10*3/MM3 (ref 3.4–10.8)

## 2023-10-10 PROCEDURE — 36415 COLL VENOUS BLD VENIPUNCTURE: CPT

## 2023-10-10 PROCEDURE — 80053 COMPREHEN METABOLIC PANEL: CPT

## 2023-10-10 PROCEDURE — 85025 COMPLETE CBC W/AUTO DIFF WBC: CPT

## 2023-10-10 PROCEDURE — 85652 RBC SED RATE AUTOMATED: CPT

## 2023-10-10 PROCEDURE — 86140 C-REACTIVE PROTEIN: CPT

## 2023-10-10 PROCEDURE — 85610 PROTHROMBIN TIME: CPT

## 2023-10-10 NOTE — PROGRESS NOTES
Anticoagulation Clinic - Remote Progress Note  REMOTE LAB     Indication: mechanical AVR  Referring Provider: Arun Jackson (12/17/2021)  Initial Warfarin Start Date: 2009  Goal INR: 2.5-3.5  Current Drug Interactions: aspirin     Diet: not much GLV 1/9/2023  Alcohol: none  Tobacco: none  OTC Pain Medication:     INR History:  Date 8/2 10/26 12/21 1/19/18 2/20 3/27 4/27 5/16 6/11 7/16 9/20 11/2   Total Weekly Dose 62.5mg 62.5mg 62.5mg 62.5mg 62.5mg 62.5mg 62.5mg 62.5mg 62.5mg 62.5mg 62.5mg 62.5mg   INR 2.2 2.9 3.2 3.0 2.9 3.4 3.7 2.9 2.6 2.9 2.9 3.2   Notes             sick                Date 12/21 2/1/19 3/11 4/24 5/23 6/24 7/11 7/18 7/22 8/15 9/9 9/25   Total Weekly Dose 62.5mg 62.5mg 62.5mg 62.5mg 62.5mg 62.5mg 52.5mg 55mg 57.5mg 57.5mg 57.5mg 57.5mg   INR 3.4 3.34 2.8 3.0 3.3 3.6 2.52 2.27 2.9 2.9 2.5 2.6   Notes recv'd 12/26       self report; recent hosp; Augmentin Merrem post-disch for sepsis merrem merrem 1x incr dose, merrem            Date 11/8 12/30 1/24/20 4/13 6/2 7/8 8/31 10/13 12/1 1/14/21 2/2 3/4   Total Weekly Dose 57.5mg 57.5mg 57.5mg 57.5mg 57.5mg 57.5mg 57.5mg 60mg 60mg 60mg 60mg 60mg   INR 2.4 2.4 3.7 2.2 2.9 2.3 2.2 2.8 3.5 3.5 3.23 2.4   Notes   incr GLV     nerve control 911 supplement recv'd 7/9  incr GLV recv'd 9/2; incr GLV       incr GLV incr GLV      Date 3/30   5/10 6/21 8/3   9/27   11/16-19 12/15   3/8   Total WeeklyDose 60mg non- compliant 60mg 60mg 60mg non- compliant 60mg non-  compliant BHL admit 60mg Non compliance 60 mg   INR 2.5   2.8 3.6 3.12   3.1     3.2   2.9   Notes 1xboost     Less GLV         2x hold &  2x boost              Date 4/15  5/11-5/13  5/18 6/7   8/9 8/12 9/6 10/4 11/14 1/6/23  2/8  4/11   Total Weekly Dose 60 mg  BH Papi   45 mg  60 mg  hospital admission 65mg 70mg 60 mg 60 mg 60 mg 60mg  60 mg Non- compliance 60mg   INR 2.8    1.9 2.76 Hold x 4 days 1.6 3.5 3.1 2.7 2.8 2.1  2.9  2.2   Notes rec'd 4/18 No bridge heldx2     lovenox   rec 9/13   rec 11/15 rec  1/6  rec 2/10  1x boost, unable to reach until 2/22  Rec'd 4/13      Date  6/13 8/3/23 10/10           Total WeeklyDose Non-compliant 60 mg 60 mg 60 mg            INR  2.7 2.6 2.56           Notes  Rec 6/14 rec 8/4     Unable to talk until 10/12             **will take minocycline 100mg BID indefinitely**      Phone Interview:  Tablet Strength: 5mg tabs  Patient Contact Info: [phone] (Home); [phone] (Mobile)  Lab Contact Info: [facility] [phone]  Verbal release 7/23/19 may speak with Delaney (wife), Puneet or Ty (sons)- ok to [facility]    Patient Findings  Negatives: Signs/symptoms of thrombosis, Signs/symptoms of bleeding, Laboratory test error suspected, Change in health, Change in alcohol use, Change in activity, Upcoming invasive procedure, Emergency department visit, Upcoming dental procedure, Missed doses, Extra doses, Change in medications, Change in diet/appetite, Hospital admission, Bruising, Other complaints   Comments: Patient stopped amlodipine and increased lisinopril to 40mg      Plan:  1. INR was therapeutic on 10/10 at 2.56 (goal 2.5-3.5). Instructed Mr. Farias to continue maintenance dose of warfarin 7.5 mg oral daily except 10 mg on MonWedFri until recheck.  2. Repeat INR in four weeks  11/7/23. Patient has extensive history of non-compliance with requested follow-up.  3. Verbal information provided over the phone. Cecilio Farias RBV dosing instructions, expresses understanding by teach back, and has no further questions at this time.      Olga Friend, PharmD  10/13/23  10:06 EDT

## 2023-12-11 NOTE — PROGRESS NOTES
[facility]  Follow Up Visit  Cecilio Farias  1955    VISIT DATE:  12/12/23    PCP:   America Everett,   [address] [address]          CC:  Abnormal PFT's      Problem List:  Severe valvular aortic stenosis:  Acute systolic CHF, winter 2009.   LVEF 20% to 25%.   Normal coronary arteries.   25 mm. [place]. Kevin AVR and repair of ascending aneurysm, March 2009.  Paroxysmal atrial fibrillation, resolved.   MUGA EF = 64%, September 2010.  EF >70%, mechanical aortic valve with normal function (7/5/16).  ESTHER, 6/12/2019 - negative for vegetation or endocarditis, EF 56-60%  Echo, 1/16/2021: mechanical AV normal function, EF 53%  PA hypertension with right heart failure, new Afib January 2021   Echo 1/16/21: EF 53%, mechanical aortic valve with normal function, MAC with mild MR, RVSP 56mmHg   Right and LHC 1/20/21: mod-severe PAH due to elevated pulmonary vascular resistance; normal forward CI, nonobstructive CAD  PFTs 1/21/2021: Severe combined obstruction and restriction. Air trapping.   Paroxysmal atrial fibrillation  Noted on admission 01/2021, rate controlled and asymptomatic   Hypertension.   Dyslipidemia.   Diabetes mellitus, type 2.   Right lower lobe nodule  Morbid obesity.   Colon cancer with operation/chemotherapy/radiation.   Status post operations, remote.  Acinetobacter sepsis, 6/2019  IV antibiotics followed by ID.  Chronic suppressive Rx planned by ID       History of Present Illness:  Cecilio Farias  Is a 68 y.o. male with pertinent cardiac history detailed above.  Patient has a mechanical aortic valve.  His INR was therapeutic on last check.  He is having some hypertension.  Currently only on lisinopril.  He states his diabetes is well-controlled.  Does remain overweight.  He had normal coronary arteries on a previous evaluation but with elevated pulmonary pressures.  On a prior right heart catheterization his mean PA pressure was 30 he has no complaints of  dyspnea.  Wedge pressure was 15.  Has chronic plaque scan related to chronic minocycline use.  He is no longer on beta-blockers due to history of Mobitz 1 AV block      Patient Active Problem List    Diagnosis Date Noted    Small bowel obstruction 08/01/2022    Left upper quadrant abdominal pain 05/11/2022    Rectus sheath hematoma 05/11/2022    Lactic acidosis 04/30/2022    SBO (small bowel obstruction) 11/17/2021    Diastolic CHF, chronic 11/17/2021    Hyperbilirubinemia 11/17/2021    Dyspnea and mild hypoxemia 01/21/2021    Pulmonary arterial hypertension 01/21/2021    Bilateral small pleural effusions 01/21/2021    RLL lung nodule. Incidental finding and requires follow-up 01/17/2021    Chronic anticoagulation 01/15/2021    Anemia 01/15/2021    Acute diastolic congestive heart failure 01/15/2021    History Acinetobacter bacteremia on chronic minocycline 01/15/2021    Paroxysmal atrial fibrillation 01/15/2021     Note Last Updated: 1/16/2021     Atrial fibrillation noted following AVR/ascending aortic aneurysm repair, 2009  [facility] admission for CHF with atrial fibrillation, 1/15/2021  [id number] (HTN, DM, CHF)      Discoloration of skin of bilateral legs likely due to minocycline use 01/15/2021    Sepsis due to Acinetobacter species 06/13/2019    History of mechanical AVR for aortic stenosis 2009 06/10/2019     Note Last Updated: 1/16/2021     Acute systolic CHF with aortic stenosis, 2009  Status post mechanical AVR (25 mm [place] Kevin tilting-disc) with repair of ascending aorta, 3/2009  ESTHER (6/12/2019): LVEF 60%.  Mild to moderate LVH.  Normal functioning mechanical aortic valve.  No vegetations.      Essential hypertension 06/28/2016    Dyslipidemia 06/28/2016    Morbid obesity 06/28/2016    Type 2 diabetes mellitus 06/28/2016       Allergies   Allergen Reactions    Sulfa Antibiotics     Amoxicillin Rash     Has tolerated ceftriaxone       Social History     Socioeconomic History    Marital status:     Tobacco Use    Smoking status: Never    Smokeless tobacco: Never   Vaping Use    Vaping Use: Never used   Substance and Sexual Activity    Alcohol use: No    Drug use: No    Sexual activity: Defer       Family History   Problem Relation Age of Onset    Heart disease Mother     Hyperlipidemia Mother     Diabetes Mother     Leukemia Father     Cancer Sister     Kidney failure Brother     Hepatitis Brother     Cancer Sister     Cancer Brother     Heart disease Brother     Heart attack Brother        Current Medications:    Current Outpatient Medications:     aspirin 81 MG EC tablet, Take 1 tablet by mouth Daily. OTC, Disp: , Rfl:     atorvastatin (LIPITOR) 40 MG tablet, Take 1 tablet by mouth Every Night., Disp: , Rfl:     Continuous Blood Gluc Sensor (Dexcom G6 Sensor), , Disp: , Rfl:     furosemide (LASIX) 20 MG tablet, Take 1 tablet by mouth Daily., Disp: 90 tablet, Rfl: 2    gabapentin (NEURONTIN) 800 MG tablet, Take 1 tablet by mouth 3 (Three) Times a Day., Disp: , Rfl:     glipizide (GLUCOTROL) 5 MG tablet, Take 1 tablet by mouth 2 (Two) Times a Day Before Meals., Disp: , Rfl:     HYDROcodone-acetaminophen (NORCO) 5-325 MG per tablet, Take 1 tablet by mouth As Needed., Disp: , Rfl:     insulin lispro protamine-insulin lispro (humaLOG 75-25) (75-25) 100 UNIT/ML suspension injection, Inject 15 Units under the skin into the appropriate area as directed 2 (Two) Times a Day With Meals., Disp: , Rfl:     lisinopril (PRINIVIL,ZESTRIL) 40 MG tablet, Take 1 tablet by mouth Daily., Disp: , Rfl:     minocycline (MINOCIN,DYNACIN) 100 MG capsule, Take 1 capsule by mouth 2 (Two) Times a Day. For 30 days, refilled on 07-08-22, Disp: , Rfl:     nortriptyline (PAMELOR) 10 MG capsule, Take 1 capsule by mouth Every Night., Disp: , Rfl:     OneTouch Ultra test strip, , Disp: , Rfl:     potassium chloride ER (K-TAB) 20 MEQ tablet controlled-release ER tablet, Take 1 tablet by mouth Daily., Disp: 90 tablet, Rfl: 0     "warfarin (COUMADIN) 5 MG tablet, TAKE ONE AND ONE-HALF TO TWO TABLETS BY MOUTH ONCE DAILY OR AS DIRECTED BY THE ANTICOAGULATION CLINIC, Disp: 140 tablet, Rfl: 0    amLODIPine (NORVASC) 10 MG tablet, Take 1 tablet by mouth Every Morning. (Patient not taking: Reported on 12/12/2023), Disp: , Rfl:     carvedilol (COREG) 3.125 MG tablet, Take 3.125 mg by mouth 2 (Two) Times a Day. (Patient not taking: Reported on 12/12/2023), Disp: , Rfl:      ROS    Vitals:    12/12/23 1357   BP: 160/66   BP Location: Left arm   Patient Position: Sitting   Cuff Size: Adult   Pulse: 85   SpO2: 99%   Weight: 132 kg (291 lb 12.8 oz)   Height: 190.5 cm (75\")       Physical Exam  Cardiovascular:      Rate and Rhythm: Rhythm irregular.      Pulses: Normal pulses.      Comments: S2  Pulmonary:      Effort: Pulmonary effort is normal.      Breath sounds: Normal breath sounds.   Musculoskeletal:      Right lower leg: Edema present.      Left lower leg: Edema present.      Comments: 1+   Skin:     Comments: Dark skin on legs bilaterally.         Diagnostic Data:  Procedures  No results found for: \"CHLPL\", \"TRIG\", \"HDL\", \"LDLDIRECT\"  Lab Results   Component Value Date    GLUCOSE 127 (H) 10/10/2023    BUN 30 (H) 10/10/2023    CREATININE 0.91 10/10/2023     10/10/2023    K 4.7 10/10/2023     10/10/2023    CO2 28.0 10/10/2023     Lab Results   Component Value Date    HGBA1C 6.50 (H) 08/01/2022     Lab Results   Component Value Date    WBC 6.64 10/10/2023    HGB 13.8 10/10/2023    HCT 43.8 10/10/2023     10/10/2023       Assessment:  No diagnosis found.    Plan:    1.  Mechanical aortic valve, [place] Kevin  -On warfarin anticoagulation, last INR 2.56  -Echo May 2022: EF 56 to 60%, mild mitral valve stenosis, normal functioning mechanical aortic valve   -Will repeat echo in 1 year with next visit       2.  Nonobstructive CAD  -Heart catheterization in 2021 with no obstructive disease     3.  Hypertension  -Lisinopril 40 mg  -Add " spironolactone 25 mg daily, DC potassium       4.  Paroxysmal A. Fib  -on warfarin AC  -regularly irregular rhythm on auscultation, history of Mobitz 1 AV block  -Holter to assess burden and screen for any higher degree AV block     5.  JI  -does not have CPAP  -Previous abnormal PFTs  -Referred to pulmonary previously but did not keep follow-up, will refer     6.  Pulmonary hypertension  -Mean PA pressure 30 with pulmonary capillary wedge pressure 15  -Severe combined obstruction and restriction on PFTs, he has not had any pulmonary follow-up in the past, will refer him there     7.  DM  -followed by his PCP  -atorvastatin 40mg     8.  Legs black from minocycline  -On long-term antibiotics due to prior Acinetobacter sepsis, mechanical aortic valve  -follows with Dr. Kaur      Follow-up 1 year      ACP discussion was held with the patient during this visit. Patient has an advance directive in EMR which is still valid.       Arun Jackson MD [facility]

## 2023-12-12 ENCOUNTER — OFFICE VISIT (OUTPATIENT)
Dept: CARDIOLOGY | Facility: CLINIC | Age: 68
End: 2023-12-12
Payer: MEDICARE

## 2023-12-12 VITALS
OXYGEN SATURATION: 99 % | BODY MASS INDEX: 36.28 KG/M2 | HEART RATE: 85 BPM | SYSTOLIC BLOOD PRESSURE: 160 MMHG | DIASTOLIC BLOOD PRESSURE: 66 MMHG | WEIGHT: 291.8 LBS | HEIGHT: 75 IN

## 2023-12-12 DIAGNOSIS — I48.0 PAROXYSMAL ATRIAL FIBRILLATION: ICD-10-CM

## 2023-12-12 DIAGNOSIS — Z95.2 HISTORY OF MECHANICAL AORTIC VALVE REPLACEMENT: Primary | ICD-10-CM

## 2023-12-12 DIAGNOSIS — R94.2 ABNORMAL PFTS: Primary | ICD-10-CM

## 2023-12-12 RX ORDER — LISINOPRIL 40 MG/1
40 TABLET ORAL DAILY
COMMUNITY
Start: 2023-12-08

## 2023-12-12 RX ORDER — PROCHLORPERAZINE 25 MG/1
SUPPOSITORY RECTAL
COMMUNITY
Start: 2023-11-18

## 2023-12-12 RX ORDER — NORTRIPTYLINE HYDROCHLORIDE 10 MG/1
10 CAPSULE ORAL NIGHTLY
COMMUNITY
Start: 2023-12-04

## 2023-12-12 RX ORDER — HYDROCODONE BITARTRATE AND ACETAMINOPHEN 5; 325 MG/1; MG/1
1 TABLET ORAL AS NEEDED
COMMUNITY
Start: 2023-10-11

## 2023-12-12 RX ORDER — BLOOD SUGAR DIAGNOSTIC
STRIP MISCELLANEOUS
COMMUNITY
Start: 2023-10-30

## 2023-12-12 RX ORDER — SPIRONOLACTONE 25 MG/1
25 TABLET ORAL DAILY
Qty: 30 TABLET | Refills: 11 | Status: SHIPPED | OUTPATIENT
Start: 2023-12-12

## 2024-01-01 DIAGNOSIS — Z95.2 HISTORY OF MECHANICAL AORTIC VALVE REPLACEMENT: ICD-10-CM

## 2024-01-08 RX ORDER — WARFARIN SODIUM 5 MG/1
TABLET ORAL
Qty: 140 TABLET | Refills: 0 | Status: SHIPPED | OUTPATIENT
Start: 2024-01-08

## 2024-01-10 ENCOUNTER — ANTICOAGULATION VISIT (OUTPATIENT)
Dept: PHARMACY | Facility: HOSPITAL | Age: 69
End: 2024-01-10
Payer: MEDICARE

## 2024-01-10 LAB — INR PPP: 2.5

## 2024-01-10 NOTE — PROGRESS NOTES
Anticoagulation Clinic - Remote Progress Note  REMOTE LAB     Indication: mechanical AVR  Referring Provider: Arun Jackson (12/17/2021)  Initial Warfarin Start Date: 2009  Goal INR: 2.5-3.5  Current Drug Interactions: aspirin     Diet: not much GLV 1/9/2023  Alcohol: none  Tobacco: none  OTC Pain Medication:     INR History:  Date 8/2 10/26 12/21 1/19/18 2/20 3/27 4/27 5/16 6/11 7/16 9/20 11/2   Total Weekly Dose 62.5mg 62.5mg 62.5mg 62.5mg 62.5mg 62.5mg 62.5mg 62.5mg 62.5mg 62.5mg 62.5mg 62.5mg   INR 2.2 2.9 3.2 3.0 2.9 3.4 3.7 2.9 2.6 2.9 2.9 3.2   Notes             sick                Date 12/21 2/1/19 3/11 4/24 5/23 6/24 7/11 7/18 7/22 8/15 9/9 9/25   Total Weekly Dose 62.5mg 62.5mg 62.5mg 62.5mg 62.5mg 62.5mg 52.5mg 55mg 57.5mg 57.5mg 57.5mg 57.5mg   INR 3.4 3.34 2.8 3.0 3.3 3.6 2.52 2.27 2.9 2.9 2.5 2.6   Notes recv'd 12/26       self report; recent hosp; Augmentin Merrem post-disch for sepsis merrem merrem 1x incr dose, merrem            Date 11/8 12/30 1/24/20 4/13 6/2 7/8 8/31 10/13 12/1 1/14/21 2/2 3/4   Total Weekly Dose 57.5mg 57.5mg 57.5mg 57.5mg 57.5mg 57.5mg 57.5mg 60mg 60mg 60mg 60mg 60mg   INR 2.4 2.4 3.7 2.2 2.9 2.3 2.2 2.8 3.5 3.5 3.23 2.4   Notes   incr GLV     nerve control 911 supplement recv'd 7/9  incr GLV recv'd 9/2; incr GLV       incr GLV incr GLV      Date 3/30   5/10 6/21 8/3   9/27   11/16-19 12/15   3/8   Total WeeklyDose 60mg non- compliant 60mg 60mg 60mg non- compliant 60mg non-  compliant [facility] admit 60mg Non compliance 60 mg   INR 2.5   2.8 3.6 3.12   3.1     3.2   2.9   Notes 1xboost     Less GLV         2x hold &  2x boost              Date 4/15  5/11-5/13  5/18 6/7   8/9 8/12 9/6 10/4 11/14 1/6/23  2/8  4/11   Total Weekly Dose 60 mg  BH Papi   45 mg  60 mg  hospital admission 65mg 70mg 60 mg 60 mg 60 mg 60mg  60 mg Non- compliance 60mg   INR 2.8    1.9 2.76 Hold x 4 days 1.6 3.5 3.1 2.7 2.8 2.1  2.9  2.2   Notes rec'd 4/18 No bridge heldx2     lovenox   rec 9/13   rec 11/15 rec  1/6  rec 2/10  1x boost, unable to reach until 2/22  Rec'd 4/13      Date  6/13 8/3/23 10/10  1/9         Total WeeklyDose Non-compliant 60 mg 60 mg 60 mg  Non compliant 60 mg         INR  2.7 2.6 2.56  2.5         Notes  Rec 6/14 rec 8/4     Unable to talk until 10/12  Rec 1/10           **will take minocycline 100mg BID indefinitely**      Phone Interview:  Tablet Strength: 5mg tabs  Patient Contact Info: [phone] (Home); [phone] (Mobile)  Lab Contact Info: [facility] [phone]  Verbal release 7/23/19 may speak with Delaney (wife), Puneet or Ty (sons)- ok to [facility]    Patient Findings    Negatives: Signs/symptoms of thrombosis, Signs/symptoms of bleeding, Laboratory test error suspected, Change in health, Change in alcohol use, Change in activity, Upcoming invasive procedure, Emergency department visit, Upcoming dental procedure, Missed doses, Extra doses, Change in medications, Change in diet/appetite, Hospital admission, Bruising, Other complaints   Comments: Per cardiology 12/12: Add spironolactone 25 mg daily, DC potassium  Confirmed with pt he started last month    Discussed maintaining regular INR checks        Plan:  1. INR was therapeutic at 2.5 (goal 2.5-3.5). Instructed Mr. Farias to continue maintenance dose of warfarin 7.5 mg oral daily except 10 mg on MonWedFri until recheck.  2. Repeat INR in four weeks 2/7. Patient has extensive history of non-compliance with requested follow-up.  3. Verbal information provided over the phone. Cecilio Farias RBV dosing instructions, expresses understanding by teach back, and has no further questions at this time.    Adore Masters, SandhyaD.  01/10/24   13:09 EST

## 2024-01-12 DIAGNOSIS — R94.31 ABNORMAL ELECTROCARDIOGRAM (ECG) (EKG): ICD-10-CM

## 2024-01-12 DIAGNOSIS — I47.29 VENTRICULAR TACHYCARDIA (PAROXYSMAL): Primary | ICD-10-CM

## 2024-03-11 LAB — INR PPP: 2.4

## 2024-03-16 DIAGNOSIS — Z95.2 HISTORY OF MECHANICAL AORTIC VALVE REPLACEMENT: ICD-10-CM

## 2024-03-19 ENCOUNTER — ANTICOAGULATION VISIT (OUTPATIENT)
Dept: PHARMACY | Facility: HOSPITAL | Age: 69
End: 2024-03-19
Payer: MEDICARE

## 2024-03-19 RX ORDER — WARFARIN SODIUM 5 MG/1
TABLET ORAL
Qty: 120 TABLET | Refills: 0 | Status: SHIPPED | OUTPATIENT
Start: 2024-03-19

## 2024-03-19 NOTE — TELEPHONE ENCOUNTER
Patient has not checked INR since 1/7/24. Attempted to contact patient to verify dose and re-iterate outstanding INR check. Forwarded to voice message.

## 2024-03-19 NOTE — PROGRESS NOTES
Anticoagulation Clinic - Remote Progress Note  REMOTE LAB     Indication: mechanical AVR  Referring Provider: Arun Jackson (12/17/2021)  Initial Warfarin Start Date: 2009  Goal INR: 2.5-3.5  Current Drug Interactions: aspirin     Diet: not much GLV 1/9/2023  Alcohol: none  Tobacco: none  OTC Pain Medication:     INR History:  Date 8/2 10/26 12/21 1/19/18 2/20 3/27 4/27 5/16 6/11 7/16 9/20 11/2   Total Weekly Dose 62.5mg 62.5mg 62.5mg 62.5mg 62.5mg 62.5mg 62.5mg 62.5mg 62.5mg 62.5mg 62.5mg 62.5mg   INR 2.2 2.9 3.2 3.0 2.9 3.4 3.7 2.9 2.6 2.9 2.9 3.2   Notes             sick                Date 12/21 2/1/19 3/11 4/24 5/23 6/24 7/11 7/18 7/22 8/15 9/9 9/25   Total Weekly Dose 62.5mg 62.5mg 62.5mg 62.5mg 62.5mg 62.5mg 52.5mg 55mg 57.5mg 57.5mg 57.5mg 57.5mg   INR 3.4 3.34 2.8 3.0 3.3 3.6 2.52 2.27 2.9 2.9 2.5 2.6   Notes recv'd 12/26       self report; recent hosp; Augmentin Merrem post-disch for sepsis merrem merrem 1x incr dose, merrem            Date 11/8 12/30 1/24/20 4/13 6/2 7/8 8/31 10/13 12/1 1/14/21 2/2 3/4   Total Weekly Dose 57.5mg 57.5mg 57.5mg 57.5mg 57.5mg 57.5mg 57.5mg 60mg 60mg 60mg 60mg 60mg   INR 2.4 2.4 3.7 2.2 2.9 2.3 2.2 2.8 3.5 3.5 3.23 2.4   Notes   incr GLV     nerve control 911 supplement recv'd 7/9  incr GLV recv'd 9/2; incr GLV       incr GLV incr GLV      Date 3/30   5/10 6/21 8/3   9/27   11/16-19 12/15   3/8   Total WeeklyDose 60mg non- compliant 60mg 60mg 60mg non- compliant 60mg non-  compliant [facility] admit 60mg Non compliance 60 mg   INR 2.5   2.8 3.6 3.12   3.1     3.2   2.9   Notes 1xboost     Less GLV         2x hold &  2x boost              Date 4/15  5/11-5/13  5/18 6/7   8/9 8/12 9/6 10/4 11/14 1/6/23  2/8  4/11   Total Weekly Dose 60 mg  BH Papi   45 mg  60 mg  hospital admission 65mg 70mg 60 mg 60 mg 60 mg 60mg  60 mg Non- compliance 60mg   INR 2.8    1.9 2.76 Hold x 4 days 1.6 3.5 3.1 2.7 2.8 2.1  2.9  2.2   Notes rec'd 4/18 No bridge heldx2     lovenox   rec 9/13   rec 11/15 rec  1/6  rec 2/10  1x boost, unable to reach until 2/22  Rec'd 4/13      Date  6/13 8/3/23 10/10  1/9 3/11        Total WeeklyDose Non-compliant 60 mg 60 mg 60 mg  Non compliant 60 mg 60 mg        INR  2.7 2.6 2.56  2.5 2.4        Notes  Rec 6/14 rec 8/4     Unable to talk until 10/12  Rec 1/10 Rec'd 3/19          **will take minocycline 100mg BID indefinitely**      Phone Interview:  Tablet Strength: 5mg tabs  Patient Contact Info: [phone] (Home); [phone] (Mobile)  Lab Contact Info: [facility] [phone]  Verbal release 7/23/19 may speak with Delaney (wife), Puneet or Ty (sons)- ok to [facility]    Patient Findings    Positives: Change in diet/appetite   Negatives: Signs/symptoms of thrombosis, Signs/symptoms of bleeding, Laboratory test error suspected, Change in health, Change in alcohol use, Change in activity, Upcoming invasive procedure, Emergency department visit, Upcoming dental procedure, Missed doses, Extra doses, Change in medications, Hospital admission, Bruising, Other complaints   Comments: Patient reports that he was eating more GLV than usual around the time of his INR check last week. I discussed the importance of more regular INR checks with Mr. Farias, especially if this change in diet will be maintained.      Plan:  1. INR was slightly subtherapeutic at 2.4 on 3/11/24 at [facility] lab (goal 2.5-3.5). Instructed Mr. Farias to continue maintenance dose of warfarin 7.5 mg oral daily except 10 mg on MonWedFri until recheck. May need to increase maintenance dose if INR is subtherapeutic again and patient is continuing to increase GLV intake.  2. Repeat INR on 4/1/24. Mr. Farias states he will check INR again by the end of the month.    3. Verbal information provided over the phone. eCcilio Farias RBV dosing instructions, expresses understanding by teach back, and has no further questions at this time.    Jesica Middleton [facility]  03/19/24  14:20 EDT

## 2024-05-09 ENCOUNTER — ANTICOAGULATION VISIT (OUTPATIENT)
Dept: PHARMACY | Facility: HOSPITAL | Age: 69
End: 2024-05-09
Payer: MEDICARE

## 2024-05-09 LAB — INR PPP: 1.6

## 2024-05-09 NOTE — PROGRESS NOTES
Anticoagulation Clinic - Remote Progress Note  REMOTE LAB     Indication: mechanical AVR  Referring Provider: Arun Jackson (12/17/2021)  Initial Warfarin Start Date: 2009  Goal INR: 2.5-3.5  Current Drug Interactions: aspirin     Diet: not much GLV 1/9/2023  Alcohol: none  Tobacco: none  OTC Pain Medication:     INR History:  Date 8/2 10/26 12/21 1/19/18 2/20 3/27 4/27 5/16 6/11 7/16 9/20 11/2   Total Weekly Dose 62.5mg 62.5mg 62.5mg 62.5mg 62.5mg 62.5mg 62.5mg 62.5mg 62.5mg 62.5mg 62.5mg 62.5mg   INR 2.2 2.9 3.2 3.0 2.9 3.4 3.7 2.9 2.6 2.9 2.9 3.2   Notes             sick                Date 12/21 2/1/19 3/11 4/24 5/23 6/24 7/11 7/18 7/22 8/15 9/9 9/25   Total Weekly Dose 62.5mg 62.5mg 62.5mg 62.5mg 62.5mg 62.5mg 52.5mg 55mg 57.5mg 57.5mg 57.5mg 57.5mg   INR 3.4 3.34 2.8 3.0 3.3 3.6 2.52 2.27 2.9 2.9 2.5 2.6   Notes recv'd 12/26       self report; recent hosp; Augmentin Merrem post-disch for sepsis merrem merrem 1x incr dose, merrem            Date 11/8 12/30 1/24/20 4/13 6/2 7/8 8/31 10/13 12/1 1/14/21 2/2 3/4   Total Weekly Dose 57.5mg 57.5mg 57.5mg 57.5mg 57.5mg 57.5mg 57.5mg 60mg 60mg 60mg 60mg 60mg   INR 2.4 2.4 3.7 2.2 2.9 2.3 2.2 2.8 3.5 3.5 3.23 2.4   Notes   incr GLV     nerve control 911 supplement recv'd 7/9  incr GLV recv'd 9/2; incr GLV       incr GLV incr GLV      Date 3/30   5/10 6/21 8/3   9/27   11/16-19 12/15   3/8   Total WeeklyDose 60mg non- compliant 60mg 60mg 60mg non- compliant 60mg non-  compliant [facility] admit 60mg Non compliance 60 mg   INR 2.5   2.8 3.6 3.12   3.1     3.2   2.9   Notes 1xboost     Less GLV         2x hold &  2x boost              Date 4/15  5/11-5/13  5/18 6/7   8/9 8/12 9/6 10/4 11/14 1/6/23  2/8  4/11   Total Weekly Dose 60 mg  BH Papi   45 mg  60 mg  hospital admission 65mg 70mg 60 mg 60 mg 60 mg 60mg  60 mg Non- compliance 60mg   INR 2.8    1.9 2.76 Hold x 4 days 1.6 3.5 3.1 2.7 2.8 2.1  2.9  2.2   Notes rec'd 4/18 No bridge heldx2     lovenox   rec 9/13   rec 11/15 rec  1/6  rec 2/10  1x boost, unable to reach until 2/22  Rec'd 4/13      Date  6/13 8/3/23 10/10  1/9 3/11  5/9      Total WeeklyDose Non-compliant 60 mg 60 mg 60 mg  Non compliant 60 mg 60 mg Non-compliant with recheck 60 mg 67.5 mg     INR  2.7 2.6 2.56  2.5 2.4  1.6      Notes  Rec 6/14 rec 8/4     Unable to talk until 10/12  Rec 1/10 Rec'd 3/19  Spoke to pt 5/14 Lovenox,   Boost x 2       **will take minocycline 100mg BID indefinitely**      Phone Interview:  Tablet Strength: 5mg tabs  Patient Contact Info: [phone] (Home); [phone] (Mobile)  Lab Contact Info: [facility] [phone]  Verbal release 7/23/19 may speak with Delaney (wife), Puneet or Ty (sons)- ok to [facility]    Patient Findings    Positives: Change in medications, Change in diet/appetite   Negatives: Signs/symptoms of thrombosis, Signs/symptoms of bleeding, Laboratory test error suspected, Change in health, Change in alcohol use, Change in activity, Upcoming invasive procedure, Emergency department visit, Upcoming dental procedure, Missed doses, Extra doses, Hospital admission, Bruising, Other complaints   Comments: He ate lots of green beans and broccoli. He also started taking chlorthalidone for his BP and carbamazepine for neuropathy which can diminish the effects of warfarin. He does not feel the carbamazepine is working so he is going to talk to his doctor and see if it is necessary to continue this medication. He is historically non compliant and it was stressed to him the importance of regular INR checks and follow up with our clinic especially since he is changing medications and has had subtherapeutic INRs the last 3 months. I discussed the risks of clots with his mAVR.      Plan:  1. INR was subtherapeutic at 1.6 on 5/09/24 at [facility] lab (goal 2.5-3.5). finally got in touch with Mr. Farias today 5/14. Instructed Mr. Farias to boost today's dose to 15 mg then take 10 mg tomorrow/thurs recheck Friday.  Additionally told him to start Lovenox 120 mg BID tonight and use at least until his INR > 2. May need to increase maintenance dose if INR is subtherapeutic again and patient is continuing to increase GLV intake plus DDI with carbamazepine   2. Repeat INR on 5/17/24.   3. Sending Lovenox 120 mg bid to his preferred pharmacy.  4. Stressed the importance of staying compliant and getting his INR checked regularly. Told him we have been having trouble getting in touch with him historically.   3. Verbal information provided over the phone. Cecilio Farias RBV dosing instructions, expresses understanding by teach back, and has no further questions at this time.    Kayode Childers, PharmD  05/09/24  14:20 EDT    Addend: 5/14/24  Wen Jay, PharmD  5/14/2024  10:44 EDT

## 2024-05-14 RX ORDER — ENOXAPARIN SODIUM 150 MG/ML
120 INJECTION SUBCUTANEOUS EVERY 12 HOURS SCHEDULED
Qty: 80 ML | Refills: 0 | Status: SHIPPED | OUTPATIENT
Start: 2024-05-14

## 2024-05-15 ENCOUNTER — TELEPHONE (OUTPATIENT)
Dept: PHARMACY | Facility: HOSPITAL | Age: 69
End: 2024-05-15
Payer: MEDICARE

## 2024-05-15 NOTE — TELEPHONE ENCOUNTER
Mr. Farias called the clinic today to state that he was not able to  his lovenox prescription today, as it was entered for 80 mL instead of 8 mL. His pharmacy was called and a verbal given to modify the prescription to 8 mL (10 syringes). He will  the prescription today and begin lovenox injections this evening, then take BID tomorrow. INR recheck still planned for Friday 5/17/24.     Jesica Middleton, PharmD  05/15/24   15:44 EDT

## 2024-05-17 ENCOUNTER — ANTICOAGULATION VISIT (OUTPATIENT)
Dept: PHARMACY | Facility: HOSPITAL | Age: 69
End: 2024-05-17
Payer: MEDICARE

## 2024-05-17 LAB
INR PPP: 2
INR PPP: 2

## 2024-05-17 NOTE — PROGRESS NOTES
Anticoagulation Clinic - Remote Progress Note  REMOTE LAB     Indication: mechanical AVR  Referring Provider: Arun Jackson (12/17/2021)  Initial Warfarin Start Date: 2009  Goal INR: 2.5-3.5  Current Drug Interactions: aspirin     Diet: not much GLV 1/9/2023  Alcohol: none  Tobacco: none  OTC Pain Medication:     INR History:  Date 8/2 10/26 12/21 1/19/18 2/20 3/27 4/27 5/16 6/11 7/16 9/20 11/2   Total Weekly Dose 62.5mg 62.5mg 62.5mg 62.5mg 62.5mg 62.5mg 62.5mg 62.5mg 62.5mg 62.5mg 62.5mg 62.5mg   INR 2.2 2.9 3.2 3.0 2.9 3.4 3.7 2.9 2.6 2.9 2.9 3.2   Notes             sick                Date 12/21 2/1/19 3/11 4/24 5/23 6/24 7/11 7/18 7/22 8/15 9/9 9/25   Total Weekly Dose 62.5mg 62.5mg 62.5mg 62.5mg 62.5mg 62.5mg 52.5mg 55mg 57.5mg 57.5mg 57.5mg 57.5mg   INR 3.4 3.34 2.8 3.0 3.3 3.6 2.52 2.27 2.9 2.9 2.5 2.6   Notes recv'd 12/26       self report; recent hosp; Augmentin Merrem post-disch for sepsis merrem merrem 1x incr dose, merrem            Date 11/8 12/30 1/24/20 4/13 6/2 7/8 8/31 10/13 12/1 1/14/21 2/2 3/4   Total Weekly Dose 57.5mg 57.5mg 57.5mg 57.5mg 57.5mg 57.5mg 57.5mg 60mg 60mg 60mg 60mg 60mg   INR 2.4 2.4 3.7 2.2 2.9 2.3 2.2 2.8 3.5 3.5 3.23 2.4   Notes   incr GLV     nerve control 911 supplement recv'd 7/9  incr GLV recv'd 9/2; incr GLV       incr GLV incr GLV      Date 3/30   5/10 6/21 8/3   9/27   11/16-19 12/15   3/8   Total WeeklyDose 60mg non- compliant 60mg 60mg 60mg non- compliant 60mg non-  compliant [facility] admit 60mg Non compliance 60 mg   INR 2.5   2.8 3.6 3.12   3.1     3.2   2.9   Notes 1xboost     Less GLV         2x hold &  2x boost              Date 4/15  5/11-5/13  5/18 6/7   8/9 8/12 9/6 10/4 11/14 1/6/23  2/8  4/11   Total Weekly Dose 60 mg  [facility]   45 mg  60 mg  hospital admission 65mg 70mg 60 mg 60 mg 60 mg 60mg  60 mg Non- compliance 60mg   INR 2.8    1.9 2.76 Hold x 4 days 1.6 3.5 3.1 2.7 2.8 2.1  2.9  2.2   Notes rec'd 4/18 No bridge heldx2     lovenox   rec 9/13   rec 11/15 rec  1/6  rec 2/10  1x boost, unable to reach until 2/22  Rec'd 4/13      Date  6/13 8/3/23 10/10  1/9 3/11  5/9 5/17/24     Total WeeklyDose Non-compliant 60 mg 60 mg 60 mg  Non compliant 60 mg 60 mg Non-compliant with recheck 60 mg 70 mg     INR  2.7 2.6 2.56  2.5 2.4  1.6 2.0     Notes  Rec 6/14 rec 8/4     Unable to talk until 10/12  Rec 1/10 Rec'd 3/19  Spoke to pt 5/14 Lovenox,   Boost x 2       **will take minocycline 100mg BID indefinitely**      Phone Interview:  Tablet Strength: 5mg tabs  Patient Contact Info: [phone] (Home); [phone] (Mobile)  Lab Contact Info: [facility] [phone]  Verbal release 7/23/19 may speak with Delaney (wife), Puneet or Ty (sons)- ok to [facility]      Patient Findings   Positives: Change in medications   Negatives: Signs/symptoms of thrombosis, Signs/symptoms of bleeding, Laboratory test error suspected, Change in health, Change in alcohol use, Change in activity, Upcoming invasive procedure, Emergency department visit, Upcoming dental procedure, Missed doses, Extra doses, Change in diet/appetite, Hospital admission, Bruising, Other complaints   Comments: Discontinued carbamazepine ( see last encounter )    All other findings negative per patient          Plan:  1. INR was therapeutic today at 2.0 (goal 2.5-3.5). Per Wen Jay,[facility] Instructed Mr. Farias to discontinue Lovenox use and take Warfarin...  5/17/24: 15 mg  5/18/24: 10 mg  5/19/24: 7.5 mg  5/20/24: 10 mg  5/21/24: 7.5 mg  2. Repeat INR on 5/22/24. Pt agreed to recheck date   3. Stressed the importance of staying compliant and getting his INR checked regularly. Told him we have been having trouble getting in touch with him historically.   4.Verbal information provided over the phone. Cecilio Farias RBV dosing instructions, expresses understanding by teach back, and has no further questions at this time.    Lio Mendoza, Pharmacy Technician  05/17/24  14:20 EDT    I, Rupert Nixon, SandhyaD,  have reviewed the note in full and agree with the assessment and plan.  05/17/24  16:36 EDT

## 2024-05-21 DIAGNOSIS — Z95.2 HISTORY OF MECHANICAL AORTIC VALVE REPLACEMENT: ICD-10-CM

## 2024-05-21 RX ORDER — WARFARIN SODIUM 5 MG/1
TABLET ORAL
Qty: 120 TABLET | Refills: 0 | Status: SHIPPED | OUTPATIENT
Start: 2024-05-21

## 2024-06-15 ENCOUNTER — APPOINTMENT (OUTPATIENT)
Dept: CT IMAGING | Facility: HOSPITAL | Age: 69
End: 2024-06-15
Payer: MEDICARE

## 2024-06-15 ENCOUNTER — HOSPITAL ENCOUNTER (OUTPATIENT)
Facility: HOSPITAL | Age: 69
LOS: 2 days | Discharge: HOME | End: 2024-06-17
Attending: EMERGENCY MEDICINE | Admitting: FAMILY MEDICINE
Payer: MEDICARE

## 2024-06-15 DIAGNOSIS — K56.609 SBO (SMALL BOWEL OBSTRUCTION): Primary | ICD-10-CM

## 2024-06-15 DIAGNOSIS — N17.9 ACUTE KIDNEY INJURY: ICD-10-CM

## 2024-06-15 DIAGNOSIS — E87.5 ACUTE HYPERKALEMIA: ICD-10-CM

## 2024-06-15 LAB
ALBUMIN SERPL-MCNC: 3.3 G/DL (ref 3.5–5.2)
ALBUMIN/GLOB SERPL: 0.9 G/DL
ALP SERPL-CCNC: 395 U/L (ref 39–117)
ALT SERPL W P-5'-P-CCNC: 170 U/L (ref 1–41)
AMORPH URATE CRY URNS QL MICRO: ABNORMAL /HPF
ANION GAP SERPL CALCULATED.3IONS-SCNC: 10 MMOL/L (ref 5–15)
AST SERPL-CCNC: 338 U/L (ref 1–40)
BACTERIA UR QL AUTO: ABNORMAL /HPF
BASOPHILS # BLD AUTO: 0.05 10*3/MM3 (ref 0–0.2)
BASOPHILS NFR BLD AUTO: 0.4 % (ref 0–1.5)
BILIRUB SERPL-MCNC: 1.6 MG/DL (ref 0–1.2)
BILIRUB UR QL STRIP: NEGATIVE
BUN SERPL-MCNC: 40 MG/DL (ref 8–23)
BUN/CREAT SERPL: 20.6 (ref 7–25)
CALCIUM SPEC-SCNC: 8.4 MG/DL (ref 8.6–10.5)
CHLORIDE SERPL-SCNC: 102 MMOL/L (ref 98–107)
CLARITY UR: ABNORMAL
CO2 SERPL-SCNC: 25 MMOL/L (ref 22–29)
COARSE GRAN CASTS URNS QL MICRO: ABNORMAL /LPF
COLOR UR: ABNORMAL
CREAT SERPL-MCNC: 1.94 MG/DL (ref 0.76–1.27)
CRP SERPL-MCNC: 0.84 MG/DL (ref 0–0.5)
D-LACTATE SERPL-SCNC: 1.8 MMOL/L (ref 0.5–2)
D-LACTATE SERPL-SCNC: 2.7 MMOL/L (ref 0.5–2)
DEPRECATED RDW RBC AUTO: 52.4 FL (ref 37–54)
EGFRCR SERPLBLD CKD-EPI 2021: 37.2 ML/MIN/1.73
EOSINOPHIL # BLD AUTO: 0.02 10*3/MM3 (ref 0–0.4)
EOSINOPHIL NFR BLD AUTO: 0.2 % (ref 0.3–6.2)
ERYTHROCYTE [DISTWIDTH] IN BLOOD BY AUTOMATED COUNT: 14.8 % (ref 12.3–15.4)
ERYTHROCYTE [SEDIMENTATION RATE] IN BLOOD: 78 MM/HR (ref 0–20)
GLOBULIN UR ELPH-MCNC: 3.7 GM/DL
GLUCOSE BLDC GLUCOMTR-MCNC: 147 MG/DL (ref 70–130)
GLUCOSE BLDC GLUCOMTR-MCNC: 167 MG/DL (ref 70–130)
GLUCOSE BLDC GLUCOMTR-MCNC: 177 MG/DL (ref 70–130)
GLUCOSE BLDC GLUCOMTR-MCNC: 206 MG/DL (ref 70–130)
GLUCOSE SERPL-MCNC: 256 MG/DL (ref 65–99)
GLUCOSE UR STRIP-MCNC: NEGATIVE MG/DL
HBA1C MFR BLD: 6.7 % (ref 4.8–5.6)
HCT VFR BLD AUTO: 41.4 % (ref 37.5–51)
HGB BLD-MCNC: 12.6 G/DL (ref 13–17.7)
HGB UR QL STRIP.AUTO: NEGATIVE
HYALINE CASTS UR QL AUTO: ABNORMAL /LPF
IMM GRANULOCYTES # BLD AUTO: 0.08 10*3/MM3 (ref 0–0.05)
IMM GRANULOCYTES NFR BLD AUTO: 0.6 % (ref 0–0.5)
INR PPP: 2.29 (ref 0.89–1.12)
INR PPP: 2.81 (ref 0.89–1.12)
KETONES UR QL STRIP: ABNORMAL
LEUKOCYTE ESTERASE UR QL STRIP.AUTO: ABNORMAL
LIPASE SERPL-CCNC: >3000 U/L (ref 13–60)
LYMPHOCYTES # BLD AUTO: 0.62 10*3/MM3 (ref 0.7–3.1)
LYMPHOCYTES NFR BLD AUTO: 5 % (ref 19.6–45.3)
MCH RBC QN AUTO: 29.4 PG (ref 26.6–33)
MCHC RBC AUTO-ENTMCNC: 30.4 G/DL (ref 31.5–35.7)
MCV RBC AUTO: 96.5 FL (ref 79–97)
MONOCYTES # BLD AUTO: 0.97 10*3/MM3 (ref 0.1–0.9)
MONOCYTES NFR BLD AUTO: 7.8 % (ref 5–12)
NEUTROPHILS NFR BLD AUTO: 10.71 10*3/MM3 (ref 1.7–7)
NEUTROPHILS NFR BLD AUTO: 86 % (ref 42.7–76)
NITRITE UR QL STRIP: NEGATIVE
NRBC BLD AUTO-RTO: 0 /100 WBC (ref 0–0.2)
PH UR STRIP.AUTO: <=5 [PH] (ref 5–8)
PLATELET # BLD AUTO: 256 10*3/MM3 (ref 140–450)
PMV BLD AUTO: 10.3 FL (ref 6–12)
POTASSIUM SERPL-SCNC: 5.5 MMOL/L (ref 3.5–5.2)
PROT SERPL-MCNC: 7 G/DL (ref 6–8.5)
PROT UR QL STRIP: ABNORMAL
PROTHROMBIN TIME: 25.4 SECONDS (ref 12.2–14.5)
PROTHROMBIN TIME: 29.8 SECONDS (ref 12.2–14.5)
RBC # BLD AUTO: 4.29 10*6/MM3 (ref 4.14–5.8)
RBC # UR STRIP: ABNORMAL /HPF
REF LAB TEST METHOD: ABNORMAL
SODIUM SERPL-SCNC: 137 MMOL/L (ref 136–145)
SP GR UR STRIP: 1.02 (ref 1–1.03)
SQUAMOUS #/AREA URNS HPF: ABNORMAL /HPF
UROBILINOGEN UR QL STRIP: ABNORMAL
WBC # UR STRIP: ABNORMAL /HPF
WBC NRBC COR # BLD AUTO: 12.45 10*3/MM3 (ref 3.4–10.8)

## 2024-06-15 PROCEDURE — 85610 PROTHROMBIN TIME: CPT | Performed by: EMERGENCY MEDICINE

## 2024-06-15 PROCEDURE — 83690 ASSAY OF LIPASE: CPT | Performed by: EMERGENCY MEDICINE

## 2024-06-15 PROCEDURE — 96372 THER/PROPH/DIAG INJ SC/IM: CPT

## 2024-06-15 PROCEDURE — 83605 ASSAY OF LACTIC ACID: CPT | Performed by: EMERGENCY MEDICINE

## 2024-06-15 PROCEDURE — 82948 REAGENT STRIP/BLOOD GLUCOSE: CPT

## 2024-06-15 PROCEDURE — 86140 C-REACTIVE PROTEIN: CPT | Performed by: EMERGENCY MEDICINE

## 2024-06-15 PROCEDURE — 25010000002 CALCIUM GLUCONATE-NACL 1-0.675 GM/50ML-% SOLUTION: Performed by: EMERGENCY MEDICINE

## 2024-06-15 PROCEDURE — 87086 URINE CULTURE/COLONY COUNT: CPT | Performed by: STUDENT IN AN ORGANIZED HEALTH CARE EDUCATION/TRAINING PROGRAM

## 2024-06-15 PROCEDURE — 99222 1ST HOSP IP/OBS MODERATE 55: CPT | Performed by: STUDENT IN AN ORGANIZED HEALTH CARE EDUCATION/TRAINING PROGRAM

## 2024-06-15 PROCEDURE — 85025 COMPLETE CBC W/AUTO DIFF WBC: CPT | Performed by: EMERGENCY MEDICINE

## 2024-06-15 PROCEDURE — 25810000003 SODIUM CHLORIDE 0.9 % SOLUTION: Performed by: EMERGENCY MEDICINE

## 2024-06-15 PROCEDURE — 83036 HEMOGLOBIN GLYCOSYLATED A1C: CPT | Performed by: STUDENT IN AN ORGANIZED HEALTH CARE EDUCATION/TRAINING PROGRAM

## 2024-06-15 PROCEDURE — 80053 COMPREHEN METABOLIC PANEL: CPT | Performed by: EMERGENCY MEDICINE

## 2024-06-15 PROCEDURE — 96374 THER/PROPH/DIAG INJ IV PUSH: CPT

## 2024-06-15 PROCEDURE — 99285 EMERGENCY DEPT VISIT HI MDM: CPT

## 2024-06-15 PROCEDURE — 85652 RBC SED RATE AUTOMATED: CPT | Performed by: EMERGENCY MEDICINE

## 2024-06-15 PROCEDURE — 74176 CT ABD & PELVIS W/O CONTRAST: CPT

## 2024-06-15 PROCEDURE — 85610 PROTHROMBIN TIME: CPT | Performed by: STUDENT IN AN ORGANIZED HEALTH CARE EDUCATION/TRAINING PROGRAM

## 2024-06-15 PROCEDURE — 63710000001 INSULIN REGULAR HUMAN PER 5 UNITS: Performed by: EMERGENCY MEDICINE

## 2024-06-15 PROCEDURE — 99291 CRITICAL CARE FIRST HOUR: CPT

## 2024-06-15 PROCEDURE — 25010000002 ENOXAPARIN PER 10 MG: Performed by: STUDENT IN AN ORGANIZED HEALTH CARE EDUCATION/TRAINING PROGRAM

## 2024-06-15 PROCEDURE — 94640 AIRWAY INHALATION TREATMENT: CPT

## 2024-06-15 PROCEDURE — 63710000001 INSULIN LISPRO (HUMAN) PER 5 UNITS: Performed by: STUDENT IN AN ORGANIZED HEALTH CARE EDUCATION/TRAINING PROGRAM

## 2024-06-15 PROCEDURE — 81001 URINALYSIS AUTO W/SCOPE: CPT | Performed by: STUDENT IN AN ORGANIZED HEALTH CARE EDUCATION/TRAINING PROGRAM

## 2024-06-15 PROCEDURE — 94664 DEMO&/EVAL PT USE INHALER: CPT

## 2024-06-15 RX ORDER — SODIUM CHLORIDE 0.9 % (FLUSH) 0.9 %
10 SYRINGE (ML) INJECTION EVERY 12 HOURS SCHEDULED
Status: DISCONTINUED | OUTPATIENT
Start: 2024-06-15 | End: 2024-06-17 | Stop reason: HOSPADM

## 2024-06-15 RX ORDER — SODIUM CHLORIDE 0.9 % (FLUSH) 0.9 %
10 SYRINGE (ML) INJECTION AS NEEDED
Status: DISCONTINUED | OUTPATIENT
Start: 2024-06-15 | End: 2024-06-17 | Stop reason: HOSPADM

## 2024-06-15 RX ORDER — ATORVASTATIN CALCIUM 40 MG/1
40 TABLET, FILM COATED ORAL NIGHTLY
COMMUNITY

## 2024-06-15 RX ORDER — POLYETHYLENE GLYCOL 3350 17 G/17G
17 POWDER, FOR SOLUTION ORAL DAILY PRN
Status: DISCONTINUED | OUTPATIENT
Start: 2024-06-15 | End: 2024-06-17 | Stop reason: HOSPADM

## 2024-06-15 RX ORDER — BISACODYL 5 MG/1
5 TABLET, DELAYED RELEASE ORAL DAILY PRN
Status: DISCONTINUED | OUTPATIENT
Start: 2024-06-15 | End: 2024-06-17 | Stop reason: HOSPADM

## 2024-06-15 RX ORDER — ATORVASTATIN CALCIUM 40 MG/1
40 TABLET, FILM COATED ORAL NIGHTLY
Status: DISCONTINUED | OUTPATIENT
Start: 2024-06-15 | End: 2024-06-17 | Stop reason: HOSPADM

## 2024-06-15 RX ORDER — ACETAMINOPHEN 325 MG/1
650 TABLET ORAL EVERY 4 HOURS PRN
Status: DISCONTINUED | OUTPATIENT
Start: 2024-06-15 | End: 2024-06-17 | Stop reason: HOSPADM

## 2024-06-15 RX ORDER — NORTRIPTYLINE HYDROCHLORIDE 10 MG/1
10 CAPSULE ORAL NIGHTLY
Status: DISCONTINUED | OUTPATIENT
Start: 2024-06-15 | End: 2024-06-17 | Stop reason: HOSPADM

## 2024-06-15 RX ORDER — SODIUM CHLORIDE 9 MG/ML
40 INJECTION, SOLUTION INTRAVENOUS AS NEEDED
Status: DISCONTINUED | OUTPATIENT
Start: 2024-06-15 | End: 2024-06-17 | Stop reason: HOSPADM

## 2024-06-15 RX ORDER — ENOXAPARIN SODIUM 150 MG/ML
1 INJECTION SUBCUTANEOUS EVERY 12 HOURS
Status: DISCONTINUED | OUTPATIENT
Start: 2024-06-15 | End: 2024-06-16

## 2024-06-15 RX ORDER — NICOTINE POLACRILEX 4 MG
15 LOZENGE BUCCAL
Status: DISCONTINUED | OUTPATIENT
Start: 2024-06-15 | End: 2024-06-17 | Stop reason: HOSPADM

## 2024-06-15 RX ORDER — AMOXICILLIN 250 MG
2 CAPSULE ORAL 2 TIMES DAILY
Status: DISCONTINUED | OUTPATIENT
Start: 2024-06-15 | End: 2024-06-17 | Stop reason: HOSPADM

## 2024-06-15 RX ORDER — BISACODYL 10 MG
10 SUPPOSITORY, RECTAL RECTAL DAILY PRN
Status: DISCONTINUED | OUTPATIENT
Start: 2024-06-15 | End: 2024-06-17 | Stop reason: HOSPADM

## 2024-06-15 RX ORDER — WARFARIN SODIUM 5 MG/1
5 TABLET ORAL
Status: DISCONTINUED | OUTPATIENT
Start: 2024-06-15 | End: 2024-06-15 | Stop reason: SDUPTHER

## 2024-06-15 RX ORDER — IBUPROFEN 600 MG/1
1 TABLET ORAL
Status: DISCONTINUED | OUTPATIENT
Start: 2024-06-15 | End: 2024-06-17 | Stop reason: HOSPADM

## 2024-06-15 RX ORDER — ALBUTEROL SULFATE 2.5 MG/3ML
2.5 SOLUTION RESPIRATORY (INHALATION) ONCE
Status: COMPLETED | OUTPATIENT
Start: 2024-06-15 | End: 2024-06-15

## 2024-06-15 RX ORDER — CALCIUM GLUCONATE 20 MG/ML
1000 INJECTION, SOLUTION INTRAVENOUS ONCE
Status: COMPLETED | OUTPATIENT
Start: 2024-06-15 | End: 2024-06-15

## 2024-06-15 RX ORDER — MINOCYCLINE HYDROCHLORIDE 50 MG/1
100 CAPSULE ORAL 2 TIMES DAILY
Status: DISCONTINUED | OUTPATIENT
Start: 2024-06-15 | End: 2024-06-17 | Stop reason: HOSPADM

## 2024-06-15 RX ORDER — INSULIN LISPRO 100 [IU]/ML
2-7 INJECTION, SOLUTION INTRAVENOUS; SUBCUTANEOUS
Status: DISCONTINUED | OUTPATIENT
Start: 2024-06-15 | End: 2024-06-17 | Stop reason: HOSPADM

## 2024-06-15 RX ORDER — ASPIRIN 81 MG/1
81 TABLET ORAL DAILY
Status: DISCONTINUED | OUTPATIENT
Start: 2024-06-15 | End: 2024-06-17 | Stop reason: HOSPADM

## 2024-06-15 RX ORDER — GABAPENTIN 300 MG/1
300 CAPSULE ORAL DAILY
Status: DISCONTINUED | OUTPATIENT
Start: 2024-06-15 | End: 2024-06-16

## 2024-06-15 RX ORDER — DEXTROSE MONOHYDRATE 25 G/50ML
25 INJECTION, SOLUTION INTRAVENOUS
Status: DISCONTINUED | OUTPATIENT
Start: 2024-06-15 | End: 2024-06-17 | Stop reason: HOSPADM

## 2024-06-15 RX ORDER — WARFARIN SODIUM 5 MG/1
10 TABLET ORAL
Status: DISCONTINUED | OUTPATIENT
Start: 2024-06-15 | End: 2024-06-16

## 2024-06-15 RX ADMIN — SENNOSIDES AND DOCUSATE SODIUM 2 TABLET: 8.6; 5 TABLET ORAL at 21:01

## 2024-06-15 RX ADMIN — INSULIN LISPRO 2 UNITS: 100 INJECTION, SOLUTION INTRAVENOUS; SUBCUTANEOUS at 12:21

## 2024-06-15 RX ADMIN — ENOXAPARIN SODIUM 135 MG: 150 INJECTION SUBCUTANEOUS at 21:00

## 2024-06-15 RX ADMIN — ALBUTEROL SULFATE 2.5 MG: 2.5 SOLUTION RESPIRATORY (INHALATION) at 05:52

## 2024-06-15 RX ADMIN — ASPIRIN 81 MG: 81 TABLET, COATED ORAL at 10:33

## 2024-06-15 RX ADMIN — NORTRIPTYLINE HYDROCHLORIDE 10 MG: 10 CAPSULE ORAL at 21:01

## 2024-06-15 RX ADMIN — Medication 10 ML: at 21:01

## 2024-06-15 RX ADMIN — WARFARIN SODIUM 10 MG: 5 TABLET ORAL at 17:18

## 2024-06-15 RX ADMIN — CALCIUM GLUCONATE 1000 MG: 20 INJECTION, SOLUTION INTRAVENOUS at 06:01

## 2024-06-15 RX ADMIN — INSULIN LISPRO 2 UNITS: 100 INJECTION, SOLUTION INTRAVENOUS; SUBCUTANEOUS at 17:18

## 2024-06-15 RX ADMIN — GABAPENTIN 300 MG: 300 CAPSULE ORAL at 10:34

## 2024-06-15 RX ADMIN — SODIUM CHLORIDE 1000 ML: 9 INJECTION, SOLUTION INTRAVENOUS at 03:25

## 2024-06-15 RX ADMIN — Medication 10 ML: at 12:21

## 2024-06-15 RX ADMIN — ATORVASTATIN CALCIUM 40 MG: 40 TABLET, FILM COATED ORAL at 21:02

## 2024-06-15 RX ADMIN — INSULIN HUMAN 10 UNITS: 100 INJECTION, SOLUTION PARENTERAL at 06:05

## 2024-06-15 RX ADMIN — MINOCYCLINE HYDROCHLORIDE 100 MG: 50 CAPSULE ORAL at 21:01

## 2024-06-15 RX ADMIN — MINOCYCLINE HYDROCHLORIDE 100 MG: 50 CAPSULE ORAL at 12:23

## 2024-06-15 RX ADMIN — ENOXAPARIN SODIUM 135 MG: 150 INJECTION SUBCUTANEOUS at 10:33

## 2024-06-15 NOTE — PROGRESS NOTES
"Pharmacy Consult  -  Warfarin    Cecilio Farias is a  68 y.o. male   Height - 190.5 cm (75\")  Weight - 126 kg (278 lb)    Consulting Provider: - hospitalist  Indication: - mechanical aortic valve  Goal INR: - 2.5-3.5  Home Regimen:   - warfarin 10mg MWF   - warfarin 7.5mg on remaining days    Bridge Therapy: Yes   and enoxapain    Drug-Drug Interactions with current regimen:  Acetaminophen: doses >2 grams/day may increase bleeding risk  Aspirin: may increase bleeding risk    Warfarin Dosing During Admission:    Date  6/15           INR  2.29           Dose  10 mg                Education Provided:    Discharge Follow up:   Following Provider - [facility] Anticoagulation clinic   Follow up time range or appointment - 2-3 days following discharge      Labs:    Results from last 7 days   Lab Units 06/15/24  0316   INR  2.29*   HEMOGLOBIN g/dL 12.6*   HEMATOCRIT % 41.4     Results from last 7 days   Lab Units 06/15/24  0316   SODIUM mmol/L 137   POTASSIUM mmol/L 5.5*   CHLORIDE mmol/L 102   CO2 mmol/L 25.0   BUN mg/dL 40*   CREATININE mg/dL 1.94*   CALCIUM mg/dL 8.4*   BILIRUBIN mg/dL 1.6*   ALK PHOS U/L 395*   ALT (SGPT) U/L 170*   AST (SGOT) U/L 338*   GLUCOSE mg/dL 256*       Current dietary intake: not charted    Dietary Orders (From admission, onward)       Start     Ordered    06/15/24 0925  Diet: Liquid; Clear Liquid; Fluid Consistency: Thin (IDDSI 0)  Diet Effective Now        References:    Diet Order [facility]   Question Answer Comment   Diets: Liquid    Liquid Diet: Clear Liquid    Fluid Consistency: Thin (IDDSI 0)        06/15/24 0924                    Assessment/Plan:     1. Patient's INR is SUBtherapeutic today at 2.29. Goal INR 2.5-3.5 for mechanical aortic valve.   2. Will give warfarin 10mg tonight and observe INR trend. Bridging with therapeutic lovenox until INR within goal range.  3. Daily PT/INR ordered.  4. Monitor signs/symptoms of bleeding, dietary intake, and drug-drug " interactions. Make dose adjustments as necessary.     Pharmacy will continue to follow.    Thank you  Shadi Guzman, PharmD  6/15/2024  10:04 EDT

## 2024-06-15 NOTE — ED PROVIDER NOTES
Subjective   History of Present Illness  This is a 68-year-old male with past medical history of diabetes presented to the emergency department with some abdominal discomfort, vomiting and loose stools.  Patient states that he has been constipated for the last few days.  Has been having some spasms in his colon.  Patient was taking some stool softeners.  Started having some loose stools afterwards.  Patient has been very nauseous as well.  He vomited yesterday.  Complaining of some diffuse abdominal pain.  Is not having any fevers or chills.  No headache or change in vision.  No focal weakness.  No chest pain or shortness of breath.    History provided by:  Patient and relative   used: No        Review of Systems   Constitutional:  Negative for chills and fever.   HENT:  Negative for congestion, ear pain and sore throat.    Eyes:  Negative for visual disturbance.   Respiratory:  Negative for shortness of breath.    Cardiovascular:  Negative for chest pain.   Gastrointestinal:  Positive for abdominal pain, diarrhea, nausea and vomiting.   Genitourinary:  Negative for difficulty urinating.   Musculoskeletal:  Negative for arthralgias.   Skin:  Negative for rash.   Neurological:  Negative for dizziness, weakness and numbness.   Psychiatric/Behavioral:  Negative for agitation.        Past Medical History:   Diagnosis Date    Cancer     colon cancer with operation/ chemotherapy/radiation     CHF (congestive heart failure) 2009    Acute Systolic     Chronic anticoagulation     coumadin    Diabetes mellitus     H/O aortic valve replacement     HLD (hyperlipidemia)     Hypertension     Sepsis due to Acinetobacter species 6/13/2019       Allergies   Allergen Reactions    Sulfa Antibiotics     Amoxicillin Rash     Has tolerated ceftriaxone       Past Surgical History:   Procedure Laterality Date    AORTIC VALVE REPAIR/REPLACEMENT  2009    25 mm. [place] AVR    ASCENDING AORTIC ANEURYSM REPAIR  2009     BACK SURGERY      CARDIAC CATHETERIZATION N/A 1/20/2021    Procedure: RIGHT AND LEFT HEART CATH;  Surgeon: Kurtis Smith MD;  Location: [facility] CATH INVASIVE LOCATION;  Service: Cardiology;  Laterality: N/A;    COLON SURGERY      Colon cancer with operation    FOOT SURGERY      bilateral 2/2 wound and trauma        Family History   Problem Relation Age of Onset    Heart disease Mother     Hyperlipidemia Mother     Diabetes Mother     Leukemia Father     Cancer Sister     Kidney failure Brother     Hepatitis Brother     Cancer Sister     Cancer Brother     Heart disease Brother     Heart attack Brother        Social History     Socioeconomic History    Marital status:    Tobacco Use    Smoking status: Never    Smokeless tobacco: Never   Vaping Use    Vaping status: Never Used   Substance and Sexual Activity    Alcohol use: No    Drug use: No    Sexual activity: Defer           Objective   Physical Exam  Vitals and nursing note reviewed.   Constitutional:       General: He is not in acute distress.     Appearance: He is not ill-appearing or toxic-appearing.   HENT:      Mouth/Throat:      Pharynx: No posterior oropharyngeal erythema.   Eyes:      Extraocular Movements: Extraocular movements intact.      Pupils: Pupils are equal, round, and reactive to light.   Cardiovascular:      Rate and Rhythm: Normal rate and regular rhythm.   Pulmonary:      Effort: Pulmonary effort is normal. No respiratory distress.   Abdominal:      General: Abdomen is flat. There is no distension.      Palpations: There is no mass.      Tenderness: There is generalized abdominal tenderness. There is no guarding or rebound.   Musculoskeletal:         General: No deformity. Normal range of motion.   Neurological:      General: No focal deficit present.      Mental Status: He is alert.      Sensory: No sensory deficit.      Motor: No weakness.         Procedures           ED Course  ED Course as of 06/15/24 0620   Sat Ben 15, 2024    0336 BP: 136/58 [JK]   0336 Temp: 97.6 °F (36.4 °C) [JK]   0336 Heart Rate: 81 [JK]   0336 Resp: 20 [JK]   0336 SpO2: 98 %  Interpretation:  Patient's repeat vitals, telemetry tracing, and pulse oximetry tracing were directly viewed and interpreted by myself.  Normal sinus rhythm [JK]   0535 CBC & Differential(!) [JK]   0535 Lipase(!) [JK]   0535 Lactic Acid, Plasma(!!) [JK]   0535 Protime-INR(!) [JK]   0535 C-reactive Protein(!) [JK]   0535 Comprehensive Metabolic Panel(!)  Interpretation:  Laboratory studies were reviewed and interpreted directly by myself.  CBC showed some mild leukocytosis with white blood cell count 12.45, lipase was greater than 3000, lactic was elevated at 2.7, INR is 2.29, CRP marginal at 0.4, CMP showed some acute kidney injury with a BUN of 40 and creatinine 1.94, potassium was elevated at 5.5, ALT elevated at 170, AST at 338, alk phos 395 and bilirubin at 1.16 [JK]   0536 CT Abdomen Pelvis Without Contrast  Interpretation:  Imaging was directly visualized by myself, per my interpretations, CT abdomen pelvis showed multiple ventral hernias with a small bowel obstruction. [JK]   0537 Patient has evidence of hyperkalemia in the setting of acute kidney injury.  We did administer hyperkalemia cocktail with nebulized albuterol, calcium, insulin and dextrose [JK]   0537 Patient does have evidence of small bowel obstruction.  Does have a small area of bowel in the left abdomen which is not reducible.  I spoke with general surgery.  They will be evaluating the patient.  NG tube placed by nursing staff. [JK]   0619 Based on the patient's presentation, history and diffuse work-up in the emergency department, the patient is deemed appropriate for admission to the hospital for further evaluation and treatment.  This was discussed with the patient at bedside.  They are in agreement with the current medical management.    Admitting physician: Dr. Zuniga    Discussion was had with admitting physician  regarding the laboratory and imaging findings.  We did discuss current therapeutics in the emergency department and progression of the patient.  Working diagnosis was conveyed to the admitting physician, as well as current status and prognosis for the patient.  They are in agreement with these findings and have accepted admission.    Shared decision making:   After full review of the patient's clinical presentation, review of any work-up including but not limited to laboratory studies and radiology obtained, I had a discussion with the patient.  Treatment options were discussed as well as the risks, benefits and consequences.  I discussed all findings with the patient and family members if available.  During the discussion, treatment goals were understood by all as well as any misconceptions which were addressed with the patient.  Ample time was given for any questions they may have had.  They are in agreement with the treatment plan as well as final disposition. [JK]      ED Course User Index  [JK] Domo Arroyo MD                                             Medical Decision Making  This is a 68-year-old male with a history of CHF and diabetes presenting to the emergency department with some abdominal pain, nausea, vomiting, diarrhea as well as constipation.  Overall the patient's symptoms seem consistent with acute constipation and colon spasm.  However he has had multiple abdominal surgeries.  There is concern for small bowel obstruction.  Patient has no evidence of acute abdomen or peritonitis at this time.  Overall, the patient is nontoxic.  Afebrile.  IV access was established and the patient.  Placed on continuous telemetry monitoring.  Given the patient's presentation, differential is broad and will require further evaluation.  Workup initiated.      Differential diagnosis: Peptic ulcer disease, dyspepsia, GERD, pancreatitis, biliary colic, electrolyte abnormality, acute kidney injury, small bowel  obstruction, enteritis      Amount and/or Complexity of Data Reviewed  External Data Reviewed: labs, radiology, ECG and notes.     Details: External laboratories, imaging as well as notes were reviewed personally by myself.  All relevant studies were used to guide decision making.     Date of previous record: 12/12/2023    Source of note: Cardiology    Summary: Patient was seen evaluate for routine visit of mechanical valve.  I did review basic laboratory studies on file as well as a previous chest x-ray and EKG.  Records reviewed    Labs: ordered. Decision-making details documented in ED Course.  Radiology: ordered and independent interpretation performed. Decision-making details documented in ED Course.  ECG/medicine tests: ordered and independent interpretation performed. Decision-making details documented in ED Course.    Risk  OTC drugs.  Prescription drug management.  Decision regarding hospitalization.    Critical Care  Total time providing critical care: 31 minutes (Authorized and performed by: Domo Arroyo MD  I personally spent a total of 31 minutes of critical care time with the patient.  Due to the high probability of clinically significant, life-threatening deterioration, the patient required my highest level of care to intervene emergently.  These interventions, including, but not limited to, establishing IV access, continuous pulse oximeter and telemetry monitoring, frequent monitoring and reevaluations, management the patient's airway and cardiovascular system, discussion with other consultants as needed, which bear directly on the management the patient.  This also includes obtaining history, examining the patient, frequent reevaluations and coordinating high level of care.  Failure to emergently initiate these interventions would carry a high probability of resulting in sudden, clinically significant or life threatening deterioration in the patient's condition.  This does not include time  spent on separately reported billable procedures.)        Final diagnoses:   SBO (small bowel obstruction)   Acute hyperkalemia   Acute kidney injury       ED Disposition  ED Disposition       ED Disposition   Decision to Admit    Condition   --    Comment   Level of Care: Telemetry [5]   Diagnosis: SBO (small bowel obstruction) [958973]   Admitting Physician: AYAAN BARNHART III [[id number]   Attending Physician: AYAAN BARNHART III [[id number]]   Bed Request Comments: tele obs (not CDU)                 No follow-up provider specified.       Medication List      No changes were made to your prescriptions during this visit.            Domo Arroyo MD  06/15/24 0611

## 2024-06-15 NOTE — ED NOTES
Pt bilateral lower legs are  black. Pt reports this is normal for him and due to a medication he takes

## 2024-06-15 NOTE — ED NOTES
Cecilio Farias    Nursing Report ED to Floor:  Mental status: A&O x4  Ambulatory status: ambulatory with assistance  Oxygen Therapy:  RA  Cardiac Rhythm:    Admitted from: home  Safety Concerns:  has NG tube  Social Issues: n/a  ED Room #:  10    ED Nurse Phone Extension - [phone] or may call [phone].      HPI:   Chief Complaint   Patient presents with    Abdominal Pain       Past Medical History:  Past Medical History:   Diagnosis Date    Cancer     colon cancer with operation/ chemotherapy/radiation     CHF (congestive heart failure) 2009    Acute Systolic     Chronic anticoagulation     coumadin    Diabetes mellitus     H/O aortic valve replacement     HLD (hyperlipidemia)     Hypertension     Sepsis due to Acinetobacter species 6/13/2019        Past Surgical History:  Past Surgical History:   Procedure Laterality Date    AORTIC VALVE REPAIR/REPLACEMENT  2009    25 mm. [place] Kevin AVR    ASCENDING AORTIC ANEURYSM REPAIR  2009    BACK SURGERY      CARDIAC CATHETERIZATION N/A 1/20/2021    Procedure: RIGHT AND LEFT HEART CATH;  Surgeon: Kurtis Smith MD;  Location: [facility] CATH INVASIVE LOCATION;  Service: Cardiology;  Laterality: N/A;    COLON SURGERY      Colon cancer with operation    FOOT SURGERY      bilateral 2/2 wound and trauma         Admitting Doctor:   Jose Manuel Zuniga III, DO    Consulting Provider(s):  Consults       Date and Time Order Name Status Description    6/15/2024  5:35 AM Inpatient General Surgery Consult               Admitting Diagnosis:   The primary encounter diagnosis was SBO (small bowel obstruction). Diagnoses of Acute hyperkalemia and Acute kidney injury were also pertinent to this visit.    Most Recent Vitals:   Vitals:    06/15/24 0400 06/15/24 0455 06/15/24 0530 06/15/24 0552   BP: 95/41 94/52 103/84    Pulse: 81 76 62 61   Resp: 20   20   Temp:       SpO2: 98% 97% 100% 97%   Weight:       Height:           Active LDAs/IV Access:   Lines, Drains & Airways       Active LDAs        Name Placement date Placement time Site Days    Peripheral IV 06/15/24 0319 Right Antecubital 06/15/24  0319  Antecubital  less than 1    NG/OG Tube Nasogastric 16 Fr Left nostril 06/15/24  0619  Left nostril  less than 1                    Labs (abnormal labs have a star):   Labs Reviewed   COMPREHENSIVE METABOLIC PANEL - Abnormal; Notable for the following components:       Result Value    Glucose 256 (*)     BUN 40 (*)     Creatinine 1.94 (*)     Potassium 5.5 (*)     Calcium 8.4 (*)     Albumin 3.3 (*)     ALT (SGPT) 170 (*)     AST (SGOT) 338 (*)     Alkaline Phosphatase 395 (*)     Total Bilirubin 1.6 (*)     eGFR 37.2 (*)     All other components within normal limits    Narrative:     GFR Normal >60  Chronic Kidney Disease <60  Kidney Failure <15     LIPASE - Abnormal; Notable for the following components:    Lipase >3,000 (*)     All other components within normal limits   PROTIME-INR - Abnormal; Notable for the following components:    Protime 25.4 (*)     INR 2.29 (*)     All other components within normal limits   CBC WITH AUTO DIFFERENTIAL - Abnormal; Notable for the following components:    WBC 12.45 (*)     Hemoglobin 12.6 (*)     MCHC 30.4 (*)     Neutrophil % 86.0 (*)     Lymphocyte % 5.0 (*)     Eosinophil % 0.2 (*)     Immature Grans % 0.6 (*)     Neutrophils, Absolute 10.71 (*)     Lymphocytes, Absolute 0.62 (*)     Monocytes, Absolute 0.97 (*)     Immature Grans, Absolute 0.08 (*)     All other components within normal limits   LACTIC ACID, PLASMA - Abnormal; Notable for the following components:    Lactate 2.7 (*)     All other components within normal limits   C-REACTIVE PROTEIN - Abnormal; Notable for the following components:    C-Reactive Protein 0.84 (*)     All other components within normal limits   SEDIMENTATION RATE - Abnormal; Notable for the following components:    Sed Rate 78 (*)     All other components within normal limits   URINALYSIS W/ CULTURE IF INDICATED   LACTIC ACID, REFLEX    POCT GLUCOSE FINGERSTICK   POCT GLUCOSE FINGERSTICK   POCT GLUCOSE FINGERSTICK   POCT GLUCOSE FINGERSTICK   CBC AND DIFFERENTIAL    Narrative:     The following orders were created for panel order CBC & Differential.  Procedure                               Abnormality         Status                     ---------                               -----------         ------                     CBC Auto Differential[[id number]]        Abnormal            Final result                 Please view results for these tests on the individual orders.       Meds Given in ED:   Medications   sodium chloride 0.9 % flush 10 mL (has no administration in time range)   sodium chloride 0.9 % bolus 1,000 mL (0 mL Intravenous Stopped 6/15/24 0415)   calcium gluconate 1000 Mg/50ml 0.675% NaCl IV SOLN (1,000 mg Intravenous New Bag 6/15/24 0601)   insulin regular (humuLIN R,novoLIN R) injection 10 Units (10 Units Intravenous Given 6/15/24 0605)   albuterol (PROVENTIL) nebulizer solution 0.083% 2.5 mg/3mL (2.5 mg Nebulization Given 6/15/24 0552)           Last NIH score:                                                          Dysphagia screening results:        [place] Coma Scale:  No data recorded     CIWA:        Restraint Type:            Isolation Status:  No active isolations

## 2024-06-15 NOTE — CONSULTS
General Surgery Consultation Note    Date of Service: 6/15/2024  Cecilio Farias  [id number]  1955      Referring Provider: Digna Mitchell MD    Location of Consult: ER     Reason for Consultation: Small bowel obstruction       History of Present Illness:  I am seeing, Cecilio Farias, in consultation for Digna Mitchell MD regarding small bowel obstruction.    Mr. Cecilio Farias is a 68 year old gentleman with a PMHx of colon cancer s/p colectomy, CHF, mechanical St. Kevin aortic valve, non-obstructive CAD, HTN, PAF, JI, Pulmonary HTN, DM2, Hx of acinetobacter sepsis on chronic suppressive minocycline presenting with recurrent small bowel obstruction 2/2 an incarcerated ventral hernia.     Specific history is somewhat unclear given patient uncertainty, but taken from patient and family. He states that he has been having abdominal discomfort and constipation over the last several days. At some point over the last two days, his GI cramping became much worse and developed nausea,vomiting and diarrhea. He describes his pain as diffuse, crampy, and colicky. It is similar to his symptoms from previous bowel obstructions. Family member present states he noticed a hard lump in the area of the suspected hernia, which has now resolved. Upon my evaluation, patient had just had a large bowel movement with relief of his abdominal pain.    He follows with my partner Dr. Lester, and it has been decided that the risk of surgical repair of his large swiss-cheese ventral hernias outweighs the benefits given his comorbidities.     Work-up in the ER showed labs of lactate 2.7, WBC 12.5, K 5.5, INR 2.3, T bili 1.6, AST//170, glucose 256, alk phos 395.    CT A/P shows evidence of a bowel obstruction with likely transition point at a small hernia defect just left of mid-line.      Problems Addressed this Visit       SBO (small bowel obstruction) - Primary     Other Visit Diagnoses       Acute hyperkalemia        Acute kidney  injury              Diagnoses         Codes Comments    SBO (small bowel obstruction)    -  Primary ICD-10-CM: K56.609  ICD-9-CM: 560.9     Acute hyperkalemia     ICD-10-CM: E87.5  ICD-9-CM: 276.7     Acute kidney injury     ICD-10-CM: N17.9  ICD-9-CM: 584.9             Past Medical History:   Diagnosis Date    Cancer     colon cancer with operation/ chemotherapy/radiation     CHF (congestive heart failure) 2009    Acute Systolic     Chronic anticoagulation     coumadin    Diabetes mellitus     H/O aortic valve replacement     HLD (hyperlipidemia)     Hypertension     Sepsis due to Acinetobacter species 6/13/2019       Past Surgical History:    AORTIC VALVE REPAIR/REPLACEMENT    25 mm. St. Kevin AVR    ASCENDING AORTIC ANEURYSM REPAIR    BACK SURGERY    CARDIAC CATHETERIZATION    Procedure: RIGHT AND LEFT HEART CATH;  Surgeon: Kurtis Smith MD;  Location: [facility] CATH INVASIVE LOCATION;  Service: Cardiology;  Laterality: N/A;    COLON SURGERY    Colon cancer with operation    FOOT SURGERY    bilateral 2/2 wound and trauma        Allergies   Allergen Reactions    Sulfa Antibiotics     Amoxicillin Rash     Has tolerated ceftriaxone       No current facility-administered medications on file prior to encounter.     Current Outpatient Medications on File Prior to Encounter   Medication Sig Dispense Refill    aspirin 81 MG EC tablet Take 1 tablet by mouth Daily. OTC      atorvastatin (LIPITOR) 40 MG tablet Take 1 tablet by mouth Every Night.      Continuous Blood Gluc Sensor (Dexcom G6 Sensor)       furosemide (LASIX) 20 MG tablet Take 1 tablet by mouth Daily. 90 tablet 2    gabapentin (NEURONTIN) 800 MG tablet Take 1 tablet by mouth 3 (Three) Times a Day.      glipizide (GLUCOTROL) 5 MG tablet Take 1 tablet by mouth 2 (Two) Times a Day Before Meals.      insulin lispro protamine-insulin lispro (humaLOG 75-25) (75-25) 100 UNIT/ML suspension injection Inject 15 Units under the skin into the appropriate area as directed 2  "(Two) Times a Day With Meals.      lisinopril (PRINIVIL,ZESTRIL) 40 MG tablet Take 1 tablet by mouth Daily.      minocycline (MINOCIN,DYNACIN) 100 MG capsule Take 1 capsule by mouth 2 (Two) Times a Day. For 30 days, refilled on 07-08-22      nortriptyline (PAMELOR) 10 MG capsule Take 1 capsule by mouth Every Night.      OneTouch Ultra test strip       spironolactone (ALDACTONE) 25 MG tablet Take 1 tablet by mouth Daily. 30 tablet 11    warfarin (COUMADIN) 5 MG tablet TAKE 1 AND 1/2 TO 2 TABLETS BY MOUTH OR AS DIRECTED BY [facility]. 120 tablet 0    Enoxaparin Sodium (LOVENOX) 120 MG/0.8ML solution prefilled syringe syringe Inject 0.8 mL under the skin into the appropriate area as directed Every 12 (Twelve) Hours. 80 mL 0    HYDROcodone-acetaminophen (NORCO) 5-325 MG per tablet Take 1 tablet by mouth As Needed.           Current Facility-Administered Medications:     [COMPLETED] Insert Peripheral IV, , , Once **AND** sodium chloride 0.9 % flush 10 mL, 10 mL, Intravenous, PRN, Domo Arroyo MD    Family History   Problem Relation Age of Onset    Heart disease Mother     Hyperlipidemia Mother     Diabetes Mother     Leukemia Father     Cancer Sister     Kidney failure Brother     Hepatitis Brother     Cancer Sister     Cancer Brother     Heart disease Brother     Heart attack Brother      Social History     Socioeconomic History    Marital status:    Tobacco Use    Smoking status: Never    Smokeless tobacco: Never   Vaping Use    Vaping status: Never Used   Substance and Sexual Activity    Alcohol use: No    Drug use: No    Sexual activity: Defer       Review of Systems:  Per HPI, otherwise the 12 point review of systems is negative.    /53 (BP Location: Right arm, Patient Position: Lying)   Pulse 95   Temp 98 °F (36.7 °C) (Oral)   Resp 20   Ht 190.5 cm (75\")   Wt 126 kg (278 lb)   SpO2 98%   BMI 34.75 kg/m²   Body mass index is 34.75 kg/m².    General: " slightly confused, chronically ill-appearing, no acute distress  HEENT: normocephalic, atraumatic, sclerae anicteric, external ears normal   Cardiovascular: regular rate and regular rhythm  Pulmonary: breathing comfortably on NC, no respiratory distress  Gastrointestinal: soft, non-tender, non-distended, multiple hernia defects appreciated, long vertical midline incision is well-healed  Extremity: no clubbing, cyanosis, edema   Neuro: cognitively intact, CN grossly intact, no focal deficits  Psych: appropriate mood and affect    CBC  Results from last 7 days   Lab Units 06/15/24  0316   WBC 10*3/mm3 12.45*   HEMOGLOBIN g/dL 12.6*   HEMATOCRIT % 41.4   PLATELETS 10*3/mm3 256       CMP  Results from last 7 days   Lab Units 06/15/24  0316   SODIUM mmol/L 137   POTASSIUM mmol/L 5.5*   CHLORIDE mmol/L 102   CO2 mmol/L 25.0   BUN mg/dL 40*   CREATININE mg/dL 1.94*   CALCIUM mg/dL 8.4*   BILIRUBIN mg/dL 1.6*   ALK PHOS U/L 395*   ALT (SGPT) U/L 170*   AST (SGOT) U/L 338*   GLUCOSE mg/dL 256*       Radiology  Imaging Results (Last 72 Hours)       Procedure Component Value Units Date/Time    CT Abdomen Pelvis Without Contrast [[id number]] Collected: 06/15/24 0349     Updated: 06/15/24 0400    Narrative:      CT ABDOMEN PELVIS WO CONTRAST    Date of Exam: 6/15/2024 3:09 AM EDT    Indication: Pain, vomiting, history of bowel obstruction.    Comparison: 8/1/2022.    Technique: Axial CT images were obtained of the abdomen and pelvis without the administration of contrast. Reconstructed coronal and sagittal images were also obtained. Automated exposure control and iterative construction methods were used.      Findings:  The heart is enlarged. There is a partially seen prosthetic aortic valve and the patient is status post median sternotomy. No pericardial effusion. Coronary artery calcifications are present. There are numerous ventral hernias. The majority are small and   contain loops of bowel and/or fat and there is a larger  midline ventral hernia at the lower abdominal wall that contains multiple small bowel loops.. There is a particular hernia left of midline containing a small bowel loop best seen on axial image 109   that appears to results in obstruction. The proximal small bowel loops are collapsed and the distal small bowel loops are dilated with fluid levels and mild fecalization of contents. This appears to be the same hernia that resulted in obstruction in   2022 with less distention of small bowel. There is a small bowel anastomosis in the right side of the abdomen and there is a colonic anastomosis in the left side of the abdomen. The colon is otherwise unremarkable. The appendix is normal. The liver is   homogeneous. There is cholelithiasis without evidence of acute cholecystitis. There is pancreatic atrophy. The stomach and spleen appear unremarkable. There is a stable fatty nodule at the left adrenal gland measuring 26 mm, likely myelolipoma.. Stable   low-density nodule at the right adrenal gland measuring 18 mm consistent with adenoma. Kidneys appear unremarkable. No hydronephrosis or urolithiasis. There is urinary bladder wall thickening, which is unchanged. Prostate gland is normal size. There is   mild aortoiliac atherosclerosis. No aneurysm. No acute osseous abnormality or destructive bone lesion. Moderate thoracolumbar spondylosis and mild osteoarthritis of both hips.      Impression:      Impression:    1. There are numerous ventral hernias. There is a smaller hernia at the left of midline containing a small bowel loop that appears to result in small bowel obstruction. There are associated dilated fluid filled small bowel loops with fecalized material.  2. Cholelithiasis without evidence of acute cholecystitis.            Electronically Signed: Mohamud Coronado MD    6/15/2024 3:57 AM EDT    Workstation ID: [id number]            ASSESSMENT/PLAN:  Cecilio Farias is a 68 y.o. male presenting with a recurrent small bowel  obstruction 2/2 an incarcerated ventral incisional hernia through one of the many swiss-cheese defects from his mid-line from his prior colectomy. This has now resolved with spontaneous reduction of the hernia. Patient just had a large BM and is passing gas. NGT remains in place from initial presentation. .    Recurrent SBO  - Okay to remove NGT and start CLD, ADAT  - Make NPO if any recurrence of symptoms  - Do not recommend acute surgical intervention  - Patient unlikely to be a candidate for elective repair as well given his comorbidities  - Appreciate medical assistance with management  - Will continue to follow, please call with questions or concerns    Sony Figueroa MD  06/15/24  08:15 EDT

## 2024-06-15 NOTE — H&P
[facility] Medicine Services  HISTORY AND PHYSICAL    Patient Name: Cecilio Farias  : 1955  MRN: [id number]  Primary Care Physician: America Everett DO  Date of admission: 6/15/2024      Subjective   Subjective     Chief Complaint:  Abdominal pain    HPI:  Cecilio Farias is a 68 y.o. male with a history of ventral hernia, CHF, aortic valve replacement, diabetes, colon cancer status post colectomy who is presenting with abdominal pain and constipation.  Discussed with patient and family member at bedside.  Recently hospitalized for Klebsiella infection, he had been doing fine and completed antibiotics post discharge however last night started having symptoms that were similar to when he was diagnosed with the infection.  Nonspecific abdominal symptoms including pain and constipation and family was worried so they brought him into the ED for further evaluation.  ED imaging was consistent with ventral hernias resulting in a small bowel obstruction, general surgery was consulted.  Since then and now on my assessment he has had a bowel movement and his hernia has self reduced.  He reports resolution of his symptoms and is eager to start a diet.      Personal History     Past Medical History:   Diagnosis Date    Cancer     colon cancer with operation/ chemotherapy/radiation     CHF (congestive heart failure)     Acute Systolic     Chronic anticoagulation     coumadin    Diabetes mellitus     H/O aortic valve replacement     HLD (hyperlipidemia)     Hypertension     Sepsis due to Acinetobacter species 2019           Past Surgical History:   Procedure Laterality Date    AORTIC VALVE REPAIR/REPLACEMENT      25 mm. St. Kevin AVR    ASCENDING AORTIC ANEURYSM REPAIR  2009    BACK SURGERY      CARDIAC CATHETERIZATION N/A 2021    Procedure: RIGHT AND LEFT HEART CATH;  Surgeon: Kurtis Smith MD;  Location: [facility] CATH INVASIVE LOCATION;  Service: Cardiology;   Laterality: N/A;    COLON SURGERY      Colon cancer with operation    FOOT SURGERY      bilateral 2/2 wound and trauma        Family History: family history includes Cancer in his brother, sister, and sister; Diabetes in his mother; Heart attack in his brother; Heart disease in his brother and mother; Hepatitis in his brother; Hyperlipidemia in his mother; Kidney failure in his brother; Leukemia in his father.     Social History:  reports that he has never smoked. He has never used smokeless tobacco. He reports that he does not drink alcohol and does not use drugs.  Social History     Social History Narrative    Not on file       Medications:  Available home medication information reviewed.  Dexcom G6 Sensor, Enoxaparin Sodium, HYDROcodone-acetaminophen, aspirin, atorvastatin, furosemide, gabapentin, glipizide, glucose blood, insulin lispro protamine-insulin lispro, lisinopril, minocycline, nortriptyline, spironolactone, and warfarin    Allergies   Allergen Reactions    Sulfa Antibiotics     Amoxicillin Rash     Has tolerated ceftriaxone       Objective   Objective     Vital Signs:   Temp:  [97.6 °F (36.4 °C)-98 °F (36.7 °C)] 98 °F (36.7 °C)  Heart Rate:  [61-95] 95  Resp:  [20] 20  BP: ()/(41-84) 124/53       Physical Exam   Constitutional: No acute distress, awake, alert, obese, chronically ill appearing  HENT: NCAT, mucous membranes moist  Respiratory: Clear to auscultation bilaterally, respiratory effort normal, comf on NC  Cardiovascular: bradycardic, no murmurs, rubs, or gallops  Gastrointestinal: hypoactive bowel sounds, soft, nontender, nondistended, binder in place, surgical scar present  Musculoskeletal: trace bilateral ankle edema  Psychiatric: Appropriate affect, cooperative  Neurologic: Oriented x 3, no focal deficit, speech clear, globally wk  Skin: BLE dark diffuse rash extending above knees. Drug rxn to minocycline      Result Review:  I have personally reviewed the results from the time of  this admission to 6/15/2024 09:42 EDT and agree with these findings:  [x]  Laboratory list / accordion  [x]  Microbiology  [x]  Radiology  [x]  EKG/Telemetry   []  Cardiology/Vascular   []  Pathology  []  Old records  []  Other:  Most notable findings include:   Elev Cr  Lactate trended down  Leukocytosis  Elev LFTs      LAB RESULTS:      Lab 06/15/24  0822 06/15/24  0437 06/15/24  0316   WBC  --   --  12.45*   HEMOGLOBIN  --   --  12.6*   HEMATOCRIT  --   --  41.4   PLATELETS  --   --  256   NEUTROS ABS  --   --  10.71*   IMMATURE GRANS (ABS)  --   --  0.08*   LYMPHS ABS  --   --  0.62*   MONOS ABS  --   --  0.97*   EOS ABS  --   --  0.02   MCV  --   --  96.5   SED RATE  --   --  78*   CRP  --   --  0.84*   LACTATE 1.8 2.7*  --    PROTIME  --   --  25.4*   INR  --   --  2.29*         Lab 06/15/24  0316   SODIUM 137   POTASSIUM 5.5*   CHLORIDE 102   CO2 25.0   ANION GAP 10.0   BUN 40*   CREATININE 1.94*   EGFR 37.2*   GLUCOSE 256*   CALCIUM 8.4*         Lab 06/15/24  0316   TOTAL PROTEIN 7.0   ALBUMIN 3.3*   GLOBULIN 3.7   ALT (SGPT) 170*   AST (SGOT) 338*   BILIRUBIN 1.6*   ALK PHOS 395*   LIPASE >3,000*                         Microbiology Results (last 10 days)       ** No results found for the last 240 hours. **            CT Abdomen Pelvis Without Contrast    Result Date: 6/15/2024  CT ABDOMEN PELVIS WO CONTRAST Date of Exam: 6/15/2024 3:09 AM EDT Indication: Pain, vomiting, history of bowel obstruction. Comparison: 8/1/2022. Technique: Axial CT images were obtained of the abdomen and pelvis without the administration of contrast. Reconstructed coronal and sagittal images were also obtained. Automated exposure control and iterative construction methods were used. Findings: The heart is enlarged. There is a partially seen prosthetic aortic valve and the patient is status post median sternotomy. No pericardial effusion. Coronary artery calcifications are present. There are numerous ventral hernias. The majority  are small and  contain loops of bowel and/or fat and there is a larger midline ventral hernia at the lower abdominal wall that contains multiple small bowel loops.. There is a particular hernia left of midline containing a small bowel loop best seen on axial image 109  that appears to results in obstruction. The proximal small bowel loops are collapsed and the distal small bowel loops are dilated with fluid levels and mild fecalization of contents. This appears to be the same hernia that resulted in obstruction in 2022 with less distention of small bowel. There is a small bowel anastomosis in the right side of the abdomen and there is a colonic anastomosis in the left side of the abdomen. The colon is otherwise unremarkable. The appendix is normal. The liver is homogeneous. There is cholelithiasis without evidence of acute cholecystitis. There is pancreatic atrophy. The stomach and spleen appear unremarkable. There is a stable fatty nodule at the left adrenal gland measuring 26 mm, likely myelolipoma.. Stable low-density nodule at the right adrenal gland measuring 18 mm consistent with adenoma. Kidneys appear unremarkable. No hydronephrosis or urolithiasis. There is urinary bladder wall thickening, which is unchanged. Prostate gland is normal size. There is mild aortoiliac atherosclerosis. No aneurysm. No acute osseous abnormality or destructive bone lesion. Moderate thoracolumbar spondylosis and mild osteoarthritis of both hips.     Impression: Impression: 1. There are numerous ventral hernias. There is a smaller hernia at the left of midline containing a small bowel loop that appears to result in small bowel obstruction. There are associated dilated fluid filled small bowel loops with fecalized material. 2. Cholelithiasis without evidence of acute cholecystitis. Electronically Signed: Mohamud Coronado MD  6/15/2024 3:57 AM EDT  Workstation ID: [id number]     Results for orders placed during the hospital encounter of  05/11/22    Adult Transthoracic Echo Complete W/ Cont if Necessary Per Protocol    Interpretation Summary  · Left ventricular ejection fraction appears to be 56 - 60%.  · Left ventricular wall thickness is consistent with moderate concentric hypertrophy  · Moderate mitral annular calcification is present  · Mild mitral valve stenosis is present. The mitral valve mean gradient is 5 mmHg. There is moderate thickening of the anterior leaflet subvalvular apparatus.  · There is a 25 mm, St. Kevin mechanical aortic valve prosthesis present  · The aortic valve peak and mean gradients are within defined limits. The prosthetic aortic valve is normal.  · The right ventricular cavity is mildly dilated.  · Mild tricuspid valve regurgitation is present. Estimated right ventricular systolic pressure from tricuspid regurgitation is normal (<35 mmHg).  · Borderline dilation of the aortic root is present measures 3.9 cm.      Assessment & Plan   Assessment & Plan       SBO (small bowel obstruction)    Essential hypertension    Morbid obesity    Type 2 diabetes mellitus    History of mechanical AVR for aortic stenosis 2009    History Acinetobacter bacteremia on chronic minocycline    Paroxysmal atrial fibrillation    Discoloration of skin of bilateral legs likely due to minocycline use      Cecilio Farias is a 68-year-old male with a history of ventral hernia, colon cancer status post colectomy, diabetes, CHF presenting with abdominal pain and found to have an acute SBO.      Ventral hernia  Recurrent SBO  Colon cancer status post colectomy  - NG was placed in the ED, since then he has had resolution of his hernia and symptoms.  General surgery is following and recommends conservative management at this point.  He is known to general surgery and repair of his hernia has felt to be too risky given his underlying medical comorbidities.  Will go ahead and remove NG tube and start a clear liquid diet.  Advance diet as tolerated.   Discussed with patient that we would reinsert the NG tube should he have recurrence of symptoms    HUMA  Elevated Lfts  --suspect secondary to above  --s/p fluids in ED, monitor w inc PO intake. Repeat CMP in AM  --hold home lasix for now  --reduce dose gabapentin  --hold lisinopril  --hold spironolactone    CHF  CAD  Hypertension  Afib  [facility]  --holding home meds as above. Ok to cont asa  --INR needs 2.5-3.5 goal. D/w pharmacy who is assisting w warfarin dosing. Will bridge w heparin      Diabetes  --last A1c 6.5 in 2022  --SSI for now, repeat A1c      History of Acinetobacter  - s/p suppressive minocycline      VTE Prophylaxis:  Pharmacologic & mechanical VTE prophylaxis orders are present.          CODE STATUS:    Code Status and Medical Interventions:   Ordered at: 06/15/24 0938     Medical Intervention Limits:    No intubation (DNI)     Level Of Support Discussed With:    Patient     Code Status (Patient has no pulse and is not breathing):    CPR (Attempt to Resuscitate)     Medical Interventions (Patient has pulse or is breathing):    Limited Support       Expected Discharge   Expected Discharge Date: 6/17/2024; Expected Discharge Time:      Digna Mitchell MD  06/15/24

## 2024-06-16 LAB
ALBUMIN SERPL-MCNC: 2.9 G/DL (ref 3.5–5.2)
ALBUMIN/GLOB SERPL: 0.9 G/DL
ALP SERPL-CCNC: 305 U/L (ref 39–117)
ALT SERPL W P-5'-P-CCNC: 132 U/L (ref 1–41)
ANION GAP SERPL CALCULATED.3IONS-SCNC: 7 MMOL/L (ref 5–15)
AST SERPL-CCNC: 167 U/L (ref 1–40)
BACTERIA SPEC AEROBE CULT: NO GROWTH
BASOPHILS # BLD AUTO: 0.04 10*3/MM3 (ref 0–0.2)
BASOPHILS NFR BLD AUTO: 0.6 % (ref 0–1.5)
BILIRUB SERPL-MCNC: 0.8 MG/DL (ref 0–1.2)
BUN SERPL-MCNC: 40 MG/DL (ref 8–23)
BUN/CREAT SERPL: 31.5 (ref 7–25)
CALCIUM SPEC-SCNC: 7.8 MG/DL (ref 8.6–10.5)
CHLORIDE SERPL-SCNC: 106 MMOL/L (ref 98–107)
CO2 SERPL-SCNC: 25 MMOL/L (ref 22–29)
CREAT SERPL-MCNC: 1.27 MG/DL (ref 0.76–1.27)
DEPRECATED RDW RBC AUTO: 51.9 FL (ref 37–54)
EGFRCR SERPLBLD CKD-EPI 2021: 61.9 ML/MIN/1.73
EOSINOPHIL # BLD AUTO: 0.17 10*3/MM3 (ref 0–0.4)
EOSINOPHIL NFR BLD AUTO: 2.5 % (ref 0.3–6.2)
ERYTHROCYTE [DISTWIDTH] IN BLOOD BY AUTOMATED COUNT: 14.9 % (ref 12.3–15.4)
GLOBULIN UR ELPH-MCNC: 3.4 GM/DL
GLUCOSE BLDC GLUCOMTR-MCNC: 138 MG/DL (ref 70–130)
GLUCOSE BLDC GLUCOMTR-MCNC: 141 MG/DL (ref 70–130)
GLUCOSE BLDC GLUCOMTR-MCNC: 150 MG/DL (ref 70–130)
GLUCOSE BLDC GLUCOMTR-MCNC: 156 MG/DL (ref 70–130)
GLUCOSE SERPL-MCNC: 133 MG/DL (ref 65–99)
HCT VFR BLD AUTO: 33.8 % (ref 37.5–51)
HGB BLD-MCNC: 10.8 G/DL (ref 13–17.7)
IMM GRANULOCYTES # BLD AUTO: 0.03 10*3/MM3 (ref 0–0.05)
IMM GRANULOCYTES NFR BLD AUTO: 0.4 % (ref 0–0.5)
INR PPP: 3.28 (ref 0.89–1.12)
LYMPHOCYTES # BLD AUTO: 1.12 10*3/MM3 (ref 0.7–3.1)
LYMPHOCYTES NFR BLD AUTO: 16.7 % (ref 19.6–45.3)
MAGNESIUM SERPL-MCNC: 1.9 MG/DL (ref 1.6–2.4)
MCH RBC QN AUTO: 30.1 PG (ref 26.6–33)
MCHC RBC AUTO-ENTMCNC: 32 G/DL (ref 31.5–35.7)
MCV RBC AUTO: 94.2 FL (ref 79–97)
MONOCYTES # BLD AUTO: 0.68 10*3/MM3 (ref 0.1–0.9)
MONOCYTES NFR BLD AUTO: 10.1 % (ref 5–12)
NEUTROPHILS NFR BLD AUTO: 4.68 10*3/MM3 (ref 1.7–7)
NEUTROPHILS NFR BLD AUTO: 69.7 % (ref 42.7–76)
NRBC BLD AUTO-RTO: 0 /100 WBC (ref 0–0.2)
PLATELET # BLD AUTO: 179 10*3/MM3 (ref 140–450)
PMV BLD AUTO: 10.5 FL (ref 6–12)
POTASSIUM SERPL-SCNC: 5.2 MMOL/L (ref 3.5–5.2)
PROT SERPL-MCNC: 6.3 G/DL (ref 6–8.5)
PROTHROMBIN TIME: 33.6 SECONDS (ref 12.2–14.5)
RBC # BLD AUTO: 3.59 10*6/MM3 (ref 4.14–5.8)
SODIUM SERPL-SCNC: 138 MMOL/L (ref 136–145)
WBC NRBC COR # BLD AUTO: 6.72 10*3/MM3 (ref 3.4–10.8)

## 2024-06-16 PROCEDURE — 85025 COMPLETE CBC W/AUTO DIFF WBC: CPT | Performed by: STUDENT IN AN ORGANIZED HEALTH CARE EDUCATION/TRAINING PROGRAM

## 2024-06-16 PROCEDURE — 82948 REAGENT STRIP/BLOOD GLUCOSE: CPT

## 2024-06-16 PROCEDURE — 93005 ELECTROCARDIOGRAM TRACING: CPT | Performed by: STUDENT IN AN ORGANIZED HEALTH CARE EDUCATION/TRAINING PROGRAM

## 2024-06-16 PROCEDURE — 80053 COMPREHEN METABOLIC PANEL: CPT | Performed by: STUDENT IN AN ORGANIZED HEALTH CARE EDUCATION/TRAINING PROGRAM

## 2024-06-16 PROCEDURE — 99232 SBSQ HOSP IP/OBS MODERATE 35: CPT | Performed by: STUDENT IN AN ORGANIZED HEALTH CARE EDUCATION/TRAINING PROGRAM

## 2024-06-16 PROCEDURE — 85610 PROTHROMBIN TIME: CPT | Performed by: STUDENT IN AN ORGANIZED HEALTH CARE EDUCATION/TRAINING PROGRAM

## 2024-06-16 PROCEDURE — 63710000001 INSULIN LISPRO (HUMAN) PER 5 UNITS: Performed by: STUDENT IN AN ORGANIZED HEALTH CARE EDUCATION/TRAINING PROGRAM

## 2024-06-16 PROCEDURE — 83735 ASSAY OF MAGNESIUM: CPT | Performed by: PHYSICIAN ASSISTANT

## 2024-06-16 RX ORDER — GABAPENTIN 300 MG/1
600 CAPSULE ORAL EVERY 8 HOURS SCHEDULED
Status: DISCONTINUED | OUTPATIENT
Start: 2024-06-16 | End: 2024-06-17 | Stop reason: HOSPADM

## 2024-06-16 RX ORDER — WARFARIN SODIUM 7.5 MG/1
7.5 TABLET ORAL
Status: DISCONTINUED | OUTPATIENT
Start: 2024-06-16 | End: 2024-06-17 | Stop reason: HOSPADM

## 2024-06-16 RX ADMIN — MINOCYCLINE HYDROCHLORIDE 100 MG: 50 CAPSULE ORAL at 20:49

## 2024-06-16 RX ADMIN — MINOCYCLINE HYDROCHLORIDE 100 MG: 50 CAPSULE ORAL at 08:52

## 2024-06-16 RX ADMIN — INSULIN LISPRO 2 UNITS: 100 INJECTION, SOLUTION INTRAVENOUS; SUBCUTANEOUS at 12:21

## 2024-06-16 RX ADMIN — GABAPENTIN 300 MG: 300 CAPSULE ORAL at 08:48

## 2024-06-16 RX ADMIN — Medication 10 ML: at 08:53

## 2024-06-16 RX ADMIN — SENNOSIDES AND DOCUSATE SODIUM 2 TABLET: 8.6; 5 TABLET ORAL at 08:48

## 2024-06-16 RX ADMIN — NORTRIPTYLINE HYDROCHLORIDE 10 MG: 10 CAPSULE ORAL at 20:49

## 2024-06-16 RX ADMIN — ACETAMINOPHEN 650 MG: 325 TABLET ORAL at 20:25

## 2024-06-16 RX ADMIN — GABAPENTIN 600 MG: 300 CAPSULE ORAL at 20:25

## 2024-06-16 RX ADMIN — WARFARIN SODIUM 7.5 MG: 7.5 TABLET ORAL at 17:47

## 2024-06-16 RX ADMIN — ASPIRIN 81 MG: 81 TABLET, COATED ORAL at 08:48

## 2024-06-16 RX ADMIN — ATORVASTATIN CALCIUM 40 MG: 40 TABLET, FILM COATED ORAL at 20:25

## 2024-06-16 NOTE — PROGRESS NOTES
"Patient Name:  Cecilio Farias  YOB: 1955  [id number]    Surgery Progress Note    Date of visit: 6/16/2024    Subjective   Feels well, no acute issues overnight. Having bowel function with gas and bowel movements. Denies pain.         Objective       /65 (BP Location: Left arm, Patient Position: Lying)   Pulse 73   Temp 98.2 °F (36.8 °C) (Oral)   Resp 18   Ht 190.5 cm (75\")   Wt 127 kg (279 lb 11.2 oz)   SpO2 93%   BMI 34.96 kg/m²     Intake/Output Summary (Last 24 hours) at 6/16/2024 1514  Last data filed at 6/16/2024 1055  Gross per 24 hour   Intake 780 ml   Output 2350 ml   Net -1570 ml       General: Alert, oriented x 3, well-appearing, no acute distress  Cardiovascular: regular rate and rhythm  Pulmonary: Breathing comfortably on room air, no respiratory distress  Abdomen: Soft, non-tender, non-distended, Large vertical mid-line incision is well-healed, multiple ventral hernia defects without evidence of incarceration and extending into pannus    Recent labs and imaging that are back at this time have been reviewed.     Labs:    Results from last 7 days   Lab Units 06/16/24  0421   WBC 10*3/mm3 6.72   HEMOGLOBIN g/dL 10.8*   HEMATOCRIT % 33.8*   PLATELETS 10*3/mm3 179     Results from last 7 days   Lab Units 06/16/24  0421   SODIUM mmol/L 138   POTASSIUM mmol/L 5.2   CHLORIDE mmol/L 106   CO2 mmol/L 25.0   BUN mg/dL 40*   CREATININE mg/dL 1.27   CALCIUM mg/dL 7.8*   BILIRUBIN mg/dL 0.8   ALK PHOS U/L 305*   ALT (SGPT) U/L 132*   AST (SGOT) U/L 167*   GLUCOSE mg/dL 133*     Results from last 7 days   Lab Units 06/16/24  0421   SODIUM mmol/L 138   POTASSIUM mmol/L 5.2   CHLORIDE mmol/L 106   CO2 mmol/L 25.0   BUN mg/dL 40*   CREATININE mg/dL 1.27   GLUCOSE mg/dL 133*   CALCIUM mg/dL 7.8*     Lab Results   Lab Value Date/Time    LIPASE >3,000 (H) 06/15/2024 0316    LIPASE 43 08/01/2022 1038    LIPASE 46 05/11/2022 0904    LIPASE 37 04/30/2022 2041    LIPASE 48 11/16/2021 2331        "     Assessment & Plan   Cecilio Farias is a 68 y.o. male presenting with a recurrent small bowel obstruction 2/2 an incarcerated ventral incisional hernia through one of the many swiss-cheese defects from his mid-line from his prior colectomy. This has now resolved with spontaneous reduction of the hernia. Doing well without symptoms currently.    Problem List Items Addressed This Visit       * (Principal) SBO (small bowel obstruction) - Primary     Other Visit Diagnoses       Acute hyperkalemia        Acute kidney injury                Recurrent SBO, resolved  - Okay to advance diet as tolerated to regular  - Once tolerating regular diet, okay to discharge  - Patient unlikely to be a candidate for elective repair as well given his comorbidities, follow-up PRN, will notify Dr. Lester he was admitted  - Appreciate medical assistance with management  - Will continue to follow, please call with questions or concerns      Sony Figueroa MD  6/16/2024  15:14 EDT

## 2024-06-16 NOTE — PROGRESS NOTES
"Pharmacy Consult  -  Warfarin    Cecilio Farias is a  68 y.o. male   Height - 190.5 cm (75\")  Weight - 127 kg (279 lb 11.2 oz)    Consulting Provider: - hospitalist  Indication: - mechanical aortic valve  Goal INR: - 2.5-3.5  Home Regimen:   - warfarin 10mg MWF   - warfarin 7.5mg on remaining days    Bridge Therapy: Yes   and enoxapain- STOPPED 6/16 due to therapeutic INR    Drug-Drug Interactions with current regimen:  Acetaminophen: doses >2 grams/day may increase bleeding risk  Aspirin: may increase bleeding risk    Warfarin Dosing During Admission:    Date  6/15 6/16          INR  2.29 3.28          Dose  10 mg (7.5 mg)               Education Provided:    Discharge Follow up:   Following Provider - [facility]   Follow up time range or appointment - 2-3 days following discharge      Labs:    Results from last 7 days   Lab Units 06/16/24  0421 06/15/24  1203 06/15/24  0316   INR  3.28* 2.81* 2.29*   HEMOGLOBIN g/dL 10.8*  --  12.6*   HEMATOCRIT % 33.8*  --  41.4     Results from last 7 days   Lab Units 06/16/24  0421 06/15/24  0316   SODIUM mmol/L 138 137   POTASSIUM mmol/L 5.2 5.5*   CHLORIDE mmol/L 106 102   CO2 mmol/L 25.0 25.0   BUN mg/dL 40* 40*   CREATININE mg/dL 1.27 1.94*   CALCIUM mg/dL 7.8* 8.4*   BILIRUBIN mg/dL 0.8 1.6*   ALK PHOS U/L 305* 395*   ALT (SGPT) U/L 132* 170*   AST (SGOT) U/L 167* 338*   GLUCOSE mg/dL 133* 256*       Current dietary intake: not charted    Dietary Orders (From admission, onward)       Start     Ordered    06/15/24 0925  Diet: Liquid; Clear Liquid; Fluid Consistency: Thin (IDDSI 0)  Diet Effective Now        References:    Diet Order Crosswalk   Question Answer Comment   Diets: Liquid    Liquid Diet: Clear Liquid    Fluid Consistency: Thin (IDDSI 0)        06/15/24 0924                    Assessment/Plan:     1. Patient's INR is therapeutic today at 3.28. Goal INR 2.5-3.5 for mechanical aortic valve.   2. Will resume home regimen and give " warfarin 7.5 mg tonight. Will discontinue Bridging with therapeutic lovenox as INR is within therapeutic range.  3. Daily PT/INR ordered.  4. Monitor signs/symptoms of bleeding, dietary intake, and drug-drug interactions. Make dose adjustments as necessary.     Pharmacy will continue to follow.    Thank you  Maria D Dukes RPH  6/16/2024  10:51 EDT

## 2024-06-16 NOTE — PROGRESS NOTES
[facility] Medicine Services  PROGRESS NOTE    Patient Name: Cecilio Farias  : 1955  MRN: [id number]    Date of Admission: 6/15/2024  Primary Care Physician: America Everett DO    Subjective   Subjective     CC:  F/u abdominal pain    HPI:  He is feeling a lot better, had a bowel movement this AM      Objective   Objective     Vital Signs:   Temp:  [98 °F (36.7 °C)-98.8 °F (37.1 °C)] 98.2 °F (36.8 °C)  Heart Rate:  [42-73] 73  Resp:  [18-20] 18  BP: (132-151)/(56-72) 147/65     Physical Exam:  Constitutional: No acute distress, awake, alert, obese, chronically ill appearing  HENT: NCAT, mucous membranes moist  Respiratory: Clear to auscultation bilaterally, respiratory effort normal, comf on NC  Cardiovascular: bradycardic, no murmurs, rubs, or gallops  Gastrointestinal: hypoactive bowel sounds, soft, nontender, nondistended, binder in place, surgical scar present  Musculoskeletal: trace bilateral ankle edema  Psychiatric: Appropriate affect, cooperative  Neurologic: Oriented x 3, no focal deficit, speech clear, globally wk  Skin: BLE dark diffuse rash extending above knees. Drug rxn to minocycline      Results Reviewed:  LAB RESULTS:      Lab 24  0421 06/15/24  1203 06/15/24  0822 06/15/24  0437 06/15/24  0316   WBC 6.72  --   --   --  12.45*   HEMOGLOBIN 10.8*  --   --   --  12.6*   HEMATOCRIT 33.8*  --   --   --  41.4   PLATELETS 179  --   --   --  256   NEUTROS ABS 4.68  --   --   --  10.71*   IMMATURE GRANS (ABS) 0.03  --   --   --  0.08*   LYMPHS ABS 1.12  --   --   --  0.62*   MONOS ABS 0.68  --   --   --  0.97*   EOS ABS 0.17  --   --   --  0.02   MCV 94.2  --   --   --  96.5   SED RATE  --   --   --   --  78*   CRP  --   --   --   --  0.84*   LACTATE  --   --  1.8 2.7*  --    PROTIME 33.6* 29.8*  --   --  25.4*         Lab 24  0421 06/15/24  0316   SODIUM 138 137   POTASSIUM 5.2 5.5*   CHLORIDE 106 102   CO2 25.0 25.0   ANION GAP 7.0 10.0   BUN 40* 40*    CREATININE 1.27 1.94*   EGFR 61.9 37.2*   GLUCOSE 133* 256*   CALCIUM 7.8* 8.4*   HEMOGLOBIN A1C  --  6.70*         Lab 06/16/24  0421 06/15/24  0316   TOTAL PROTEIN 6.3 7.0   ALBUMIN 2.9* 3.3*   GLOBULIN 3.4 3.7   ALT (SGPT) 132* 170*   AST (SGOT) 167* 338*   BILIRUBIN 0.8 1.6*   ALK PHOS 305* 395*   LIPASE  --  >3,000*         Lab 06/16/24  0421 06/15/24  1203 06/15/24  0316   PROTIME 33.6* 29.8* 25.4*   INR 3.28* 2.81* 2.29*                 Brief Urine Lab Results  (Last result in the past 365 days)        Color   Clarity   Blood   Leuk Est   Nitrite   Protein   CREAT   Urine HCG        06/15/24 1747 Dark Yellow   Cloudy   Negative   Trace   Negative   100 mg/dL (2+)                   Microbiology Results Abnormal       None            CT Abdomen Pelvis Without Contrast    Result Date: 6/15/2024  CT ABDOMEN PELVIS WO CONTRAST Date of Exam: 6/15/2024 3:09 AM EDT Indication: Pain, vomiting, history of bowel obstruction. Comparison: 8/1/2022. Technique: Axial CT images were obtained of the abdomen and pelvis without the administration of contrast. Reconstructed coronal and sagittal images were also obtained. Automated exposure control and iterative construction methods were used. Findings: The heart is enlarged. There is a partially seen prosthetic aortic valve and the patient is status post median sternotomy. No pericardial effusion. Coronary artery calcifications are present. There are numerous ventral hernias. The majority are small and  contain loops of bowel and/or fat and there is a larger midline ventral hernia at the lower abdominal wall that contains multiple small bowel loops.. There is a particular hernia left of midline containing a small bowel loop best seen on axial image 109  that appears to results in obstruction. The proximal small bowel loops are collapsed and the distal small bowel loops are dilated with fluid levels and mild fecalization of contents. This appears to be the same hernia that  resulted in obstruction in 2022 with less distention of small bowel. There is a small bowel anastomosis in the right side of the abdomen and there is a colonic anastomosis in the left side of the abdomen. The colon is otherwise unremarkable. The appendix is normal. The liver is homogeneous. There is cholelithiasis without evidence of acute cholecystitis. There is pancreatic atrophy. The stomach and spleen appear unremarkable. There is a stable fatty nodule at the left adrenal gland measuring 26 mm, likely myelolipoma.. Stable low-density nodule at the right adrenal gland measuring 18 mm consistent with adenoma. Kidneys appear unremarkable. No hydronephrosis or urolithiasis. There is urinary bladder wall thickening, which is unchanged. Prostate gland is normal size. There is mild aortoiliac atherosclerosis. No aneurysm. No acute osseous abnormality or destructive bone lesion. Moderate thoracolumbar spondylosis and mild osteoarthritis of both hips.     Impression: Impression: 1. There are numerous ventral hernias. There is a smaller hernia at the left of midline containing a small bowel loop that appears to result in small bowel obstruction. There are associated dilated fluid filled small bowel loops with fecalized material. 2. Cholelithiasis without evidence of acute cholecystitis. Electronically Signed: Mohamud Coronado MD  6/15/2024 3:57 AM EDT  Workstation ID: [id number]     Results for orders placed during the hospital encounter of 05/11/22    Adult Transthoracic Echo Complete W/ Cont if Necessary Per Protocol    Interpretation Summary  · Left ventricular ejection fraction appears to be 56 - 60%.  · Left ventricular wall thickness is consistent with moderate concentric hypertrophy  · Moderate mitral annular calcification is present  · Mild mitral valve stenosis is present. The mitral valve mean gradient is 5 mmHg. There is moderate thickening of the anterior leaflet subvalvular apparatus.  · There is a 25 mm, [employer] mechanical aortic valve prosthesis present  · The aortic valve peak and mean gradients are within defined limits. The prosthetic aortic valve is normal.  · The right ventricular cavity is mildly dilated.  · Mild tricuspid valve regurgitation is present. Estimated right ventricular systolic pressure from tricuspid regurgitation is normal (<35 mmHg).  · Borderline dilation of the aortic root is present measures 3.9 cm.      Current medications:  Scheduled Meds:aspirin, 81 mg, Oral, Daily  atorvastatin, 40 mg, Oral, Nightly  gabapentin, 300 mg, Oral, Daily  insulin lispro, 2-7 Units, Subcutaneous, 4x Daily AC & at Bedtime  minocycline, 100 mg, Oral, BID  nortriptyline, 10 mg, Oral, Nightly  senna-docusate sodium, 2 tablet, Oral, BID  sodium chloride, 10 mL, Intravenous, Q12H  warfarin, 7.5 mg, Oral, Daily      Continuous Infusions:Pharmacy to dose warfarin,       PRN Meds:.  acetaminophen    senna-docusate sodium **AND** polyethylene glycol **AND** bisacodyl **AND** bisacodyl    dextrose    dextrose    glucagon (human recombinant)    Pharmacy to dose warfarin    [COMPLETED] Insert Peripheral IV **AND** sodium chloride    sodium chloride    sodium chloride    Assessment & Plan   Assessment & Plan     Active Hospital Problems    Diagnosis  POA    **SBO (small bowel obstruction) [K56.609]  Yes    Paroxysmal atrial fibrillation [I48.0]  Yes    Discoloration of skin of bilateral legs likely due to minocycline use [L81.9]  Yes    History Acinetobacter bacteremia on chronic minocycline [Z86.19]  Yes    History of mechanical AVR for aortic stenosis 2009 [Z95.2]  Not Applicable    Type 2 diabetes mellitus [E11.9]  Yes    Morbid obesity [E66.01]  Yes    Essential hypertension [I10]  Yes      Resolved Hospital Problems   No resolved problems to display.        Brief Hospital Course to date:  Cecilio Farias is a 68 y.o. male with a history of ventral hernia, colon cancer status post colectomy, diabetes, CHF presenting with  abdominal pain and found to have an acute SBO.       Ventral hernia  Recurrent SBO  Colon cancer status post colectomy  - NG was placed in the ED, since then he has had resolution of his hernia and symptoms.  General surgery is following and recommends conservative management at this point.  He is known to general surgery and repair of his hernia has felt to be too risky given his underlying medical comorbidities.    --NG tube now out, bowel function returned. Advance diet as tolerated     HUMA  Elevated Lfts  --suspect secondary to above  --s/p fluids in ED, monitor w inc PO intake.   --hold home lasix for now  --reduce dose gabapentin  --hold lisinopril  --hold spironolactone  --improving, repeat labs in AM     CHF  CAD  Hypertension  Afib  Hx of [facility] Aortic valve  --holding home meds as above. Ok to cont asa  --INR needs 2.5-3.5 goal. S/p a brief lovenox bridge. Pharmacy dosing warfarin        Diabetes  --last A1c 6.5 in 2022  --SSI for now, repeat A1c 6.7        History of Acinetobacter  - s/p suppressive minocycline         Expected Discharge Location and Transportation: home  Expected Discharge   Expected Discharge Date: 6/17/2024; Expected Discharge Time:      VTE Prophylaxis:  Pharmacologic & mechanical VTE prophylaxis orders are present.         AM-PAC 6 Clicks Score (PT): 19 (06/15/24 2000)    CODE STATUS:   Code Status and Medical Interventions:   Ordered at: 06/15/24 0938     Medical Intervention Limits:    No intubation (DNI)     Level Of Support Discussed With:    Patient     Code Status (Patient has no pulse and is not breathing):    CPR (Attempt to Resuscitate)     Medical Interventions (Patient has pulse or is breathing):    Limited Support       Digna Mitchell MD  06/16/24

## 2024-06-16 NOTE — PLAN OF CARE
Goal Outcome Evaluation:  Plan of Care Reviewed With: patient, spouse       Very merlyn, good urine output. Slept well. No c/o pain or nausea.         Progress: improving       Problem: Adult Inpatient Plan of Care  Goal: Plan of Care Review  Outcome: Ongoing, Progressing  Flowsheets (Taken 6/15/2024 2241)  Progress: improving  Plan of Care Reviewed With:   patient   spouse  Goal: Patient-Specific Goal (Individualized)  Outcome: Ongoing, Progressing  Goal: Absence of Hospital-Acquired Illness or Injury  Outcome: Ongoing, Progressing  Intervention: Identify and Manage Fall Risk  Recent Flowsheet Documentation  Taken 6/15/2024 2000 by Ellen Martinez RN  Safety Promotion/Fall Prevention: activity supervised  Intervention: Prevent Skin Injury  Recent Flowsheet Documentation  Taken 6/15/2024 2000 by Ellen Martinez RN  Body Position: weight shifting  Skin Protection: adhesive use limited  Intervention: Prevent and Manage VTE (Venous Thromboembolism) Risk  Recent Flowsheet Documentation  Taken 6/15/2024 2000 by Ellen Martinez RN  Activity Management: activity encouraged  VTE Prevention/Management: (lovenox subq)   bilateral   sequential compression devices off  Intervention: Prevent Infection  Recent Flowsheet Documentation  Taken 6/15/2024 2000 by Ellen Martinez RN  Infection Prevention: rest/sleep promoted  Goal: Optimal Comfort and Wellbeing  Outcome: Ongoing, Progressing  Intervention: Provide Person-Centered Care  Recent Flowsheet Documentation  Taken 6/15/2024 2000 by Ellen Martinez RN  Trust Relationship/Rapport:   care explained   choices provided   emotional support provided   empathic listening provided   questions encouraged   questions answered   reassurance provided   thoughts/feelings acknowledged  Goal: Readiness for Transition of Care  Outcome: Ongoing, Progressing     Problem: Skin Injury Risk Increased  Goal: Skin Health and Integrity  Outcome: Ongoing, Progressing  Intervention: Optimize  Skin Protection  Recent Flowsheet Documentation  Taken 6/15/2024 2000 by Ellen Martinez, RN  Pressure Reduction Techniques: frequent weight shift encouraged  Pressure Reduction Devices: pressure-redistributing mattress utilized  Skin Protection: adhesive use limited

## 2024-06-17 ENCOUNTER — READMISSION MANAGEMENT (OUTPATIENT)
Dept: CALL CENTER | Facility: HOSPITAL | Age: 69
End: 2024-06-17
Payer: MEDICARE

## 2024-06-17 VITALS
HEART RATE: 51 BPM | SYSTOLIC BLOOD PRESSURE: 136 MMHG | HEIGHT: 75 IN | RESPIRATION RATE: 18 BRPM | DIASTOLIC BLOOD PRESSURE: 78 MMHG | BODY MASS INDEX: 34.52 KG/M2 | WEIGHT: 277.6 LBS | TEMPERATURE: 97.7 F | OXYGEN SATURATION: 97 %

## 2024-06-17 PROBLEM — K56.609 SBO (SMALL BOWEL OBSTRUCTION): Status: RESOLVED | Noted: 2021-11-17 | Resolved: 2024-06-17

## 2024-06-17 LAB
ALBUMIN SERPL-MCNC: 3.2 G/DL (ref 3.5–5.2)
ALP SERPL-CCNC: 290 U/L (ref 39–117)
ALT SERPL W P-5'-P-CCNC: 93 U/L (ref 1–41)
ANION GAP SERPL CALCULATED.3IONS-SCNC: 9 MMOL/L (ref 5–15)
AST SERPL-CCNC: 76 U/L (ref 1–40)
BILIRUB CONJ SERPL-MCNC: 0.3 MG/DL (ref 0–0.3)
BILIRUB INDIRECT SERPL-MCNC: 0.6 MG/DL
BILIRUB SERPL-MCNC: 0.9 MG/DL (ref 0–1.2)
BUN SERPL-MCNC: 29 MG/DL (ref 8–23)
BUN/CREAT SERPL: 33 (ref 7–25)
CALCIUM SPEC-SCNC: 8.5 MG/DL (ref 8.6–10.5)
CHLORIDE SERPL-SCNC: 102 MMOL/L (ref 98–107)
CO2 SERPL-SCNC: 24 MMOL/L (ref 22–29)
CREAT SERPL-MCNC: 0.88 MG/DL (ref 0.76–1.27)
EGFRCR SERPLBLD CKD-EPI 2021: 94.2 ML/MIN/1.73
GLUCOSE BLDC GLUCOMTR-MCNC: 139 MG/DL (ref 70–130)
GLUCOSE BLDC GLUCOMTR-MCNC: 161 MG/DL (ref 70–130)
GLUCOSE SERPL-MCNC: 134 MG/DL (ref 65–99)
INR PPP: 3.1 (ref 0.89–1.12)
POTASSIUM SERPL-SCNC: 4.9 MMOL/L (ref 3.5–5.2)
PROT SERPL-MCNC: 7 G/DL (ref 6–8.5)
PROTHROMBIN TIME: 32.2 SECONDS (ref 12.2–14.5)
QT INTERVAL: 512 MS
QTC INTERVAL: 489 MS
SODIUM SERPL-SCNC: 135 MMOL/L (ref 136–145)

## 2024-06-17 PROCEDURE — 85610 PROTHROMBIN TIME: CPT | Performed by: STUDENT IN AN ORGANIZED HEALTH CARE EDUCATION/TRAINING PROGRAM

## 2024-06-17 PROCEDURE — 99239 HOSP IP/OBS DSCHRG MGMT >30: CPT | Performed by: FAMILY MEDICINE

## 2024-06-17 PROCEDURE — 82948 REAGENT STRIP/BLOOD GLUCOSE: CPT

## 2024-06-17 PROCEDURE — 80076 HEPATIC FUNCTION PANEL: CPT | Performed by: FAMILY MEDICINE

## 2024-06-17 PROCEDURE — 63710000001 INSULIN LISPRO (HUMAN) PER 5 UNITS: Performed by: STUDENT IN AN ORGANIZED HEALTH CARE EDUCATION/TRAINING PROGRAM

## 2024-06-17 PROCEDURE — A9270 NON-COVERED ITEM OR SERVICE: HCPCS | Performed by: STUDENT IN AN ORGANIZED HEALTH CARE EDUCATION/TRAINING PROGRAM

## 2024-06-17 PROCEDURE — 80048 BASIC METABOLIC PNL TOTAL CA: CPT | Performed by: FAMILY MEDICINE

## 2024-06-17 PROCEDURE — G0378 HOSPITAL OBSERVATION PER HR: HCPCS

## 2024-06-17 RX ORDER — WARFARIN SODIUM 7.5 MG/1
TABLET ORAL
Start: 2024-06-17 | End: 2024-06-19

## 2024-06-17 RX ADMIN — MINOCYCLINE HYDROCHLORIDE 100 MG: 50 CAPSULE ORAL at 08:47

## 2024-06-17 RX ADMIN — ASPIRIN 81 MG: 81 TABLET, COATED ORAL at 08:46

## 2024-06-17 RX ADMIN — GABAPENTIN 600 MG: 300 CAPSULE ORAL at 05:08

## 2024-06-17 RX ADMIN — INSULIN LISPRO 2 UNITS: 100 INJECTION, SOLUTION INTRAVENOUS; SUBCUTANEOUS at 12:45

## 2024-06-17 NOTE — PROGRESS NOTES
"Pharmacy Consult  -  Warfarin  Cecilio Farias is a  68 y.o. male   Height - 190.5 cm (75\")  Weight - 126 kg (277 lb 9.6 oz)    Consulting Provider: Hospitalist  Indication: Mechanical aortic valve  Goal INR: 2.5-3.5  Home Regimen: Warfarin 10 mg on MWF & Warfarin 7.5 mg on T, TH, Sat, Sun    Bridge Therapy: Yes   and enoxapain- stopped on 6/16 due to therapeutic INR    Drug-Drug Interactions with current regimen:  Acetaminophen: doses >2 grams/day may increase bleeding risk  Aspirin: may increase bleeding risk  Minocycline: may enhance the anticoagulant effect of Vitamin K Antagonists.     Warfarin Dosing During Admission:  Date  6/15 6/16 6/17         INR  2.29 3.28 3.1         Dose  10 mg 7.5 mg (7.5 mg)            Education Provided: Patient has been previously counseled by the [facility]. Inpatient pharmacist available for further counseling or to answer questions as requested.     Discharge Follow up:   Following Provider - [facility]   Follow up time range or appointment - 2-3 days following discharge    Labs:  Results from last 7 days   Lab Units 06/17/24  0335 06/16/24  0421 06/15/24  1203 06/15/24  0316   INR  3.10* 3.28* 2.81* 2.29*   HEMOGLOBIN g/dL  --  10.8*  --  12.6*   HEMATOCRIT %  --  33.8*  --  41.4     Results from last 7 days   Lab Units 06/16/24  0421 06/15/24  0316   SODIUM mmol/L 138 137   POTASSIUM mmol/L 5.2 5.5*   CHLORIDE mmol/L 106 102   CO2 mmol/L 25.0 25.0   BUN mg/dL 40* 40*   CREATININE mg/dL 1.27 1.94*   CALCIUM mg/dL 7.8* 8.4*   BILIRUBIN mg/dL 0.8 1.6*   ALK PHOS U/L 305* 395*   ALT (SGPT) U/L 132* 170*   AST (SGOT) U/L 167* 338*   GLUCOSE mg/dL 133* 256*     Current dietary intake: % of documented in the past 24 hours.  Dietary Orders (From admission, onward)       Start     Ordered    06/16/24 6288  Diet: Regular/House, Diabetic; Consistent Carbohydrate; Fluid Consistency: Thin (IDDSI 0)  Diet " Effective Now        References:    Diet Order [facility]   Question Answer Comment   Diets: Regular/House    Diets: Diabetic    Diabetic Diet: Consistent Carbohydrate    Fluid Consistency: Thin (IDDSI 0)        06/16/24 2219                  Assessment/Plan:   Pharmacy to dose warfarin for mechanical aortic valve.  Goal INR: 2.5-3.5  Patient's INR is therapeutic today at 3.1, decreased from 3.28 on 6/16.   Due to the INR remaining near the higher end of therapeutic, give Warfarin 7.5 mg today.  Daily PT/INR ordered.  Monitor for signs/symptoms of bleeding, dietary intake, and drug-drug interactions.   Pharmacy will continue to follow and make dose adjustments as necessary.    Thank you,    Nadine See, PharmD, BCPS  6/17/2024  13:03 EDT\

## 2024-06-17 NOTE — DISCHARGE SUMMARY
[facility] Medicine Services  DISCHARGE SUMMARY    Patient Name: Cecilio Farias  : 1955  MRN: [id number]    Date of Admission: 6/15/2024  2:56 AM  Date of Discharge:  2024  Primary Care Physician: America Everett DO    Consults       Date and Time Order Name Status Description    6/15/2024  5:35 AM Inpatient General Surgery Consult Completed             Hospital Course     Presenting Problem: Small bowel obstruction    Active Hospital Problems    Diagnosis  POA    Paroxysmal atrial fibrillation [I48.0]  Yes    Discoloration of skin of bilateral legs likely due to minocycline use [L81.9]  Yes    History Acinetobacter bacteremia on chronic minocycline [Z86.19]  Yes    History of mechanical AVR for aortic stenosis  [Z95.2]  Not Applicable    Type 2 diabetes mellitus [E11.9]  Yes    Morbid obesity [E66.01]  Yes    Essential hypertension [I10]  Yes      Resolved Hospital Problems    Diagnosis Date Resolved POA    **SBO (small bowel obstruction) [K56.609] 2024 Yes          Hospital Course:  Cecilio Farias is a 68 y.o. male with a history of ventral hernia, colon cancer status post colectomy, diabetes, CHF presenting with abdominal pain and found to have an acute SBO.       Ventral hernia  Recurrent SBO  Colon cancer status post colectomy  - NG was placed in the ED, since then he has had resolution of his hernia and symptoms.  General surgery is following and recommends conservative management at this point.  He is known to general surgery and repair of his hernia has felt to be too risky given his underlying medical comorbidities.    --NG tube now out, bowel function returned. Advance diet as tolerated     HUMA  Elevated Lfts  --suspect secondary to above  --s/p fluids in ED, monitor w inc PO intake  -LFTs trending down and HUMA improved       CHF  CAD  Hypertension  Afib  Hx of mech Aortic valve  --INR needs 2.5-3.5 goal. S/p a brief lovenox bridge. Pharmacy dosing  warfarin  -INR at goal on day of discharge.  He was advised to take 7.5 mg of warfarin tomorrow and on Wednesday morning until his INR can be checked on Wednesday.  Patient was advised further instruction for his warfarin will come from his cardiologist/Coumadin clinic        Diabetes  --last A1c 6.5 in 2022  --SSI for now, repeat A1c 6.7        History of Acinetobacter  - s/p suppressive minocycline    Discharge Follow Up Recommendations for outpatient labs/diagnostics:  Patient is to follow-up with PCP in 1 week.  He is to follow-up with his Coumadin clinic on Wednesday 06/19/2024.  Patient is follow-up with cardiology within 2 to 4 weeks.    Day of Discharge     HPI:   Patient sitting upright in bed.  Bowel movement this morning.  Denies nausea vomiting fevers chills.  Tolerating diet.  Patient is ready for discharge.      Vital Signs:   Temp:  [97.7 °F (36.5 °C)-97.9 °F (36.6 °C)] 97.7 °F (36.5 °C)  Heart Rate:  [35-69] 51  Resp:  [16-20] 18  BP: (113-169)/(32-78) 136/78      Physical Exam:  Constitutional:  male no acute distress, awake, alert  HENT: NCAT, mucous membranes moist  Respiratory: Clear to auscultation bilaterally, respiratory effort normal   Cardiovascular: Bradycardia no murmurs, rubs, or gallops  Gastrointestinal: Positive bowel sounds, soft, nontender, full  Musculoskeletal: No bilateral ankle edema  Psychiatric: Appropriate affect, cooperative  Neurologic: Oriented x 3, speech clear  Skin: No rashes, chronic skin changes from chronic antibiotics.    Pertinent  and/or Most Recent Results     LAB RESULTS:      Lab 06/17/24  0335 06/16/24  0421 06/15/24  1203 06/15/24  0822 06/15/24  0437 06/15/24  0316   WBC  --  6.72  --   --   --  12.45*   HEMOGLOBIN  --  10.8*  --   --   --  12.6*   HEMATOCRIT  --  33.8*  --   --   --  41.4   PLATELETS  --  179  --   --   --  256   NEUTROS ABS  --  4.68  --   --   --  10.71*   IMMATURE GRANS (ABS)  --  0.03  --   --   --  0.08*   LYMPHS ABS  --  1.12   --   --   --  0.62*   MONOS ABS  --  0.68  --   --   --  0.97*   EOS ABS  --  0.17  --   --   --  0.02   MCV  --  94.2  --   --   --  96.5   SED RATE  --   --   --   --   --  78*   CRP  --   --   --   --   --  0.84*   LACTATE  --   --   --  1.8 2.7*  --    PROTIME 32.2* 33.6* 29.8*  --   --  25.4*         Lab 06/17/24  1324 06/16/24  0421 06/15/24  0316   SODIUM 135* 138 137   POTASSIUM 4.9 5.2 5.5*   CHLORIDE 102 106 102   CO2 24.0 25.0 25.0   ANION GAP 9.0 7.0 10.0   BUN 29* 40* 40*   CREATININE 0.88 1.27 1.94*   EGFR 94.2 61.9 37.2*   GLUCOSE 134* 133* 256*   CALCIUM 8.5* 7.8* 8.4*   MAGNESIUM  --  1.9  --    HEMOGLOBIN A1C  --   --  6.70*         Lab 06/17/24  1324 06/16/24  0421 06/15/24  0316   TOTAL PROTEIN 7.0 6.3 7.0   ALBUMIN 3.2* 2.9* 3.3*   GLOBULIN  --  3.4 3.7   ALT (SGPT) 93* 132* 170*   AST (SGOT) 76* 167* 338*   BILIRUBIN 0.9 0.8 1.6*   INDIRECT BILIRUBIN 0.6  --   --    BILIRUBIN DIRECT 0.3  --   --    ALK PHOS 290* 305* 395*   LIPASE  --   --  >3,000*         Lab 06/17/24  0335 06/16/24  0421 06/15/24  1203 06/15/24  0316   PROTIME 32.2* 33.6* 29.8* 25.4*   INR 3.10* 3.28* 2.81* 2.29*                 Brief Urine Lab Results  (Last result in the past 365 days)        Color   Clarity   Blood   Leuk Est   Nitrite   Protein   CREAT   Urine HCG        06/15/24 1747 Dark Yellow   Cloudy   Negative   Trace   Negative   100 mg/dL (2+)                 Microbiology Results (last 10 days)       Procedure Component Value - Date/Time    Urine Culture - Urine, Urine, Clean Catch [[id number]]  (Normal) Collected: 06/15/24 1747    Lab Status: Final result Specimen: Urine, Clean Catch Updated: 06/16/24 2025     Urine Culture No growth            CT Abdomen Pelvis Without Contrast    Result Date: 6/15/2024  CT ABDOMEN PELVIS WO CONTRAST Date of Exam: 6/15/2024 3:09 AM EDT Indication: Pain, vomiting, history of bowel obstruction. Comparison: 8/1/2022. Technique: Axial CT images were obtained of the abdomen and pelvis  without the administration of contrast. Reconstructed coronal and sagittal images were also obtained. Automated exposure control and iterative construction methods were used. Findings: The heart is enlarged. There is a partially seen prosthetic aortic valve and the patient is status post median sternotomy. No pericardial effusion. Coronary artery calcifications are present. There are numerous ventral hernias. The majority are small and  contain loops of bowel and/or fat and there is a larger midline ventral hernia at the lower abdominal wall that contains multiple small bowel loops.. There is a particular hernia left of midline containing a small bowel loop best seen on axial image 109  that appears to results in obstruction. The proximal small bowel loops are collapsed and the distal small bowel loops are dilated with fluid levels and mild fecalization of contents. This appears to be the same hernia that resulted in obstruction in 2022 with less distention of small bowel. There is a small bowel anastomosis in the right side of the abdomen and there is a colonic anastomosis in the left side of the abdomen. The colon is otherwise unremarkable. The appendix is normal. The liver is homogeneous. There is cholelithiasis without evidence of acute cholecystitis. There is pancreatic atrophy. The stomach and spleen appear unremarkable. There is a stable fatty nodule at the left adrenal gland measuring 26 mm, likely myelolipoma.. Stable low-density nodule at the right adrenal gland measuring 18 mm consistent with adenoma. Kidneys appear unremarkable. No hydronephrosis or urolithiasis. There is urinary bladder wall thickening, which is unchanged. Prostate gland is normal size. There is mild aortoiliac atherosclerosis. No aneurysm. No acute osseous abnormality or destructive bone lesion. Moderate thoracolumbar spondylosis and mild osteoarthritis of both hips.     Impression: 1. There are numerous ventral hernias. There is a  smaller hernia at the left of midline containing a small bowel loop that appears to result in small bowel obstruction. There are associated dilated fluid filled small bowel loops with fecalized material. 2. Cholelithiasis without evidence of acute cholecystitis. Electronically Signed: Mohamud Coronado MD  6/15/2024 3:57 AM EDT  Workstation ID: [id number]             Results for orders placed during the hospital encounter of 05/11/22    Adult Transthoracic Echo Complete W/ Cont if Necessary Per Protocol    Interpretation Summary  · Left ventricular ejection fraction appears to be 56 - 60%.  · Left ventricular wall thickness is consistent with moderate concentric hypertrophy  · Moderate mitral annular calcification is present  · Mild mitral valve stenosis is present. The mitral valve mean gradient is 5 mmHg. There is moderate thickening of the anterior leaflet subvalvular apparatus.  · There is a 25 mm, [employer] mechanical aortic valve prosthesis present  · The aortic valve peak and mean gradients are within defined limits. The prosthetic aortic valve is normal.  · The right ventricular cavity is mildly dilated.  · Mild tricuspid valve regurgitation is present. Estimated right ventricular systolic pressure from tricuspid regurgitation is normal (<35 mmHg).  · Borderline dilation of the aortic root is present measures 3.9 cm.        Discharge Details        Discharge Medications        Changes to Medications        Instructions Start Date   warfarin 7.5 MG tablet  Commonly known as: COUMADIN  What changed:   medication strength  additional instructions   Indications: Atrial Fibrillation             Continue These Medications        Instructions Start Date   aspirin 81 MG EC tablet   81 mg, Oral, Daily, OTC      atorvastatin 40 MG tablet  Commonly known as: LIPITOR   40 mg, Oral, Nightly      Dexcom G6 Sensor       furosemide 20 MG tablet  Commonly known as: LASIX   20 mg, Oral, Daily      gabapentin 800 MG tablet  Commonly  known as: NEURONTIN   800 mg, Oral, 3 Times Daily      glipizide 5 MG tablet  Commonly known as: GLUCOTROL   5 mg, Oral, 2 Times Daily Before Meals      insulin lispro protamine-insulin lispro (75-25) 100 UNIT/ML suspension injection  Commonly known as: humaLOG 75-25   15 Units, Subcutaneous, 2 Times Daily With Meals      lisinopril 40 MG tablet  Commonly known as: PRINIVIL,ZESTRIL   40 mg, Oral, Daily      minocycline 100 MG capsule  Commonly known as: MINOCIN,DYNACIN   1 capsule, Oral, 2 Times Daily, For 30 days, refilled on 07-08-22      nortriptyline 10 MG capsule  Commonly known as: PAMELOR   10 mg, Oral, Nightly      OneTouch Ultra test strip  Generic drug: glucose blood                Allergies   Allergen Reactions    Sulfa Antibiotics     Amoxicillin Rash     Has tolerated ceftriaxone         Discharge Disposition:  Home or Self Care    Diet:  Hospital:No active diet order      Diet Instructions       Diet: Cardiac Diets, Diabetic Diets; Healthy Heart (2-3 Na+); Regular (IDDSI 7); Thin (IDDSI 0); Consistent Carbohydrate      Discharge Diet:  Cardiac Diets  Diabetic Diets       Cardiac Diet: Healthy Heart (2-3 Na+)    Texture: Regular (IDDSI 7)    Fluid Consistency: Thin (IDDSI 0)    Diabetic Diet: Consistent Carbohydrate                  CODE STATUS:    Code Status and Medical Interventions:   Ordered at: 06/15/24 0938     Medical Intervention Limits:    No intubation (DNI)     Level Of Support Discussed With:    Patient     Code Status (Patient has no pulse and is not breathing):    CPR (Attempt to Resuscitate)     Medical Interventions (Patient has pulse or is breathing):    Limited Support       Future Appointments   Date Time Provider Department Center   7/9/2024  9:40 AM Nevaeh Nixon APRN [facility]   1/28/2025 10:45 AM Arun Jackson MD [facility]       Additional Instructions for the Follow-ups that You Need to Schedule       Discharge Follow-up with PCP   As directed        Currently Documented PCP:    America Everett DO    PCP Phone Number:    [phone]     Follow Up Details: within one week        Discharge Follow-up with Specialty: Dr. Jackson; 2 Weeks   As directed      Specialty: Dr. Jackson   Follow Up: 2 Weeks        Discharge Follow-up with Specified Provider: Follow up in Coumadin clinic on Wednesday 06/19/2024   As directed      To: Follow up in Coumadin clinic on Wednesday 06/19/2024                      Lashawn Sierra MD  06/17/24      Time Spent on Discharge:  I spent  35  minutes on this discharge activity which included: face-to-face encounter with the patient, reviewing the data in the system, coordination of the care with the nursing staff as well as consultants, documentation, and entering orders.

## 2024-06-17 NOTE — PROGRESS NOTES
"Patient Name:  Cecilio Farias  YOB: 1955  [id number]    Surgery Progress Note    Date of visit: 6/17/2024    Subjective   Feels well, no acute issues overnight. Having bowel function with gas and bowel movements. Denies pain.         Objective       /78 (BP Location: Left arm, Patient Position: Sitting)   Pulse 51   Temp 97.7 °F (36.5 °C) (Oral)   Resp 18   Ht 190.5 cm (75\")   Wt 126 kg (277 lb 9.6 oz)   SpO2 97%   BMI 34.70 kg/m²     Intake/Output Summary (Last 24 hours) at 6/17/2024 0952  Last data filed at 6/17/2024 0800  Gross per 24 hour   Intake 1040 ml   Output 3890 ml   Net -2850 ml       General: Alert, oriented x 3, well-appearing, no acute distress  Cardiovascular: regular rate and rhythm  Pulmonary: Breathing comfortably on room air, no respiratory distress  Abdomen: Soft, non-tender, non-distended, Large vertical mid-line incision is well-healed, multiple ventral hernia defects without evidence of incarceration and extending into pannus    Recent labs and imaging that are back at this time have been reviewed.     Labs:    Results from last 7 days   Lab Units 06/16/24  0421   WBC 10*3/mm3 6.72   HEMOGLOBIN g/dL 10.8*   HEMATOCRIT % 33.8*   PLATELETS 10*3/mm3 179     Results from last 7 days   Lab Units 06/16/24  0421   SODIUM mmol/L 138   POTASSIUM mmol/L 5.2   CHLORIDE mmol/L 106   CO2 mmol/L 25.0   BUN mg/dL 40*   CREATININE mg/dL 1.27   CALCIUM mg/dL 7.8*   BILIRUBIN mg/dL 0.8   ALK PHOS U/L 305*   ALT (SGPT) U/L 132*   AST (SGOT) U/L 167*   GLUCOSE mg/dL 133*     Results from last 7 days   Lab Units 06/16/24  0421   SODIUM mmol/L 138   POTASSIUM mmol/L 5.2   CHLORIDE mmol/L 106   CO2 mmol/L 25.0   BUN mg/dL 40*   CREATININE mg/dL 1.27   GLUCOSE mg/dL 133*   CALCIUM mg/dL 7.8*     Lab Results   Lab Value Date/Time    LIPASE >3,000 (H) 06/15/2024 0316    LIPASE 43 08/01/2022 1038    LIPASE 46 05/11/2022 0904    LIPASE 37 04/30/2022 2041    LIPASE 48 11/16/2021 2331        "     Assessment & Plan   Cecilio Farias is a 68 y.o. male presenting with a recurrent small bowel obstruction 2/2 an incarcerated ventral incisional hernia through one of the many swiss-cheese defects from his mid-line from his prior colectomy. This has now resolved with spontaneous reduction of the hernia. Doing well without symptoms currently.    Problem List Items Addressed This Visit       * (Principal) SBO (small bowel obstruction) - Primary     Other Visit Diagnoses       Acute hyperkalemia        Acute kidney injury                Recurrent SBO, resolved  - Okay for discharge from surgery standpoint  - Patient unlikely to be a candidate for elective repair as well given his comorbidities, follow-up PRN  - Appreciate medical assistance with management  - Surgery to sign off      Sony Figueroa MD  6/17/2024  09:52 EDT

## 2024-06-17 NOTE — DISCHARGE INSTR - APPOINTMENTS
Per  patient   he goes to  [facility]  lab to have  blood drawn for [facility] and they send results to dr irby

## 2024-06-17 NOTE — CASE MANAGEMENT/SOCIAL WORK
Discharge Planning Assessment  [facility]     Patient Name: Cecilio Farias  MRN: [id number]  Today's Date: 6/17/2024    Admit Date: 6/15/2024    Plan: home   Discharge Needs Assessment       Row Name 06/17/24 1213       Living Environment    People in Home spouse;child(marysol), adult    Current Living Arrangements home    Potentially Unsafe Housing Conditions none    In the past 12 months has the electric, gas, oil, or water company threatened to shut off services in your home? No    Primary Care Provided by self    Provides Primary Care For no one    Family Caregiver if Needed none    Quality of Family Relationships involved;supportive    Able to Return to Prior Arrangements yes       Resource/Environmental Concerns    Resource/Environmental Concerns none    Transportation Concerns none       Transportation Needs    In the past 12 months, has lack of transportation kept you from medical appointments or from getting medications? no    In the past 12 months, has lack of transportation kept you from meetings, work, or from getting things needed for daily living? No       Food Insecurity    Within the past 12 months, you worried that your food would run out before you got the money to buy more. Never true    Within the past 12 months, the food you bought just didn't last and you didn't have money to get more. Never true       Transition Planning    Patient/Family Anticipates Transition to home with family    Patient/Family Anticipated Services at Transition none    Transportation Anticipated family or friend will provide       Discharge Needs Assessment    Readmission Within the Last 30 Days no previous admission in last 30 days    Equipment Currently Used at Home walker, rolling    Concerns to be Addressed discharge planning    Anticipated Changes Related to Illness none    Equipment Needed After Discharge none                   Discharge Plan       Row Name 06/17/24 1215       Plan    Plan home    Patient/Family in  Agreement with Plan yes    Plan Comments Met with Mr. Farias at the bedside to inititate discharge plan. He shares a home in [place] with his wife and adult child. He is independent with activities of daily living and occasionally uses a rolling walking for mobility. He denies having other DME. He is not current with home health or outpatient services. His discharge plan is home with family, and they will pick him up from the hospital.  will continue to follow plan of care and assist with discharge planning needs as indicated.    Final Discharge Disposition Code 01 - home or self-care                  Continued Care and Services - Admitted Since 6/15/2024    No active coordination exists for this encounter.       Expected Discharge Date and Time       Expected Discharge Date Expected Discharge Time    Jun 17, 2024            Demographic Summary       Row Name 06/17/24 1212       General Information    Admission Type observation    Arrived From home    Referral Source admission list    Reason for Consult discharge planning    Preferred Language English       Contact Information    Permission Granted to Share Info With     Contact Information Obtained for     Contact Information Comments Delaney Farias Spouse [phone]  GERSON FARIAS Son   [phone]  Puneet Farias Son [phone]                   Functional Status       Row Name 06/17/24 1213       Functional Status    Usual Activity Tolerance good    Current Activity Tolerance good       Physical Activity    On average, how many days per week do you engage in moderate to strenuous exercise (like a brisk walk)? 0 days    On average, how many minutes do you engage in exercise at this level? 0 min    Number of minutes of exercise per week 0       Assessment of Health Literacy    How often do you have someone help you read hospital materials? Never    How often do you have problems learning about your medical  condition because of difficulty understanding written information? Never    How often do you have a problem understanding what is told to you about your medical condition? Never    How confident are you filling out medical forms by yourself? Extremely    Health Literacy Good       Functional Status, IADL    Medications independent    Meal Preparation independent    Housekeeping independent    Laundry independent    Shopping independent       Mental Status    General Appearance WDL WDL       Mental Status Summary    Recent Changes in Mental Status/Cognitive Functioning no changes                   Psychosocial    No documentation.                  Abuse/Neglect    No documentation.                  Legal    No documentation.                  Substance Abuse    No documentation.                  Patient Forms    No documentation.                     María Bazan RN

## 2024-06-17 NOTE — PLAN OF CARE
Goal Outcome Evaluation:  Plan of Care Reviewed With: patient, spouse, son        Progress: improving       Problem: Adult Inpatient Plan of Care  Goal: Plan of Care Review  Outcome: Ongoing, Progressing  Flowsheets (Taken 6/17/2024 0135)  Progress: improving  Plan of Care Reviewed With:   patient   spouse   son  Goal: Patient-Specific Goal (Individualized)  Outcome: Ongoing, Progressing  Goal: Absence of Hospital-Acquired Illness or Injury  Outcome: Ongoing, Progressing  Intervention: Identify and Manage Fall Risk  Recent Flowsheet Documentation  Taken 6/16/2024 2000 by Ellen Martinez RN  Safety Promotion/Fall Prevention: safety round/check completed  Intervention: Prevent Skin Injury  Recent Flowsheet Documentation  Taken 6/16/2024 2000 by Ellen Martinez RN  Body Position: weight shifting  Skin Protection:   adhesive use limited   tubing/devices free from skin contact   transparent dressing maintained   skin-to-skin areas padded   skin-to-device areas padded  Intervention: Prevent and Manage VTE (Venous Thromboembolism) Risk  Recent Flowsheet Documentation  Taken 6/16/2024 2000 by Ellen Martinez RN  Activity Management: activity encouraged  VTE Prevention/Management: (lovenox/warfarin)   bilateral   sequential compression devices off  Range of Motion:   active ROM (range of motion) encouraged   ROM (range of motion) performed  Intervention: Prevent Infection  Recent Flowsheet Documentation  Taken 6/16/2024 2000 by Ellen Martinez RN  Infection Prevention: rest/sleep promoted  Goal: Optimal Comfort and Wellbeing  Outcome: Ongoing, Progressing  Intervention: Provide Person-Centered Care  Recent Flowsheet Documentation  Taken 6/16/2024 2000 by Ellen Martinez RN  Trust Relationship/Rapport:   care explained   choices provided   emotional support provided   empathic listening provided   questions answered   questions encouraged   reassurance provided   thoughts/feelings acknowledged  Goal: Readiness for  Transition of Care  Outcome: Ongoing, Progressing     Problem: Skin Injury Risk Increased  Goal: Skin Health and Integrity  Outcome: Ongoing, Progressing  Intervention: Optimize Skin Protection  Recent Flowsheet Documentation  Taken 6/16/2024 2000 by Ellen Martinez RN  Pressure Reduction Techniques: frequent weight shift encouraged  Head of Bed (HOB) Positioning: HOB at 20-30 degrees  Pressure Reduction Devices: positioning supports utilized  Skin Protection:   adhesive use limited   tubing/devices free from skin contact   transparent dressing maintained   skin-to-skin areas padded   skin-to-device areas padded     Problem: Fall Injury Risk  Goal: Absence of Fall and Fall-Related Injury  Outcome: Ongoing, Progressing  Intervention: Promote Injury-Free Environment  Recent Flowsheet Documentation  Taken 6/16/2024 2000 by Ellen Martinez RN  Safety Promotion/Fall Prevention: safety round/check completed

## 2024-06-17 NOTE — NURSING NOTE
At the beginning of the shift, pt was having a HR as low as 38 bpm. EKG obtained. First was low infarct but the second showed afib with low ventricular response. Pt had no s/s and BP was WNL. Called Zelda CASILLAS to notify and mag was checked. Called again to state the results of the EKG.     Pt continued to have low HR throughout shift while sleeping/resting.    Around 0430 am pt had HR of 34-35 bpm, pt was asleep, asymptomatic. Called Dr. Zuniga to see if cardiology needed to be consulted.

## 2024-06-18 ENCOUNTER — TELEPHONE (OUTPATIENT)
Dept: PHARMACY | Facility: HOSPITAL | Age: 69
End: 2024-06-18
Payer: MEDICARE

## 2024-06-18 NOTE — TELEPHONE ENCOUNTER
Called Mr. Farias today to follow-up since hospital discharge yesterday 6/17. LVM to confirm patient is to recheck INR on 6/19.     Jesica Middleton, PharmD  06/18/24   16:16 EDT

## 2024-06-18 NOTE — OUTREACH NOTE
Prep Survey      Flowsheet Row Responses   [facility] facility patient discharged from? [facility]   Is LACE score < 7 ? No   Eligibility Readm Mgmt   Discharge diagnosis SBO (small bowel obstruction)   Does the patient have one of the following disease processes/diagnoses(primary or secondary)? Other   Does the patient have Home health ordered? No   Is there a DME ordered? No   Prep survey completed? Yes            Norma SALTER - Registered Nurse

## 2024-06-21 ENCOUNTER — ANTICOAGULATION VISIT (OUTPATIENT)
Dept: PHARMACY | Facility: HOSPITAL | Age: 69
End: 2024-06-21
Payer: MEDICARE

## 2024-06-21 ENCOUNTER — READMISSION MANAGEMENT (OUTPATIENT)
Dept: CALL CENTER | Facility: HOSPITAL | Age: 69
End: 2024-06-21
Payer: MEDICARE

## 2024-06-21 LAB — INR PPP: 2

## 2024-06-21 NOTE — PROGRESS NOTES
Anticoagulation Clinic - Remote Progress Note  REMOTE LAB     Indication: mechanical AVR  Referring Provider: Arun Jackson (12/17/2021)  Initial Warfarin Start Date: 2009  Goal INR: 2.5-3.5  Current Drug Interactions: aspirin     Diet: not much GLV 1/9/2023  Alcohol: none  Tobacco: none  OTC Pain Medication:     INR History:  Date 8/2 10/26 12/21 1/19/18 2/20 3/27 4/27 5/16 6/11 7/16 9/20 11/2   Total Weekly Dose 62.5mg 62.5mg 62.5mg 62.5mg 62.5mg 62.5mg 62.5mg 62.5mg 62.5mg 62.5mg 62.5mg 62.5mg   INR 2.2 2.9 3.2 3.0 2.9 3.4 3.7 2.9 2.6 2.9 2.9 3.2   Notes             sick                Date 12/21 2/1/19 3/11 4/24 5/23 6/24 7/11 7/18 7/22 8/15 9/9 9/25   Total Weekly Dose 62.5mg 62.5mg 62.5mg 62.5mg 62.5mg 62.5mg 52.5mg 55mg 57.5mg 57.5mg 57.5mg 57.5mg   INR 3.4 3.34 2.8 3.0 3.3 3.6 2.52 2.27 2.9 2.9 2.5 2.6   Notes recv'd 12/26       self report; recent hosp; Augmentin Merrem post-disch for sepsis merrem merrem 1x incr dose, merrem            Date 11/8 12/30 1/24/20 4/13 6/2 7/8 8/31 10/13 12/1 1/14/21 2/2 3/4   Total Weekly Dose 57.5mg 57.5mg 57.5mg 57.5mg 57.5mg 57.5mg 57.5mg 60mg 60mg 60mg 60mg 60mg   INR 2.4 2.4 3.7 2.2 2.9 2.3 2.2 2.8 3.5 3.5 3.23 2.4   Notes   incr GLV     nerve control 911 supplement recv'd 7/9  incr GLV recv'd 9/2; incr GLV       incr GLV incr GLV      Date 3/30   5/10 6/21 8/3   9/27   11/16-19 12/15   3/8   Total WeeklyDose 60mg non- compliant 60mg 60mg 60mg non- compliant 60mg non-  compliant [facility] admit 60mg Non compliance 60 mg   INR 2.5   2.8 3.6 3.12   3.1     3.2   2.9   Notes 1xboost     Less GLV         2x hold &  2x boost              Date 4/15  5/11-5/13  5/18 6/7   8/9 8/12 9/6 10/4 11/14 1/6/23  2/8  4/11   Total Weekly Dose 60 mg  [facility] Papi   45 mg  60 mg  hospital admission 65mg 70mg 60 mg 60 mg 60 mg 60mg  60 mg Non- compliance 60mg   INR 2.8    1.9 2.76 Hold x 4 days 1.6 3.5 3.1 2.7 2.8 2.1  2.9  2.2   Notes rec'd 4/18 No bridge heldx2     lovenox   rec 9/13   rec 11/15 rec  "1/6  rec 2/10  1x boost, unable to reach until 2/22  Rec'd 4/13      Date  6/13 8/3/23 10/10  1/9 3/11  5/9 5/17/24 6/21/24    Total WeeklyDose Non-compliant 60 mg 60 mg 60 mg  Non compliant 60 mg 60 mg Non-compliant with recheck 60 mg 70 mg 60 mg    INR  2.7 2.6 2.56  2.5 2.4  1.6 2.0 2.0    Notes  Rec 6/14 rec 8/4     Unable to talk until 10/12  Rec 1/10 Rec'd 3/19  Spoke to pt 5/14 Lovenox,   Boost x 2 Post hospital d/c (see below)      **will take minocycline 100mg BID indefinitely**      Warfarin Dosing During Admission:  Date  6/15 6/16 6/17               INR  2.29 3.28 3.1               Dose  10 mg 7.5 mg (7.5 mg)                 Phone Interview:  Tablet Strength: 5mg tabs  Patient Contact Info: [phone] (Home); [phone] (Mobile)  Lab Contact Info: [facility] [phone]  Verbal release 7/23/19 may speak with Delaney (wife), Puneet or Ty (sons)- ok to [facility]      Patient Findings    Positives: Change in health, Hospital admission   Negatives: Signs/symptoms of thrombosis, Signs/symptoms of bleeding, Laboratory test error suspected, Change in alcohol use, Change in activity, Upcoming invasive procedure, Emergency department visit, Upcoming dental procedure, Missed doses, Extra doses, Change in medications, Change in diet/appetite, Bruising, Other complaints   Comments: Hospital admission [facility] 6/15-6/17: \"Cecilio Farias is a 68 y.o. male with a history of ventral hernia, colon cancer status post colectomy, diabetes, CHF presenting with abdominal pain and found to have an acute SBO. \"  No medication changes made. Patient reports he has been eating his normal diet since returning home.        Plan:  1. INR was subtherapeutic today at 2.0 (goal 2.5-3.5). Instructed Mr. Farias to boost dose tomorrow to warfarin 10 mg, otherwise continue previous dose of warfarin 7.5 mg daily except 10 mg MonWedFri until recheck.   2. Repeat INR on 6/26/24. Pt agreed to recheck date   3. Stressed the " importance of staying compliant and getting his INR checked regularly. Told him we have been having trouble getting in touch with him historically.   4.Verbal information provided over the phone. Cecilio Farias RBV dosing instructions, expresses understanding by teach back, and has no further questions at this time.    Jesica Middleton, [facility]  06/21/24  14:20 EDT

## 2024-06-21 NOTE — OUTREACH NOTE
Medical Week 1 Survey      Flowsheet Row Responses   [facility] patient discharged from? [facility]   Does the patient have one of the following disease processes/diagnoses(primary or secondary)? Other   Week 1 attempt successful? No   Unsuccessful attempts Attempt 1            JAYSON LOCKHART - Registered Nurse

## 2024-06-26 ENCOUNTER — READMISSION MANAGEMENT (OUTPATIENT)
Dept: CALL CENTER | Facility: HOSPITAL | Age: 69
End: 2024-06-26
Payer: MEDICARE

## 2024-06-26 NOTE — OUTREACH NOTE
Medical Week 1 Survey      Flowsheet Row Responses   [facility] patient discharged from? [facility]   Does the patient have one of the following disease processes/diagnoses(primary or secondary)? Other   Week 1 attempt successful? No   Unsuccessful attempts Attempt 2            Petrona GIBBONS - Registered Nurse

## 2024-06-28 LAB — INR PPP: 2.7

## 2024-07-01 ENCOUNTER — ANTICOAGULATION VISIT (OUTPATIENT)
Dept: PHARMACY | Facility: HOSPITAL | Age: 69
End: 2024-07-01
Payer: MEDICARE

## 2024-07-01 NOTE — PROGRESS NOTES
Anticoagulation Clinic - Remote Progress Note  REMOTE LAB     Indication: mechanical AVR  Referring Provider: Arun Jackson (12/17/2021)  Initial Warfarin Start Date: 2009  Goal INR: 2.5-3.5  Current Drug Interactions: aspirin     Diet: not much GLV 1/9/2023  Alcohol: none  Tobacco: none  OTC Pain Medication:     INR History:  Date 8/2 10/26 12/21 1/19/18 2/20 3/27 4/27 5/16 6/11 7/16 9/20 11/2   Total Weekly Dose 62.5mg 62.5mg 62.5mg 62.5mg 62.5mg 62.5mg 62.5mg 62.5mg 62.5mg 62.5mg 62.5mg 62.5mg   INR 2.2 2.9 3.2 3.0 2.9 3.4 3.7 2.9 2.6 2.9 2.9 3.2   Notes             sick                Date 12/21 2/1/19 3/11 4/24 5/23 6/24 7/11 7/18 7/22 8/15 9/9 9/25   Total Weekly Dose 62.5mg 62.5mg 62.5mg 62.5mg 62.5mg 62.5mg 52.5mg 55mg 57.5mg 57.5mg 57.5mg 57.5mg   INR 3.4 3.34 2.8 3.0 3.3 3.6 2.52 2.27 2.9 2.9 2.5 2.6   Notes recv'd 12/26       self report; recent hosp; Augmentin Merrem post-disch for sepsis merrem merrem 1x incr dose, merrem            Date 11/8 12/30 1/24/20 4/13 6/2 7/8 8/31 10/13 12/1 1/14/21 2/2 3/4   Total Weekly Dose 57.5mg 57.5mg 57.5mg 57.5mg 57.5mg 57.5mg 57.5mg 60mg 60mg 60mg 60mg 60mg   INR 2.4 2.4 3.7 2.2 2.9 2.3 2.2 2.8 3.5 3.5 3.23 2.4   Notes   incr GLV     nerve control 911 supplement recv'd 7/9  incr GLV recv'd 9/2; incr GLV       incr GLV incr GLV      Date 3/30   5/10 6/21 8/3   9/27   11/16-19 12/15   3/8   Total WeeklyDose 60mg non- compliant 60mg 60mg 60mg non- compliant 60mg non-  compliant [facility] admit 60mg Non compliance 60 mg   INR 2.5   2.8 3.6 3.12   3.1     3.2   2.9   Notes 1xboost     Less GLV         2x hold &  2x boost              Date 4/15  5/11-5/13  5/18 6/7   8/9 8/12 9/6 10/4 11/14 1/6/23  2/8  4/11   Total Weekly Dose 60 mg  BH Papi   45 mg  60 mg  hospital admission 65mg 70mg 60 mg 60 mg 60 mg 60mg  60 mg Non- compliance 60mg   INR 2.8    1.9 2.76 Hold x 4 days 1.6 3.5 3.1 2.7 2.8 2.1  2.9  2.2   Notes rec'd 4/18 No bridge heldx2     lovenox   rec 9/13   rec 11/15 rec  1/6  rec 2/10  1x boost, unable to reach until 2/22  Rec'd 4/13      Date  6/13 8/3/23 10/10  1/9 3/11  5/9 5/17/24 6/21/24 6/28/24   Total WeeklyDose Non-compliant 60 mg 60 mg 60 mg  Non compliant 60 mg 60 mg Non-compliant with recheck 60 mg 70 mg 60 mg 60 mg   INR  2.7 2.6 2.56  2.5 2.4  1.6 2.0 2.0 2.7   Notes  Rec 6/14 rec 8/4     Unable to talk until 10/12  Rec 1/10 Rec'd 3/19  Spoke to pt 5/14 Lovenox,   Boost x 2 Post hospital d/c (see below) Rec'd 7/1     **will take minocycline 100mg BID indefinitely**      Warfarin Dosing During Admission:  Date  6/15 6/16 6/17               INR  2.29 3.28 3.1               Dose  10 mg 7.5 mg (7.5 mg)                 Phone Interview:  Tablet Strength: 5mg tabs  Patient Contact Info: [phone] (Home); [phone] (Mobile)  Lab Contact Info: [facility] [phone]  Verbal release 7/23/19 may speak with Delaney (wife), Puneet or Ty (sons)- ok to [facility]    Patient Findings    Negatives: Signs/symptoms of thrombosis, Signs/symptoms of bleeding, Laboratory test error suspected, Change in health, Change in alcohol use, Change in activity, Upcoming invasive procedure, Emergency department visit, Upcoming dental procedure, Missed doses, Extra doses, Change in medications, Change in diet/appetite, Hospital admission, Bruising, Other complaints   Comments: All findings negative per Mr. Farias      Plan:  1. INR was therapeutic at 2.7 on 6/28 (goal 2.5-3.5). Unable to contact until 7/2. Instructed Mr. Farias to continue previous dose of warfarin 7.5 mg daily except 10 mg MonWedFri until recheck.   2. Repeat INR on 7/12/24. Pt agreed to recheck date.   3. Stressed the importance of staying compliant and getting his INR checked regularly. Told him we have been having trouble getting in touch with him historically.   4.Verbal information provided over the phone. Cecilio Farias RBV dosing instructions, expresses understanding by teach back, and has no further questions  at this time.    Jesica Middleton, [facility]  07/01/24  14:20 EDT

## 2024-07-11 ENCOUNTER — READMISSION MANAGEMENT (OUTPATIENT)
Dept: CALL CENTER | Facility: HOSPITAL | Age: 69
End: 2024-07-11
Payer: MEDICARE

## 2024-07-11 NOTE — OUTREACH NOTE
Medical Week 3 Survey      Flowsheet Row Responses   [facility] patient discharged from? [facility]   Does the patient have one of the following disease processes/diagnoses(primary or secondary)? Other   Week 3 attempt successful? Yes   Call start time 1250   Call end time 1251   Discharge diagnosis SBO (small bowel obstruction)   Is patient permission given to speak with other caregiver? Yes   List who call center can speak with Ty badillo   Person spoke with today (if not patient) and relationship Ty son   Meds reviewed with patient/caregiver? Yes   Is the patient taking all medications as directed (includes completed medication regime)? Yes   Comments regarding appointments Cardiology apt in the future per son   Does the patient have a primary care provider?  Yes   Has the patient kept scheduled appointments due by today? N/A   What is the patient's perception of their health status since discharge? Improving   Is the patient/caregiver able to teach back signs and symptoms related to disease process for when to call PCP? Yes   Is the patient/caregiver able to teach back signs and symptoms related to disease process for when to call 911? Yes   Is the patient/caregiver able to teach back the hierarchy of who to call/visit for symptoms/problems? PCP, Specialist, Home health nurse, Urgent Care, ED, 911 Yes   If the patient is a current smoker, are they able to teach back resources for cessation? Not a smoker   Week 3 Call Completed? Yes   Graduated Yes   Graduated/Revoked comments Pt's son reports no issues or concerns   Call end time 1251            Neeta H - Registered Nurse

## 2024-07-18 DIAGNOSIS — Z95.2 HISTORY OF MECHANICAL AORTIC VALVE REPLACEMENT: ICD-10-CM

## 2024-07-18 RX ORDER — WARFARIN SODIUM 5 MG/1
TABLET ORAL
Qty: 144 TABLET | Refills: 0 | Status: SHIPPED | OUTPATIENT
Start: 2024-07-18

## 2024-08-20 ENCOUNTER — OFFICE VISIT (OUTPATIENT)
Age: 69
End: 2024-08-20
Payer: MEDICARE

## 2024-08-20 ENCOUNTER — TELEPHONE (OUTPATIENT)
Dept: PHARMACY | Facility: HOSPITAL | Age: 69
End: 2024-08-20

## 2024-08-20 VITALS
WEIGHT: 294.3 LBS | SYSTOLIC BLOOD PRESSURE: 160 MMHG | DIASTOLIC BLOOD PRESSURE: 70 MMHG | BODY MASS INDEX: 37.77 KG/M2 | HEIGHT: 74 IN

## 2024-08-20 DIAGNOSIS — M25.512 LEFT SHOULDER PAIN, UNSPECIFIED CHRONICITY: ICD-10-CM

## 2024-08-20 DIAGNOSIS — S42.292A CLOSED 3-PART FRACTURE OF PROXIMAL HUMERUS, LEFT, INITIAL ENCOUNTER: Primary | ICD-10-CM

## 2024-08-20 PROCEDURE — 3077F SYST BP >= 140 MM HG: CPT

## 2024-08-20 PROCEDURE — 1159F MED LIST DOCD IN RCRD: CPT

## 2024-08-20 PROCEDURE — 1160F RVW MEDS BY RX/DR IN RCRD: CPT

## 2024-08-20 PROCEDURE — 3078F DIAST BP <80 MM HG: CPT

## 2024-08-20 PROCEDURE — 99204 OFFICE O/P NEW MOD 45 MIN: CPT

## 2024-08-20 RX ORDER — OXYCODONE HYDROCHLORIDE 5 MG/1
CAPSULE ORAL
COMMUNITY
Start: 2024-08-10

## 2024-08-20 RX ORDER — POTASSIUM CHLORIDE 1500 MG/1
20 TABLET, EXTENDED RELEASE ORAL DAILY
COMMUNITY

## 2024-08-20 RX ORDER — CHLORTHALIDONE 25 MG/1
TABLET ORAL
COMMUNITY
Start: 2024-08-09

## 2024-08-20 RX ORDER — CARVEDILOL 3.12 MG/1
TABLET ORAL
COMMUNITY

## 2024-08-20 RX ORDER — HYDROCODONE BITARTRATE AND ACETAMINOPHEN 5; 325 MG/1; MG/1
TABLET ORAL
COMMUNITY
Start: 2024-08-15

## 2024-08-20 NOTE — PROGRESS NOTES
[facility] [place] Orthopaedic Surgery Office Visit - Megan Wilson PA-C    Office Visit       Patient Name: Cecilio Farias    Chief Complaint:   Chief Complaint   Patient presents with    Left Shoulder - Pain       Referring Physician: Provider, No Known    History of Present Illness:   Cecilio Farias is a 68 y.o. male who presents with left shoulder injury.  Date of injury 8/9/2024.  He says he tripped at home and fell onto the left shoulder. He was seen by Dr. Kayode Aguilar at [facility] in [place] on 8/15/24. He was told he had a displaced fracture. He was referred to Dr. Luz for further evaluation given displacement. He has been wearing a sling. Pain controlled.     Takes warfarin for mechanical heart valve. Significant bruising and swelling since injury. Improving slowly.    Here today with supportive son and granddaughter. He has another son who is a PT.    Right hand dominant.      Subjective     Review of Systems   Constitutional: Negative.    HENT: Negative.     Eyes: Negative.    Respiratory: Negative.     Cardiovascular: Negative.    Gastrointestinal: Negative.    Endocrine: Negative.    Genitourinary: Negative.    Musculoskeletal:  Positive for arthralgias.   Skin: Negative.    Allergic/Immunologic: Negative.    Neurological: Negative.    Hematological: Negative.    Psychiatric/Behavioral: Negative.          Past Medical History:   Past Medical History:   Diagnosis Date    Cancer     colon cancer with operation/ chemotherapy/radiation     CHF (congestive heart failure) 2009    Acute Systolic     Chronic anticoagulation     coumadin    Diabetes mellitus     H/O aortic valve replacement     HLD (hyperlipidemia)     Hypertension     Sepsis due to Acinetobacter species 6/13/2019       Past Surgical History:   Past Surgical History:   Procedure Laterality Date    AORTIC VALVE REPAIR/REPLACEMENT  2009    25 mm. [place] AVR    ASCENDING AORTIC  ANEURYSM REPAIR  2009    BACK SURGERY      CARDIAC CATHETERIZATION N/A 1/20/2021    Procedure: RIGHT AND LEFT HEART CATH;  Surgeon: Kurtis Smith MD;  Location: [facility] CATH INVASIVE LOCATION;  Service: Cardiology;  Laterality: N/A;    COLON SURGERY      Colon cancer with operation    FOOT SURGERY      bilateral 2/2 wound and trauma        Family History:   Family History   Problem Relation Age of Onset    Heart disease Mother     Hyperlipidemia Mother     Diabetes Mother     Leukemia Father     Cancer Sister     Kidney failure Brother     Hepatitis Brother     Cancer Sister     Cancer Brother     Heart disease Brother     Heart attack Brother        Social History:   Social History     Socioeconomic History    Marital status:    Tobacco Use    Smoking status: Never    Smokeless tobacco: Never   Vaping Use    Vaping status: Never Used   Substance and Sexual Activity    Alcohol use: No    Drug use: No    Sexual activity: Defer       Medications:   Current Outpatient Medications:     aspirin 81 MG EC tablet, Take 1 tablet by mouth Daily. OTC, Disp: , Rfl:     atorvastatin (LIPITOR) 40 MG tablet, Take 1 tablet by mouth Every Night., Disp: , Rfl:     carvedilol (COREG) 3.125 MG tablet, , Disp: , Rfl:     chlorthalidone (HYGROTON) 25 MG tablet, , Disp: , Rfl:     Continuous Blood Gluc Sensor (Dexcom G6 Sensor), , Disp: , Rfl:     furosemide (LASIX) 20 MG tablet, Take 1 tablet by mouth Daily., Disp: 90 tablet, Rfl: 2    gabapentin (NEURONTIN) 800 MG tablet, Take 1 tablet by mouth 3 (Three) Times a Day., Disp: , Rfl:     glipizide (GLUCOTROL) 5 MG tablet, Take 1 tablet by mouth 2 (Two) Times a Day Before Meals., Disp: , Rfl:     insulin lispro protamine-insulin lispro (humaLOG 75-25) (75-25) 100 UNIT/ML suspension injection, Inject 15 Units under the skin into the appropriate area as directed 2 (Two) Times a Day With Meals., Disp: , Rfl:     lisinopril (PRINIVIL,ZESTRIL) 40 MG tablet, Take 1 tablet by mouth  "Daily., Disp: , Rfl:     minocycline (MINOCIN,DYNACIN) 100 MG capsule, Take 1 capsule by mouth 2 (Two) Times a Day. For 30 days, refilled on 07-08-22, Disp: , Rfl:     nortriptyline (PAMELOR) 10 MG capsule, Take 1 capsule by mouth Every Night., Disp: , Rfl:     OneTouch Ultra test strip, , Disp: , Rfl:     potassium chloride ER (K-TAB) 20 MEQ tablet controlled-release ER tablet, Take 1 tablet by mouth Daily., Disp: , Rfl:     warfarin (COUMADIN) 5 MG tablet, TAKE 1 AND 1/2 TABLET TO 2 TABLETS BY MOUTH OR AS DIRECTED BY [facility], Disp: 144 tablet, Rfl: 0    HYDROcodone-acetaminophen (NORCO) 5-325 MG per tablet, , Disp: , Rfl:     oxyCODONE (OXY-IR) 5 MG capsule, , Disp: , Rfl:     warfarin (COUMADIN) 7.5 MG tablet, Indications: Atrial Fibrillation, Disp: , Rfl:     Allergies:   Allergies   Allergen Reactions    Sulfa Antibiotics     Amoxicillin Rash     Has tolerated ceftriaxone       I have reviewed and updated the following portions of the patient's history and review of systems: allergies, current medications, past family history, past medical history, past social history, past surgical history and problem list.    Objective      Vital Signs:   Vitals:    08/20/24 1147   BP: 160/70   Weight: 133 kg (294 lb 4.8 oz)   Height: 188 cm (74\")       Ortho Exam:  General: no acute distress, comfortable  Vitals reviewed in chart    Musculoskeletal Exam:    SIDE: Left shoulder    Tenderness: Mild tenderness at greater tuberosity  No obvious deformity or step off  Significant ecchymosis present along chest wall and down left upper extremity  No open wounds or abrasions  Swelling of hand present    Neurovascularly intact  2+ radial pulse  No evidence of septic joint      Results Review:   XR Shoulder 2+ View Left  Imaging: shoulder x-rays 3 views - AP, axillary, and scapular-Y x-ray   views    Side: LEFT SHOULDER    Indication for shoulder x-ray 3 views: shoulder pain    Comparison: " Outside hospital post injury images/comparison views available    Findings: Left shoulder proximal humeral fracture shows a three-part   proximal humeral fracture with displacement of the greater tuberosity.  No   acute changes noted when compared to the prior outside hospital   images-displacement the greater tuberosity was noted at that time as well.    I personally reviewed the above x-rays discussed with VINNY New and discussed with the patient and his family with regards to   the severity of the injury and greater tuberosity displacement.         Assessment / Plan      Assessment:  Diagnoses and all orders for this visit:    1. Closed 3-part fracture of proximal humerus, left, initial encounter (Primary)    2. Left shoulder pain, unspecified chronicity  -     XR Shoulder 2+ View Left        Quality Metrics:   BMI:   BMI is >= 30 and <35. (Class 1 Obesity). The following options were offered after discussion;: weight loss educational material (shared in after visit summary)       Tobacco:   Cecilio Farias  reports that he has never smoked. He has never used smokeless tobacco.          Plan:  X-ray images left shoulder personally reviewed and interpreted today with Dr. Luz.  Evidence of three-part fracture involving the surgical neck and greater tuberosity.  Surgical neck component appears nondisplaced however the tuberosity has some displacement present. Compared to prior images the alignment appears better.  Discussed operative and nonoperative treatment options.  Given his complex medical comorbidities including warfarin use, it is recommended to continue with nonoperative management. We feel the fractures will heal appropriately and provide sufficient function based on current alignment. He is happy to avoid surgery at this time and hopeful he will do well.   Recommend ultrasling for positioning and comfort.  Recommend tylenol as needed.  Recommend keeping left hand elevated for  swelling.    History, diagnosis and treatment plan discussed with Dr. Luz.      Follow Up:   2 weeks with 2+ views left shoulder        Megan Wilson PA-C  [facility]       Dictated using Dragon Speech Recognition.

## 2024-08-30 DIAGNOSIS — Z95.2 HISTORY OF MECHANICAL AORTIC VALVE REPLACEMENT: Primary | ICD-10-CM

## 2024-09-20 ENCOUNTER — TELEPHONE (OUTPATIENT)
Dept: PHARMACY | Facility: HOSPITAL | Age: 69
End: 2024-09-20

## 2024-09-20 NOTE — TELEPHONE ENCOUNTER
Called patient informing him he is overdue for an INR check. LVM in patient's voicemail.     Zeina Galeano CPhT   15:28 EDT 9/20/2024

## 2024-09-24 ENCOUNTER — TELEPHONE (OUTPATIENT)
Dept: ORTHOPEDIC SURGERY | Facility: CLINIC | Age: 69
End: 2024-09-24
Payer: MEDICARE

## 2024-10-01 ENCOUNTER — OFFICE VISIT (OUTPATIENT)
Age: 69
End: 2024-10-01
Payer: MEDICARE

## 2024-10-01 VITALS
BODY MASS INDEX: 37.5 KG/M2 | HEIGHT: 74 IN | SYSTOLIC BLOOD PRESSURE: 132 MMHG | WEIGHT: 292.2 LBS | DIASTOLIC BLOOD PRESSURE: 66 MMHG

## 2024-10-01 DIAGNOSIS — S42.292A CLOSED 3-PART FRACTURE OF PROXIMAL HUMERUS, LEFT, INITIAL ENCOUNTER: Primary | ICD-10-CM

## 2024-10-01 DIAGNOSIS — M25.512 LEFT SHOULDER PAIN, UNSPECIFIED CHRONICITY: ICD-10-CM

## 2024-10-01 RX ORDER — INSULIN LISPRO 100 [IU]/ML
15 INJECTION, SUSPENSION SUBCUTANEOUS
COMMUNITY
Start: 2024-09-25

## 2024-10-01 RX ORDER — FUROSEMIDE 40 MG
40 TABLET ORAL DAILY
COMMUNITY
Start: 2024-09-08
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
## 2024-10-04 ENCOUNTER — TELEPHONE (OUTPATIENT)
Dept: PHARMACY | Facility: HOSPITAL | Age: 69
End: 2024-10-04

## 2024-10-04 NOTE — TELEPHONE ENCOUNTER
Called patient informing him he is overdue for an INR check. Patient has not had INR checked since July 2024. LVM in patient's voicemail on mobile number. Sending unable to contact letter.      Zeina Galeano CPhT   14:41 EDT 10/4/2024      Patient called back and reports that he has switched cardiologists. [facility]in managed by them.     Zeina Galeano CPhT   14:59 EDT 10/4/2024

## (undated) DEVICE — SWAN-GANZ THERMODILUTION CATHETER: Brand: SWAN-GANZ

## (undated) DEVICE — PK CATH CARD 10

## (undated) DEVICE — INTRO SHEATH ENGAGE TR SS/STD .025 6F 12CM

## (undated) DEVICE — CATH DIAG EXPO M/ PK 6FR FL4/FR4 PIG 3PK

## (undated) DEVICE — CATH DIAG EXPO .056 FL3.5 6F 100CM

## (undated) DEVICE — ST INF PRI SMRTSTE 20DRP 2VLV 24ML 117

## (undated) DEVICE — INTRO SHEATH ENGAGE W/50 GW .038 8F12

## (undated) DEVICE — KT MANIFOLD CATHLAB CUST

## (undated) DEVICE — ANGIO-SEAL EVOLUTION VASCULAR CLOSURE DEVICE: Brand: ANGIO-SEAL

## (undated) DEVICE — GW FC J TFE/COAT .025 3MM 180CM

## (undated) DEVICE — Device

## (undated) DEVICE — DEV COMP RAD PRELUDESYNC 24CM